# Patient Record
Sex: FEMALE | Race: ASIAN | NOT HISPANIC OR LATINO | Employment: OTHER | URBAN - METROPOLITAN AREA
[De-identification: names, ages, dates, MRNs, and addresses within clinical notes are randomized per-mention and may not be internally consistent; named-entity substitution may affect disease eponyms.]

---

## 2018-01-17 ENCOUNTER — SURGERY - HEALTHEAST (OUTPATIENT)
Dept: GASTROENTEROLOGY | Facility: HOSPITAL | Age: 83
End: 2018-01-17

## 2018-01-18 ASSESSMENT — MIFFLIN-ST. JEOR: SCORE: 960.71

## 2018-01-19 ENCOUNTER — AMBULATORY - HEALTHEAST (OUTPATIENT)
Dept: OTHER | Facility: CLINIC | Age: 83
End: 2018-01-19

## 2018-01-19 ENCOUNTER — COMMUNICATION - HEALTHEAST (OUTPATIENT)
Dept: NEUROSURGERY | Facility: CLINIC | Age: 83
End: 2018-01-19

## 2018-01-19 DIAGNOSIS — S22.080A T12 COMPRESSION FRACTURE (H): ICD-10-CM

## 2018-01-19 ASSESSMENT — MIFFLIN-ST. JEOR: SCORE: 961.62

## 2018-01-20 ENCOUNTER — HOME CARE/HOSPICE - HEALTHEAST (OUTPATIENT)
Dept: HOME HEALTH SERVICES | Facility: HOME HEALTH | Age: 83
End: 2018-01-20

## 2018-01-22 ENCOUNTER — HOME CARE/HOSPICE - HEALTHEAST (OUTPATIENT)
Dept: HOME HEALTH SERVICES | Facility: HOME HEALTH | Age: 83
End: 2018-01-22

## 2018-01-25 ENCOUNTER — HOME CARE/HOSPICE - HEALTHEAST (OUTPATIENT)
Dept: HOME HEALTH SERVICES | Facility: HOME HEALTH | Age: 83
End: 2018-01-25

## 2018-01-26 ENCOUNTER — HOME CARE/HOSPICE - HEALTHEAST (OUTPATIENT)
Dept: HOME HEALTH SERVICES | Facility: HOME HEALTH | Age: 83
End: 2018-01-26

## 2018-01-29 ENCOUNTER — HOME CARE/HOSPICE - HEALTHEAST (OUTPATIENT)
Dept: HOME HEALTH SERVICES | Facility: HOME HEALTH | Age: 83
End: 2018-01-29

## 2018-01-31 ENCOUNTER — HOME CARE/HOSPICE - HEALTHEAST (OUTPATIENT)
Dept: HOME HEALTH SERVICES | Facility: HOME HEALTH | Age: 83
End: 2018-01-31

## 2018-02-01 ENCOUNTER — HOME CARE/HOSPICE - HEALTHEAST (OUTPATIENT)
Dept: HOME HEALTH SERVICES | Facility: HOME HEALTH | Age: 83
End: 2018-02-01

## 2018-02-05 ENCOUNTER — OFFICE VISIT - HEALTHEAST (OUTPATIENT)
Dept: NEUROSURGERY | Facility: CLINIC | Age: 83
End: 2018-02-05

## 2018-02-05 ENCOUNTER — HOSPITAL ENCOUNTER (OUTPATIENT)
Dept: RADIOLOGY | Facility: HOSPITAL | Age: 83
Discharge: HOME | End: 2018-02-05
Attending: NEUROLOGICAL SURGERY

## 2018-02-05 DIAGNOSIS — S22.080A T12 COMPRESSION FRACTURE (H): ICD-10-CM

## 2018-02-05 DIAGNOSIS — S22.000A THORACIC COMPRESSION FRACTURE (H): ICD-10-CM

## 2018-02-08 ENCOUNTER — HOME CARE/HOSPICE - HEALTHEAST (OUTPATIENT)
Dept: HOME HEALTH SERVICES | Facility: HOME HEALTH | Age: 83
End: 2018-02-08

## 2018-03-05 ENCOUNTER — OFFICE VISIT - HEALTHEAST (OUTPATIENT)
Dept: NEUROSURGERY | Facility: CLINIC | Age: 83
End: 2018-03-05

## 2018-03-05 DIAGNOSIS — S22.080A T12 COMPRESSION FRACTURE (H): ICD-10-CM

## 2018-03-13 ENCOUNTER — HOSPITAL ENCOUNTER (OUTPATIENT)
Dept: RADIOLOGY | Facility: HOSPITAL | Age: 83
Discharge: HOME | End: 2018-03-13

## 2018-03-13 ENCOUNTER — OFFICE VISIT - HEALTHEAST (OUTPATIENT)
Dept: NEUROSURGERY | Facility: CLINIC | Age: 83
End: 2018-03-13

## 2018-03-13 DIAGNOSIS — S22.000A THORACIC COMPRESSION FRACTURE (H): ICD-10-CM

## 2018-03-13 DIAGNOSIS — S22.080A T12 COMPRESSION FRACTURE (H): ICD-10-CM

## 2018-03-14 ENCOUNTER — OFFICE VISIT - HEALTHEAST (OUTPATIENT)
Dept: NEUROSURGERY | Facility: CLINIC | Age: 83
End: 2018-03-14

## 2018-03-14 DIAGNOSIS — S22.080A T12 COMPRESSION FRACTURE (H): ICD-10-CM

## 2018-03-14 ASSESSMENT — MIFFLIN-ST. JEOR: SCORE: 960.71

## 2018-03-23 ENCOUNTER — HOME CARE/HOSPICE - HEALTHEAST (OUTPATIENT)
Dept: HOME HEALTH SERVICES | Facility: HOME HEALTH | Age: 83
End: 2018-03-23

## 2018-03-25 ENCOUNTER — HOME CARE/HOSPICE - HEALTHEAST (OUTPATIENT)
Dept: HOME HEALTH SERVICES | Facility: HOME HEALTH | Age: 83
End: 2018-03-25

## 2018-04-17 ENCOUNTER — COMMUNICATION - HEALTHEAST (OUTPATIENT)
Dept: NEUROSURGERY | Facility: CLINIC | Age: 83
End: 2018-04-17

## 2018-04-18 ENCOUNTER — HOSPITAL ENCOUNTER (OUTPATIENT)
Dept: RADIOLOGY | Facility: HOSPITAL | Age: 83
Discharge: HOME | End: 2018-04-18

## 2018-04-18 ENCOUNTER — OFFICE VISIT - HEALTHEAST (OUTPATIENT)
Dept: NEUROSURGERY | Facility: CLINIC | Age: 83
End: 2018-04-18

## 2018-04-18 DIAGNOSIS — S22.080A T12 COMPRESSION FRACTURE (H): ICD-10-CM

## 2018-04-20 ENCOUNTER — HOSPITAL ENCOUNTER (OUTPATIENT)
Dept: MRI IMAGING | Facility: CLINIC | Age: 83
Discharge: HOME | End: 2018-04-20
Attending: INTERPRETER

## 2018-04-20 DIAGNOSIS — S22.080A T12 COMPRESSION FRACTURE (H): ICD-10-CM

## 2018-04-24 ENCOUNTER — OFFICE VISIT - HEALTHEAST (OUTPATIENT)
Dept: NEUROSURGERY | Facility: CLINIC | Age: 83
End: 2018-04-24

## 2018-04-24 DIAGNOSIS — S22.080A T12 COMPRESSION FRACTURE (H): ICD-10-CM

## 2018-04-24 DIAGNOSIS — R25.1 EPISODE OF SHAKING: ICD-10-CM

## 2018-04-27 ENCOUNTER — RECORDS - HEALTHEAST (OUTPATIENT)
Dept: ADMINISTRATIVE | Facility: OTHER | Age: 83
End: 2018-04-27

## 2018-04-27 ENCOUNTER — RECORDS - HEALTHEAST (OUTPATIENT)
Dept: LAB | Facility: HOSPITAL | Age: 83
End: 2018-04-27

## 2018-04-27 LAB
ALBUMIN SERPL-MCNC: 3 G/DL (ref 3.5–5)
ALP SERPL-CCNC: 68 U/L (ref 45–120)
ALT SERPL W P-5'-P-CCNC: 11 U/L (ref 0–45)
ANION GAP SERPL CALCULATED.3IONS-SCNC: 7 MMOL/L (ref 5–18)
AST SERPL W P-5'-P-CCNC: 16 U/L (ref 0–40)
BILIRUB SERPL-MCNC: 0.4 MG/DL (ref 0–1)
BUN SERPL-MCNC: 15 MG/DL (ref 8–28)
CALCIUM SERPL-MCNC: 9.2 MG/DL (ref 8.5–10.5)
CHLORIDE BLD-SCNC: 102 MMOL/L (ref 98–107)
CO2 SERPL-SCNC: 27 MMOL/L (ref 22–31)
CREAT SERPL-MCNC: 0.88 MG/DL (ref 0.6–1.1)
GFR SERPL CREATININE-BSD FRML MDRD: >60 ML/MIN/1.73M2
GLUCOSE BLD-MCNC: 151 MG/DL (ref 70–125)
POTASSIUM BLD-SCNC: 4 MMOL/L (ref 3.5–5)
PROT SERPL-MCNC: 8.5 G/DL (ref 6–8)
SODIUM SERPL-SCNC: 136 MMOL/L (ref 136–145)
TSH SERPL DL<=0.005 MIU/L-ACNC: 3.25 UIU/ML (ref 0.3–5)
VIT B12 SERPL-MCNC: 720 PG/ML (ref 213–816)

## 2018-04-29 LAB — COPPER SERPL-MCNC: 103 UG/DL (ref 80–155)

## 2018-05-04 ENCOUNTER — RECORDS - HEALTHEAST (OUTPATIENT)
Dept: ADMINISTRATIVE | Facility: OTHER | Age: 83
End: 2018-05-04

## 2018-05-04 ENCOUNTER — RECORDS - HEALTHEAST (OUTPATIENT)
Dept: BONE DENSITY | Facility: CLINIC | Age: 83
End: 2018-05-04

## 2018-05-04 DIAGNOSIS — S22.080A WEDGE COMPRESSION FRACTURE OF T11-T12 VERTEBRA, INITIAL ENCOUNTER FOR CLOSED FRACTURE (H): ICD-10-CM

## 2018-05-07 ENCOUNTER — HOSPITAL ENCOUNTER (OUTPATIENT)
Dept: MRI IMAGING | Facility: CLINIC | Age: 83
Discharge: HOME | End: 2018-05-07
Attending: PSYCHIATRY & NEUROLOGY

## 2018-05-07 DIAGNOSIS — R20.9 SENSORY DISTURBANCE: ICD-10-CM

## 2018-06-05 ENCOUNTER — COMMUNICATION - HEALTHEAST (OUTPATIENT)
Dept: NEUROSURGERY | Facility: CLINIC | Age: 83
End: 2018-06-05

## 2018-06-13 ENCOUNTER — AMBULATORY - HEALTHEAST (OUTPATIENT)
Dept: NEUROSURGERY | Facility: CLINIC | Age: 83
End: 2018-06-13

## 2018-06-19 ENCOUNTER — OFFICE VISIT - HEALTHEAST (OUTPATIENT)
Dept: NEUROSURGERY | Facility: CLINIC | Age: 83
End: 2018-06-19

## 2018-06-19 DIAGNOSIS — M25.552 BILATERAL HIP PAIN: ICD-10-CM

## 2018-06-19 DIAGNOSIS — M25.551 BILATERAL HIP PAIN: ICD-10-CM

## 2018-06-19 ASSESSMENT — MIFFLIN-ST. JEOR: SCORE: 870

## 2018-06-29 ENCOUNTER — HOSPITAL ENCOUNTER (OUTPATIENT)
Dept: PHYSICAL MEDICINE AND REHAB | Facility: CLINIC | Age: 83
Discharge: HOME | End: 2018-06-29
Attending: PHYSICAL MEDICINE & REHABILITATION

## 2018-06-29 DIAGNOSIS — M12.88 OTHER SPECIFIC ARTHROPATHIES, NOT ELSEWHERE CLASSIFIED, OTHER SPECIFIED SITE: ICD-10-CM

## 2018-06-29 DIAGNOSIS — M54.50 LUMBAR SPINE PAIN: ICD-10-CM

## 2018-06-29 DIAGNOSIS — M25.551 BILATERAL HIP PAIN: ICD-10-CM

## 2018-06-29 DIAGNOSIS — M48.061 LUMBAR STENOSIS: ICD-10-CM

## 2018-06-29 DIAGNOSIS — R20.2 PARESTHESIAS: ICD-10-CM

## 2018-06-29 DIAGNOSIS — M43.16 SPONDYLOLISTHESIS, LUMBAR REGION: ICD-10-CM

## 2018-06-29 DIAGNOSIS — M25.552 BILATERAL HIP PAIN: ICD-10-CM

## 2018-06-29 DIAGNOSIS — M47.816 ARTHROPATHY OF LUMBAR FACET JOINT: ICD-10-CM

## 2018-06-29 LAB — GLUCOSE BLDC GLUCOMTR-MCNC: 140 MG/DL (ref 70–125)

## 2018-07-13 ENCOUNTER — HOSPITAL ENCOUNTER (OUTPATIENT)
Dept: PHYSICAL MEDICINE AND REHAB | Facility: CLINIC | Age: 83
Discharge: HOME | End: 2018-07-13
Attending: PHYSICIAN ASSISTANT

## 2018-07-13 DIAGNOSIS — M25.552 BILATERAL HIP PAIN: ICD-10-CM

## 2018-07-13 DIAGNOSIS — M54.50 LUMBAR SPINE PAIN: ICD-10-CM

## 2018-07-13 DIAGNOSIS — M25.551 BILATERAL HIP PAIN: ICD-10-CM

## 2018-07-13 DIAGNOSIS — M47.816 ARTHROPATHY OF LUMBAR FACET JOINT: ICD-10-CM

## 2018-07-13 DIAGNOSIS — R20.2 PARESTHESIAS: ICD-10-CM

## 2018-07-13 DIAGNOSIS — S22.080A T12 COMPRESSION FRACTURE (H): ICD-10-CM

## 2018-07-20 ENCOUNTER — HOSPITAL ENCOUNTER (OUTPATIENT)
Dept: MRI IMAGING | Facility: HOSPITAL | Age: 83
Discharge: HOME | End: 2018-07-20
Attending: PHYSICIAN ASSISTANT

## 2018-07-20 DIAGNOSIS — R20.2 PARESTHESIAS: ICD-10-CM

## 2018-07-20 DIAGNOSIS — M12.88 OTHER SPECIFIC ARTHROPATHIES, NOT ELSEWHERE CLASSIFIED, OTHER SPECIFIED SITE: ICD-10-CM

## 2018-07-20 DIAGNOSIS — M25.552 BILATERAL HIP PAIN: ICD-10-CM

## 2018-07-20 DIAGNOSIS — M47.816 ARTHROPATHY OF LUMBAR FACET JOINT: ICD-10-CM

## 2018-07-20 DIAGNOSIS — M25.551 BILATERAL HIP PAIN: ICD-10-CM

## 2018-07-20 DIAGNOSIS — M54.50 LUMBAR SPINE PAIN: ICD-10-CM

## 2018-07-23 ENCOUNTER — COMMUNICATION - HEALTHEAST (OUTPATIENT)
Dept: PHYSICAL MEDICINE AND REHAB | Facility: CLINIC | Age: 83
End: 2018-07-23

## 2018-07-27 ENCOUNTER — HOSPITAL ENCOUNTER (OUTPATIENT)
Dept: PHYSICAL MEDICINE AND REHAB | Facility: CLINIC | Age: 83
Discharge: HOME | End: 2018-07-27
Attending: PHYSICAL MEDICINE & REHABILITATION

## 2018-07-27 DIAGNOSIS — M43.16 SPONDYLOLISTHESIS, LUMBAR REGION: ICD-10-CM

## 2018-07-27 DIAGNOSIS — M48.061 LUMBAR STENOSIS: ICD-10-CM

## 2018-07-27 DIAGNOSIS — M54.50 LUMBAR SPINE PAIN: ICD-10-CM

## 2018-07-27 DIAGNOSIS — S22.080A T12 COMPRESSION FRACTURE (H): ICD-10-CM

## 2018-07-27 ASSESSMENT — MIFFLIN-ST. JEOR: SCORE: 867.73

## 2018-07-31 ENCOUNTER — HOSPITAL ENCOUNTER (OUTPATIENT)
Dept: PHYSICAL MEDICINE AND REHAB | Facility: CLINIC | Age: 83
Discharge: HOME | End: 2018-07-31
Attending: PHYSICAL MEDICINE & REHABILITATION

## 2018-07-31 DIAGNOSIS — M48.061 LUMBAR STENOSIS: ICD-10-CM

## 2018-07-31 DIAGNOSIS — M54.50 LUMBAR SPINE PAIN: ICD-10-CM

## 2018-08-14 ENCOUNTER — HOSPITAL ENCOUNTER (OUTPATIENT)
Dept: PHYSICAL MEDICINE AND REHAB | Facility: CLINIC | Age: 83
Discharge: HOME | End: 2018-08-14
Attending: NURSE PRACTITIONER

## 2018-08-14 DIAGNOSIS — M48.061 SPINAL STENOSIS OF LUMBAR REGION WITHOUT NEUROGENIC CLAUDICATION: ICD-10-CM

## 2018-08-14 DIAGNOSIS — M47.816 ARTHROPATHY OF LUMBAR FACET JOINT: ICD-10-CM

## 2018-08-14 DIAGNOSIS — M54.16 LUMBAR RADICULITIS: ICD-10-CM

## 2018-08-14 DIAGNOSIS — S22.080A T12 COMPRESSION FRACTURE (H): ICD-10-CM

## 2018-08-14 DIAGNOSIS — M43.16 SPONDYLOLISTHESIS, LUMBAR REGION: ICD-10-CM

## 2018-08-16 ENCOUNTER — HOSPITAL ENCOUNTER (OUTPATIENT)
Dept: PHYSICAL MEDICINE AND REHAB | Facility: CLINIC | Age: 83
Discharge: HOME | End: 2018-08-16
Attending: PHYSICAL MEDICINE & REHABILITATION

## 2018-08-16 DIAGNOSIS — M54.16 LUMBAR RADICULITIS: ICD-10-CM

## 2018-08-30 ENCOUNTER — HOSPITAL ENCOUNTER (OUTPATIENT)
Dept: PHYSICAL MEDICINE AND REHAB | Facility: CLINIC | Age: 83
Discharge: HOME | End: 2018-08-30
Attending: NURSE PRACTITIONER

## 2018-08-30 DIAGNOSIS — M54.16 LUMBAR RADICULITIS: ICD-10-CM

## 2018-08-30 DIAGNOSIS — M47.816 ARTHROPATHY OF LUMBAR FACET JOINT: ICD-10-CM

## 2018-08-30 DIAGNOSIS — R29.898 WEAKNESS OF BOTH LOWER EXTREMITIES: ICD-10-CM

## 2018-08-30 DIAGNOSIS — M43.16 SPONDYLOLISTHESIS, LUMBAR REGION: ICD-10-CM

## 2018-08-30 DIAGNOSIS — M48.061 LUMBAR STENOSIS: ICD-10-CM

## 2018-08-30 DIAGNOSIS — R20.2 PARESTHESIAS: ICD-10-CM

## 2018-08-30 DIAGNOSIS — M54.50 LUMBAR SPINE PAIN: ICD-10-CM

## 2018-08-30 DIAGNOSIS — M48.061 SPINAL STENOSIS OF LUMBAR REGION WITHOUT NEUROGENIC CLAUDICATION: ICD-10-CM

## 2018-10-09 ENCOUNTER — HOSPITAL ENCOUNTER (OUTPATIENT)
Dept: RADIOLOGY | Facility: CLINIC | Age: 83
Discharge: HOME | End: 2018-10-09
Attending: NEUROLOGICAL SURGERY

## 2018-10-09 ENCOUNTER — OFFICE VISIT - HEALTHEAST (OUTPATIENT)
Dept: NEUROSURGERY | Facility: CLINIC | Age: 83
End: 2018-10-09

## 2018-10-09 DIAGNOSIS — M25.552 BILATERAL HIP PAIN: ICD-10-CM

## 2018-10-09 DIAGNOSIS — M25.551 BILATERAL HIP PAIN: ICD-10-CM

## 2018-10-09 DIAGNOSIS — M48.062 SPINAL STENOSIS, LUMBAR REGION, WITH NEUROGENIC CLAUDICATION: ICD-10-CM

## 2018-10-09 DIAGNOSIS — S22.080A T12 COMPRESSION FRACTURE (H): ICD-10-CM

## 2018-10-09 ASSESSMENT — MIFFLIN-ST. JEOR: SCORE: 888.14

## 2018-10-23 ENCOUNTER — RECORDS - HEALTHEAST (OUTPATIENT)
Dept: ADMINISTRATIVE | Facility: OTHER | Age: 83
End: 2018-10-23

## 2018-10-24 ENCOUNTER — RECORDS - HEALTHEAST (OUTPATIENT)
Dept: ADMINISTRATIVE | Facility: OTHER | Age: 83
End: 2018-10-24

## 2018-10-30 ENCOUNTER — RECORDS - HEALTHEAST (OUTPATIENT)
Dept: ADMINISTRATIVE | Facility: OTHER | Age: 83
End: 2018-10-30

## 2018-11-01 ENCOUNTER — HOSPITAL ENCOUNTER (OUTPATIENT)
Dept: PHYSICAL MEDICINE AND REHAB | Facility: CLINIC | Age: 83
Discharge: HOME | End: 2018-11-01
Attending: NEUROLOGICAL SURGERY

## 2018-11-01 DIAGNOSIS — M48.062 SPINAL STENOSIS, LUMBAR REGION, WITH NEUROGENIC CLAUDICATION: ICD-10-CM

## 2018-11-02 ENCOUNTER — HOSPITAL ENCOUNTER (OUTPATIENT)
Dept: CARDIOLOGY | Facility: HOSPITAL | Age: 83
Discharge: HOME | End: 2018-11-02
Attending: INTERPRETER

## 2018-11-02 DIAGNOSIS — R60.9 EDEMA: ICD-10-CM

## 2018-11-02 ASSESSMENT — MIFFLIN-ST. JEOR: SCORE: 858.66

## 2018-11-05 ENCOUNTER — RECORDS - HEALTHEAST (OUTPATIENT)
Dept: ADMINISTRATIVE | Facility: OTHER | Age: 83
End: 2018-11-05

## 2018-11-05 LAB
AORTIC ARCH: 2.8 CM
AORTIC ROOT: 3.6 CM
AORTIC VALVE MEAN VELOCITY: 100 CM/S
ASCENDING AORTA: 3.6 CM
AV DIMENSIONLESS INDEX VTI: 0.5
AV MEAN GRADIENT: 5 MMHG
AV PEAK GRADIENT: 7.7 MMHG
AV VALVE AREA: 1.8 CM2
AV VELOCITY RATIO: 0.5
BSA FOR ECHO PROCEDURE: 1.47 M2
CV BLOOD PRESSURE: NORMAL MMHG
CV ECHO HEIGHT: 57 IN
CV ECHO WEIGHT: 119 LBS
DOP CALC AO PEAK VEL: 139 CM/S
DOP CALC AO VTI: 30.7 CM
DOP CALC LVOT AREA: 3.46 CM2
DOP CALC LVOT DIAMETER: 2.1 CM
DOP CALC LVOT PEAK VEL: 62.8 CM/S
DOP CALC LVOT STROKE VOLUME: 55 CM3
DOP CALCLVOT PEAK VEL VTI: 15.9 CM
EJECTION FRACTION: 56 % (ref 55–75)
FRACTIONAL SHORTENING: 41.2 % (ref 28–44)
INTERVENTRICULAR SEPTUM IN END DIASTOLE: 0.95 CM (ref 0.6–0.9)
IVS/PW RATIO: 1.1
LA AREA 1: 14.8 CM2
LA AREA 2: 12.1 CM2
LEFT ATRIUM LENGTH: 3.9 CM
LEFT ATRIUM SIZE: 2.8 CM
LEFT ATRIUM TO AORTIC ROOT RATIO: 0.78 NO UNITS
LEFT ATRIUM VOLUME INDEX: 26.6 ML/M2
LEFT ATRIUM VOLUME: 39 ML
LEFT VENTRICLE CARDIAC INDEX: 2.6 L/MIN/M2
LEFT VENTRICLE CARDIAC OUTPUT: 3.8 L/MIN
LEFT VENTRICLE DIASTOLIC VOLUME INDEX: 35.7 CM3/M2 (ref 34–74)
LEFT VENTRICLE DIASTOLIC VOLUME: 52.5 CM3 (ref 46–106)
LEFT VENTRICLE HEART RATE: 69 BPM
LEFT VENTRICLE MASS INDEX: 81.8 G/M2
LEFT VENTRICLE SYSTOLIC VOLUME INDEX: 15.7 CM3/M2 (ref 11–31)
LEFT VENTRICLE SYSTOLIC VOLUME: 23.1 CM3 (ref 14–42)
LEFT VENTRICULAR INTERNAL DIMENSION IN DIASTOLE: 4.13 CM (ref 3.8–5.2)
LEFT VENTRICULAR INTERNAL DIMENSION IN SYSTOLE: 2.43 CM (ref 2.2–3.5)
LEFT VENTRICULAR MASS: 120.3 G
LEFT VENTRICULAR OUTFLOW TRACT MEAN GRADIENT: 1 MMHG
LEFT VENTRICULAR OUTFLOW TRACT MEAN VELOCITY: 45.5 CM/S
LEFT VENTRICULAR OUTFLOW TRACT PEAK GRADIENT: 2 MMHG
LEFT VENTRICULAR POSTERIOR WALL IN END DIASTOLE: 0.9 CM (ref 0.6–0.9)
LV STROKE VOLUME INDEX: 37.4 ML/M2
MITRAL VALVE DECELERATION SLOPE: 2100 MM/S2
MITRAL VALVE E/A RATIO: 0.6
MITRAL VALVE PRESSURE HALF-TIME: 87 MS
MV AVERAGE E/E' RATIO: 12.6 CM/S
MV DECELERATION TIME: 296 MS
MV E'TISSUE VEL-LAT: 4.74 CM/S
MV E'TISSUE VEL-MED: 5.13 CM/S
MV LATERAL E/E' RATIO: 13.1
MV MEDIAL E/E' RATIO: 12.1
MV PEAK A VELOCITY: 101 CM/S
MV PEAK E VELOCITY: 62.1 CM/S
MV VALVE AREA PRESSURE 1/2 METHOD: 2.5 CM2
NUC REST DIASTOLIC VOLUME INDEX: 1904 LBS
NUC REST SYSTOLIC VOLUME INDEX: 57 IN
RIGHT VENTRICULAR INTERNAL DIMENSION IN DYSTOLE: 3.26 CM
TRICUSPID REGURGITATION PEAK PRESSURE GRADIENT: 18.1 MMHG
TRICUSPID VALVE ANULAR PLANE SYSTOLIC EXCURSION: 1.8 CM
TRICUSPID VALVE PEAK REGURGITANT VELOCITY: 213 CM/S

## 2018-12-11 ENCOUNTER — TRANSFERRED RECORDS (OUTPATIENT)
Dept: HEALTH INFORMATION MANAGEMENT | Facility: CLINIC | Age: 83
End: 2018-12-11

## 2018-12-11 ENCOUNTER — AMBULATORY - HEALTHEAST (OUTPATIENT)
Dept: INFUSION THERAPY | Facility: HOSPITAL | Age: 83
End: 2018-12-11

## 2018-12-11 ENCOUNTER — OFFICE VISIT - HEALTHEAST (OUTPATIENT)
Dept: ONCOLOGY | Facility: HOSPITAL | Age: 83
End: 2018-12-11

## 2018-12-11 DIAGNOSIS — D50.0 ANEMIA DUE TO GI BLOOD LOSS: ICD-10-CM

## 2018-12-11 LAB
BASOPHILS # BLD AUTO: 0.1 THOU/UL (ref 0–0.2)
BASOPHILS NFR BLD AUTO: 1 % (ref 0–2)
EOSINOPHIL # BLD AUTO: 0.2 THOU/UL (ref 0–0.4)
EOSINOPHIL NFR BLD AUTO: 3 % (ref 0–6)
ERYTHROCYTE [DISTWIDTH] IN BLOOD BY AUTOMATED COUNT: 14.6 % (ref 11–14.5)
FERRITIN SERPL-MCNC: 35 NG/ML (ref 10–130)
HCT VFR BLD AUTO: 34 % (ref 35–47)
HGB BLD-MCNC: 11.1 G/DL (ref 12–16)
IGA SERPL-MCNC: 2632 MG/DL
IGA SERPL-MCNC: 619 MG/DL (ref 65–400)
IGM SERPL-MCNC: 96 MG/DL (ref 60–280)
IRON SATN MFR SERPL: 16 % (ref 20–50)
IRON SERPL-MCNC: 47 UG/DL (ref 42–175)
LYMPHOCYTES # BLD AUTO: 2 THOU/UL (ref 0.8–4.4)
LYMPHOCYTES NFR BLD AUTO: 34 % (ref 20–40)
MCH RBC QN AUTO: 33.6 PG (ref 27–34)
MCHC RBC AUTO-ENTMCNC: 32.6 G/DL (ref 32–36)
MCV RBC AUTO: 103 FL (ref 80–100)
MONOCYTES # BLD AUTO: 0.9 THOU/UL (ref 0–0.9)
MONOCYTES NFR BLD AUTO: 16 % (ref 2–10)
NEUTROPHILS # BLD AUTO: 2.6 THOU/UL (ref 2–7.7)
NEUTROPHILS NFR BLD AUTO: 46 % (ref 50–70)
PLATELET # BLD AUTO: 226 THOU/UL (ref 140–440)
PMV BLD AUTO: 8.3 FL (ref 8.5–12.5)
RBC # BLD AUTO: 3.3 MILL/UL (ref 3.8–5.4)
TIBC SERPL-MCNC: 297 UG/DL (ref 313–563)
TRANSFERRIN SERPL-MCNC: 237 MG/DL (ref 212–360)
WBC: 5.7 THOU/UL (ref 4–11)

## 2018-12-13 LAB
EPO SERPL-ACNC: 26 MU/ML (ref 4–27)
KAPPA LC FREE SER-MCNC: 5.75 MG/DL (ref 0.33–1.94)
KAPPA LC FREE/LAMBDA FREE SER NEPH: 0.79 {RATIO} (ref 0.26–1.65)
LAMBDA LC FREE SERPL-MCNC: 7.32 MG/DL (ref 0.57–2.63)

## 2019-01-04 ENCOUNTER — AMBULATORY - HEALTHEAST (OUTPATIENT)
Dept: CARDIOLOGY | Facility: CLINIC | Age: 84
End: 2019-01-04

## 2019-01-04 ENCOUNTER — RECORDS - HEALTHEAST (OUTPATIENT)
Dept: ADMINISTRATIVE | Facility: OTHER | Age: 84
End: 2019-01-04

## 2019-01-11 ENCOUNTER — OFFICE VISIT - HEALTHEAST (OUTPATIENT)
Dept: CARDIOLOGY | Facility: CLINIC | Age: 84
End: 2019-01-11

## 2019-01-11 DIAGNOSIS — I50.30 HEART FAILURE WITH PRESERVED EJECTION FRACTION (H): ICD-10-CM

## 2019-01-11 DIAGNOSIS — I10 ACCELERATED HYPERTENSION: ICD-10-CM

## 2019-01-11 ASSESSMENT — MIFFLIN-ST. JEOR: SCORE: 840.52

## 2019-01-22 ENCOUNTER — OFFICE VISIT - HEALTHEAST (OUTPATIENT)
Dept: ONCOLOGY | Facility: HOSPITAL | Age: 84
End: 2019-01-22

## 2019-01-22 ENCOUNTER — AMBULATORY - HEALTHEAST (OUTPATIENT)
Dept: INFUSION THERAPY | Facility: HOSPITAL | Age: 84
End: 2019-01-22

## 2019-01-22 DIAGNOSIS — D50.0 ANEMIA DUE TO GI BLOOD LOSS: ICD-10-CM

## 2019-01-22 LAB
BASOPHILS # BLD AUTO: 0 THOU/UL (ref 0–0.2)
BASOPHILS NFR BLD AUTO: 0 % (ref 0–2)
EOSINOPHIL # BLD AUTO: 0.1 THOU/UL (ref 0–0.4)
EOSINOPHIL NFR BLD AUTO: 2 % (ref 0–6)
ERYTHROCYTE [DISTWIDTH] IN BLOOD BY AUTOMATED COUNT: 14.9 % (ref 11–14.5)
HCT VFR BLD AUTO: 35 % (ref 35–47)
HGB BLD-MCNC: 11.9 G/DL (ref 12–16)
LYMPHOCYTES # BLD AUTO: 2 THOU/UL (ref 0.8–4.4)
LYMPHOCYTES NFR BLD AUTO: 37 % (ref 20–40)
MCH RBC QN AUTO: 34 PG (ref 27–34)
MCHC RBC AUTO-ENTMCNC: 34 G/DL (ref 32–36)
MCV RBC AUTO: 100 FL (ref 80–100)
MONOCYTES # BLD AUTO: 0.8 THOU/UL (ref 0–0.9)
MONOCYTES NFR BLD AUTO: 14 % (ref 2–10)
NEUTROPHILS # BLD AUTO: 2.5 THOU/UL (ref 2–7.7)
NEUTROPHILS NFR BLD AUTO: 46 % (ref 50–70)
PLATELET # BLD AUTO: 304 THOU/UL (ref 140–440)
PMV BLD AUTO: 8.5 FL (ref 8.5–12.5)
RBC # BLD AUTO: 3.5 MILL/UL (ref 3.8–5.4)
WBC: 5.5 THOU/UL (ref 4–11)

## 2019-05-16 ENCOUNTER — AMBULATORY - HEALTHEAST (OUTPATIENT)
Dept: ADMINISTRATIVE | Facility: REHABILITATION | Age: 84
End: 2019-05-16

## 2019-05-16 DIAGNOSIS — F41.1 GAD (GENERALIZED ANXIETY DISORDER): ICD-10-CM

## 2019-05-16 DIAGNOSIS — M19.049 ARTHRITIS OF HAND: ICD-10-CM

## 2019-07-19 ENCOUNTER — AMBULATORY - HEALTHEAST (OUTPATIENT)
Dept: INFUSION THERAPY | Facility: HOSPITAL | Age: 84
End: 2019-07-19

## 2019-07-19 ENCOUNTER — TRANSFERRED RECORDS (OUTPATIENT)
Dept: HEALTH INFORMATION MANAGEMENT | Facility: CLINIC | Age: 84
End: 2019-07-19

## 2019-07-19 ENCOUNTER — OFFICE VISIT - HEALTHEAST (OUTPATIENT)
Dept: ONCOLOGY | Facility: HOSPITAL | Age: 84
End: 2019-07-19

## 2019-07-19 DIAGNOSIS — C05.1 CANCER OF SOFT PALATE (H): ICD-10-CM

## 2019-07-19 DIAGNOSIS — D50.0 ANEMIA DUE TO GI BLOOD LOSS: ICD-10-CM

## 2019-07-19 LAB
BASOPHILS # BLD AUTO: 0 THOU/UL (ref 0–0.2)
BASOPHILS NFR BLD AUTO: 0 % (ref 0–2)
EOSINOPHIL # BLD AUTO: 0.2 THOU/UL (ref 0–0.4)
EOSINOPHIL NFR BLD AUTO: 2 % (ref 0–6)
ERYTHROCYTE [DISTWIDTH] IN BLOOD BY AUTOMATED COUNT: 14.3 % (ref 11–14.5)
HCT VFR BLD AUTO: 33.5 % (ref 35–47)
HGB BLD-MCNC: 11.2 G/DL (ref 12–16)
LYMPHOCYTES # BLD AUTO: 1.3 THOU/UL (ref 0.8–4.4)
LYMPHOCYTES NFR BLD AUTO: 20 % (ref 20–40)
MCH RBC QN AUTO: 35.3 PG (ref 27–34)
MCHC RBC AUTO-ENTMCNC: 33.4 G/DL (ref 32–36)
MCV RBC AUTO: 106 FL (ref 80–100)
MONOCYTES # BLD AUTO: 1.2 THOU/UL (ref 0–0.9)
MONOCYTES NFR BLD AUTO: 18 % (ref 2–10)
NEUTROPHILS # BLD AUTO: 4 THOU/UL (ref 2–7.7)
NEUTROPHILS NFR BLD AUTO: 60 % (ref 50–70)
PLATELET # BLD AUTO: 240 THOU/UL (ref 140–440)
PMV BLD AUTO: 8.2 FL (ref 8.5–12.5)
RBC # BLD AUTO: 3.17 MILL/UL (ref 3.8–5.4)
WBC: 6.7 THOU/UL (ref 4–11)

## 2019-07-25 ENCOUNTER — TRANSFERRED RECORDS (OUTPATIENT)
Dept: HEALTH INFORMATION MANAGEMENT | Facility: CLINIC | Age: 84
End: 2019-07-25

## 2019-07-25 ENCOUNTER — HOSPITAL ENCOUNTER (OUTPATIENT)
Dept: PET IMAGING | Facility: HOSPITAL | Age: 84
Setting detail: RADIATION/ONCOLOGY SERIES
Discharge: STILL ADMITTED OR ADMINISTRATIVE | End: 2019-07-25
Attending: INTERNAL MEDICINE

## 2019-07-25 DIAGNOSIS — C05.1 CANCER OF SOFT PALATE (H): ICD-10-CM

## 2019-07-25 LAB — GLUCOSE BLDC GLUCOMTR-MCNC: 105 MG/DL (ref 70–139)

## 2019-07-25 ASSESSMENT — MIFFLIN-ST. JEOR: SCORE: 808.76

## 2019-07-29 ENCOUNTER — RECORDS - HEALTHEAST (OUTPATIENT)
Dept: ADMINISTRATIVE | Facility: OTHER | Age: 84
End: 2019-07-29

## 2019-07-29 ENCOUNTER — TRANSFERRED RECORDS (OUTPATIENT)
Dept: HEALTH INFORMATION MANAGEMENT | Facility: CLINIC | Age: 84
End: 2019-07-29

## 2019-08-08 ENCOUNTER — TRANSFERRED RECORDS (OUTPATIENT)
Dept: HEALTH INFORMATION MANAGEMENT | Facility: CLINIC | Age: 84
End: 2019-08-08

## 2019-08-08 ENCOUNTER — AMBULATORY - HEALTHEAST (OUTPATIENT)
Dept: RADIATION ONCOLOGY | Facility: HOSPITAL | Age: 84
End: 2019-08-08

## 2019-08-08 ENCOUNTER — OFFICE VISIT - HEALTHEAST (OUTPATIENT)
Dept: RADIATION ONCOLOGY | Facility: HOSPITAL | Age: 84
End: 2019-08-08

## 2019-08-08 DIAGNOSIS — C05.1 CANCER OF SOFT PALATE (H): ICD-10-CM

## 2019-08-09 ENCOUNTER — TRANSFERRED RECORDS (OUTPATIENT)
Dept: HEALTH INFORMATION MANAGEMENT | Facility: CLINIC | Age: 84
End: 2019-08-09

## 2019-08-14 ENCOUNTER — TRANSFERRED RECORDS (OUTPATIENT)
Dept: HEALTH INFORMATION MANAGEMENT | Facility: CLINIC | Age: 84
End: 2019-08-14

## 2019-08-14 ENCOUNTER — HOSPITAL ENCOUNTER (OUTPATIENT)
Dept: ULTRASOUND IMAGING | Facility: HOSPITAL | Age: 84
Setting detail: RADIATION/ONCOLOGY SERIES
Discharge: STILL ADMITTED OR ADMINISTRATIVE | End: 2019-08-14
Attending: RADIOLOGY

## 2019-08-14 DIAGNOSIS — C05.1 CANCER OF SOFT PALATE (H): ICD-10-CM

## 2019-08-22 ENCOUNTER — TELEPHONE (OUTPATIENT)
Dept: OTOLARYNGOLOGY | Facility: CLINIC | Age: 84
End: 2019-08-22

## 2019-08-22 ENCOUNTER — OFFICE VISIT - HEALTHEAST (OUTPATIENT)
Dept: RADIATION ONCOLOGY | Facility: HOSPITAL | Age: 84
End: 2019-08-22

## 2019-08-22 ENCOUNTER — MEDICAL CORRESPONDENCE (OUTPATIENT)
Dept: HEALTH INFORMATION MANAGEMENT | Facility: CLINIC | Age: 84
End: 2019-08-22

## 2019-08-22 DIAGNOSIS — C05.1 CANCER OF SOFT PALATE (H): ICD-10-CM

## 2019-08-22 NOTE — TELEPHONE ENCOUNTER
LVM with patient through  letting her know of sooner appt Date/Time with Dr. Cruz.  Aso, sent out appt letter.  Left the call center number in case that doesn't work to reschedule with next available.

## 2019-08-22 NOTE — TELEPHONE ENCOUNTER
PT currently scheduled for next available 9/10/19 and needs sooner appt.  Urgent referral and records being faxed to [phone]

## 2019-08-23 LAB
CAP COMMENT: ABNORMAL
LAB AP CHARGES (HE HISTORICAL CONVERSION): ABNORMAL
LAB AP INITIAL CYTO EVAL (HE HISTORICAL CONVERSION): ABNORMAL
LAB MED GENERAL PATH INTERP (HE HISTORICAL CONVERSION): ABNORMAL
PATH REPORT.ADDENDUM SPEC: ABNORMAL
PATH REPORT.COMMENTS IMP SPEC: ABNORMAL
PATH REPORT.COMMENTS IMP SPEC: ABNORMAL
PATH REPORT.FINAL DX SPEC: ABNORMAL
PATH REPORT.MICROSCOPIC SPEC OTHER STN: ABNORMAL
PATH REPORT.MICROSCOPIC SPEC OTHER STN: ABNORMAL
PATH REPORT.RELEVANT HX SPEC: ABNORMAL
RESULT FLAG (HE HISTORICAL CONVERSION): ABNORMAL
SPECIMEN DESCRIPTION: ABNORMAL

## 2019-08-26 NOTE — PROGRESS NOTES
Otolaryngology Clinic      Name: Chris Bell  MRN: [id number]  Age: 84 year old  : 1935  Referring provider: Dai Lai  2019      Chief Complaint:  Consult     History of Present Illness:   The patient's clinic visit was completed with the assistance of a [employer] .   Chris Bell is a 84 year old female who presents for evaluation of a tongue lesion.  The patient reports about 6 months of a painful tongue lesion, leading her to present to the ER in early 2019.  This was biopsied on 6/3/19 with pathology showing inflammation without evidence of malignancy.  CT scan on 19 showed an enhancing infiltrative mass in the left soft palate extending to the left lateral pharyngeal wall and toward the left palatine tonsil highly concerning for a primary mucosal malignancy.  PET scan on 19 revealed marked FDG activity consistent with malignancy.  Biopsy of the soft palate on 19 again showed ulcer with necrosis and inflammation without evidence of malignancy.  Lymph node biopsy on 19 also did not show evidence of malignancy however due to ongoing clinical concern for cancer, she was referred here for further evaluation.  Currently her worst pain is at the back of the throat and on her palate on the left side.     Active Medications:      alum & mag hydroxide-simethicone (ANTACID LIQUID) 200-200-20 MG/5ML SUSP suspension, 4 MLS MIXED WITH 1 ML LIDOCAINE-MAY TAKE 5 ML PO QID, Disp: , Rfl:      calcium carbonate (TUMS) 500 MG chewable tablet, TK  1  TO  2  TS  PO    Q 1  TO 2      H  PRF  STOMACH  PAINS, Disp: , Rfl:      Ferrous Sulfate 324 (65 Fe) MG TBEC, TK 1 T PO BID, Disp: , Rfl:      furosemide (LASIX) 20 MG tablet, TK 1 T  PO  QAM, Disp: , Rfl:      gabapentin (NEURONTIN) 100 MG capsule, Take 200 mg by mouth, Disp: , Rfl:      levothyroxine (SYNTHROID/LEVOTHROID) 50 MCG tablet, Take 50 mcg by mouth, Disp: , Rfl:      lidocaine (XYLOCAINE) 2 % solution, 1 ML MIXED WITH 4 ML RULOX-MAY  TAKE 5 ML BY MOUTH QID FOR STOMACH PAIN., Disp: , Rfl:      lisinopril (PRINIVIL/ZESTRIL) 5 MG tablet, TK 1 T  PO Q D, Disp: , Rfl:      metoprolol succinate ER (TOPROL-XL) 25 MG 24 hr tablet, Take 25 mg by mouth, Disp: , Rfl:      Multiple Vitamin (DAILY-CHRIS) TABS, TK 1 T  PO  QD, Disp: , Rfl:      omeprazole (PRILOSEC) 20 MG DR capsule, TK 1 C  PO  QD  BEFORE  EATING, Disp: , Rfl:      ranitidine (ZANTAC) 300 MG tablet, TK 1 T  PO  QD  TO  PREVENT  ACID, Disp: , Rfl:      sucralfate (CARAFATE) 1 GM tablet, Take 1 g by mouth, Disp: , Rfl:      traZODone (DESYREL) 50 MG tablet, TK 1 T PO QHS, Disp: , Rfl:      triamcinolone (KENALOG) 0.1 % paste, LISANDRO    SMALL     AMOUNT  BID      UTD    TO  MOUTH, Disp: , Rfl:      vitamin B-12 (CYANOCOBALAMIN) 250 MCG tablet, TK 1  T PO QD, Disp: , Rfl:      acetaminophen (TYLENOL) 500 MG tablet, TAKE 1 PILL (500 MG) EVERY 6 HOURS AS NEEDED/ YOG MOB TXHUA 6 TEEV NOJ 1 LUB, Disp: , Rfl: 0     benzocaine (ORAJEL/ANBESOL) 10 % gel, , Disp: , Rfl:      calcium carbonate (TUMS) 500 MG chewable tablet, TK  1  TO  2  TS  PO    Q 1  TO 2      H  PRF  STOMACH  PAINS, Disp: , Rfl: 3     diclofenac (VOLTAREN) 1 % topical gel, LISANDRO    2  GRAMS   AA     TID  PRN  P, Disp: , Rfl: 1     fentaNYL (DURAGESIC) 12 mcg/hr 72 hr patch, PLACE  1   PATCH  ON  THE  SKIN    Q  THIRD  DAY, Disp: , Rfl: 0     LORazepam (ATIVAN) 0.5 MG tablet, 1 TB BY MOUTH EVERY 6-12 HOURS AS NEEDED FOR ANXIETY, Disp: , Rfl: 0     magnesium hydroxide (MILK OF MAGNESIA) 400 MG/5ML suspension, Take 30 mLs by mouth, Disp: , Rfl:      Magnesium Oxide -Mg Supplement 250 MG tablet, TK 1  T  PO QD, Disp: , Rfl: 3     mirtazapine (REMERON) 15 MG tablet, TK 1 T  PO HS, Disp: , Rfl: 5     oxyCODONE (ROXICODONE) 5 MG/5ML solution, 5-10 ML MLS EVERY 6 HOURS AS NEEDED FOR PAIN RELIEF IN TONGUE, Disp: , Rfl: 0     potassium chloride ER (K-DUR/KLOR-CON M) 20 MEQ CR tablet, Take 20 mEq by mouth, Disp: , Rfl:       Allergies:   No clinical  screening - see comments      Past Medical History:  Hypothyroidism  Hypokalemia   Bleeding ulcer  Heart failure with preserved ejection fraction   Kyphosis of thoracic region   C-spine compression fracture  T12 compression fracture   Hypertension   Osteoporosis  Peptic ulcer disease      Past Surgical History:  FNA lymph node 8/14/19  Esophagogastroduodenoscopy   Hysterectomy     Family History:   History reviewed. No pertinent family history.       Social History:   Presents to clinic with her daughter and law and grandson  Tobacco Use: No previous or current tobacco use.   Alcohol Use: No alcohol use.   PCP: INOCENCIA PRINGLE     Review of Systems:   Pertinent items are noted in HPI or as in patient entered ROS below, remainder of complete ROS is negative.   Answers for HPI/ROS submitted by the patient on 8/27/2019   If you checked off any problems, how difficult have these problems made it for you to do your work, take care of things at home, or get along with other people?: Very difficult  PHQ9 TOTAL SCORE: 20     Physical Exam:   /86 (BP Location: Right arm, Patient Position: Sitting, Cuff Size: Adult Small)   Pulse 88   Resp 14   Ht 1.524 m (5')      Constitutional:  The patient was accompanied, well-groomed, and in no acute distress.    Skin:  Warm and pink.    Neurologic:  Alert and oriented x 3.  CN's III-XII within normal limits.  Voice normal.   Psychiatric:  The patient's affect was calm, cooperative, and appropriate.    Respiratory:  Breathing comfortably without stridor or exertion of accessory muscles.    Eyes: Extraocular movement intact.    Head:  Normocephalic and atraumatic.  No lesions or scars.    OC/OP:  Normal tongue, floor of mouth, buccal mucosa.  Dime sized ulcer deep in the left side of the soft palate.  Neck:  Supple with normal laryngeal and tracheal landmarks.  The parotid beds were without masses.  No palpable thyroid.  Lymphatic:  There is no palpable lymphadenopathy in  the neck.     Data:  Soft tissue neck CT 7/1/19:  CONCLUSION:   1.  Findings highly concerning for primary mucosal malignancy such as squamous cell carcinoma involving the left aspect of the soft palate. Enhancing abnormality extends laterally and abuts the medial margin of the left medial pterygoid muscle as above.  2.  For line size and mildly enlarged hyperenhancing left level 2 cervical lymph nodes concerning for moises metastasis.    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  FDG avid bilateral axillary, mediastinal, and hilar lymph nodes could all be reactive, less likely metastatic.    Left lateral tongue ulcer biopsy 6/3/19:  A) TONGUE, LEFT LATERAL ULCER, BIOPSY:  1. Ulcer base with granulation tissue, fibrin, and acute inflammation  2. Negative for vasculitis and vessel thrombi  3. Negative for malignancy  4. See comment    Immunohistochemical staining for CMV, HSV I and II fail to reveal virally infected cells  A GMS stain is performed and fails to reveal fungal hyphae.    The histologic appearance is of a major aphthous ulcer (> 1 cm in size, often multiple lesions, present greater than 2 weeks). In these situations the most common cause is HSV, but in this case HSV studies are negative. The remaining differential diagnosis is diverse and includes causes such as neutropenias and nutritional deficiencies (B vitamins, iron, folate, zinc), oral presentations of Celiac or Crohn's disease (often with significant GI symptoms), immunodeficiencies (IgA, HIV) or immune dysregulation (Behcet's syndrome - usually multiple lesions of the soft palate and oropharynx), or drug reactions. Clinical correlation is required.     Left soft palate biopsy 7/29/19:  A) LEFT SOFT PALATE, BIOPSY:  1.  Ulcer with necrosis and marked acute inflammation  2.  Associated benign squamous mucosa with reactive  changes  3.  Negative for evidence of fungus, Herpes, CMV, Treponema, and Julio-Barr virus, see microscopic description  4.  See comment    I reviewed Dr. Joy's procedure note and discussed this patient's situation in detail with him. I understand that this lesion is clinically very suspicious for malignancy. The reactive changes seen in this biopsy do not necessarily rule out an unsampled malignancy in this area. Specific features are not seen in this biopsy. Thus, unfortunately, a cause for the inflammatory process is not readily apparent.    Lymph node, left side of neck FNA 8/14/19:  LYMPH NODE, LEFT SIDE OF NECK, FINE NEEDLE ASPIRATION:    - MIXED POPULATION OF T-CELLS AND B-CELLS WITH FOCAL EVIDENCE OF ACUTE LYMPHADENITIS     - NO EVIDENCE OF MALIGNANCY     - PLEASE SEE COMMENT    Pap and Diff-Quik-stained smears prepared from aspirates of lymph node show a mixed population of mainly small lymphocytes, occasional centroblasts, occasional neutrophils and eosinophils. H&E stained cell block sections show similar cellular elements including a rare aggregate of lymphocytes with imbedded neutrophils, raising the possibility of acute infection and possibly granulomatous inflammation.     Due to the clinical concern for the possibility of neoplasia, immunohistochemical stains have been performed to rule out the possibility of lymphoma and carcinoma. The results are as follows:      - Cytokeratin-7: Negative    - p63: Negative (no diagnostic staining)    - [id number]: Negative    - CD56: Rare positive lymphocytes are identified    - CD20: Positive in B-cells, slight minority of lymphocytes    - CD3: Positive in T-cells, slight majority of lymphocytes    - Cyclin D1: Negative    - Bcl-2: Positive in T-cells     - Bcl-6: Positive in scattered lymphocytes    - CD5: Positive in T-cells    In addition, an in-situ hybridization probe for Julio-Barr virus (SHIMA) is performed, and it is negative.     Assessment and  Plan:  Chris Bell is a 84 year old female here for consultation regarding a tongue/soft palate lesion.  While her imaging is concerning for possible malignancy, 3 separate biopsies have not identified any definitive evidence of malignancy.  It is possible this could represent infection or inflammation.  Area of ulceration was painted with venetian violet today.  Will have her use magic mouthwash with steroid, nystatin, and Benadryl for the next 3 weeks and then see her back for reevaluation.          Scribe Disclosure:  I, Karen Thompson, am serving as a scribe to document services personally performed by Salazar Cruz MD at this visit, based upon the provider's statements to me. All documentation has been reviewed by the aforementioned provider prior to being entered into the official medical record.

## 2019-08-27 ENCOUNTER — OFFICE VISIT (OUTPATIENT)
Dept: OTOLARYNGOLOGY | Facility: CLINIC | Age: 84
End: 2019-08-27
Payer: COMMERCIAL

## 2019-08-27 VITALS
RESPIRATION RATE: 14 BRPM | DIASTOLIC BLOOD PRESSURE: 86 MMHG | SYSTOLIC BLOOD PRESSURE: 129 MMHG | HEART RATE: 88 BPM | HEIGHT: 60 IN

## 2019-08-27 DIAGNOSIS — L43.9 LICHEN PLANUS: Primary | ICD-10-CM

## 2019-08-27 RX ORDER — FUROSEMIDE 20 MG
TABLET ORAL
COMMUNITY
Start: 2018-10-18 | End: 2022-01-01

## 2019-08-27 RX ORDER — MIRTAZAPINE 15 MG/1
TABLET, FILM COATED ORAL
Refills: 5 | COMMUNITY
Start: 2019-06-18 | End: 2022-01-01

## 2019-08-27 RX ORDER — CALCIUM CARBONATE/VITAMIN D3 600 MG-10
TABLET ORAL
COMMUNITY
Start: 2019-06-18 | End: 2022-01-01

## 2019-08-27 RX ORDER — TRAZODONE HYDROCHLORIDE 50 MG/1
TABLET, FILM COATED ORAL
COMMUNITY
Start: 2019-06-09 | End: 2022-01-01

## 2019-08-27 RX ORDER — MULTIVITAMIN WITH FOLIC ACID 400 MCG
TABLET ORAL
COMMUNITY
Start: 2019-05-31 | End: 2022-01-01

## 2019-08-27 RX ORDER — LISINOPRIL 5 MG/1
TABLET ORAL
COMMUNITY
Start: 2018-10-23 | End: 2022-01-01

## 2019-08-27 RX ORDER — FERROUS SULFATE 324(65)MG
TABLET, DELAYED RELEASE (ENTERIC COATED) ORAL
COMMUNITY
Start: 2019-04-11 | End: 2022-01-01

## 2019-08-27 RX ORDER — SUCRALFATE 1 G/1
1 TABLET ORAL
COMMUNITY
Start: 2019-04-03 | End: 2022-01-01

## 2019-08-27 RX ORDER — TRIAMCINOLONE ACETONIDE 0.1 %
PASTE (GRAM) DENTAL
COMMUNITY
Start: 2019-06-24 | End: 2022-01-01

## 2019-08-27 RX ORDER — ISOPROPYL ALCOHOL 0.75 G/1
SWAB TOPICAL
Refills: 0 | COMMUNITY
Start: 2018-09-27 | End: 2022-01-01

## 2019-08-27 RX ORDER — BLOOD SUGAR DIAGNOSTIC
STRIP MISCELLANEOUS
Refills: 2 | COMMUNITY
Start: 2018-09-27 | End: 2022-01-01

## 2019-08-27 RX ORDER — CALCIUM CARBONATE 500 MG/1
TABLET, CHEWABLE ORAL
COMMUNITY
Start: 2019-03-14 | End: 2021-10-27

## 2019-08-27 RX ORDER — LIDOCAINE HYDROCHLORIDE 20 MG/ML
SOLUTION OROPHARYNGEAL
COMMUNITY
Start: 2019-05-15 | End: 2021-10-27

## 2019-08-27 RX ORDER — OXYCODONE HCL 5 MG/5 ML
SOLUTION, ORAL ORAL
Refills: 0 | COMMUNITY
Start: 2019-08-15 | End: 2021-10-27

## 2019-08-27 RX ORDER — LORAZEPAM 0.5 MG/1
0.5 TABLET ORAL
Refills: 0 | Status: ON HOLD | COMMUNITY
Start: 2019-08-13 | End: 2022-01-01

## 2019-08-27 RX ORDER — LEVOTHYROXINE SODIUM 50 UG/1
50 TABLET ORAL EVERY MORNING
COMMUNITY
Start: 2019-03-29

## 2019-08-27 RX ORDER — ACETAMINOPHEN 500 MG
TABLET ORAL
Refills: 0 | COMMUNITY
Start: 2018-12-14 | End: 2021-08-18

## 2019-08-27 RX ORDER — PNV NO.95/FERROUS FUM/FOLIC AC 28MG-0.8MG
TABLET ORAL
Refills: 3 | COMMUNITY
Start: 2019-04-11 | End: 2021-10-27 | Stop reason: DRUGHIGH

## 2019-08-27 RX ORDER — GABAPENTIN 100 MG/1
200 CAPSULE ORAL
COMMUNITY
Start: 2018-08-30 | End: 2021-10-27

## 2019-08-27 RX ORDER — MAGNESIUM HYDROXIDE/ALUMINUM HYDROXICE/SIMETHICONE 120; 1200; 1200 MG/30ML; MG/30ML; MG/30ML
SUSPENSION ORAL
COMMUNITY
Start: 2019-01-15 | End: 2021-10-27

## 2019-08-27 RX ORDER — CALCIUM CARBONATE 500 MG/1
2 TABLET, CHEWABLE ORAL
Refills: 3 | COMMUNITY
Start: 2019-03-14 | End: 2022-01-01

## 2019-08-27 RX ORDER — POTASSIUM CHLORIDE 1500 MG/1
20 TABLET, EXTENDED RELEASE ORAL
COMMUNITY
End: 2021-10-27 | Stop reason: DRUGHIGH

## 2019-08-27 RX ORDER — BLOOD-GLUCOSE METER
EACH MISCELLANEOUS 2 TIMES DAILY
Refills: 0 | COMMUNITY
Start: 2018-09-27 | End: 2022-01-01

## 2019-08-27 RX ORDER — FENTANYL 12.5 UG/1
PATCH TRANSDERMAL
Refills: 0 | COMMUNITY
Start: 2019-08-22 | End: 2021-10-27

## 2019-08-27 RX ORDER — METOPROLOL SUCCINATE 25 MG/1
25 TABLET, EXTENDED RELEASE ORAL
COMMUNITY
Start: 2019-03-27 | End: 2021-10-27 | Stop reason: DRUGHIGH

## 2019-08-27 ASSESSMENT — PATIENT HEALTH QUESTIONNAIRE - PHQ9
SUM OF ALL RESPONSES TO PHQ QUESTIONS 1-9: 20
SUM OF ALL RESPONSES TO PHQ QUESTIONS 1-9: 20
10. IF YOU CHECKED OFF ANY PROBLEMS, HOW DIFFICULT HAVE THESE PROBLEMS MADE IT FOR YOU TO DO YOUR WORK, TAKE CARE OF THINGS AT HOME, OR GET ALONG WITH OTHER PEOPLE: VERY DIFFICULT

## 2019-08-27 ASSESSMENT — PAIN SCALES - GENERAL: PAINLEVEL: EXTREME PAIN (9)

## 2019-08-27 NOTE — PATIENT INSTRUCTIONS
1. You were seen in the ENT Clinic today by Dr. Cruz  If you have any questions or concerns after your appointment, please call   - Option 1: ENT Clinic: [phone]  - Option 2: Sylwia MARTINS Nurse Coordinator: [phone]    2. Plan to return to clinic in 3 weeks, use mouth wash 2 times daily for 2 weeks.         Thank you for allowing us to be apart of your care!  Rosa Elena Harley LPN

## 2019-08-27 NOTE — NURSING NOTE
Chief Complaint   Patient presents with     Consult     Cancer of soft palate     /86 (BP Location: Right arm, Patient Position: Sitting, Cuff Size: Adult Small)   Pulse 88   Resp 14   Ht 1.524 m (5')     Stevie Ríos CMA

## 2019-08-27 NOTE — LETTER
2019       RE: Chris Bell  [address]  [address]  [place] [address]     Dear Colleague,    Thank you for referring your patient, Chris Bell, to the [facility] EAR NOSE AND THROAT at [facility]. Please see a copy of my visit note below.      Otolaryngology Clinic      Name: Chris Bell  MRN: [id number]  Age: 84 year old  : 1935  Referring provider: Dai Lai  2019      Chief Complaint:  Consult     History of Present Illness:   The patient's clinic visit was completed with the assistance of a [employer] .   Chris Bell is a 84 year old female who presents for evaluation of a tongue lesion.  The patient reports about 6 months of a painful tongue lesion, leading her to present to the ER in early 2019.  This was biopsied on 6/3/19 with pathology showing inflammation without evidence of malignancy.  CT scan on 19 showed an enhancing infiltrative mass in the left soft palate extending to the left lateral pharyngeal wall and toward the left palatine tonsil highly concerning for a primary mucosal malignancy.  PET scan on 19 revealed marked FDG activity consistent with malignancy.  Biopsy of the soft palate on 19 again showed ulcer with necrosis and inflammation without evidence of malignancy.  Lymph node biopsy on 19 also did not show evidence of malignancy however due to ongoing clinical concern for cancer, she was referred here for further evaluation.  Currently her worst pain is at the back of the throat and on her palate on the left side.     Active Medications:      alum & mag hydroxide-simethicone (ANTACID LIQUID) 200-200-20 MG/5ML SUSP suspension, 4 MLS MIXED WITH 1 ML LIDOCAINE-MAY TAKE 5 ML PO QID, Disp: , Rfl:      calcium carbonate (TUMS) 500 MG chewable tablet, TK  1  TO  2  TS  PO    Q 1  TO 2      H  PRF  STOMACH  PAINS, Disp: , Rfl:      Ferrous Sulfate 324 (65 Fe) MG TBEC, TK 1 T PO BID, Disp: , Rfl:      furosemide  (LASIX) 20 MG tablet, TK 1 T  PO  QAM, Disp: , Rfl:      gabapentin (NEURONTIN) 100 MG capsule, Take 200 mg by mouth, Disp: , Rfl:      levothyroxine (SYNTHROID/LEVOTHROID) 50 MCG tablet, Take 50 mcg by mouth, Disp: , Rfl:      lidocaine (XYLOCAINE) 2 % solution, 1 ML MIXED WITH 4 ML RULOX-MAY TAKE 5 ML BY MOUTH QID FOR STOMACH PAIN., Disp: , Rfl:      lisinopril (PRINIVIL/ZESTRIL) 5 MG tablet, TK 1 T  PO Q D, Disp: , Rfl:      metoprolol succinate ER (TOPROL-XL) 25 MG 24 hr tablet, Take 25 mg by mouth, Disp: , Rfl:      Multiple Vitamin (DAILY-CHRIS) TABS, TK 1 T  PO  QD, Disp: , Rfl:      omeprazole (PRILOSEC) 20 MG DR capsule, TK 1 C  PO  QD  BEFORE  EATING, Disp: , Rfl:      ranitidine (ZANTAC) 300 MG tablet, TK 1 T  PO  QD  TO  PREVENT  ACID, Disp: , Rfl:      sucralfate (CARAFATE) 1 GM tablet, Take 1 g by mouth, Disp: , Rfl:      traZODone (DESYREL) 50 MG tablet, TK 1 T PO QHS, Disp: , Rfl:      triamcinolone (KENALOG) 0.1 % paste, LISANDRO    SMALL     AMOUNT  BID      UTD    TO  MOUTH, Disp: , Rfl:      vitamin B-12 (CYANOCOBALAMIN) 250 MCG tablet, TK 1  T PO QD, Disp: , Rfl:      acetaminophen (TYLENOL) 500 MG tablet, TAKE 1 PILL (500 MG) EVERY 6 HOURS AS NEEDED/ YOG MOB TXHUA 6 TEEV NOJ 1 LUB, Disp: , Rfl: 0     benzocaine (ORAJEL/ANBESOL) 10 % gel, , Disp: , Rfl:      calcium carbonate (TUMS) 500 MG chewable tablet, TK  1  TO  2  TS  PO    Q 1  TO 2      H  PRF  STOMACH  PAINS, Disp: , Rfl: 3     diclofenac (VOLTAREN) 1 % topical gel, LISANDRO    2  GRAMS   AA     TID  PRN  P, Disp: , Rfl: 1     fentaNYL (DURAGESIC) 12 mcg/hr 72 hr patch, PLACE  1   PATCH  ON  THE  SKIN    Q  THIRD  DAY, Disp: , Rfl: 0     LORazepam (ATIVAN) 0.5 MG tablet, 1 TB BY MOUTH EVERY 6-12 HOURS AS NEEDED FOR ANXIETY, Disp: , Rfl: 0     magnesium hydroxide (MILK OF MAGNESIA) 400 MG/5ML suspension, Take 30 mLs by mouth, Disp: , Rfl:      Magnesium Oxide -Mg Supplement 250 MG tablet, TK 1  T  PO QD, Disp: , Rfl: 3     mirtazapine (REMERON) 15  MG tablet, TK 1 T  PO HS, Disp: , Rfl: 5     oxyCODONE (ROXICODONE) 5 MG/5ML solution, 5-10 ML MLS EVERY 6 HOURS AS NEEDED FOR PAIN RELIEF IN TONGUE, Disp: , Rfl: 0     potassium chloride ER (K-DUR/KLOR-CON M) 20 MEQ CR tablet, Take 20 mEq by mouth, Disp: , Rfl:       Allergies:   No clinical screening - see comments      Past Medical History:  Hypothyroidism  Hypokalemia   Bleeding ulcer  Heart failure with preserved ejection fraction   Kyphosis of thoracic region   C-spine compression fracture  T12 compression fracture   Hypertension   Osteoporosis  Peptic ulcer disease      Past Surgical History:  FNA lymph node 8/14/19  Esophagogastroduodenoscopy   Hysterectomy     Family History:   History reviewed. No pertinent family history.       Social History:   Presents to clinic with her daughter and law and grandson  Tobacco Use: No previous or current tobacco use.   Alcohol Use: No alcohol use.   PCP: INOCENCIA PRINGLE     Review of Systems:   Pertinent items are noted in HPI or as in patient entered ROS below, remainder of complete ROS is negative.   Answers for HPI/ROS submitted by the patient on 8/27/2019   If you checked off any problems, how difficult have these problems made it for you to do your work, take care of things at home, or get along with other people?: Very difficult  PHQ9 TOTAL SCORE: 20     Physical Exam:   /86 (BP Location: Right arm, Patient Position: Sitting, Cuff Size: Adult Small)   Pulse 88   Resp 14   Ht 1.524 m (5')      Constitutional:  The patient was accompanied, well-groomed, and in no acute distress.    Skin:  Warm and pink.    Neurologic:  Alert and oriented x 3.  CN's III-XII within normal limits.  Voice normal.   Psychiatric:  The patient's affect was calm, cooperative, and appropriate.    Respiratory:  Breathing comfortably without stridor or exertion of accessory muscles.    Eyes: Extraocular movement intact.    Head:  Normocephalic and atraumatic.  No lesions or  scars.    OC/OP:  Normal tongue, floor of mouth, buccal mucosa.  Dime sized ulcer deep in the left side of the soft palate.  Neck:  Supple with normal laryngeal and tracheal landmarks.  The parotid beds were without masses.  No palpable thyroid.  Lymphatic:  There is no palpable lymphadenopathy in the neck.     Data:  Soft tissue neck CT 7/1/19:  CONCLUSION:   1.  Findings highly concerning for primary mucosal malignancy such as squamous cell carcinoma involving the left aspect of the soft palate. Enhancing abnormality extends laterally and abuts the medial margin of the left medial pterygoid muscle as above.  2.  For line size and mildly enlarged hyperenhancing left level 2 cervical lymph nodes concerning for moises metastasis.    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  FDG avid bilateral axillary, mediastinal, and hilar lymph nodes could all be reactive, less likely metastatic.    Left lateral tongue ulcer biopsy 6/3/19:  A) TONGUE, LEFT LATERAL ULCER, BIOPSY:  1. Ulcer base with granulation tissue, fibrin, and acute inflammation  2. Negative for vasculitis and vessel thrombi  3. Negative for malignancy  4. See comment    Immunohistochemical staining for CMV, HSV I and II fail to reveal virally infected cells  A GMS stain is performed and fails to reveal fungal hyphae.    The histologic appearance is of a major aphthous ulcer (> 1 cm in size, often multiple lesions, present greater than 2 weeks). In these situations the most common cause is HSV, but in this case HSV studies are negative. The remaining differential diagnosis is diverse and includes causes such as neutropenias and nutritional deficiencies (B vitamins, iron, folate, zinc), oral presentations of Celiac or Crohn's disease (often with significant GI symptoms), immunodeficiencies (IgA, HIV) or immune dysregulation (Behcet's syndrome -  usually multiple lesions of the soft palate and oropharynx), or drug reactions. Clinical correlation is required.     Left soft palate biopsy 7/29/19:  A) LEFT SOFT PALATE, BIOPSY:  1.  Ulcer with necrosis and marked acute inflammation  2.  Associated benign squamous mucosa with reactive changes  3.  Negative for evidence of fungus, Herpes, CMV, Treponema, and Julio-Barr virus, see microscopic description  4.  See comment    I reviewed Dr. Joy's procedure note and discussed this patient's situation in detail with him. I understand that this lesion is clinically very suspicious for malignancy. The reactive changes seen in this biopsy do not necessarily rule out an unsampled malignancy in this area. Specific features are not seen in this biopsy. Thus, unfortunately, a cause for the inflammatory process is not readily apparent.    Lymph node, left side of neck FNA 8/14/19:  LYMPH NODE, LEFT SIDE OF NECK, FINE NEEDLE ASPIRATION:    - MIXED POPULATION OF T-CELLS AND B-CELLS WITH FOCAL EVIDENCE OF ACUTE LYMPHADENITIS     - NO EVIDENCE OF MALIGNANCY     - PLEASE SEE COMMENT    Pap and Diff-Quik-stained smears prepared from aspirates of lymph node show a mixed population of mainly small lymphocytes, occasional centroblasts, occasional neutrophils and eosinophils. H&E stained cell block sections show similar cellular elements including a rare aggregate of lymphocytes with imbedded neutrophils, raising the possibility of acute infection and possibly granulomatous inflammation.     Due to the clinical concern for the possibility of neoplasia, immunohistochemical stains have been performed to rule out the possibility of lymphoma and carcinoma. The results are as follows:      - Cytokeratin-7: Negative    - p63: Negative (no diagnostic staining)    - [id number]: Negative    - CD56: Rare positive lymphocytes are identified    - CD20: Positive in B-cells, slight minority of lymphocytes    - CD3: Positive in T-cells, slight  majority of lymphocytes    - Cyclin D1: Negative    - Bcl-2: Positive in T-cells     - Bcl-6: Positive in scattered lymphocytes    - CD5: Positive in T-cells    In addition, an in-situ hybridization probe for Julio-Barr virus (SHIMA) is performed, and it is negative.     Assessment and Plan:  Chris Bell is a 84 year old female here for consultation regarding a tongue/soft palate lesion.  While her imaging is concerning for possible malignancy, 3 separate biopsies have not identified any definitive evidence of malignancy.  It is possible this could represent infection or inflammation.  Area of ulceration was painted with venetian violet today.  Will have her use magic mouthwash with steroid, nystatin, and Benadryl for the next 3 weeks and then see her back for reevaluation.          Scribe Disclosure:  I, Karen Lujanjas, am serving as a scribe to document services personally performed by Salazar Cruz MD at this visit, based upon the provider's statements to me. All documentation has been reviewed by the aforementioned provider prior to being entered into the official medical record.        Again, thank you for allowing me to participate in the care of your patient.      Sincerely,    Salazar Cruz MD

## 2019-08-28 LAB — COPATH REPORT: NORMAL

## 2019-08-28 PROCEDURE — 00000346 ZZHCL STATISTIC REVIEW OUTSIDE SLIDES TC 88321: Performed by: OTOLARYNGOLOGY

## 2019-09-10 ENCOUNTER — PRE VISIT (OUTPATIENT)
Dept: OTOLARYNGOLOGY | Facility: CLINIC | Age: 84
End: 2019-09-10

## 2019-09-17 ENCOUNTER — OFFICE VISIT (OUTPATIENT)
Dept: OTOLARYNGOLOGY | Facility: CLINIC | Age: 84
End: 2019-09-17
Payer: COMMERCIAL

## 2019-09-17 VITALS — HEIGHT: 60 IN | RESPIRATION RATE: 14 BRPM

## 2019-09-17 DIAGNOSIS — L43.9 LICHEN PLANUS: Primary | ICD-10-CM

## 2019-09-17 RX ORDER — CLOBETASOL PROPIONATE 0.5 MG/G
OINTMENT TOPICAL 2 TIMES DAILY
Qty: 30 G | Refills: 0 | Status: SHIPPED | OUTPATIENT
Start: 2019-09-17 | End: 2022-01-01

## 2019-09-17 RX ORDER — CLOTRIMAZOLE 10 MG/1
LOZENGE ORAL
Qty: 70 TROCHE | Refills: 1 | Status: SHIPPED | OUTPATIENT
Start: 2019-09-17 | End: 2019-10-01

## 2019-09-17 ASSESSMENT — PAIN SCALES - GENERAL: PAINLEVEL: WORST PAIN (10)

## 2019-09-17 NOTE — LETTER
9/17/2019       RE: Chris Bell  [address]  [address]  [facility] [address]     Dear Colleague,    Thank you for referring your patient, Chris Bell, to the [facility] EAR NOSE AND THROAT at [facility]. Please see a copy of my visit note below.    HISTORY OF PRESENT ILLNESS:  Chris Bell here for followup today.  She is a patient that is 84 years old.  She has a history of oral cavity necrosis.  I do not know what the cause for this is, but she has had multiple biopsies and it is certainly not cancer.  It has finally gotten a little bit better from the medicines we had given her which have been Magic Mouthwash-type medicines versus gentian violet.  She has no other current complaints at the present time.  He is otherwise getting by quite well.  She says that she does not think that she is that improved.      PHYSICAL EXAMINATION:  The patient is alert and oriented x3 and pleasant.  She is here through audiogram interpretation with her daughter.  She has no other issues today.  Her oral cavity was examined and the area of her tonsil is about 40-50% healed.  The inside of the lip still looks sore.  There are no other masses or lesions and no new ones that are developing in her neck.      ASSESSMENT:  Patient with a history of oral cavity necrosis.  I do not think this is cancer.       PLAN:  I went ahead and painted this area with gentian violet.  I will put her on Mycelex troches and see her again in two to three weeks.  It is healing slowly so that is all better than what we have seen before, but she is still somewhat symptomatic from this.         Again, thank you for allowing me to participate in the care of your patient.      Sincerely,    Salazar Cruz MD

## 2019-09-17 NOTE — NURSING NOTE
Chief Complaint   Patient presents with     RECHECK     Mouth Pain     Resp 14   Ht 1.524 m (5')     Stevie Ríos, CMA

## 2019-09-17 NOTE — PATIENT INSTRUCTIONS
1. You were seen in the ENT Clinic today by Dr. Cruz.  If you have any questions or concerns after your appointment, please call   - Option 1: ENT Clinic: [phone]  - Option 2: Sylwia (Dr. Cruz's Nurse): [phone]    2. Plan to return to clinic in 3 weeks; October 15th at 0900    3. An ointment and Lozenge have been sent to your local pharmacy; please  and follow the prescription details    4. 1% Hydrocortisone cream for rash to neck. This is an over the counter medication that you can pick-up at your local pharmacy.       Natice Schwab, RN  [facility] Otolaryngology  [phone]

## 2019-09-17 NOTE — PROGRESS NOTES
HISTORY OF PRESENT ILLNESS:  Chris Bell here for followup today.  She is a patient that is 84 years old.  She has a history of oral cavity necrosis.  I do not know what the cause for this is, but she has had multiple biopsies and it is certainly not cancer.  It has finally gotten a little bit better from the medicines we had given her which have been Magic Mouthwash-type medicines versus gentian violet.  She has no other current complaints at the present time.  He is otherwise getting by quite well.  She says that she does not think that she is that improved.      PHYSICAL EXAMINATION:  The patient is alert and oriented x3 and pleasant.  She is here through audiogram interpretation with her daughter.  She has no other issues today.  Her oral cavity was examined and the area of her tonsil is about 40-50% healed.  The inside of the lip still looks sore.  There are no other masses or lesions and no new ones that are developing in her neck.      ASSESSMENT:  Patient with a history of oral cavity necrosis.  I do not think this is cancer.       PLAN:  I went ahead and painted this area with gentian violet.  I will put her on Mycelex troches and see her again in two to three weeks.  It is healing slowly so that is all better than what we have seen before, but she is still somewhat symptomatic from this.

## 2019-09-18 ENCOUNTER — TELEPHONE (OUTPATIENT)
Dept: OTOLARYNGOLOGY | Facility: CLINIC | Age: 84
End: 2019-09-18

## 2019-09-18 NOTE — TELEPHONE ENCOUNTER
"Received a fax for a Drug change request. Drug: Clotrimazole. Message said patient starts to have rash on face and mouth after taking this medication. Please change to different medication.   This is not correct patient came into clinic yesterday with rash and was told to use hydrocortisone. Patient had not been prescribed or taken medication yet to show a reaction. Pharmacy tech said that it shows Clotrimazole as an allergy. \"I informed him we do not have it listed as an allergy, We show she has NKDA \" .He said he does not know when it was added to her list. He was going to try to call patient and follow up. I told him I would also try to follow up as patient speaks Hmong and needs an .     Tried to call daughter unable to reach.  Called interrupter services to call patient but home number is same as cell that belongs to daughter.    "

## 2019-09-19 ENCOUNTER — TELEPHONE (OUTPATIENT)
Dept: OTOLARYNGOLOGY | Facility: CLINIC | Age: 84
End: 2019-09-19

## 2019-09-19 NOTE — TELEPHONE ENCOUNTER
Called talked with daughter she said she is aware that patient was complaining of itching and rash prior to appointment and before taking the medication. She said her mother has anxiety and every time she starts on a new medication she starts to think she is having all these things happen to her. Clarified patient had not taking this medication before and that she does not in fact have a allergy to this medication.

## 2019-09-19 NOTE — TELEPHONE ENCOUNTER
"Voicemail received asking for a call back to discuss recently prescribed medications. Phone call returned. Daughter explained that when she takes the trouche's it feels \"hot in her mouth and spreads to her entire body\". Also, when she takes the ointment it makes her drowsy. I explained that these are very unusual symptoms to have from these medications. I instructed her to discontinue the medications if she has any shortness of breath otherwise she is going to have to dictate what symptoms are bothersome enough (mouth pain vs warm body) as there are no alternatives to the prescribed medications. Daughter stated understanding. Direct line given for additional questions/concerns.     Natice Schwab, RN BSN    "

## 2019-10-15 ENCOUNTER — OFFICE VISIT (OUTPATIENT)
Dept: OTOLARYNGOLOGY | Facility: CLINIC | Age: 84
End: 2019-10-15
Payer: COMMERCIAL

## 2019-10-15 VITALS
RESPIRATION RATE: 14 BRPM | HEART RATE: 99 BPM | BODY MASS INDEX: 21.07 KG/M2 | OXYGEN SATURATION: 96 % | WEIGHT: 107.3 LBS | HEIGHT: 60 IN

## 2019-10-15 DIAGNOSIS — L43.9 LICHEN PLANUS: ICD-10-CM

## 2019-10-15 ASSESSMENT — PAIN SCALES - GENERAL: PAINLEVEL: SEVERE PAIN (7)

## 2019-10-15 ASSESSMENT — MIFFLIN-ST. JEOR: SCORE: 858.21

## 2019-10-15 NOTE — NURSING NOTE
Chief Complaint   Patient presents with     RECHECK     Lichen planus     Pulse 99   Resp 14   Ht 1.524 m (5')   Wt 48.7 kg (107 lb 4.8 oz)   SpO2 96%   BMI 20.96 kg/m      Stevie Ríos CMA

## 2019-10-15 NOTE — LETTER
"10/15/2019       RE: Chris Bell  [address]  [address]  [facility] [address]     Dear Colleague,    Thank you for referring your patient, Chris Bell, to the [facility] EAR NOSE AND THROAT at [facility]. Please see a copy of my visit note below.      Otolaryngology Clinic      Name: Chris Bell  MRN: [id number]  Age: 84 year old  : 1935  10/15/2019      Chief Complaint:   RECHECK     History of Present Illness:   Chris Bell is a 84 year old female with a history of lichen planus who presents for follow up. Patient has a history of oral cavity necrosis, although she has had several biopsies and this is felt to be non-carcinogenic in nature. At the time of her last evaluation on 2019 the patient was responding well to Magic Mouth Wash-type medicines vs gentian violet. She was placed on Mycelex troches at the time of her last follow up and recommended follow up in 2-3 weeks.     Here the patient states that she only took the Mycelex for 1 day as it gave her a \"warm sensation\" from her chest down. Her daughter notes that the patient takes a lot of medications. The patient notes that the rinsing medication she received previously seemed to help improve her symptoms. She endorses pain to palpation in her left neck as well.      Review of Systems:   Pertinent items are noted in HPI or as in patient entered ROS below, remainder of complete ROS is negative.     Physical Exam:   Pulse 99   Resp 14   Ht 1.524 m (5')   Wt 48.7 kg (107 lb 4.8 oz)   SpO2 96%   BMI 20.96 kg/m        PHYSICAL EXAMINATION:    Constitutional:  The patient was accompanied, well-groomed, and in no acute distress.    Skin:  Warm and pink.    Neurologic:  Alert and oriented x 3.  CN's III-XII within normal limits.  Voice normal.   Psychiatric:  The patient's affect was calm, cooperative, and appropriate.    Respiratory:  Breathing comfortably without stridor or exertion of accessory muscles.    Eyes: " Extraocular movement intact.    Head:  Normocephalic and atraumatic.  No lesions or scars.    Ears:  Pinnae and tragus non-tender.  EAC's and TM's were clear.     Nose:  Sinuses were non-tender.  Anterior rhinoscopy revealed midline septum and absence of purulence or polyps.    OC/OP:  Area is over 50% improved compared to previous. Normal tongue, floor of mouth, buccal mucosa, and palate.  Ulcerated lesion smaller and less ulcered.  No abnormal lymph tissue in the oropharynx.  The pterygoid region is non-tender.    Neck:  Supple with normal laryngeal and tracheal landmarks.  The parotid beds were without masses.  No palpable thyroid.  Lymphatic:  There is no palpable lymphadenopathy in the neck.     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 The patient's oral cavity seems to be significantly improved from prior. We placed gentian violet here and will start the patient on Magic Mouthwash. We discussed that this is likely not cancer but the patient should follow up in 4-6 weeks to ensure the area is healing well.       Scribe Disclosure:  Brown BLEVINS, am serving as a scribe to document services personally performed by Salazar Cruz MD at this visit, based upon the provider's statements to me. All documentation has been reviewed by the aforementioned provider prior to being entered into the official medical record.        Again, thank you for allowing me to participate in the care of your patient.      Sincerely,    Salazar Cruz MD

## 2019-10-15 NOTE — PROGRESS NOTES
"  Otolaryngology Clinic      Name: Chris Bell  MRN: [id number]  Age: 84 year old  : 1935  10/15/2019      Chief Complaint:   RECHECK     History of Present Illness:   Chris Bell is a 84 year old female with a history of lichen planus who presents for follow up. Patient has a history of oral cavity necrosis, although she has had several biopsies and this is felt to be non-carcinogenic in nature. At the time of her last evaluation on 2019 the patient was responding well to Magic Mouth Wash-type medicines vs gentian violet. She was placed on Mycelex troches at the time of her last follow up and recommended follow up in 2-3 weeks.     Here the patient states that she only took the Mycelex for 1 day as it gave her a \"warm sensation\" from her chest down. Her daughter notes that the patient takes a lot of medications. The patient notes that the rinsing medication she received previously seemed to help improve her symptoms. She endorses pain to palpation in her left neck as well.      Review of Systems:   Pertinent items are noted in HPI or as in patient entered ROS below, remainder of complete ROS is negative.     Physical Exam:   Pulse 99   Resp 14   Ht 1.524 m (5')   Wt 48.7 kg (107 lb 4.8 oz)   SpO2 96%   BMI 20.96 kg/m       PHYSICAL EXAMINATION:    Constitutional:  The patient was accompanied, well-groomed, and in no acute distress.    Skin:  Warm and pink.    Neurologic:  Alert and oriented x 3.  CN's III-XII within normal limits.  Voice normal.   Psychiatric:  The patient's affect was calm, cooperative, and appropriate.    Respiratory:  Breathing comfortably without stridor or exertion of accessory muscles.    Eyes: Extraocular movement intact.    Head:  Normocephalic and atraumatic.  No lesions or scars.    Ears:  Pinnae and tragus non-tender.  EAC's and TM's were clear.     Nose:  Sinuses were non-tender.  Anterior rhinoscopy revealed midline septum and absence of purulence or polyps.    OC/OP:  " Area is over 50% improved compared to previous. Normal tongue, floor of mouth, buccal mucosa, and palate.  Ulcerated lesion smaller and less ulcered.  No abnormal lymph tissue in the oropharynx.  The pterygoid region is non-tender.    Neck:  Supple with normal laryngeal and tracheal landmarks.  The parotid beds were without masses.  No palpable thyroid.  Lymphatic:  There is no palpable lymphadenopathy in the neck.     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 The patient's oral cavity seems to be significantly improved from prior. We placed gentian violet here and will start the patient on Magic Mouthwash. We discussed that this is likely not cancer but the patient should follow up in 4-6 weeks to ensure the area is healing well.       Scribe Disclosure:  I, Brown Phillips, am serving as a scribe to document services personally performed by Salazar Cruz MD at this visit, based upon the provider's statements to me. All documentation has been reviewed by the aforementioned provider prior to being entered into the official medical record.

## 2019-10-15 NOTE — PATIENT INSTRUCTIONS
1. You were seen in the ENT Clinic today by Dr. Cruz  If you have any questions or concerns after your appointment, please call   - Option 1: ENT Clinic: [phone]  - Option 2: Sylwia MARTINS Nurse Coordinator: [phone]    2. Plan we will send RX for mouth wash to be delivered.  Return to clinic 4-6 weeks    Thank you for allowing us to be apart of your care!  Rosa Elena Harley LPN

## 2019-10-15 NOTE — TELEPHONE ENCOUNTER
Patient's daughter in law called us looking for a prescription for her mother's (patient) magic mouthwash.  I have no record that it was ever sent / received by us.  Looks like someone locally printed and never faxed it over to us.  Could someone please look into this and send us over a new prescription (fax [phone]).        Thanks        Hemanth Nunez  Compounding Pharmacy Technician  [facility]   [address] [place] [id number]   Phone: [phone]  Fax: [phone]

## 2019-10-31 ENCOUNTER — COMMUNICATION - HEALTHEAST (OUTPATIENT)
Dept: ADMINISTRATIVE | Facility: HOSPITAL | Age: 84
End: 2019-10-31

## 2019-11-05 ENCOUNTER — TELEPHONE (OUTPATIENT)
Dept: OTOLARYNGOLOGY | Facility: CLINIC | Age: 84
End: 2019-11-05

## 2019-11-05 DIAGNOSIS — L43.9 LICHEN PLANUS: ICD-10-CM

## 2019-11-05 NOTE — TELEPHONE ENCOUNTER
Voicemail received from pt's daughter asking for pt's magic mouthwash to be refilled.     After consulting with Dr. Cruz, refill sent to compounding pharmacy to be filled.    Daughter called to inform her of prescription being sent. Direct line given for additional questions/concerns.     Natice Schwab, RN BSN

## 2019-11-26 ENCOUNTER — OFFICE VISIT (OUTPATIENT)
Dept: OTOLARYNGOLOGY | Facility: CLINIC | Age: 84
End: 2019-11-26
Payer: COMMERCIAL

## 2019-11-26 VITALS
SYSTOLIC BLOOD PRESSURE: 105 MMHG | OXYGEN SATURATION: 99 % | DIASTOLIC BLOOD PRESSURE: 37 MMHG | TEMPERATURE: 98.2 F | HEIGHT: 60 IN | BODY MASS INDEX: 21.03 KG/M2 | HEART RATE: 70 BPM | WEIGHT: 107.1 LBS

## 2019-11-26 DIAGNOSIS — L43.9 LICHEN PLANUS: ICD-10-CM

## 2019-11-26 ASSESSMENT — MIFFLIN-ST. JEOR: SCORE: 857.3

## 2019-11-26 ASSESSMENT — PAIN SCALES - GENERAL: PAINLEVEL: SEVERE PAIN (7)

## 2019-11-26 NOTE — PATIENT INSTRUCTIONS
1. You were seen in the ENT Clinic today by Dr. Cruz  If you have any questions or concerns after your appointment, please call   - Option 1: ENT Clinic: [phone]  - Option 2: Sylwia MARTINS Nurse Coordinator: [phone]    2. Plan: continue to use mouth wash and do not rinse out mouth after.  Return to clinic in about 6 weeks     Thank you for allowing us to be apart of your care!  Rosa Elena Harley LPN

## 2019-11-26 NOTE — PROGRESS NOTES
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 The patient has been using Magic Mouthwash and gentian violet treatments. She was previously offered Mycelex troches but did not tolerate these well. At the time of her last evaluation on 10/15/2019 she was noted to have a improved and healing oral cavity. It was thought this could possibly be necrotizing sialometaplasia and are having the patient return here today to ensure the area is continuing to heal well and that there are no signs of malignancy.      Review of Systems:   Pertinent items are noted in HPI or as in patient entered ROS below, remainder of complete ROS is negative.     Physical Exam:   BP (!) 105/37 (BP Location: Left arm, Patient Position: Sitting, Cuff Size: Adult Regular)   Pulse 70   Temp 98.2  F (36.8  C) (Oral)   Ht 1.524 m (5')   Wt 48.6 kg (107 lb 1.6 oz)   SpO2 99%   BMI 20.92 kg/m       PHYSICAL EXAMINATION:    Constitutional:  The patient was accompanied, well-groomed, and in no acute distress.    Skin:  Warm and pink.    Neurologic:  Alert and oriented x 3.  CN's III-XII within normal limits.  Voice normal.   Psychiatric:  The patient's affect was calm, cooperative, and appropriate.    Respiratory:  Breathing comfortably without stridor or exertion of accessory muscles.    Eyes: Extraocular movement intact.    Head:  Normocephalic and atraumatic.  No lesions or scars.    Ears:  Pinnae and tragus non-tender.  EAC's and TM's were clear.     Nose:  Sinuses were non-tender.  Anterior rhinoscopy revealed midline septum and absence of purulence or polyps.    OC/OP:  Area is over 75% improved  compared to previous. Normal tongue, floor of mouth, buccal mucosa, and palate.  Ulcerated lesion smaller and less ulcered.  No abnormal lymph tissue in the oropharynx.  The pterygoid region is non-tender.    Neck:  Supple with normal laryngeal and tracheal landmarks.  The parotid beds were without masses.  No palpable thyroid.  Lymphatic:  There is no palpable lymphadenopathy in the neck.    Assessment and Plan:  84 year old with a history of oral lichens planus and possible sialometaplasia. Patient is responding well to the above treatment and has almost 75% healing on exam today. Follow up in 6 weeks.       Scribe Disclosure:  Brown BLEVINS, am serving as a scribe to document services personally performed by Salazar Cruz MD at this visit, based upon the provider's statements to me. All documentation has been reviewed by the aforementioned provider prior to being entered into the official medical record.

## 2019-11-26 NOTE — LETTER
2019      RE: Chris Bell  [address]  [address]  [facility] [address]     Otolaryngology Clinic  Name: Chris Bell  MRN: [id number]  Age: 84 year old  : 2019      Chief Complaint:   RECHECK    History of Present Illness:   Chris Bell is a 84 year old female with a history of oral lichen planus who presents for follow up. 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 The patient has been using Magic Mouthwash and gentian violet treatments. She was previously offered Mycelex troches but did not tolerate these well. At the time of her last evaluation on 10/15/2019 she was noted to have a improved and healing oral cavity. It was thought this could possibly be necrotizing sialometaplasia and are having the patient return here today to ensure the area is continuing to heal well and that there are no signs of malignancy.      Review of Systems:   Pertinent items are noted in HPI or as in patient entered ROS below, remainder of complete ROS is negative.     Physical Exam:   BP (!) 105/37 (BP Location: Left arm, Patient Position: Sitting, Cuff Size: Adult Regular)   Pulse 70   Temp 98.2  F (36.8  C) (Oral)   Ht 1.524 m (5')   Wt 48.6 kg (107 lb 1.6 oz)   SpO2 99%   BMI 20.92 kg/m        PHYSICAL EXAMINATION:    Constitutional:  The patient was accompanied, well-groomed, and in no acute distress.    Skin:  Warm and pink.    Neurologic:  Alert and oriented x 3.  CN's III-XII within normal limits.  Voice normal.   Psychiatric:  The patient's affect was calm, cooperative, and appropriate.    Respiratory:  Breathing comfortably without stridor or exertion of accessory muscles.    Eyes: Extraocular movement intact.    Head:  Normocephalic and atraumatic.  No lesions or scars.    Ears:  Pinnae and tragus non-tender.  EAC's and TM's were clear.     Nose:  Sinuses were non-tender.  Anterior rhinoscopy revealed midline  septum and absence of purulence or polyps.    OC/OP:  Area is over 75% improved compared to previous. Normal tongue, floor of mouth, buccal mucosa, and palate.  Ulcerated lesion smaller and less ulcered.  No abnormal lymph tissue in the oropharynx.  The pterygoid region is non-tender.    Neck:  Supple with normal laryngeal and tracheal landmarks.  The parotid beds were without masses.  No palpable thyroid.  Lymphatic:  There is no palpable lymphadenopathy in the neck.    Assessment and Plan:  84 year old with a history of oral lichens planus and possible sialometaplasia. Patient is responding well to the above treatment and has almost 75% healing on exam today. Follow up in 6 weeks.       Scribe Disclosure:  I, Brown Phillips, am serving as a scribe to document services personally performed by Salazar Cruz MD at this visit, based upon the provider's statements to me. All documentation has been reviewed by the aforementioned provider prior to being entered into the official medical record.         Salazar Cruz MD

## 2019-11-26 NOTE — LETTER
2019       RE: Chris Bell  [address]  [address]  [facility] [address]     Dear Colleague,    Thank you for referring your patient, Chris Bell, to the [facility] EAR NOSE AND THROAT at [facility]. Please see a copy of my visit note below.      Otolaryngology Clinic      Name: Chris Bell  MRN: [id number]  Age: 84 year old  : 2019      Chief Complaint:   RECHECK       History of Present Illness:   Chris Bell is a 84 year old female with a history of oral lichen planus who presents for follow up. 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 The patient has been using Magic Mouthwash and gentian violet treatments. She was previously offered Mycelex troches but did not tolerate these well. At the time of her last evaluation on 10/15/2019 she was noted to have a improved and healing oral cavity. It was thought this could possibly be necrotizing sialometaplasia and are having the patient return here today to ensure the area is continuing to heal well and that there are no signs of malignancy.      Review of Systems:   Pertinent items are noted in HPI or as in patient entered ROS below, remainder of complete ROS is negative.     Physical Exam:   BP (!) 105/37 (BP Location: Left arm, Patient Position: Sitting, Cuff Size: Adult Regular)   Pulse 70   Temp 98.2  F (36.8  C) (Oral)   Ht 1.524 m (5')   Wt 48.6 kg (107 lb 1.6 oz)   SpO2 99%   BMI 20.92 kg/m        PHYSICAL EXAMINATION:    Constitutional:  The patient was accompanied, well-groomed, and in no acute distress.    Skin:  Warm and pink.    Neurologic:  Alert and oriented x 3.  CN's III-XII within normal limits.  Voice normal.   Psychiatric:  The patient's affect was calm, cooperative, and appropriate.    Respiratory:  Breathing comfortably without stridor or exertion of accessory muscles.    Eyes: Extraocular movement  intact.    Head:  Normocephalic and atraumatic.  No lesions or scars.    Ears:  Pinnae and tragus non-tender.  EAC's and TM's were clear.     Nose:  Sinuses were non-tender.  Anterior rhinoscopy revealed midline septum and absence of purulence or polyps.    OC/OP:  Area is over 75% improved compared to previous. Normal tongue, floor of mouth, buccal mucosa, and palate.  Ulcerated lesion smaller and less ulcered.  No abnormal lymph tissue in the oropharynx.  The pterygoid region is non-tender.    Neck:  Supple with normal laryngeal and tracheal landmarks.  The parotid beds were without masses.  No palpable thyroid.  Lymphatic:  There is no palpable lymphadenopathy in the neck.    Assessment and Plan:  84 year old with a history of oral lichens planus and possible sialometaplasia. Patient is responding well to the above treatment and has almost 75% healing on exam today. Follow up in 6 weeks.       Scribe Disclosure:  GEN, Brown Phillips, am serving as a scribe to document services personally performed by Salazar Cruz MD at this visit, based upon the provider's statements to me. All documentation has been reviewed by the aforementioned provider prior to being entered into the official medical record.         Again, thank you for allowing me to participate in the care of your patient.      Sincerely,    Salazar Cruz MD

## 2019-11-26 NOTE — NURSING NOTE
Chief Complaint   Patient presents with     RECHECK     Lichen planus.       Vitals:    11/26/19 1020   BP: (!) 105/37   BP Location: Left arm   Patient Position: Sitting   Cuff Size: Adult Regular   Pulse: 70   Temp: 98.2  F (36.8  C)   TempSrc: Oral   SpO2: 99%   Weight: 48.6 kg (107 lb 1.6 oz)   Height: 1.524 m (5')       Body mass index is 20.92 kg/m .      Judith Casillas CMA

## 2020-01-14 ENCOUNTER — OFFICE VISIT (OUTPATIENT)
Dept: OTOLARYNGOLOGY | Facility: CLINIC | Age: 85
End: 2020-01-14
Payer: COMMERCIAL

## 2020-01-14 VITALS — SYSTOLIC BLOOD PRESSURE: 126 MMHG | DIASTOLIC BLOOD PRESSURE: 78 MMHG | HEART RATE: 82 BPM

## 2020-01-14 DIAGNOSIS — L43.9 LICHEN PLANUS: Primary | ICD-10-CM

## 2020-01-14 ASSESSMENT — PAIN SCALES - GENERAL: PAINLEVEL: MODERATE PAIN (5)

## 2020-01-14 NOTE — LETTER
2020       RE: Chris Bell  [address]  [address]  [facility] [address]     Dear Colleague,    Thank you for referring your patient, Chris Bell, to the [facility] EAR NOSE AND THROAT at [facility]. Please see a copy of my visit note below.      Otolaryngology Clinic      Name: Chris Bell  MRN: [id number]  Age: 84 year old  : 2020      Chief Complaint:   Follow up    History of Present Illness:   The patient's clinic visit was completed with the assistance of a [employer] .   Chris Bell is a 84 year old female who presents for follow up of oral lichen planus. 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 The patient has been using Magic Mouthwash and gentian violet treatments. I last saw the patient in 2019 and she was continuing to heal with these strategies. Since that time, she feels her tongue fully healed except for a small area of pain on her left mouth.      Review of Systems:   Pertinent items are noted in HPI or as in patient entered ROS below, remainder of complete ROS is negative.   [facility] ROS 2019   Constitutional Weight loss, Problems with sleep, Unexplained fever or night sweats   Neurology Numbness, Unexplained weakness   Psychology Frequently feeling depressed or sad, Frequently feeling anxious   Eyes Visual loss, Double vision   Ears, Nose, Throat Hearing loss, Ear pain, Ringing/noise in ears, Sore throat, Trouble swallowing, Hoarseness   Cardiopulmonary Cough, Breathing problems, Wheezing, Chest pain   Gastrointestinal/Genitourinary Unexplained nausea/vomiting, Constipation   Musculoskeletal Sore or stiff joints, Swollen joints, Back pain, Neck pain, Swollen legs/feet   Allergy/Immunology Allergies or hay fever, Skin changes, Rash   Hematologic Lymph node swelling   Endocrine Thirst, Heat or cold intolerance, Frequent urination   Skin  Change in moles   Other Rash        Physical Exam:   There were no vitals taken for this visit.     PHYSICAL EXAMINATION:    Constitutional:  The patient was accompanied by her daughter and H[place] , well-groomed, and in no acute distress.    Skin:  Warm and pink.    Neurologic:  Alert and oriented x 3.  CN's III-XII within normal limits.  Voice normal.   Psychiatric:  The patient's affect was calm, cooperative, and appropriate.    Respiratory:  Breathing comfortably without stridor or exertion of accessory muscles.    Eyes: Extraocular movement intact.    Head:  Normocephalic and atraumatic.  No lesions or scars.    Ears:  Pinnae and tragus non-tender.  EAC's and TM's were clear.     Nose:  Sinuses were non-tender.  Anterior rhinoscopy revealed midline septum and absence of purulence or polyps.    OC/OP:  Normal tongue, floor of mouth, buccal mucosa, and palate.  No lesions or masses on inspection or palpation.  No abnormal lymph tissue in the oropharynx.  The pterygoid region is non-tender.  No trismus.   Neck:  Supple with normal laryngeal and tracheal landmarks.  The parotid beds were without masses.  No palpable thyroid.  Lymphatic:  There is no palpable lymphadenopathy in the neck.     Assessment and Plan:  Chris Bell is an 84 year old woman here for follow up of lichen planus and oral cavity necrosis. This has fully healed now after gentian violet and magic mouthwash. She can follow up with new or worsening symptoms.      Scribe Disclosure:  I, Tawanna Ta, am serving as a scribe to document services personally performed by Salazar Cruz MD at this visit, based upon the provider's statements to me. All documentation has been reviewed by the aforementioned provider prior to being entered into the official medical record.      Again, thank you for allowing me to participate in the care of your patient.      Sincerely,    Salazar Cruz MD

## 2020-01-14 NOTE — NURSING NOTE
Chief Complaint   Patient presents with     RECHECK     Lichen planus     Blood pressure 126/78, pulse 82.    Rosa Elena Harley LPN

## 2020-01-14 NOTE — PATIENT INSTRUCTIONS
1. You were seen in the ENT Clinic today by Dr. Cruz.  If you have any questions or concerns after your appointment, please call   - Option 1: ENT Clinic: [phone]  - Option 2: Sylwia (Dr. Cruz's Nurse): [phone]    2.   Plan to return to clinic as needed    Natice Schwab, RN  [facility] Otolaryngology  [phone]

## 2020-01-14 NOTE — PROGRESS NOTES
Otolaryngology Clinic      Name: Chris Bell  MRN: [id number]  Age: 84 year old  : 2020      Chief Complaint:   Follow up    History of Present Illness:   The patient's clinic visit was completed with the assistance of a [facility] .   Chris Bell is a 84 year old female who presents for follow up of oral lichen planus. 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 The patient has been using Magic Mouthwash and gentian violet treatments. I last saw the patient in 2019 and she was continuing to heal with these strategies. Since that time, she feels her tongue fully healed except for a small area of pain on her left mouth.      Review of Systems:   Pertinent items are noted in HPI or as in patient entered ROS below, remainder of complete ROS is negative.    ENT ROS 2019   Constitutional Weight loss, Problems with sleep, Unexplained fever or night sweats   Neurology Numbness, Unexplained weakness   Psychology Frequently feeling depressed or sad, Frequently feeling anxious   Eyes Visual loss, Double vision   Ears, Nose, Throat Hearing loss, Ear pain, Ringing/noise in ears, Sore throat, Trouble swallowing, Hoarseness   Cardiopulmonary Cough, Breathing problems, Wheezing, Chest pain   Gastrointestinal/Genitourinary Unexplained nausea/vomiting, Constipation   Musculoskeletal Sore or stiff joints, Swollen joints, Back pain, Neck pain, Swollen legs/feet   Allergy/Immunology Allergies or hay fever, Skin changes, Rash   Hematologic Lymph node swelling   Endocrine Thirst, Heat or cold intolerance, Frequent urination   Skin Change in moles   Other Rash        Physical Exam:   There were no vitals taken for this visit.     PHYSICAL EXAMINATION:    Constitutional:  The patient was accompanied by her daughter and [facility] , well-groomed, and in no acute distress.    Skin:  Warm and pink.    Neurologic:   Alert and oriented x 3.  CN's III-XII within normal limits.  Voice normal.   Psychiatric:  The patient's affect was calm, cooperative, and appropriate.    Respiratory:  Breathing comfortably without stridor or exertion of accessory muscles.    Eyes: Extraocular movement intact.    Head:  Normocephalic and atraumatic.  No lesions or scars.    Ears:  Pinnae and tragus non-tender.  EAC's and TM's were clear.     Nose:  Sinuses were non-tender.  Anterior rhinoscopy revealed midline septum and absence of purulence or polyps.    OC/OP:  Normal tongue, floor of mouth, buccal mucosa, and palate.  No lesions or masses on inspection or palpation.  No abnormal lymph tissue in the oropharynx.  The pterygoid region is non-tender.  No trismus.   Neck:  Supple with normal laryngeal and tracheal landmarks.  The parotid beds were without masses.  No palpable thyroid.  Lymphatic:  There is no palpable lymphadenopathy in the neck.     Assessment and Plan:  Chris Bell is an 84 year old woman here for follow up of lichen planus and oral cavity necrosis. This has fully healed now after gentian violet and magic mouthwash. She can follow up with new or worsening symptoms.      Scribe Disclosure:  I, Tawanna Ta, am serving as a scribe to document services personally performed by Salazar Cruz MD at this visit, based upon the provider's statements to me. All documentation has been reviewed by the aforementioned provider prior to being entered into the official medical record.

## 2020-01-30 ENCOUNTER — RECORDS - HEALTHEAST (OUTPATIENT)
Dept: ADMINISTRATIVE | Facility: OTHER | Age: 85
End: 2020-01-30

## 2020-03-25 ENCOUNTER — RECORDS - HEALTHEAST (OUTPATIENT)
Dept: ADMINISTRATIVE | Facility: OTHER | Age: 85
End: 2020-03-25

## 2020-03-26 ENCOUNTER — AMBULATORY - HEALTHEAST (OUTPATIENT)
Dept: ADMINISTRATIVE | Facility: CLINIC | Age: 85
End: 2020-03-26

## 2020-03-26 DIAGNOSIS — K12.0 APHTHOUS STOMATITIS: ICD-10-CM

## 2020-03-26 DIAGNOSIS — K05.10 GINGIVITIS: ICD-10-CM

## 2020-03-26 DIAGNOSIS — K14.0 ULCERATED TONGUE: ICD-10-CM

## 2021-02-15 ENCOUNTER — RECORDS - HEALTHEAST (OUTPATIENT)
Dept: LAB | Facility: CLINIC | Age: 86
End: 2021-02-15

## 2021-02-16 LAB
ANION GAP SERPL CALCULATED.3IONS-SCNC: 11 MMOL/L (ref 5–18)
BUN SERPL-MCNC: 5 MG/DL (ref 8–28)
CALCIUM SERPL-MCNC: 8.5 MG/DL (ref 8.5–10.5)
CHLORIDE BLD-SCNC: 98 MMOL/L (ref 98–107)
CO2 SERPL-SCNC: 24 MMOL/L (ref 22–31)
CREAT SERPL-MCNC: 0.74 MG/DL (ref 0.6–1.1)
ERYTHROCYTE [DISTWIDTH] IN BLOOD BY AUTOMATED COUNT: 16.1 % (ref 11–14.5)
GFR SERPL CREATININE-BSD FRML MDRD: >60 ML/MIN/1.73M2
GLUCOSE BLD-MCNC: 135 MG/DL (ref 70–125)
HCT VFR BLD AUTO: 25.7 % (ref 35–47)
HGB BLD-MCNC: 8.5 G/DL (ref 12–16)
MCH RBC QN AUTO: 33.5 PG (ref 27–34)
MCHC RBC AUTO-ENTMCNC: 33.1 G/DL (ref 32–36)
MCV RBC AUTO: 101 FL (ref 80–100)
PLATELET # BLD AUTO: 336 THOU/UL (ref 140–440)
PMV BLD AUTO: 8 FL (ref 8.5–12.5)
POTASSIUM BLD-SCNC: 3.4 MMOL/L (ref 3.5–5)
RBC # BLD AUTO: 2.54 MILL/UL (ref 3.8–5.4)
SODIUM SERPL-SCNC: 133 MMOL/L (ref 136–145)
WBC: 3.6 THOU/UL (ref 4–11)

## 2021-02-19 ENCOUNTER — RECORDS - HEALTHEAST (OUTPATIENT)
Dept: LAB | Facility: CLINIC | Age: 86
End: 2021-02-19

## 2021-02-19 LAB
ALBUMIN UR-MCNC: NEGATIVE MG/DL
APPEARANCE UR: CLEAR
BILIRUB UR QL STRIP: NEGATIVE
COLOR UR AUTO: NORMAL
GLUCOSE UR STRIP-MCNC: NEGATIVE MG/DL
HGB UR QL STRIP: NEGATIVE
KETONES UR STRIP-MCNC: NEGATIVE MG/DL
LEUKOCYTE ESTERASE UR QL STRIP: NEGATIVE
NITRATE UR QL: NEGATIVE
PH UR STRIP: 6 [PH] (ref 4.5–8)
SP GR UR STRIP: 1.01 (ref 1–1.03)
UROBILINOGEN UR STRIP-ACNC: NORMAL

## 2021-02-22 ENCOUNTER — RECORDS - HEALTHEAST (OUTPATIENT)
Dept: LAB | Facility: CLINIC | Age: 86
End: 2021-02-22

## 2021-02-23 LAB
ANION GAP SERPL CALCULATED.3IONS-SCNC: 8 MMOL/L (ref 5–18)
BUN SERPL-MCNC: 6 MG/DL (ref 8–28)
CALCIUM SERPL-MCNC: 8.1 MG/DL (ref 8.5–10.5)
CHLORIDE BLD-SCNC: 102 MMOL/L (ref 98–107)
CO2 SERPL-SCNC: 26 MMOL/L (ref 22–31)
CREAT SERPL-MCNC: 0.66 MG/DL (ref 0.6–1.1)
FERRITIN SERPL-MCNC: 36 NG/ML (ref 10–130)
FOLATE SERPL-MCNC: 8.5 NG/ML
GFR SERPL CREATININE-BSD FRML MDRD: >60 ML/MIN/1.73M2
GLUCOSE BLD-MCNC: 103 MG/DL (ref 70–125)
HGB BLD-MCNC: 7.9 G/DL (ref 12–16)
IRON SATN MFR SERPL: 8 % (ref 20–50)
IRON SERPL-MCNC: 18 UG/DL (ref 42–175)
POTASSIUM BLD-SCNC: 3.3 MMOL/L (ref 3.5–5)
SODIUM SERPL-SCNC: 136 MMOL/L (ref 136–145)
TIBC SERPL-MCNC: 215 UG/DL (ref 313–563)
TRANSFERRIN SERPL-MCNC: 172 MG/DL (ref 212–360)
VIT B12 SERPL-MCNC: 1340 PG/ML (ref 213–816)

## 2021-02-24 ENCOUNTER — RECORDS - HEALTHEAST (OUTPATIENT)
Dept: LAB | Facility: CLINIC | Age: 86
End: 2021-02-24

## 2021-02-25 LAB
HGB BLD-MCNC: 8.8 G/DL (ref 12–16)
MAGNESIUM SERPL-MCNC: 1.5 MG/DL (ref 1.8–2.6)

## 2021-02-28 ENCOUNTER — RECORDS - HEALTHEAST (OUTPATIENT)
Dept: LAB | Facility: CLINIC | Age: 86
End: 2021-02-28

## 2021-02-28 LAB
SARS-COV-2 PCR COMMENT: NORMAL
SARS-COV-2 RNA SPEC QL NAA+PROBE: NEGATIVE
SARS-COV-2 VIRUS SPECIMEN SOURCE: NORMAL

## 2021-03-08 ENCOUNTER — RECORDS - HEALTHEAST (OUTPATIENT)
Dept: LAB | Facility: CLINIC | Age: 86
End: 2021-03-08

## 2021-03-08 LAB
ALBUMIN UR-MCNC: NEGATIVE MG/DL
APPEARANCE UR: CLEAR
BILIRUB UR QL STRIP: NEGATIVE
COLOR UR AUTO: NORMAL
GLUCOSE UR STRIP-MCNC: NEGATIVE MG/DL
HGB UR QL STRIP: NEGATIVE
KETONES UR STRIP-MCNC: NEGATIVE MG/DL
LEUKOCYTE ESTERASE UR QL STRIP: NEGATIVE
NITRATE UR QL: NEGATIVE
PH UR STRIP: 6.5 [PH] (ref 4.5–8)
SP GR UR STRIP: 1 (ref 1–1.03)
UROBILINOGEN UR STRIP-ACNC: NORMAL

## 2021-03-09 LAB
ANION GAP SERPL CALCULATED.3IONS-SCNC: 8 MMOL/L (ref 5–18)
BUN SERPL-MCNC: 4 MG/DL (ref 8–28)
CALCIUM SERPL-MCNC: 8.5 MG/DL (ref 8.5–10.5)
CHLORIDE BLD-SCNC: 102 MMOL/L (ref 98–107)
CO2 SERPL-SCNC: 26 MMOL/L (ref 22–31)
CREAT SERPL-MCNC: 0.71 MG/DL (ref 0.6–1.1)
ERYTHROCYTE [DISTWIDTH] IN BLOOD BY AUTOMATED COUNT: 15.1 % (ref 11–14.5)
GFR SERPL CREATININE-BSD FRML MDRD: >60 ML/MIN/1.73M2
GLUCOSE BLD-MCNC: 111 MG/DL (ref 70–125)
HCT VFR BLD AUTO: 27.6 % (ref 35–47)
HGB BLD-MCNC: 8.7 G/DL (ref 12–16)
MCH RBC QN AUTO: 33.1 PG (ref 27–34)
MCHC RBC AUTO-ENTMCNC: 31.5 G/DL (ref 32–36)
MCV RBC AUTO: 105 FL (ref 80–100)
PLATELET # BLD AUTO: 251 THOU/UL (ref 140–440)
PMV BLD AUTO: 8.5 FL (ref 8.5–12.5)
POTASSIUM BLD-SCNC: 3.3 MMOL/L (ref 3.5–5)
RBC # BLD AUTO: 2.63 MILL/UL (ref 3.8–5.4)
SODIUM SERPL-SCNC: 136 MMOL/L (ref 136–145)
WBC: 4.2 THOU/UL (ref 4–11)

## 2021-03-22 ENCOUNTER — AMBULATORY - HEALTHEAST (OUTPATIENT)
Dept: PHYSICAL MEDICINE AND REHAB | Facility: CLINIC | Age: 86
End: 2021-03-22

## 2021-03-22 DIAGNOSIS — M48.54XD COLLAPSED VERTEBRA, NOT ELSEWHERE CLASSIFIED, THORACIC REGION, SUBSEQUENT ENCOUNTER FOR FRACTURE WITH ROUTINE HEALING: ICD-10-CM

## 2021-03-24 DIAGNOSIS — G47.9 SLEEP DIFFICULTIES: Primary | ICD-10-CM

## 2021-03-29 ENCOUNTER — HOSPITAL ENCOUNTER (OUTPATIENT)
Dept: PHYSICAL MEDICINE AND REHAB | Facility: CLINIC | Age: 86
Discharge: HOME | End: 2021-03-29
Attending: NURSE PRACTITIONER

## 2021-03-29 ENCOUNTER — COMMUNICATION - HEALTHEAST (OUTPATIENT)
Dept: TELEHEALTH | Facility: CLINIC | Age: 86
End: 2021-03-29

## 2021-03-29 DIAGNOSIS — G89.29 CHRONIC MIDLINE LOW BACK PAIN WITH BILATERAL SCIATICA: ICD-10-CM

## 2021-03-29 DIAGNOSIS — M48.061 SPINAL STENOSIS OF LUMBAR REGION WITHOUT NEUROGENIC CLAUDICATION: ICD-10-CM

## 2021-03-29 DIAGNOSIS — M54.42 CHRONIC MIDLINE LOW BACK PAIN WITH BILATERAL SCIATICA: ICD-10-CM

## 2021-03-29 DIAGNOSIS — M79.18 MYOFASCIAL PAIN: ICD-10-CM

## 2021-03-29 DIAGNOSIS — M53.3 PAIN IN THE COCCYX: ICD-10-CM

## 2021-03-29 DIAGNOSIS — Z87.81 HISTORY OF COMPRESSION FRACTURE OF SPINE: ICD-10-CM

## 2021-03-29 DIAGNOSIS — M47.816 LUMBAR FACET ARTHROPATHY: ICD-10-CM

## 2021-03-29 DIAGNOSIS — M43.16 SPONDYLOLISTHESIS OF LUMBAR REGION: ICD-10-CM

## 2021-03-29 DIAGNOSIS — M54.41 CHRONIC MIDLINE LOW BACK PAIN WITH BILATERAL SCIATICA: ICD-10-CM

## 2021-05-25 ENCOUNTER — RECORDS - HEALTHEAST (OUTPATIENT)
Dept: ADMINISTRATIVE | Facility: CLINIC | Age: 86
End: 2021-05-25

## 2021-05-26 ENCOUNTER — RECORDS - HEALTHEAST (OUTPATIENT)
Dept: ADMINISTRATIVE | Facility: CLINIC | Age: 86
End: 2021-05-26

## 2021-05-27 ENCOUNTER — RECORDS - HEALTHEAST (OUTPATIENT)
Dept: ADMINISTRATIVE | Facility: CLINIC | Age: 86
End: 2021-05-27

## 2021-05-28 ENCOUNTER — RECORDS - HEALTHEAST (OUTPATIENT)
Dept: ADMINISTRATIVE | Facility: CLINIC | Age: 86
End: 2021-05-28

## 2021-05-29 ENCOUNTER — RECORDS - HEALTHEAST (OUTPATIENT)
Dept: ADMINISTRATIVE | Facility: CLINIC | Age: 86
End: 2021-05-29

## 2021-05-30 NOTE — PROGRESS NOTES
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  Had CT scan July 1 showing concern for soft palate cancer with associated cervical adenopathy.  Seen by ENT July 8 but initially declined biopsy.  He is agreeable to this now.    We will schedule PET scan.  We will schedule follow-up with Dr. Barragan at [facility] as soon as possible for biopsy.  We will schedule follow-up with our radiation oncology about a week after the ENT visit.    Explained that she likely has a squamous cell cancer.  Given her age and core morbidities I think the primary treatment would be radiation therapy alone.  Did not schedule a follow-up here in oncology.    Asked her to increase dose of oxycodone to help with her pain.  We will follow-up with her primary for additional pain management.    Seen initially for follow-up and evaluation of anemia.  This appears to be iron deficiency related to bleeding from gastric ulcers.  Has now resolved.    Plan: Schedule PET scan  Follow-up with ENT for biopsy  See radiation oncology after that    Measurable disease: Physical exam, CT of the neck        ECOG Performance   ECOG Performance Status: 2    Distress Assessment  Distress Assessment Score: No distress    Pain           Problem List    1. Cancer of soft palate (H)  NM PET CT Bone Scan    Ambulatory referral to ENT    Ambulatory referral to Radiation Oncology   2. Anemia due to GI blood loss          CC: Kizzy Villanueva,  MD    ______________________________________________________________________________    History of Present Illness    Ms. Chris Bell returns for follow-up.  Last seen here about 6 months ago for follow-up of her anemia which had improved.  Was recommended to stay on some oral iron.    Has been having issues in her tongue for a few months.  At the end of June there was noted left sided soft palate lesion.  She was treated with Orabase.  Had CT scan in urgent care July 8 showing concern for squamous cell cancer involving the soft palate with associated cervical lymph nodes.  Did go back to ENT and was recommended biopsy but declined this.  It saw her primary care and was started on pain medication.    Pain not adequately controlled.  Patient and family are agreeable to biopsy at this time.    Pain Status  Currently in Pain: No/denies    Review of Systems    Constitutional  Constitutional (WDL): Exceptions to WDL  Fatigue: Fatigue not relieved by rest - Limiting instrumental ADL  Neurosensory  Neurosensory (WDL): Exceptions to WDL  Ataxia: Moderate symptoms, limiting instrumental ADL(Slow gait. Weak. Intermittent WC use while in clinic. )  Peripheral Sensory Neuropathy: Asymptomatic, loss of deep tendon reflexes or paresthesia(Tingling to both hands and feet. )  Cardiovascular  Cardiovascular (WDL): Exceptions to WDL  Palpitations: Definition: A disorder characterized by inflammation of the muscle tissue of the heart.  Edema: Yes(Feet/ankles. Reports some improvement. )  Pulmonary  Respiratory (WDL): Within Defined Limits  Gastrointestinal  Gastrointestinal (WDL): Exceptions to WDL  Anorexia: Oral intake altered without significant weight loss or malnutrition, oral nutritional supplements indicated(Minimal intake. )  Constipation: Persistent symptoms with regular use of laxatives or enemas, limiting instrumental ADL  Dysphagia: Symptomatic and altered eating/swallowing(Foods have to be very soft or liquidy. )  Dry  Mouth: Symptomatic (e.g., dry or thick saliva) without significant dietary alteration, unstimulated saliva flow >0.2 ml/min  Genitourinary  Genitourinary (WDL): All genitourinary elements are within defined limits  Integumentary  Integumentary (WDL): All integumentary elements are within defined limits  Patient Coping  Patient Coping: Accepting  Distress Assessment  Distress Assessment Score: No distress  Accompanied by  Accompanied by: Family Member    Past History  Past Medical History:   Diagnosis Date     Anemia      Depression      Disease of thyroid gland      HTN (hypertension)      Osteoporosis      PUD (peptic ulcer disease)          Past Surgical History:   Procedure Laterality Date     ESOPHAGOGASTRODUODENOSCOPY N/A 1/17/2018    Procedure: ESOPHAGOGASTRODUODENOSCOPY (EGD) with h.pylori and gastric ulcer biopsies;  Surgeon: Colin Alvarez MD;  Location: [facility];  Service:      HYSTERECTOMY         Physical Exam    Recent Vitals 7/19/2019   Height -   Weight 108 lbs 11 oz   BSA (m2) 1.41 m2   /60   Pulse 86   Temp 98.5   Temp src 1   SpO2 97   Some recent data might be hidden       GENERAL: Alert and oriented to time place and person. Seated comfortably. In no distress.    HEAD: Atraumatic and normocephalic.    EYES: EFRAIN, EOMI.  No pallor.  No icterus.    Oral cavity: Left side of the soft palate reveals whitish lesion measuring 2 to 3 cm.    NECK: supple. JVP normal.  No thyroid enlargement.    LYMPH NODES: No palpable, cervical, axillary or inguinal lymphadenopathy.    CHEST: clear to auscultation bilaterally.  Resonant to percussion throughout bilaterally.  Symmetrical breath movements bilaterally.    CVS: S1 and S2 are heard. Regular rate and rhythm.  No murmur or gallop or rub heard.  No peripheral edema.    ABDOMEN: Soft. Not tender. Not distended.  No palpable hepatomegaly or splenomegaly.  No other mass palpable.  Bowel sounds heard.    EXTREMITIES: Warm.    SKIN: no rash, or  bruising or purpura.  Has a full head of hair.      Lab Results    Recent Results (from the past 168 hour(s))   HM1 (CBC with Diff)   Result Value Ref Range    WBC 6.7 4.0 - 11.0 thou/uL    RBC 3.17 (L) 3.80 - 5.40 mill/uL    Hemoglobin 11.2 (L) 12.0 - 16.0 g/dL    Hematocrit 33.5 (L) 35.0 - 47.0 %     (H) 80 - 100 fL    MCH 35.3 (H) 27.0 - 34.0 pg    MCHC 33.4 32.0 - 36.0 g/dL    RDW 14.3 11.0 - 14.5 %    Platelets 240 140 - 440 thou/uL    MPV 8.2 (L) 8.5 - 12.5 fL    Neutrophils % 60 50 - 70 %    Lymphocytes % 20 20 - 40 %    Monocytes % 18 (H) 2 - 10 %    Eosinophils % 2 0 - 6 %    Basophils % 0 0 - 2 %    Neutrophils Absolute 4.0 2.0 - 7.7 thou/uL    Lymphocytes Absolute 1.3 0.8 - 4.4 thou/uL    Monocytes Absolute 1.2 (H) 0.0 - 0.9 thou/uL    Eosinophils Absolute 0.2 0.0 - 0.4 thou/uL    Basophils Absolute 0.0 0.0 - 0.2 thou/uL       Imaging    No results found.      Signed by: Fiona Jennings MD

## 2021-05-31 ENCOUNTER — RECORDS - HEALTHEAST (OUTPATIENT)
Dept: ADMINISTRATIVE | Facility: CLINIC | Age: 86
End: 2021-05-31

## 2021-05-31 VITALS — BODY MASS INDEX: 25.76 KG/M2 | WEIGHT: 131.2 LBS | HEIGHT: 60 IN

## 2021-05-31 VITALS — BODY MASS INDEX: 25.72 KG/M2 | WEIGHT: 131 LBS | HEIGHT: 60 IN

## 2021-05-31 NOTE — CONSULTS
Consults   [facility] Radiation Oncology Consult Note     Patient: Chris Bell  MRN: [id number]  Date of Service: 08/08/2019          Fiona Jennings MD  [address] [place] [address]       Dear Dr. Jennings:    Thank you very much for referring this patient for consideration of radiotherapy. As you know Ms. Bell is a 84 y.o. female with a diagnosis of left oropharynx ulcerative lesion with FDG avid level 2 left lymph node consistent with oropharyngeal cancer with lymph node metastasis.  The patient had biopsy twice of the oral pharyngeal lesion with the pathology showed inflammation without evidence of malignancy.  She is referred to radiation oncology for evaluation and discussion of management recommendation.    HISTORY OF PRESENT ILLNESS:   Ms. Bell is a 84 y.o. female who presented a few months history of painful tongue lesion for which she was seeking further evaluation.  She was initially treated with triamcinolone acetonide with no significant improvement.  Her initial examination reviewed left soft palate was erythematous with a central ulceration and extend to the left lateral tongue consistent with oropharynx cancer.  The patient had a left tongue ulcerative lesion biopsy on 6/3/2019 with biopsy showed acute inflammation with no evidence of malignancy. CT neck on 7/1/119 also reviewed an enhancing infiltrative mass at the left aspect of the soft palate extending laterally towards the left lateral pharyngeal wall and inferiorly towards the left palatine tonsil. This area of enhancement measures approximately 2.4 x 1.8 x 2.1 cm in oblique transverse, AP, and craniocaudal dimensions respectively.  There was also an Enlarged mildly enhancing left level 2 cervical lymph nodes. The dominant node measures 1.0 x 1.8 cm. There is an additional suspicious rounded left level 2 lymph node measuring 0.7 cm.  PET CT scan on 7/25/2019 confirmed marked FDG activity,SUV max 8.9, consistent with  malignancy. The enhancing level 2 left cervical lymph node demonstrates moderate FDG activity, SUV max 4.6, consistent with lymph node metastasis.  She underwent a second biopsy on 7/29/2019 with pathology again reviewed ulcer with necrosis and marked acute inflammation with no evidence of malignancy.  Patient is referred to radiation oncology with the assumption of diagnosis of head neck cancer and discussion of management recommendation.    CHEMOTHERAPY HISTORY: Concurrent Chemotherapy: Yes    RADIATION THERAPY HISTORY: Prior Radiation: No    IMPLANTED CARDIAC DEVICE: none     PREGNANCY: The patient is informed not to be pregnant during the radiation therapy.  She is post menopausal.    Current Outpatient Medications   Medication Sig Dispense Refill     ANTACID REGULAR STRENGTH 200-200-20 mg/5 mL Susp 4 MLS MIXED WITH 1 ML LIDOCAINE-MAY TAKE 5 ML PO QID  11     benzocaine (ORAJEL) 10 % mucosal gel Apply to the mouth or throat as needed for pain.       furosemide (LASIX) 20 MG tablet Take 1 tablet (20 mg total) by mouth daily for 5 days. 5 tablet 0     gabapentin (NEURONTIN) 100 MG capsule Take 200 mg by mouth 3 (three) times a day. (Patient taking differently: Take 100 mg by mouth 3 (three) times a day. ) 180 capsule 2     levothyroxine (SYNTHROID, LEVOTHROID) 50 MCG tablet Take 50 mcg by mouth daily.       LORazepam (ATIVAN) 0.5 MG tablet Take 0.5 mg by mouth every 8 (eight) hours as needed for anxiety.        magnesium hydroxide (MAGNESIUM HYDROXIDE) 400 mg/5 mL Susp suspension Take 30 mL by mouth daily as needed.       metoprolol succinate (TOPROL-XL) 25 MG Take 25 mg by mouth 2 (two) times a day.        multivitamin (DAILY-CHRIS ORAL) Take 1 tablet by mouth daily.       oxyCODONE (ROXICODONE) 5 mg/5 mL solution Take 5 mg by mouth every 4 (four) hours as needed for pain.       potassium chloride (K-DUR,KLOR-CON) 20 MEQ tablet Take 20 mEq by mouth daily.       sucralfate (CARAFATE) 1 gram tablet Take 1 g by mouth  4 (four) times a day.       No current facility-administered medications for this visit.      Past Medical History:   Diagnosis Date     Anemia      Depression      Disease of thyroid gland      HTN (hypertension)      Osteoporosis      PUD (peptic ulcer disease)      Past Surgical History:   Procedure Laterality Date     ESOPHAGOGASTRODUODENOSCOPY N/A 1/17/2018    Procedure: ESOPHAGOGASTRODUODENOSCOPY (EGD) with h.pylori and gastric ulcer biopsies;  Surgeon: Colin Alvarez MD;  Location: [facility];  Service:      HYSTERECTOMY       Patient has no known allergies.  Family History   Problem Relation Age of Onset     Diabetes Neg Hx      Cancer Neg Hx      Heart disease Neg Hx      Social History     Socioeconomic History     Marital status:      Spouse name: Not on file     Number of children: Not on file     Years of education: Not on file     Highest education level: Not on file   Occupational History     Not on file   Social Needs     Financial resource strain: Not on file     Food insecurity:     Worry: Not on file     Inability: Not on file     Transportation needs:     Medical: Not on file     Non-medical: Not on file   Tobacco Use     Smoking status: Never Smoker     Smokeless tobacco: Never Used   Substance and Sexual Activity     Alcohol use: No     Drug use: No     Sexual activity: Never   Lifestyle     Physical activity:     Days per week: Not on file     Minutes per session: Not on file     Stress: Not on file   Relationships     Social connections:     Talks on phone: Not on file     Gets together: Not on file     Attends [employer] service: Not on file     Active member of club or organization: Not on file     Attends meetings of clubs or organizations: Not on file     Relationship status: Not on file     Intimate partner violence:     Fear of current or ex partner: Not on file     Emotionally abused: Not on file     Physically abused: Not on file     Forced sexual activity: Not on file    Other Topics Concern     Not on file   Social History Narrative     Not on file        Review of Systems:      General  Constitutional (WDL): Exceptions to WDL  Fatigue: Fatigue not relieved by rest - Limiting instrumental ADL  Hot flashes/Night Sweats: Mild symptoms, no intervention needed  EENT  Eye Disorder (WDL): Exceptions to WDL  Blurred Vision: Intervention not indicated  Ear Disorder (WDL): Exceptions to WDL  Ear Pain: Mild Pain(left ear pain/pressure)  Tinnitus: Mild symptoms, intervention not indicated(left)  Respiratory   Respiratory (WDL): Within Defined Limits  Cardiovascular  Cardiovascular (WDL): Exceptions to WDL  Palpitations: Definition: A disorder characterized by inflammation of the muscle tissue of the heart.  Edema: Yes  Edema Limbs: 5 - 10% inter-limb discrepancy in volume or circumference at point of greatest visible difference, swelling or obscuration of anatomic architecture on close inspection(feet/ankles occasionally)  Endocrine     Gastrointestinal  Gastrointestinal (WDL): Exceptions to WDL  Anorexia: Associated with significant weight loss or malnutrition (e.g., inadequate oral caloric and/or fluid intake), tube feeding or TPN indicated(can only swallow rice porridge & broth)  Dysphagia: Symptomatic and altered eating/swallowing  Mucositis Oral: Moderate pain: not interfering with oral intake: modified diet  Constipation: Persistent symptoms with regular use of laxatives or enemas, limiting instrumental ADL  Musculoskeletal  Musculoskeletal and Connetive Tissue Disorders (WDL): Exceptions to WDL  Muscle Weakness : Symptomatic, evident on physical exam, limiting instrumental ADL  Integumentary               Integumentary (WDL): All integumentary elements are within defined limits  Neurological  Neurosensory (WDL): Exceptions to WDL  Ataxia: Moderate symptoms, limiting instrumental ADL(weak, slow gait, uses w/c in clinic)  Peripheral Sensory Neuropathy: Asymptomatic, loss of deep tendon  reflexes or paresthesia(tingling in hands & feet)  Psychological/Emotional   Patient Coping: Accepting;Open/discussion  Hematological/Lymphatic  Lymph (WDL): All lymph disorder elements are within defined limits  Dermatologic     Genitourinary/Reproductive  Genitourinary (WDL): All genitourinary elements are within defined limits  Reproductive     Pain              Currently in Pain: Yes  Pain Score (Initial OR Reassessment): 5  Pain Frequency: Constant/continuous  Location: mouth/throat  Pain Intervention(s): Home medication  Response to Interventions: tolerable  Accompanied by  Accompanied by: Family Member    Imaging: Reviewed    Pathology: Reviewed    ECOG Peformance Status  ECOG Performance Status: 2  Distress Assessment Score: 5    Objective:      PHYSICAL EXAMINATION:    /73   Pulse 80   Temp 98.4  F (36.9  C) (Oral)   SpO2 98%     Gen: Alert, in NAD  Eyes: PERRL, EOMI, sclera anicteric  HENT     Head: NC/AT     Ears: No external auricular lesions     Nose/sinus: No rhinorrhea or epistaxis     Oropharynx: There is an large ulcerative lesion involving the left soft palate extending down to left base of tongue.  Neck: Supple, full ROM, no LAD  Pulm: No wheezing, stridor or respiratory distress  CV: Well-perfused, no cyanosis, no pedal edema  Abdominal: BS+, soft, nontender, nondistended, no hepatomegaly  Back: No step-offs or pain to palpation along the thoracolumbar spine  Rectal: Deferred  : Deferred  Musculoskeletal: Normal muscle bulk and tone  Skin: Normal color and turgor  Neurologic: A/Ox3, CN II-XII intact, normal gait and station  Psychiatric: Appropriate mood and affect     Impression     Left oropharynx ulcerative lesion with FDG avid level 2 left lymph node consistent with oropharyngeal cancer with lymph node metastasis.  The patient had biopsy twice of the oral pharyngeal lesion with the pathology showed inflammation without evidence of malignancy.  She is referred to radiation oncology  for evaluation and discussion of management recommendation.    Assessment & Plan:     I have personally reviewed her upcoming medical record today.  I have also reviewed her most recent radiology study including PET CT scan.  This is a 84-year-old female with a recent finding of ulcerative lesion involving the left oropharynx with PET CT scan reviewed FDG avid oropharyngeal lesion and level 2 lymph node enlargement consistent with oropharyngeal cancer with lymph node metastasis.  2 attempted to biopsy did not reviewed evidence of malignancy.  I have reviewed her PET CT scan with our radiologist today.  I have also discussed her case with Dr. Khanh Joy, ENT.  The consensus recommendation is to proceed with ultrasound/CT-guided biopsy of her left neck level 2 lymph node to obtain pathology confirmation of the diagnosis.  If this is still not successful, Dr. Barragan will consider to obtain a biopsy under the anesthesia.  Patient is therefore scheduled to return to radiation oncology 1 week after biopsy for further evaluation and discussion of treatment recommendation.  She is also informed to see her dentist to get her teeth evaluation and treatment before proceed with head neck cancer treatment.    Again, thank you very much for the referral and allowing me to participate in the care of this patient.  If you have any questions or concerns about this consultation, please do not hesitate to call.  I spent approximately 60 minutes today with the patient and 80% time was used for counseling.        Sincerely,        Dai Lai MD, PhD  Department of Radiation Oncology   [facility]  Tel: [phone]  Page: [phone]    [facility]  [address] [place] [address]     [facility]   [address] [facility] [place] [address]    CC:  Patient Care Team:  Kizzy Villanueva MD as PCP - General (Family Medicine)  Fiona Jennings MD as Physician (Hematology and  Oncology)  Khanh Joy MD as Physician (Otolaryngology)  Dai Lai MD as Physician (Radiation Oncology)

## 2021-05-31 NOTE — PROGRESS NOTES
Pt in w/c with family for follow up. Having lots of mouth and throat pain. Further recommendations and orders per provider.

## 2021-05-31 NOTE — PROGRESS NOTES
[facility] Radiation Oncology Follow Up Note    Patient: Chris Bell  MRN: [id number]  Date of Service: 08/22/2019    Ms. Bell is a 84 y.o. female who presented a few months history of painful tongue lesion for which she was seeking further evaluation.  She was initially treated with triamcinolone acetonide with no significant improvement.  Her initial examination reviewed left soft palate was erythematous with a central ulceration and extend to the left lateral tongue consistent with oropharynx cancer.  The patient had a left tongue ulcerative lesion biopsy on 6/3/2019 with biopsy showed acute inflammation with no evidence of malignancy. CT neck on 7/1/119 also reviewed an enhancing infiltrative mass at the left aspect of the soft palate extending laterally towards the left lateral pharyngeal wall and inferiorly towards the left palatine tonsil. This area of enhancement measures approximately 2.4 x 1.8 x 2.1 cm in oblique transverse, AP, and craniocaudal dimensions respectively.  There was also an Enlarged mildly enhancing left level 2 cervical lymph nodes. The dominant node measures 1.0 x 1.8 cm. There is an additional suspicious rounded left level 2 lymph node measuring 0.7 cm.  PET CT scan on 7/25/2019 confirmed marked FDG activity,SUV max 8.9, consistent with malignancy. The enhancing level 2 left cervical lymph node demonstrates moderate FDG activity, SUV max 4.6, consistent with lymph node metastasis.  She underwent a second biopsy on 7/29/2019 with pathology again reviewed ulcer with necrosis and marked acute inflammation with no evidence of malignancy.  The patient was seen and evaluated on 8/8/2019 for consideration of possible chemoradiation therapy.  She had a 2 attempted biopsy in the past with no evidence of malignancy.  She was recommended to proceed third biopsy of her left neck lymph nodes and the patient had lymph node biopsy on 8/14/2019 with biopsy again showed no evidence of malignancy.  The  patient is here for office follow-up.      I have reviewed her new pathology result with the patient.  The pathology showed no evidence of malignancy.  I have discussed her case with Dr. Joy, ENT.  The decision is to refer patient to [facility] for evaluation and management recommendation.  The patient may require another biopsy or excision under anesthesia to establish the diagnosis.  Patient is recommended to return to radiation oncology 1 week after ENT evaluation by [facility] for discussion of possible treatment recommendation.  She still have significant pain and is not able to swallow with any solid food.  I will prescribe fentanyl patches 12 MCG every 72 hours for better pain control.    I spent approximately 40 minutes today with patient and 80% time was used for counseling.        Dai Lai MD, PhD  Department of Radiation Oncology   [facility]  Tel: [phone]  Page: [phone]    [facility]  [address] [place] [address]     [address] [place] [address]    CC:  Patient Care Team:  Kizzy Villanueva MD as PCP - General (Family Medicine)  Fiona Jennings MD as Physician (Hematology and Oncology)  Khanh Joy MD as Physician (Otolaryngology)  Dai Lai MD as Physician (Radiation Oncology)

## 2021-05-31 NOTE — PROGRESS NOTES
Pt here via w/c with one family member, and  used throughout encounter. VSS, c/o pain in mouth/throat for which she takes orajel and roxicodone with decent relief. She states she can only eat rice porridge and vegetable broth at this time. Bx done at [facility] recently, report and path available to Dr. Lai today.     Per Dr. Lai, pathology is negative so more work up needs to be done. I did not go through radiation education at this time per Dr. Lai.

## 2021-06-01 VITALS — WEIGHT: 115 LBS | BODY MASS INDEX: 23.23 KG/M2

## 2021-06-01 VITALS — BODY MASS INDEX: 22.98 KG/M2 | WEIGHT: 114 LBS | HEIGHT: 59 IN

## 2021-06-01 VITALS — WEIGHT: 114.5 LBS | HEIGHT: 59 IN | BODY MASS INDEX: 23.08 KG/M2

## 2021-06-01 VITALS — WEIGHT: 115 LBS | HEIGHT: 60 IN | BODY MASS INDEX: 22.58 KG/M2

## 2021-06-01 VITALS — BODY MASS INDEX: 23.23 KG/M2 | WEIGHT: 115 LBS

## 2021-06-02 VITALS — HEIGHT: 57 IN | WEIGHT: 115 LBS | BODY MASS INDEX: 24.81 KG/M2

## 2021-06-02 VITALS — WEIGHT: 119 LBS | BODY MASS INDEX: 25.67 KG/M2 | HEIGHT: 57 IN

## 2021-06-02 VITALS — BODY MASS INDEX: 25.19 KG/M2 | WEIGHT: 116.4 LBS

## 2021-06-03 VITALS — BODY MASS INDEX: 23.63 KG/M2 | WEIGHT: 109.2 LBS

## 2021-06-03 VITALS — HEIGHT: 57 IN | BODY MASS INDEX: 23.3 KG/M2 | WEIGHT: 108 LBS

## 2021-06-03 VITALS — WEIGHT: 108.7 LBS | BODY MASS INDEX: 23.52 KG/M2

## 2021-06-15 NOTE — PROGRESS NOTES
S: Patient is a 83 y/o female seen for the fitting of a [place] 424 TLSO at [facility] on orders from Yaneth Nunez CNP for the treatment of a T12 compression fracture.    O: Patient presented in her hospital bed in the supine position. Patient only speaks Hmong and was accompanied by two family members and a hospital provided . Patient's orthosis will provide compression and stabilization of her affected vertebrae to allow proper positioning and healing.     A: I fit the patient with the aid of nursing staff. Patient was log-rolled in her hospital bed and the orthosis was donned over her hospital gown. Patient's orthosis was adjusted for tightness and positioned optimally. Patient was then taken down to X-ray to confirm that the orthosis was supporting her fracture site. Patient returned from x-ray and her nurse confirmed that the orthosis was supporting her fracture as intended. Patient does not like to wear the orthosis as tight as she needs to and the orthosis was beginning to migrate superiorly after she returned from x-ray because of this fact. I explained the care and usage associated with the patient's orthosis through an . All of the patient's questions were answered at the end of our appointment.     P: Patient's family was given my business card and asked to call our office if there are any further questions or concerns regarding the fit and function of the patient's orthosis.

## 2021-06-15 NOTE — PROGRESS NOTES
NEUROSURGERY FOLLOW UP EVALUATION:    Chris Bell is a 82 y.o. female with pmh including PUD and osteoporosis presented initially with epigastric pain and abdominal bloating on December 23, she was found to have 2 non-bleeding ulcers. T12 fracture was noted at that time on CT of chest abdomen and pelvis. She had mid low back pain that was not severe in nature. MRI was completed that showed worsening T12 fracture with 70% height loss, 23 degrees of kyphosis, and 5mm of retropulsion into the ventral canal causing cord flattening but no abnormal T2 signal.     Today she reports very little pain. Weakness while up and using the walker.     Her daughter is getting overwhelmed taking care of her mother and her sister who now has an arm in a sling.     EXAM:    Alert and oriented x3, speech clear per   Arm strength: 5/5  Leg strength: 5/5   Bulk and tone: normal  Gait with a walker     IMAGING:  Bone subjectively osteopenic. T12 compression fracture with near complete vertebral body height loss anteriorly is similar. Approximately 32 degrees segmental kyphosis from T11 through L1, similar. DEXA scoliotic curvature the thoracolumbar   junction is similar. Elsewhere vertebral body heights are maintained. Mild to moderate multilevel degenerative disc disease with loss of disc height and endplate osteophyte formation. Limited evaluation of the thorax is unremarkable    IMPRESSION:  82 y.o. female with stable T12 compression fracture. She is not a candidate for vertrebroplasty due to retropulsion. This brace is working for her for now.     PLAN:  Patient Instructions   1.  Continue brace when up. Do not wear it when you sleep.  2.  Follow up in 4 weeks with a new xray.  3.  No lifting, twisting, pulling, pushing or bending greater than 5-10 pounds.   4.  You may walk around your house as tolerated.       The patient verbalizes understanding of instructions and agrees with the plan.    Xochilt Maxwell [facility]  Neurosurgery  [phone]

## 2021-06-16 PROBLEM — M25.552 BILATERAL HIP PAIN: Status: ACTIVE | Noted: 2018-06-19

## 2021-06-16 PROBLEM — R10.9 ABDOMINAL PAIN: Status: ACTIVE | Noted: 2018-01-16

## 2021-06-16 PROBLEM — R93.89 ABNORMAL FINDING ON IMAGING: Status: ACTIVE | Noted: 2018-01-16

## 2021-06-16 PROBLEM — R60.9 EDEMA: Status: ACTIVE | Noted: 2018-10-17

## 2021-06-16 PROBLEM — R25.1 EPISODE OF SHAKING: Status: ACTIVE | Noted: 2018-04-24

## 2021-06-16 PROBLEM — C05.1: Status: ACTIVE | Noted: 2019-07-19

## 2021-06-16 PROBLEM — M40.204 KYPHOSIS OF THORACIC REGION: Status: ACTIVE | Noted: 2018-01-19

## 2021-06-16 PROBLEM — M25.551 BILATERAL HIP PAIN: Status: ACTIVE | Noted: 2018-06-19

## 2021-06-16 NOTE — PROGRESS NOTES
CHART NOTE     DATE OF SERVICE:  3/5/2018     : 1935   82 y.o.     ASSESSMENT :       PLAN:     HPI:  Chris Bell isn  82 y.o. female with pmh including PUD and osteoporosis presented initially with epigastric pain and abdominal bloating initially on 2017 and was found to have 2 non-bleeding ulcers. Returned to hospital on 2018 and noted to haveT12 fracture was noted at that time on CT of chest abdomen and pelvis. Flexible brace applied 2018. Last seen in clinic on 2018. Fx was stable on CT. Reporting minimal pain, walking with walker.  She is not a candidate for vertrebroplasty due to retropulsion. This brace is working for her for now. RTC 4 weeks with new xrays.     TODAY, Chris Bell cancelled appt

## 2021-06-16 NOTE — PROGRESS NOTES
CHART NOTE     DATE OF SERVICE:  3/13/2018     : 1935   82 y.o.     ASSESSMENT :       PLAN:     HPI:  Chris Bell is an 82 y.o. female with h/o PUD and osteoporosis presented initially with epigastric pain and back pain in late December.  T12 fracture was noted at that time on CT.  Readmitted in January, MRI was completed that showed worsening T12 fracture with 70% height loss and retropulsion with flattening of the cord. Patient is not a candidate for vertebroplasty.  Initially refused brace, agreed to [place], placed in brace 2018. Dr Redding neurosurgeon.      Last seen in clinic on 2018. Fx has remained stable with near complete height loss.  Alignment stable. Patient with min c/o pain. Walking with walker, family overwhelmed, [facility] and OT/OT ongoing.  RTC 4 weeks w/ xrays.       TODAY, Chris Bell cancelled appt

## 2021-06-16 NOTE — PROGRESS NOTES
Assessment:     Diagnoses and all orders for this visit:    Spinal stenosis of lumbar region without neurogenic claudication    Spondylolisthesis of lumbar region    Lumbar facet arthropathy    History of compression fracture of spine    Pain in the coccyx  -     diclofenac sodium (VOLTAREN) 1 % Gel; Apply 2-4 g topically 3 (three) times a day as needed. Each application should be 2-4 grams.  Dispense: 1 Tube; Refill: 3    Chronic midline low back pain with bilateral sciatica  -     diclofenac sodium (VOLTAREN) 1 % Gel; Apply 2-4 g topically 3 (three) times a day as needed. Each application should be 2-4 grams.  Dispense: 1 Tube; Refill: 3  -     methocarbamoL (ROBAXIN) 500 MG tablet; Take 0.5-1 tablets (250-500 mg total) by mouth 3 (three) times a day as needed (muscle spasms).  Dispense: 30 tablet; Refill: 0    Myofascial pain  -     methocarbamoL (ROBAXIN) 500 MG tablet; Take 0.5-1 tablets (250-500 mg total) by mouth 3 (three) times a day as needed (muscle spasms).  Dispense: 30 tablet; Refill: 0       Chris Bell is a 85 y.o. y.o. female with past medical history significant for depression, hypertension, peptic ulcer disease, heart failure, anemia, osteoporosis, thyroid disease, T12 compression fracture with thoracic kyphosis, cancer of the soft palate who presents today for follow-up regarding:    -Chronic midline low back pain with generalized lower extremity pain with paresthesias.  Patient does have chronic L4-5 severe spinal stenosis likely contributing to these ongoing symptoms.  She does feel they are fairly tolerable, she is not interested in spine surgery at this time.    -Worsening tailbone pain post fall from January 2021, no pinpoint tenderness on exam and no acute fractures identified on x-rays.     Plan:     A shared decision making plan was used. The patient's values and choices were respected. Prior medical records from 8/30/2018 to current were reviewed today. The following represents what was  discussed and decided upon by the provider and the patient.        -DIAGNOSTIC TESTS: Images were personally reviewed and interpreted.   --Did discuss that we could consider lumbar and sacral MRI down the road if her symptoms are worsening however she would like to hold off on further images which is reasonable as she is neurologically intact on exam.  --Lumbar spine x-ray 1/27/2021 with mild convex curvature.  Chronic marked T12 compression fracture unchanged since 2018.  Possible subtle height loss at L3. Degenerative spondylolisthesis L5-S1 and 4 mm anterolisthesis L4-5 stable.  --Pelvic x-ray 1/27/2021 with no fracture identified, stable joint space.    --Lumbar spine MRI shows chronic anterolisthesis L4-5 with facet hypertrophy and ligamentum flavum thickening causing severe central canal stenosis with mild bilateral foraminal stenosis.  There is also unchanged compression deformity at T12 with retropulsion with kyphosis at this level.      -INTERVENTIONS: We did discuss potentially doing an L5-S1 interlaminar epidural steroid injection however previously she got short-term relief with injections only, therefore she wants to hold off on injections and her daughter-in-law is in agreements with this as well.    -MEDICATIONS: Did prescribe diclofenac gel that she can utilize 3 times daily to tailbone pain.  Lidocaine patches were not covered by her insurance.  -Did prescribe methocarbamol 500 mg that she can utilize 1/2 to 1 tablet 3 times daily as needed for lower extremity spasms.  Patient is aware that this medication can cause sedation and she should be cautious when taking this medication as she is already at risk for falls.  Patient and daughter-in-law did verbalize understanding of this.  -We did discuss revisiting gabapentin 100 mg 1 tablet 3 times daily, as she prefers not to take further medications on a regular basis however.  She has tried this in the past and it did give her some relief but not  substantial.  Discussed side effects of medications and proper use. Patient verbalized understanding.    -PHYSICAL THERAPY: Encourage patient to continue with home PT and OT at this time.  Discussed the importance of core strengthening, ROM, stretching exercises with the patient and how each of these entities is important in decreasing pain.  Explained to the patient that the purpose of physical therapy is to teach the patient a home exercise program.  These exercises need to be performed every day in order to decrease pain and prevent future occurrences of pain.        -PATIENT EDUCATION:  Total time of 46 minutes spent with the patient, reviewing the chart, placing orders, and documenting.   -Today we also discussed the issues related to the current COVID-19 pandemic, the pros and cons of the current treatment plan, the CDC guidelines such as social distancing, washing the hands, and covering the cough.    -FOLLOW UP: Follow-up as needed  Advised to contact clinic if symptoms worsen or change.    Subjective:     Chris Bell is a 85 y.o. female who presents today for follow-up regarding chronic bilateral low back pain with generalized lower extremity pain with paresthesias that is fairly unchanged but she reports tolerable in the last couple of years.  Patient did have a fall in January 2021 and has since then had lower tailbone pain that is her concern today.  Currently her pain is a 5/10, and 8 at its worst, 0 at its best.  She reports that its slightly more tolerable with sitting and walking but does bother some more so when she is laying down.  She reports its very much in the center in the coccyx region, has minimal pain in the low back that is unchanged from her chronic baseline.    *Patient was referred to [facility] back in 2018 however did not decide to go forward with any type of surgical intervention for her chronic lumbar stenosis.     was present during entire visit today.      Patient's daughter-in-law who does speak fluent English was present today during entire visit and added to past medical/surgical/family/social history and history of presenting illness.     Treatment to Date: No prior spinal surgery.     Bilateral L4-5 facet joint steroid injection 6/29/2018  Bilateral L4-5 TF ZAHRAA 7/31/2018 with minimal to no lasting benefit.  Preprocedure pain scale 8/10, post 5/10.  Bilateral L5-S1 TF ZAHRAA 8/16/2018 with 50% relief ×3 days only.  Preprocedure pain scale 8/10, most 6/10.     Medications:  Flexeril 10 mg  Oxycodone 5 mg  Tizanidine 2 mg  Gabapentin 100 mg 1-1-1    Patient Active Problem List   Diagnosis     Anemia due to GI blood loss     Hypokalemia     PUD (peptic ulcer disease)     T12 compression fracture (H)     Accelerated hypertension     Abdominal pain     Abnormal finding on imaging     Kyphosis of thoracic region     Electrolyte imbalance     Episode of shaking     Bilateral hip pain     Edema     Heart failure with preserved ejection fraction (H)     Cancer of soft palate (H)       Current Outpatient Medications on File Prior to Encounter   Medication Sig Dispense Refill     acetaminophen (TYLENOL) 650 MG CR tablet Take 650 mg by mouth daily as needed for pain.       ANTACID REGULAR STRENGTH 200-200-20 mg/5 mL Susp 4 MLS MIXED WITH 1 ML LIDOCAINE-MAY TAKE 5 ML PO QID  11     benzocaine (ORAJEL) 10 % mucosal gel Apply to the mouth or throat as needed for pain.       fentaNYL (DURAGESIC) 12 mcg/hr Place 1 patch on the skin every third day. 5 patch 0     furosemide (LASIX) 20 MG tablet Take 1 tablet (20 mg total) by mouth daily for 5 days. 5 tablet 0     levothyroxine (SYNTHROID, LEVOTHROID) 50 MCG tablet Take 50 mcg by mouth daily.       LORazepam (ATIVAN) 0.5 MG tablet Take 0.5 mg by mouth every 8 (eight) hours as needed for anxiety.        magnesium hydroxide (MAGNESIUM HYDROXIDE) 400 mg/5 mL Susp suspension Take 30 mL by mouth daily as needed.       metoprolol  succinate (TOPROL-XL) 25 MG Take 25 mg by mouth 2 (two) times a day.        multivitamin (DAILY-CHRIS ORAL) Take 1 tablet by mouth daily.       potassium chloride (K-DUR,KLOR-CON) 20 MEQ tablet Take 20 mEq by mouth daily.       sucralfate (CARAFATE) 1 gram tablet Take 1 g by mouth 4 (four) times a day.       [DISCONTINUED] acetaminophen (TYLENOL) 500 MG tablet Take 2 tablets (1,000 mg total) by mouth every 8 (eight) hours as needed for pain. 30 tablet 0     [DISCONTINUED] gabapentin (NEURONTIN) 100 MG capsule Take 200 mg by mouth 3 (three) times a day. (Patient taking differently: Take 100 mg by mouth 3 (three) times a day. ) 180 capsule 2     [DISCONTINUED] lidocaine (LIDODERM) 5 % Remove & Discard patch within 12 hours or as directed by MD 5 patch 1     [DISCONTINUED] oxyCODONE (ROXICODONE) 5 MG immediate release tablet Take 1 tablet (5 mg total) by mouth every 4 (four) hours as needed for pain. 15 tablet 0     No current facility-administered medications on file prior to encounter.        No Known Allergies    Past Medical History:   Diagnosis Date     Anemia      Depression      Disease of thyroid gland      HTN (hypertension)      Osteoporosis      PUD (peptic ulcer disease)         Review of Systems  ROS: Positive for generalized weakness, increased pain at night. specifically negative for bowel/bladder dysfunction, balance changes, headache, dizziness, foot drop, fevers, chills, appetite changes, nausea/vomiting, unexplained weight loss. Otherwise 13 systems reviewed are negative. Please see the patient's intake questionnaire from today for details.    Reviewed Social, Family, Past Medical and Past Surgical history with patient, no significant changes noted since prior visit.     Objective:     /62 (Patient Site: Right Arm, Patient Position: Sitting)     PHYSICAL EXAMINATION:    --CONSTITUTIONAL: Well developed, well nourished, healthy appearing individual.  --PSYCHIATRIC: Appropriate mood and affect. No  difficulty interacting due to temper, social withdrawal, or memory issues.  --SKIN: Lumbar region is dry and intact.   --RESPIRATORY: Normal rhythm and effort. No abnormal accessory muscle breathing patterns noted.   --MUSCULOSKELETAL:  Normal lumbar lordosis noted, no lateral shift.  --GROSS MOTOR: Easily arises from a seated position.  Walks with a walker.  --LUMBAR SPINE:  Inspection reveals no evidence of deformity. No tenderness to palpation lumbar spine or over the coccyx/tailbone region. Straight leg raising in the seated position is negative to radicular pain. Sciatic notch non-tender.    --SACROILIAC JOINT: One Finger point test negative.  --LOWER EXTREMITY MOTOR TESTING:  Plantar flexion left 5/5, right 5/5   Dorsiflexion left 5/5, right 5/5   Great toe MTP extension left 5/5, right 5/5  Knee flexion left 5/5, right 5/5  Knee extension left 5/5, right 5/5   Hip flexion left 5/5, right 5/5  Hip abduction left 5/5, right 5/5  Hip adduction left 5/5, right 5/5   --HIPS: Full range of motion bilaterally.   --NEUROLOGIC: Bilateral patellar and achilles reflexes are physiologic and symmetric. Sensation to light touch is intact in the bilateral L4, L5, and S1 dermatomes.    RESULTS:   Imaging: Lumbar spine imaging was reviewed today. The images were shown to the patient and the findings were explained using a spine model.      Mr Lumbar Spine Without Contrast  Result Date: 7/20/2018  INDICATION: Low back pain radiating into the bilateral hips and tingling in both legs. Status post L4-L5 facet joint injections bilaterally 6/29/2018. History of T12 compression fracture. TECHNIQUE: Without IV contrast. CONTRAST: None. COMPARISON: 4/20/2018 thoracic spine MRI. FINDINGS: Nomenclature is based on 5 lumbar-type vertebral bodies. Sagittal field-of-view extends from the level of T10 to the level of S3. No significant change in T12 vertebra plana with unchanged retropulsion and associated focal thoracolumbar kyphosis. No  new fracture. Remaining vertebral bodies are normal in height. Unchanged grade 1 anterolisthesis at L4-L5. No pars defects. Except for the T12 vertebral body, expected marrow signal. The conus tip is identified at L2. Cauda equina is normal. Grossly normal paraspinal soft tissues. No abdominal aortic aneurysm. The visualized portions of the bony pelvis are normal for age. T11-T12: Unchanged posterior retropulsion of the superior T12 vertebral body, resulting in unchanged contouring of the spinal cord. No abnormal cord signal. No neural foraminal narrowing.   T12-L1: Normal disc height and signal. No herniation. No facet arthropathy. No spinal canal stenosis. No right neural foraminal stenosis. No left neural foraminal stenosis.   L1-L2: Normal disc height and signal. No herniation. No facet arthropathy. No spinal canal stenosis. No right neural foraminal stenosis. No left neural foraminal stenosis.   L2-L3: Disc desiccation, height loss, and disc bulge. Dorsal epidural lipomatosis, ligamentum flavum thickening, and bilateral facet arthropathy. Moderate subarticular zone narrowing. Mild central spinal canal stenosis. No right neural foraminal stenosis. No left neural foraminal stenosis.   L3-L4: Disc desiccation, height loss, and disc bulge. Ligamentum flavum thickening. Bilateral facet arthropathy. Moderate subarticular zone narrowing and moderate central spinal canal stenosis. Mild right neural foraminal stenosis. Mild left neural foraminal stenosis.   L4-L5: Disc desiccation, height loss, and disc bulge without significant cephalad migration of the superiorly uncovered disc. Ligamentum flavum thickening. Bilateral facet arthropathy. Severe spinal canal stenosis. Mild right neural foraminal stenosis. Mild left neural foraminal stenosis.   L5-S1: Normal disc height and signal. No herniation. Bilateral facet arthropathy. No spinal canal stenosis. No right neural foraminal stenosis. No left neural foraminal stenosis.    CONCLUSION:   1.  Disc bulge, ligamentum flavum thickening, and facet arthropathy contribute to cause severe spinal canal narrowing at L4-L5. Unchanged grade 1 anterolisthesis at L4-L5.   2.  Unchanged compression deformity of the T12 vertebral body with retropulsed fracture fragment that contours the ventral spinal cord. No abnormal cord signal. Unchanged resultant focal thoracolumbar kyphosis.   3.  Otherwise, multilevel lumbar spondylosis. No additional level of severe spinal canal narrowing. No severe neural foraminal narrowing.

## 2021-06-16 NOTE — PROGRESS NOTES
CHART NOTE      DATE OF SERVICE:  3/14/2018      : 1935   82 y.o.      ASSESSMENT :   Overall doing well with min pain. Stable T12 fx.       PLAN: Continue brace.  RTC 6 5-6 weeks with new xrays.      HPI:  Chris Bell is an 82 y.o. female with h/o PUD and osteoporosis presented initially with epigastric pain and back pain in late December.  T12 fracture was noted at that time on CT.  Readmitted in January, MRI was completed that showed worsening T12 fracture with 70% height loss and retropulsion with flattening of the cord. Patient is not a candidate for vertebroplasty.  Initially refused brace, agreed to [place], placed in brace 2018. Dr Redding neurosurgeon.       Last seen in clinic on 2018. Fx has remained stable with near complete height loss.  Alignment stable. Patient with min c/o pain. Walking with walker, family overwhelmed, HE HC and OT/OT ongoing.  RTC 4 weeks w/ xrays.      TODAY, Chris Bell reports min back pain, mostly with prolonged sitting. Some leg pain with prolonged walking.  Uses her walker.   Wearing brace as instructed.  HHC/PT/OT is now completed.      PAST MEDICAL HISTORY, SURGICAL HISTORY, REVIEW OF SYMPTOMS, MEDICATIONS AND ALLERGIES:  Past medical history, surgical history, ROS, medications and allergies reviewed with patient and remain unchanged from previous visit.          Past Medical History:   Diagnosis Date     HTN (hypertension)       Osteoporosis           PHYSICAL EXAM:    There were no vitals taken for this visit.     Neurological exam reveals:  Respirations easy, non-labored.   Skin: W/D/I. No rashes, lesions or breaks in integrity.   Recent and remote memory intact, fund of knowledge wnl.    Alert and oriented x3, speech fluent and appropriate.   PERRL, EOMI, No nystagmus,   Face symmetric, tongue midline, Uvula midline,  palate rises with phonation   Shoulder shrug equal  Leg strength bilateral dorsiflexion, plantar flexion, and hip flexion 5/5  No extremity  edema noted.   Muscle Bulk and tone wnl.   Reflexes: No pathological reflexes   Gait and station: Not observed, in WC    RADIOGRAPHIC IMAGING: XR:  Marked anterior wedge deformity of T12 with slight retropulsion of the posterior T12 vertebral margin similar to the previous exam.     Films personally reviewed and interpreted.    Reviewed imaging with patient and family.

## 2021-06-16 NOTE — PATIENT INSTRUCTIONS - HE
~Please call our [facility] Nurse Navigation line [phone] with any questions or concerns about your treatment plan, if symptoms worsen and you would like to be seen urgently, or if you have problems controlling bladder and bowel function.      Importance of specialized Physical Therapy: Discussed the importance of core strengthening, ROM, stretching exercises with the patient and how each of these entities is important in decreasing pain.  Explained to the patient that the purpose of physical therapy is to teach the patient a home exercise program individualized to them and their specific health concerns.  These exercises need to be performed every day in order to decrease pain and prevent future occurrences of pain.

## 2021-06-17 NOTE — PROGRESS NOTES
CHART NOTE     DATE OF SERVICE:  2018     : 1935   82 y.o.     ASSESSMENT :   Stable T12 fracture, persistent pain.  Initially not considered candidate for vertebroplasty due to retropulsion.         PLAN: MRI thoracic spine. RTC next Tuesday when Dr. Redding is in to review MRI and determine next step, whether ok for vertebroplasty or if surgical correction indicated. Daughter did seem to understand. Computer system down at time of visit-given written instructions.     HPI:  Chris Bell is an 82 y.o. female with h/o PUD and osteoporosis presented initially with epigastric pain and back pain in late December.  T12 fracture was noted at that time on CT.  Readmitted in January, MRI was completed that showed worsening T12 fracture with 70% height loss and retropulsion with flattening of the cord. Patient is not a candidate for vertebroplasty.  Initially refused brace, agreed to [place], placed in brace 2018. Dr Redding neurosurgeon.      Last seen in clinic on 3/14/2018.  She was doing well with min pain, wearing brace, amb with walker. HHC/PT/OT completed.  XR showed stable T12 fx. RTC 4 weeks with xrays.      TODAY, Chris Bell is here today with her daughter who speaks some English,  failed the appt and did not respond to phone calls x 2.  I did see patient as she has been to the ED twice since last seen in clinic.  She  reports back pain all of the time-points to lower on back worse than at fracture site. Has not been wearing the brace as back hurts worse with it on.  Has myriad c/o that family has thought is related to the fracture, such as eye pain, shaking, tingling all over etc.  At ED visits noted to have electrolyte abnormalities and and symptoms c/w hyperventilation and anxiety.  I did try to explain this to daughter and patient.  On imaging compression fx remains stable but given persistent pain, will get another MRI to insure no further retropulsion has occurred, and to assess  whether or not fx has healed.  I called and reviewed with Annemarie Polanco NP with IR. If fx not healed, patient would be candidate for vertebroplasty.      PAST MEDICAL HISTORY, SURGICAL HISTORY, REVIEW OF SYMPTOMS, MEDICATIONS AND ALLERGIES:  Past medical history, surgical history, ROS, medications and allergies reviewed with patient and remain unchanged from previous visit.    Past Medical History:   Diagnosis Date     HTN (hypertension)      Osteoporosis        PHYSICAL EXAM:    /84  Pulse 100  Temp 98.4  F (36.9  C)  Resp 16  SpO2 97%      Neurological exam reveals:  Respirations easy, non-labored.   Skin: W/D/I. No rashes, lesions or breaks in integrity.   Recent and remote memory intact, fund of knowledge wnl.    Alert and oriented x3, speech fluent and appropriate   PERRL, EOMI, No nystagmus,   Face symmetric, tongue midline, Uvula midline,  palate rises with phonation   No extremity edema noted.   Muscle Bulk and tone wnl.   Reflexes: No pathological reflexes   CHOWDHURY   Gait and station:Antalgic     RADIOGRAPHIC IMAGING: XR: Severe anterior compression fracture at T12 is unchanged with segmental kyphosis from T11 to L1 measuring 31 degrees, unchanged.      Films personally reviewed and interpreted.    Reviewed imaging with patient and family.

## 2021-06-17 NOTE — PROGRESS NOTES
CHART NOTE     DATE OF SERVICE:  2018     : 1935   82 y.o.     ASSESSMENT :   T 12 fracture.  Episodes of shaking, staring.      PLAN:  Neurology referral for episodes of shaking and staring. Dexa Scan, F/U with Dr. Redding..    HPI:  Chris Bell is status post 82 y.o. female with h/o PUD and osteoporosis presented initially with epigastric pain and back pain in late December.  T12 fracture was noted at that time on CT.  Readmitted in January, MRI was completed that showed worsening T12 fracture with 70% height loss and retropulsion with flattening of the cord. Patient is not a candidate for vertebroplasty.  Initially refused brace, agreed to [place], placed in brace 2018. Dr Redding neurosurgeon.      Last seen in clinic 2018.  She  reports back pain all of the time-points to lower on back worse than at fracture site. Has not been wearing the brace as back hurts worse with it on.  Has myriad c/o that family has thought is related to the fracture, such as eye pain, shaking, tingling all over etc.  At ED visits noted to have electrolyte abnormalities and and symptoms c/w hyperventilation and anxiety.  I did try to explain this to daughter and patient.  On imaging compression fx remains stable but given persistent pain, will get another MRI to insure no further retropulsion has occurred, and to assess whether or not fx has healed, and to eval retropulsion.      TODAY, Chris Bell is here today with her son who speaks very good English, her daughters and . Patient still reporting severe pain in back, still refusing to wear brace. Has episodes of shaking, tingling, burning all over unable to move-occurs at night when trying to lay down for sleep.   Patient's family would like to proceed with fusion if feasible.      PAST MEDICAL HISTORY, SURGICAL HISTORY, REVIEW OF SYMPTOMS, MEDICATIONS AND ALLERGIES:  Past medical history, surgical history, ROS, medications and allergies reviewed with  patient and remain unchanged from previous visit.    Past Medical History:   Diagnosis Date     HTN (hypertension)      Osteoporosis        PHYSICAL EXAM:    /84  Pulse 100  Resp 16  SpO2 99%    Neurological exam reveals:  Respirations easy, non-labored.   Skin: W/D/I. No rashes, lesions or breaks in integrity.   Recent and remote memory intact, fund of knowledge wnl.    Alert and oriented x3, speech fluent and appropriate.   PERRL, EOMI, No nystagmus,   Face symmetric, tongue midline, Uvula midline,  palate rises with phonation   Shoulder shrug equal  Leg strength bilateral dorsiflexion, plantar flexion, and hip flexion 5/5  No extremity edema noted.   Muscle Bulk and tone wnl.   Reflexes: No pathological reflexes   Gait and station:in      RADIOGRAPHIC IMAGIN2018:   Marked T12 fracture similar in configuration to the most recent plain film. Mild retropulsion (4.5 mm similar to past imaging) of the posterior superior cortex slightly flattening the ventral cord though the canal is only mildly narrowed. No cord signal abnormality.      Films personally reviewed and interpreted.  Also reviewed and discussed with Dr. Redding.   Reviewed imaging with patient and family.

## 2021-06-18 NOTE — PROGRESS NOTES
D/W Dr. Hurtado OPS.  Referral to [facility] and they will order/do the bilateral SI Joint injections same day after consult.  They will also do follow up and any further conservative care indicated.     Linda Hartmann, CNP

## 2021-06-19 NOTE — PROGRESS NOTES
Assessment:   Chris Bell is a 83 y.o. y.o. female with past medical history significant for hypertension, peptic ulcer disease who presents today for follow-up regarding bilateral low back pain with radiation into the buttocks and lateral hips and widespread tingling involving both arms and both legs.  She is status post a bilateral L4-5 facet joint injection on June 29, 2018 which provided 50% relief of her pain but only lasted 1 week.  There is mild spondylolisthesis at this level.  I am concerned that she may have neural compression.  -Patient also has a history of a T12 compression fracture with retropulsion and focal kyphosis which could potentially cause pain to the lower back and gluteal region.  -Unclear etiology for tingling in bilateral upper and lower extremities.         Plan:     A shared decision making plan was used.  The patient's values and choices were respected.  The following represents what was discussed and decided upon by the physician assistant and the patient.  A professional  is present for the visit.    1.  DIAGNOSTIC TESTS: I reviewed the CT abdomen and pelvis and CT thoracic spine.  I did place an order for an MRI lumbar spine to better characterize possible neural compression.    2.  PHYSICAL THERAPY: No physical therapy was ordered.    3.  MEDICATIONS: No changes are made to the patient's medications.  Uses gabapentin 100 mg 3 times daily, cycle benzathine 10 mg as needed, lorazepam 5 mg twice daily, and oxycodone 5 mg as needed.    4.  INTERVENTIONS: No further interventions were ordered.    5.  PATIENT EDUCATION: Patient is in agreement with the above plan.  All questions were answered.    6.  FOLLOW-UP: Patient will return to the clinic to review the results of her MRI lumbar spine with Dr. Hurtado.  She has any questions or concerns in the meantime, she should not hesitate contact our clinic.    Subjective:     Chris Bell is a 83 y.o. female who presents today for  follow-up regarding bilateral low back pain with radiation into the buttocks and hips and numbness and tingling involving the bilateral lower and upper extremities.  Patient status post bilateral L4-5 facet joint injections on June 29, 2018.  Patient reports these injections provided 50% relief of her pain but only lasted 1 week.    Patient complains of bilateral low back pain.  Pain spans across low back in a broadband his region at the lumbosacral junction.  Pain radiates into the buttocks and hips bilaterally.  Patient denies any pain radiating further down the legs, but she is tingling which begins at the feet and rises up both legs throughout the body and down both arms.  She rates her pain today as an 8 out of 10.  At its best it is a 5 out of 10.  At its worst it is a 10 out of 10.  The pain is aggravated with walking and alleviated with rest.  The tingling is aggravated with anything touching her arms or legs.  She states that she does not even like a blanket covering her body because of the tingling and causes.  She feels weakness associated with tingling.    Patient has had home therapy in the past.  She was deemed not to be a candidate for vertebroplasty or kyphoplasty per neurosurgery notes.  She did not tolerate bracing for the compression fracture.  She uses lorazepam 5 mg twice daily, gabapentin 100 mg 3 times daily, cyclobenzaprine 10 mg as needed, and oxycodone 5 mg as needed.    Past medical history is reviewed and is unchanged in the interim.    Family history is reviewed and is unchanged in the interim.    Review of Systems:  Positive for tingling, weakness.  Negative for loss of bowel/bladder control, footdrop, headache, dizziness, nausea/vomiting, blurry vision, balance changes.     Objective:   CONSTITUTIONAL:  Vital signs as above.  No acute distress.  The patient is well nourished and well groomed.    PSYCHIATRIC:  The patient is awake, alert, oriented to person, place and time.  The patient  is answering questions appropriately with clear speech.  Normal affect.  HEENT: Normocephalic, atraumatic.  Sclera clear.    SKIN:  Skin over the face, posterior torso, bilateral upper and lower extremities is clean, dry, intact without rashes.  MUSCULOSKELETAL: Patient ambulates with a guarded gait with short strides.  She is flexed forward at the hips.  Her family member assisted her from the wheelchair to the exam table, but the patient was able to transfer independently from exam table and take several steps by herself back to wheelchair.  Mild tenderness over the bilateral lower lumbar paraspinal muscles.    Strength testing was difficult with some breakaway weakness throughout; the patient has 5/5 strength for the bilateral hip flexors, knee flexors/extensors, ankle dorsiflexors/plantar flexors, ankle evertors/invertors.    NEUROLOGICAL: 1+ patellar, achilles reflexes which are symmetric bilaterally.  No ankle clonus bilaterally.  Subjective tingling sensation throughout both legs to light touch.     RESULTS:   [facility]  CT THORACIC SPINE WO CONTRAST  6/11/2018 7:47 PM     INDICATION: Tingling tingling  TECHNIQUE: Helical thin-section CT scan of the spine was performed using bone reconstruction algorithm. From the axial source data, sagittal and coronal thin reformats. Dose reduction techniques were used.   IV CONTRAST: None.  COMPARISON: Thoracic spine MRI 4/20/2018     FINDINGS: Mild dextrocurvature of the thoracic spine. Redemonstration of severe compression fracture of T12 with approximately 80-90% vertebral body height loss. 5.5 mm retropulsion of the posterior superior endplate of T12 into the spinal canal causing   mild spinal canal narrowing at that level. Stable 28 degrees of focal kyphosis from T11 to L1. The rest of vertebral body heights are maintained. The spinal canal is otherwise widely patent. No high-grade left-sided neuroforaminal narrowing. The   paraspinal soft tissues are  grossly within normal limits. Atherosclerotic calcification of the distal abdominal aorta. Bibasilar atelectasis.        IMPRESSION:   CONCLUSION:  1.  Redemonstration of severe compression fracture of T12 with approximately 80-90% vertebral body height loss. 5.5 mm retropulsion of the posterior superior endplate of T12 into the spinal canal causing mild spinal canal narrowing at that level.   2.  Stable 28 degrees of focal kyphosis from T11 to L1.     XR LUMBAR SPINE 2 OR 3 VWS  4/18/2018 10:31 AM     INDICATION: T12 fracture follow-up.  COMPARISON: 4/8/2018.     FINDINGS: Five lumbar-type vertebral bodies. Severe anterior compression fracture at T12 is unchanged with segmental kyphosis from T11 to L1 measuring 31 degrees, unchanged. Mild degenerative anterior spondylolisthesis at L4-L5 by 5 mm and minimal   posterior spondylolisthesis at L2-L3. Otherwise normal alignment. Diffuse osteopenia. Otherwise normal vertebral body heights. Mild disc space narrowing and marginal osteophytes throughout the lumbar spine. Advanced facet arthropathy L4-L5 and mild to   moderate facet arthropathy L3-L4 and L5-S1. The sacrum and visualized pelvis are unremarkable.    CT ABDOMEN PELVIS WO ORAL W IV CONTRAST  12/24/2017 4:23 AM      INDICATION: bilat LQ pain with rebound abd pain  TECHNIQUE: CT abdomen and pelvis. Multiplanar reformation images (MPR). Dose reduction techniques were used.   IV CONTRAST: Iohexol (Omni) 100 mL  COMPARISON: 3/11/2016     FINDINGS:  LUNG BASES: Basilar fibroatelectasis. Prominent subpleural fat.     ABDOMEN: Hepatic steatosis. Thickening of the adrenals. Stomach is not well distended. Distal gastric wall thickening is not excluded. Several areas of outpouching along the lumen of the stomach in the region of wall thickening. Pancreas and kidneys   within normal limits. Normal caliber bowel.     PELVIS: Appendix is normal. Left inguinal hernia containing nondistended small bowel. No free  fluid.     MUSCULOSKELETAL: Osseous demineralization. Degenerative change osseous structures.     IMPRESSION:   CONCLUSION:  1.  The stomach is not well distended. Wall thickening of the distal stomach. Several areas of questionable outpouching in the region of wall thickening. Possibility of underlying gastritis/peptic ulcer disease not excluded. Correlate clinically and if   indicated with endoscopy correlation.  2.  Left inguinal hernia containing nondistended small bowel.  3.  Normal appendix.

## 2021-06-19 NOTE — PROGRESS NOTES
Assessment/Plan:      Diagnoses and all orders for this visit:    Lumbar spine pain  -     gabapentin (NEURONTIN) 100 MG capsule; 1 cap at bedtime x 3 days, then may take 1 cap twice daily x 3 days, then may take 1 cap three times daily  Dispense: 90 capsule; Refill: 2    Arthropathy of lumbar facet joint  -     gabapentin (NEURONTIN) 100 MG capsule; 1 cap at bedtime x 3 days, then may take 1 cap twice daily x 3 days, then may take 1 cap three times daily  Dispense: 90 capsule; Refill: 2  -     OPS Paravertbral Facet Joint Inj Lumbar; Future; Expected date: 6/29/18    Spondylolisthesis, lumbar region  -     gabapentin (NEURONTIN) 100 MG capsule; 1 cap at bedtime x 3 days, then may take 1 cap twice daily x 3 days, then may take 1 cap three times daily  Dispense: 90 capsule; Refill: 2  -     OPS Paravertbral Facet Joint Inj Lumbar; Future; Expected date: 6/29/18    Lumbar stenosis  -     gabapentin (NEURONTIN) 100 MG capsule; 1 cap at bedtime x 3 days, then may take 1 cap twice daily x 3 days, then may take 1 cap three times daily  Dispense: 90 capsule; Refill: 2    Paresthesias  -     POCT glucose  -     gabapentin (NEURONTIN) 100 MG capsule; 1 cap at bedtime x 3 days, then may take 1 cap twice daily x 3 days, then may take 1 cap three times daily  Dispense: 90 capsule; Refill: 2        Assessment: Pleasant 82-year-old  non-English speaking female presents with  and family members with a history of depression, hypertension, osteoporosis thyroid disease, T12 compression fracture with:    1.  Lumbar spine pain at the lumbosacral junction.  She has L4-5 facet arthropathy and mild spondylolisthesis with central stenosis at that level on MRI of the thoracic spine and CT abdomen and pelvis.  Difficult to fully assess extent of stenosis given lack of MRI of lumbar spine.  2.  T12 compression fracture may also be causing pain in the gluteal region.  3.  Widespread paresthesias arms and legs, likely related to  anxiety and/or pain level cannot exclude systemic cause such as noncompliance with thyroid medication along with elevated blood glucose.  4.  She does have mild hyperreflexia right upper extremity only.  No carotid bruits today.  Cranial nerves intact no other signs of stroke acutely.  She has no spinal cord compression the cervical spine.      Discussion:    1.  Lengthy discussion with the patient through an  and her family.  We discussed options of injections, medications, therapy, blood glucose check along with further diagnostics.  2.  She is having significant pain would like something done.  Will trial bilateral L4-5 facet injections.  If she has a difficult time getting on the table given her severe pain from the fracture then she will hold off on injection.  3.  We will check blood glucose prior to injection to ensure she is not having hypo-or hyperglycemia.  4.  Trial gabapentin for pain.  We discussed side effects.  5.  Consideration for further diagnostics such as MRI lumbar spine if she cannot tolerate laying on her stomach for this injection or if injection is not helpful.  6.  Will monitor hyperreflexia for now.  7.  Follow-up 2 weeks.      60 minutes were spent with this patient in addition to any procedure with greater than 50% in counseling and coordination of care.      It was our pleasure caring for your patient today, if there any questions or concerns please do not hesitate to contact us.    Addendum: Blood glucose today 140.    Subjective:   Patient ID: Chris Bell is a 82 y.o. female.    History of Present Illness: Patient presents at the request of Linda Hartmann for evaluation of low back pain at the lumbosacral junction presents with her family today an .  The family members do provide some of the history as well.  She has low back pain since December 24, 2017.  She woke up with severe pain was hospitalized.  She was found to have a T12 compression fracture significant  height loss is being followed by neurosurgery, Dr. Redding.  She was given a brace but it is too painful to wear.  She has severe pain in the lower lumbar spine which brings her in to see us today.  She also has other issues since the time of the fracture including numbness and tingling throughout her entire body that comes in waves.  This happens in her legs and arms and feels that it occurs with times of pain.  She has had some issues with swallowing and vision issues when the pain and tingling become severe.  She has had blood glucose checked with  which has been mildly elevated in the past at times other times normal.  Mild elevation is in the 100s.  Magnesium has been low at 1.6 white blood counts normal hemoglobin normal thyroid in April of this year was normal 3.25.  She is hypothyroid but does not take her thyroid medication regularly.    Low back pain is at the lumbosacral junction bilaterally.  Constant and severe worse with any walking better with lying down.  Feels that she cannot walk today given the severity of the pain and is in a wheelchair.  The pain does not necessarily radiate down the legs but she has this whole body numbness and tingling that comes in waves.  The pain is mostly axial.  No weakness in the legs.  No bowel or bladder incontinence.    She has had home therapy in the past.  No injections.  Not a candidate for vertebroplasty or kyphoplasty per neurosurgery notes which were reviewed.  Again is not wearing her brace.  She is taking medications including oxycodone and tizanidine.  Has had CT abdomen and pelvis done with MRIs of the thoracic spine x-rays of lumbar spine MRI of the cervical spine.      Imaging: MRI report and images were personally reviewed and discussed with the patient.  A plastic model was utilized during the discussion.  MRI of thethoracic spine personally reviewed. .There is T12 compression fracture significant with retropulsion.  No high-grade spinal cord  compression.  On distal sagittal views there is also broad-based disc bulge which appears at L4-5 with spondylolisthesis degenerative disc disease.  There is central stenosis difficult to appreciate the extent  Likely at least moderate    CT abdomen and pelvis from December 2017 personally reviewed.  Musculoskeletal shows demineralization throughout degenerative changes of the osseous structures.  On my review there is significant facet arthropathy L4-5 with degenerative disc disease and vacuum disc phenomenon at this level likely broad-based disc bulge and likely severe central stenosis but again difficult to fully assess.    Review of Systems: Multiple positive review of systems.  She has whole-body paresthesias as discussed above.  This is the arms and legs waxes and wanes.  She has some blurred vision during these episodes.  May be some swallowing issues and speech the last 2 hours at a time.  She does have irregular heartbeat anxiety, abdominal pain constipation diarrhea bruising easily, poor sleep, back pain joint pain leg pain headaches and depression. Remainder of 12 point review systems negative unless listed above.    Past Medical History:   Diagnosis Date     Depression      Disease of thyroid gland      HTN (hypertension)      Osteoporosis      PUD (peptic ulcer disease)        The following portions of the patient's history were reviewed and updated as appropriate: allergies, current medications, past family history, past medical history, past social history, past surgical history and problem list.      WHO 5: 5    ERNESTINE Score: 90      Objective:   Physical Exam:    Vitals:    06/29/18 0817   BP: 142/80   Pulse: 88   Temp: 97.9  F (36.6  C)       General:  Well-appearing female in no acute distress.  Sitting in wheelchair, pleasant, cooperative, and interactive throughout the examination and interview.  May be some confusion with the  at times.  CV: No lower extremity edema on inspection or  paltation.  No carotid bruits auscultated bilaterally.  Lymphatics: No cervical lymphadenopathy palpated.  Eyes: sclera clear.  Skin: No rashes or lesions seen .  Respirations unlabored.  MSK: Gait is not fully assessed.  Can sit to stand from wheelchair.  Refuses to ambulate given symptoms..   Romberg not assessed..  Spine: Appears to have increased thoracic kyphotic curve.  Difficult to assess range of motion given wheelchair  used today.     Palpation: Tenderness to palpation over lumbosacral junction bilaterally and L4-5 levels.  Extremities: Full range of motion of the  elbows, and wrists with no effusions or tenderness to palpation.     Full range of motion of the hips knees, and ankles from a seated position with no effusions or tenderness to palpation. No hypermobility of the upper or lower extremities.  Neurologic exam: Mental status: Patient is alert and oriented with normal affect.  Attention, knowledge, memory, and language appear intact but there may be some confusion at times with discussing with .  No pronator drift.  Normal coordination t on rapid alternating movements.  Able to perform finger to nose bilaterally.  Cranial nerves II-XII appear intact.  Reflexes are 2+ and symmetric biceps, triceps, brachioradialis, patellar, and 1+ Achilles with mild positive Bullock's right negative left.  Equivocal toes.  Sensation is intact to light touch throughout the upper and lower extremities bilaterally.  Manual muscle testing reveals 5 out of 5 strength in the bilateral ankle plantar flexors ankle dorsiflexors, EHL, knee flexors extensors and hip flexors .  May be some give way to pain throughout bilaterally.  Upper extremity strength appears grossly normal.;    Normal muscle bulk and tone in the arms and legs.

## 2021-06-19 NOTE — PROGRESS NOTES
Assessment/Plan:      Diagnoses and all orders for this visit:    Lumbar spine pain  -     OPS TFESI Lumbar Bilateral; Future; Expected date: 7/27/18  -     Ambulatory referral to Neurosurgery    Lumbar stenosis  -     OPS TFESI Lumbar Bilateral; Future; Expected date: 7/27/18  -     Ambulatory referral to Neurosurgery    Spondylolisthesis, lumbar region  -     Ambulatory referral to Neurosurgery    T12 compression fracture (H)        Assessment:Pleasant 82-year-old  non-English speaking female presents with  and family members with a history of depression, hypertension, osteoporosis thyroid disease, T12 compression fracture with:    1. *Persistent lumbar spine pain bilateral gluteal pain lower extremity pain with potential weakness in ankle plantar flexors/S1 muscles and potential L5 muscles difficult with language barrier.  She has severe spinal stenosis L4-5 there is trifold with severe lateral recess stenosis.  Mild spondylolisthesis at that level.  2.  Multilevel lumbar degenerative disc disease.  3.  T12 compression fracture stable on recent MRI.  4.  Widespread paresthesias of the arms and legs.  Some of the leg symptoms may be related to lumbar stenosis may also be myofascial pain type phenomenon.      Discussion:    1.  I discussed the diagnoses and treatment options with the patient and her daughter through an  today.  We reviewed the MRI.  We reviewed the previous MRI and treatments.  We discussed options such as epidural surgical referral medications.  Given the severity and duration of her pain she like to be aggressive.  2.  Bilateral L5-S1 transfemoral epidural steroid injection to treat the lateral recess stenosis and target the L5 nerves given the distribution of her pain and weakness.  3.  Given the potential weakness in lower extremities and severity of the stenosis, recommend neurosurgical evaluation and she would like an opinion.  4.  Follow-up with LISANDRO 2-4 weeks after  injection.    25 minutes were spent with this patient in addition to any procedure with greater than 50% in counseling and coordination of care.    It was our pleasure caring for your patient today, if there any questions or concerns please do not hesitate to contact us.      Subjective:   Patient ID: Chris Bell is a 83 y.o. female.    History of Present IllnessPatient presents with an  today and her daughter for evaluation of persistent low back pain bilateral lower extremity pain and paresthesias.  Symptoms have not changed dramatically since last visit with me.  She had lumbar facet injections which she says help mildly for little while and her pain flared has improved some since the MRI but really at baseline and change depends on position.  Patient has pain at the lumbosacral junction with pain down both legs posterior lateral legs to the knees and her legs feel weak.  This pain is worse with any sitting standing or even laying down.  Can do better with changing positions or sitting in certain positions.  Pain is a 7/10 today 8/10 at worst.  Her legs do feel weak.    Since last visit has had MRI which was reviewed.  She had no effects adversely from the injections and the facets.  Does have chronic T12 compression fracture which was reviewed on MRI as well.           Imaging: MRI report and images were personally reviewed and discussed with the patient.  A plastic model was utilized during the discussion.  MRI of the lumbar spine personally reviewed.  This reveals stable T12 compression fracture.  Multilevel degenerative disc diseaseThroughout the lumbar spine.  L5-S1 is normal however with some bilateral facet arthropathy.  At L4-5 there is broad-based disc bulge with ligamentum flavum buckling bilateral facet arthropathy resulting in severe spinal stenosis and lateral recess stenosis.  There is also mild neural foraminal stenosis bilaterally.  There is some moderate central stenosis at L3-4 with  lateral recess stenosis.  MRI of the thoracic spine was personally reviewed again to compare the compression fractures which appears unchanged recently. Is also able to view down to the L3 vertebral body.  Showing the stenosis at L3-4.    Review of Systems: Complains of numbness and tingling in the legs with weakness.  No headache dizziness nausea vomiting blurred vision or balance changes.    Past Medical History:   Diagnosis Date     Depression      Disease of thyroid gland      HTN (hypertension)      Osteoporosis      PUD (peptic ulcer disease)      No change in past medical history family history.    The following portions of the patient's history were reviewed and updated as appropriate: allergies, current medications, past family history, past medical history, past social history, past surgical history and problem list.      Objective:   Physical Exam:    Vitals:    07/27/18 1123   BP: 132/65   Pulse: 63       General: Alert and oriented with normal affect. Attention, knowledge, memory, and language are intact. No acute distress.   Eyes: Sclerae are clear.  Respirations: Unlabored. CV: No lower extremity edema.   Gait:   not tested, and wheelchair  Sensation is intact to light touch throughout the   lower extremities.  Reflexes are 1+ patellar and Achilles     Manual muscle testing reveals:  Right /Left out of 5     5/5 hip flexors  4+/4+ knee flexors-from wheelchair.  Question weakness versus language barrier.  5/5 knee extensors  4/4 ankle plantar flexors-from wheelchair.  Question weakness versus language barrier.  5/5 ankle dorsiflexors  5-/5 - [facility] question giveaway difficult with language

## 2021-06-19 NOTE — PROGRESS NOTES
Assessment:     Diagnoses and all orders for this visit:    Spondylolisthesis, lumbar region    T12 compression fracture (H)    Arthropathy of lumbar facet joint    Spinal stenosis of lumbar region without neurogenic claudication    Lumbar radiculitis  -     OPS TFESI Lumbar Bilateral; Future; Expected date: 8/14/18       Chris Bell is a 83 y.o. y.o. female patient of Dr. Hurtado last seen 7/27/2018 with past medical history significant for depression, hypertension, osteoporosis, thyroid disease, T12 compression fracture who presents today for follow-up regarding:    -Chronic bilateral lower extremity pain in which she received minimal to no relief with bilateral L4-5 TF ZAHRAA, pain is constant with associated paresthesias.    Exam is challenging, there was weakness noted on exam however question to giveaway as well as difficulties with language barrier.     Plan:     A shared decision making plan was used. The patient's values and choices were respected. Prior medical records from 7/27/2018 to current were reviewed today. The following represents what was discussed and decided upon by the provider and the patient.        -DIAGNOSTIC TESTS: Images were personally reviewed and interpreted.   --Lumbar spine MRI shows chronic anterolisthesis L4-5 with facet hypertrophy and ligamentum flavum thickening causing severe central canal stenosis with mild bilateral foraminal stenosis.  There is also unchanged compression deformity at T12 with retropulsion with kyphosis at this level.      -Referral: [facility] neurosurgical referral was placed by Dr. Hurtado on 7/27/2018, patient is not scheduled as of yet.  Patient would like to hold off on surgical referral until second injection and then will schedule if she gets no benefit which is reasonable.  Her pain is debilitating therefore she does want to consider surgery if injections give no benefit.    -INTERVENTIONS: Per Dr. Williamson recommendation we will trial a bilateral   L5-S1 transforaminal epidural steroid injection to see if we get further relief of her chronic bilateral radicular symptoms since he got no lasting relief with bilateral L4-5 TF ZAHRAA.  She does have lateral recess stenosis bilaterally at L5-S1.    -MEDICATIONS: No change in medications today, patient does take oxycodone prescribed by PCP ongoing.  Discussed side effects of medications and proper use. Patient verbalized understanding.    -PHYSICAL THERAPY: Could consider physical therapy down the road however she is not interested in physical therapy today and declined a referral.  Discussed the importance of core strengthening, ROM, stretching exercises with the patient and how each of these entities is important in decreasing pain.  Explained to the patient that the purpose of physical therapy is to teach the patient a home exercise program.  These exercises need to be performed every day in order to decrease pain and prevent future occurrences of pain.        -PATIENT EDUCATION:  30 minutes of total visit time was spent face to face with the patient today, 60 % of the visit was spent on counseling, education, and coordinating care.   -5 minutes spent outside of visit time, non-face-to-face time, reviewing chart.    -FOLLOW UP: Follow-up for injection with Dr. Williamson than 2 weeks postinjection if no benefit  Advised to contact clinic if symptoms worsen or change.    Subjective:     Chris Bell is a 83 y.o. female who presents today for follow-up regarding ongoing chronic bilateral low back pain with bilateral lower extremity pain nonspecific pattern as well as paresthesias that is significant and debilitating she reports currently her pain is a 6/10 up to an 8/10 at its worst.  She reports that she got minimal to no relief with bilateral L4-5 TF ZAHRAA and feels similar to how she did prior to injection.  She does endorse lower extremity weakness as well however denies any recent trips or falls or balance  changes.    Patient's daughter who does speak some English was present today during entire visit and added to past medical/surgical/family/social history and history of presenting illness.   [employer]  (voice only) was present during entire visit today.  There was some challenges with this due to difficulties with hearing the  and the  hearing with the patient and staff.    Patient was seen by Linda Hartmann CNP with [facility] neurosurgery last 4/24/2018 with progressive T12 compression fracture 70% height loss, patient was not a candidate for vertebroplasty.    Treatment to Date: No prior spinal surgery.    Bilateral L4-5 facet joint steroid injection 6/29/2018  Bilateral L4-5 TF ZAHRAA 7/31/2018 with minimal to no lasting benefit.  Preprocedure pain scale 8/10, post 5/10.    Medications:  Flexeril 10 mg  Oxycodone 5 mg  Tizanidine 2 mg  Gabapentin 100 mg 1-1-1    Patient Active Problem List   Diagnosis     Anemia due to GI blood loss     Hypokalemia     PUD (peptic ulcer disease)     T12 compression fracture (H)     Accelerated hypertension     Abdominal pain     Abnormal finding on imaging     Kyphosis of thoracic region     Electrolyte imbalance     Episode of shaking     Bilateral hip pain       Current Outpatient Prescriptions on File Prior to Encounter   Medication Sig Dispense Refill     gabapentin (NEURONTIN) 100 MG capsule 1 cap at bedtime x 3 days, then may take 1 cap twice daily x 3 days, then may take 1 cap three times daily 90 capsule 2     LORazepam (ATIVAN) 0.5 MG tablet Take by mouth. Take 1 tablet by mouth every 8 to 12 hours to relieve anxiety.       metoprolol succinate (TOPROL-XL) 25 MG Take 50 mg by mouth daily. Takes about 7pm qday       MULTIVITAMIN (MULTIPLE VITAMIN ORAL) Take 1 tablet by mouth daily.       oxyCODONE (ROXICODONE) 5 mg/5 mL solution Take 5 mg by mouth every 4 (four) hours as needed for pain.       potassium chloride (K-DUR,KLOR-CON) 20 MEQ  tablet Take 20 mEq by mouth daily.       sucralfate (CARAFATE) 1 gram tablet Take 1 g by mouth 4 (four) times a day.       cyclobenzaprine (FLEXERIL) 10 MG tablet Take 1 tablet (10 mg total) by mouth 2 (two) times a day as needed for muscle spasms. 20 tablet 0     DAILY-CHRIS tablet Take 1 tablet by mouth daily.  0     levothyroxine (SYNTHROID, LEVOTHROID) 50 MCG tablet Take 50 mcg by mouth daily.       meclizine (ANTIVERT) 25 mg tablet Take 25 mg by mouth 4 (four) times a day as needed.       tiZANidine (ZANAFLEX) 2 MG tablet Take 2 mg by mouth. Take 1/2 to 1 tablet twice daily as needed. Give 1 tablet at bedtime.       viscous lidocaine HC (LIDOCAINE) 2 % Soln viscous solution Take 15 mL by mouth 3 (three) times a day. 15 mL 0     VITAMIN E ACETATE ORAL Take by mouth daily.       No current facility-administered medications on file prior to encounter.        No Known Allergies    Past Medical History:   Diagnosis Date     Depression      Disease of thyroid gland      HTN (hypertension)      Osteoporosis      PUD (peptic ulcer disease)         Review of Systems  ROS: Positive for numbness and tingling and perceived weakness, positive for blurry vision and chronic balance changes.  Specifically negative for bowel/bladder dysfunction, headache, dizziness, foot drop, fevers, chills, appetite changes, nausea/vomiting, unexplained weight loss. Otherwise 13 systems reviewed are negative. Please see the patient's intake questionnaire from today for details.    Reviewed Social, Family, Past Medical and Past Surgical history with patient, no significant changes noted since prior visit.     Objective:     /78 (Patient Site: Right Arm, Patient Position: Sitting)  Pulse 98  Temp 98  F (36.7  C) (Oral)   SpO2 98%    PHYSICAL EXAMINATION:    --CONSTITUTIONAL: Well developed, well nourished, healthy appearing individual.  --PSYCHIATRIC: Appropriate mood and affect. No difficulty interacting due to temper, social  withdrawal, or memory issues.  --SKIN: Lumbar region is dry and intact.   --RESPIRATORY: Normal rhythm and effort. No abnormal accessory muscle breathing patterns noted.   --MUSCULOSKELETAL:  Patient in wheelchair.  --LUMBAR SPINE:  Inspection reveals no evidence of deformity. No tenderness to palpation lumbar spine. Straight leg raising in the seated position is negative to radicular pain. Sciatic notch non-tender.   --LOWER EXTREMITY MOTOR TESTING:  Plantar flexion left 4/5, right 4/5 -question give way/difficulty with language barrier  Dorsiflexion left 5/5, right 5/5   Great toe MTP extension left 4/5, right 4/5 -question give way/difficult with language barrier.  Knee flexion left 4/5, right 4/5 -question give way/difficult with language barrier  Hip flexion left 5/5, right 5/5  Hip abduction left 5/5, right 5/5  Hip adduction left 5/5, right 5/5   --HIPS: Full range of motion bilaterally.   --NEUROLOGIC: Bilateral patellar and achilles reflexes are physiologic and symmetric at 1+. Sensation to light touch is intact in the bilateral L4, L5, and S1 dermatomes.    RESULTS:   Imaging: Lumbar spine imaging was reviewed today. The images were shown to the patient and the findings were explained using a spine model.      Mr Lumbar Spine Without Contrast  Result Date: 7/20/2018  INDICATION: Low back pain radiating into the bilateral hips and tingling in both legs. Status post L4-L5 facet joint injections bilaterally 6/29/2018. History of T12 compression fracture. TECHNIQUE: Without IV contrast. CONTRAST: None. COMPARISON: 4/20/2018 thoracic spine MRI. FINDINGS: Nomenclature is based on 5 lumbar-type vertebral bodies. Sagittal field-of-view extends from the level of T10 to the level of S3. No significant change in T12 vertebra plana with unchanged retropulsion and associated focal thoracolumbar kyphosis. No new fracture. Remaining vertebral bodies are normal in height. Unchanged grade 1 anterolisthesis at L4-L5. No  pars defects. Except for the T12 vertebral body, expected marrow signal. The conus tip is identified at L2. Cauda equina is normal. Grossly normal paraspinal soft tissues. No abdominal aortic aneurysm. The visualized portions of the bony pelvis are normal for age. T11-T12: Unchanged posterior retropulsion of the superior T12 vertebral body, resulting in unchanged contouring of the spinal cord. No abnormal cord signal. No neural foraminal narrowing.   T12-L1: Normal disc height and signal. No herniation. No facet arthropathy. No spinal canal stenosis. No right neural foraminal stenosis. No left neural foraminal stenosis.   L1-L2: Normal disc height and signal. No herniation. No facet arthropathy. No spinal canal stenosis. No right neural foraminal stenosis. No left neural foraminal stenosis.   L2-L3: Disc desiccation, height loss, and disc bulge. Dorsal epidural lipomatosis, ligamentum flavum thickening, and bilateral facet arthropathy. Moderate subarticular zone narrowing. Mild central spinal canal stenosis. No right neural foraminal stenosis. No left neural foraminal stenosis.   L3-L4: Disc desiccation, height loss, and disc bulge. Ligamentum flavum thickening. Bilateral facet arthropathy. Moderate subarticular zone narrowing and moderate central spinal canal stenosis. Mild right neural foraminal stenosis. Mild left neural foraminal stenosis.   L4-L5: Disc desiccation, height loss, and disc bulge without significant cephalad migration of the superiorly uncovered disc. Ligamentum flavum thickening. Bilateral facet arthropathy. Severe spinal canal stenosis. Mild right neural foraminal stenosis. Mild left neural foraminal stenosis.   L5-S1: Normal disc height and signal. No herniation. Bilateral facet arthropathy. No spinal canal stenosis. No right neural foraminal stenosis. No left neural foraminal stenosis.   CONCLUSION:   1.  Disc bulge, ligamentum flavum thickening, and facet arthropathy contribute to cause severe  spinal canal narrowing at L4-L5. Unchanged grade 1 anterolisthesis at L4-L5.   2.  Unchanged compression deformity of the T12 vertebral body with retropulsed fracture fragment that contours the ventral spinal cord. No abnormal cord signal. Unchanged resultant focal thoracolumbar kyphosis.   3.  Otherwise, multilevel lumbar spondylosis. No additional level of severe spinal canal narrowing. No severe neural foraminal narrowing.

## 2021-06-20 NOTE — PROGRESS NOTES
Assessment:     Diagnoses and all orders for this visit:    Spinal stenosis of lumbar region without neurogenic claudication  -     Ambulatory referral to Neurosurgery    Spondylolisthesis, lumbar region  -     gabapentin (NEURONTIN) 100 MG capsule; Take 200 mg by mouth 3 (three) times a day.  Dispense: 180 capsule; Refill: 2    Lumbar radiculitis  -     Ambulatory referral to Neurosurgery    Lumbar spine pain  -     gabapentin (NEURONTIN) 100 MG capsule; Take 200 mg by mouth 3 (three) times a day.  Dispense: 180 capsule; Refill: 2    Arthropathy of lumbar facet joint  -     gabapentin (NEURONTIN) 100 MG capsule; Take 200 mg by mouth 3 (three) times a day.  Dispense: 180 capsule; Refill: 2    Lumbar stenosis  -     gabapentin (NEURONTIN) 100 MG capsule; Take 200 mg by mouth 3 (three) times a day.  Dispense: 180 capsule; Refill: 2    Paresthesias  -     gabapentin (NEURONTIN) 100 MG capsule; Take 200 mg by mouth 3 (three) times a day.  Dispense: 180 capsule; Refill: 2  -     Ambulatory referral to Neurosurgery    Weakness of both lower extremities  -     Ambulatory referral to Neurosurgery       Chris Bell is a 83 y.o. y.o. female patient of Dr. Hurtado last seen 8/14/2018 with past medical history significant for depression, hypertension, osteoporosis, thyroid disease, T12 compression fracture who presents today for follow-up regarding:    -Chronic bilateral low back pain with lower extremity pain, paresthesias and associated weakness with no lasting relief with bilateral L4-5 TF ZAHRAA, patient did receive about 50% relief ×3 days with bilateral  L5-S1 TF ZAHRAA, however no lasting relief.     Plan:     A shared decision making plan was used. The patient's values and choices were respected. Prior medical records from 8/14/2018 were reviewed today. The following represents what was discussed and decided upon by the provider and the patient.        -DIAGNOSTIC TESTS: Images were personally reviewed and interpreted.    --Lumbar spine MRI shows chronic anterolisthesis L4-5 with facet hypertrophy and ligamentum flavum thickening causing severe central canal stenosis with mild bilateral foraminal stenosis.  There is also unchanged compression deformity at T12 with retropulsion with kyphosis at this level.      -Referral: [facility]  neurosurgical referral was again placed by Dr. Hurtado 7/27/2018.  Another referral was placed so that they would call patient to schedule and patient was given the phone number as well but she would prefer them to call her.      -INTERVENTIONS: No further recommended injections at this time unless recommended by neurosurgery.    -MEDICATIONS: Did increase gabapentin to 100 mg, 2 tablets 3 times a day to see if we get further relief of her stenosis type pain and lower extremity paresthesias.  -Encouraged patient continue  oxycodone liquid prescribed by PCP as needed for pain.  Discussed side effects of medications and proper use. Patient verbalized understanding.    -PHYSICAL THERAPY: No recommendations for physical therapy at this time.  Discussed the importance of core strengthening, ROM, stretching exercises with the patient and how each of these entities is important in decreasing pain.  Explained to the patient that the purpose of physical therapy is to teach the patient a home exercise program.  These exercises need to be performed every day in order to decrease pain and prevent future occurrences of pain.        -PATIENT EDUCATION:  25 minutes of total visit time was spent face to face with the patient today, 60 % of the visit was spent on counseling, education, and coordinating care.   -5 minutes spent outside of visit time, non-face-to-face time, reviewing chart.    -FOLLOW UP: Follow-up with [facility] neurosurgery then as needed with Dr. Hurtado if she is not a surgical candidate.  Advised to contact clinic if symptoms worsen or change.    Subjective:     Chris Bell is a 83 y.o. female who  presents today for follow-up regarding 2 weeks post bilateral L5-S1 50% relief for 3 days and then transforaminal epidural steroid injection which she received pain returned to baseline.  Currently her pain is so significant lower lumbar spine that radiates to the upper buttock region with numbness and tingling and perceived weakness lower extremity.  Currently her pain is significant at a 9/10, a 7 at its best but it is constant.    Patient's daughter and son was present today during entire visit and added to past medical/surgical/family/social history and history of presenting illness.    was present during entire visit today.     Patient was seen by Linda Hartmann CNP with [facility] neurosurgery last 4/24/2018 with progressive T12 compression fracture 70% height loss, patient was not a candidate for vertebroplasty.     Treatment to Date: No prior spinal surgery.     Bilateral L4-5 facet joint steroid injection 6/29/2018  Bilateral L4-5 TF ZAHRAA 7/31/2018 with minimal to no lasting benefit.  Preprocedure pain scale 8/10, post 5/10.  Bilateral L5-S1 TF ZAHRAA 8/16/2018 with 50% relief ×3 days only.  Preprocedure pain scale 8/10, most 6/10.     Medications:  Flexeril 10 mg  Oxycodone 5 mg  Tizanidine 2 mg  Gabapentin 100 mg 1-1-1    Patient Active Problem List   Diagnosis     Anemia due to GI blood loss     Hypokalemia     PUD (peptic ulcer disease)     T12 compression fracture (H)     Accelerated hypertension     Abdominal pain     Abnormal finding on imaging     Kyphosis of thoracic region     Electrolyte imbalance     Episode of shaking     Bilateral hip pain       Current Outpatient Prescriptions on File Prior to Encounter   Medication Sig Dispense Refill     cyclobenzaprine (FLEXERIL) 10 MG tablet Take 1 tablet (10 mg total) by mouth 2 (two) times a day as needed for muscle spasms. 20 tablet 0     DAILY-CHRIS tablet Take 1 tablet by mouth daily.  0     levothyroxine (SYNTHROID, LEVOTHROID) 50 MCG tablet  Take 50 mcg by mouth daily.       LORazepam (ATIVAN) 0.5 MG tablet Take by mouth. Take 1 tablet by mouth every 8 to 12 hours to relieve anxiety.       meclizine (ANTIVERT) 25 mg tablet Take 25 mg by mouth 4 (four) times a day as needed.       metoprolol succinate (TOPROL-XL) 25 MG Take 50 mg by mouth daily. Takes about 7pm qday       MULTIVITAMIN (MULTIPLE VITAMIN ORAL) Take 1 tablet by mouth daily.       oxyCODONE (ROXICODONE) 5 mg/5 mL solution Take 5 mg by mouth every 4 (four) hours as needed for pain.       potassium chloride (K-DUR,KLOR-CON) 20 MEQ tablet Take 20 mEq by mouth daily.       sucralfate (CARAFATE) 1 gram tablet Take 1 g by mouth 4 (four) times a day.       tiZANidine (ZANAFLEX) 2 MG tablet Take 2 mg by mouth. Take 1/2 to 1 tablet twice daily as needed. Give 1 tablet at bedtime.       viscous lidocaine HC (LIDOCAINE) 2 % Soln viscous solution Take 15 mL by mouth 3 (three) times a day. 15 mL 0     VITAMIN E ACETATE ORAL Take by mouth daily.       [DISCONTINUED] gabapentin (NEURONTIN) 100 MG capsule 1 cap at bedtime x 3 days, then may take 1 cap twice daily x 3 days, then may take 1 cap three times daily 90 capsule 2     No current facility-administered medications on file prior to encounter.        No Known Allergies    Past Medical History:   Diagnosis Date     Depression      Disease of thyroid gland      HTN (hypertension)      Osteoporosis      PUD (peptic ulcer disease)         Review of Systems  ROS: Positive for numbness and tingling, generalized lower extremity weakness, dizziness, balance changes, blurry vision.  Specifically negative for bowel/bladder dysfunction, foot drop, fevers, chills, appetite changes, nausea/vomiting, unexplained weight loss. Otherwise 13 systems reviewed are negative. Please see the patient's intake questionnaire from today for details.    Reviewed Social, Family, Past Medical and Past Surgical history with patient, no significant changes noted since prior visit.      Objective:     /81 (Patient Site: Right Arm, Patient Position: Sitting)  Pulse 81  SpO2 99%    PHYSICAL EXAMINATION:    --CONSTITUTIONAL: Well developed, well nourished, healthy appearing individual.  --PSYCHIATRIC: Appropriate mood and affect. No difficulty interacting due to temper, social withdrawal, or memory issues.  --SKIN: Lumbar region is dry and intact.   --RESPIRATORY: Normal rhythm and effort. No abnormal accessory muscle breathing patterns noted.   --LUMBAR SPINE: No pinpoint tenderness over lumbar or thoracic spine.  --LOWER EXTREMITY MOTOR TESTING: -Question give way to pain/difficulty with language barrier.  Plantar flexion left 4/5, right 4/5   Dorsiflexion left 4/5, right 4/5   Great toe MTP extension left 5/5, right 5/5  Knee flexion left 4/5, right 4/5  Knee extension left 4/5, right 4/5   Hip flexion left 5/5, right 5/5  --NEUROLOGIC: Bilateral patellar and achilles reflexes are physiologic and symmetric at 1+. Sensation to light touch is intact in the bilateral L4, L5, and S1 dermatomes.    RESULTS:   Imaging: Lumbar spine imaging was reviewed today. The images were shown to the patient and the findings were explained using a spine model.      Mr Lumbar Spine Without Contrast  Result Date: 7/20/2018  INDICATION: Low back pain radiating into the bilateral hips and tingling in both legs. Status post L4-L5 facet joint injections bilaterally 6/29/2018. History of T12 compression fracture. TECHNIQUE: Without IV contrast. CONTRAST: None. COMPARISON: 4/20/2018 thoracic spine MRI. FINDINGS: Nomenclature is based on 5 lumbar-type vertebral bodies. Sagittal field-of-view extends from the level of T10 to the level of S3. No significant change in T12 vertebra plana with unchanged retropulsion and associated focal thoracolumbar kyphosis. No new fracture. Remaining vertebral bodies are normal in height. Unchanged grade 1 anterolisthesis at L4-L5. No pars defects. Except for the T12 vertebral body,  expected marrow signal. The conus tip is identified at L2. Cauda equina is normal. Grossly normal paraspinal soft tissues. No abdominal aortic aneurysm. The visualized portions of the bony pelvis are normal for age. T11-T12: Unchanged posterior retropulsion of the superior T12 vertebral body, resulting in unchanged contouring of the spinal cord. No abnormal cord signal. No neural foraminal narrowing.   T12-L1: Normal disc height and signal. No herniation. No facet arthropathy. No spinal canal stenosis. No right neural foraminal stenosis. No left neural foraminal stenosis.   L1-L2: Normal disc height and signal. No herniation. No facet arthropathy. No spinal canal stenosis. No right neural foraminal stenosis. No left neural foraminal stenosis.   L2-L3: Disc desiccation, height loss, and disc bulge. Dorsal epidural lipomatosis, ligamentum flavum thickening, and bilateral facet arthropathy. Moderate subarticular zone narrowing. Mild central spinal canal stenosis. No right neural foraminal stenosis. No left neural foraminal stenosis.   L3-L4: Disc desiccation, height loss, and disc bulge. Ligamentum flavum thickening. Bilateral facet arthropathy. Moderate subarticular zone narrowing and moderate central spinal canal stenosis. Mild right neural foraminal stenosis. Mild left neural foraminal stenosis.   L4-L5: Disc desiccation, height loss, and disc bulge without significant cephalad migration of the superiorly uncovered disc. Ligamentum flavum thickening. Bilateral facet arthropathy. Severe spinal canal stenosis. Mild right neural foraminal stenosis. Mild left neural foraminal stenosis.   L5-S1: Normal disc height and signal. No herniation. Bilateral facet arthropathy. No spinal canal stenosis. No right neural foraminal stenosis. No left neural foraminal stenosis.   CONCLUSION:   1.  Disc bulge, ligamentum flavum thickening, and facet arthropathy contribute to cause severe spinal canal narrowing at L4-L5. Unchanged grade  1 anterolisthesis at L4-L5.   2.  Unchanged compression deformity of the T12 vertebral body with retropulsed fracture fragment that contours the ventral spinal cord. No abnormal cord signal. Unchanged resultant focal thoracolumbar kyphosis.   3.  Otherwise, multilevel lumbar spondylosis. No additional level of severe spinal canal narrowing. No severe neural foraminal narrowing.

## 2021-06-20 NOTE — PROGRESS NOTES
Dear Dr. Villanueva:  The pleasure of seeing Chris Bell is an 83-year-old with predominantly back pain with occasional pain in her thighs bilaterally.  She states that she has generalized weakness.  She had a thoracic fracture in December 2017.  She had a recent MRI dated 7/20/2018 suggestive of severe spinal canal narrowing at L4-5 and grade 1 anterolisthesis at L4-5 and mild to moderate stenosis at L3-4.  There is a compression deformity at T12 vertebral body with retropulsed fracture fragment the contour of the ventral spinal cord without any abnormal cord signal.  Also there is multilevel lumbar spondylosis.  Patient has had 3 epidural steroid injections one at L4-5 and most recently at L5-S1 all 3 without any improvement in her pain.  She is tried physical therapy in the past without improvement in her pain.  On physical exam patient is in a wheelchair but is able to stand with assistance  On physical exam patient is very pleasant and appropriate  Speech is clear fluent and appropriate  Face is symmetric throughout the forehead eyes and mouth  Extraocular muscles are intact bilaterally  Strength is 4-/5 throughout the lower ext's  Straight leg raise test negative bilaterally  DTRs are intact throughout the lower extremities  Patient has severe tenderness to palpation of the thoracal lumbosacral region in the midline and paraspinous region as well as SI joints bilaterally    Assessment and plan:  Chris Bell is a very pleasant 83-year-old with osteoporosis with severe stenosis at L4-5 and moderate stenosis at L3-4.  Her physical exam does not correlate to her stenosis.  I explained this to her her son and daughter who accompanied her today.  We will refer her for EMG studies to evaluate for any radiculopathy.  I will also obtain flexion-extension x-rays.  I will refer her for physical therapy.   Thank you for giving me the opportunity to see this very pleasant patient.  If you have any questions about her or any  other patients please feel free to contact me.  Of note I spent greater than 30 minutes counseling the patient regarding her pathology and treatment plan.    Juan Redding MD, FAANS

## 2021-06-21 NOTE — PROGRESS NOTES
Patient presents at the request of Dr. Redding for bilateral lower extremity EMG.  She presents with an  today.  She has low back pain with bilateral lower extremity pain and some tingling in her feet.    EMG/NCS  results: Please see scanned full report.    Comment NCS: Borderline study  1.  Normal bilateral sural SNAPs.  2.  Low amplitude right superficial peroneal SNAP, likely normal for patient's age.  3.  Borderline or low amplitude lower extremity CMAPs.  Likely technical given lower extremity edema.    Comment EMG: Normal study  1.  Normal needle EMG bilateral lower extremities.    Interpretation: Essentially normal study    1. There is no electrodiagnostic evidence of lumbosacral radiculopathy, lumbosacral plexopathy, or focal neuropathy in the bilateral lower extremities.    2.  There is some borderline/nonspecific nerve conduction study findings which are likely normal for the patient and or technical secondary to lower extremity edema.    The testing was completed in its entirety by the physician.      It was our pleasure caring for your patient today, if there any questions or concerns please do not hesitate to contact us.

## 2021-06-22 NOTE — CONSULTS
[facility] Hematology and Oncology Consult Note    Patient: Chris Bell  MRN: [id number]  Date of Service: 12/11/2018        Reason for Visit    I was consulted by Dr. Villanueva regarding anemia    Assessment/Plan    Macrocytic anemia  History of gastric ulcers and bleeding    Patient hospitalized in December 2017 in January 2018 with significant drop in hemoglobin.  Had endoscopy which showed gastric ulcers and was transfused on the first admission.  Recently hospitalized again.  Hemoglobin did drop in the hospital but was improved in November.    Workup does not suggest a plasma cell dyscrasia.  B12 and folate are normal.  Iron saturation is low and there is no recent ferritin level checked.  Patient has been on oral iron twice daily for about a month.    Anemia could be related to iron deficiency related to her GI bleeding with slow recovery of counts.  She could have some iron malabsorption which is delaying her response.  Given the macrocytosis a bone marrow disorder such as myelodysplastic syndrome is possible as well.    Will do additional workup today and check erythropoietin level, ferritin, repeat CBC, serum free light chains and quantitative immunoglobulins.  Will have patient continue iron 3 times daily.  We will have her return in a few weeks with recheck of CBC.    If she does have iron deficiency and is not responding to oral iron then we can consider parenteral iron replacement.  If there is no evidence of iron deficiency and no response to oral iron we can discuss bone marrow aspirate and biopsy.    Plan: Continue ferrous sulfate 3 times a day  Check lab work as above  Follow-up in about 6 weeks with recheck of CBC            ECOG Performance   ECOG Performance Status: 1  Distress Assessment  Distress Assessment Score: No distress        Problem List    1. Anemia due to GI blood loss  Ferritin    Iron and Transferrin Iron Binding Capacity    Erythropoietin (EPO)    Immunoglobulin Free Light Chains,  Serum    Immunoglobulins, Quantitative    HM1(CBC and Differential)           CC: Kizzy Villanueva MD      ______________________________________________________________________________      Staging History    Cancer Staging  No matching staging information was found for the patient.    History    Ms. Chris Bell is a 83-year-old with past history of hypertension, hypothyroidism, gastric ulcers, thoracic compression fracture who is referred for evaluation of anemia.  She was hospitalized recently with some leg swelling and weight gain.  She had lab work showing slight elevation in the BNP.  AST was mildly elevated and albumin is decreased.  After discharge she had follow-up in spine care with EMG which was normal.  She was also seen in the emergency room for hypokalemia.    She was hospitalized last December with significant anemia and transfused.  She was hospitalized again in January of this year with low hemoglobin and had endoscopy showing gastric ulcers.    She currently denies any bleeding.  Reports no change with her diet.  Appetite is good.  Weight has increased recently possibly due to edema.    No headaches dizziness or focal weakness.  No fever or mild sores.  Some fatigue.  No change with her breathing.  No abdominal pain.  No change with bowels or urine.  No bleeding or rash.    Past medical history as above.  Past surgical history includes hysterectomy.  She lives in a senior home is  and has 5 kids.  Does not smoke or drink.      Past History  Past Medical History:   Diagnosis Date     Anemia      Depression      Disease of thyroid gland      HTN (hypertension)      Osteoporosis      PUD (peptic ulcer disease)      Past Surgical History:   Procedure Laterality Date     ESOPHAGOGASTRODUODENOSCOPY N/A 1/17/2018    Procedure: ESOPHAGOGASTRODUODENOSCOPY (EGD) with h.pylori and gastric ulcer biopsies;  Surgeon: Colin Alvarez MD;  Location: [facility];  Service:      HYSTERECTOMY    "    Family History   Problem Relation Age of Onset     Diabetes Neg Hx      Cancer Neg Hx      Heart disease Neg Hx      Social History     Socioeconomic History     Marital status:      Spouse name: None     Number of children: None     Years of education: None     Highest education level: None   Social Needs     Financial resource strain: None     Food insecurity - worry: None     Food insecurity - inability: None     Transportation needs - medical: None     Transportation needs - non-medical: None   Occupational History     None   Tobacco Use     Smoking status: Never Smoker     Smokeless tobacco: Never Used   Substance and Sexual Activity     Alcohol use: No     Drug use: No     Sexual activity: No   Other Topics Concern     None   Social History Narrative     None     Allergies    No Known Allergies    Review of Systems    12 point review of systems completed.  Details per the HPI.  Other systemic review negative.       Pain  Currently in Pain: Yes  Pain Score (Initial OR Reassessment): 5  Pain Frequency: Constant/continuous  Location: \"All over.\"  Pain Characteristics : Aching  Pain Intervention(s): Home medication  Response to Interventions: Some relief    Physical Exam    Recent Vitals 12/11/2018   Height -   Weight -   BSA (m2) -   /76   Pulse 92   Temp 98.3   Temp src 1   SpO2 97   Some recent data might be hidden       GENERAL: Alert and oriented. Seated comfortably. In no distress.    HEAD: Atraumatic and normocephalic.  Has a full head of hair.    EYES: EFRAIN, EOMI.  No pallor.  No icterus.    Oral cavity: no mucosal lesion or tonsillar enlargement.    NECK: supple. JVP normal.  No thyroid enlargement.    LYMPH NODES: No palpable, cervical, axillary or inguinal lymphadenopathy.    CHEST: clear to auscultation bilaterally.  Resonant to percussion throughout bilaterally.  Symmetrical breath movements bilaterally.    CVS: S1 and S2 are heard. Regular rate and rhythm.  No murmur or gallop or " rub heard.    ABDOMEN: Soft. Not tender. Not distended.  No palpable hepatomegaly or splenomegaly.  No other mass palpable.  Bowel sounds heard.    EXTREMITIES: Warm.  No peripheral edema.    SKIN: no rash, or bruising or purpura.      Lab Results    CMP shows mild increase in AST and decrease in albumin.  Total protein is 8.  .  C-reactive protein 2.7.  CBC shows white count 6.2 hemoglobin 10.5 platelets 106  with 19% monocytes.  Urinalysis is normal.  Iron saturation 13%.  HIV test negative.  Serum immunofixation is normal.  SPEP shows polyclonal gammopathy.  B12 and folate are normal.  Hepatitis C test is negative.  Peripheral smear shows microcytic anemia with megaloblastic changes.  TSH was normal.  Imaging Results  Echo shows ejection fraction of 56%.    Signed by: Fiona Jennings MD

## 2021-06-23 NOTE — PROGRESS NOTES
[facility] Hematology and Oncology Progress Note    Patient: Chris Bell  MRN: [id number]  Date of Service: 01/22/2019        Reason for Visit    Chief Complaint   Patient presents with     Benign Hematology     Anemia due to GI blood loss       Assessment and Plan    1.  Macrocytic anemia: Patient has a history of gastric ulcers and bleeding.  Was felt to be related to iron deficiency.  He did get a workup for myeloma.  That was normal.  B12 and folate were normal.  Ferritin level was normal.  She has been taking oral iron twice a day.  That was started in November.  Anemia seems to be slowly improving and is now basically normal.  At this time we will have her continue her iron.  See Dr. Jennings in 4-6 months with a repeat CBC.  I think if that is normal we can probably discharge her from our clinic.  If she stops responding we may have to give her IV iron.  If no response so that we could potentially do a bone marrow biopsy.    ECOG Performance    2     Distress Assessment  Distress Assessment Score: 2    Pain  Currently in Pain: Yes  Pain Score (Initial OR Reassessment): 7  Location: back pain; pinched: pcp is managing        Problem List    1. Anemia due to GI blood loss  HM1(CBC and Differential)    [facility]        ______________________________________________________________________________    History of Present Illness    Measurable disease: CBC    Treatment: Oral iron twice a day    Interim history: Patient is here today for a lab and follow-up visit.  Overall she says she is doing fine.  She continues to take her oral iron.  Denies any constipation or GI issues.  Denies any bleeding in her stool or any other bleeding.    Pain Status  Currently in Pain: Yes    Review of Systems    Oncology Nurse Assessment/CMA Intake:   Constitutional  Constitutional (WDL): Exceptions to WDL  Fatigue: Concerns(weak and tired as usual)  Fever: No Concerns  Chills: No Concerns  Weight Gain: No Concerns  Weight Loss: No  Concerns  Hot flashes/Night Sweats: Concerns(some hot flashes; no sweating)  Neurosensory  Peripheral Motor Neuropathy: Concerns(numbness and tingling in fingertips)  Ataxia: Concerns(unsteady)  Peripheral Sensory Neuropathy: No Concerns(makes fingers stiff)  Confusion: No Concerns  Dizziness: No Concerns  Syncope: No Concerns  Eye   Eye Disorder (WDL): Exceptions to WDL  Blurred Vision: No Concerns  Dry Eye: Concerns(pt reports eyes feel hard)  Eye Pain: No Concerns  Watering Eyes: No Concerns  Ear  Ear Disorder (WDL): All ear disorder elements are within defined limits  Ear Pain: No Concerns  Tinnitus: No Concerns  Cardiovascular  Cardiovascular (WDL): All cardiovascular elements are within defined limits  Palpitations: No Concerns  Edema: No Concerns  SVT, DVT/PE: No Concerns  Chest Pain - Cardiac: No Concerns  Pulmonary  Respiratory (WDL): Exceptions to WDL(alot of sneezing)  Cough: Concerns(occ dry )  Dyspnea: No Concerns  Hypoxia: No Concerns  Gastrointestinal  Gastrointestinal (WDL): All gastrointestinal elements are within defined limits  Anorexia: No Concerns  Nausea: No Concerns  Vomiting: No Concerns  Dehydration: No Concerns  Dysgeusia: No Concerns  Dysphagia: No Concerns  Mucositis Oral: No Concerns  Esophagitis: No Concerns  Constipation: No Concerns  Diarrhea: No Concerns  Pharyngitis: No Concerns  Dry Mouth: No Concerns  Genitourinary  Genitourinary (WDL): All genitourinary elements are within defined limits  Urinary Frequency: Concerns(water pill)  Urinary Retention: No Concerns  Urinary Tract Pain: No Concerns  Lymphatic  Lymph (WDL): Exceptions to WDL(neck feels tight)  Lymphedema: No Concerns  Lymph Node Discomfort: No Concerns  Musculoskeletal and Connective Tissue  Musculoskeletal and Connetive Tissue Disorders (WDL): Exceptions to WDL  Arthralgia: Concerns(fingers swollen)  Bone Pain: No Concerns  Muscle Weakness : Concerns(legs; wheelchair used today)  Myalgia: Concerns(chronic back  pain)  Integumentary  Integumentary (WDL): All integumentary elements are within defined limits  Alopecia: No Concerns  Rash Maculo-Papular: No Concerns  Pruritus: No Concerns  Urticaria: No Concerns  Palmar-Plantar Erythrodysesthesia Syndrome: No Concerns  Flushing: No Concerns  Patient Coping  Patient Coping: Accepting  Accompanied by  Accompanied by: Family Member( from [facility] Garcia)  Oral Chemo Adherence         Past History  Past Medical History:   Diagnosis Date     Anemia      Depression      Disease of thyroid gland      HTN (hypertension)      Osteoporosis      PUD (peptic ulcer disease)        PHYSICAL EXAM:  /64   Pulse 76   Temp 98.1  F (36.7  C) (Oral)   Wt 116 lb 6.4 oz (52.8 kg)   SpO2 98%   BMI 25.19 kg/m    GENERAL: no acute distress. Cooperative in conversation. Here with daughter and . In wheelchair  HEENT: pupils are equal, round and reactive. Oromucosa is clean and intact. No ulcerations or mucositis noted. No bleeding noted.  RESP: lungs are clear bilaterally per auscultation. Regular respiratory rate. No wheezes or rhonchi.  CV: Regular, rate and rhythm. No murmurs.  ABD: soft, nontender. Positive bowel sounds. No organomegaly.   MUSCULOSKELETAL: No lower extremity swelling.   NEURO: non focal. Alert and oriented x3.   PSYCH: within normal limits. No depression or anxiety.  SKIN: warm dry intact   LYMPH: no cervical, supraclavicular or axillary lymphadenopathy      Lab Results    Recent Results (from the past 168 hour(s))   HM1 (CBC with Diff)   Result Value Ref Range    WBC 5.5 4.0 - 11.0 thou/uL    RBC 3.50 (L) 3.80 - 5.40 mill/uL    Hemoglobin 11.9 (L) 12.0 - 16.0 g/dL    Hematocrit 35.0 35.0 - 47.0 %     80 - 100 fL    MCH 34.0 27.0 - 34.0 pg    MCHC 34.0 32.0 - 36.0 g/dL    RDW 14.9 (H) 11.0 - 14.5 %    Platelets 304 140 - 440 thou/uL    MPV 8.5 8.5 - 12.5 fL    Neutrophils % 46 (L) 50 - 70 %    Lymphocytes % 37 20 - 40 %    Monocytes % 14 (H) 2 - 10 %     Eosinophils % 2 0 - 6 %    Basophils % 0 0 - 2 %    Neutrophils Absolute 2.5 2.0 - 7.7 thou/uL    Lymphocytes Absolute 2.0 0.8 - 4.4 thou/uL    Monocytes Absolute 0.8 0.0 - 0.9 thou/uL    Eosinophils Absolute 0.1 0.0 - 0.4 thou/uL    Basophils Absolute 0.0 0.0 - 0.2 thou/uL       Imaging    No results found.      Signed by: Sole Lin, CNP

## 2021-06-23 NOTE — PATIENT INSTRUCTIONS - HE
Chris Bell,    It was a pleasure to see you today at the [facility].     My recommendations after this visit include:  - Please follow up in the heart clinic as needed   - No changes to your medications    Kate Morrison CNP    What is the [facility] Failure [facility]?     The [facility] is a heart failure specialty clinic within [facility].  You will work with your cardiologist, nurse practitioner, and nurses to carefully adjust medications and learn how to live with heart failure.  The Heart Failure Program will help you:      Better understand your chronic heart condition    Feel better and avoid hospital stays    Monitoring for Symptoms      Call the Heart Failure Phone Line ([phone]) if you have any of these symptoms:     Increased shortness of breath/shortness of breath at rest    Waking up at night with difficulty breathing    Unable to lie down for sleep due to symptoms or needing to sit upright for sleep    Weight gain of 2 pounds a day for 2 days in a row OR 5 pounds in 1 week    Increased swelling in your ankles or legs    Dizziness or lightheadedness    Medications       Take your medications as prescribed    Bring all your medications in their original bottles to every appointment    Avoid non-steroidal anti-inflammatory medications (Advil, Aleve, Ibuprofen, Naprosyn, Naproxen, Celebrex)    Do not stop taking your medications or begin taking over-the-counter or herbal medications without first talking to your doctor or nurse practitioner    Diet and Lifestyle       Limit sodium/salt to 2000 mg daily   o Read food labels for sodium content  o Do not add salt when cooking or add salt at the table    Weigh yourself every day and record in your daily weight log   o Call if you gain 2 pounds a day for 2 days in a row OR 5 pounds in 1 week  o Bring daily weight log to every appointment    Stay active, pace yourself, listen to your body, and rest  when tired    Elevate your legs if they are swollen. Ask about using compression/support stockings    Stop smoking    Lose weight if you are overweight    Avoid drinking alcohol or limit amount    Stay updated on your immunizations including flu and pneumonia vaccines

## 2021-06-27 NOTE — PROGRESS NOTES
Progress Notes by Kate Morrison CNP at 1/11/2019  2:50 PM     Author: Kate Morrison CNP Service: -- Author Type: Nurse Practitioner    Filed: 1/11/2019  3:30 PM Encounter Date: 1/11/2019 Status: Signed    : Kate Morrison CNP (Nurse Practitioner)           Click to link to [facility] HEART CARE NOTE      Assessment/Recommendations   Assessment:    1.  Heart failure with preserved ejection fraction, NYHA class I: Compensated.  No signs or symptoms of fluid retention.  We discussed monitoring heart failure symptoms, following a low-sodium diet, monitoring daily weights, and heart failure treatment.  We discussed reasons to call her PMD or the heart clinic.  She will start weighing herself on a daily basis.  She is currently not on any diuretics.    2.  Hypertension: Controlled.  Blood pressure 134/80    Plan:  1.  Continue current medications  2.  Start daily weights and continue low-sodium diet    Chris Bell will follow up in the heart clinic as needed.     History of Present Illness    Ms. Chris Bell is a 83 y.o. female seen at [facility] heart failure clinic today for continued follow-up.  2 family members accompany her today.  There is an  for the duration of the appointment.  She was hospitalized at [facility] October 17 - October 18, 2018 with lower extremity edema, mild JVD, and pulmonary edema shown on chest x-ray.  Her BNP was 265 on admission.  She was started on Lasix which improved her lower extremity edema.  She was not seen by cardiology during her hospitalization.  Her primary doctor checked an echocardiogram November 2, 2018 which showed an ejection fraction of 56% with no significant valve abnormalities.  She has a past medical history significant for hypertension and ulcer.    Today, she states she has no acute heart failure symptoms.  Her edema has resolved.  She denies fatigue, lightheadedness, shortness of breath, dyspnea  on exertion, orthopnea, PND, palpitations, chest pain, abdominal fullness/bloating and lower extremity edema.      She is not monitoring weights.  She is following a low sodium diet. Her weight is down 5 pounds since discharge from the hospital.    ECHO 11/2/2018:   Summary       No previous study for comparison.    Normal left ventricular size.    Left ventricle ejection fraction is normal. The calculated left ventricular ejection fraction is 56%.    Normal right ventricular size and systolic function.    No significant valve abnormality observed.    Small circumferential pericardial effusion most prominent overlying the right ventricle.          Physical Examination Review of Systems   Vitals:    01/11/19 1459   BP: 134/80   Pulse: 81   Resp: 18     Body mass index is 24.89 kg/m .  Wt Readings from Last 3 Encounters:   01/11/19 115 lb (52.2 kg)   11/21/18 121 lb (54.9 kg)   11/02/18 119 lb (54 kg)       General Appearance:     Alert, cooperative and in no acute distress.   ENT/Mouth: membranes moist, no oral lesions or bleeding gums.      EYES:  no scleral icterus, normal conjunctivae   Neck: no carotid bruits or thyromegaly   Chest/Lungs:   lungs are clear to auscultation, no rales or wheezing, respirations unlabored   Cardiovascular:   Regular. Normal first and second heart sounds with no murmurs, rubs, or gallops; the carotid, radial and posterior tibial pulses are intact, Jugular venous pressure normal, no edema     Abdomen:  Soft, nontender, nondistended, bowel sounds present   Extremities: no cyanosis or clubbing   Skin: warm, dry.    Neurologic: mood and affect are appropriate, alert and oriented x3      General: WNL  Eyes: WNL  Ears/Nose/Throat: WNL  Lungs: WNL  Heart: Leg Swelling  Stomach: WNL  Bladder: WNL  Muscle/Joints: WNL  Skin: WNL  Nervous System: WNL  Mental Health: Depression, Anxiety     Blood: WNL     Medical History  Surgical History Family History Social History   Past Medical History:    Diagnosis Date   ? Anemia    ? Depression    ? Disease of thyroid gland    ? HTN (hypertension)    ? Osteoporosis    ? PUD (peptic ulcer disease)     Past Surgical History:   Procedure Laterality Date   ? ESOPHAGOGASTRODUODENOSCOPY N/A 1/17/2018    Procedure: ESOPHAGOGASTRODUODENOSCOPY (EGD) with h.pylori and gastric ulcer biopsies;  Surgeon: Colin Alvarez MD;  Location: [facility];  Service:    ? HYSTERECTOMY      Family History   Problem Relation Age of Onset   ? Diabetes Neg Hx    ? Cancer Neg Hx    ? Heart disease Neg Hx     Social History     Socioeconomic History   ? Marital status:      Spouse name: Not on file   ? Number of children: Not on file   ? Years of education: Not on file   ? Highest education level: Not on file   Social Needs   ? Financial resource strain: Not on file   ? Food insecurity - worry: Not on file   ? Food insecurity - inability: Not on file   ? Transportation needs - medical: Not on file   ? Transportation needs - non-medical: Not on file   Occupational History   ? Not on file   Tobacco Use   ? Smoking status: Never Smoker   ? Smokeless tobacco: Never Used   Substance and Sexual Activity   ? Alcohol use: No   ? Drug use: No   ? Sexual activity: No   Other Topics Concern   ? Not on file   Social History Narrative   ? Not on file          Medications  Allergies   Current Outpatient Medications   Medication Sig Dispense Refill   ? DAILY-CHRIS tablet Take 1 tablet by mouth daily.  0   ? gabapentin (NEURONTIN) 100 MG capsule Take 200 mg by mouth 3 (three) times a day. (Patient taking differently: Take 100 mg by mouth 3 (three) times a day. ) 180 capsule 2   ? levothyroxine (SYNTHROID, LEVOTHROID) 50 MCG tablet Take 50 mcg by mouth daily.     ? LORazepam (ATIVAN) 0.5 MG tablet Take 0.5 mg by mouth every 8 (eight) hours as needed for anxiety.      ? metoprolol succinate (TOPROL-XL) 25 MG Take 25 mg by mouth 2 (two) times a day.      ? multivitamin with minerals (THERA-M) 9 mg  iron-400 mcg Tab tablet Take 1 tablet by mouth every evening.     ? oxyCODONE (ROXICODONE) 5 mg/5 mL solution Take 5 mg by mouth every 4 (four) hours as needed for pain.     ? potassium chloride (K-DUR,KLOR-CON) 20 MEQ tablet Take 20 mEq by mouth daily.     ? sucralfate (CARAFATE) 1 gram tablet Take 1 g by mouth 4 (four) times a day.     ? furosemide (LASIX) 20 MG tablet Take 1 tablet (20 mg total) by mouth daily for 5 days. 5 tablet 0     No current facility-administered medications for this visit.       No Known Allergies      Lab Results    Chemistry CBC BNP   Lab Results   Component Value Date    CREATININE 0.76 11/21/2018    BUN 9 11/21/2018     (L) 11/21/2018    K 3.7 11/21/2018    CL 98 11/21/2018    CO2 22 11/21/2018     Creatinine (mg/dL)   Date Value   11/21/2018 0.76   10/18/2018 0.69   10/17/2018 0.72   09/26/2018 0.71    Lab Results   Component Value Date    WBC 5.7 12/11/2018    HGB 11.1 (L) 12/11/2018    HCT 34.0 (L) 12/11/2018     (H) 12/11/2018     12/11/2018    Lab Results   Component Value Date     (H) 10/17/2018     BNP (pg/mL)   Date Value   10/17/2018 265 (H)   06/05/2018 31          30 minutes were spent with the patient with greater than 50% spent on education and counseling.      Kate Morrison, Replaced by [facility]   Heart Failure Clinic

## 2021-07-13 NOTE — PROGRESS NOTES
Assessment:     Diagnoses and all orders for this visit:  Chronic right-sided low back pain with right-sided sciatica  -     MR Lumbar Spine w/o Contrast; Future  -     methocarbamol (ROBAXIN) 500 MG tablet; Take 1 tablet (500 mg) by mouth 4 times daily as needed for muscle spasms  -     acetaminophen (TYLENOL) 500 MG tablet; Take 1-2 tablets (500-1,000 mg) by mouth every 8 hours as needed for pain  Right lumbar radiculitis  -     MR Lumbar Spine w/o Contrast; Future  -     methocarbamol (ROBAXIN) 500 MG tablet; Take 1 tablet (500 mg) by mouth 4 times daily as needed for muscle spasms  -     acetaminophen (TYLENOL) 500 MG tablet; Take 1-2 tablets (500-1,000 mg) by mouth every 8 hours as needed for pain  Spinal stenosis of lumbar region without neurogenic claudication  -     MR Lumbar Spine w/o Contrast; Future  Spondylolisthesis of lumbar region  -     MR Lumbar Spine w/o Contrast; Future  Lumbar facet arthropathy  -     MR Lumbar Spine w/o Contrast; Future  History of compression fracture of spine  -     MR Lumbar Spine w/o Contrast; Future     Chris Bell is a 86 year old y.o. female with past medical history significant for depression, hypertension, peptic ulcer disease, heart failure, anemia, osteoporosis, thyroid disease, T12 compression fracture with thoracic kyphosis, cancer of the soft palate who presents today for follow-up regarding:    -Chronic low back pain with acute right LBP with groin pain and anterior thigh pain consistent with radiculitis.     Plan:     A shared decision making plan was used. The patient's values and choices were respected. Prior medical records from 3/29/2021 to current were reviewed today. The following represents what was discussed and decided upon by the provider and the patient.        -DIAGNOSTIC TESTS: Images were personally reviewed and interpreted.   --Ordered updated urgent MRI to further evaluate severe right lumbar radiculitis.  --Lumbar spine x-ray 1/27/2021 with mild  convex curvature.  Chronic marked T12 compression fracture unchanged since 2018.  Possible subtle height loss at L3. Degenerative spondylolisthesis L5-S1 and 4 mm anterolisthesis L4-5 stable.  --Pelvic x-ray 1/27/2021 with no fracture identified, stable joint space.    --Lumbar spine MRI 2018 shows chronic anterolisthesis L4-5 with facet hypertrophy and ligamentum flavum thickening causing severe central canal stenosis with mild bilateral foraminal stenosis.  There is also unchanged compression deformity at T12 with retropulsion with kyphosis at this level.      -INTERVENTIONS: Consider right lumbar ZAHRAA pending imaging review which she is very interested in.    -MEDICATIONS: Prescribed Tylenol 500 mg 1 to 2 tablets 3 times daily as needed for pain.  -Also prescribe methocarbamol 500 mg 4 times daily as needed for myofascial pain.  Discussed side effects of medications and proper use. Patient verbalized understanding.    -PHYSICAL THERAPY: Patient is not interested in physical therapy options at this time.  Discussed the importance of core strengthening, ROM, stretching exercises with the patient and how each of these entities is important in decreasing pain.  Explained to the patient that the purpose of physical therapy is to teach the patient a home exercise program.  These exercises need to be performed every day in order to decrease pain and prevent future occurrences of pain.        -PATIENT EDUCATION:  Total time of 32 minutes spent with the patient, reviewing the chart, placing orders, and documenting.   -Today we also discussed the issues related to the current COVID-19 pandemic, the pros and cons of the current treatment plan, the CDC guidelines such as social distancing, washing the hands, and covering the cough.    -FOLLOW UP: Can call patient if right ZAHRAA injection reasonable given MRI results.  Advised to contact clinic if symptoms worsen or change.    Subjective:     Chris Bell is a 86 year old female  who presents today for follow-up regarding chronic bilateral low back pain with generalized lower extremity pain historically however pain is been more severe in the last couple of weeks on the right low back at the lumbosacral junction that radiates to the right lateral hip anterior groin and occasionally into the anterior thigh mostly that stops at the knee at this time.  She reports that her pain is constant and 8/10 currently up to a 10 at its worse with walking as well as prolonged sitting.  She does feel like her pain is severe at bedtime and hindering her sleep at this time as well.  Patient otherwise denies any recent trips or falls or balance changes, denies bowel or bladder loss control.     was present during entire visit today.   Patient's daughter-in-law who does speak fluent English was present today during entire visit and added to past medical/surgical/family/social history and history of presenting illness.      Treatment to Date: No prior spinal surgery.     Bilateral L4-5 facet joint steroid injection 6/29/2018  Bilateral L4-5 TF ZAHRAA 7/31/2018 with minimal to no lasting benefit.  Preprocedure pain scale 8/10, post 5/10.  Bilateral L5-S1 TF ZAHRAA 8/16/2018 with 50% relief ×3 days only.  Preprocedure pain scale 8/10, most 6/10.     Medications:  Flexeril 10 mg  Oxycodone 5 mg  Tizanidine 2 mg  Gabapentin 100 mg 1-1-1  Diclofenac gel  Methocarbamol    Patient Active Problem List   Diagnosis     Anemia due to GI blood loss     Hypokalemia     PUD (peptic ulcer disease)     T12 compression fracture (H)     Accelerated hypertension     Abdominal pain     Abnormal finding on imaging     Kyphosis of thoracic region     Electrolyte imbalance     Episode of shaking     Bilateral hip pain     Edema     Heart failure with preserved ejection fraction (H)     Cancer of soft palate (H)       Current Outpatient Medications   Medication     acetaminophen (TYLENOL) 500 MG tablet     acetaminophen (TYLENOL)  500 MG tablet     Ferrous Sulfate 324 (65 Fe) MG TBEC     levothyroxine (SYNTHROID/LEVOTHROID) 50 MCG tablet     LORazepam (ATIVAN) 0.5 MG tablet     magnesium oxide (MAG-OX) 400 MG tablet     methocarbamol (ROBAXIN) 500 MG tablet     metoprolol succinate ER (TOPROL-XL) 25 MG 24 hr tablet     pantoprazole (PROTONIX) 40 MG EC tablet     sodium chloride 1 GM tablet     vitamin B-12 (CYANOCOBALAMIN) 250 MCG tablet     Alcohol Swabs (B-D SINGLE USE SWABS REGULAR) PADS     alum & mag hydroxide-simethicone (ANTACID LIQUID) 200-200-20 MG/5ML SUSP suspension     benzocaine (ORAJEL/ANBESOL) 10 % gel     blood glucose monitoring (ONETOUCH VERIO) meter device kit     calcium carbonate (TUMS) 500 MG chewable tablet     calcium carbonate (TUMS) 500 MG chewable tablet     clobetasol (TEMOVATE) 0.05 % external ointment     diclofenac (VOLTAREN) 1 % topical gel     fentaNYL (DURAGESIC) 12 mcg/hr 72 hr patch     furosemide (LASIX) 20 MG tablet     gabapentin (NEURONTIN) 100 MG capsule     lidocaine (XYLOCAINE) 2 % solution     lisinopril (PRINIVIL/ZESTRIL) 5 MG tablet     magic mouthwash (ENTER INGREDIENTS IN COMMENTS) suspension     magnesium hydroxide (MILK OF MAGNESIA) 400 MG/5ML suspension     Magnesium Oxide -Mg Supplement 250 MG tablet     mirtazapine (REMERON) 15 MG tablet     Multiple Vitamin (DAILY-CHRIS) TABS     omeprazole (PRILOSEC) 20 MG DR capsule     ONETOUCH VERIO IQ test strip     oxyCODONE (ROXICODONE) 5 MG/5ML solution     potassium chloride ER (K-DUR/KLOR-CON M) 20 MEQ CR tablet     potassium chloride ER (K-TAB/KLOR-CON) 10 MEQ CR tablet     ranitidine (ZANTAC) 300 MG tablet     sucralfate (CARAFATE) 1 GM tablet     traZODone (DESYREL) 50 MG tablet     triamcinolone (KENALOG) 0.1 % paste     No current facility-administered medications for this visit.       Allergies   Allergen Reactions     Unknown [No Clinical Screening - See Comments]        Past Medical History:   Diagnosis Date     Anemia      Depression       Disease of thyroid gland      HTN (hypertension)      Osteoporosis      PUD (peptic ulcer disease)         Review of Systems  ROS:  Specifically negative for bowel/bladder dysfunction, balance changes, headache, dizziness, foot drop, fevers, chills, appetite changes, nausea/vomiting, unexplained weight loss. Otherwise 13 systems reviewed are negative. Please see the patient's intake questionnaire from today for details.    Reviewed Social, Family, Past Medical and Past Surgical history with patient, no significant changes noted since prior visit.     Objective:     BP (!) 170/81 (BP Location: Right arm, Patient Position: Sitting, Cuff Size: Adult Regular)   Pulse 70     PHYSICAL EXAMINATION:    --CONSTITUTIONAL: Well developed, well nourished, healthy appearing individual.  --PSYCHIATRIC: Appropriate mood and affect. No difficulty interacting due to temper, social withdrawal, or memory issues.  --SKIN: Lumbar region is dry and intact.   --RESPIRATORY: Normal rhythm and effort. No abnormal accessory muscle breathing patterns noted.   --MUSCULOSKELETAL:  Normal lumbar lordosis noted, no lateral shift.  --GROSS MOTOR: Easily arises from a seated position. Gait is non-antalgic  --LUMBAR SPINE:  Inspection reveals no evidence of deformity. No tenderness to palpation lumbar spine. Straight leg raising in the supine position is negative to radicular pain. Sciatic notch non-tender on the left, tender on the right.   --SACROILIAC JOINT: One Finger point test negative.  --LOWER EXTREMITY MOTOR TESTING:  Plantar flexion left 5/5, right 5/5   Dorsiflexion left 5/5, right 5/5   Great toe MTP extension left 5/5, right 5/5  Knee flexion left 5/5, right 5/5  Knee extension left 5/5, right 5/5   Hip flexion left 5/5, right 5/5  Hip abduction left 5/5, right 5/5  Hip adduction left 5/5, right 5/5   --HIPS: Full range of motion bilaterally.   --NEUROLOGIC: Bilateral patellar and achilles reflexes are physiologic and symmetric.  Sensation to light touch is intact in the bilateral L4, L5, and S1 dermatomes.    RESULTS:   Imaging: Lumbar spine imaging was reviewed today. The images were shown to the patient and the findings were explained using a spine model.      Mr Lumbar Spine Without Contrast  Result Date: 7/20/2018  INDICATION: Low back pain radiating into the bilateral hips and tingling in both legs. Status post L4-L5 facet joint injections bilaterally 6/29/2018. History of T12 compression fracture. TECHNIQUE: Without IV contrast. CONTRAST: None. COMPARISON: 4/20/2018 thoracic spine MRI. FINDINGS: Nomenclature is based on 5 lumbar-type vertebral bodies. Sagittal field-of-view extends from the level of T10 to the level of S3. No significant change in T12 vertebra plana with unchanged retropulsion and associated focal thoracolumbar kyphosis. No new fracture. Remaining vertebral bodies are normal in height. Unchanged grade 1 anterolisthesis at L4-L5. No pars defects. Except for the T12 vertebral body, expected marrow signal. The conus tip is identified at L2. Cauda equina is normal. Grossly normal paraspinal soft tissues. No abdominal aortic aneurysm. The visualized portions of the bony pelvis are normal for age. T11-T12: Unchanged posterior retropulsion of the superior T12 vertebral body, resulting in unchanged contouring of the spinal cord. No abnormal cord signal. No neural foraminal narrowing.   T12-L1: Normal disc height and signal. No herniation. No facet arthropathy. No spinal canal stenosis. No right neural foraminal stenosis. No left neural foraminal stenosis.   L1-L2: Normal disc height and signal. No herniation. No facet arthropathy. No spinal canal stenosis. No right neural foraminal stenosis. No left neural foraminal stenosis.   L2-L3: Disc desiccation, height loss, and disc bulge. Dorsal epidural lipomatosis, ligamentum flavum thickening, and bilateral facet arthropathy. Moderate subarticular zone narrowing. Mild central  spinal canal stenosis. No right neural foraminal stenosis. No left neural foraminal stenosis.   L3-L4: Disc desiccation, height loss, and disc bulge. Ligamentum flavum thickening. Bilateral facet arthropathy. Moderate subarticular zone narrowing and moderate central spinal canal stenosis. Mild right neural foraminal stenosis. Mild left neural foraminal stenosis.   L4-L5: Disc desiccation, height loss, and disc bulge without significant cephalad migration of the superiorly uncovered disc. Ligamentum flavum thickening. Bilateral facet arthropathy. Severe spinal canal stenosis. Mild right neural foraminal stenosis. Mild left neural foraminal stenosis.   L5-S1: Normal disc height and signal. No herniation. Bilateral facet arthropathy. No spinal canal stenosis. No right neural foraminal stenosis. No left neural foraminal stenosis.   CONCLUSION:   1.  Disc bulge, ligamentum flavum thickening, and facet arthropathy contribute to cause severe spinal canal narrowing at L4-L5. Unchanged grade 1 anterolisthesis at L4-L5.   2.  Unchanged compression deformity of the T12 vertebral body with retropulsed fracture fragment that contours the ventral spinal cord. No abnormal cord signal. Unchanged resultant focal thoracolumbar kyphosis.   3.  Otherwise, multilevel lumbar spondylosis. No additional level of severe spinal canal narrowing. No severe neural foraminal narrowing.

## 2021-07-14 ENCOUNTER — OFFICE VISIT (OUTPATIENT)
Dept: PHYSICAL MEDICINE AND REHAB | Facility: CLINIC | Age: 86
End: 2021-07-14
Payer: COMMERCIAL

## 2021-07-14 VITALS — HEART RATE: 70 BPM | DIASTOLIC BLOOD PRESSURE: 81 MMHG | SYSTOLIC BLOOD PRESSURE: 170 MMHG

## 2021-07-14 DIAGNOSIS — M54.41 CHRONIC RIGHT-SIDED LOW BACK PAIN WITH RIGHT-SIDED SCIATICA: Primary | ICD-10-CM

## 2021-07-14 DIAGNOSIS — M43.16 SPONDYLOLISTHESIS OF LUMBAR REGION: ICD-10-CM

## 2021-07-14 DIAGNOSIS — M47.816 LUMBAR FACET ARTHROPATHY: ICD-10-CM

## 2021-07-14 DIAGNOSIS — Z87.81 HISTORY OF COMPRESSION FRACTURE OF SPINE: ICD-10-CM

## 2021-07-14 DIAGNOSIS — M54.16 RIGHT LUMBAR RADICULITIS: ICD-10-CM

## 2021-07-14 DIAGNOSIS — M48.061 SPINAL STENOSIS OF LUMBAR REGION WITHOUT NEUROGENIC CLAUDICATION: ICD-10-CM

## 2021-07-14 DIAGNOSIS — G89.29 CHRONIC RIGHT-SIDED LOW BACK PAIN WITH RIGHT-SIDED SCIATICA: Primary | ICD-10-CM

## 2021-07-14 PROCEDURE — 99214 OFFICE O/P EST MOD 30 MIN: CPT | Performed by: NURSE PRACTITIONER

## 2021-07-14 RX ORDER — POTASSIUM CHLORIDE 750 MG/1
10 TABLET, EXTENDED RELEASE ORAL DAILY
COMMUNITY
Start: 2021-06-01 | End: 2022-01-01

## 2021-07-14 RX ORDER — METHOCARBAMOL 500 MG/1
500 TABLET, FILM COATED ORAL 4 TIMES DAILY PRN
Qty: 30 TABLET | Refills: 3 | Status: SHIPPED | OUTPATIENT
Start: 2021-07-14 | End: 2021-10-27 | Stop reason: SINTOL

## 2021-07-14 RX ORDER — SODIUM CHLORIDE 1 G/1
2 TABLET ORAL 2 TIMES DAILY
Status: ON HOLD | COMMUNITY
Start: 2021-07-12 | End: 2022-01-01

## 2021-07-14 RX ORDER — PANTOPRAZOLE SODIUM 40 MG/1
40 TABLET, DELAYED RELEASE ORAL DAILY
COMMUNITY
Start: 2020-08-23 | End: 2022-01-01

## 2021-07-14 RX ORDER — ACETAMINOPHEN 500 MG
500-1000 TABLET ORAL EVERY 8 HOURS PRN
Qty: 90 TABLET | Refills: 1 | Status: SHIPPED | OUTPATIENT
Start: 2021-07-14 | End: 2021-08-18

## 2021-07-14 RX ORDER — MAGNESIUM OXIDE 400 MG/1
400 TABLET ORAL DAILY
COMMUNITY
Start: 2021-05-27 | End: 2022-01-01

## 2021-07-14 ASSESSMENT — PAIN SCALES - GENERAL: PAINLEVEL: EXTREME PAIN (8)

## 2021-07-14 NOTE — LETTER
7/14/2021         RE: Chris Bell  [address]  [address]  [facility] [address]        Dear Colleague,    Thank you for referring your patient, Chris Bell, to the [facility] [place]. Please see a copy of my visit note below.      Assessment:     Diagnoses and all orders for this visit:  Chronic right-sided low back pain with right-sided sciatica  -     MR Lumbar Spine w/o Contrast; Future  -     methocarbamol (ROBAXIN) 500 MG tablet; Take 1 tablet (500 mg) by mouth 4 times daily as needed for muscle spasms  -     acetaminophen (TYLENOL) 500 MG tablet; Take 1-2 tablets (500-1,000 mg) by mouth every 8 hours as needed for pain  Right lumbar radiculitis  -     MR Lumbar Spine w/o Contrast; Future  -     methocarbamol (ROBAXIN) 500 MG tablet; Take 1 tablet (500 mg) by mouth 4 times daily as needed for muscle spasms  -     acetaminophen (TYLENOL) 500 MG tablet; Take 1-2 tablets (500-1,000 mg) by mouth every 8 hours as needed for pain  Spinal stenosis of lumbar region without neurogenic claudication  -     MR Lumbar Spine w/o Contrast; Future  Spondylolisthesis of lumbar region  -     MR Lumbar Spine w/o Contrast; Future  Lumbar facet arthropathy  -     MR Lumbar Spine w/o Contrast; Future  History of compression fracture of spine  -     MR Lumbar Spine w/o Contrast; Future     Chris Bell is a 86 year old y.o. female with past medical history significant for depression, hypertension, peptic ulcer disease, heart failure, anemia, osteoporosis, thyroid disease, T12 compression fracture with thoracic kyphosis, cancer of the soft palate who presents today for follow-up regarding:    -Chronic low back pain with acute right LBP with groin pain and anterior thigh pain consistent with radiculitis.     Plan:     A shared decision making plan was used. The patient's values and choices were respected. Prior medical records from 3/29/2021 to current were reviewed today. The following represents what was  discussed and decided upon by the provider and the patient.        -DIAGNOSTIC TESTS: Images were personally reviewed and interpreted.   --Ordered updated urgent MRI to further evaluate severe right lumbar radiculitis.  --Lumbar spine x-ray 1/27/2021 with mild convex curvature.  Chronic marked T12 compression fracture unchanged since 2018.  Possible subtle height loss at L3. Degenerative spondylolisthesis L5-S1 and 4 mm anterolisthesis L4-5 stable.  --Pelvic x-ray 1/27/2021 with no fracture identified, stable joint space.    --Lumbar spine MRI 2018 shows chronic anterolisthesis L4-5 with facet hypertrophy and ligamentum flavum thickening causing severe central canal stenosis with mild bilateral foraminal stenosis.  There is also unchanged compression deformity at T12 with retropulsion with kyphosis at this level.      -INTERVENTIONS: Consider right lumbar ZAHRAA pending imaging review which she is very interested in.    -MEDICATIONS: Prescribed Tylenol 500 mg 1 to 2 tablets 3 times daily as needed for pain.  -Also prescribe methocarbamol 500 mg 4 times daily as needed for myofascial pain.  Discussed side effects of medications and proper use. Patient verbalized understanding.    -PHYSICAL THERAPY: Patient is not interested in physical therapy options at this time.  Discussed the importance of core strengthening, ROM, stretching exercises with the patient and how each of these entities is important in decreasing pain.  Explained to the patient that the purpose of physical therapy is to teach the patient a home exercise program.  These exercises need to be performed every day in order to decrease pain and prevent future occurrences of pain.        -PATIENT EDUCATION:  Total time of 32 minutes spent with the patient, reviewing the chart, placing orders, and documenting.   -Today we also discussed the issues related to the current COVID-19 pandemic, the pros and cons of the current treatment plan, the CDC guidelines such  as social distancing, washing the hands, and covering the cough.    -FOLLOW UP: Can call patient if right ZAHRAA injection reasonable given MRI results.  Advised to contact clinic if symptoms worsen or change.    Subjective:     Chris Bell is a 86 year old female who presents today for follow-up regarding chronic bilateral low back pain with generalized lower extremity pain historically however pain is been more severe in the last couple of weeks on the right low back at the lumbosacral junction that radiates to the right lateral hip anterior groin and occasionally into the anterior thigh mostly that stops at the knee at this time.  She reports that her pain is constant and 8/10 currently up to a 10 at its worse with walking as well as prolonged sitting.  She does feel like her pain is severe at bedtime and hindering her sleep at this time as well.  Patient otherwise denies any recent trips or falls or balance changes, denies bowel or bladder loss control.     was present during entire visit today.   Patient's daughter-in-law who does speak fluent English was present today during entire visit and added to past medical/surgical/family/social history and history of presenting illness.      Treatment to Date: No prior spinal surgery.     Bilateral L4-5 facet joint steroid injection 6/29/2018  Bilateral L4-5 TF ZAHRAA 7/31/2018 with minimal to no lasting benefit.  Preprocedure pain scale 8/10, post 5/10.  Bilateral L5-S1 TF ZAHRAA 8/16/2018 with 50% relief ×3 days only.  Preprocedure pain scale 8/10, most 6/10.     Medications:  Flexeril 10 mg  Oxycodone 5 mg  Tizanidine 2 mg  Gabapentin 100 mg 1-1-1  Diclofenac gel  Methocarbamol    Patient Active Problem List   Diagnosis     Anemia due to GI blood loss     Hypokalemia     PUD (peptic ulcer disease)     T12 compression fracture (H)     Accelerated hypertension     Abdominal pain     Abnormal finding on imaging     Kyphosis of thoracic region     Electrolyte imbalance      Episode of shaking     Bilateral hip pain     Edema     Heart failure with preserved ejection fraction (H)     Cancer of soft palate (H)       Current Outpatient Medications   Medication     acetaminophen (TYLENOL) 500 MG tablet     acetaminophen (TYLENOL) 500 MG tablet     Ferrous Sulfate 324 (65 Fe) MG TBEC     levothyroxine (SYNTHROID/LEVOTHROID) 50 MCG tablet     LORazepam (ATIVAN) 0.5 MG tablet     magnesium oxide (MAG-OX) 400 MG tablet     methocarbamol (ROBAXIN) 500 MG tablet     metoprolol succinate ER (TOPROL-XL) 25 MG 24 hr tablet     pantoprazole (PROTONIX) 40 MG EC tablet     sodium chloride 1 GM tablet     vitamin B-12 (CYANOCOBALAMIN) 250 MCG tablet     Alcohol Swabs (B-D SINGLE USE SWABS REGULAR) PADS     alum & mag hydroxide-simethicone (ANTACID LIQUID) 200-200-20 MG/5ML SUSP suspension     benzocaine (ORAJEL/ANBESOL) 10 % gel     blood glucose monitoring (ONETOUCH VERIO) meter device kit     calcium carbonate (TUMS) 500 MG chewable tablet     calcium carbonate (TUMS) 500 MG chewable tablet     clobetasol (TEMOVATE) 0.05 % external ointment     diclofenac (VOLTAREN) 1 % topical gel     fentaNYL (DURAGESIC) 12 mcg/hr 72 hr patch     furosemide (LASIX) 20 MG tablet     gabapentin (NEURONTIN) 100 MG capsule     lidocaine (XYLOCAINE) 2 % solution     lisinopril (PRINIVIL/ZESTRIL) 5 MG tablet     magic mouthwash (ENTER INGREDIENTS IN COMMENTS) suspension     magnesium hydroxide (MILK OF MAGNESIA) 400 MG/5ML suspension     Magnesium Oxide -Mg Supplement 250 MG tablet     mirtazapine (REMERON) 15 MG tablet     Multiple Vitamin (DAILY-CHRIS) TABS     omeprazole (PRILOSEC) 20 MG DR capsule     ONETOUCH VERIO IQ test strip     oxyCODONE (ROXICODONE) 5 MG/5ML solution     potassium chloride ER (K-DUR/KLOR-CON M) 20 MEQ CR tablet     potassium chloride ER (K-TAB/KLOR-CON) 10 MEQ CR tablet     ranitidine (ZANTAC) 300 MG tablet     sucralfate (CARAFATE) 1 GM tablet     traZODone (DESYREL) 50 MG tablet      triamcinolone (KENALOG) 0.1 % paste     No current facility-administered medications for this visit.       Allergies   Allergen Reactions     Unknown [No Clinical Screening - See Comments]        Past Medical History:   Diagnosis Date     Anemia      Depression      Disease of thyroid gland      HTN (hypertension)      Osteoporosis      PUD (peptic ulcer disease)         Review of Systems  ROS:  Specifically negative for bowel/bladder dysfunction, balance changes, headache, dizziness, foot drop, fevers, chills, appetite changes, nausea/vomiting, unexplained weight loss. Otherwise 13 systems reviewed are negative. Please see the patient's intake questionnaire from today for details.    Reviewed Social, Family, Past Medical and Past Surgical history with patient, no significant changes noted since prior visit.     Objective:     BP (!) 170/81 (BP Location: Right arm, Patient Position: Sitting, Cuff Size: Adult Regular)   Pulse 70     PHYSICAL EXAMINATION:    --CONSTITUTIONAL: Well developed, well nourished, healthy appearing individual.  --PSYCHIATRIC: Appropriate mood and affect. No difficulty interacting due to temper, social withdrawal, or memory issues.  --SKIN: Lumbar region is dry and intact.   --RESPIRATORY: Normal rhythm and effort. No abnormal accessory muscle breathing patterns noted.   --MUSCULOSKELETAL:  Normal lumbar lordosis noted, no lateral shift.  --GROSS MOTOR: Easily arises from a seated position. Gait is non-antalgic  --LUMBAR SPINE:  Inspection reveals no evidence of deformity. No tenderness to palpation lumbar spine. Straight leg raising in the supine position is negative to radicular pain. Sciatic notch non-tender on the left, tender on the right.   --SACROILIAC JOINT: One Finger point test negative.  --LOWER EXTREMITY MOTOR TESTING:  Plantar flexion left 5/5, right 5/5   Dorsiflexion left 5/5, right 5/5   Great toe MTP extension left 5/5, right 5/5  Knee flexion left 5/5, right 5/5  Knee  extension left 5/5, right 5/5   Hip flexion left 5/5, right 5/5  Hip abduction left 5/5, right 5/5  Hip adduction left 5/5, right 5/5   --HIPS: Full range of motion bilaterally.   --NEUROLOGIC: Bilateral patellar and achilles reflexes are physiologic and symmetric. Sensation to light touch is intact in the bilateral L4, L5, and S1 dermatomes.    RESULTS:   Imaging: Lumbar spine imaging was reviewed today. The images were shown to the patient and the findings were explained using a spine model.      Mr Lumbar Spine Without Contrast  Result Date: 7/20/2018  INDICATION: Low back pain radiating into the bilateral hips and tingling in both legs. Status post L4-L5 facet joint injections bilaterally 6/29/2018. History of T12 compression fracture. TECHNIQUE: Without IV contrast. CONTRAST: None. COMPARISON: 4/20/2018 thoracic spine MRI. FINDINGS: Nomenclature is based on 5 lumbar-type vertebral bodies. Sagittal field-of-view extends from the level of T10 to the level of S3. No significant change in T12 vertebra plana with unchanged retropulsion and associated focal thoracolumbar kyphosis. No new fracture. Remaining vertebral bodies are normal in height. Unchanged grade 1 anterolisthesis at L4-L5. No pars defects. Except for the T12 vertebral body, expected marrow signal. The conus tip is identified at L2. Cauda equina is normal. Grossly normal paraspinal soft tissues. No abdominal aortic aneurysm. The visualized portions of the bony pelvis are normal for age. T11-T12: Unchanged posterior retropulsion of the superior T12 vertebral body, resulting in unchanged contouring of the spinal cord. No abnormal cord signal. No neural foraminal narrowing.   T12-L1: Normal disc height and signal. No herniation. No facet arthropathy. No spinal canal stenosis. No right neural foraminal stenosis. No left neural foraminal stenosis.   L1-L2: Normal disc height and signal. No herniation. No facet arthropathy. No spinal canal stenosis. No right  neural foraminal stenosis. No left neural foraminal stenosis.   L2-L3: Disc desiccation, height loss, and disc bulge. Dorsal epidural lipomatosis, ligamentum flavum thickening, and bilateral facet arthropathy. Moderate subarticular zone narrowing. Mild central spinal canal stenosis. No right neural foraminal stenosis. No left neural foraminal stenosis.   L3-L4: Disc desiccation, height loss, and disc bulge. Ligamentum flavum thickening. Bilateral facet arthropathy. Moderate subarticular zone narrowing and moderate central spinal canal stenosis. Mild right neural foraminal stenosis. Mild left neural foraminal stenosis.   L4-L5: Disc desiccation, height loss, and disc bulge without significant cephalad migration of the superiorly uncovered disc. Ligamentum flavum thickening. Bilateral facet arthropathy. Severe spinal canal stenosis. Mild right neural foraminal stenosis. Mild left neural foraminal stenosis.   L5-S1: Normal disc height and signal. No herniation. Bilateral facet arthropathy. No spinal canal stenosis. No right neural foraminal stenosis. No left neural foraminal stenosis.   CONCLUSION:   1.  Disc bulge, ligamentum flavum thickening, and facet arthropathy contribute to cause severe spinal canal narrowing at L4-L5. Unchanged grade 1 anterolisthesis at L4-L5.   2.  Unchanged compression deformity of the T12 vertebral body with retropulsed fracture fragment that contours the ventral spinal cord. No abnormal cord signal. Unchanged resultant focal thoracolumbar kyphosis.   3.  Otherwise, multilevel lumbar spondylosis. No additional level of severe spinal canal narrowing. No severe neural foraminal narrowing.                               Again, thank you for allowing me to participate in the care of your patient.        Sincerely,        Jennifer Knight, CNP

## 2021-07-14 NOTE — PATIENT INSTRUCTIONS
~Please call our [facility] Nurse Navigation line [phone] with any questions or concerns about your treatment plan, if symptoms worsen and you would like to be seen urgently, or if you have problems controlling bladder and bowel function.      A muscle relaxer methocarbamol was prescribed today to take as needed only for muscle tightness and pain.  Pleaseuse with caution with driving due to possible sedation.      Imaging has been ordered. Radiology will call you to schedule. Please call below if you do not hear from them in the next couple of days.     [facility] Radiology Scheduling  Please call [phone] to schedule your image(s) (select option #1). There are 2 different locations, see below.     [facility]  [address] [facility] [address]    [address] [address]

## 2021-07-22 ENCOUNTER — HOSPITAL ENCOUNTER (OUTPATIENT)
Dept: MRI IMAGING | Facility: HOSPITAL | Age: 86
Discharge: HOME | End: 2021-07-22
Attending: NURSE PRACTITIONER | Admitting: NURSE PRACTITIONER
Payer: COMMERCIAL

## 2021-07-22 PROCEDURE — 72148 MRI LUMBAR SPINE W/O DYE: CPT

## 2021-07-23 ENCOUNTER — TELEPHONE (OUTPATIENT)
Dept: PHYSICAL MEDICINE AND REHAB | Facility: CLINIC | Age: 86
End: 2021-07-23

## 2021-07-23 DIAGNOSIS — S32.040A CLOSED COMPRESSION FRACTURE OF L4 LUMBAR VERTEBRA, INITIAL ENCOUNTER (H): Primary | ICD-10-CM

## 2021-07-23 NOTE — TELEPHONE ENCOUNTER
Call placed to May, pt's daughter. Results and recommendations given. She reports the pt does already have a TLSO brace from a previous T12 fracture she had in 2018. She will have the pt start wearing this brace. If she is unable to find this, she will let us know so we can assist in getting her a new one.     Assisted her in scheduling appt with Jennifer next week as well.

## 2021-07-23 NOTE — TELEPHONE ENCOUNTER
----- Message from Haroldo Hurtado DO sent at 7/23/2021  9:47 AM CDT -----  MRI lumbar spine was reviewed in Jennifer's absence.  There is an acute/subacute L4 compression fracture 30 to 40% height loss.We will provide orthotics referral for lumbar brace/LSO and recommend follow-up with Jennifer Knight next week.

## 2021-07-23 NOTE — TELEPHONE ENCOUNTER
Call placed to patient with provider's results and recommendations.  Primary number on file is for daughter May who speaks fluent English. She was currently driving and asked to be called back in 15 minutes. Will call her back.

## 2021-07-26 NOTE — PROGRESS NOTES
Assessment:     Diagnoses and all orders for this visit:  Age-related osteoporosis with current pathological fracture, initial encounter  -     [facility]; Future  -     acetaminophen-codeine (TYLENOL #3) 300-30 MG tablet; Take 0.5-1 tablets by mouth every 8 hours as needed for severe pain  -     XR Lumbar Spine 2/3 Views; Future  -     DEX[facility] - HIM Scan  Closed compression fracture of L4 lumbar vertebra, initial encounter (H)  -     calcitonin, salmon, (MIACALCIN) 200 UNIT/ACT nasal spray; Spray 1 spray into one nostril alternating nostrils daily for 14 days Alternate nostril each day.  -     [facility]; Future  -     acetaminophen-codeine (TYLENOL #3) 300-30 MG tablet; Take 0.5-1 tablets by mouth every 8 hours as needed for severe pain  -     XR Lumbar Spine 2/3 Views; Future  -     DEXA - HIM Scan  Chronic right-sided low back pain with right-sided sciatica  -     calcitonin, salmon, (MIACALCIN) 200 UNIT/ACT nasal spray; Spray 1 spray into one nostril alternating nostrils daily for 14 days Alternate nostril each day.  -     XR Lumbar Spine 2/3 Views; Future  Right lumbar radiculitis  Spinal stenosis of lumbar region without neurogenic claudication  History of compression fracture of spine  -     [facility]; Future  -     XR Lumbar Spine 2/3 Views; Future  -     DEXA - HIM Scan     Chris Bell is a 86 year old y.o. female with past medical history significant for depression, hypertension, peptic ulcer disease, heart failure, anemia, osteoporosis, thyroid disease, T12 compression fracture with thoracic kyphosis, cancer of the soft palate who presents today for follow-up regarding:    -Acute on chronic right LBP with right lumbar radiculitis.   Updated lumbar spine MRI with acute/subacute pathologic L4 compression fracture.     Plan:     A shared decision making plan was used. The patient's values and choices were respected. Prior medical records were reviewed today. The  following represents what was discussed and decided upon by the provider and the patient.        -DIAGNOSTIC TESTS: Images were personally reviewed and interpreted.   --Recommend standing x-ray to evaluate L4 compression fracture stability.  --Ordered bone density scan.  --Lumbar spine MRI 7/14/2021 with acute/subacute L4 compression fracture 30 to 40% height loss with disc bulge and facet arthropathy with moderate to severe spinal stenosis, moderate right and mild left foraminal stenosis.  L4-5 severe spinal stenosis with severe right foraminal stenosis.  Chronic T12 compression fracture with accompanied angular kyphosis.  --Lumbar spine x-ray 1/27/2021 with mild convex curvature.  Chronic marked T12 compression fracture unchanged since 2018.  Possible subtle height loss at L3. Degenerative spondylolisthesis L5-S1 and 4 mm anterolisthesis L4-5 stable.  --Pelvic x-ray 1/27/2021 with no fracture identified, stable joint space.    --Lumbar spine MRI 2018 shows chronic anterolisthesis L4-5 with facet hypertrophy and ligamentum flavum thickening causing severe central canal stenosis with mild bilateral foraminal stenosis.  There is also unchanged compression deformity at T12 with retropulsion with kyphosis at this level.      -Osteoporosis clinic evaluation placed due to osteoporosis with pathologic compression fracture as well as history of prior pathologic compression fracture.    -INTERVENTIONS: No injection recommendations at this time.    -TLSO back brace ordered by Dr. Hurtado previously 7/22/2021.  She does also report that she has a brace at home and will try this on to see if it still fits her.    -MEDICATIONS: Prescribed calcitonin for bone pain and healing.  Prescribed Tylenol No. 3, 1/2 to 1 tablet 3 times daily as needed for severe breakthrough pain, number 21 tablets given for 7 days worth.  Patient is aware that she should take this only as needed for severe breakthrough pain and should take it 4 hours  away from her lorazepam/Ativan medication.  Recommend trialing 1/2 tablet first to see how she does with the lower dose.  Discussed side effects of medications and proper use. Patient verbalized understanding.    -PHYSICAL THERAPY: Recommend holding on physical therapy, can discuss physical therapy options in the next month if symptoms are improving.  Discussed the importance of core strengthening, ROM, stretching exercises with the patient and how each of these entities is important in decreasing pain.  Explained to the patient that the purpose of physical therapy is to teach the patient a home exercise program.  These exercises need to be performed every day in order to decrease pain and prevent future occurrences of pain.        -PATIENT EDUCATION:  Total time of 42 minutes spent with the patient, reviewing the chart, placing orders, and documenting.   -Today we also discussed the issues related to the current COVID-19 pandemic, the pros and cons of the current treatment plan, the CDC guidelines such as social distancing, washing the hands, and covering the cough.    -FOLLOW UP: Follow-up in 3 weeks, sooner if needed for medication refill.  Advised to contact clinic if symptoms worsen or change.    Subjective:     Chris Bell is a 86 year old female who presents today for follow-up regarding chronic bilateral low back pain with acute more severe right low back pain radiating to the lumbosacral junction, sacral area as well as lateral hip and anterior groin with some pain into the anterior thigh over the last 3-week timeframe, no known trauma.  Currently her pain is a constant 9/10 worse with standing and walking, does improve slightly with sitting.    Patient's family was present today during entire visit and added to past medical/surgical/family/social history and history of presenting illness.    was present during entire visit today.     Treatment to Date: No prior spinal surgery.     Bilateral L4-5  facet joint steroid injection 6/29/2018  Bilateral L4-5 TF ZAHRAA 7/31/2018 with minimal to no lasting benefit.  Preprocedure pain scale 8/10, post 5/10.  Bilateral L5-S1 TF ZAHRAA 8/16/2018 with 50% relief ×3 days only.  Preprocedure pain scale 8/10, most 6/10.     Medications:  Flexeril 10 mg  Oxycodone 5 mg  Tizanidine 2 mg  Gabapentin 100 mg 1-1-1  Diclofenac gel  Methocarbamol    Patient Active Problem List   Diagnosis     Anemia due to GI blood loss     Hypokalemia     PUD (peptic ulcer disease)     T12 compression fracture (H)     Accelerated hypertension     Abdominal pain     Abnormal finding on imaging     Kyphosis of thoracic region     Electrolyte imbalance     Episode of shaking     Bilateral hip pain     Edema     Heart failure with preserved ejection fraction (H)     Cancer of soft palate (H)       Current Outpatient Medications   Medication     acetaminophen-codeine (TYLENOL #3) 300-30 MG tablet     calcitonin, salmon, (MIACALCIN) 200 UNIT/ACT nasal spray     acetaminophen (TYLENOL) 500 MG tablet     acetaminophen (TYLENOL) 500 MG tablet     Alcohol Swabs (B-D SINGLE USE SWABS REGULAR) PADS     alum & mag hydroxide-simethicone (ANTACID LIQUID) 200-200-20 MG/5ML SUSP suspension     benzocaine (ORAJEL/ANBESOL) 10 % gel     blood glucose monitoring (ONETOUCH VERIO) meter device kit     calcium carbonate (TUMS) 500 MG chewable tablet     calcium carbonate (TUMS) 500 MG chewable tablet     clobetasol (TEMOVATE) 0.05 % external ointment     diclofenac (VOLTAREN) 1 % topical gel     fentaNYL (DURAGESIC) 12 mcg/hr 72 hr patch     Ferrous Sulfate 324 (65 Fe) MG TBEC     furosemide (LASIX) 20 MG tablet     gabapentin (NEURONTIN) 100 MG capsule     levothyroxine (SYNTHROID/LEVOTHROID) 50 MCG tablet     lidocaine (XYLOCAINE) 2 % solution     lisinopril (PRINIVIL/ZESTRIL) 5 MG tablet     LORazepam (ATIVAN) 0.5 MG tablet     magic mouthwash (ENTER INGREDIENTS IN COMMENTS) suspension     magnesium hydroxide (MILK OF  MAGNESIA) 400 MG/5ML suspension     magnesium oxide (MAG-OX) 400 MG tablet     Magnesium Oxide -Mg Supplement 250 MG tablet     methocarbamol (ROBAXIN) 500 MG tablet     metoprolol succinate ER (TOPROL-XL) 25 MG 24 hr tablet     mirtazapine (REMERON) 15 MG tablet     Multiple Vitamin (DAILY-CHRIS) TABS     omeprazole (PRILOSEC) 20 MG DR capsule     ONETOUCH VERIO IQ test strip     oxyCODONE (ROXICODONE) 5 MG/5ML solution     pantoprazole (PROTONIX) 40 MG EC tablet     potassium chloride ER (K-DUR/KLOR-CON M) 20 MEQ CR tablet     potassium chloride ER (K-TAB/KLOR-CON) 10 MEQ CR tablet     ranitidine (ZANTAC) 300 MG tablet     sodium chloride 1 GM tablet     sucralfate (CARAFATE) 1 GM tablet     traZODone (DESYREL) 50 MG tablet     triamcinolone (KENALOG) 0.1 % paste     vitamin B-12 (CYANOCOBALAMIN) 250 MCG tablet     No current facility-administered medications for this visit.       Allergies   Allergen Reactions     Unknown [No Clinical Screening - See Comments]        Past Medical History:   Diagnosis Date     Anemia      Depression      Disease of thyroid gland      HTN (hypertension)      Osteoporosis      PUD (peptic ulcer disease)         Review of Systems  ROS:  Specifically negative for bowel/bladder dysfunction, balance changes, headache, dizziness, foot drop, fevers, chills, appetite changes, nausea/vomiting, unexplained weight loss. Otherwise 13 systems reviewed are negative. Please see the patient's intake questionnaire from today for details.    Reviewed Social, Family, Past Medical and Past Surgical history with patient, no significant changes noted since prior visit.     Objective:     /88 (BP Location: Left arm, Patient Position: Sitting, Cuff Size: Adult Regular)   Pulse 88     PHYSICAL EXAMINATION:    --CONSTITUTIONAL: Well developed, well nourished, healthy appearing individual.  --PSYCHIATRIC: Appropriate mood and affect. No difficulty interacting due to temper, social withdrawal, or memory  issues.  --SKIN: Lumbar region is dry and intact.   --RESPIRATORY: Normal rhythm and effort. No abnormal accessory muscle breathing patterns noted.   --MUSCULOSKELETAL:  Normal lumbar lordosis noted, no lateral shift.  --GROSS MOTOR: Easily arises from a seated position. Gait is non-antalgic  --LUMBAR SPINE:  Inspection reveals no evidence of deformity. Range of motion is not limited in lumbar flexion, extension, lateral rotation.  Tenderness to palpation midline lumbar spine.  --LOWER EXTREMITY MOTOR TESTING:  Plantar flexion left 5/5, right 5/5   Dorsiflexion left 5/5, right 5/5   Great toe MTP extension left 5/5, right 5/5  Knee flexion left 5/5, right 5/5  Knee extension left 5/5, right 5/5   Hip flexion left 5/5, right 5/5  --NEUROLOGIC: Bilateral patellar and achilles reflexes are physiologic and symmetric. Sensation to light touch is intact in the bilateral L4, L5, and S1 dermatomes.    RESULTS:   Imaging: Lumbar spine imaging was reviewed today. The images were shown to the patient and the findings were explained using a spine model.      MR Lumbar Spine w/o Contrast  Result Date: 7/23/2021  EXAM: MR LUMBAR SPINE W/O CONTRAST LOCATION: [facility] DATE/TIME: 7/22/2021 7:20 PM INDICATION: Low back pain, > 6 wks. COMPARISON: Radiograph 01/27/2021. MRI 07/20/2018. TECHNIQUE: Routine Lumbar Spine MRI without IV contrast. FINDINGS: Nomenclature is based on 5 lumbar-type vertebral bodies. Severe chronic T12 vertebral body height loss with retropulsion is unchanged. There is 30-40% height loss of the L4 vertebral body with diffuse accompanying edema extending into the pedicles. This is compatible with an acute-to-subacute fracture. There is mild edema along the L5 superior endplate, eccentric to the right, without significant height loss. The imaged portions of the proximal femora, sacrum and rm appear intact. There is angular kyphosis at the T11-T12 level, due to T12 vertebral body height  loss described above. Mild L4-L5 anterolisthesis and minimal L2-L3 retrolisthesis are noted. There is mild dextroconvex curvature. The conus terminates at L2. No signal abnormalities of the imaged cord or cauda equina nerve roots are demonstrated. Paraspinal soft tissues are unremarkable. Limited images of the abdominal cavity demonstrate no acute abnormality. T12-L1: Preserved intervertebral disc height. No significant posterior disc abnormality. No significant stenosis of the spinal canal or neural foramina. L1-L2: Normal disc height and signal. No herniation. Normal facets. No spinal canal or neural foraminal stenosis. L2-L3: Mild intervertebral disc height loss with disc desiccation. Circumferential disc bulging. No significant facet arthropathy. Mild spinal canal stenosis. No right neural foraminal stenosis. Mild left neural foraminal stenosis. L3-L4: Preserved intervertebral disc height. Mildly elevated disc signal likely represents disc injury related to fracture described above. Shallow broad-based bulge. Mild to moderate bilateral facet arthropathy. Moderate to severe spinal canal stenosis.  Moderate right and mild left neural foraminal stenosis. L4-L5: Anterolisthesis as above with unroofing of the dorsal disc margin. L4 fracture as above. Circumferential disc bulging. Moderate facet arthropathy. Severe spinal canal stenosis. Severe right and mild left neural foraminal stenosis. L5-S1: Unremarkable disc height and signal. No herniation. Mild facet arthropathy. No spinal canal stenosis. No neural foraminal stenosis.   IMPRESSION: 1.  Acute to subacute L4 compression fracture with 30-40% height loss. 2.  Mild edema along the left L5 superior endplate without significant height loss. This may represent subtle superior endplate fracture or reactive change. 3.  Stable chronic severe T12 compression fracture. 4.  At L3-L4, there is moderate-to-severe spinal canal stenosis and moderate right neural foraminal  stenosis. 5.  At L4-L5, there is severe spinal canal stenosis and severe right neural foraminal stenosis. Additional findings as above.      Mr Lumbar Spine Without Contrast  Result Date: 7/20/2018  INDICATION: Low back pain radiating into the bilateral hips and tingling in both legs. Status post L4-L5 facet joint injections bilaterally 6/29/2018. History of T12 compression fracture. TECHNIQUE: Without IV contrast. CONTRAST: None. COMPARISON: 4/20/2018 thoracic spine MRI. FINDINGS: Nomenclature is based on 5 lumbar-type vertebral bodies. Sagittal field-of-view extends from the level of T10 to the level of S3. No significant change in T12 vertebra plana with unchanged retropulsion and associated focal thoracolumbar kyphosis. No new fracture. Remaining vertebral bodies are normal in height. Unchanged grade 1 anterolisthesis at L4-L5. No pars defects. Except for the T12 vertebral body, expected marrow signal. The conus tip is identified at L2. Cauda equina is normal. Grossly normal paraspinal soft tissues. No abdominal aortic aneurysm. The visualized portions of the bony pelvis are normal for age. T11-T12: Unchanged posterior retropulsion of the superior T12 vertebral body, resulting in unchanged contouring of the spinal cord. No abnormal cord signal. No neural foraminal narrowing.   T12-L1: Normal disc height and signal. No herniation. No facet arthropathy. No spinal canal stenosis. No right neural foraminal stenosis. No left neural foraminal stenosis.   L1-L2: Normal disc height and signal. No herniation. No facet arthropathy. No spinal canal stenosis. No right neural foraminal stenosis. No left neural foraminal stenosis.   L2-L3: Disc desiccation, height loss, and disc bulge. Dorsal epidural lipomatosis, ligamentum flavum thickening, and bilateral facet arthropathy. Moderate subarticular zone narrowing. Mild central spinal canal stenosis. No right neural foraminal stenosis. No left neural foraminal stenosis.    L3-L4: Disc desiccation, height loss, and disc bulge. Ligamentum flavum thickening. Bilateral facet arthropathy. Moderate subarticular zone narrowing and moderate central spinal canal stenosis. Mild right neural foraminal stenosis. Mild left neural foraminal stenosis.   L4-L5: Disc desiccation, height loss, and disc bulge without significant cephalad migration of the superiorly uncovered disc. Ligamentum flavum thickening. Bilateral facet arthropathy. Severe spinal canal stenosis. Mild right neural foraminal stenosis. Mild left neural foraminal stenosis.   L5-S1: Normal disc height and signal. No herniation. Bilateral facet arthropathy. No spinal canal stenosis. No right neural foraminal stenosis. No left neural foraminal stenosis.   CONCLUSION:   1.  Disc bulge, ligamentum flavum thickening, and facet arthropathy contribute to cause severe spinal canal narrowing at L4-L5. Unchanged grade 1 anterolisthesis at L4-L5.   2.  Unchanged compression deformity of the T12 vertebral body with retropulsed fracture fragment that contours the ventral spinal cord. No abnormal cord signal. Unchanged resultant focal thoracolumbar kyphosis.   3.  Otherwise, multilevel lumbar spondylosis. No additional level of severe spinal canal narrowing. No severe neural foraminal narrowing.

## 2021-07-27 ENCOUNTER — OFFICE VISIT (OUTPATIENT)
Dept: PHYSICAL MEDICINE AND REHAB | Facility: CLINIC | Age: 86
End: 2021-07-27
Payer: COMMERCIAL

## 2021-07-27 VITALS — DIASTOLIC BLOOD PRESSURE: 88 MMHG | SYSTOLIC BLOOD PRESSURE: 137 MMHG | HEART RATE: 88 BPM

## 2021-07-27 DIAGNOSIS — M54.41 CHRONIC RIGHT-SIDED LOW BACK PAIN WITH RIGHT-SIDED SCIATICA: ICD-10-CM

## 2021-07-27 DIAGNOSIS — S32.040A CLOSED COMPRESSION FRACTURE OF L4 LUMBAR VERTEBRA, INITIAL ENCOUNTER (H): ICD-10-CM

## 2021-07-27 DIAGNOSIS — M80.00XA AGE-RELATED OSTEOPOROSIS WITH CURRENT PATHOLOGICAL FRACTURE, INITIAL ENCOUNTER: Primary | ICD-10-CM

## 2021-07-27 DIAGNOSIS — Z87.81 HISTORY OF COMPRESSION FRACTURE OF SPINE: ICD-10-CM

## 2021-07-27 DIAGNOSIS — G89.29 CHRONIC RIGHT-SIDED LOW BACK PAIN WITH RIGHT-SIDED SCIATICA: ICD-10-CM

## 2021-07-27 DIAGNOSIS — M48.061 SPINAL STENOSIS OF LUMBAR REGION WITHOUT NEUROGENIC CLAUDICATION: ICD-10-CM

## 2021-07-27 DIAGNOSIS — M54.16 RIGHT LUMBAR RADICULITIS: ICD-10-CM

## 2021-07-27 PROCEDURE — 99215 OFFICE O/P EST HI 40 MIN: CPT | Performed by: NURSE PRACTITIONER

## 2021-07-27 RX ORDER — CALCITONIN SALMON 200 [IU]/.09ML
1 SPRAY, METERED NASAL DAILY
Qty: 3.7 ML | Refills: 0 | Status: SHIPPED | OUTPATIENT
Start: 2021-07-27 | End: 2021-08-18

## 2021-07-27 ASSESSMENT — PAIN SCALES - GENERAL: PAINLEVEL: WORST PAIN (10)

## 2021-07-27 NOTE — LETTER
7/27/2021         RE: Chris Bell  [address]  [address]  [facility] [address]        Dear Colleague,    Thank you for referring your patient, Chris Bell, to the [facility] [place]. Please see a copy of my visit note below.      Assessment:     Diagnoses and all orders for this visit:  Age-related osteoporosis with current pathological fracture, initial encounter  -     [facility] Referral; Future  -     acetaminophen-codeine (TYLENOL #3) 300-30 MG tablet; Take 0.5-1 tablets by mouth every 8 hours as needed for severe pain  -     XR Lumbar Spine 2/3 Views; Future  -     DEXA - HIM Scan  Closed compression fracture of L4 lumbar vertebra, initial encounter (H)  -     calcitonin, salmon, (MIACALCIN) 200 UNIT/ACT nasal spray; Spray 1 spray into one nostril alternating nostrils daily for 14 days Alternate nostril each day.  -     [facility] Referral; Future  -     acetaminophen-codeine (TYLENOL #3) 300-30 MG tablet; Take 0.5-1 tablets by mouth every 8 hours as needed for severe pain  -     XR Lumbar Spine 2/3 Views; Future  -     DEXA - HIM Scan  Chronic right-sided low back pain with right-sided sciatica  -     calcitonin, salmon, (MIACALCIN) 200 UNIT/ACT nasal spray; Spray 1 spray into one nostril alternating nostrils daily for 14 days Alternate nostril each day.  -     XR Lumbar Spine 2/3 Views; Future  Right lumbar radiculitis  Spinal stenosis of lumbar region without neurogenic claudication  History of compression fracture of spine  -     [facility] Referral; Future  -     XR Lumbar Spine 2/3 Views; Future  -     DEXA - HIM Scan     Chris Bell is a 86 year old y.o. female with past medical history significant for depression, hypertension, peptic ulcer disease, heart failure, anemia, osteoporosis, thyroid disease, T12 compression fracture with thoracic kyphosis, cancer of the soft palate who presents today for follow-up regarding:    -Acute on chronic right LBP with right  lumbar radiculitis.   Updated lumbar spine MRI with acute/subacute pathologic L4 compression fracture.     Plan:     A shared decision making plan was used. The patient's values and choices were respected. Prior medical records were reviewed today. The following represents what was discussed and decided upon by the provider and the patient.        -DIAGNOSTIC TESTS: Images were personally reviewed and interpreted.   --Recommend standing x-ray to evaluate L4 compression fracture stability.  --Ordered bone density scan.  --Lumbar spine MRI 7/14/2021 with acute/subacute L4 compression fracture 30 to 40% height loss with disc bulge and facet arthropathy with moderate to severe spinal stenosis, moderate right and mild left foraminal stenosis.  L4-5 severe spinal stenosis with severe right foraminal stenosis.  Chronic T12 compression fracture with accompanied angular kyphosis.  --Lumbar spine x-ray 1/27/2021 with mild convex curvature.  Chronic marked T12 compression fracture unchanged since 2018.  Possible subtle height loss at L3. Degenerative spondylolisthesis L5-S1 and 4 mm anterolisthesis L4-5 stable.  --Pelvic x-ray 1/27/2021 with no fracture identified, stable joint space.    --Lumbar spine MRI 2018 shows chronic anterolisthesis L4-5 with facet hypertrophy and ligamentum flavum thickening causing severe central canal stenosis with mild bilateral foraminal stenosis.  There is also unchanged compression deformity at T12 with retropulsion with kyphosis at this level.      -Osteoporosis clinic evaluation placed due to osteoporosis with pathologic compression fracture as well as history of prior pathologic compression fracture.    -INTERVENTIONS: No injection recommendations at this time.    -TLSO back brace ordered by Dr. Hurtado previously 7/22/2021.  She does also report that she has a brace at home and will try this on to see if it still fits her.    -MEDICATIONS: Prescribed calcitonin for bone pain and  healing.  Prescribed Tylenol No. 3, 1/2 to 1 tablet 3 times daily as needed for severe breakthrough pain, number 21 tablets given for 7 days worth.  Patient is aware that she should take this only as needed for severe breakthrough pain and should take it 4 hours away from her lorazepam/Ativan medication.  Recommend trialing 1/2 tablet first to see how she does with the lower dose.  Discussed side effects of medications and proper use. Patient verbalized understanding.    -PHYSICAL THERAPY: Recommend holding on physical therapy, can discuss physical therapy options in the next month if symptoms are improving.  Discussed the importance of core strengthening, ROM, stretching exercises with the patient and how each of these entities is important in decreasing pain.  Explained to the patient that the purpose of physical therapy is to teach the patient a home exercise program.  These exercises need to be performed every day in order to decrease pain and prevent future occurrences of pain.        -PATIENT EDUCATION:  Total time of 42 minutes spent with the patient, reviewing the chart, placing orders, and documenting.   -Today we also discussed the issues related to the current COVID-19 pandemic, the pros and cons of the current treatment plan, the CDC guidelines such as social distancing, washing the hands, and covering the cough.    -FOLLOW UP: Follow-up in 3 weeks, sooner if needed for medication refill.  Advised to contact clinic if symptoms worsen or change.    Subjective:     Chris Bell is a 86 year old female who presents today for follow-up regarding chronic bilateral low back pain with acute more severe right low back pain radiating to the lumbosacral junction, sacral area as well as lateral hip and anterior groin with some pain into the anterior thigh over the last 3-week timeframe, no known trauma.  Currently her pain is a constant 9/10 worse with standing and walking, does improve slightly with  sitting.    Patient's family was present today during entire visit and added to past medical/surgical/family/social history and history of presenting illness.    was present during entire visit today.     Treatment to Date: No prior spinal surgery.     Bilateral L4-5 facet joint steroid injection 6/29/2018  Bilateral L4-5 TF ZAHRAA 7/31/2018 with minimal to no lasting benefit.  Preprocedure pain scale 8/10, post 5/10.  Bilateral L5-S1 TF ZAHRAA 8/16/2018 with 50% relief ×3 days only.  Preprocedure pain scale 8/10, most 6/10.     Medications:  Flexeril 10 mg  Oxycodone 5 mg  Tizanidine 2 mg  Gabapentin 100 mg 1-1-1  Diclofenac gel  Methocarbamol    Patient Active Problem List   Diagnosis     Anemia due to GI blood loss     Hypokalemia     PUD (peptic ulcer disease)     T12 compression fracture (H)     Accelerated hypertension     Abdominal pain     Abnormal finding on imaging     Kyphosis of thoracic region     Electrolyte imbalance     Episode of shaking     Bilateral hip pain     Edema     Heart failure with preserved ejection fraction (H)     Cancer of soft palate (H)       Current Outpatient Medications   Medication     acetaminophen-codeine (TYLENOL #3) 300-30 MG tablet     calcitonin, salmon, (MIACALCIN) 200 UNIT/ACT nasal spray     acetaminophen (TYLENOL) 500 MG tablet     acetaminophen (TYLENOL) 500 MG tablet     Alcohol Swabs (B-D SINGLE USE SWABS REGULAR) PADS     alum & mag hydroxide-simethicone (ANTACID LIQUID) 200-200-20 MG/5ML SUSP suspension     benzocaine (ORAJEL/ANBESOL) 10 % gel     blood glucose monitoring (ONETOUCH VERIO) meter device kit     calcium carbonate (TUMS) 500 MG chewable tablet     calcium carbonate (TUMS) 500 MG chewable tablet     clobetasol (TEMOVATE) 0.05 % external ointment     diclofenac (VOLTAREN) 1 % topical gel     fentaNYL (DURAGESIC) 12 mcg/hr 72 hr patch     Ferrous Sulfate 324 (65 Fe) MG TBEC     furosemide (LASIX) 20 MG tablet     gabapentin (NEURONTIN) 100 MG  capsule     levothyroxine (SYNTHROID/LEVOTHROID) 50 MCG tablet     lidocaine (XYLOCAINE) 2 % solution     lisinopril (PRINIVIL/ZESTRIL) 5 MG tablet     LORazepam (ATIVAN) 0.5 MG tablet     magic mouthwash (ENTER INGREDIENTS IN COMMENTS) suspension     magnesium hydroxide (MILK OF MAGNESIA) 400 MG/5ML suspension     magnesium oxide (MAG-OX) 400 MG tablet     Magnesium Oxide -Mg Supplement 250 MG tablet     methocarbamol (ROBAXIN) 500 MG tablet     metoprolol succinate ER (TOPROL-XL) 25 MG 24 hr tablet     mirtazapine (REMERON) 15 MG tablet     Multiple Vitamin (DAILY-CHRIS) TABS     omeprazole (PRILOSEC) 20 MG DR capsule     ONETOUCH VERIO IQ test strip     oxyCODONE (ROXICODONE) 5 MG/5ML solution     pantoprazole (PROTONIX) 40 MG EC tablet     potassium chloride ER (K-DUR/KLOR-CON M) 20 MEQ CR tablet     potassium chloride ER (K-TAB/KLOR-CON) 10 MEQ CR tablet     ranitidine (ZANTAC) 300 MG tablet     sodium chloride 1 GM tablet     sucralfate (CARAFATE) 1 GM tablet     traZODone (DESYREL) 50 MG tablet     triamcinolone (KENALOG) 0.1 % paste     vitamin B-12 (CYANOCOBALAMIN) 250 MCG tablet     No current facility-administered medications for this visit.       Allergies   Allergen Reactions     Unknown [No Clinical Screening - See Comments]        Past Medical History:   Diagnosis Date     Anemia      Depression      Disease of thyroid gland      HTN (hypertension)      Osteoporosis      PUD (peptic ulcer disease)         Review of Systems  ROS:  Specifically negative for bowel/bladder dysfunction, balance changes, headache, dizziness, foot drop, fevers, chills, appetite changes, nausea/vomiting, unexplained weight loss. Otherwise 13 systems reviewed are negative. Please see the patient's intake questionnaire from today for details.    Reviewed Social, Family, Past Medical and Past Surgical history with patient, no significant changes noted since prior visit.     Objective:     /88 (BP Location: Left arm, Patient  Position: Sitting, Cuff Size: Adult Regular)   Pulse 88     PHYSICAL EXAMINATION:    --CONSTITUTIONAL: Well developed, well nourished, healthy appearing individual.  --PSYCHIATRIC: Appropriate mood and affect. No difficulty interacting due to temper, social withdrawal, or memory issues.  --SKIN: Lumbar region is dry and intact.   --RESPIRATORY: Normal rhythm and effort. No abnormal accessory muscle breathing patterns noted.   --MUSCULOSKELETAL:  Normal lumbar lordosis noted, no lateral shift.  --GROSS MOTOR: Easily arises from a seated position. Gait is non-antalgic  --LUMBAR SPINE:  Inspection reveals no evidence of deformity. Range of motion is not limited in lumbar flexion, extension, lateral rotation.  Tenderness to palpation midline lumbar spine.  --LOWER EXTREMITY MOTOR TESTING:  Plantar flexion left 5/5, right 5/5   Dorsiflexion left 5/5, right 5/5   Great toe MTP extension left 5/5, right 5/5  Knee flexion left 5/5, right 5/5  Knee extension left 5/5, right 5/5   Hip flexion left 5/5, right 5/5  --NEUROLOGIC: Bilateral patellar and achilles reflexes are physiologic and symmetric. Sensation to light touch is intact in the bilateral L4, L5, and S1 dermatomes.    RESULTS:   Imaging: Lumbar spine imaging was reviewed today. The images were shown to the patient and the findings were explained using a spine model.      MR Lumbar Spine w/o Contrast  Result Date: 7/23/2021  EXAM: MR LUMBAR SPINE W/O CONTRAST LOCATION: [facility] DATE/TIME: 7/22/2021 7:20 PM INDICATION: Low back pain, > 6 wks. COMPARISON: Radiograph 01/27/2021. MRI 07/20/2018. TECHNIQUE: Routine Lumbar Spine MRI without IV contrast. FINDINGS: Nomenclature is based on 5 lumbar-type vertebral bodies. Severe chronic T12 vertebral body height loss with retropulsion is unchanged. There is 30-40% height loss of the L4 vertebral body with diffuse accompanying edema extending into the pedicles. This is compatible with an  acute-to-subacute fracture. There is mild edema along the L5 superior endplate, eccentric to the right, without significant height loss. The imaged portions of the proximal femora, sacrum and rm appear intact. There is angular kyphosis at the T11-T12 level, due to T12 vertebral body height loss described above. Mild L4-L5 anterolisthesis and minimal L2-L3 retrolisthesis are noted. There is mild dextroconvex curvature. The conus terminates at L2. No signal abnormalities of the imaged cord or cauda equina nerve roots are demonstrated. Paraspinal soft tissues are unremarkable. Limited images of the abdominal cavity demonstrate no acute abnormality. T12-L1: Preserved intervertebral disc height. No significant posterior disc abnormality. No significant stenosis of the spinal canal or neural foramina. L1-L2: Normal disc height and signal. No herniation. Normal facets. No spinal canal or neural foraminal stenosis. L2-L3: Mild intervertebral disc height loss with disc desiccation. Circumferential disc bulging. No significant facet arthropathy. Mild spinal canal stenosis. No right neural foraminal stenosis. Mild left neural foraminal stenosis. L3-L4: Preserved intervertebral disc height. Mildly elevated disc signal likely represents disc injury related to fracture described above. Shallow broad-based bulge. Mild to moderate bilateral facet arthropathy. Moderate to severe spinal canal stenosis.  Moderate right and mild left neural foraminal stenosis. L4-L5: Anterolisthesis as above with unroofing of the dorsal disc margin. L4 fracture as above. Circumferential disc bulging. Moderate facet arthropathy. Severe spinal canal stenosis. Severe right and mild left neural foraminal stenosis. L5-S1: Unremarkable disc height and signal. No herniation. Mild facet arthropathy. No spinal canal stenosis. No neural foraminal stenosis.   IMPRESSION: 1.  Acute to subacute L4 compression fracture with 30-40% height loss. 2.  Mild edema along  the left L5 superior endplate without significant height loss. This may represent subtle superior endplate fracture or reactive change. 3.  Stable chronic severe T12 compression fracture. 4.  At L3-L4, there is moderate-to-severe spinal canal stenosis and moderate right neural foraminal stenosis. 5.  At L4-L5, there is severe spinal canal stenosis and severe right neural foraminal stenosis. Additional findings as above.      Mr Lumbar Spine Without Contrast  Result Date: 7/20/2018  INDICATION: Low back pain radiating into the bilateral hips and tingling in both legs. Status post L4-L5 facet joint injections bilaterally 6/29/2018. History of T12 compression fracture. TECHNIQUE: Without IV contrast. CONTRAST: None. COMPARISON: 4/20/2018 thoracic spine MRI. FINDINGS: Nomenclature is based on 5 lumbar-type vertebral bodies. Sagittal field-of-view extends from the level of T10 to the level of S3. No significant change in T12 vertebra plana with unchanged retropulsion and associated focal thoracolumbar kyphosis. No new fracture. Remaining vertebral bodies are normal in height. Unchanged grade 1 anterolisthesis at L4-L5. No pars defects. Except for the T12 vertebral body, expected marrow signal. The conus tip is identified at L2. Cauda equina is normal. Grossly normal paraspinal soft tissues. No abdominal aortic aneurysm. The visualized portions of the bony pelvis are normal for age. T11-T12: Unchanged posterior retropulsion of the superior T12 vertebral body, resulting in unchanged contouring of the spinal cord. No abnormal cord signal. No neural foraminal narrowing.   T12-L1: Normal disc height and signal. No herniation. No facet arthropathy. No spinal canal stenosis. No right neural foraminal stenosis. No left neural foraminal stenosis.   L1-L2: Normal disc height and signal. No herniation. No facet arthropathy. No spinal canal stenosis. No right neural foraminal stenosis. No left neural foraminal stenosis.   L2-L3:  Disc desiccation, height loss, and disc bulge. Dorsal epidural lipomatosis, ligamentum flavum thickening, and bilateral facet arthropathy. Moderate subarticular zone narrowing. Mild central spinal canal stenosis. No right neural foraminal stenosis. No left neural foraminal stenosis.   L3-L4: Disc desiccation, height loss, and disc bulge. Ligamentum flavum thickening. Bilateral facet arthropathy. Moderate subarticular zone narrowing and moderate central spinal canal stenosis. Mild right neural foraminal stenosis. Mild left neural foraminal stenosis.   L4-L5: Disc desiccation, height loss, and disc bulge without significant cephalad migration of the superiorly uncovered disc. Ligamentum flavum thickening. Bilateral facet arthropathy. Severe spinal canal stenosis. Mild right neural foraminal stenosis. Mild left neural foraminal stenosis.   L5-S1: Normal disc height and signal. No herniation. Bilateral facet arthropathy. No spinal canal stenosis. No right neural foraminal stenosis. No left neural foraminal stenosis.   CONCLUSION:   1.  Disc bulge, ligamentum flavum thickening, and facet arthropathy contribute to cause severe spinal canal narrowing at L4-L5. Unchanged grade 1 anterolisthesis at L4-L5.   2.  Unchanged compression deformity of the T12 vertebral body with retropulsed fracture fragment that contours the ventral spinal cord. No abnormal cord signal. Unchanged resultant focal thoracolumbar kyphosis.   3.  Otherwise, multilevel lumbar spondylosis. No additional level of severe spinal canal narrowing. No severe neural foraminal narrowing.                              Again, thank you for allowing me to participate in the care of your patient.        Sincerely,        Jennifer Knight, CNP

## 2021-07-27 NOTE — PATIENT INSTRUCTIONS
~Please call our [facility] Nurse Navigation line [phone] with any questions or concerns about your treatment plan, if symptoms worsen and you would like to be seen urgently, or if you have problems controlling bladder and bowel function.      Imaging has been ordered. Radiology will call you to schedule. Please call below if you do not hear from them in the next couple of days.   [facility] Radiology Scheduling  Please call [phone] to schedule your image(s) (select option #1). There are 2 different locations, see below.     [facility]  [address] [address]    [place] [address] [facility] [address]      *You have a spine fracture and a Spine Back Brace has been ordered to help control your pain by taking pressure off the fracture, and to limit spine mobility in order to stabilize the fracture so that the fracture does not worsen and compress further while it is healing.   You will be contacted by [facility] to schedule an appointment for the brace fitting.   The brace needs to fit snuggly in order for it to be effective.   It can take 3-6 months before a fracture heals and can cause some discomfort up to a year after the event.  You should wear the brace at all time when up and around during the day for the next 12 weeks. You can take the brace off for bathing/showering, at bedtime, and if you are taking a nap or lying down during the day.   If the brace is uncomfortable or does not fit properly, you should call the facility you received the brace from and they can help to re-fit it to be as comfortable as possible.       *You have been prescribed Calcitonin for bone pain and healing post fracture. This medication is a nasal spray and should be used in one side of your nose (nostril) daily, alternating sides for the next 2 weeks only. We do not recommend this medication longer than 2 weeks.       *Bone Density Testing was  ordered today to evaluate bone health post fracture. You will be called to schedule this and it should be scheduled within the next couple of months.       ~Please call our [facility] Nurse Navigation line [phone] with any questions or concerns about your treatment plan, if symptoms worsen and you would like to be seen urgently, or if you have problems controlling bladder and bowel function.

## 2021-07-29 RX ORDER — CALCITONIN SALMON 200 [IU]/.09ML
SPRAY, METERED NASAL
Qty: 11.1 ML | OUTPATIENT
Start: 2021-07-29

## 2021-08-06 ENCOUNTER — HOSPITAL ENCOUNTER (OUTPATIENT)
Dept: RADIOLOGY | Facility: HOSPITAL | Age: 86
Discharge: HOME | End: 2021-08-06
Attending: NURSE PRACTITIONER | Admitting: NURSE PRACTITIONER
Payer: COMMERCIAL

## 2021-08-06 DIAGNOSIS — S32.040A CLOSED COMPRESSION FRACTURE OF L4 LUMBAR VERTEBRA, INITIAL ENCOUNTER (H): ICD-10-CM

## 2021-08-06 DIAGNOSIS — Z87.81 HISTORY OF COMPRESSION FRACTURE OF SPINE: ICD-10-CM

## 2021-08-06 DIAGNOSIS — M80.00XA AGE-RELATED OSTEOPOROSIS WITH CURRENT PATHOLOGICAL FRACTURE, INITIAL ENCOUNTER: ICD-10-CM

## 2021-08-06 DIAGNOSIS — M54.41 CHRONIC RIGHT-SIDED LOW BACK PAIN WITH RIGHT-SIDED SCIATICA: ICD-10-CM

## 2021-08-06 DIAGNOSIS — G89.29 CHRONIC RIGHT-SIDED LOW BACK PAIN WITH RIGHT-SIDED SCIATICA: ICD-10-CM

## 2021-08-06 PROCEDURE — 72100 X-RAY EXAM L-S SPINE 2/3 VWS: CPT

## 2021-08-10 ENCOUNTER — TELEPHONE (OUTPATIENT)
Dept: PHYSICAL MEDICINE AND REHAB | Facility: CLINIC | Age: 86
End: 2021-08-10

## 2021-08-10 NOTE — TELEPHONE ENCOUNTER
Phone call to patient to review results and provider's recommendations with assistance of Sukhjinder  Lucy from [facility]  services. Results given and explained to both patient and her daughter.     Discussed/confirmed upcoming appointment for DEXA scan as well as her follow up with Jennifer Knight CNP. Stated understanding.

## 2021-08-10 NOTE — TELEPHONE ENCOUNTER
----- Message from Ministerio Tate, DO sent at 8/6/2021  4:22 PM CDT -----  Please call the patient let her know that I am reviewing x-ray of the lumbar spine in Jennifer's absence as she is in not in the office today.  It does show vertebral compression fracture at L4 which is similar to MRI and compression.  Recommend that she continue with Jennifer's plan and follow-up as scheduled.

## 2021-08-11 ENCOUNTER — DOCUMENTATION ONLY (OUTPATIENT)
Dept: ORTHOPEDICS | Facility: CLINIC | Age: 86
End: 2021-08-11

## 2021-08-11 NOTE — PROGRESS NOTES
S: Pt. seen at the [place] office with daughter. Female. Dr. Hurtado. RX: LSO. DX: Closed compression Fx. of L4 lumbar vert..  O:A: Pt. had been wearing a LSO OTS. Pt. has decided to take a Aspen quickdraw RAP LSO size Medium. LSO to provide intercavitary pressure to reduce load on the intervertebral disc and reduce pain. Donning doffing wear and care instructions given to Pt. and daughter.  P: Pt. to be seen as needed.    Bean BARRETT,LO

## 2021-08-12 ENCOUNTER — ANCILLARY PROCEDURE (OUTPATIENT)
Dept: BONE DENSITY | Facility: CLINIC | Age: 86
End: 2021-08-12
Attending: NURSE PRACTITIONER
Payer: COMMERCIAL

## 2021-08-12 DIAGNOSIS — S32.040A CLOSED COMPRESSION FRACTURE OF L4 LUMBAR VERTEBRA, INITIAL ENCOUNTER (H): ICD-10-CM

## 2021-08-12 DIAGNOSIS — Z87.81 HISTORY OF COMPRESSION FRACTURE OF SPINE: ICD-10-CM

## 2021-08-12 DIAGNOSIS — M80.00XA AGE-RELATED OSTEOPOROSIS WITH CURRENT PATHOLOGICAL FRACTURE, INITIAL ENCOUNTER: ICD-10-CM

## 2021-08-12 PROCEDURE — 77080 DXA BONE DENSITY AXIAL: CPT | Performed by: INTERNAL MEDICINE

## 2021-08-18 ENCOUNTER — OFFICE VISIT (OUTPATIENT)
Dept: PHYSICAL MEDICINE AND REHAB | Facility: CLINIC | Age: 86
End: 2021-08-18
Payer: COMMERCIAL

## 2021-08-18 VITALS
WEIGHT: 117 LBS | BODY MASS INDEX: 23.59 KG/M2 | HEIGHT: 59 IN | SYSTOLIC BLOOD PRESSURE: 115 MMHG | DIASTOLIC BLOOD PRESSURE: 68 MMHG

## 2021-08-18 DIAGNOSIS — M80.00XA AGE-RELATED OSTEOPOROSIS WITH CURRENT PATHOLOGICAL FRACTURE, INITIAL ENCOUNTER: ICD-10-CM

## 2021-08-18 DIAGNOSIS — G89.29 CHRONIC RIGHT-SIDED LOW BACK PAIN WITH RIGHT-SIDED SCIATICA: ICD-10-CM

## 2021-08-18 DIAGNOSIS — S32.040A CLOSED COMPRESSION FRACTURE OF L4 LUMBAR VERTEBRA, INITIAL ENCOUNTER (H): Primary | ICD-10-CM

## 2021-08-18 DIAGNOSIS — Z87.81 HISTORY OF COMPRESSION FRACTURE OF SPINE: ICD-10-CM

## 2021-08-18 DIAGNOSIS — M54.16 RIGHT LUMBAR RADICULITIS: ICD-10-CM

## 2021-08-18 DIAGNOSIS — M54.41 CHRONIC RIGHT-SIDED LOW BACK PAIN WITH RIGHT-SIDED SCIATICA: ICD-10-CM

## 2021-08-18 PROCEDURE — 99215 OFFICE O/P EST HI 40 MIN: CPT | Performed by: NURSE PRACTITIONER

## 2021-08-18 RX ORDER — ACETAMINOPHEN 500 MG
500-1000 TABLET ORAL EVERY 8 HOURS PRN
Qty: 90 TABLET | Refills: 1 | Status: SHIPPED | OUTPATIENT
Start: 2021-08-18 | End: 2021-09-15

## 2021-08-18 ASSESSMENT — PAIN SCALES - GENERAL: PAINLEVEL: EXTREME PAIN (8)

## 2021-08-18 ASSESSMENT — MIFFLIN-ST. JEOR: SCORE: 876.34

## 2021-08-18 NOTE — LETTER
8/18/2021         RE: Chris Bell  [address]  [address]  [facility], [place] [address]        Dear Colleague,    Thank you for referring your patient, Chris Bell, to the [facility] [place]. Please see a copy of my visit note below.      Assessment:     Diagnoses and all orders for this visit:  Closed compression fracture of L4 lumbar vertebra, initial encounter (H)  -     XR Lumbar Spine 2/3 Views; Future  History of compression fracture of spine  -     XR Lumbar Spine 2/3 Views; Future  Age-related osteoporosis with current pathological fracture, initial encounter  -     XR Lumbar Spine 2/3 Views; Future  Chronic right-sided low back pain with right-sided sciatica  -     XR Lumbar Spine 2/3 Views; Future  -     acetaminophen (TYLENOL) 500 MG tablet; Take 1-2 tablets (500-1,000 mg) by mouth every 8 hours as needed for pain  Right lumbar radiculitis  -     acetaminophen (TYLENOL) 500 MG tablet; Take 1-2 tablets (500-1,000 mg) by mouth every 8 hours as needed for pain     Chris Bell is a 86 year old y.o. female with past medical history significant for depression, hypertension, peptic ulcer disease, heart failure, anemia, osteoporosis, thyroid disease, T12 compression fracture with thoracic kyphosis, cancer of the soft palate who presents today for follow-up regarding:    -Acute on chronic right low back pain with right lumbar radiculitis.  Updated MRI with subacute L4 compression fracture, chronic T12 compression fracture.  Chronic significant central canal stenosis at L4-5 with severe right foraminal stenosis.     Plan:     A shared decision making plan was used. The patient's values and choices were respected. Prior medical records were reviewed today. The following represents what was discussed and decided upon by the provider and the patient.        -DIAGNOSTIC TESTS: Images were personally reviewed and interpreted.   --Discuss potentially obtaining a right lower extremity EMG to evaluate  for acute versus chronic radiculopathy.  She is not interested in surgery options at this time therefore patient defers EMG as well at this time.  --Ordered lumbar spine x-ray to be completed in 2 to 3 weeks to further evaluate L4 compression fracture.  If stable no further imaging needed.  --Lumbar spine x-ray 8/6/2021 with chronic T12 compression fracture.  L4 compression fracture appears stable since MRI 7/22/2021.  --Bone density scan 8/12/2021 with osteoporosis with high predicted future fracture risk.  --Lumbar spine MRI 7/14/2021 with acute/subacute L4 compression fracture 30 to 40% height loss with disc bulge and facet arthropathy with moderate to severe spinal stenosis, moderate right and mild left foraminal stenosis.  L4-5 severe spinal stenosis with severe right foraminal stenosis.  Chronic T12 compression fracture with accompanied angular kyphosis.  --Lumbar spine x-ray 1/27/2021 with mild convex curvature.  Chronic marked T12 compression fracture unchanged since 2018.  Possible subtle height loss at L3. Degenerative spondylolisthesis L5-S1 and 4 mm anterolisthesis L4-5 stable.  --Pelvic x-ray 1/27/2021 with no fracture identified, stable joint space.    --Lumbar spine MRI 2018 shows chronic anterolisthesis L4-5 with facet hypertrophy and ligamentum flavum thickening causing severe central canal stenosis with mild bilateral foraminal stenosis.  There is also unchanged compression deformity at T12 with retropulsion with kyphosis at this level.      -Patient is scheduled with Dr. Golden for osteoporosis evaluation and management 11/1/2021.    -TLSO back brace previously ordered 7/22/2021.  Patient does not wish to wear this at this time as she finds it is very aggravating.  Advised patient to limit lifting over 10 pounds for the next 4 weeks, as well as limiting repetitive bending and twisting at this time.  Patient and daughter verbalized understanding    -INTERVENTIONS: No recommendations for spine  injections at this time.  Discussed with patient that if she still has significant right lower extremity pain in the next 4 weeks and her x-ray is stable without progression of L4 compression fracture we could consider a right L4-5 TFESI.    -MEDICATIONS: Calcitonin therapy completed.  Refill Tylenol extra strength 500 mg 1 to 2 tablets 3 times daily as needed for pain.  Patient has previous prescription still for Tylenol 3 which she is taking 1/2 tablet very infrequently due to severe back pain as she does find this medication quite strong.  She still has 18 to 19 tablets left she states and does not need a refill today.  Discussed side effects of medications and proper use. Patient verbalized understanding.    -PHYSICAL THERAPY: Consider physical therapy down the road, will hold at this time due to acute/subacute compression fracture.  Discussed the importance of core strengthening, ROM, stretching exercises with the patient and how each of these entities is important in decreasing pain.  Explained to the patient that the purpose of physical therapy is to teach the patient a home exercise program.  These exercises need to be performed every day in order to decrease pain and prevent future occurrences of pain.        -PATIENT EDUCATION:  Total time of 42 minutes spent with the patient, reviewing the chart, placing orders, and documenting.   -Today we also discussed the issues related to the current COVID-19 pandemic, the pros and cons of the current treatment plan, the CDC guidelines such as social distancing, washing the hands, and covering the cough.    -FOLLOW UP: Follow-up in 4 weeks after obtaining x-ray to review x-ray results.  Advised to contact clinic if symptoms worsen or change.    Subjective:     Chris Bell is a 86 year old female who presents today for follow-up regarding bilateral low back pain that has been more severe in the last couple of months radiating on the right into the right lower extremity  into the posterior and anterior thigh as well as posterior and anterior shin with associated numbness and tingling and perceived weakness.  Currently her pain is still constant 8/10, 10 at its worst, 6 at its best.  Overall symptoms are aggravated with generalized activity and movement of her body, does improve with taking Tylenol as well as Tylenol 3.    Telephone  was present during entire visit today.   Patient's daughter was present today during entire visit and added to past medical/surgical/family/social history and history of presenting illness.     Treatment to Date: No prior spinal surgery.     Bilateral L4-5 facet joint steroid injection 6/29/2018  Bilateral L4-5 TF ZAHRAA 7/31/2018 with minimal to no lasting benefit.  Preprocedure pain scale 8/10, post 5/10.  Bilateral L5-S1 TF ZAHRAA 8/16/2018 with 50% relief ×3 days only.  Preprocedure pain scale 8/10, most 6/10.     Medications:  Gabapentin 100 mg 1-1-1  Diclofenac gel    Prior medications:  Oxycodone  Tizanidine  Methocarbamol  Flexeril    Patient Active Problem List   Diagnosis     Anemia due to GI blood loss     Hypokalemia     PUD (peptic ulcer disease)     T12 compression fracture (H)     Accelerated hypertension     Abdominal pain     Abnormal finding on imaging     Kyphosis of thoracic region     Electrolyte imbalance     Episode of shaking     Bilateral hip pain     Edema     Heart failure with preserved ejection fraction (H)     Cancer of soft palate (H)       Current Outpatient Medications   Medication     acetaminophen (TYLENOL) 500 MG tablet     Alcohol Swabs (B-D SINGLE USE SWABS REGULAR) PADS     alum & mag hydroxide-simethicone (ANTACID LIQUID) 200-200-20 MG/5ML SUSP suspension     benzocaine (ORAJEL/ANBESOL) 10 % gel     blood glucose monitoring (ONETOUCH VERIO) meter device kit     calcium carbonate (TUMS) 500 MG chewable tablet     calcium carbonate (TUMS) 500 MG chewable tablet     clobetasol (TEMOVATE) 0.05 % external ointment      fentaNYL (DURAGESIC) 12 mcg/hr 72 hr patch     Ferrous Sulfate 324 (65 Fe) MG TBEC     furosemide (LASIX) 20 MG tablet     gabapentin (NEURONTIN) 100 MG capsule     levothyroxine (SYNTHROID/LEVOTHROID) 50 MCG tablet     lidocaine (XYLOCAINE) 2 % solution     lisinopril (PRINIVIL/ZESTRIL) 5 MG tablet     LORazepam (ATIVAN) 0.5 MG tablet     magic mouthwash (ENTER INGREDIENTS IN COMMENTS) suspension     magnesium hydroxide (MILK OF MAGNESIA) 400 MG/5ML suspension     magnesium oxide (MAG-OX) 400 MG tablet     Magnesium Oxide -Mg Supplement 250 MG tablet     methocarbamol (ROBAXIN) 500 MG tablet     metoprolol succinate ER (TOPROL-XL) 25 MG 24 hr tablet     mirtazapine (REMERON) 15 MG tablet     Multiple Vitamin (DAILY-CHRIS) TABS     omeprazole (PRILOSEC) 20 MG DR capsule     ONETOUCH VERIO IQ test strip     oxyCODONE (ROXICODONE) 5 MG/5ML solution     pantoprazole (PROTONIX) 40 MG EC tablet     potassium chloride ER (K-DUR/KLOR-CON M) 20 MEQ CR tablet     potassium chloride ER (K-TAB/KLOR-CON) 10 MEQ CR tablet     ranitidine (ZANTAC) 300 MG tablet     sodium chloride 1 GM tablet     sucralfate (CARAFATE) 1 GM tablet     traZODone (DESYREL) 50 MG tablet     triamcinolone (KENALOG) 0.1 % paste     vitamin B-12 (CYANOCOBALAMIN) 250 MCG tablet     No current facility-administered medications for this visit.       Allergies   Allergen Reactions     Unknown [No Clinical Screening - See Comments]        Past Medical History:   Diagnosis Date     Anemia      Depression      Disease of thyroid gland      HTN (hypertension)      Osteoporosis      PUD (peptic ulcer disease)         Review of Systems  ROS:  Specifically negative for bowel/bladder dysfunction, balance changes, headache, dizziness, foot drop, fevers, chills, appetite changes, nausea/vomiting, unexplained weight loss. Otherwise 13 systems reviewed are negative. Please see the patient's intake questionnaire from today for details.    Reviewed Social, Family, Past  "Medical and Past Surgical history with patient, no significant changes noted since prior visit.     Objective:     /68 (BP Location: Right arm, Patient Position: Sitting)   Ht 4' 11\" (1.499 m)   Wt 117 lb (53.1 kg)   BMI 23.63 kg/m      PHYSICAL EXAMINATION:    --CONSTITUTIONAL: Well developed, well nourished, healthy appearing individual.  --PSYCHIATRIC: Appropriate mood and affect. No difficulty interacting due to temper, social withdrawal, or memory issues.  --SKIN: Lumbar region is dry and intact.   --RESPIRATORY: Normal rhythm and effort. No abnormal accessory muscle breathing patterns noted.   --MUSCULOSKELETAL:  Normal lumbar lordosis noted, no lateral shift.  --GROSS MOTOR: Easily arises from a seated position. Gait is non-antalgic  --LUMBAR SPINE:  Inspection reveals no evidence of deformity.   --LOWER EXTREMITY MOTOR TESTING:  Plantar flexion left 5/5, right 5/5   Dorsiflexion left 5/5, right 5/5   Great toe MTP extension left 5/5, right 5/5  Knee flexion left 5/5, right 5/5  Knee extension left 5/5, right 5/5   Hip flexion left 5/5, right 5/5  Hip abduction left 5/5, right 5/5  Hip adduction left 5/5, right 5/5   --NEUROLOGIC: Bilateral patellar and achilles reflexes are physiologic and symmetric. Sensation to light touch is intact on the left, diminished globally on the right below the knee only.    RESULTS:   Imaging: Lumbar spine imaging was reviewed today. The images were shown to the patient and the findings were explained using a spine model.      XR Lumbar Spine 2/3 Views  Result Date: 8/6/2021  EXAM: XR LUMBAR SPINE 2-3 VIEWS LOCATION: [facility] DATE/TIME: 8/6/2021 9:48 AM INDICATION: Standing Xray: Evaluate L4 Compression Fracture COMPARISON: X-ray 01/27/2021. MRI 07/22/2021 TECHNIQUE: CR Lumbar Spine.   IMPRESSION: 5 lumbar type vertebra. Thoracolumbar levocurvature similar to previous exams. Moderate central vertebral compression at L4 is new since the " previous radiographs, but is relatively similar to findings on previous MRI given differences in technique. Chronic marked anterior wedge deformity of T12 with 33 degrees segmental kyphosis centered at this level. No definite new fracture or progressive vertebral height loss identified. Mild diffuse lumbar spondylosis including mild to moderate multilevel facet arthropathy, greatest at L4-L5.      DX Hip/Pelvis/Spine  Result Date: 8/15/2021  8/12/2021 RE: Chris Bell YOB: 1935 Dear Jennifer Knight, Patient Profile: 86 year old female, postmenopausal, is here for the follow up bone density test. History of fractures - Yes; Thoracic vertebrae. Family history of osteoporosis - None.  Family history of hip fracture: None. Smoking history - No. Osteoporosis treatment past -  No. Osteoporosis treatment current - No.  Chronic medical problems - Hysterectomy. High risk medications -  Anti-seizure; Yes, Currently. Assessment: 1. The spine bone density is best assessed using L1-L2 with T-score of -2.6. 2. Femoral bone densities show left femoral neck T- score of -2.4, and right total hip T-score of -2.8. 3.  Since the scan in 2018, bone density has declined a significant 8.7% in the right total hip and 5.2% in the left total hip.  At L1-L2 the AP spine, bone density has not significantly changed. 4.  The trabecular bone score reflects poor micro architecture. 86 year old female with OSTEOPOROSIS and HIGH predicted future fracture risk.  Assuming that her vertebral fractures are true fragility fractures, osteoporosis is judged as severe. Recommendations: Further evaluation and therapeutic intervention is advised with a recheck in 1 year. Bone densitometry was performed on your patient using our [employer] densitometer. The results are summarized and a copy of the actual scans are included for your review. In conformity with the International Society of Clinical Densitometry's most recent position statement  for DXA interpretation (2015), the diagnosis will be made on the lowest measured T-score of the lumbar spine, femoral neck, total proximal femur or 33% radius. Note the change in terminology for diagnostic classification from OSTEOPENIA to LOW BONE MASS. All trending for sequential exams will be done using multiple vertebrae or the total proximal femur. Fracture risk is based on the WHO Fracture Risk Assessment Tool (FRAX). If additional information is needed or if you would like to discuss the results, please do not hesitate to call me. Thank you for referring this patient to [facility]. We are happy to be of service in support of you and your practice. If you have any questions or suggestions to improve our service, please call me at [phone]. Sincerely, DAVID Barraza. Osteoporosis Services, [facility]      MR Lumbar Spine w/o Contrast  Result Date: 7/23/2021  EXAM: MR LUMBAR SPINE W/O CONTRAST LOCATION: [facility] DATE/TIME: 7/22/2021 7:20 PM INDICATION: Low back pain, > 6 wks. COMPARISON: Radiograph 01/27/2021. MRI 07/20/2018. TECHNIQUE: Routine Lumbar Spine MRI without IV contrast. FINDINGS: Nomenclature is based on 5 lumbar-type vertebral bodies. Severe chronic T12 vertebral body height loss with retropulsion is unchanged. There is 30-40% height loss of the L4 vertebral body with diffuse accompanying edema extending into the pedicles. This is compatible with an acute-to-subacute fracture. There is mild edema along the L5 superior endplate, eccentric to the right, without significant height loss. The imaged portions of the proximal femora, sacrum and rm appear intact. There is angular kyphosis at the T11-T12 level, due to T12 vertebral body height loss described above. Mild L4-L5 anterolisthesis and minimal L2-L3 retrolisthesis are noted. There is mild dextroconvex curvature. The conus terminates at L2. No signal abnormalities  of the imaged cord or cauda equina nerve roots are demonstrated. Paraspinal soft tissues are unremarkable. Limited images of the abdominal cavity demonstrate no acute abnormality. T12-L1: Preserved intervertebral disc height. No significant posterior disc abnormality. No significant stenosis of the spinal canal or neural foramina. L1-L2: Normal disc height and signal. No herniation. Normal facets. No spinal canal or neural foraminal stenosis. L2-L3: Mild intervertebral disc height loss with disc desiccation. Circumferential disc bulging. No significant facet arthropathy. Mild spinal canal stenosis. No right neural foraminal stenosis. Mild left neural foraminal stenosis. L3-L4: Preserved intervertebral disc height. Mildly elevated disc signal likely represents disc injury related to fracture described above. Shallow broad-based bulge. Mild to moderate bilateral facet arthropathy. Moderate to severe spinal canal stenosis.  Moderate right and mild left neural foraminal stenosis. L4-L5: Anterolisthesis as above with unroofing of the dorsal disc margin. L4 fracture as above. Circumferential disc bulging. Moderate facet arthropathy. Severe spinal canal stenosis. Severe right and mild left neural foraminal stenosis. L5-S1: Unremarkable disc height and signal. No herniation. Mild facet arthropathy. No spinal canal stenosis. No neural foraminal stenosis.   IMPRESSION: 1.  Acute to subacute L4 compression fracture with 30-40% height loss. 2.  Mild edema along the left L5 superior endplate without significant height loss. This may represent subtle superior endplate fracture or reactive change. 3.  Stable chronic severe T12 compression fracture. 4.  At L3-L4, there is moderate-to-severe spinal canal stenosis and moderate right neural foraminal stenosis. 5.  At L4-L5, there is severe spinal canal stenosis and severe right neural foraminal stenosis. Additional findings as above.        Mr Lumbar Spine Without Contrast  Result  Date: 7/20/2018  INDICATION: Low back pain radiating into the bilateral hips and tingling in both legs. Status post L4-L5 facet joint injections bilaterally 6/29/2018. History of T12 compression fracture. TECHNIQUE: Without IV contrast. CONTRAST: None. COMPARISON: 4/20/2018 thoracic spine MRI. FINDINGS: Nomenclature is based on 5 lumbar-type vertebral bodies. Sagittal field-of-view extends from the level of T10 to the level of S3. No significant change in T12 vertebra plana with unchanged retropulsion and associated focal thoracolumbar kyphosis. No new fracture. Remaining vertebral bodies are normal in height. Unchanged grade 1 anterolisthesis at L4-L5. No pars defects. Except for the T12 vertebral body, expected marrow signal. The conus tip is identified at L2. Cauda equina is normal. Grossly normal paraspinal soft tissues. No abdominal aortic aneurysm. The visualized portions of the bony pelvis are normal for age. T11-T12: Unchanged posterior retropulsion of the superior T12 vertebral body, resulting in unchanged contouring of the spinal cord. No abnormal cord signal. No neural foraminal narrowing.   T12-L1: Normal disc height and signal. No herniation. No facet arthropathy. No spinal canal stenosis. No right neural foraminal stenosis. No left neural foraminal stenosis.   L1-L2: Normal disc height and signal. No herniation. No facet arthropathy. No spinal canal stenosis. No right neural foraminal stenosis. No left neural foraminal stenosis.   L2-L3: Disc desiccation, height loss, and disc bulge. Dorsal epidural lipomatosis, ligamentum flavum thickening, and bilateral facet arthropathy. Moderate subarticular zone narrowing. Mild central spinal canal stenosis. No right neural foraminal stenosis. No left neural foraminal stenosis.   L3-L4: Disc desiccation, height loss, and disc bulge. Ligamentum flavum thickening. Bilateral facet arthropathy. Moderate subarticular zone narrowing and moderate central spinal canal  stenosis. Mild right neural foraminal stenosis. Mild left neural foraminal stenosis.   L4-L5: Disc desiccation, height loss, and disc bulge without significant cephalad migration of the superiorly uncovered disc. Ligamentum flavum thickening. Bilateral facet arthropathy. Severe spinal canal stenosis. Mild right neural foraminal stenosis. Mild left neural foraminal stenosis.   L5-S1: Normal disc height and signal. No herniation. Bilateral facet arthropathy. No spinal canal stenosis. No right neural foraminal stenosis. No left neural foraminal stenosis.   CONCLUSION:   1.  Disc bulge, ligamentum flavum thickening, and facet arthropathy contribute to cause severe spinal canal narrowing at L4-L5. Unchanged grade 1 anterolisthesis at L4-L5.   2.  Unchanged compression deformity of the T12 vertebral body with retropulsed fracture fragment that contours the ventral spinal cord. No abnormal cord signal. Unchanged resultant focal thoracolumbar kyphosis.   3.  Otherwise, multilevel lumbar spondylosis. No additional level of severe spinal canal narrowing. No severe neural foraminal narrowing.                          Again, thank you for allowing me to participate in the care of your patient.        Sincerely,        Jennifer Knight, CNP

## 2021-08-18 NOTE — PATIENT INSTRUCTIONS
~Please call our [facility] Nurse Navigation line [phone] with any questions or concerns about your treatment plan, if symptoms worsen and you would like to be seen urgently, or if you have problems controlling bladder and bowel function.      Imaging has been ordered. Radiology will call you to schedule. Please call below if you do not hear from them in the next couple of days.     [facility] Radiology Scheduling  Please call [phone] to schedule your image(s) (select option #1). There are 2 different locations, see below.     [facility]  [address] [id number]    [place] [id number] [facility] [phone]

## 2021-08-18 NOTE — PROGRESS NOTES
Assessment:     Diagnoses and all orders for this visit:  Closed compression fracture of L4 lumbar vertebra, initial encounter (H)  -     XR Lumbar Spine 2/3 Views; Future  History of compression fracture of spine  -     XR Lumbar Spine 2/3 Views; Future  Age-related osteoporosis with current pathological fracture, initial encounter  -     XR Lumbar Spine 2/3 Views; Future  Chronic right-sided low back pain with right-sided sciatica  -     XR Lumbar Spine 2/3 Views; Future  -     acetaminophen (TYLENOL) 500 MG tablet; Take 1-2 tablets (500-1,000 mg) by mouth every 8 hours as needed for pain  Right lumbar radiculitis  -     acetaminophen (TYLENOL) 500 MG tablet; Take 1-2 tablets (500-1,000 mg) by mouth every 8 hours as needed for pain     Chris Bell is a 86 year old y.o. female with past medical history significant for depression, hypertension, peptic ulcer disease, heart failure, anemia, osteoporosis, thyroid disease, T12 compression fracture with thoracic kyphosis, cancer of the soft palate who presents today for follow-up regarding:    -Acute on chronic right low back pain with right lumbar radiculitis.  Updated MRI with subacute L4 compression fracture, chronic T12 compression fracture.  Chronic significant central canal stenosis at L4-5 with severe right foraminal stenosis.     Plan:     A shared decision making plan was used. The patient's values and choices were respected. Prior medical records were reviewed today. The following represents what was discussed and decided upon by the provider and the patient.        -DIAGNOSTIC TESTS: Images were personally reviewed and interpreted.   --Discuss potentially obtaining a right lower extremity EMG to evaluate for acute versus chronic radiculopathy.  She is not interested in surgery options at this time therefore patient defers EMG as well at this time.  --Ordered lumbar spine x-ray to be completed in 2 to 3 weeks to further evaluate L4 compression fracture.  If  stable no further imaging needed.  --Lumbar spine x-ray 8/6/2021 with chronic T12 compression fracture.  L4 compression fracture appears stable since MRI 7/22/2021.  --Bone density scan 8/12/2021 with osteoporosis with high predicted future fracture risk.  --Lumbar spine MRI 7/14/2021 with acute/subacute L4 compression fracture 30 to 40% height loss with disc bulge and facet arthropathy with moderate to severe spinal stenosis, moderate right and mild left foraminal stenosis.  L4-5 severe spinal stenosis with severe right foraminal stenosis.  Chronic T12 compression fracture with accompanied angular kyphosis.  --Lumbar spine x-ray 1/27/2021 with mild convex curvature.  Chronic marked T12 compression fracture unchanged since 2018.  Possible subtle height loss at L3. Degenerative spondylolisthesis L5-S1 and 4 mm anterolisthesis L4-5 stable.  --Pelvic x-ray 1/27/2021 with no fracture identified, stable joint space.    --Lumbar spine MRI 2018 shows chronic anterolisthesis L4-5 with facet hypertrophy and ligamentum flavum thickening causing severe central canal stenosis with mild bilateral foraminal stenosis.  There is also unchanged compression deformity at T12 with retropulsion with kyphosis at this level.      -Patient is scheduled with Dr. Golden for osteoporosis evaluation and management 11/1/2021.    -TLSO back brace previously ordered 7/22/2021.  Patient does not wish to wear this at this time as she finds it is very aggravating.  Advised patient to limit lifting over 10 pounds for the next 4 weeks, as well as limiting repetitive bending and twisting at this time.  Patient and daughter verbalized understanding    -INTERVENTIONS: No recommendations for spine injections at this time.  Discussed with patient that if she still has significant right lower extremity pain in the next 4 weeks and her x-ray is stable without progression of L4 compression fracture we could consider a right L4-5 TFESI.    -MEDICATIONS:  Calcitonin therapy completed.  Refill Tylenol extra strength 500 mg 1 to 2 tablets 3 times daily as needed for pain.  Patient has previous prescription still for Tylenol 3 which she is taking 1/2 tablet very infrequently due to severe back pain as she does find this medication quite strong.  She still has 18 to 19 tablets left she states and does not need a refill today.  Discussed side effects of medications and proper use. Patient verbalized understanding.    -PHYSICAL THERAPY: Consider physical therapy down the road, will hold at this time due to acute/subacute compression fracture.  Discussed the importance of core strengthening, ROM, stretching exercises with the patient and how each of these entities is important in decreasing pain.  Explained to the patient that the purpose of physical therapy is to teach the patient a home exercise program.  These exercises need to be performed every day in order to decrease pain and prevent future occurrences of pain.        -PATIENT EDUCATION:  Total time of 42 minutes spent with the patient, reviewing the chart, placing orders, and documenting.   -Today we also discussed the issues related to the current COVID-19 pandemic, the pros and cons of the current treatment plan, the CDC guidelines such as social distancing, washing the hands, and covering the cough.    -FOLLOW UP: Follow-up in 4 weeks after obtaining x-ray to review x-ray results.  Advised to contact clinic if symptoms worsen or change.    Subjective:     Chris Bell is a 86 year old female who presents today for follow-up regarding bilateral low back pain that has been more severe in the last couple of months radiating on the right into the right lower extremity into the posterior and anterior thigh as well as posterior and anterior shin with associated numbness and tingling and perceived weakness.  Currently her pain is still constant 8/10, 10 at its worst, 6 at its best.  Overall symptoms are aggravated with  generalized activity and movement of her body, does improve with taking Tylenol as well as Tylenol 3.    Telephone  was present during entire visit today.   Patient's daughter was present today during entire visit and added to past medical/surgical/family/social history and history of presenting illness.     Treatment to Date: No prior spinal surgery.     Bilateral L4-5 facet joint steroid injection 6/29/2018  Bilateral L4-5 TF ZAHRAA 7/31/2018 with minimal to no lasting benefit.  Preprocedure pain scale 8/10, post 5/10.  Bilateral L5-S1 TF ZAHRAA 8/16/2018 with 50% relief ×3 days only.  Preprocedure pain scale 8/10, most 6/10.     Medications:  Gabapentin 100 mg 1-1-1  Diclofenac gel    Prior medications:  Oxycodone  Tizanidine  Methocarbamol  Flexeril    Patient Active Problem List   Diagnosis     Anemia due to GI blood loss     Hypokalemia     PUD (peptic ulcer disease)     T12 compression fracture (H)     Accelerated hypertension     Abdominal pain     Abnormal finding on imaging     Kyphosis of thoracic region     Electrolyte imbalance     Episode of shaking     Bilateral hip pain     Edema     Heart failure with preserved ejection fraction (H)     Cancer of soft palate (H)       Current Outpatient Medications   Medication     acetaminophen (TYLENOL) 500 MG tablet     Alcohol Swabs (B-D SINGLE USE SWABS REGULAR) PADS     alum & mag hydroxide-simethicone (ANTACID LIQUID) 200-200-20 MG/5ML SUSP suspension     benzocaine (ORAJEL/ANBESOL) 10 % gel     blood glucose monitoring (ONETOUCH VERIO) meter device kit     calcium carbonate (TUMS) 500 MG chewable tablet     calcium carbonate (TUMS) 500 MG chewable tablet     clobetasol (TEMOVATE) 0.05 % external ointment     fentaNYL (DURAGESIC) 12 mcg/hr 72 hr patch     Ferrous Sulfate 324 (65 Fe) MG TBEC     furosemide (LASIX) 20 MG tablet     gabapentin (NEURONTIN) 100 MG capsule     levothyroxine (SYNTHROID/LEVOTHROID) 50 MCG tablet     lidocaine (XYLOCAINE) 2 %  "solution     lisinopril (PRINIVIL/ZESTRIL) 5 MG tablet     LORazepam (ATIVAN) 0.5 MG tablet     magic mouthwash (ENTER INGREDIENTS IN COMMENTS) suspension     magnesium hydroxide (MILK OF MAGNESIA) 400 MG/5ML suspension     magnesium oxide (MAG-OX) 400 MG tablet     Magnesium Oxide -Mg Supplement 250 MG tablet     methocarbamol (ROBAXIN) 500 MG tablet     metoprolol succinate ER (TOPROL-XL) 25 MG 24 hr tablet     mirtazapine (REMERON) 15 MG tablet     Multiple Vitamin (DAILY-CHRIS) TABS     omeprazole (PRILOSEC) 20 MG DR capsule     ONETOUCH VERIO IQ test strip     oxyCODONE (ROXICODONE) 5 MG/5ML solution     pantoprazole (PROTONIX) 40 MG EC tablet     potassium chloride ER (K-DUR/KLOR-CON M) 20 MEQ CR tablet     potassium chloride ER (K-TAB/KLOR-CON) 10 MEQ CR tablet     ranitidine (ZANTAC) 300 MG tablet     sodium chloride 1 GM tablet     sucralfate (CARAFATE) 1 GM tablet     traZODone (DESYREL) 50 MG tablet     triamcinolone (KENALOG) 0.1 % paste     vitamin B-12 (CYANOCOBALAMIN) 250 MCG tablet     No current facility-administered medications for this visit.       Allergies   Allergen Reactions     Unknown [No Clinical Screening - See Comments]        Past Medical History:   Diagnosis Date     Anemia      Depression      Disease of thyroid gland      HTN (hypertension)      Osteoporosis      PUD (peptic ulcer disease)         Review of Systems  ROS:  Specifically negative for bowel/bladder dysfunction, balance changes, headache, dizziness, foot drop, fevers, chills, appetite changes, nausea/vomiting, unexplained weight loss. Otherwise 13 systems reviewed are negative. Please see the patient's intake questionnaire from today for details.    Reviewed Social, Family, Past Medical and Past Surgical history with patient, no significant changes noted since prior visit.     Objective:     /68 (BP Location: Right arm, Patient Position: Sitting)   Ht 4' 11\" (1.499 m)   Wt 117 lb (53.1 kg)   BMI 23.63 kg/m  "     PHYSICAL EXAMINATION:    --CONSTITUTIONAL: Well developed, well nourished, healthy appearing individual.  --PSYCHIATRIC: Appropriate mood and affect. No difficulty interacting due to temper, social withdrawal, or memory issues.  --SKIN: Lumbar region is dry and intact.   --RESPIRATORY: Normal rhythm and effort. No abnormal accessory muscle breathing patterns noted.   --MUSCULOSKELETAL:  Normal lumbar lordosis noted, no lateral shift.  --GROSS MOTOR: Easily arises from a seated position. Gait is non-antalgic  --LUMBAR SPINE:  Inspection reveals no evidence of deformity.   --LOWER EXTREMITY MOTOR TESTING:  Plantar flexion left 5/5, right 5/5   Dorsiflexion left 5/5, right 5/5   Great toe MTP extension left 5/5, right 5/5  Knee flexion left 5/5, right 5/5  Knee extension left 5/5, right 5/5   Hip flexion left 5/5, right 5/5  Hip abduction left 5/5, right 5/5  Hip adduction left 5/5, right 5/5   --NEUROLOGIC: Bilateral patellar and achilles reflexes are physiologic and symmetric. Sensation to light touch is intact on the left, diminished globally on the right below the knee only.    RESULTS:   Imaging: Lumbar spine imaging was reviewed today. The images were shown to the patient and the findings were explained using a spine model.      XR Lumbar Spine 2/3 Views  Result Date: 8/6/2021  EXAM: XR LUMBAR SPINE 2-3 VIEWS LOCATION: [facility] DATE/TIME: 8/6/2021 9:48 AM INDICATION: Standing Xray: Evaluate L4 Compression Fracture COMPARISON: X-ray 01/27/2021. MRI 07/22/2021 TECHNIQUE: CR Lumbar Spine.   IMPRESSION: 5 lumbar type vertebra. Thoracolumbar levocurvature similar to previous exams. Moderate central vertebral compression at L4 is new since the previous radiographs, but is relatively similar to findings on previous MRI given differences in technique. Chronic marked anterior wedge deformity of T12 with 33 degrees segmental kyphosis centered at this level. No definite new fracture or  progressive vertebral height loss identified. Mild diffuse lumbar spondylosis including mild to moderate multilevel facet arthropathy, greatest at L4-L5.      DX Hip/Pelvis/Spine  Result Date: 8/15/2021  8/12/2021 RE: Chris Bell YOB: 1935 Dear Jennifer Knight, Patient Profile: 86 year old female, postmenopausal, is here for the follow up bone density test. History of fractures - Yes; Thoracic vertebrae. Family history of osteoporosis - None.  Family history of hip fracture: None. Smoking history - No. Osteoporosis treatment past -  No. Osteoporosis treatment current - No.  Chronic medical problems - Hysterectomy. High risk medications -  Anti-seizure; Yes, Currently. Assessment: 1. The spine bone density is best assessed using L1-L2 with T-score of -2.6. 2. Femoral bone densities show left femoral neck T- score of -2.4, and right total hip T-score of -2.8. 3.  Since the scan in 2018, bone density has declined a significant 8.7% in the right total hip and 5.2% in the left total hip.  At L1-L2 the AP spine, bone density has not significantly changed. 4.  The trabecular bone score reflects poor micro architecture. 86 year old female with OSTEOPOROSIS and HIGH predicted future fracture risk.  Assuming that her vertebral fractures are true fragility fractures, osteoporosis is judged as severe. Recommendations: Further evaluation and therapeutic intervention is advised with a recheck in 1 year. Bone densitometry was performed on your patient using our Snapwire densitometer. The results are summarized and a copy of the actual scans are included for your review. In conformity with the International Society of Clinical Densitometry's most recent position statement for DXA interpretation (2015), the diagnosis will be made on the lowest measured T-score of the lumbar spine, femoral neck, total proximal femur or 33% radius. Note the change in terminology for diagnostic classification from OSTEOPENIA to LOW  BONE MASS. All trending for sequential exams will be done using multiple vertebrae or the total proximal femur. Fracture risk is based on the WHO Fracture Risk Assessment Tool (FRAX). If additional information is needed or if you would like to discuss the results, please do not hesitate to call me. Thank you for referring this patient to [facility]. We are happy to be of service in support of you and your practice. If you have any questions or suggestions to improve our service, please call me at [phone]. Sincerely, DAVID Barraza. Osteoporosis Services, [facility]      MR Lumbar Spine w/o Contrast  Result Date: 7/23/2021  EXAM: MR LUMBAR SPINE W/O CONTRAST LOCATION: [facility] DATE/TIME: 7/22/2021 7:20 PM INDICATION: Low back pain, > 6 wks. COMPARISON: Radiograph 01/27/2021. MRI 07/20/2018. TECHNIQUE: Routine Lumbar Spine MRI without IV contrast. FINDINGS: Nomenclature is based on 5 lumbar-type vertebral bodies. Severe chronic T12 vertebral body height loss with retropulsion is unchanged. There is 30-40% height loss of the L4 vertebral body with diffuse accompanying edema extending into the pedicles. This is compatible with an acute-to-subacute fracture. There is mild edema along the L5 superior endplate, eccentric to the right, without significant height loss. The imaged portions of the proximal femora, sacrum and rm appear intact. There is angular kyphosis at the T11-T12 level, due to T12 vertebral body height loss described above. Mild L4-L5 anterolisthesis and minimal L2-L3 retrolisthesis are noted. There is mild dextroconvex curvature. The conus terminates at L2. No signal abnormalities of the imaged cord or cauda equina nerve roots are demonstrated. Paraspinal soft tissues are unremarkable. Limited images of the abdominal cavity demonstrate no acute abnormality. T12-L1: Preserved intervertebral disc height. No significant  posterior disc abnormality. No significant stenosis of the spinal canal or neural foramina. L1-L2: Normal disc height and signal. No herniation. Normal facets. No spinal canal or neural foraminal stenosis. L2-L3: Mild intervertebral disc height loss with disc desiccation. Circumferential disc bulging. No significant facet arthropathy. Mild spinal canal stenosis. No right neural foraminal stenosis. Mild left neural foraminal stenosis. L3-L4: Preserved intervertebral disc height. Mildly elevated disc signal likely represents disc injury related to fracture described above. Shallow broad-based bulge. Mild to moderate bilateral facet arthropathy. Moderate to severe spinal canal stenosis.  Moderate right and mild left neural foraminal stenosis. L4-L5: Anterolisthesis as above with unroofing of the dorsal disc margin. L4 fracture as above. Circumferential disc bulging. Moderate facet arthropathy. Severe spinal canal stenosis. Severe right and mild left neural foraminal stenosis. L5-S1: Unremarkable disc height and signal. No herniation. Mild facet arthropathy. No spinal canal stenosis. No neural foraminal stenosis.   IMPRESSION: 1.  Acute to subacute L4 compression fracture with 30-40% height loss. 2.  Mild edema along the left L5 superior endplate without significant height loss. This may represent subtle superior endplate fracture or reactive change. 3.  Stable chronic severe T12 compression fracture. 4.  At L3-L4, there is moderate-to-severe spinal canal stenosis and moderate right neural foraminal stenosis. 5.  At L4-L5, there is severe spinal canal stenosis and severe right neural foraminal stenosis. Additional findings as above.        Mr Lumbar Spine Without Contrast  Result Date: 7/20/2018  INDICATION: Low back pain radiating into the bilateral hips and tingling in both legs. Status post L4-L5 facet joint injections bilaterally 6/29/2018. History of T12 compression fracture. TECHNIQUE: Without IV contrast.  CONTRAST: None. COMPARISON: 4/20/2018 thoracic spine MRI. FINDINGS: Nomenclature is based on 5 lumbar-type vertebral bodies. Sagittal field-of-view extends from the level of T10 to the level of S3. No significant change in T12 vertebra plana with unchanged retropulsion and associated focal thoracolumbar kyphosis. No new fracture. Remaining vertebral bodies are normal in height. Unchanged grade 1 anterolisthesis at L4-L5. No pars defects. Except for the T12 vertebral body, expected marrow signal. The conus tip is identified at L2. Cauda equina is normal. Grossly normal paraspinal soft tissues. No abdominal aortic aneurysm. The visualized portions of the bony pelvis are normal for age. T11-T12: Unchanged posterior retropulsion of the superior T12 vertebral body, resulting in unchanged contouring of the spinal cord. No abnormal cord signal. No neural foraminal narrowing.   T12-L1: Normal disc height and signal. No herniation. No facet arthropathy. No spinal canal stenosis. No right neural foraminal stenosis. No left neural foraminal stenosis.   L1-L2: Normal disc height and signal. No herniation. No facet arthropathy. No spinal canal stenosis. No right neural foraminal stenosis. No left neural foraminal stenosis.   L2-L3: Disc desiccation, height loss, and disc bulge. Dorsal epidural lipomatosis, ligamentum flavum thickening, and bilateral facet arthropathy. Moderate subarticular zone narrowing. Mild central spinal canal stenosis. No right neural foraminal stenosis. No left neural foraminal stenosis.   L3-L4: Disc desiccation, height loss, and disc bulge. Ligamentum flavum thickening. Bilateral facet arthropathy. Moderate subarticular zone narrowing and moderate central spinal canal stenosis. Mild right neural foraminal stenosis. Mild left neural foraminal stenosis.   L4-L5: Disc desiccation, height loss, and disc bulge without significant cephalad migration of the superiorly uncovered disc. Ligamentum flavum  thickening. Bilateral facet arthropathy. Severe spinal canal stenosis. Mild right neural foraminal stenosis. Mild left neural foraminal stenosis.   L5-S1: Normal disc height and signal. No herniation. Bilateral facet arthropathy. No spinal canal stenosis. No right neural foraminal stenosis. No left neural foraminal stenosis.   CONCLUSION:   1.  Disc bulge, ligamentum flavum thickening, and facet arthropathy contribute to cause severe spinal canal narrowing at L4-L5. Unchanged grade 1 anterolisthesis at L4-L5.   2.  Unchanged compression deformity of the T12 vertebral body with retropulsed fracture fragment that contours the ventral spinal cord. No abnormal cord signal. Unchanged resultant focal thoracolumbar kyphosis.   3.  Otherwise, multilevel lumbar spondylosis. No additional level of severe spinal canal narrowing. No severe neural foraminal narrowing.

## 2021-09-03 ENCOUNTER — HOSPITAL ENCOUNTER (OUTPATIENT)
Dept: RADIOLOGY | Facility: HOSPITAL | Age: 86
Discharge: HOME | End: 2021-09-03
Attending: NURSE PRACTITIONER | Admitting: NURSE PRACTITIONER
Payer: COMMERCIAL

## 2021-09-03 DIAGNOSIS — M54.41 CHRONIC RIGHT-SIDED LOW BACK PAIN WITH RIGHT-SIDED SCIATICA: ICD-10-CM

## 2021-09-03 DIAGNOSIS — M80.00XA AGE-RELATED OSTEOPOROSIS WITH CURRENT PATHOLOGICAL FRACTURE, INITIAL ENCOUNTER: ICD-10-CM

## 2021-09-03 DIAGNOSIS — G89.29 CHRONIC RIGHT-SIDED LOW BACK PAIN WITH RIGHT-SIDED SCIATICA: ICD-10-CM

## 2021-09-03 DIAGNOSIS — S32.040A CLOSED COMPRESSION FRACTURE OF L4 LUMBAR VERTEBRA, INITIAL ENCOUNTER (H): ICD-10-CM

## 2021-09-03 DIAGNOSIS — Z87.81 HISTORY OF COMPRESSION FRACTURE OF SPINE: ICD-10-CM

## 2021-09-03 PROCEDURE — 72100 X-RAY EXAM L-S SPINE 2/3 VWS: CPT

## 2021-09-15 ENCOUNTER — OFFICE VISIT (OUTPATIENT)
Dept: PHYSICAL MEDICINE AND REHAB | Facility: CLINIC | Age: 86
End: 2021-09-15
Payer: COMMERCIAL

## 2021-09-15 VITALS — DIASTOLIC BLOOD PRESSURE: 68 MMHG | SYSTOLIC BLOOD PRESSURE: 148 MMHG

## 2021-09-15 DIAGNOSIS — M54.41 CHRONIC RIGHT-SIDED LOW BACK PAIN WITH RIGHT-SIDED SCIATICA: ICD-10-CM

## 2021-09-15 DIAGNOSIS — M48.061 SPINAL STENOSIS OF LUMBAR REGION WITHOUT NEUROGENIC CLAUDICATION: ICD-10-CM

## 2021-09-15 DIAGNOSIS — Z87.81 HISTORY OF COMPRESSION FRACTURE OF SPINE: ICD-10-CM

## 2021-09-15 DIAGNOSIS — M79.18 MYOFASCIAL PAIN: ICD-10-CM

## 2021-09-15 DIAGNOSIS — S32.040S CLOSED COMPRESSION FRACTURE OF L4 LUMBAR VERTEBRA, SEQUELA: Primary | ICD-10-CM

## 2021-09-15 DIAGNOSIS — M80.00XD AGE-RELATED OSTEOPOROSIS WITH CURRENT PATHOLOGICAL FRACTURE WITH ROUTINE HEALING, SUBSEQUENT ENCOUNTER: ICD-10-CM

## 2021-09-15 DIAGNOSIS — G89.29 CHRONIC RIGHT-SIDED LOW BACK PAIN WITH RIGHT-SIDED SCIATICA: ICD-10-CM

## 2021-09-15 DIAGNOSIS — M54.16 RIGHT LUMBAR RADICULITIS: ICD-10-CM

## 2021-09-15 PROCEDURE — 99214 OFFICE O/P EST MOD 30 MIN: CPT | Performed by: NURSE PRACTITIONER

## 2021-09-15 RX ORDER — ACETAMINOPHEN 500 MG
500-1000 TABLET ORAL EVERY 8 HOURS PRN
Qty: 90 TABLET | Refills: 1 | Status: ON HOLD | OUTPATIENT
Start: 2021-09-15 | End: 2022-01-01

## 2021-09-15 ASSESSMENT — PAIN SCALES - GENERAL: PAINLEVEL: SEVERE PAIN (7)

## 2021-09-15 NOTE — PROGRESS NOTES
Assessment:     Diagnoses and all orders for this visit:  Closed compression fracture of L4 lumbar vertebra, sequela  -     Physical Therapy Referral; Future  History of compression fracture of spine  -     Physical Therapy Referral; Future  Age-related osteoporosis with current pathological fracture with routine healing, subsequent encounter  -     Physical Therapy Referral; Future  Right lumbar radiculitis  -     Physical Therapy Referral; Future  -     acetaminophen (TYLENOL) 500 MG tablet; Take 1-2 tablets (500-1,000 mg) by mouth every 8 hours as needed for pain  Spinal stenosis of lumbar region without neurogenic claudication  -     Physical Therapy Referral; Future  Chronic right-sided low back pain with right-sided sciatica  -     Physical Therapy Referral; Future  -     acetaminophen (TYLENOL) 500 MG tablet; Take 1-2 tablets (500-1,000 mg) by mouth every 8 hours as needed for pain  Myofascial pain  -     Physical Therapy Referral; Future     Chris Bell is a 86 year old y.o. female with past medical history significant for depression, hypertension, peptic ulcer disease, heart failure, anemia, osteoporosis, thyroid disease, T12 compression fracture with thoracic kyphosis, cancer of the soft palate who presents today for follow-up regarding:    -Acute on chronic right low back pain with right lumbar radiculitis.  Updated MRI with subacute L4 compression fracture, chronic T12 compression fracture, updated x-ray shows stability of the fracture.  Patient does have chronic severe spinal stenosis as well as right foraminal stenosis at L4-5.     Plan:     A shared decision making plan was used. The patient's values and choices were respected. Prior medical records were reviewed today. The following represents what was discussed and decided upon by the provider and the patient.        -DIAGNOSTIC TESTS: Images were personally reviewed and interpreted.   --No further x-rays recommended at this time unless symptoms  worsen or change at any time.  --Lumbar spine x-ray 9/3/2021 with unchanged T12 and L4 compression fractures.  Kyphosis T12 at 32 degrees.  Stable since prior x-ray.  --Lumbar spine x-ray 8/6/2021 with chronic T12 compression fracture.  L4 compression fracture appears stable since MRI 7/22/2021.  --Bone density scan 8/12/2021 with osteoporosis with high predicted future fracture risk.  --Lumbar spine MRI 7/14/2021 with acute/subacute L4 compression fracture 30 to 40% height loss with disc bulge and facet arthropathy with moderate to severe spinal stenosis, moderate right and mild left foraminal stenosis.  L4-5 severe spinal stenosis with severe right foraminal stenosis.  Chronic T12 compression fracture with accompanied angular kyphosis.  --Lumbar spine x-ray 1/27/2021 with mild convex curvature.  Chronic marked T12 compression fracture unchanged since 2018.  Possible subtle height loss at L3. Degenerative spondylolisthesis L5-S1 and 4 mm anterolisthesis L4-5 stable.  --Pelvic x-ray 1/27/2021 with no fracture identified, stable joint space.    --Lumbar spine MRI 2018 shows chronic anterolisthesis L4-5 with facet hypertrophy and ligamentum flavum thickening causing severe central canal stenosis with mild bilateral foraminal stenosis.  There is also unchanged compression deformity at T12 with retropulsion with kyphosis at this level.      -Patient is scheduled with Dr. Golden for osteoporosis evaluation and management 11/1/2021.    -INTERVENTIONS: Consider a right L4-5 TFESI if symptoms or not improving with at least 3 sessions of physical therapy.  -Patient is not interested in surgical intervention at this time.    -MEDICATIONS: Refilled Tylenol 500 mg 1 to 2 tablets 3 times daily as needed for pain.  Patient does not feel she needs codeine at this time.  Discussed side effects of medications and proper use. Patient verbalized understanding.    -PHYSICAL THERAPY: Referral to physical therapy placed to optimum for  osteoporosis, fracture management as well as radicular pain.  Discussed the importance of core strengthening, ROM, stretching exercises with the patient and how each of these entities is important in decreasing pain.  Explained to the patient that the purpose of physical therapy is to teach the patient a home exercise program.  These exercises need to be performed every day in order to decrease pain and prevent future occurrences of pain.        -PATIENT EDUCATION:  Total time of 32 minutes spent with the patient, reviewing the chart, placing orders, and documenting.   -Today we also discussed the issues related to the current COVID-19 pandemic, the pros and cons of the current treatment plan, the CDC guidelines such as social distancing, washing the hands, and covering the cough.    -FOLLOW UP: Follow-up in 4 to 6 weeks, sooner at any time if pain is worsening.  Advised to contact clinic if symptoms worsen or change.    Subjective:     Chris Bell is a 86 year old female who presents today for follow-up regarding chronic bilateral low back pain with right lumbar radiculitis radiating to the right lateral hip lateral thigh lateral knee as well as lateral shin, with slight improvement in the last 4 weeks.  Currently her pain is a constant 7/10, 10 at its worst, a 4 at its best.  She denies any recent trips or falls, does report generalized weakness right greater than left lower extremity.     was present during entire visit today.   Patient's daughter who speaks fluent English was present today during entire visit and added to past medical/surgical/family/social history and history of presenting illness.     Treatment to Date: No prior spinal surgery.     Bilateral L4-5 facet joint steroid injection 6/29/2018  Bilateral L4-5 TF ZAHRAA 7/31/2018 with minimal to no lasting benefit.  Preprocedure pain scale 8/10, post 5/10.  Bilateral L5-S1 TF ZAHRAA 8/16/2018 with 50% relief ×3 days only.  Preprocedure pain scale  8/10, most 6/10.     Medications:  Gabapentin 100 mg 1-1-1  Diclofenac gel     Prior medications:  Oxycodone  Tizanidine  Methocarbamol  Flexeril    Patient Active Problem List   Diagnosis     Anemia due to GI blood loss     Hypokalemia     PUD (peptic ulcer disease)     T12 compression fracture (H)     Accelerated hypertension     Abdominal pain     Abnormal finding on imaging     Kyphosis of thoracic region     Electrolyte imbalance     Episode of shaking     Bilateral hip pain     Edema     Heart failure with preserved ejection fraction (H)     Cancer of soft palate (H)       Current Outpatient Medications   Medication     acetaminophen (TYLENOL) 500 MG tablet     Alcohol Swabs (B-D SINGLE USE SWABS REGULAR) PADS     alum & mag hydroxide-simethicone (ANTACID LIQUID) 200-200-20 MG/5ML SUSP suspension     benzocaine (ORAJEL/ANBESOL) 10 % gel     blood glucose monitoring (ONETOUCH VERIO) meter device kit     calcium carbonate (TUMS) 500 MG chewable tablet     calcium carbonate (TUMS) 500 MG chewable tablet     clobetasol (TEMOVATE) 0.05 % external ointment     fentaNYL (DURAGESIC) 12 mcg/hr 72 hr patch     Ferrous Sulfate 324 (65 Fe) MG TBEC     furosemide (LASIX) 20 MG tablet     gabapentin (NEURONTIN) 100 MG capsule     levothyroxine (SYNTHROID/LEVOTHROID) 50 MCG tablet     lidocaine (XYLOCAINE) 2 % solution     lisinopril (PRINIVIL/ZESTRIL) 5 MG tablet     LORazepam (ATIVAN) 0.5 MG tablet     magic mouthwash (ENTER INGREDIENTS IN COMMENTS) suspension     magnesium hydroxide (MILK OF MAGNESIA) 400 MG/5ML suspension     magnesium oxide (MAG-OX) 400 MG tablet     Magnesium Oxide -Mg Supplement 250 MG tablet     methocarbamol (ROBAXIN) 500 MG tablet     metoprolol succinate ER (TOPROL-XL) 25 MG 24 hr tablet     mirtazapine (REMERON) 15 MG tablet     Multiple Vitamin (DAILY-CHRIS) TABS     omeprazole (PRILOSEC) 20 MG DR capsule     ONETOUCH VERIO IQ test strip     oxyCODONE (ROXICODONE) 5 MG/5ML solution      pantoprazole (PROTONIX) 40 MG EC tablet     potassium chloride ER (K-DUR/KLOR-CON M) 20 MEQ CR tablet     potassium chloride ER (K-TAB/KLOR-CON) 10 MEQ CR tablet     ranitidine (ZANTAC) 300 MG tablet     sodium chloride 1 GM tablet     sucralfate (CARAFATE) 1 GM tablet     traZODone (DESYREL) 50 MG tablet     triamcinolone (KENALOG) 0.1 % paste     vitamin B-12 (CYANOCOBALAMIN) 250 MCG tablet     No current facility-administered medications for this visit.       Allergies   Allergen Reactions     Unknown [No Clinical Screening - See Comments]        Past Medical History:   Diagnosis Date     Anemia      Depression      Disease of thyroid gland      HTN (hypertension)      Osteoporosis      PUD (peptic ulcer disease)         Review of Systems  ROS:  Specifically negative for bowel/bladder dysfunction, balance changes, headache, dizziness, foot drop, fevers, chills, appetite changes, nausea/vomiting, unexplained weight loss. Otherwise 13 systems reviewed are negative. Please see the patient's intake questionnaire from today for details.    Reviewed Social, Family, Past Medical and Past Surgical history with patient, no significant changes noted since prior visit.     Objective:     BP (!) 148/68 (BP Location: Right arm, Patient Position: Sitting)     PHYSICAL EXAMINATION:    --CONSTITUTIONAL: Well developed, well nourished, healthy appearing individual.  --PSYCHIATRIC: Appropriate mood and affect. No difficulty interacting due to temper, social withdrawal, or memory issues.  --SKIN: Lumbar region is dry and intact.   --RESPIRATORY: Normal rhythm and effort. No abnormal accessory muscle breathing patterns noted.   --MUSCULOSKELETAL:  Normal lumbar lordosis noted, no lateral shift.  --GROSS MOTOR: Easily arises from a seated position. Gait is non-antalgic  --LUMBAR SPINE:  Inspection reveals no evidence of deformity. Range of motion is not limited in lumbar flexion, extension, lateral rotation. No tenderness to  palpation lumbar spine. Straight leg raising in the supine position is negative to radicular pain. Sciatic notch non-tender.     RESULTS:   Imaging: Lumbar spine imaging was reviewed today. The images were shown to the patient and the findings were explained using a spine model.      XR Lumbar Spine 2/3 Views  Result Date: 9/3/2021  EXAM: XR LUMBAR SPINE 2-3 VIEWS LOCATION: [facility] DATE/TIME: 9/3/2021 10:50 AM INDICATION: Evaluate L4 compression fracture stability, compare with previous Xray COMPARISON: 08/06/2021. TECHNIQUE: CR Lumbar Spine.   IMPRESSION: 5 lumbar vertebrae. Thoracolumbar dextrocurvature. Degenerative grade 1 anterolisthesis at L4-L5. Unchanged T12 and L4 compression deformities. Focal kyphosis at T12 measures 32 degrees, similar to the prior radiograph No new fracture. Diffuse osseous demineralization. Unchanged spondylosis.      XR Lumbar Spine 2/3 Views  Result Date: 8/6/2021  EXAM: XR LUMBAR SPINE 2-3 VIEWS LOCATION: [facility] DATE/TIME: 8/6/2021 9:48 AM INDICATION: Standing Xray: Evaluate L4 Compression Fracture COMPARISON: X-ray 01/27/2021. MRI 07/22/2021 TECHNIQUE: CR Lumbar Spine.   IMPRESSION: 5 lumbar type vertebra. Thoracolumbar levocurvature similar to previous exams. Moderate central vertebral compression at L4 is new since the previous radiographs, but is relatively similar to findings on previous MRI given differences in technique. Chronic marked anterior wedge deformity of T12 with 33 degrees segmental kyphosis centered at this level. No definite new fracture or progressive vertebral height loss identified. Mild diffuse lumbar spondylosis including mild to moderate multilevel facet arthropathy, greatest at L4-L5.        DX Hip/Pelvis/Spine  Result Date: 8/15/2021  8/12/2021 RE: Chris Bell YOB: 1935 Dear Jennifer Knight, Patient Profile: 86 year old female, postmenopausal, is here for the follow up bone density  test. History of fractures - Yes; Thoracic vertebrae. Family history of osteoporosis - None.  Family history of hip fracture: None. Smoking history - No. Osteoporosis treatment past -  No. Osteoporosis treatment current - No.  Chronic medical problems - Hysterectomy. High risk medications -  Anti-seizure; Yes, Currently. Assessment: 1. The spine bone density is best assessed using L1-L2 with T-score of -2.6. 2. Femoral bone densities show left femoral neck T- score of -2.4, and right total hip T-score of -2.8. 3.  Since the scan in 2018, bone density has declined a significant 8.7% in the right total hip and 5.2% in the left total hip.  At L1-L2 the AP spine, bone density has not significantly changed. 4.  The trabecular bone score reflects poor micro architecture. 86 year old female with OSTEOPOROSIS and HIGH predicted future fracture risk.  Assuming that her vertebral fractures are true fragility fractures, osteoporosis is judged as severe. Recommendations: Further evaluation and therapeutic intervention is advised with a recheck in 1 year. Bone densitometry was performed on your patient using our THUBIT densitometer. The results are summarized and a copy of the actual scans are included for your review. In conformity with the International Society of Clinical Densitometry's most recent position statement for DXA interpretation (2015), the diagnosis will be made on the lowest measured T-score of the lumbar spine, femoral neck, total proximal femur or 33% radius. Note the change in terminology for diagnostic classification from OSTEOPENIA to LOW BONE MASS. All trending for sequential exams will be done using multiple vertebrae or the total proximal femur. Fracture risk is based on the WHO Fracture Risk Assessment Tool (FRAX). If additional information is needed or if you would like to discuss the results, please do not hesitate to call me. Thank you for referring this patient to [facility]. We are happy to be of service in support of you and your practice. If you have any questions or suggestions to improve our service, please call me at [phone]. Sincerely, Rosales Case M.D. LEON. [facility]oporosis [facility], [facility]        MR Lumbar Spine w/o Contrast  Result Date: 7/23/2021  EXAM: MR LUMBAR SPINE W/O CONTRAST LOCATION: [facility] DATE/TIME: 7/22/2021 7:20 PM INDICATION: Low back pain, > 6 wks. COMPARISON: Radiograph 01/27/2021. MRI 07/20/2018. TECHNIQUE: Routine Lumbar Spine MRI without IV contrast. FINDINGS: Nomenclature is based on 5 lumbar-type vertebral bodies. Severe chronic T12 vertebral body height loss with retropulsion is unchanged. There is 30-40% height loss of the L4 vertebral body with diffuse accompanying edema extending into the pedicles. This is compatible with an acute-to-subacute fracture. There is mild edema along the L5 superior endplate, eccentric to the right, without significant height loss. The imaged portions of the proximal femora, sacrum and rm appear intact. There is angular kyphosis at the T11-T12 level, due to T12 vertebral body height loss described above. Mild L4-L5 anterolisthesis and minimal L2-L3 retrolisthesis are noted. There is mild dextroconvex curvature. The conus terminates at L2. No signal abnormalities of the imaged cord or cauda equina nerve roots are demonstrated. Paraspinal soft tissues are unremarkable. Limited images of the abdominal cavity demonstrate no acute abnormality. T12-L1: Preserved intervertebral disc height. No significant posterior disc abnormality. No significant stenosis of the spinal canal or neural foramina. L1-L2: Normal disc height and signal. No herniation. Normal facets. No spinal canal or neural foraminal stenosis. L2-L3: Mild intervertebral disc height loss with disc desiccation. Circumferential disc bulging. No significant facet arthropathy. Mild spinal canal  stenosis. No right neural foraminal stenosis. Mild left neural foraminal stenosis. L3-L4: Preserved intervertebral disc height. Mildly elevated disc signal likely represents disc injury related to fracture described above. Shallow broad-based bulge. Mild to moderate bilateral facet arthropathy. Moderate to severe spinal canal stenosis.  Moderate right and mild left neural foraminal stenosis. L4-L5: Anterolisthesis as above with unroofing of the dorsal disc margin. L4 fracture as above. Circumferential disc bulging. Moderate facet arthropathy. Severe spinal canal stenosis. Severe right and mild left neural foraminal stenosis. L5-S1: Unremarkable disc height and signal. No herniation. Mild facet arthropathy. No spinal canal stenosis. No neural foraminal stenosis.   IMPRESSION: 1.  Acute to subacute L4 compression fracture with 30-40% height loss. 2.  Mild edema along the left L5 superior endplate without significant height loss. This may represent subtle superior endplate fracture or reactive change. 3.  Stable chronic severe T12 compression fracture. 4.  At L3-L4, there is moderate-to-severe spinal canal stenosis and moderate right neural foraminal stenosis. 5.  At L4-L5, there is severe spinal canal stenosis and severe right neural foraminal stenosis. Additional findings as above.        Mr Lumbar Spine Without Contrast  Result Date: 7/20/2018  INDICATION: Low back pain radiating into the bilateral hips and tingling in both legs. Status post L4-L5 facet joint injections bilaterally 6/29/2018. History of T12 compression fracture. TECHNIQUE: Without IV contrast. CONTRAST: None. COMPARISON: 4/20/2018 thoracic spine MRI. FINDINGS: Nomenclature is based on 5 lumbar-type vertebral bodies. Sagittal field-of-view extends from the level of T10 to the level of S3. No significant change in T12 vertebra plana with unchanged retropulsion and associated focal thoracolumbar kyphosis. No new fracture. Remaining vertebral bodies are  normal in height. Unchanged grade 1 anterolisthesis at L4-L5. No pars defects. Except for the T12 vertebral body, expected marrow signal. The conus tip is identified at L2. Cauda equina is normal. Grossly normal paraspinal soft tissues. No abdominal aortic aneurysm. The visualized portions of the bony pelvis are normal for age. T11-T12: Unchanged posterior retropulsion of the superior T12 vertebral body, resulting in unchanged contouring of the spinal cord. No abnormal cord signal. No neural foraminal narrowing.   T12-L1: Normal disc height and signal. No herniation. No facet arthropathy. No spinal canal stenosis. No right neural foraminal stenosis. No left neural foraminal stenosis.   L1-L2: Normal disc height and signal. No herniation. No facet arthropathy. No spinal canal stenosis. No right neural foraminal stenosis. No left neural foraminal stenosis.   L2-L3: Disc desiccation, height loss, and disc bulge. Dorsal epidural lipomatosis, ligamentum flavum thickening, and bilateral facet arthropathy. Moderate subarticular zone narrowing. Mild central spinal canal stenosis. No right neural foraminal stenosis. No left neural foraminal stenosis.   L3-L4: Disc desiccation, height loss, and disc bulge. Ligamentum flavum thickening. Bilateral facet arthropathy. Moderate subarticular zone narrowing and moderate central spinal canal stenosis. Mild right neural foraminal stenosis. Mild left neural foraminal stenosis.   L4-L5: Disc desiccation, height loss, and disc bulge without significant cephalad migration of the superiorly uncovered disc. Ligamentum flavum thickening. Bilateral facet arthropathy. Severe spinal canal stenosis. Mild right neural foraminal stenosis. Mild left neural foraminal stenosis.   L5-S1: Normal disc height and signal. No herniation. Bilateral facet arthropathy. No spinal canal stenosis. No right neural foraminal stenosis. No left neural foraminal stenosis.   CONCLUSION:   1.  Disc bulge, ligamentum  flavum thickening, and facet arthropathy contribute to cause severe spinal canal narrowing at L4-L5. Unchanged grade 1 anterolisthesis at L4-L5.   2.  Unchanged compression deformity of the T12 vertebral body with retropulsed fracture fragment that contours the ventral spinal cord. No abnormal cord signal. Unchanged resultant focal thoracolumbar kyphosis.   3.  Otherwise, multilevel lumbar spondylosis. No additional level of severe spinal canal narrowing. No severe neural foraminal narrowing.

## 2021-09-15 NOTE — PATIENT INSTRUCTIONS
~Please call our [facility] Nurse Navigation line [phone] with any questions or concerns about your treatment plan, if symptoms worsen and you would like to be seen urgently, or if you have problems controlling bladder and bowel function.        ~You have been referred for Physical Therapy to [facility]. They will call you to schedule an appointment.      Scheduling phone number is [phone] for [facility], or [place] location.  If you have not heard from the scheduling office within 2 business days, please call [phone] for ALL other locations.    Discussed the importance of core strengthening, ROM, stretching exercises and how each of these entities is important in decreasing pain and improving long term spine health.  The purpose of physical therapy is to teach you an individualized home exercise program.  These exercises need to be performed every day in order to decrease pain and prevent future occurrences of pain.

## 2021-09-15 NOTE — LETTER
9/15/2021         RE: Chris Bell  [address]  [address]  [facility] [address]        Dear Colleague,    Thank you for referring your patient, Chris Bell, to the [facility] [place]. Please see a copy of my visit note below.      Assessment:     Diagnoses and all orders for this visit:  Closed compression fracture of L4 lumbar vertebra, sequela  -     Physical Therapy Referral; Future  History of compression fracture of spine  -     Physical Therapy Referral; Future  Age-related osteoporosis with current pathological fracture with routine healing, subsequent encounter  -     Physical Therapy Referral; Future  Right lumbar radiculitis  -     Physical Therapy Referral; Future  -     acetaminophen (TYLENOL) 500 MG tablet; Take 1-2 tablets (500-1,000 mg) by mouth every 8 hours as needed for pain  Spinal stenosis of lumbar region without neurogenic claudication  -     Physical Therapy Referral; Future  Chronic right-sided low back pain with right-sided sciatica  -     Physical Therapy Referral; Future  -     acetaminophen (TYLENOL) 500 MG tablet; Take 1-2 tablets (500-1,000 mg) by mouth every 8 hours as needed for pain  Myofascial pain  -     Physical Therapy Referral; Future     Chris Bell is a 86 year old y.o. female with past medical history significant for depression, hypertension, peptic ulcer disease, heart failure, anemia, osteoporosis, thyroid disease, T12 compression fracture with thoracic kyphosis, cancer of the soft palate who presents today for follow-up regarding:    -Acute on chronic right low back pain with right lumbar radiculitis.  Updated MRI with subacute L4 compression fracture, chronic T12 compression fracture, updated x-ray shows stability of the fracture.  Patient does have chronic severe spinal stenosis as well as right foraminal stenosis at L4-5.     Plan:     A shared decision making plan was used. The patient's values and choices were respected. Prior medical  records were reviewed today. The following represents what was discussed and decided upon by the provider and the patient.        -DIAGNOSTIC TESTS: Images were personally reviewed and interpreted.   --No further x-rays recommended at this time unless symptoms worsen or change at any time.  --Lumbar spine x-ray 9/3/2021 with unchanged T12 and L4 compression fractures.  Kyphosis T12 at 32 degrees.  Stable since prior x-ray.  --Lumbar spine x-ray 8/6/2021 with chronic T12 compression fracture.  L4 compression fracture appears stable since MRI 7/22/2021.  --Bone density scan 8/12/2021 with osteoporosis with high predicted future fracture risk.  --Lumbar spine MRI 7/14/2021 with acute/subacute L4 compression fracture 30 to 40% height loss with disc bulge and facet arthropathy with moderate to severe spinal stenosis, moderate right and mild left foraminal stenosis.  L4-5 severe spinal stenosis with severe right foraminal stenosis.  Chronic T12 compression fracture with accompanied angular kyphosis.  --Lumbar spine x-ray 1/27/2021 with mild convex curvature.  Chronic marked T12 compression fracture unchanged since 2018.  Possible subtle height loss at L3. Degenerative spondylolisthesis L5-S1 and 4 mm anterolisthesis L4-5 stable.  --Pelvic x-ray 1/27/2021 with no fracture identified, stable joint space.    --Lumbar spine MRI 2018 shows chronic anterolisthesis L4-5 with facet hypertrophy and ligamentum flavum thickening causing severe central canal stenosis with mild bilateral foraminal stenosis.  There is also unchanged compression deformity at T12 with retropulsion with kyphosis at this level.      -Patient is scheduled with Dr. Golden for osteoporosis evaluation and management 11/1/2021.    -INTERVENTIONS: Consider a right L4-5 TFESI if symptoms or not improving with at least 3 sessions of physical therapy.  -Patient is not interested in surgical intervention at this time.    -MEDICATIONS: Refilled Tylenol 500 mg 1 to 2  tablets 3 times daily as needed for pain.  Patient does not feel she needs codeine at this time.  Discussed side effects of medications and proper use. Patient verbalized understanding.    -PHYSICAL THERAPY: Referral to physical therapy placed to optimum for osteoporosis, fracture management as well as radicular pain.  Discussed the importance of core strengthening, ROM, stretching exercises with the patient and how each of these entities is important in decreasing pain.  Explained to the patient that the purpose of physical therapy is to teach the patient a home exercise program.  These exercises need to be performed every day in order to decrease pain and prevent future occurrences of pain.        -PATIENT EDUCATION:  Total time of 32 minutes spent with the patient, reviewing the chart, placing orders, and documenting.   -Today we also discussed the issues related to the current COVID-19 pandemic, the pros and cons of the current treatment plan, the CDC guidelines such as social distancing, washing the hands, and covering the cough.    -FOLLOW UP: Follow-up in 4 to 6 weeks, sooner at any time if pain is worsening.  Advised to contact clinic if symptoms worsen or change.    Subjective:     Chris Bell is a 86 year old female who presents today for follow-up regarding chronic bilateral low back pain with right lumbar radiculitis radiating to the right lateral hip lateral thigh lateral knee as well as lateral shin, with slight improvement in the last 4 weeks.  Currently her pain is a constant 7/10, 10 at its worst, a 4 at its best.  She denies any recent trips or falls, does report generalized weakness right greater than left lower extremity.     was present during entire visit today.   Patient's daughter who speaks fluent English was present today during entire visit and added to past medical/surgical/family/social history and history of presenting illness.     Treatment to Date: No prior spinal  surgery.     Bilateral L4-5 facet joint steroid injection 6/29/2018  Bilateral L4-5 TF ZAHRAA 7/31/2018 with minimal to no lasting benefit.  Preprocedure pain scale 8/10, post 5/10.  Bilateral L5-S1 TF ZAHRAA 8/16/2018 with 50% relief ×3 days only.  Preprocedure pain scale 8/10, most 6/10.     Medications:  Gabapentin 100 mg 1-1-1  Diclofenac gel     Prior medications:  Oxycodone  Tizanidine  Methocarbamol  Flexeril    Patient Active Problem List   Diagnosis     Anemia due to GI blood loss     Hypokalemia     PUD (peptic ulcer disease)     T12 compression fracture (H)     Accelerated hypertension     Abdominal pain     Abnormal finding on imaging     Kyphosis of thoracic region     Electrolyte imbalance     Episode of shaking     Bilateral hip pain     Edema     Heart failure with preserved ejection fraction (H)     Cancer of soft palate (H)       Current Outpatient Medications   Medication     acetaminophen (TYLENOL) 500 MG tablet     Alcohol Swabs (B-D SINGLE USE SWABS REGULAR) PADS     alum & mag hydroxide-simethicone (ANTACID LIQUID) 200-200-20 MG/5ML SUSP suspension     benzocaine (ORAJEL/ANBESOL) 10 % gel     blood glucose monitoring (ONETOUCH VERIO) meter device kit     calcium carbonate (TUMS) 500 MG chewable tablet     calcium carbonate (TUMS) 500 MG chewable tablet     clobetasol (TEMOVATE) 0.05 % external ointment     fentaNYL (DURAGESIC) 12 mcg/hr 72 hr patch     Ferrous Sulfate 324 (65 Fe) MG TBEC     furosemide (LASIX) 20 MG tablet     gabapentin (NEURONTIN) 100 MG capsule     levothyroxine (SYNTHROID/LEVOTHROID) 50 MCG tablet     lidocaine (XYLOCAINE) 2 % solution     lisinopril (PRINIVIL/ZESTRIL) 5 MG tablet     LORazepam (ATIVAN) 0.5 MG tablet     magic mouthwash (ENTER INGREDIENTS IN COMMENTS) suspension     magnesium hydroxide (MILK OF MAGNESIA) 400 MG/5ML suspension     magnesium oxide (MAG-OX) 400 MG tablet     Magnesium Oxide -Mg Supplement 250 MG tablet     methocarbamol (ROBAXIN) 500 MG tablet      metoprolol succinate ER (TOPROL-XL) 25 MG 24 hr tablet     mirtazapine (REMERON) 15 MG tablet     Multiple Vitamin (DAILY-CHRIS) TABS     omeprazole (PRILOSEC) 20 MG DR capsule     ONETOUCH VERIO IQ test strip     oxyCODONE (ROXICODONE) 5 MG/5ML solution     pantoprazole (PROTONIX) 40 MG EC tablet     potassium chloride ER (K-DUR/KLOR-CON M) 20 MEQ CR tablet     potassium chloride ER (K-TAB/KLOR-CON) 10 MEQ CR tablet     ranitidine (ZANTAC) 300 MG tablet     sodium chloride 1 GM tablet     sucralfate (CARAFATE) 1 GM tablet     traZODone (DESYREL) 50 MG tablet     triamcinolone (KENALOG) 0.1 % paste     vitamin B-12 (CYANOCOBALAMIN) 250 MCG tablet     No current facility-administered medications for this visit.       Allergies   Allergen Reactions     Unknown [No Clinical Screening - See Comments]        Past Medical History:   Diagnosis Date     Anemia      Depression      Disease of thyroid gland      HTN (hypertension)      Osteoporosis      PUD (peptic ulcer disease)         Review of Systems  ROS:  Specifically negative for bowel/bladder dysfunction, balance changes, headache, dizziness, foot drop, fevers, chills, appetite changes, nausea/vomiting, unexplained weight loss. Otherwise 13 systems reviewed are negative. Please see the patient's intake questionnaire from today for details.    Reviewed Social, Family, Past Medical and Past Surgical history with patient, no significant changes noted since prior visit.     Objective:     BP (!) 148/68 (BP Location: Right arm, Patient Position: Sitting)     PHYSICAL EXAMINATION:    --CONSTITUTIONAL: Well developed, well nourished, healthy appearing individual.  --PSYCHIATRIC: Appropriate mood and affect. No difficulty interacting due to temper, social withdrawal, or memory issues.  --SKIN: Lumbar region is dry and intact.   --RESPIRATORY: Normal rhythm and effort. No abnormal accessory muscle breathing patterns noted.   --MUSCULOSKELETAL:  Normal lumbar lordosis noted, no  lateral shift.  --GROSS MOTOR: Easily arises from a seated position. Gait is non-antalgic  --LUMBAR SPINE:  Inspection reveals no evidence of deformity. Range of motion is not limited in lumbar flexion, extension, lateral rotation. No tenderness to palpation lumbar spine. Straight leg raising in the supine position is negative to radicular pain. Sciatic notch non-tender.     RESULTS:   Imaging: Lumbar spine imaging was reviewed today. The images were shown to the patient and the findings were explained using a spine model.      XR Lumbar Spine 2/3 Views  Result Date: 9/3/2021  EXAM: XR LUMBAR SPINE 2-3 VIEWS LOCATION: [facility] DATE/TIME: 9/3/2021 10:50 AM INDICATION: Evaluate L4 compression fracture stability, compare with previous Xray COMPARISON: 08/06/2021. TECHNIQUE: CR Lumbar Spine.   IMPRESSION: 5 lumbar vertebrae. Thoracolumbar dextrocurvature. Degenerative grade 1 anterolisthesis at L4-L5. Unchanged T12 and L4 compression deformities. Focal kyphosis at T12 measures 32 degrees, similar to the prior radiograph No new fracture. Diffuse osseous demineralization. Unchanged spondylosis.      XR Lumbar Spine 2/3 Views  Result Date: 8/6/2021  EXAM: XR LUMBAR SPINE 2-3 VIEWS LOCATION: [facility] DATE/TIME: 8/6/2021 9:48 AM INDICATION: Standing Xray: Evaluate L4 Compression Fracture COMPARISON: X-ray 01/27/2021. MRI 07/22/2021 TECHNIQUE: CR Lumbar Spine.   IMPRESSION: 5 lumbar type vertebra. Thoracolumbar levocurvature similar to previous exams. Moderate central vertebral compression at L4 is new since the previous radiographs, but is relatively similar to findings on previous MRI given differences in technique. Chronic marked anterior wedge deformity of T12 with 33 degrees segmental kyphosis centered at this level. No definite new fracture or progressive vertebral height loss identified. Mild diffuse lumbar spondylosis including mild to moderate multilevel facet  arthropathy, greatest at L4-L5.        DX Hip/Pelvis/Spine  Result Date: 8/15/2021  8/12/2021 RE: Chris Bell YOB: 1935 Dear Jennifer Knight, Patient Profile: 86 year old female, postmenopausal, is here for the follow up bone density test. History of fractures - Yes; Thoracic vertebrae. Family history of osteoporosis - None.  Family history of hip fracture: None. Smoking history - No. Osteoporosis treatment past -  No. Osteoporosis treatment current - No.  Chronic medical problems - Hysterectomy. High risk medications -  Anti-seizure; Yes, Currently. Assessment: 1. The spine bone density is best assessed using L1-L2 with T-score of -2.6. 2. Femoral bone densities show left femoral neck T- score of -2.4, and right total hip T-score of -2.8. 3.  Since the scan in 2018, bone density has declined a significant 8.7% in the right total hip and 5.2% in the left total hip.  At L1-L2 the AP spine, bone density has not significantly changed. 4.  The trabecular bone score reflects poor micro architecture. 86 year old female with OSTEOPOROSIS and HIGH predicted future fracture risk.  Assuming that her vertebral fractures are true fragility fractures, osteoporosis is judged as severe. Recommendations: Further evaluation and therapeutic intervention is advised with a recheck in 1 year. Bone densitometry was performed on your patient using our Eurocept densitometer. The results are summarized and a copy of the actual scans are included for your review. In conformity with the International Society of Clinical Densitometry's most recent position statement for DXA interpretation (2015), the diagnosis will be made on the lowest measured T-score of the lumbar spine, femoral neck, total proximal femur or 33% radius. Note the change in terminology for diagnostic classification from OSTEOPENIA to LOW BONE MASS. All trending for sequential exams will be done using multiple vertebrae or the total proximal femur. Fracture  risk is based on the WHO Fracture Risk Assessment Tool (FRAX). If additional information is needed or if you would like to discuss the results, please do not hesitate to call me. Thank you for referring this patient to [facility]. We are happy to be of service in support of you and your practice. If you have any questions or suggestions to improve our service, please call me at [phone]. Sincerely, DAVID Barraza. Osteoporosis Services, [facility]        MR Lumbar Spine w/o Contrast  Result Date: 7/23/2021  EXAM: MR LUMBAR SPINE W/O CONTRAST LOCATION: [facility] DATE/TIME: 7/22/2021 7:20 PM INDICATION: Low back pain, > 6 wks. COMPARISON: Radiograph 01/27/2021. MRI 07/20/2018. TECHNIQUE: Routine Lumbar Spine MRI without IV contrast. FINDINGS: Nomenclature is based on 5 lumbar-type vertebral bodies. Severe chronic T12 vertebral body height loss with retropulsion is unchanged. There is 30-40% height loss of the L4 vertebral body with diffuse accompanying edema extending into the pedicles. This is compatible with an acute-to-subacute fracture. There is mild edema along the L5 superior endplate, eccentric to the right, without significant height loss. The imaged portions of the proximal femora, sacrum and rm appear intact. There is angular kyphosis at the T11-T12 level, due to T12 vertebral body height loss described above. Mild L4-L5 anterolisthesis and minimal L2-L3 retrolisthesis are noted. There is mild dextroconvex curvature. The conus terminates at L2. No signal abnormalities of the imaged cord or cauda equina nerve roots are demonstrated. Paraspinal soft tissues are unremarkable. Limited images of the abdominal cavity demonstrate no acute abnormality. T12-L1: Preserved intervertebral disc height. No significant posterior disc abnormality. No significant stenosis of the spinal canal or neural foramina. L1-L2: Normal disc height and  signal. No herniation. Normal facets. No spinal canal or neural foraminal stenosis. L2-L3: Mild intervertebral disc height loss with disc desiccation. Circumferential disc bulging. No significant facet arthropathy. Mild spinal canal stenosis. No right neural foraminal stenosis. Mild left neural foraminal stenosis. L3-L4: Preserved intervertebral disc height. Mildly elevated disc signal likely represents disc injury related to fracture described above. Shallow broad-based bulge. Mild to moderate bilateral facet arthropathy. Moderate to severe spinal canal stenosis.  Moderate right and mild left neural foraminal stenosis. L4-L5: Anterolisthesis as above with unroofing of the dorsal disc margin. L4 fracture as above. Circumferential disc bulging. Moderate facet arthropathy. Severe spinal canal stenosis. Severe right and mild left neural foraminal stenosis. L5-S1: Unremarkable disc height and signal. No herniation. Mild facet arthropathy. No spinal canal stenosis. No neural foraminal stenosis.   IMPRESSION: 1.  Acute to subacute L4 compression fracture with 30-40% height loss. 2.  Mild edema along the left L5 superior endplate without significant height loss. This may represent subtle superior endplate fracture or reactive change. 3.  Stable chronic severe T12 compression fracture. 4.  At L3-L4, there is moderate-to-severe spinal canal stenosis and moderate right neural foraminal stenosis. 5.  At L4-L5, there is severe spinal canal stenosis and severe right neural foraminal stenosis. Additional findings as above.        Mr Lumbar Spine Without Contrast  Result Date: 7/20/2018  INDICATION: Low back pain radiating into the bilateral hips and tingling in both legs. Status post L4-L5 facet joint injections bilaterally 6/29/2018. History of T12 compression fracture. TECHNIQUE: Without IV contrast. CONTRAST: None. COMPARISON: 4/20/2018 thoracic spine MRI. FINDINGS: Nomenclature is based on 5 lumbar-type vertebral bodies.  Sagittal field-of-view extends from the level of T10 to the level of S3. No significant change in T12 vertebra plana with unchanged retropulsion and associated focal thoracolumbar kyphosis. No new fracture. Remaining vertebral bodies are normal in height. Unchanged grade 1 anterolisthesis at L4-L5. No pars defects. Except for the T12 vertebral body, expected marrow signal. The conus tip is identified at L2. Cauda equina is normal. Grossly normal paraspinal soft tissues. No abdominal aortic aneurysm. The visualized portions of the bony pelvis are normal for age. T11-T12: Unchanged posterior retropulsion of the superior T12 vertebral body, resulting in unchanged contouring of the spinal cord. No abnormal cord signal. No neural foraminal narrowing.   T12-L1: Normal disc height and signal. No herniation. No facet arthropathy. No spinal canal stenosis. No right neural foraminal stenosis. No left neural foraminal stenosis.   L1-L2: Normal disc height and signal. No herniation. No facet arthropathy. No spinal canal stenosis. No right neural foraminal stenosis. No left neural foraminal stenosis.   L2-L3: Disc desiccation, height loss, and disc bulge. Dorsal epidural lipomatosis, ligamentum flavum thickening, and bilateral facet arthropathy. Moderate subarticular zone narrowing. Mild central spinal canal stenosis. No right neural foraminal stenosis. No left neural foraminal stenosis.   L3-L4: Disc desiccation, height loss, and disc bulge. Ligamentum flavum thickening. Bilateral facet arthropathy. Moderate subarticular zone narrowing and moderate central spinal canal stenosis. Mild right neural foraminal stenosis. Mild left neural foraminal stenosis.   L4-L5: Disc desiccation, height loss, and disc bulge without significant cephalad migration of the superiorly uncovered disc. Ligamentum flavum thickening. Bilateral facet arthropathy. Severe spinal canal stenosis. Mild right neural foraminal stenosis. Mild left neural  foraminal stenosis.   L5-S1: Normal disc height and signal. No herniation. Bilateral facet arthropathy. No spinal canal stenosis. No right neural foraminal stenosis. No left neural foraminal stenosis.   CONCLUSION:   1.  Disc bulge, ligamentum flavum thickening, and facet arthropathy contribute to cause severe spinal canal narrowing at L4-L5. Unchanged grade 1 anterolisthesis at L4-L5.   2.  Unchanged compression deformity of the T12 vertebral body with retropulsed fracture fragment that contours the ventral spinal cord. No abnormal cord signal. Unchanged resultant focal thoracolumbar kyphosis.   3.  Otherwise, multilevel lumbar spondylosis. No additional level of severe spinal canal narrowing. No severe neural foraminal narrowing.                            Again, thank you for allowing me to participate in the care of your patient.        Sincerely,        Jennifer Knight, CNP

## 2021-10-27 ENCOUNTER — TRANSCRIBE ORDERS (OUTPATIENT)
Dept: PHYSICAL MEDICINE AND REHAB | Facility: CLINIC | Age: 86
End: 2021-10-27

## 2021-10-27 ENCOUNTER — OFFICE VISIT (OUTPATIENT)
Dept: PHYSICAL MEDICINE AND REHAB | Facility: CLINIC | Age: 86
End: 2021-10-27
Payer: COMMERCIAL

## 2021-10-27 ENCOUNTER — TELEPHONE (OUTPATIENT)
Dept: PHYSICAL MEDICINE AND REHAB | Facility: CLINIC | Age: 86
End: 2021-10-27

## 2021-10-27 VITALS — SYSTOLIC BLOOD PRESSURE: 124 MMHG | DIASTOLIC BLOOD PRESSURE: 58 MMHG

## 2021-10-27 DIAGNOSIS — M54.41 CHRONIC RIGHT-SIDED LOW BACK PAIN WITH RIGHT-SIDED SCIATICA: Primary | ICD-10-CM

## 2021-10-27 DIAGNOSIS — M80.00XD AGE-RELATED OSTEOPOROSIS WITH CURRENT PATHOLOGICAL FRACTURE WITH ROUTINE HEALING, SUBSEQUENT ENCOUNTER: ICD-10-CM

## 2021-10-27 DIAGNOSIS — Z87.81 HISTORY OF COMPRESSION FRACTURE OF SPINE: ICD-10-CM

## 2021-10-27 DIAGNOSIS — S32.040A CLOSED COMPRESSION FRACTURE OF L4 LUMBAR VERTEBRA, INITIAL ENCOUNTER (H): ICD-10-CM

## 2021-10-27 DIAGNOSIS — M54.16 RIGHT LUMBAR RADICULITIS: ICD-10-CM

## 2021-10-27 DIAGNOSIS — M80.00XA AGE-RELATED OSTEOPOROSIS WITH CURRENT PATHOLOGICAL FRACTURE, INITIAL ENCOUNTER: ICD-10-CM

## 2021-10-27 DIAGNOSIS — G89.29 CHRONIC RIGHT-SIDED LOW BACK PAIN WITH RIGHT-SIDED SCIATICA: Primary | ICD-10-CM

## 2021-10-27 PROCEDURE — 99215 OFFICE O/P EST HI 40 MIN: CPT | Performed by: NURSE PRACTITIONER

## 2021-10-27 RX ORDER — METOPROLOL SUCCINATE 50 MG/1
100 TABLET, EXTENDED RELEASE ORAL DAILY
COMMUNITY

## 2021-10-27 ASSESSMENT — PAIN SCALES - GENERAL: PAINLEVEL: SEVERE PAIN (6)

## 2021-10-27 NOTE — LETTER
10/27/2021         RE: Chris Bell  [address]  [address]  [facility] [id number]        Dear Colleague,    Thank you for referring your patient, Chris Bell, to the [facility] [place]. Please see a copy of my visit note below.      Assessment:     Diagnoses and all orders for this visit:  Chronic right-sided low back pain with right-sided sciatica  Right lumbar radiculitis  Age-related osteoporosis with current pathological fracture with routine healing, subsequent encounter     Chris Bell is a 86 year old y.o. female with past medical history significant for depression, hypertension, peptic ulcer disease, heart failure, anemia, osteoporosis, thyroid disease, T12 compression fracture with thoracic kyphosis, cancer of the soft palate who presents today for follow-up regarding:    -Acute on chronic improving right low back pain with right lumbar radiculitis.  Updated MRI with subacute L4 compression fracture, chronic T12 compression fracture, updated x-ray shows stability of the fracture.  Patient does have chronic severe spinal stenosis as well as right foraminal stenosis at L4-5.     Plan:     A shared decision making plan was used. The patient's values and choices were respected. Prior medical records were reviewed today. The following represents what was discussed and decided upon by the provider and the patient.        -DIAGNOSTIC TESTS: Images were personally reviewed and interpreted.   --No further x-rays recommended at this time unless symptoms worsen or change at any time.  --Lumbar spine x-ray 9/3/2021 with unchanged T12 and L4 compression fractures.  Kyphosis T12 at 32 degrees.  Stable since prior x-ray.  --Lumbar spine x-ray 8/6/2021 with chronic T12 compression fracture.  L4 compression fracture appears stable since MRI 7/22/2021.  --Bone density scan 8/12/2021 with osteoporosis with high predicted future fracture risk.  --Lumbar spine MRI 7/14/2021 with acute/subacute L4  compression fracture 30 to 40% height loss with disc bulge and facet arthropathy with moderate to severe spinal stenosis, moderate right and mild left foraminal stenosis.  L4-5 severe spinal stenosis with severe right foraminal stenosis.  Chronic T12 compression fracture with accompanied angular kyphosis.  --Lumbar spine x-ray 1/27/2021 with mild convex curvature.  Chronic marked T12 compression fracture unchanged since 2018.  Possible subtle height loss at L3. Degenerative spondylolisthesis L5-S1 and 4 mm anterolisthesis L4-5 stable.  --Pelvic x-ray 1/27/2021 with no fracture identified, stable joint space.    --Lumbar spine MRI 2018 shows chronic anterolisthesis L4-5 with facet hypertrophy and ligamentum flavum thickening causing severe central canal stenosis with mild bilateral foraminal stenosis.  There is also unchanged compression deformity at T12 with retropulsion with kyphosis at this level.      -Patient is scheduled with Dr. Golden for osteoporosis evaluation and management 11/1/2021.    -INTERVENTIONS: Discussed with patient that we could trial a right L4-5 TFESI.  However given her history of compression fractures there is a possibility that steroid exposure could progress osteoporosis and put her at risk for future fractures.  Because of this patient is preferring to hold off on any injections at this time.    -MEDICATIONS: No changes in medications today advised patient to continue with Tylenol as needed  Discussed side effects of medications and proper use. Patient verbalized understanding.    -PHYSICAL THERAPY: Patient is scheduled to start with physical therapy in the next couple of days, advised patient to complete at least 3 sessions and continue with home exercises as tolerated on a regular basis that she learned from prior physical therapy sessions.  Discussed the importance of core strengthening, ROM, stretching exercises with the patient and how each of these entities is important in  decreasing pain.  Explained to the patient that the purpose of physical therapy is to teach the patient a home exercise program.  These exercises need to be performed every day in order to decrease pain and prevent future occurrences of pain.        -PATIENT EDUCATION:  Total time of 45 minutes, on the day of service, spent with the patient, reviewing the chart, placing orders, and documenting.   -Today we also discussed the issues related to the current COVID-19 pandemic, the pros and cons of the current treatment plan, the CDC guidelines such as social distancing, washing the hands, and covering the cough.    -FOLLOW UP: Follow-up as needed  **40-minute follow-up always  Advised to contact clinic if symptoms worsen or change.    Subjective:     Chris Bell is a 86 year old female who presents today for follow-up regarding ongoing chronic bilateral low back pain with generalized right lateral hip and right lower extremity pain that has improved at this point in the last 4 weeks.  Currently her pain is a 6/10 still however but she does report that it is more tolerable than previous.  She reports that her leg pain at this point is more intense than her back pain.  Patient does present in a wheelchair which has been an ongoing thing because of her generalized lower extremity weakness.  Patient denies any recent trips or falls or balance changes, denies bowel or bladder loss control.     was present during entire visit today.   Patient's daughter was present today during entire visit and added to past medical/surgical/family/social history and history of presenting illness.     Treatment to Date: No prior spinal surgery.     Bilateral L4-5 facet joint steroid injection 6/29/2018  Bilateral L4-5 TF ZAHRAA 7/31/2018 with minimal to no lasting benefit.  Preprocedure pain scale 8/10, post 5/10.  Bilateral L5-S1 TF ZAHRAA 8/16/2018 with 50% relief ×3 days only.  Preprocedure pain scale 8/10, most  6/10.     Medications:  Gabapentin 100 mg 1-1-1  Diclofenac gel     Prior medications:  Oxycodone  Tizanidine  Methocarbamol  Flexeril    Patient Active Problem List   Diagnosis     Anemia due to GI blood loss     Hypokalemia     PUD (peptic ulcer disease)     T12 compression fracture (H)     Accelerated hypertension     Abdominal pain     Abnormal finding on imaging     Kyphosis of thoracic region     Electrolyte imbalance     Episode of shaking     Bilateral hip pain     Edema     Heart failure with preserved ejection fraction (H)     Cancer of soft palate (H)       Current Outpatient Medications   Medication     acetaminophen (TYLENOL) 500 MG tablet     calcium carbonate (TUMS) 500 MG chewable tablet     Ferrous Sulfate 324 (65 Fe) MG TBEC     levothyroxine (SYNTHROID/LEVOTHROID) 50 MCG tablet     LORazepam (ATIVAN) 0.5 MG tablet     magnesium oxide (MAG-OX) 400 MG tablet     metoprolol succinate ER (TOPROL-XL) 50 MG 24 hr tablet     Multiple Vitamins-Minerals (CERTAVITE/ANTIOXIDANTS PO)     pantoprazole (PROTONIX) 40 MG EC tablet     potassium chloride ER (K-TAB/KLOR-CON) 10 MEQ CR tablet     sodium chloride 1 GM tablet     vitamin B-12 (CYANOCOBALAMIN) 250 MCG tablet     Alcohol Swabs (B-D SINGLE USE SWABS REGULAR) PADS     blood glucose monitoring (ONETOUCH VERIO) meter device kit     clobetasol (TEMOVATE) 0.05 % external ointment     furosemide (LASIX) 20 MG tablet     lisinopril (PRINIVIL/ZESTRIL) 5 MG tablet     magic mouthwash (ENTER INGREDIENTS IN COMMENTS) suspension     magnesium hydroxide (MILK OF MAGNESIA) 400 MG/5ML suspension     mirtazapine (REMERON) 15 MG tablet     Multiple Vitamin (DAILY-CHRIS) TABS     omeprazole (PRILOSEC) 20 MG DR capsule     ONETOUCH VERIO IQ test strip     ranitidine (ZANTAC) 300 MG tablet     sucralfate (CARAFATE) 1 GM tablet     traZODone (DESYREL) 50 MG tablet     triamcinolone (KENALOG) 0.1 % paste     No current facility-administered medications for this visit.        Allergies   Allergen Reactions     Unknown [No Clinical Screening - See Comments]        Past Medical History:   Diagnosis Date     Anemia      Depression      Disease of thyroid gland      HTN (hypertension)      Osteoporosis      PUD (peptic ulcer disease)         Review of Systems  ROS:  Specifically negative for bowel/bladder dysfunction, balance changes, headache, dizziness, foot drop, fevers, chills, appetite changes, nausea/vomiting, unexplained weight loss. Otherwise 13 systems reviewed are negative. Please see the patient's intake questionnaire from today for details.    Reviewed Social, Family, Past Medical and Past Surgical history with patient, no significant changes noted since prior visit.     Objective:     /58 (BP Location: Right arm, Patient Position: Sitting)     PHYSICAL EXAMINATION:    --CONSTITUTIONAL: Well developed, well nourished, healthy appearing individual.  --PSYCHIATRIC: Appropriate mood and affect. No difficulty interacting due to temper, social withdrawal, or memory issues.  --SKIN: Lumbar region is dry and intact.   --RESPIRATORY: Normal rhythm and effort. No abnormal accessory muscle breathing patterns noted.   --LUMBAR SPINE:  Inspection reveals no evidence of deformity.     RESULTS:   Imaging: Lumbar spine imaging was reviewed today. The images were shown to the patient and the findings were explained using a spine model.      XR Lumbar Spine 2/3 Views  Result Date: 9/3/2021  EXAM: XR LUMBAR SPINE 2-3 VIEWS LOCATION: [facility] DATE/TIME: 9/3/2021 10:50 AM INDICATION: Evaluate L4 compression fracture stability, compare with previous Xray COMPARISON: 08/06/2021. TECHNIQUE: CR Lumbar Spine.   IMPRESSION: 5 lumbar vertebrae. Thoracolumbar dextrocurvature. Degenerative grade 1 anterolisthesis at L4-L5. Unchanged T12 and L4 compression deformities. Focal kyphosis at T12 measures 32 degrees, similar to the prior radiograph No new fracture. Diffuse  osseous demineralization. Unchanged spondylosis.        XR Lumbar Spine 2/3 Views  Result Date: 8/6/2021  EXAM: XR LUMBAR SPINE 2-3 VIEWS LOCATION: [facility] DATE/TIME: 8/6/2021 9:48 AM INDICATION: Standing Xray: Evaluate L4 Compression Fracture COMPARISON: X-ray 01/27/2021. MRI 07/22/2021 TECHNIQUE: CR Lumbar Spine.   IMPRESSION: 5 lumbar type vertebra. Thoracolumbar levocurvature similar to previous exams. Moderate central vertebral compression at L4 is new since the previous radiographs, but is relatively similar to findings on previous MRI given differences in technique. Chronic marked anterior wedge deformity of T12 with 33 degrees segmental kyphosis centered at this level. No definite new fracture or progressive vertebral height loss identified. Mild diffuse lumbar spondylosis including mild to moderate multilevel facet arthropathy, greatest at L4-L5.        DX Hip/Pelvis/Spine  Result Date: 8/15/2021  8/12/2021 RE: Chris Bell YOB: 1935 Dear Jennifer Knight, Patient Profile: 86 year old female, postmenopausal, is here for the follow up bone density test. History of fractures - Yes; Thoracic vertebrae. Family history of osteoporosis - None.  Family history of hip fracture: None. Smoking history - No. Osteoporosis treatment past -  No. Osteoporosis treatment current - No.  Chronic medical problems - Hysterectomy. High risk medications -  Anti-seizure; Yes, Currently. Assessment: 1. The spine bone density is best assessed using L1-L2 with T-score of -2.6. 2. Femoral bone densities show left femoral neck T- score of -2.4, and right total hip T-score of -2.8. 3.  Since the scan in 2018, bone density has declined a significant 8.7% in the right total hip and 5.2% in the left total hip.  At L1-L2 the AP spine, bone density has not significantly changed. 4.  The trabecular bone score reflects poor micro architecture. 86 year old female with OSTEOPOROSIS and HIGH predicted  future fracture risk.  Assuming that her vertebral fractures are true fragility fractures, osteoporosis is judged as severe. Recommendations: Further evaluation and therapeutic intervention is advised with a recheck in 1 year. Bone densitometry was performed on your patient using our [employer] iDXA densitometer. The results are summarized and a copy of the actual scans are included for your review. In conformity with the International Society of Clinical Densitometry's most recent position statement for DXA interpretation (2015), the diagnosis will be made on the lowest measured T-score of the lumbar spine, femoral neck, total proximal femur or 33% radius. Note the change in terminology for diagnostic classification from OSTEOPENIA to LOW BONE MASS. All trending for sequential exams will be done using multiple vertebrae or the total proximal femur. Fracture risk is based on the WHO Fracture Risk Assessment Tool (FRAX). If additional information is needed or if you would like to discuss the results, please do not hesitate to call me. Thank you for referring this patient to [facility]. We are happy to be of service in support of you and your practice. If you have any questions or suggestions to improve our service, please call me at [phone]. Sincerely, DAVID Barraza. Osteoporosis Services, [facility]        MR Lumbar Spine w/o Contrast  Result Date: 7/23/2021  EXAM: MR LUMBAR SPINE W/O CONTRAST LOCATION: [facility] DATE/TIME: 7/22/2021 7:20 PM INDICATION: Low back pain, > 6 wks. COMPARISON: Radiograph 01/27/2021. MRI 07/20/2018. TECHNIQUE: Routine Lumbar Spine MRI without IV contrast. FINDINGS: Nomenclature is based on 5 lumbar-type vertebral bodies. Severe chronic T12 vertebral body height loss with retropulsion is unchanged. There is 30-40% height loss of the L4 vertebral body with diffuse accompanying edema extending into the  pedicles. This is compatible with an acute-to-subacute fracture. There is mild edema along the L5 superior endplate, eccentric to the right, without significant height loss. The imaged portions of the proximal femora, sacrum and rm appear intact. There is angular kyphosis at the T11-T12 level, due to T12 vertebral body height loss described above. Mild L4-L5 anterolisthesis and minimal L2-L3 retrolisthesis are noted. There is mild dextroconvex curvature. The conus terminates at L2. No signal abnormalities of the imaged cord or cauda equina nerve roots are demonstrated. Paraspinal soft tissues are unremarkable. Limited images of the abdominal cavity demonstrate no acute abnormality. T12-L1: Preserved intervertebral disc height. No significant posterior disc abnormality. No significant stenosis of the spinal canal or neural foramina. L1-L2: Normal disc height and signal. No herniation. Normal facets. No spinal canal or neural foraminal stenosis. L2-L3: Mild intervertebral disc height loss with disc desiccation. Circumferential disc bulging. No significant facet arthropathy. Mild spinal canal stenosis. No right neural foraminal stenosis. Mild left neural foraminal stenosis. L3-L4: Preserved intervertebral disc height. Mildly elevated disc signal likely represents disc injury related to fracture described above. Shallow broad-based bulge. Mild to moderate bilateral facet arthropathy. Moderate to severe spinal canal stenosis.  Moderate right and mild left neural foraminal stenosis. L4-L5: Anterolisthesis as above with unroofing of the dorsal disc margin. L4 fracture as above. Circumferential disc bulging. Moderate facet arthropathy. Severe spinal canal stenosis. Severe right and mild left neural foraminal stenosis. L5-S1: Unremarkable disc height and signal. No herniation. Mild facet arthropathy. No spinal canal stenosis. No neural foraminal stenosis.   IMPRESSION: 1.  Acute to subacute L4 compression fracture with  30-40% height loss. 2.  Mild edema along the left L5 superior endplate without significant height loss. This may represent subtle superior endplate fracture or reactive change. 3.  Stable chronic severe T12 compression fracture. 4.  At L3-L4, there is moderate-to-severe spinal canal stenosis and moderate right neural foraminal stenosis. 5.  At L4-L5, there is severe spinal canal stenosis and severe right neural foraminal stenosis. Additional findings as above.        Mr Lumbar Spine Without Contrast  Result Date: 7/20/2018  INDICATION: Low back pain radiating into the bilateral hips and tingling in both legs. Status post L4-L5 facet joint injections bilaterally 6/29/2018. History of T12 compression fracture. TECHNIQUE: Without IV contrast. CONTRAST: None. COMPARISON: 4/20/2018 thoracic spine MRI. FINDINGS: Nomenclature is based on 5 lumbar-type vertebral bodies. Sagittal field-of-view extends from the level of T10 to the level of S3. No significant change in T12 vertebra plana with unchanged retropulsion and associated focal thoracolumbar kyphosis. No new fracture. Remaining vertebral bodies are normal in height. Unchanged grade 1 anterolisthesis at L4-L5. No pars defects. Except for the T12 vertebral body, expected marrow signal. The conus tip is identified at L2. Cauda equina is normal. Grossly normal paraspinal soft tissues. No abdominal aortic aneurysm. The visualized portions of the bony pelvis are normal for age. T11-T12: Unchanged posterior retropulsion of the superior T12 vertebral body, resulting in unchanged contouring of the spinal cord. No abnormal cord signal. No neural foraminal narrowing.   T12-L1: Normal disc height and signal. No herniation. No facet arthropathy. No spinal canal stenosis. No right neural foraminal stenosis. No left neural foraminal stenosis.   L1-L2: Normal disc height and signal. No herniation. No facet arthropathy. No spinal canal stenosis. No right neural foraminal stenosis. No  left neural foraminal stenosis.   L2-L3: Disc desiccation, height loss, and disc bulge. Dorsal epidural lipomatosis, ligamentum flavum thickening, and bilateral facet arthropathy. Moderate subarticular zone narrowing. Mild central spinal canal stenosis. No right neural foraminal stenosis. No left neural foraminal stenosis.   L3-L4: Disc desiccation, height loss, and disc bulge. Ligamentum flavum thickening. Bilateral facet arthropathy. Moderate subarticular zone narrowing and moderate central spinal canal stenosis. Mild right neural foraminal stenosis. Mild left neural foraminal stenosis.   L4-L5: Disc desiccation, height loss, and disc bulge without significant cephalad migration of the superiorly uncovered disc. Ligamentum flavum thickening. Bilateral facet arthropathy. Severe spinal canal stenosis. Mild right neural foraminal stenosis. Mild left neural foraminal stenosis.   L5-S1: Normal disc height and signal. No herniation. Bilateral facet arthropathy. No spinal canal stenosis. No right neural foraminal stenosis. No left neural foraminal stenosis.   CONCLUSION:   1.  Disc bulge, ligamentum flavum thickening, and facet arthropathy contribute to cause severe spinal canal narrowing at L4-L5. Unchanged grade 1 anterolisthesis at L4-L5.   2.  Unchanged compression deformity of the T12 vertebral body with retropulsed fracture fragment that contours the ventral spinal cord. No abnormal cord signal. Unchanged resultant focal thoracolumbar kyphosis.   3.  Otherwise, multilevel lumbar spondylosis. No additional level of severe spinal canal narrowing. No severe neural foraminal narrowing.                                 Again, thank you for allowing me to participate in the care of your patient.        Sincerely,        Jennifer Knight, CNP

## 2021-10-27 NOTE — PROGRESS NOTES
Assessment:     Diagnoses and all orders for this visit:  Chronic right-sided low back pain with right-sided sciatica  Right lumbar radiculitis  Age-related osteoporosis with current pathological fracture with routine healing, subsequent encounter     Chris Bell is a 86 year old y.o. female with past medical history significant for depression, hypertension, peptic ulcer disease, heart failure, anemia, osteoporosis, thyroid disease, T12 compression fracture with thoracic kyphosis, cancer of the soft palate who presents today for follow-up regarding:    -Acute on chronic improving right low back pain with right lumbar radiculitis.  Updated MRI with subacute L4 compression fracture, chronic T12 compression fracture, updated x-ray shows stability of the fracture.  Patient does have chronic severe spinal stenosis as well as right foraminal stenosis at L4-5.     Plan:     A shared decision making plan was used. The patient's values and choices were respected. Prior medical records were reviewed today. The following represents what was discussed and decided upon by the provider and the patient.        -DIAGNOSTIC TESTS: Images were personally reviewed and interpreted.   --No further x-rays recommended at this time unless symptoms worsen or change at any time.  --Lumbar spine x-ray 9/3/2021 with unchanged T12 and L4 compression fractures.  Kyphosis T12 at 32 degrees.  Stable since prior x-ray.  --Lumbar spine x-ray 8/6/2021 with chronic T12 compression fracture.  L4 compression fracture appears stable since MRI 7/22/2021.  --Bone density scan 8/12/2021 with osteoporosis with high predicted future fracture risk.  --Lumbar spine MRI 7/14/2021 with acute/subacute L4 compression fracture 30 to 40% height loss with disc bulge and facet arthropathy with moderate to severe spinal stenosis, moderate right and mild left foraminal stenosis.  L4-5 severe spinal stenosis with severe right foraminal stenosis.  Chronic T12 compression  fracture with accompanied angular kyphosis.  --Lumbar spine x-ray 1/27/2021 with mild convex curvature.  Chronic marked T12 compression fracture unchanged since 2018.  Possible subtle height loss at L3. Degenerative spondylolisthesis L5-S1 and 4 mm anterolisthesis L4-5 stable.  --Pelvic x-ray 1/27/2021 with no fracture identified, stable joint space.    --Lumbar spine MRI 2018 shows chronic anterolisthesis L4-5 with facet hypertrophy and ligamentum flavum thickening causing severe central canal stenosis with mild bilateral foraminal stenosis.  There is also unchanged compression deformity at T12 with retropulsion with kyphosis at this level.      -Patient is scheduled with Dr. Golden for osteoporosis evaluation and management 11/1/2021.    -INTERVENTIONS: Discussed with patient that we could trial a right L4-5 TFESI.  However given her history of compression fractures there is a possibility that steroid exposure could progress osteoporosis and put her at risk for future fractures.  Because of this patient is preferring to hold off on any injections at this time.    -MEDICATIONS: No changes in medications today advised patient to continue with Tylenol as needed  Discussed side effects of medications and proper use. Patient verbalized understanding.    -PHYSICAL THERAPY: Patient is scheduled to start with physical therapy in the next couple of days, advised patient to complete at least 3 sessions and continue with home exercises as tolerated on a regular basis that she learned from prior physical therapy sessions.  Discussed the importance of core strengthening, ROM, stretching exercises with the patient and how each of these entities is important in decreasing pain.  Explained to the patient that the purpose of physical therapy is to teach the patient a home exercise program.  These exercises need to be performed every day in order to decrease pain and prevent future occurrences of pain.        -PATIENT EDUCATION:   Total time of 45 minutes, on the day of service, spent with the patient, reviewing the chart, placing orders, and documenting.   -Today we also discussed the issues related to the current COVID-19 pandemic, the pros and cons of the current treatment plan, the CDC guidelines such as social distancing, washing the hands, and covering the cough.    -FOLLOW UP: Follow-up as needed  **40-minute follow-up always  Advised to contact clinic if symptoms worsen or change.    Subjective:     Chris Bell is a 86 year old female who presents today for follow-up regarding ongoing chronic bilateral low back pain with generalized right lateral hip and right lower extremity pain that has improved at this point in the last 4 weeks.  Currently her pain is a 6/10 still however but she does report that it is more tolerable than previous.  She reports that her leg pain at this point is more intense than her back pain.  Patient does present in a wheelchair which has been an ongoing thing because of her generalized lower extremity weakness.  Patient denies any recent trips or falls or balance changes, denies bowel or bladder loss control.     was present during entire visit today.   Patient's daughter was present today during entire visit and added to past medical/surgical/family/social history and history of presenting illness.     Treatment to Date: No prior spinal surgery.     Bilateral L4-5 facet joint steroid injection 6/29/2018  Bilateral L4-5 TF ZAHRAA 7/31/2018 with minimal to no lasting benefit.  Preprocedure pain scale 8/10, post 5/10.  Bilateral L5-S1 TF ZAHRAA 8/16/2018 with 50% relief ×3 days only.  Preprocedure pain scale 8/10, most 6/10.     Medications:  Gabapentin 100 mg 1-1-1  Diclofenac gel     Prior medications:  Oxycodone  Tizanidine  Methocarbamol  Flexeril    Patient Active Problem List   Diagnosis     Anemia due to GI blood loss     Hypokalemia     PUD (peptic ulcer disease)     T12 compression fracture (H)      Accelerated hypertension     Abdominal pain     Abnormal finding on imaging     Kyphosis of thoracic region     Electrolyte imbalance     Episode of shaking     Bilateral hip pain     Edema     Heart failure with preserved ejection fraction (H)     Cancer of soft palate (H)       Current Outpatient Medications   Medication     acetaminophen (TYLENOL) 500 MG tablet     calcium carbonate (TUMS) 500 MG chewable tablet     Ferrous Sulfate 324 (65 Fe) MG TBEC     levothyroxine (SYNTHROID/LEVOTHROID) 50 MCG tablet     LORazepam (ATIVAN) 0.5 MG tablet     magnesium oxide (MAG-OX) 400 MG tablet     metoprolol succinate ER (TOPROL-XL) 50 MG 24 hr tablet     Multiple Vitamins-Minerals (CERTAVITE/ANTIOXIDANTS PO)     pantoprazole (PROTONIX) 40 MG EC tablet     potassium chloride ER (K-TAB/KLOR-CON) 10 MEQ CR tablet     sodium chloride 1 GM tablet     vitamin B-12 (CYANOCOBALAMIN) 250 MCG tablet     Alcohol Swabs (B-D SINGLE USE SWABS REGULAR) PADS     blood glucose monitoring (ONETOUCH VERIO) meter device kit     clobetasol (TEMOVATE) 0.05 % external ointment     furosemide (LASIX) 20 MG tablet     lisinopril (PRINIVIL/ZESTRIL) 5 MG tablet     magic mouthwash (ENTER INGREDIENTS IN COMMENTS) suspension     magnesium hydroxide (MILK OF MAGNESIA) 400 MG/5ML suspension     mirtazapine (REMERON) 15 MG tablet     Multiple Vitamin (DAILY-CHRIS) TABS     omeprazole (PRILOSEC) 20 MG DR capsule     ONETOUCH VERIO IQ test strip     ranitidine (ZANTAC) 300 MG tablet     sucralfate (CARAFATE) 1 GM tablet     traZODone (DESYREL) 50 MG tablet     triamcinolone (KENALOG) 0.1 % paste     No current facility-administered medications for this visit.       Allergies   Allergen Reactions     Unknown [No Clinical Screening - See Comments]        Past Medical History:   Diagnosis Date     Anemia      Depression      Disease of thyroid gland      HTN (hypertension)      Osteoporosis      PUD (peptic ulcer disease)         Review of Systems  ROS:   Specifically negative for bowel/bladder dysfunction, balance changes, headache, dizziness, foot drop, fevers, chills, appetite changes, nausea/vomiting, unexplained weight loss. Otherwise 13 systems reviewed are negative. Please see the patient's intake questionnaire from today for details.    Reviewed Social, Family, Past Medical and Past Surgical history with patient, no significant changes noted since prior visit.     Objective:     /58 (BP Location: Right arm, Patient Position: Sitting)     PHYSICAL EXAMINATION:    --CONSTITUTIONAL: Well developed, well nourished, healthy appearing individual.  --PSYCHIATRIC: Appropriate mood and affect. No difficulty interacting due to temper, social withdrawal, or memory issues.  --SKIN: Lumbar region is dry and intact.   --RESPIRATORY: Normal rhythm and effort. No abnormal accessory muscle breathing patterns noted.   --LUMBAR SPINE:  Inspection reveals no evidence of deformity.     RESULTS:   Imaging: Lumbar spine imaging was reviewed today. The images were shown to the patient and the findings were explained using a spine model.      XR Lumbar Spine 2/3 Views  Result Date: 9/3/2021  EXAM: XR LUMBAR SPINE 2-3 VIEWS LOCATION: [facility] DATE/TIME: 9/3/2021 10:50 AM INDICATION: Evaluate L4 compression fracture stability, compare with previous Xray COMPARISON: 08/06/2021. TECHNIQUE: CR Lumbar Spine.   IMPRESSION: 5 lumbar vertebrae. Thoracolumbar dextrocurvature. Degenerative grade 1 anterolisthesis at L4-L5. Unchanged T12 and L4 compression deformities. Focal kyphosis at T12 measures 32 degrees, similar to the prior radiograph No new fracture. Diffuse osseous demineralization. Unchanged spondylosis.        XR Lumbar Spine 2/3 Views  Result Date: 8/6/2021  EXAM: XR LUMBAR SPINE 2-3 VIEWS LOCATION: [facility] DATE/TIME: 8/6/2021 9:48 AM INDICATION: Standing Xray: Evaluate L4 Compression Fracture COMPARISON: X-ray 01/27/2021.  MRI 07/22/2021 TECHNIQUE: CR Lumbar Spine.   IMPRESSION: 5 lumbar type vertebra. Thoracolumbar levocurvature similar to previous exams. Moderate central vertebral compression at L4 is new since the previous radiographs, but is relatively similar to findings on previous MRI given differences in technique. Chronic marked anterior wedge deformity of T12 with 33 degrees segmental kyphosis centered at this level. No definite new fracture or progressive vertebral height loss identified. Mild diffuse lumbar spondylosis including mild to moderate multilevel facet arthropathy, greatest at L4-L5.        DX Hip/Pelvis/Spine  Result Date: 8/15/2021  8/12/2021 RE: Chris Bell YOB: 1935 Dear Jennifer Knight, Patient Profile: 86 year old female, postmenopausal, is here for the follow up bone density test. History of fractures - Yes; Thoracic vertebrae. Family history of osteoporosis - None.  Family history of hip fracture: None. Smoking history - No. Osteoporosis treatment past -  No. Osteoporosis treatment current - No.  Chronic medical problems - Hysterectomy. High risk medications -  Anti-seizure; Yes, Currently. Assessment: 1. The spine bone density is best assessed using L1-L2 with T-score of -2.6. 2. Femoral bone densities show left femoral neck T- score of -2.4, and right total hip T-score of -2.8. 3.  Since the scan in 2018, bone density has declined a significant 8.7% in the right total hip and 5.2% in the left total hip.  At L1-L2 the AP spine, bone density has not significantly changed. 4.  The trabecular bone score reflects poor micro architecture. 86 year old female with OSTEOPOROSIS and HIGH predicted future fracture risk.  Assuming that her vertebral fractures are true fragility fractures, osteoporosis is judged as severe. Recommendations: Further evaluation and therapeutic intervention is advised with a recheck in 1 year. Bone densitometry was performed on your patient using our [employer]  densitometer. The results are summarized and a copy of the actual scans are included for your review. In conformity with the International Society of Clinical Densitometry's most recent position statement for DXA interpretation (2015), the diagnosis will be made on the lowest measured T-score of the lumbar spine, femoral neck, total proximal femur or 33% radius. Note the change in terminology for diagnostic classification from OSTEOPENIA to LOW BONE MASS. All trending for sequential exams will be done using multiple vertebrae or the total proximal femur. Fracture risk is based on the WHO Fracture Risk Assessment Tool (FRAX). If additional information is needed or if you would like to discuss the results, please do not hesitate to call me. Thank you for referring this patient to [facility]. We are happy to be of service in support of you and your practice. If you have any questions or suggestions to improve our service, please call me at [phone]. Sincerely, DAVID BarrazaCMARIE. Osteoporosis Services, [facility]        MR Lumbar Spine w/o Contrast  Result Date: 7/23/2021  EXAM: MR LUMBAR SPINE W/O CONTRAST LOCATION: [facility] DATE/TIME: 7/22/2021 7:20 PM INDICATION: Low back pain, > 6 wks. COMPARISON: Radiograph 01/27/2021. MRI 07/20/2018. TECHNIQUE: Routine Lumbar Spine MRI without IV contrast. FINDINGS: Nomenclature is based on 5 lumbar-type vertebral bodies. Severe chronic T12 vertebral body height loss with retropulsion is unchanged. There is 30-40% height loss of the L4 vertebral body with diffuse accompanying edema extending into the pedicles. This is compatible with an acute-to-subacute fracture. There is mild edema along the L5 superior endplate, eccentric to the right, without significant height loss. The imaged portions of the proximal femora, sacrum and rm appear intact. There is angular kyphosis at the T11-T12 level, due to  T12 vertebral body height loss described above. Mild L4-L5 anterolisthesis and minimal L2-L3 retrolisthesis are noted. There is mild dextroconvex curvature. The conus terminates at L2. No signal abnormalities of the imaged cord or cauda equina nerve roots are demonstrated. Paraspinal soft tissues are unremarkable. Limited images of the abdominal cavity demonstrate no acute abnormality. T12-L1: Preserved intervertebral disc height. No significant posterior disc abnormality. No significant stenosis of the spinal canal or neural foramina. L1-L2: Normal disc height and signal. No herniation. Normal facets. No spinal canal or neural foraminal stenosis. L2-L3: Mild intervertebral disc height loss with disc desiccation. Circumferential disc bulging. No significant facet arthropathy. Mild spinal canal stenosis. No right neural foraminal stenosis. Mild left neural foraminal stenosis. L3-L4: Preserved intervertebral disc height. Mildly elevated disc signal likely represents disc injury related to fracture described above. Shallow broad-based bulge. Mild to moderate bilateral facet arthropathy. Moderate to severe spinal canal stenosis.  Moderate right and mild left neural foraminal stenosis. L4-L5: Anterolisthesis as above with unroofing of the dorsal disc margin. L4 fracture as above. Circumferential disc bulging. Moderate facet arthropathy. Severe spinal canal stenosis. Severe right and mild left neural foraminal stenosis. L5-S1: Unremarkable disc height and signal. No herniation. Mild facet arthropathy. No spinal canal stenosis. No neural foraminal stenosis.   IMPRESSION: 1.  Acute to subacute L4 compression fracture with 30-40% height loss. 2.  Mild edema along the left L5 superior endplate without significant height loss. This may represent subtle superior endplate fracture or reactive change. 3.  Stable chronic severe T12 compression fracture. 4.  At L3-L4, there is moderate-to-severe spinal canal stenosis and moderate  right neural foraminal stenosis. 5.  At L4-L5, there is severe spinal canal stenosis and severe right neural foraminal stenosis. Additional findings as above.        Mr Lumbar Spine Without Contrast  Result Date: 7/20/2018  INDICATION: Low back pain radiating into the bilateral hips and tingling in both legs. Status post L4-L5 facet joint injections bilaterally 6/29/2018. History of T12 compression fracture. TECHNIQUE: Without IV contrast. CONTRAST: None. COMPARISON: 4/20/2018 thoracic spine MRI. FINDINGS: Nomenclature is based on 5 lumbar-type vertebral bodies. Sagittal field-of-view extends from the level of T10 to the level of S3. No significant change in T12 vertebra plana with unchanged retropulsion and associated focal thoracolumbar kyphosis. No new fracture. Remaining vertebral bodies are normal in height. Unchanged grade 1 anterolisthesis at L4-L5. No pars defects. Except for the T12 vertebral body, expected marrow signal. The conus tip is identified at L2. Cauda equina is normal. Grossly normal paraspinal soft tissues. No abdominal aortic aneurysm. The visualized portions of the bony pelvis are normal for age. T11-T12: Unchanged posterior retropulsion of the superior T12 vertebral body, resulting in unchanged contouring of the spinal cord. No abnormal cord signal. No neural foraminal narrowing.   T12-L1: Normal disc height and signal. No herniation. No facet arthropathy. No spinal canal stenosis. No right neural foraminal stenosis. No left neural foraminal stenosis.   L1-L2: Normal disc height and signal. No herniation. No facet arthropathy. No spinal canal stenosis. No right neural foraminal stenosis. No left neural foraminal stenosis.   L2-L3: Disc desiccation, height loss, and disc bulge. Dorsal epidural lipomatosis, ligamentum flavum thickening, and bilateral facet arthropathy. Moderate subarticular zone narrowing. Mild central spinal canal stenosis. No right neural foraminal stenosis. No left neural  foraminal stenosis.   L3-L4: Disc desiccation, height loss, and disc bulge. Ligamentum flavum thickening. Bilateral facet arthropathy. Moderate subarticular zone narrowing and moderate central spinal canal stenosis. Mild right neural foraminal stenosis. Mild left neural foraminal stenosis.   L4-L5: Disc desiccation, height loss, and disc bulge without significant cephalad migration of the superiorly uncovered disc. Ligamentum flavum thickening. Bilateral facet arthropathy. Severe spinal canal stenosis. Mild right neural foraminal stenosis. Mild left neural foraminal stenosis.   L5-S1: Normal disc height and signal. No herniation. Bilateral facet arthropathy. No spinal canal stenosis. No right neural foraminal stenosis. No left neural foraminal stenosis.   CONCLUSION:   1.  Disc bulge, ligamentum flavum thickening, and facet arthropathy contribute to cause severe spinal canal narrowing at L4-L5. Unchanged grade 1 anterolisthesis at L4-L5.   2.  Unchanged compression deformity of the T12 vertebral body with retropulsed fracture fragment that contours the ventral spinal cord. No abnormal cord signal. Unchanged resultant focal thoracolumbar kyphosis.   3.  Otherwise, multilevel lumbar spondylosis. No additional level of severe spinal canal narrowing. No severe neural foraminal narrowing.

## 2021-10-27 NOTE — TELEPHONE ENCOUNTER
Got a VM from [facility] Scheduling to call this pt.    Pt was seen this morning by Jennifer. I attempt to call pt x 2 with no answer. Unable to leave VM as it is full.

## 2021-10-27 NOTE — PATIENT INSTRUCTIONS
~Please call our [facility] Nurse Navigation line [phone] with any questions or concerns about your treatment plan, if symptoms worsen and you would like to be seen urgently, or if you have problems controlling bladder and bowel function.      Follow up with Osteoporosis specialist.       Importance of specialized Physical Therapy: Discussed the importance of core strengthening, ROM, stretching exercises with the patient and how each of these entities is important in decreasing pain.  Explained to the patient that the purpose of physical therapy is to teach the patient a home exercise program individualized to them and their specific health concerns.  These exercises need to be performed every day in order to decrease pain and prevent future occurrences of pain.

## 2021-10-28 ENCOUNTER — HOSPITAL ENCOUNTER (OUTPATIENT)
Dept: PHYSICAL THERAPY | Facility: REHABILITATION | Age: 86
End: 2021-10-28
Attending: NURSE PRACTITIONER
Payer: COMMERCIAL

## 2021-10-28 DIAGNOSIS — M79.18 MYOFASCIAL PAIN: ICD-10-CM

## 2021-10-28 DIAGNOSIS — G89.29 CHRONIC RIGHT-SIDED LOW BACK PAIN WITH RIGHT-SIDED SCIATICA: ICD-10-CM

## 2021-10-28 DIAGNOSIS — M80.00XD AGE-RELATED OSTEOPOROSIS WITH CURRENT PATHOLOGICAL FRACTURE WITH ROUTINE HEALING, SUBSEQUENT ENCOUNTER: ICD-10-CM

## 2021-10-28 DIAGNOSIS — M54.41 CHRONIC RIGHT-SIDED LOW BACK PAIN WITH RIGHT-SIDED SCIATICA: ICD-10-CM

## 2021-10-28 DIAGNOSIS — M48.061 SPINAL STENOSIS OF LUMBAR REGION WITHOUT NEUROGENIC CLAUDICATION: ICD-10-CM

## 2021-10-28 DIAGNOSIS — S32.040S CLOSED COMPRESSION FRACTURE OF L4 LUMBAR VERTEBRA, SEQUELA: ICD-10-CM

## 2021-10-28 DIAGNOSIS — M54.16 RIGHT LUMBAR RADICULITIS: ICD-10-CM

## 2021-10-28 DIAGNOSIS — Z87.81 HISTORY OF COMPRESSION FRACTURE OF SPINE: ICD-10-CM

## 2021-10-28 PROCEDURE — 97110 THERAPEUTIC EXERCISES: CPT | Mod: GP

## 2021-10-28 PROCEDURE — 97161 PT EVAL LOW COMPLEX 20 MIN: CPT | Mod: GP

## 2021-10-28 NOTE — TELEPHONE ENCOUNTER
"Called and spoke with pt's daughter, Anne Castillo. Informed her I called [facility] yesterday to ask why they cancelled the pt's appt on 11/1/21 and the reason was \"wrong pt selected.\" Jennifer still want this pt to see osteoporosis so we would like to know why? The  was not able to give us a reason why this was cancelled and told me it might be an error on their end. Informed the Osteoporosis Clinic to call the patient to reschedule her consultation then which said they would.     Asked if Osteoporosis Clinic had called them to schedule but May stated she got a couple miss calls yesterday and don't know who it was. Informed May to call [facility] at [phone] to schedule her mom's consultation. May verbally understood.    Questions for provider:  Pt started PT today 10/28/21 and realized that they are teaching her the same stretches she has done in the past with Home PT. The pt stated she would rather have a Home PT order so she don't have to leave the home as it's hard for her to get dress and go out due to her pain. May was wondering if Jennifer would be ok putting in a Home PT order for her mom vs doing it at the clinic. Please advise.   "

## 2021-10-29 NOTE — PROGRESS NOTES
[facility]          OUTPATIENT PHYSICAL THERAPY ORTHOPEDIC EVALUATION  PLAN OF TREATMENT FOR OUTPATIENT REHABILITATION  (COMPLETE FOR INITIAL CLAIMS ONLY)  Patient's Last Name, First Name, M.I.  YOB: 1935  Chris Bell       Provider s Name:  [facility]   Medical Record No.  [id number]   Start of Care Date:  10/28/21   Onset Date:      Type:     _X__PT   ___OT   ___SLP Medical Diagnosis:  S32.040A (ICD-10-CM) - Closed compression fracture of L4 lumbar vertebra, initial encounter (H)     PT Diagnosis:  Radiating low back pain, generalized weakness, balance and gait deficits, decreased activity tolerance    Visits from SOC:  1      _________________________________________________________________________________  Plan of Treatment/Functional Goals:  ADL retraining, balance training, gait training, joint mobilization, manual therapy, ROM, strengthening, stretching, neuromuscular re-education     Cryotherapy, Electrical stimulation, TENS, Traction, Ultrasound, Hot packs     Goals  Goal Identifier: HEP  Goal Description: Patient will demonstrate independence with home exercise program to facilitate improvement in function.   Target Date: 11/26/21    Goal Identifier: Pain  Goal Description: Patient will report no greater than 3/10 pain to improve quality of life.   Target Date: 12/17/21    Goal Identifier: Standing  Goal Description: Patient will demonstrate at least 20 minutes of standing with no increase in symptoms to improve ability in completing upright ADLs such as cooking, cleaning, and bathing.   Target Date: 12/17/21    Goal Identifier: Walking  Goal Description: Patient will be able to tolerate at least 15 minutes of walking to improve ability in completing ADLs and walking for exercise to improve overall health.   Target Date: 12/17/21                                                Therapy Frequency:  2 times/Week  Predicted Duration of  Therapy Intervention:  10 weeks    Scott Parmer, PT, DPT                 I CERTIFY THE NEED FOR THESE SERVICES FURNISHED UNDER        THIS PLAN OF TREATMENT AND WHILE UNDER MY CARE     (Physician co-signature of this document indicates review and certification of the therapy plan).                       Certification Date From:  10/28/21   Certification Date To:  01/07/22    Referring Provider:  Jennifer Knight CNP     Initial Assessment        See Epic Evaluation Start of Care Date: 10/28/21                10/28/21 1000   General Information   Type of Visit Initial OP Ortho PT Evaluation   Start of Care Date 10/28/21   Referring Physician Jennifer Knight CNP    Orders Evaluate and Treat   Date of Order 07/27/21   Certification Required? Yes   Medical Diagnosis S32.040A (ICD-10-CM) - Closed compression fracture of L4 lumbar vertebra, initial encounter (H)   Surgical/Medical history reviewed Yes   Precautions/Limitations other (see comments)  (Osteoporosis and hx of compression fx )   General Information Comments Visit abbreviated due to patient coming about 25 minutes late and having to use the restroom in the middle of the appointment        Present Yes   Language Hmong   Body Part(s)   Body Part(s) Lumbar Spine/SI;Hip   Presentation and Etiology   Pertinent history of current problem (include personal factors and/or comorbidities that impact the POC) Patient reports hx of fx in the spine due to osteoporosis. Patient reports some arm and leg stiffness, patient has a PCA which fairly does everything for her. Patient's goals for physical therapy include becoming stronger and getting more independent. Patient does use a walker in the home. Patient does not leave the home much due to weakness and low back pain.     Pain rating (0-10 point scale) Best (/10);Worst (/10)   Best (/10) 5   Worst (/10) 10   Pain quality C. Aching;D. Burning;B. Dull;E. Shooting   Frequency of pain/symptoms C.  With activity   Pain/symptoms are: Worse in the morning   Pain/symptoms exacerbated by B. Walking;C. Lifting;D. Carrying;G. Certain positions;I. Bending;J. ADL;K. Home tasks   Pain/symptoms eased by C. Rest;I. OTC medication(s)   Progression of symptoms since onset: Worsened   Fall Risk Screen   Fall screen completed by PT   Have you fallen 2 or more times in the past year? Yes   Have you fallen and had an injury in the past year? Yes   Is patient a fall risk? Yes   Abuse Screen (yes response referral indicated)   Feels Unsafe at Home or Work/School no   Feels Threatened by Someone no   Does Anyone Try to Keep You From Having Contact with Others or Doing Things Outside Your Home? no   Physical Signs of Abuse Present no   System Outcome Measures   Outcome Measures Low Back Pain (see Oswestry and Jonny)  (72%)   Hip Objective Findings   Hip ROM Comments Hip PROM assessed in supine, WNL B grossly    Functional Closed Chain Screen 30s Sit<>Stand: 7 repetitions; Bridging: unable due to pain and weakness    Lumbar Spine/SI Objective Findings   Observation Patient arrived in wheelchair, decreased muscle mass, slow moving    Integumentary No deficits observed    Posture Forward head, shoulders, kyphosis    Gait/Locomotion Patient using 4 wheeled walker, hunched posture, very slow gait    Balance/Proprioception (Single Leg Stance) Unable to balance without UE support    Flexion ROM Unable due to balance    Lumbar ROM Comment Unable to assess standing trunk ROM due to balance deficits    Lumbar/Hip/Knee/Foot Strength Comments Deferred formal MMT due to OP dx    SLR (+) R    Slump Test (+) R    Sensation Testing Intact through dermatomal pattern    Palpation Tenderness to the B lumbar paraspinals   Planned Therapy Interventions   Planned Therapy Interventions ADL retraining;balance training;gait training;joint mobilization;manual therapy;ROM;strengthening;stretching;neuromuscular re-education   Planned Modality Interventions    Planned Modality Interventions Cryotherapy;Electrical stimulation;TENS;Traction;Ultrasound;Hot packs   Clinical Impression   Criteria for Skilled Therapeutic Interventions Met yes, treatment indicated   PT Diagnosis Radiating low back pain, generalized weakness, balance and gait deficits, decreased activity tolerance    Functional limitations due to impairments All ADLs   Clinical Presentation Stable/Uncomplicated   Clinical Decision Making (Complexity) Low complexity   Therapy Frequency 2 times/Week   Predicted Duration of Therapy Intervention (days/wks) 10 weeks   Risk & Benefits of therapy have been explained Yes   Patient, Family & other staff in agreement with plan of care Yes   Clinical Impression Comments Patient is a 86 year old female presenting to physical therapy with low back pain associated with an osteoporosis related compression fracture of the lumbar spine. Patient presents with significant weakness due to deconditioning and pain. Patient has been unable to complete ADLs on her own and requires full support of family and a PCA. Patient would benefit from physical therapy services to address limitations and improve function.    ORTHO GOALS   PT Ortho Eval Goals 1;2;3;4   Ortho Goal 1   Goal Identifier HEP   Goal Description Patient will demonstrate independence with home exercise program to facilitate improvement in function.    Target Date 11/26/21   Ortho Goal 2   Goal Identifier Pain   Goal Description Patient will report no greater than 3/10 pain to improve quality of life.    Target Date 12/17/21   Ortho Goal 3   Goal Identifier Standing   Goal Description Patient will demonstrate at least 20 minutes of standing with no increase in symptoms to improve ability in completing upright ADLs such as cooking, cleaning, and bathing.    Target Date 12/17/21   Ortho Goal 4   Goal Identifier Walking   Goal Description Patient will be able to tolerate at least 15 minutes of walking to improve ability in  completing ADLs and walking for exercise to improve overall health.    Target Date 12/17/21   Total Evaluation Time   PT Eval, Low Complexity Minutes (38114) 15   Therapy Certification   Certification date from 10/28/21   Certification date to 01/07/22   Medical Diagnosis S32.040A (ICD-10-CM) - Closed compression fracture of L4 lumbar vertebra, initial encounter (H)     Scott Parmer, PT, DPT

## 2021-10-29 NOTE — TELEPHONE ENCOUNTER
Message below was informed to May (pt's daughter) who will relay message to the patient. May also added that they called and schedule Chris's Osteoporosis consult for 1/6/22.    I would recommend her telling the physical therapist that these are the same exercises that she has done at home previously, usually they do build on the physical therapy home exercises at each subsequent visit.  I would recommend at least 3 sessions of in person physical therapy.     Therefore I do not recommend home therapy at this time, if patient prefers she can discuss this with her primary care provider.   Thanks,   Jennifer

## 2021-11-03 ENCOUNTER — HOSPITAL ENCOUNTER (OUTPATIENT)
Dept: PHYSICAL THERAPY | Facility: REHABILITATION | Age: 86
End: 2021-11-03
Payer: COMMERCIAL

## 2021-11-03 DIAGNOSIS — S32.040S CLOSED COMPRESSION FRACTURE OF L4 LUMBAR VERTEBRA, SEQUELA: ICD-10-CM

## 2021-11-03 DIAGNOSIS — M79.18 MYOFASCIAL PAIN: ICD-10-CM

## 2021-11-03 DIAGNOSIS — M80.00XD AGE-RELATED OSTEOPOROSIS WITH CURRENT PATHOLOGICAL FRACTURE WITH ROUTINE HEALING, SUBSEQUENT ENCOUNTER: ICD-10-CM

## 2021-11-03 DIAGNOSIS — Z87.81 HISTORY OF COMPRESSION FRACTURE OF SPINE: Primary | ICD-10-CM

## 2021-11-03 DIAGNOSIS — M54.16 RIGHT LUMBAR RADICULITIS: ICD-10-CM

## 2021-11-03 PROCEDURE — 97110 THERAPEUTIC EXERCISES: CPT | Mod: GP

## 2022-01-01 ENCOUNTER — HOSPITAL ENCOUNTER (INPATIENT)
Facility: HOSPITAL | Age: 87
LOS: 2 days | Discharge: HOME | DRG: 812 | End: 2022-06-24
Attending: EMERGENCY MEDICINE | Admitting: FAMILY MEDICINE
Payer: COMMERCIAL

## 2022-01-01 ENCOUNTER — HOSPITAL ENCOUNTER (EMERGENCY)
Facility: HOSPITAL | Age: 87
Discharge: HOME | End: 2022-11-12
Attending: EMERGENCY MEDICINE | Admitting: EMERGENCY MEDICINE
Payer: COMMERCIAL

## 2022-01-01 ENCOUNTER — APPOINTMENT (OUTPATIENT)
Dept: PHYSICAL THERAPY | Facility: HOSPITAL | Age: 87
DRG: 193 | End: 2022-01-01
Attending: STUDENT IN AN ORGANIZED HEALTH CARE EDUCATION/TRAINING PROGRAM
Payer: COMMERCIAL

## 2022-01-01 ENCOUNTER — APPOINTMENT (OUTPATIENT)
Dept: INTERPRETER SERVICES | Facility: CLINIC | Age: 87
End: 2022-01-01
Payer: COMMERCIAL

## 2022-01-01 ENCOUNTER — APPOINTMENT (OUTPATIENT)
Dept: OCCUPATIONAL THERAPY | Facility: HOSPITAL | Age: 87
DRG: 641 | End: 2022-01-01
Payer: COMMERCIAL

## 2022-01-01 ENCOUNTER — HOSPITAL ENCOUNTER (INPATIENT)
Facility: HOSPITAL | Age: 87
LOS: 2 days | Discharge: HOME HEALTH | DRG: 193 | End: 2022-03-22
Attending: EMERGENCY MEDICINE | Admitting: HOSPITALIST
Payer: COMMERCIAL

## 2022-01-01 ENCOUNTER — APPOINTMENT (OUTPATIENT)
Dept: RADIOLOGY | Facility: HOSPITAL | Age: 87
DRG: 193 | End: 2022-01-01
Attending: PHYSICIAN ASSISTANT
Payer: COMMERCIAL

## 2022-01-01 ENCOUNTER — OFFICE VISIT (OUTPATIENT)
Dept: CARDIOLOGY | Facility: CLINIC | Age: 87
End: 2022-01-01
Payer: COMMERCIAL

## 2022-01-01 ENCOUNTER — APPOINTMENT (OUTPATIENT)
Dept: CARDIOLOGY | Facility: HOSPITAL | Age: 87
DRG: 193 | End: 2022-01-01
Attending: FAMILY MEDICINE
Payer: COMMERCIAL

## 2022-01-01 ENCOUNTER — APPOINTMENT (OUTPATIENT)
Dept: SPEECH THERAPY | Facility: HOSPITAL | Age: 87
DRG: 193 | End: 2022-01-01
Payer: COMMERCIAL

## 2022-01-01 ENCOUNTER — APPOINTMENT (OUTPATIENT)
Dept: CT IMAGING | Facility: HOSPITAL | Age: 87
End: 2022-01-01
Attending: EMERGENCY MEDICINE
Payer: COMMERCIAL

## 2022-01-01 ENCOUNTER — APPOINTMENT (OUTPATIENT)
Dept: SPEECH THERAPY | Facility: HOSPITAL | Age: 87
DRG: 177 | End: 2022-01-01
Payer: COMMERCIAL

## 2022-01-01 ENCOUNTER — APPOINTMENT (OUTPATIENT)
Dept: RADIOLOGY | Facility: HOSPITAL | Age: 87
DRG: 193 | End: 2022-01-01
Attending: EMERGENCY MEDICINE
Payer: COMMERCIAL

## 2022-01-01 ENCOUNTER — APPOINTMENT (OUTPATIENT)
Dept: OCCUPATIONAL THERAPY | Facility: HOSPITAL | Age: 87
DRG: 812 | End: 2022-01-01
Payer: COMMERCIAL

## 2022-01-01 ENCOUNTER — HOSPITAL ENCOUNTER (OUTPATIENT)
Facility: HOSPITAL | Age: 87
Setting detail: OBSERVATION
Discharge: HOME | End: 2022-02-16
Attending: EMERGENCY MEDICINE | Admitting: EMERGENCY MEDICINE
Payer: COMMERCIAL

## 2022-01-01 ENCOUNTER — APPOINTMENT (OUTPATIENT)
Dept: RADIOLOGY | Facility: HOSPITAL | Age: 87
DRG: 867 | End: 2022-01-01
Payer: COMMERCIAL

## 2022-01-01 ENCOUNTER — APPOINTMENT (OUTPATIENT)
Dept: CT IMAGING | Facility: HOSPITAL | Age: 87
DRG: 641 | End: 2022-01-01
Payer: COMMERCIAL

## 2022-01-01 ENCOUNTER — APPOINTMENT (OUTPATIENT)
Dept: RADIOLOGY | Facility: HOSPITAL | Age: 87
DRG: 392 | End: 2022-01-01
Attending: EMERGENCY MEDICINE
Payer: COMMERCIAL

## 2022-01-01 ENCOUNTER — APPOINTMENT (OUTPATIENT)
Dept: CT IMAGING | Facility: HOSPITAL | Age: 87
DRG: 392 | End: 2022-01-01
Attending: EMERGENCY MEDICINE
Payer: COMMERCIAL

## 2022-01-01 ENCOUNTER — TELEPHONE (OUTPATIENT)
Dept: NEUROSURGERY | Facility: CLINIC | Age: 87
End: 2022-01-01
Payer: COMMERCIAL

## 2022-01-01 ENCOUNTER — HOSPITAL ENCOUNTER (OUTPATIENT)
Dept: RADIOLOGY | Facility: HOSPITAL | Age: 87
Discharge: HOME | End: 2022-04-06
Attending: SURGERY | Admitting: SURGERY
Payer: COMMERCIAL

## 2022-01-01 ENCOUNTER — APPOINTMENT (OUTPATIENT)
Dept: CT IMAGING | Facility: HOSPITAL | Age: 87
DRG: 867 | End: 2022-01-01
Payer: COMMERCIAL

## 2022-01-01 ENCOUNTER — HOSPITAL ENCOUNTER (INPATIENT)
Facility: HOSPITAL | Age: 87
LOS: 5 days | Discharge: NURSING, REHAB OR LONG-TERM CARE | DRG: 193 | End: 2022-12-19
Attending: EMERGENCY MEDICINE | Admitting: FAMILY MEDICINE
Payer: COMMERCIAL

## 2022-01-01 ENCOUNTER — HOSPITAL ENCOUNTER (EMERGENCY)
Facility: HOSPITAL | Age: 87
Discharge: HOME | End: 2022-05-20
Attending: EMERGENCY MEDICINE | Admitting: EMERGENCY MEDICINE
Payer: COMMERCIAL

## 2022-01-01 ENCOUNTER — ANESTHESIA EVENT (OUTPATIENT)
Dept: SURGERY | Facility: HOSPITAL | Age: 87
DRG: 392 | End: 2022-01-01
Payer: COMMERCIAL

## 2022-01-01 ENCOUNTER — PATIENT OUTREACH (OUTPATIENT)
Dept: CARE COORDINATION | Facility: CLINIC | Age: 87
End: 2022-01-01
Payer: COMMERCIAL

## 2022-01-01 ENCOUNTER — APPOINTMENT (OUTPATIENT)
Dept: RADIOLOGY | Facility: HOSPITAL | Age: 87
DRG: 177 | End: 2022-01-01
Attending: FAMILY MEDICINE
Payer: COMMERCIAL

## 2022-01-01 ENCOUNTER — APPOINTMENT (OUTPATIENT)
Dept: NUCLEAR MEDICINE | Facility: HOSPITAL | Age: 87
DRG: 177 | End: 2022-01-01
Payer: COMMERCIAL

## 2022-01-01 ENCOUNTER — HOSPITAL ENCOUNTER (INPATIENT)
Facility: HOSPITAL | Age: 87
LOS: 2 days | Discharge: HOME | DRG: 641 | End: 2022-05-26
Attending: EMERGENCY MEDICINE | Admitting: FAMILY MEDICINE
Payer: COMMERCIAL

## 2022-01-01 ENCOUNTER — ANESTHESIA (OUTPATIENT)
Dept: SURGERY | Facility: HOSPITAL | Age: 87
End: 2022-01-01

## 2022-01-01 ENCOUNTER — HOSPITAL ENCOUNTER (INPATIENT)
Facility: HOSPITAL | Age: 87
LOS: 7 days | Discharge: HOME | DRG: 392 | End: 2022-07-06
Attending: EMERGENCY MEDICINE | Admitting: STUDENT IN AN ORGANIZED HEALTH CARE EDUCATION/TRAINING PROGRAM
Payer: COMMERCIAL

## 2022-01-01 ENCOUNTER — APPOINTMENT (OUTPATIENT)
Dept: ULTRASOUND IMAGING | Facility: HOSPITAL | Age: 87
DRG: 392 | End: 2022-01-01
Attending: STUDENT IN AN ORGANIZED HEALTH CARE EDUCATION/TRAINING PROGRAM
Payer: COMMERCIAL

## 2022-01-01 ENCOUNTER — APPOINTMENT (OUTPATIENT)
Dept: RADIOLOGY | Facility: HOSPITAL | Age: 87
DRG: 867 | End: 2022-01-01
Attending: HOSPITALIST
Payer: COMMERCIAL

## 2022-01-01 ENCOUNTER — APPOINTMENT (OUTPATIENT)
Dept: CT IMAGING | Facility: HOSPITAL | Age: 87
DRG: 193 | End: 2022-01-01
Attending: EMERGENCY MEDICINE
Payer: COMMERCIAL

## 2022-01-01 ENCOUNTER — APPOINTMENT (OUTPATIENT)
Dept: RADIOLOGY | Facility: HOSPITAL | Age: 87
DRG: 867 | End: 2022-01-01
Attending: EMERGENCY MEDICINE
Payer: COMMERCIAL

## 2022-01-01 ENCOUNTER — APPOINTMENT (OUTPATIENT)
Dept: PHYSICAL THERAPY | Facility: HOSPITAL | Age: 87
DRG: 641 | End: 2022-01-01
Payer: COMMERCIAL

## 2022-01-01 ENCOUNTER — HOSPITAL ENCOUNTER (INPATIENT)
Facility: HOSPITAL | Age: 87
LOS: 3 days | Discharge: HOME | DRG: 193 | End: 2022-04-16
Attending: EMERGENCY MEDICINE | Admitting: INTERNAL MEDICINE
Payer: COMMERCIAL

## 2022-01-01 ENCOUNTER — APPOINTMENT (OUTPATIENT)
Dept: RADIOLOGY | Facility: HOSPITAL | Age: 87
DRG: 812 | End: 2022-01-01
Payer: COMMERCIAL

## 2022-01-01 ENCOUNTER — APPOINTMENT (OUTPATIENT)
Dept: ULTRASOUND IMAGING | Facility: HOSPITAL | Age: 87
DRG: 177 | End: 2022-01-01
Payer: COMMERCIAL

## 2022-01-01 ENCOUNTER — PATIENT OUTREACH (OUTPATIENT)
Dept: CARE COORDINATION | Facility: CLINIC | Age: 87
End: 2022-01-01

## 2022-01-01 ENCOUNTER — APPOINTMENT (OUTPATIENT)
Dept: ULTRASOUND IMAGING | Facility: HOSPITAL | Age: 87
End: 2022-01-01
Attending: EMERGENCY MEDICINE
Payer: COMMERCIAL

## 2022-01-01 ENCOUNTER — OFFICE VISIT (OUTPATIENT)
Dept: CARDIOLOGY | Facility: CLINIC | Age: 87
End: 2022-01-01
Attending: NURSE PRACTITIONER
Payer: COMMERCIAL

## 2022-01-01 ENCOUNTER — ANESTHESIA (OUTPATIENT)
Dept: SURGERY | Facility: HOSPITAL | Age: 87
DRG: 392 | End: 2022-01-01
Payer: COMMERCIAL

## 2022-01-01 ENCOUNTER — VIRTUAL VISIT (OUTPATIENT)
Dept: PHARMACY | Facility: CLINIC | Age: 87
End: 2022-01-01
Attending: FAMILY MEDICINE
Payer: COMMERCIAL

## 2022-01-01 ENCOUNTER — HOSPITAL ENCOUNTER (INPATIENT)
Facility: HOSPITAL | Age: 87
LOS: 11 days | Discharge: NURSING, REHAB OR LONG-TERM CARE | DRG: 392 | End: 2022-10-16
Attending: EMERGENCY MEDICINE | Admitting: INTERNAL MEDICINE
Payer: COMMERCIAL

## 2022-01-01 ENCOUNTER — ANESTHESIA EVENT (OUTPATIENT)
Dept: SURGERY | Facility: HOSPITAL | Age: 87
End: 2022-01-01

## 2022-01-01 ENCOUNTER — APPOINTMENT (OUTPATIENT)
Dept: RADIOLOGY | Facility: HOSPITAL | Age: 87
DRG: 392 | End: 2022-01-01
Attending: INTERNAL MEDICINE
Payer: COMMERCIAL

## 2022-01-01 ENCOUNTER — HOSPITAL ENCOUNTER (INPATIENT)
Facility: HOSPITAL | Age: 87
LOS: 7 days | Discharge: HOSPICE | DRG: 867 | End: 2022-12-30
Attending: EMERGENCY MEDICINE
Payer: COMMERCIAL

## 2022-01-01 ENCOUNTER — MEDICAL CORRESPONDENCE (OUTPATIENT)
Dept: HEALTH INFORMATION MANAGEMENT | Facility: CLINIC | Age: 87
End: 2022-01-01

## 2022-01-01 ENCOUNTER — APPOINTMENT (OUTPATIENT)
Dept: CARDIOLOGY | Facility: HOSPITAL | Age: 87
DRG: 193 | End: 2022-01-01
Attending: STUDENT IN AN ORGANIZED HEALTH CARE EDUCATION/TRAINING PROGRAM
Payer: COMMERCIAL

## 2022-01-01 ENCOUNTER — APPOINTMENT (OUTPATIENT)
Dept: CT IMAGING | Facility: HOSPITAL | Age: 87
DRG: 812 | End: 2022-01-01
Attending: EMERGENCY MEDICINE
Payer: COMMERCIAL

## 2022-01-01 ENCOUNTER — TELEPHONE (OUTPATIENT)
Dept: SLEEP MEDICINE | Facility: CLINIC | Age: 87
End: 2022-01-01

## 2022-01-01 ENCOUNTER — APPOINTMENT (OUTPATIENT)
Dept: OCCUPATIONAL THERAPY | Facility: HOSPITAL | Age: 87
DRG: 193 | End: 2022-01-01
Attending: INTERNAL MEDICINE
Payer: COMMERCIAL

## 2022-01-01 ENCOUNTER — APPOINTMENT (OUTPATIENT)
Dept: RADIOLOGY | Facility: HOSPITAL | Age: 87
DRG: 641 | End: 2022-01-01
Payer: COMMERCIAL

## 2022-01-01 ENCOUNTER — APPOINTMENT (OUTPATIENT)
Dept: PHYSICAL THERAPY | Facility: HOSPITAL | Age: 87
DRG: 193 | End: 2022-01-01
Attending: INTERNAL MEDICINE
Payer: COMMERCIAL

## 2022-01-01 ENCOUNTER — APPOINTMENT (OUTPATIENT)
Dept: OCCUPATIONAL THERAPY | Facility: HOSPITAL | Age: 87
DRG: 193 | End: 2022-01-01
Payer: COMMERCIAL

## 2022-01-01 ENCOUNTER — APPOINTMENT (OUTPATIENT)
Dept: CT IMAGING | Facility: HOSPITAL | Age: 87
DRG: 177 | End: 2022-01-01
Payer: COMMERCIAL

## 2022-01-01 ENCOUNTER — OFFICE VISIT (OUTPATIENT)
Dept: NEUROSURGERY | Facility: CLINIC | Age: 87
End: 2022-01-01
Payer: COMMERCIAL

## 2022-01-01 ENCOUNTER — APPOINTMENT (OUTPATIENT)
Dept: PHYSICAL THERAPY | Facility: HOSPITAL | Age: 87
DRG: 812 | End: 2022-01-01
Payer: COMMERCIAL

## 2022-01-01 ENCOUNTER — HOSPITAL ENCOUNTER (INPATIENT)
Facility: HOSPITAL | Age: 87
LOS: 8 days | Discharge: NURSING, REHAB OR LONG-TERM CARE | DRG: 177 | End: 2022-08-05
Attending: EMERGENCY MEDICINE | Admitting: FAMILY MEDICINE
Payer: COMMERCIAL

## 2022-01-01 ENCOUNTER — HOSPITAL ENCOUNTER (OUTPATIENT)
Facility: HOSPITAL | Age: 87
End: 2022-01-01
Attending: INTERNAL MEDICINE | Admitting: INTERNAL MEDICINE
Payer: COMMERCIAL

## 2022-01-01 ENCOUNTER — APPOINTMENT (OUTPATIENT)
Dept: PHYSICAL THERAPY | Facility: HOSPITAL | Age: 87
DRG: 177 | End: 2022-01-01
Payer: COMMERCIAL

## 2022-01-01 ENCOUNTER — APPOINTMENT (OUTPATIENT)
Dept: RADIOLOGY | Facility: HOSPITAL | Age: 87
End: 2022-01-01
Attending: EMERGENCY MEDICINE
Payer: COMMERCIAL

## 2022-01-01 ENCOUNTER — APPOINTMENT (OUTPATIENT)
Dept: SPEECH THERAPY | Facility: HOSPITAL | Age: 87
DRG: 193 | End: 2022-01-01
Attending: STUDENT IN AN ORGANIZED HEALTH CARE EDUCATION/TRAINING PROGRAM
Payer: COMMERCIAL

## 2022-01-01 ENCOUNTER — TELEPHONE (OUTPATIENT)
Dept: CARDIOLOGY | Facility: CLINIC | Age: 87
End: 2022-01-01
Payer: COMMERCIAL

## 2022-01-01 ENCOUNTER — PATIENT OUTREACH (OUTPATIENT)
Dept: FAMILY MEDICINE | Facility: CLINIC | Age: 87
End: 2022-01-01

## 2022-01-01 ENCOUNTER — TELEPHONE (OUTPATIENT)
Dept: CARDIOLOGY | Facility: CLINIC | Age: 87
End: 2022-01-01

## 2022-01-01 ENCOUNTER — APPOINTMENT (OUTPATIENT)
Dept: OCCUPATIONAL THERAPY | Facility: HOSPITAL | Age: 87
DRG: 193 | End: 2022-01-01
Attending: HOSPITALIST
Payer: COMMERCIAL

## 2022-01-01 ENCOUNTER — APPOINTMENT (OUTPATIENT)
Dept: RADIOLOGY | Facility: HOSPITAL | Age: 87
DRG: 177 | End: 2022-01-01
Attending: EMERGENCY MEDICINE
Payer: COMMERCIAL

## 2022-01-01 ENCOUNTER — VIRTUAL VISIT (OUTPATIENT)
Dept: SLEEP MEDICINE | Facility: CLINIC | Age: 87
End: 2022-01-01
Payer: COMMERCIAL

## 2022-01-01 ENCOUNTER — HOSPITAL ENCOUNTER (EMERGENCY)
Facility: HOSPITAL | Age: 87
Discharge: HOME | End: 2022-06-04
Attending: EMERGENCY MEDICINE | Admitting: EMERGENCY MEDICINE
Payer: COMMERCIAL

## 2022-01-01 VITALS
RESPIRATION RATE: 19 BRPM | TEMPERATURE: 97.9 F | SYSTOLIC BLOOD PRESSURE: 133 MMHG | OXYGEN SATURATION: 98 % | BODY MASS INDEX: 21.32 KG/M2 | DIASTOLIC BLOOD PRESSURE: 80 MMHG | WEIGHT: 108.6 LBS | HEIGHT: 60 IN | HEART RATE: 87 BPM

## 2022-01-01 VITALS
BODY MASS INDEX: 23.73 KG/M2 | WEIGHT: 117.5 LBS | HEART RATE: 86 BPM | DIASTOLIC BLOOD PRESSURE: 60 MMHG | OXYGEN SATURATION: 97 % | SYSTOLIC BLOOD PRESSURE: 124 MMHG | RESPIRATION RATE: 20 BRPM | TEMPERATURE: 98 F

## 2022-01-01 VITALS
SYSTOLIC BLOOD PRESSURE: 114 MMHG | WEIGHT: 115 LBS | RESPIRATION RATE: 16 BRPM | BODY MASS INDEX: 23.23 KG/M2 | DIASTOLIC BLOOD PRESSURE: 72 MMHG | HEART RATE: 86 BPM

## 2022-01-01 VITALS
DIASTOLIC BLOOD PRESSURE: 60 MMHG | SYSTOLIC BLOOD PRESSURE: 106 MMHG | HEIGHT: 59 IN | WEIGHT: 116 LBS | RESPIRATION RATE: 14 BRPM | BODY MASS INDEX: 23.39 KG/M2 | HEART RATE: 82 BPM

## 2022-01-01 VITALS
RESPIRATION RATE: 20 BRPM | TEMPERATURE: 98.1 F | OXYGEN SATURATION: 100 % | DIASTOLIC BLOOD PRESSURE: 74 MMHG | HEART RATE: 94 BPM | BODY MASS INDEX: 22.49 KG/M2 | SYSTOLIC BLOOD PRESSURE: 147 MMHG | WEIGHT: 111.33 LBS

## 2022-01-01 VITALS
WEIGHT: 95 LBS | HEART RATE: 81 BPM | DIASTOLIC BLOOD PRESSURE: 73 MMHG | OXYGEN SATURATION: 96 % | SYSTOLIC BLOOD PRESSURE: 119 MMHG | BODY MASS INDEX: 18.55 KG/M2 | RESPIRATION RATE: 20 BRPM | TEMPERATURE: 97.6 F

## 2022-01-01 VITALS — HEIGHT: 60 IN | WEIGHT: 95 LBS | BODY MASS INDEX: 18.65 KG/M2

## 2022-01-01 VITALS
DIASTOLIC BLOOD PRESSURE: 81 MMHG | RESPIRATION RATE: 30 BRPM | TEMPERATURE: 98.2 F | OXYGEN SATURATION: 90 % | HEART RATE: 77 BPM | SYSTOLIC BLOOD PRESSURE: 138 MMHG

## 2022-01-01 VITALS
RESPIRATION RATE: 20 BRPM | SYSTOLIC BLOOD PRESSURE: 144 MMHG | HEART RATE: 63 BPM | WEIGHT: 94 LBS | TEMPERATURE: 98.2 F | BODY MASS INDEX: 18.36 KG/M2 | OXYGEN SATURATION: 91 % | DIASTOLIC BLOOD PRESSURE: 67 MMHG

## 2022-01-01 VITALS
WEIGHT: 112 LBS | DIASTOLIC BLOOD PRESSURE: 66 MMHG | BODY MASS INDEX: 21.99 KG/M2 | RESPIRATION RATE: 17 BRPM | HEART RATE: 62 BPM | SYSTOLIC BLOOD PRESSURE: 134 MMHG | HEIGHT: 60 IN | OXYGEN SATURATION: 96 % | TEMPERATURE: 97.8 F

## 2022-01-01 VITALS
OXYGEN SATURATION: 92 % | DIASTOLIC BLOOD PRESSURE: 73 MMHG | BODY MASS INDEX: 18.59 KG/M2 | WEIGHT: 95.2 LBS | RESPIRATION RATE: 18 BRPM | SYSTOLIC BLOOD PRESSURE: 133 MMHG | HEART RATE: 75 BPM | TEMPERATURE: 97.4 F

## 2022-01-01 VITALS
RESPIRATION RATE: 20 BRPM | BODY MASS INDEX: 22.22 KG/M2 | WEIGHT: 110 LBS | HEART RATE: 83 BPM | OXYGEN SATURATION: 97 % | SYSTOLIC BLOOD PRESSURE: 127 MMHG | DIASTOLIC BLOOD PRESSURE: 61 MMHG | TEMPERATURE: 99.8 F

## 2022-01-01 VITALS
BODY MASS INDEX: 23.16 KG/M2 | DIASTOLIC BLOOD PRESSURE: 58 MMHG | OXYGEN SATURATION: 98 % | SYSTOLIC BLOOD PRESSURE: 116 MMHG | RESPIRATION RATE: 16 BRPM | WEIGHT: 114.68 LBS | HEART RATE: 91 BPM | TEMPERATURE: 97.9 F

## 2022-01-01 VITALS
BODY MASS INDEX: 23.83 KG/M2 | SYSTOLIC BLOOD PRESSURE: 110 MMHG | RESPIRATION RATE: 16 BRPM | DIASTOLIC BLOOD PRESSURE: 62 MMHG | WEIGHT: 118 LBS | HEART RATE: 96 BPM

## 2022-01-01 VITALS
OXYGEN SATURATION: 97 % | DIASTOLIC BLOOD PRESSURE: 72 MMHG | HEART RATE: 76 BPM | SYSTOLIC BLOOD PRESSURE: 155 MMHG | TEMPERATURE: 97.8 F | RESPIRATION RATE: 18 BRPM | HEIGHT: 59 IN | BODY MASS INDEX: 24.19 KG/M2 | WEIGHT: 120 LBS

## 2022-01-01 VITALS
SYSTOLIC BLOOD PRESSURE: 151 MMHG | TEMPERATURE: 97.4 F | DIASTOLIC BLOOD PRESSURE: 73 MMHG | RESPIRATION RATE: 20 BRPM | OXYGEN SATURATION: 97 % | HEIGHT: 60 IN | BODY MASS INDEX: 18.55 KG/M2 | HEART RATE: 95 BPM | WEIGHT: 94.5 LBS

## 2022-01-01 VITALS
SYSTOLIC BLOOD PRESSURE: 124 MMHG | HEART RATE: 68 BPM | OXYGEN SATURATION: 96 % | BODY MASS INDEX: 24.19 KG/M2 | DIASTOLIC BLOOD PRESSURE: 70 MMHG | WEIGHT: 120 LBS | HEIGHT: 59 IN

## 2022-01-01 VITALS
BODY MASS INDEX: 23.79 KG/M2 | RESPIRATION RATE: 19 BRPM | HEIGHT: 59 IN | OXYGEN SATURATION: 100 % | WEIGHT: 118 LBS | TEMPERATURE: 99.2 F | HEART RATE: 80 BPM | DIASTOLIC BLOOD PRESSURE: 72 MMHG | SYSTOLIC BLOOD PRESSURE: 162 MMHG

## 2022-01-01 DIAGNOSIS — K12.1 ORAL ULCER: ICD-10-CM

## 2022-01-01 DIAGNOSIS — A04.72 C. DIFFICILE COLITIS: ICD-10-CM

## 2022-01-01 DIAGNOSIS — J18.9 PNEUMONIA OF LEFT LOWER LOBE DUE TO INFECTIOUS ORGANISM: ICD-10-CM

## 2022-01-01 DIAGNOSIS — D72.829 LEUKOCYTOSIS, UNSPECIFIED TYPE: ICD-10-CM

## 2022-01-01 DIAGNOSIS — I50.33 ACUTE ON CHRONIC HEART FAILURE WITH PRESERVED EJECTION FRACTION (H): Primary | ICD-10-CM

## 2022-01-01 DIAGNOSIS — R06.02 SHORTNESS OF BREATH: ICD-10-CM

## 2022-01-01 DIAGNOSIS — R60.0 LOWER EXTREMITY EDEMA: ICD-10-CM

## 2022-01-01 DIAGNOSIS — M54.41 CHRONIC RIGHT-SIDED LOW BACK PAIN WITH RIGHT-SIDED SCIATICA: ICD-10-CM

## 2022-01-01 DIAGNOSIS — E87.6 HYPOKALEMIA: ICD-10-CM

## 2022-01-01 DIAGNOSIS — E22.2 SIADH (SYNDROME OF INAPPROPRIATE ADH PRODUCTION) (H): ICD-10-CM

## 2022-01-01 DIAGNOSIS — E83.42 HYPOMAGNESEMIA: ICD-10-CM

## 2022-01-01 DIAGNOSIS — N17.9 ACUTE RENAL FAILURE, UNSPECIFIED ACUTE RENAL FAILURE TYPE (H): ICD-10-CM

## 2022-01-01 DIAGNOSIS — D50.0 ANEMIA DUE TO GI BLOOD LOSS: Primary | ICD-10-CM

## 2022-01-01 DIAGNOSIS — K27.4 GASTROINTESTINAL HEMORRHAGE ASSOCIATED WITH PEPTIC ULCER: ICD-10-CM

## 2022-01-01 DIAGNOSIS — E87.1 HYPONATREMIA: ICD-10-CM

## 2022-01-01 DIAGNOSIS — N39.0 URINARY TRACT INFECTION WITHOUT HEMATURIA, SITE UNSPECIFIED: ICD-10-CM

## 2022-01-01 DIAGNOSIS — I10 HYPERTENSION, UNSPECIFIED TYPE: ICD-10-CM

## 2022-01-01 DIAGNOSIS — R06.01 ORTHOPNEA: ICD-10-CM

## 2022-01-01 DIAGNOSIS — J96.01 ACUTE RESPIRATORY FAILURE WITH HYPOXIA (H): ICD-10-CM

## 2022-01-01 DIAGNOSIS — I50.32 CHRONIC HEART FAILURE WITH PRESERVED EJECTION FRACTION (H): Primary | ICD-10-CM

## 2022-01-01 DIAGNOSIS — G47.33 OSA (OBSTRUCTIVE SLEEP APNEA): ICD-10-CM

## 2022-01-01 DIAGNOSIS — I50.9 CHF (CONGESTIVE HEART FAILURE) (H): ICD-10-CM

## 2022-01-01 DIAGNOSIS — S32.020D CLOSED WEDGE COMPRESSION FRACTURE OF L2 VERTEBRA WITH ROUTINE HEALING, SUBSEQUENT ENCOUNTER: Primary | ICD-10-CM

## 2022-01-01 DIAGNOSIS — J18.9 PNEUMONIA DUE TO INFECTIOUS ORGANISM, UNSPECIFIED LATERALITY, UNSPECIFIED PART OF LUNG: ICD-10-CM

## 2022-01-01 DIAGNOSIS — M62.81 GENERALIZED MUSCLE WEAKNESS: ICD-10-CM

## 2022-01-01 DIAGNOSIS — D64.9 CHRONIC ANEMIA: ICD-10-CM

## 2022-01-01 DIAGNOSIS — S32.020A CLOSED COMPRESSION FRACTURE OF L2 LUMBAR VERTEBRA, INITIAL ENCOUNTER (H): ICD-10-CM

## 2022-01-01 DIAGNOSIS — M05.79 RHEUMATOID ARTHRITIS INVOLVING MULTIPLE SITES WITH POSITIVE RHEUMATOID FACTOR (H): ICD-10-CM

## 2022-01-01 DIAGNOSIS — Z78.9 TAKES DIETARY SUPPLEMENTS: ICD-10-CM

## 2022-01-01 DIAGNOSIS — K12.1 ORAL ULCER: Primary | ICD-10-CM

## 2022-01-01 DIAGNOSIS — Z71.89 OTHER SPECIFIED COUNSELING: ICD-10-CM

## 2022-01-01 DIAGNOSIS — I50.32 CHRONIC HEART FAILURE WITH PRESERVED EJECTION FRACTION (H): ICD-10-CM

## 2022-01-01 DIAGNOSIS — D64.9 ANEMIA, UNSPECIFIED TYPE: ICD-10-CM

## 2022-01-01 DIAGNOSIS — G89.29 CHRONIC RIGHT-SIDED LOW BACK PAIN WITH RIGHT-SIDED SCIATICA: ICD-10-CM

## 2022-01-01 DIAGNOSIS — S32.029A L2 VERTEBRAL FRACTURE (H): Primary | ICD-10-CM

## 2022-01-01 DIAGNOSIS — R19.7 DIARRHEA, UNSPECIFIED TYPE: ICD-10-CM

## 2022-01-01 DIAGNOSIS — U07.1 INFECTION DUE TO 2019 NOVEL CORONAVIRUS: ICD-10-CM

## 2022-01-01 DIAGNOSIS — G89.29 OTHER CHRONIC PAIN: Primary | ICD-10-CM

## 2022-01-01 DIAGNOSIS — R10.30 LOWER ABDOMINAL PAIN: ICD-10-CM

## 2022-01-01 DIAGNOSIS — G47.33 OSA (OBSTRUCTIVE SLEEP APNEA): Primary | ICD-10-CM

## 2022-01-01 DIAGNOSIS — I50.9 CHF (CONGESTIVE HEART FAILURE) (H): Primary | ICD-10-CM

## 2022-01-01 DIAGNOSIS — N17.9 ACUTE KIDNEY INJURY (H): ICD-10-CM

## 2022-01-01 DIAGNOSIS — F41.9 ANXIETY: ICD-10-CM

## 2022-01-01 DIAGNOSIS — E87.5 HYPERKALEMIA: ICD-10-CM

## 2022-01-01 DIAGNOSIS — R53.1 WEAKNESS: ICD-10-CM

## 2022-01-01 DIAGNOSIS — K27.9 PUD (PEPTIC ULCER DISEASE): ICD-10-CM

## 2022-01-01 DIAGNOSIS — R09.02 HYPOXIA: ICD-10-CM

## 2022-01-01 DIAGNOSIS — G47.9 SLEEP DISTURBANCE: Primary | ICD-10-CM

## 2022-01-01 DIAGNOSIS — M79.604 BILATERAL LEG PAIN: ICD-10-CM

## 2022-01-01 DIAGNOSIS — R32 URINARY INCONTINENCE, UNSPECIFIED TYPE: ICD-10-CM

## 2022-01-01 DIAGNOSIS — I50.33 ACUTE ON CHRONIC DIASTOLIC CHF (CONGESTIVE HEART FAILURE) (H): ICD-10-CM

## 2022-01-01 DIAGNOSIS — R52 PAIN: ICD-10-CM

## 2022-01-01 DIAGNOSIS — M54.16 RIGHT LUMBAR RADICULITIS: ICD-10-CM

## 2022-01-01 DIAGNOSIS — R19.7 DIARRHEA, UNSPECIFIED TYPE: Primary | ICD-10-CM

## 2022-01-01 DIAGNOSIS — R10.84 ABDOMINAL PAIN, GENERALIZED: ICD-10-CM

## 2022-01-01 DIAGNOSIS — K59.00 CONSTIPATION, UNSPECIFIED CONSTIPATION TYPE: ICD-10-CM

## 2022-01-01 DIAGNOSIS — I50.9 CONGESTIVE HEART FAILURE, UNSPECIFIED HF CHRONICITY, UNSPECIFIED HEART FAILURE TYPE (H): Primary | ICD-10-CM

## 2022-01-01 DIAGNOSIS — M79.605 BILATERAL LEG PAIN: ICD-10-CM

## 2022-01-01 DIAGNOSIS — D50.0 ANEMIA DUE TO GI BLOOD LOSS: ICD-10-CM

## 2022-01-01 DIAGNOSIS — I50.33 ACUTE ON CHRONIC HEART FAILURE WITH PRESERVED EJECTION FRACTION (H): ICD-10-CM

## 2022-01-01 DIAGNOSIS — S32.029A L2 VERTEBRAL FRACTURE (H): ICD-10-CM

## 2022-01-01 DIAGNOSIS — M71.22 BAKER'S CYST OF KNEE, LEFT: ICD-10-CM

## 2022-01-01 DIAGNOSIS — M54.50 LOW BACK PAIN, UNSPECIFIED BACK PAIN LATERALITY, UNSPECIFIED CHRONICITY, UNSPECIFIED WHETHER SCIATICA PRESENT: ICD-10-CM

## 2022-01-01 LAB
ABO/RH(D): NORMAL
ALBUMIN PERCENT: 34.1 % (ref 51–67)
ALBUMIN SERPL BCG-MCNC: 2.2 G/DL (ref 3.5–5.2)
ALBUMIN SERPL BCG-MCNC: 2.5 G/DL (ref 3.5–5.2)
ALBUMIN SERPL BCG-MCNC: 2.6 G/DL (ref 3.5–5.2)
ALBUMIN SERPL BCG-MCNC: 2.6 G/DL (ref 3.5–5.2)
ALBUMIN SERPL ELPH-MCNC: 2.6 G/DL (ref 3.2–4.7)
ALBUMIN SERPL-MCNC: 1.9 G/DL (ref 3.5–5)
ALBUMIN SERPL-MCNC: 2 G/DL (ref 3.5–5)
ALBUMIN SERPL-MCNC: 2.1 G/DL (ref 3.5–5)
ALBUMIN SERPL-MCNC: 2.2 G/DL (ref 3.5–5)
ALBUMIN SERPL-MCNC: 2.3 G/DL (ref 3.5–5)
ALBUMIN SERPL-MCNC: 2.3 G/DL (ref 3.5–5)
ALBUMIN SERPL-MCNC: 2.5 G/DL (ref 3.5–5)
ALBUMIN SERPL-MCNC: 2.8 G/DL (ref 3.5–5)
ALBUMIN UR-MCNC: 20 MG/DL
ALBUMIN UR-MCNC: 30 MG/DL
ALBUMIN UR-MCNC: NEGATIVE MG/DL
ALP SERPL-CCNC: 119 U/L (ref 45–120)
ALP SERPL-CCNC: 134 U/L (ref 45–120)
ALP SERPL-CCNC: 172 U/L (ref 45–120)
ALP SERPL-CCNC: 193 U/L (ref 45–120)
ALP SERPL-CCNC: 63 U/L (ref 45–120)
ALP SERPL-CCNC: 77 U/L (ref 45–120)
ALP SERPL-CCNC: 78 U/L (ref 45–120)
ALP SERPL-CCNC: 84 U/L (ref 35–104)
ALP SERPL-CCNC: 85 U/L (ref 45–120)
ALP SERPL-CCNC: 89 U/L (ref 45–120)
ALP SERPL-CCNC: 90 U/L (ref 45–120)
ALP SERPL-CCNC: 91 U/L (ref 35–104)
ALP SERPL-CCNC: 96 U/L (ref 45–120)
ALP SERPL-CCNC: 97 U/L (ref 35–104)
ALP SERPL-CCNC: 98 U/L (ref 45–120)
ALPHA 1 PERCENT: 4.5 % (ref 2–4)
ALPHA 2 PERCENT: 11.5 % (ref 5–13)
ALPHA1 GLOB SERPL ELPH-MCNC: 0.3 G/DL (ref 0.1–0.3)
ALPHA2 GLOB SERPL ELPH-MCNC: 0.9 G/DL (ref 0.4–0.9)
ALT SERPL W P-5'-P-CCNC: 10 U/L (ref 0–45)
ALT SERPL W P-5'-P-CCNC: 10 U/L (ref 0–45)
ALT SERPL W P-5'-P-CCNC: 6 U/L (ref 10–35)
ALT SERPL W P-5'-P-CCNC: 7 U/L (ref 10–35)
ALT SERPL W P-5'-P-CCNC: <5 U/L (ref 10–35)
ALT SERPL W P-5'-P-CCNC: <9 U/L (ref 0–45)
AMORPH CRY #/AREA URNS HPF: ABNORMAL /HPF
ANA SER QL IF: NEGATIVE
ANION GAP SERPL CALCULATED.3IONS-SCNC: 10 MMOL/L (ref 5–18)
ANION GAP SERPL CALCULATED.3IONS-SCNC: 10 MMOL/L (ref 7–15)
ANION GAP SERPL CALCULATED.3IONS-SCNC: 11 MMOL/L (ref 5–18)
ANION GAP SERPL CALCULATED.3IONS-SCNC: 11 MMOL/L (ref 7–15)
ANION GAP SERPL CALCULATED.3IONS-SCNC: 12 MMOL/L (ref 5–18)
ANION GAP SERPL CALCULATED.3IONS-SCNC: 12 MMOL/L (ref 5–18)
ANION GAP SERPL CALCULATED.3IONS-SCNC: 12 MMOL/L (ref 7–15)
ANION GAP SERPL CALCULATED.3IONS-SCNC: 13 MMOL/L (ref 7–15)
ANION GAP SERPL CALCULATED.3IONS-SCNC: 13 MMOL/L (ref 7–15)
ANION GAP SERPL CALCULATED.3IONS-SCNC: 14 MMOL/L (ref 7–15)
ANION GAP SERPL CALCULATED.3IONS-SCNC: 15 MMOL/L (ref 5–18)
ANION GAP SERPL CALCULATED.3IONS-SCNC: 15 MMOL/L (ref 7–15)
ANION GAP SERPL CALCULATED.3IONS-SCNC: 15 MMOL/L (ref 7–15)
ANION GAP SERPL CALCULATED.3IONS-SCNC: 2 MMOL/L (ref 5–18)
ANION GAP SERPL CALCULATED.3IONS-SCNC: 4 MMOL/L (ref 5–18)
ANION GAP SERPL CALCULATED.3IONS-SCNC: 6 MMOL/L (ref 5–18)
ANION GAP SERPL CALCULATED.3IONS-SCNC: 6 MMOL/L (ref 5–18)
ANION GAP SERPL CALCULATED.3IONS-SCNC: 6 MMOL/L (ref 7–15)
ANION GAP SERPL CALCULATED.3IONS-SCNC: 7 MMOL/L (ref 5–18)
ANION GAP SERPL CALCULATED.3IONS-SCNC: 7 MMOL/L (ref 7–15)
ANION GAP SERPL CALCULATED.3IONS-SCNC: 8 MMOL/L (ref 5–18)
ANION GAP SERPL CALCULATED.3IONS-SCNC: 8 MMOL/L (ref 7–15)
ANION GAP SERPL CALCULATED.3IONS-SCNC: 9 MMOL/L (ref 5–18)
ANION GAP SERPL CALCULATED.3IONS-SCNC: 9 MMOL/L (ref 7–15)
ANTIBODY SCREEN: NEGATIVE
APPEARANCE UR: ABNORMAL
APPEARANCE UR: CLEAR
APTT PPP: 27 SECONDS (ref 22–38)
APTT PPP: 30 SECONDS (ref 22–38)
APTT PPP: 32 SECONDS (ref 22–38)
AST SERPL W P-5'-P-CCNC: 10 U/L (ref 0–40)
AST SERPL W P-5'-P-CCNC: 12 U/L (ref 0–40)
AST SERPL W P-5'-P-CCNC: 12 U/L (ref 0–40)
AST SERPL W P-5'-P-CCNC: 13 U/L (ref 0–40)
AST SERPL W P-5'-P-CCNC: 13 U/L (ref 0–40)
AST SERPL W P-5'-P-CCNC: 15 U/L (ref 0–40)
AST SERPL W P-5'-P-CCNC: 16 U/L (ref 0–40)
AST SERPL W P-5'-P-CCNC: 16 U/L (ref 10–35)
AST SERPL W P-5'-P-CCNC: 17 U/L (ref 0–40)
AST SERPL W P-5'-P-CCNC: 18 U/L (ref 0–40)
AST SERPL W P-5'-P-CCNC: 20 U/L (ref 10–35)
AST SERPL W P-5'-P-CCNC: 39 U/L (ref 10–35)
AST SERPL W P-5'-P-CCNC: 8 U/L (ref 0–40)
ATRIAL RATE - MUSE: 112 BPM
ATRIAL RATE - MUSE: 128 BPM
ATRIAL RATE - MUSE: 71 BPM
ATRIAL RATE - MUSE: 78 BPM
ATRIAL RATE - MUSE: 78 BPM
ATRIAL RATE - MUSE: 82 BPM
ATRIAL RATE - MUSE: 83 BPM
ATRIAL RATE - MUSE: 85 BPM
ATRIAL RATE - MUSE: 86 BPM
ATRIAL RATE - MUSE: 98 BPM
B-GLOBULIN SERPL ELPH-MCNC: 1.2 G/DL (ref 0.7–1.2)
BACTERIA #/AREA URNS HPF: ABNORMAL /HPF
BACTERIA BLD CULT: NO GROWTH
BACTERIA UR CULT: NORMAL
BASE EXCESS BLDV CALC-SCNC: -3.8 MMOL/L
BASE EXCESS BLDV CALC-SCNC: 0.9 MMOL/L
BASO STIPL BLD QL SMEAR: PRESENT
BASOPHILS # BLD AUTO: 0 10E3/UL (ref 0–0.2)
BASOPHILS # BLD MANUAL: 0 10E3/UL (ref 0–0.2)
BASOPHILS # BLD MANUAL: 0.2 10E3/UL (ref 0–0.2)
BASOPHILS # BLD MANUAL: 0.2 10E3/UL (ref 0–0.2)
BASOPHILS NFR BLD AUTO: 0 %
BASOPHILS NFR BLD AUTO: 0 %
BASOPHILS NFR BLD AUTO: 1 %
BASOPHILS NFR BLD MANUAL: 0 %
BASOPHILS NFR BLD MANUAL: 1 %
BASOPHILS NFR BLD MANUAL: 1 %
BETA PERCENT: 15.8 % (ref 10–17)
BILIRUB DIRECT SERPL-MCNC: 0.1 MG/DL
BILIRUB DIRECT SERPL-MCNC: <0.2 MG/DL (ref 0–0.3)
BILIRUB DIRECT SERPL-MCNC: <0.2 MG/DL (ref 0–0.3)
BILIRUB SERPL-MCNC: 0.2 MG/DL
BILIRUB SERPL-MCNC: 0.2 MG/DL (ref 0–1)
BILIRUB SERPL-MCNC: 0.3 MG/DL
BILIRUB SERPL-MCNC: 0.3 MG/DL
BILIRUB SERPL-MCNC: 0.3 MG/DL (ref 0–1)
BILIRUB SERPL-MCNC: 0.5 MG/DL (ref 0–1)
BILIRUB UR QL STRIP: NEGATIVE
BLD PROD TYP BPU: NORMAL
BLOOD COMPONENT TYPE: NORMAL
BNP SERPL-MCNC: 175 PG/ML (ref 0–167)
BNP SERPL-MCNC: 238 PG/ML (ref 0–167)
BNP SERPL-MCNC: 265 PG/ML (ref 0–167)
BNP SERPL-MCNC: 272 PG/ML (ref 0–167)
BNP SERPL-MCNC: 396 PG/ML (ref 0–167)
BNP SERPL-MCNC: 84 PG/ML (ref 0–167)
BUN SERPL-MCNC: 10 MG/DL (ref 8–23)
BUN SERPL-MCNC: 10.2 MG/DL (ref 8–23)
BUN SERPL-MCNC: 10.3 MG/DL (ref 8–23)
BUN SERPL-MCNC: 10.6 MG/DL (ref 8–23)
BUN SERPL-MCNC: 10.7 MG/DL (ref 8–23)
BUN SERPL-MCNC: 10.7 MG/DL (ref 8–23)
BUN SERPL-MCNC: 11 MG/DL (ref 8–28)
BUN SERPL-MCNC: 12 MG/DL (ref 8–28)
BUN SERPL-MCNC: 13.6 MG/DL (ref 8–23)
BUN SERPL-MCNC: 13.8 MG/DL (ref 8–23)
BUN SERPL-MCNC: 14.4 MG/DL (ref 8–23)
BUN SERPL-MCNC: 14.9 MG/DL (ref 8–23)
BUN SERPL-MCNC: 15 MG/DL (ref 8–28)
BUN SERPL-MCNC: 16.5 MG/DL (ref 8–23)
BUN SERPL-MCNC: 17 MG/DL (ref 8–28)
BUN SERPL-MCNC: 17.2 MG/DL (ref 8–23)
BUN SERPL-MCNC: 18 MG/DL (ref 8–28)
BUN SERPL-MCNC: 18 MG/DL (ref 8–28)
BUN SERPL-MCNC: 18.6 MG/DL (ref 8–23)
BUN SERPL-MCNC: 19 MG/DL (ref 8–28)
BUN SERPL-MCNC: 20 MG/DL (ref 8–28)
BUN SERPL-MCNC: 20.6 MG/DL (ref 8–23)
BUN SERPL-MCNC: 21 MG/DL (ref 8–23)
BUN SERPL-MCNC: 21.5 MG/DL (ref 8–23)
BUN SERPL-MCNC: 21.5 MG/DL (ref 8–23)
BUN SERPL-MCNC: 21.6 MG/DL (ref 8–23)
BUN SERPL-MCNC: 21.7 MG/DL (ref 8–23)
BUN SERPL-MCNC: 22 MG/DL (ref 8–28)
BUN SERPL-MCNC: 24 MG/DL (ref 8–28)
BUN SERPL-MCNC: 25.7 MG/DL (ref 8–23)
BUN SERPL-MCNC: 29.8 MG/DL (ref 8–23)
BUN SERPL-MCNC: 3 MG/DL (ref 8–28)
BUN SERPL-MCNC: 3 MG/DL (ref 8–28)
BUN SERPL-MCNC: 31 MG/DL (ref 8–28)
BUN SERPL-MCNC: 31.5 MG/DL (ref 8–23)
BUN SERPL-MCNC: 33 MG/DL (ref 8–28)
BUN SERPL-MCNC: 33.4 MG/DL (ref 8–23)
BUN SERPL-MCNC: 37 MG/DL (ref 8–28)
BUN SERPL-MCNC: 41 MG/DL (ref 8–28)
BUN SERPL-MCNC: 43.2 MG/DL (ref 8–23)
BUN SERPL-MCNC: 5 MG/DL (ref 8–28)
BUN SERPL-MCNC: 5 MG/DL (ref 8–28)
BUN SERPL-MCNC: 50 MG/DL (ref 8–28)
BUN SERPL-MCNC: 50 MG/DL (ref 8–28)
BUN SERPL-MCNC: 61 MG/DL (ref 8–28)
BUN SERPL-MCNC: 7 MG/DL (ref 8–28)
BUN SERPL-MCNC: 7 MG/DL (ref 8–28)
BUN SERPL-MCNC: 7.4 MG/DL (ref 8–23)
BUN SERPL-MCNC: 7.6 MG/DL (ref 8–23)
BUN SERPL-MCNC: 7.8 MG/DL (ref 8–23)
BUN SERPL-MCNC: 8 MG/DL (ref 8–28)
BUN SERPL-MCNC: 8.3 MG/DL (ref 8–23)
BUN SERPL-MCNC: 9 MG/DL (ref 8–28)
BUN SERPL-MCNC: 9 MG/DL (ref 8–28)
BUN SERPL-MCNC: 9.1 MG/DL (ref 8–23)
BUN SERPL-MCNC: 9.9 MG/DL (ref 8–23)
BURR CELLS BLD QL SMEAR: SLIGHT
BURR CELLS BLD QL SMEAR: SLIGHT
C COLI+JEJUNI+LARI FUSA STL QL NAA+PROBE: NOT DETECTED
C COLI+JEJUNI+LARI FUSA STL QL NAA+PROBE: NOT DETECTED
C DIFF TOX B STL QL: NEGATIVE
C DIFF TOX B STL QL: POSITIVE
C REACTIVE PROTEIN LHE: 18.1 MG/DL (ref 0–?)
C REACTIVE PROTEIN LHE: 22.4 MG/DL (ref 0–?)
C REACTIVE PROTEIN LHE: 4.7 MG/DL (ref 0–?)
C REACTIVE PROTEIN LHE: 5.1 MG/DL (ref 0–?)
C REACTIVE PROTEIN LHE: 6.5 MG/DL (ref 0–?)
C REACTIVE PROTEIN LHE: 9 MG/DL (ref 0–?)
C1INH ACT/NOR SERPL: 114 %
C1INH SERPL-MCNC: 33 MG/DL
C1Q AB SER-ACNC: 6 UNITS
C4 SERPL-MCNC: 35 MG/DL (ref 19–59)
CALCIUM SERPL-MCNC: 6.7 MG/DL (ref 8.8–10.2)
CALCIUM SERPL-MCNC: 7 MG/DL (ref 8.8–10.2)
CALCIUM SERPL-MCNC: 7.1 MG/DL (ref 8.8–10.2)
CALCIUM SERPL-MCNC: 7.2 MG/DL (ref 8.8–10.2)
CALCIUM SERPL-MCNC: 7.3 MG/DL (ref 8.8–10.2)
CALCIUM SERPL-MCNC: 7.3 MG/DL (ref 8.8–10.2)
CALCIUM SERPL-MCNC: 7.4 MG/DL (ref 8.8–10.2)
CALCIUM SERPL-MCNC: 7.5 MG/DL (ref 8.5–10.5)
CALCIUM SERPL-MCNC: 7.5 MG/DL (ref 8.8–10.2)
CALCIUM SERPL-MCNC: 7.6 MG/DL (ref 8.8–10.2)
CALCIUM SERPL-MCNC: 7.7 MG/DL (ref 8.5–10.5)
CALCIUM SERPL-MCNC: 7.7 MG/DL (ref 8.5–10.5)
CALCIUM SERPL-MCNC: 7.8 MG/DL (ref 8.5–10.5)
CALCIUM SERPL-MCNC: 7.8 MG/DL (ref 8.5–10.5)
CALCIUM SERPL-MCNC: 7.8 MG/DL (ref 8.8–10.2)
CALCIUM SERPL-MCNC: 7.9 MG/DL (ref 8.5–10.5)
CALCIUM SERPL-MCNC: 7.9 MG/DL (ref 8.8–10.2)
CALCIUM SERPL-MCNC: 8 MG/DL (ref 8.5–10.5)
CALCIUM SERPL-MCNC: 8 MG/DL (ref 8.8–10.2)
CALCIUM SERPL-MCNC: 8.1 MG/DL (ref 8.5–10.5)
CALCIUM SERPL-MCNC: 8.1 MG/DL (ref 8.8–10.2)
CALCIUM SERPL-MCNC: 8.1 MG/DL (ref 8.8–10.2)
CALCIUM SERPL-MCNC: 8.2 MG/DL (ref 8.5–10.5)
CALCIUM SERPL-MCNC: 8.2 MG/DL (ref 8.8–10.2)
CALCIUM SERPL-MCNC: 8.3 MG/DL (ref 8.5–10.5)
CALCIUM SERPL-MCNC: 8.3 MG/DL (ref 8.8–10.2)
CALCIUM SERPL-MCNC: 8.3 MG/DL (ref 8.8–10.2)
CALCIUM SERPL-MCNC: 8.4 MG/DL (ref 8.5–10.5)
CALCIUM SERPL-MCNC: 8.4 MG/DL (ref 8.8–10.2)
CALCIUM SERPL-MCNC: 8.5 MG/DL (ref 8.5–10.5)
CALCIUM SERPL-MCNC: 8.6 MG/DL (ref 8.5–10.5)
CALCIUM SERPL-MCNC: 8.6 MG/DL (ref 8.5–10.5)
CALCIUM SERPL-MCNC: 8.6 MG/DL (ref 8.8–10.2)
CALCIUM SERPL-MCNC: 8.8 MG/DL (ref 8.5–10.5)
CALCIUM SERPL-MCNC: 8.8 MG/DL (ref 8.5–10.5)
CALCIUM SERPL-MCNC: 8.8 MG/DL (ref 8.8–10.2)
CALCIUM SERPL-MCNC: 9.2 MG/DL (ref 8.8–10.2)
CALCIUM SERPL-MCNC: 9.3 MG/DL (ref 8.8–10.2)
CALPROTECTIN STL-MCNT: 165 MG/KG (ref 0–49.9)
CCP AB SER IA-ACNC: >340 U/ML
CHLORIDE BLD-SCNC: 100 MMOL/L (ref 98–107)
CHLORIDE BLD-SCNC: 100 MMOL/L (ref 98–107)
CHLORIDE BLD-SCNC: 101 MMOL/L (ref 98–107)
CHLORIDE BLD-SCNC: 102 MMOL/L (ref 98–107)
CHLORIDE BLD-SCNC: 103 MMOL/L (ref 98–107)
CHLORIDE BLD-SCNC: 104 MMOL/L (ref 98–107)
CHLORIDE BLD-SCNC: 105 MMOL/L (ref 98–107)
CHLORIDE BLD-SCNC: 106 MMOL/L (ref 98–107)
CHLORIDE BLD-SCNC: 107 MMOL/L (ref 98–107)
CHLORIDE BLD-SCNC: 108 MMOL/L (ref 98–107)
CHLORIDE BLD-SCNC: 110 MMOL/L (ref 98–107)
CHLORIDE BLD-SCNC: 80 MMOL/L (ref 98–107)
CHLORIDE BLD-SCNC: 92 MMOL/L (ref 98–107)
CHLORIDE BLD-SCNC: 92 MMOL/L (ref 98–107)
CHLORIDE BLD-SCNC: 97 MMOL/L (ref 98–107)
CHLORIDE BLD-SCNC: 98 MMOL/L (ref 98–107)
CHLORIDE BLD-SCNC: 99 MMOL/L (ref 98–107)
CHLORIDE BLD-SCNC: 99 MMOL/L (ref 98–107)
CHLORIDE SERPL-SCNC: 100 MMOL/L (ref 98–107)
CHLORIDE SERPL-SCNC: 102 MMOL/L (ref 98–107)
CHLORIDE SERPL-SCNC: 83 MMOL/L (ref 98–107)
CHLORIDE SERPL-SCNC: 87 MMOL/L (ref 98–107)
CHLORIDE SERPL-SCNC: 89 MMOL/L (ref 98–107)
CHLORIDE SERPL-SCNC: 90 MMOL/L (ref 98–107)
CHLORIDE SERPL-SCNC: 90 MMOL/L (ref 98–107)
CHLORIDE SERPL-SCNC: 91 MMOL/L (ref 98–107)
CHLORIDE SERPL-SCNC: 91 MMOL/L (ref 98–107)
CHLORIDE SERPL-SCNC: 92 MMOL/L (ref 98–107)
CHLORIDE SERPL-SCNC: 95 MMOL/L (ref 98–107)
CHLORIDE SERPL-SCNC: 95 MMOL/L (ref 98–107)
CHLORIDE SERPL-SCNC: 96 MMOL/L (ref 98–107)
CHLORIDE SERPL-SCNC: 97 MMOL/L (ref 98–107)
CHLORIDE SERPL-SCNC: 98 MMOL/L (ref 98–107)
CHLORIDE SERPL-SCNC: 99 MMOL/L (ref 98–107)
CHLORIDE SERPL-SCNC: 99 MMOL/L (ref 98–107)
CK SERPL-CCNC: 36 U/L (ref 30–190)
CO2 SERPL-SCNC: 17 MMOL/L (ref 22–31)
CO2 SERPL-SCNC: 18 MMOL/L (ref 22–31)
CO2 SERPL-SCNC: 19 MMOL/L (ref 22–31)
CO2 SERPL-SCNC: 19 MMOL/L (ref 22–31)
CO2 SERPL-SCNC: 20 MMOL/L (ref 22–31)
CO2 SERPL-SCNC: 21 MMOL/L (ref 22–31)
CO2 SERPL-SCNC: 22 MMOL/L (ref 22–31)
CO2 SERPL-SCNC: 23 MMOL/L (ref 22–31)
CO2 SERPL-SCNC: 24 MMOL/L (ref 22–31)
CO2 SERPL-SCNC: 24 MMOL/L (ref 22–31)
CO2 SERPL-SCNC: 25 MMOL/L (ref 22–31)
CO2 SERPL-SCNC: 25 MMOL/L (ref 22–31)
CO2 SERPL-SCNC: 28 MMOL/L (ref 22–31)
CO2 SERPL-SCNC: 29 MMOL/L (ref 22–31)
CODING SYSTEM: NORMAL
COLONOSCOPY: NORMAL
COLOR UR AUTO: ABNORMAL
COLOR UR AUTO: COLORLESS
CORTIS SERPL-MCNC: 20.8 UG/DL
CREAT SERPL-MCNC: 0.53 MG/DL (ref 0.51–0.95)
CREAT SERPL-MCNC: 0.55 MG/DL (ref 0.51–0.95)
CREAT SERPL-MCNC: 0.62 MG/DL (ref 0.6–1.1)
CREAT SERPL-MCNC: 0.65 MG/DL (ref 0.6–1.1)
CREAT SERPL-MCNC: 0.66 MG/DL (ref 0.6–1.1)
CREAT SERPL-MCNC: 0.68 MG/DL (ref 0.6–1.1)
CREAT SERPL-MCNC: 0.68 MG/DL (ref 0.6–1.1)
CREAT SERPL-MCNC: 0.69 MG/DL (ref 0.51–0.95)
CREAT SERPL-MCNC: 0.7 MG/DL (ref 0.6–1.1)
CREAT SERPL-MCNC: 0.71 MG/DL (ref 0.6–1.1)
CREAT SERPL-MCNC: 0.72 MG/DL (ref 0.6–1.1)
CREAT SERPL-MCNC: 0.73 MG/DL (ref 0.6–1.1)
CREAT SERPL-MCNC: 0.73 MG/DL (ref 0.6–1.1)
CREAT SERPL-MCNC: 0.75 MG/DL (ref 0.6–1.1)
CREAT SERPL-MCNC: 0.78 MG/DL (ref 0.6–1.1)
CREAT SERPL-MCNC: 0.78 MG/DL (ref 0.6–1.1)
CREAT SERPL-MCNC: 0.8 MG/DL (ref 0.51–0.95)
CREAT SERPL-MCNC: 0.8 MG/DL (ref 0.6–1.1)
CREAT SERPL-MCNC: 0.81 MG/DL (ref 0.6–1.1)
CREAT SERPL-MCNC: 0.81 MG/DL (ref 0.6–1.1)
CREAT SERPL-MCNC: 0.82 MG/DL (ref 0.51–0.95)
CREAT SERPL-MCNC: 0.83 MG/DL (ref 0.51–0.95)
CREAT SERPL-MCNC: 0.84 MG/DL (ref 0.51–0.95)
CREAT SERPL-MCNC: 0.85 MG/DL (ref 0.51–0.95)
CREAT SERPL-MCNC: 0.87 MG/DL (ref 0.6–1.1)
CREAT SERPL-MCNC: 0.88 MG/DL (ref 0.51–0.95)
CREAT SERPL-MCNC: 0.89 MG/DL (ref 0.51–0.95)
CREAT SERPL-MCNC: 0.89 MG/DL (ref 0.6–1.1)
CREAT SERPL-MCNC: 0.89 MG/DL (ref 0.6–1.1)
CREAT SERPL-MCNC: 0.9 MG/DL (ref 0.6–1.1)
CREAT SERPL-MCNC: 0.91 MG/DL (ref 0.51–0.95)
CREAT SERPL-MCNC: 0.92 MG/DL (ref 0.51–0.95)
CREAT SERPL-MCNC: 0.92 MG/DL (ref 0.6–1.1)
CREAT SERPL-MCNC: 0.94 MG/DL (ref 0.51–0.95)
CREAT SERPL-MCNC: 0.94 MG/DL (ref 0.51–0.95)
CREAT SERPL-MCNC: 0.96 MG/DL (ref 0.51–0.95)
CREAT SERPL-MCNC: 0.96 MG/DL (ref 0.51–0.95)
CREAT SERPL-MCNC: 0.98 MG/DL (ref 0.51–0.95)
CREAT SERPL-MCNC: 0.99 MG/DL (ref 0.51–0.95)
CREAT SERPL-MCNC: 1 MG/DL (ref 0.51–0.95)
CREAT SERPL-MCNC: 1.01 MG/DL (ref 0.6–1.1)
CREAT SERPL-MCNC: 1.01 MG/DL (ref 0.6–1.1)
CREAT SERPL-MCNC: 1.02 MG/DL (ref 0.51–0.95)
CREAT SERPL-MCNC: 1.03 MG/DL (ref 0.51–0.95)
CREAT SERPL-MCNC: 1.03 MG/DL (ref 0.51–0.95)
CREAT SERPL-MCNC: 1.08 MG/DL (ref 0.6–1.1)
CREAT SERPL-MCNC: 1.09 MG/DL (ref 0.6–1.1)
CREAT SERPL-MCNC: 1.1 MG/DL (ref 0.6–1.1)
CREAT SERPL-MCNC: 1.11 MG/DL (ref 0.51–0.95)
CREAT SERPL-MCNC: 1.11 MG/DL (ref 0.6–1.1)
CREAT SERPL-MCNC: 1.21 MG/DL (ref 0.6–1.1)
CREAT SERPL-MCNC: 1.22 MG/DL (ref 0.51–0.95)
CREAT SERPL-MCNC: 1.23 MG/DL (ref 0.6–1.1)
CREAT SERPL-MCNC: 1.27 MG/DL (ref 0.6–1.1)
CREAT SERPL-MCNC: 1.33 MG/DL (ref 0.51–0.95)
CREAT SERPL-MCNC: 1.45 MG/DL (ref 0.51–0.95)
CREAT SERPL-MCNC: 1.46 MG/DL (ref 0.51–0.95)
CREAT SERPL-MCNC: 1.49 MG/DL (ref 0.51–0.95)
CREAT SERPL-MCNC: 1.5 MG/DL (ref 0.51–0.95)
CREAT SERPL-MCNC: 1.51 MG/DL (ref 0.51–0.95)
CROSSMATCH: NORMAL
D DIMER PPP FEU-MCNC: 1.4 UG/ML FEU (ref 0–0.5)
D DIMER PPP FEU-MCNC: 1.76 UG/ML FEU (ref 0–0.5)
D DIMER PPP FEU-MCNC: 1.79 UG/ML FEU (ref 0–0.5)
D DIMER PPP FEU-MCNC: 2.16 UG/ML FEU (ref 0–0.5)
D DIMER PPP FEU-MCNC: 2.99 UG/ML FEU (ref 0–0.5)
D DIMER PPP FEU-MCNC: 3.04 UG/ML FEU (ref 0–0.5)
D DIMER PPP FEU-MCNC: 3.46 UG/ML FEU (ref 0–0.5)
D DIMER PPP FEU-MCNC: 4.18 UG/ML FEU (ref 0–0.5)
D DIMER PPP FEU-MCNC: 5.43 UG/ML FEU (ref 0–0.5)
DEPRECATED HCO3 PLAS-SCNC: 13 MMOL/L (ref 22–29)
DEPRECATED HCO3 PLAS-SCNC: 15 MMOL/L (ref 22–29)
DEPRECATED HCO3 PLAS-SCNC: 15 MMOL/L (ref 22–29)
DEPRECATED HCO3 PLAS-SCNC: 20 MMOL/L (ref 22–29)
DEPRECATED HCO3 PLAS-SCNC: 21 MMOL/L (ref 22–29)
DEPRECATED HCO3 PLAS-SCNC: 22 MMOL/L (ref 22–29)
DEPRECATED HCO3 PLAS-SCNC: 22 MMOL/L (ref 22–29)
DEPRECATED HCO3 PLAS-SCNC: 23 MMOL/L (ref 22–29)
DEPRECATED HCO3 PLAS-SCNC: 24 MMOL/L (ref 22–29)
DEPRECATED HCO3 PLAS-SCNC: 25 MMOL/L (ref 22–29)
DEPRECATED HCO3 PLAS-SCNC: 26 MMOL/L (ref 22–29)
DEPRECATED HCO3 PLAS-SCNC: 27 MMOL/L (ref 22–29)
DEPRECATED HCO3 PLAS-SCNC: 28 MMOL/L (ref 22–29)
DEPRECATED HCO3 PLAS-SCNC: 29 MMOL/L (ref 22–29)
DEPRECATED HCO3 PLAS-SCNC: 31 MMOL/L (ref 22–29)
DEPRECATED HCO3 PLAS-SCNC: 36 MMOL/L (ref 22–29)
DEPRECATED HCO3 PLAS-SCNC: 39 MMOL/L (ref 22–29)
DIASTOLIC BLOOD PRESSURE - MUSE: 60 MMHG
DIASTOLIC BLOOD PRESSURE - MUSE: 63 MMHG
DIASTOLIC BLOOD PRESSURE - MUSE: 76 MMHG
DIASTOLIC BLOOD PRESSURE - MUSE: NORMAL MMHG
EC STX1 GENE STL QL NAA+PROBE: NOT DETECTED
EC STX1 GENE STL QL NAA+PROBE: NOT DETECTED
EC STX2 GENE STL QL NAA+PROBE: NOT DETECTED
EC STX2 GENE STL QL NAA+PROBE: NOT DETECTED
ELLIPTOCYTES BLD QL SMEAR: SLIGHT
EOSINOPHIL # BLD AUTO: 0.1 10E3/UL (ref 0–0.7)
EOSINOPHIL # BLD AUTO: 0.2 10E3/UL (ref 0–0.7)
EOSINOPHIL # BLD AUTO: 0.2 10E3/UL (ref 0–0.7)
EOSINOPHIL # BLD MANUAL: 0 10E3/UL (ref 0–0.7)
EOSINOPHIL # BLD MANUAL: 0.1 10E3/UL (ref 0–0.7)
EOSINOPHIL # BLD MANUAL: 0.2 10E3/UL (ref 0–0.7)
EOSINOPHIL # BLD MANUAL: 0.2 10E3/UL (ref 0–0.7)
EOSINOPHIL # BLD MANUAL: 0.3 10E3/UL (ref 0–0.7)
EOSINOPHIL # BLD MANUAL: 0.3 10E3/UL (ref 0–0.7)
EOSINOPHIL NFR BLD AUTO: 1 %
EOSINOPHIL NFR BLD AUTO: 2 %
EOSINOPHIL NFR BLD AUTO: 4 %
EOSINOPHIL NFR BLD MANUAL: 0 %
EOSINOPHIL NFR BLD MANUAL: 1 %
EOSINOPHIL NFR BLD MANUAL: 2 %
EOSINOPHIL NFR BLD MANUAL: 2 %
ERYTHROCYTE [DISTWIDTH] IN BLOOD BY AUTOMATED COUNT: 14.1 % (ref 10–15)
ERYTHROCYTE [DISTWIDTH] IN BLOOD BY AUTOMATED COUNT: 14.3 % (ref 10–15)
ERYTHROCYTE [DISTWIDTH] IN BLOOD BY AUTOMATED COUNT: 14.5 % (ref 10–15)
ERYTHROCYTE [DISTWIDTH] IN BLOOD BY AUTOMATED COUNT: 14.6 % (ref 10–15)
ERYTHROCYTE [DISTWIDTH] IN BLOOD BY AUTOMATED COUNT: 15.5 % (ref 10–15)
ERYTHROCYTE [DISTWIDTH] IN BLOOD BY AUTOMATED COUNT: 15.9 % (ref 10–15)
ERYTHROCYTE [DISTWIDTH] IN BLOOD BY AUTOMATED COUNT: 15.9 % (ref 10–15)
ERYTHROCYTE [DISTWIDTH] IN BLOOD BY AUTOMATED COUNT: 16 % (ref 10–15)
ERYTHROCYTE [DISTWIDTH] IN BLOOD BY AUTOMATED COUNT: 16 % (ref 10–15)
ERYTHROCYTE [DISTWIDTH] IN BLOOD BY AUTOMATED COUNT: 16.1 % (ref 10–15)
ERYTHROCYTE [DISTWIDTH] IN BLOOD BY AUTOMATED COUNT: 16.2 % (ref 10–15)
ERYTHROCYTE [DISTWIDTH] IN BLOOD BY AUTOMATED COUNT: 16.3 % (ref 10–15)
ERYTHROCYTE [DISTWIDTH] IN BLOOD BY AUTOMATED COUNT: 16.4 % (ref 10–15)
ERYTHROCYTE [DISTWIDTH] IN BLOOD BY AUTOMATED COUNT: 16.5 % (ref 10–15)
ERYTHROCYTE [DISTWIDTH] IN BLOOD BY AUTOMATED COUNT: 16.7 % (ref 10–15)
ERYTHROCYTE [DISTWIDTH] IN BLOOD BY AUTOMATED COUNT: 16.8 % (ref 10–15)
ERYTHROCYTE [DISTWIDTH] IN BLOOD BY AUTOMATED COUNT: 17 % (ref 10–15)
ERYTHROCYTE [DISTWIDTH] IN BLOOD BY AUTOMATED COUNT: 17.1 % (ref 10–15)
ERYTHROCYTE [DISTWIDTH] IN BLOOD BY AUTOMATED COUNT: 17.2 % (ref 10–15)
ERYTHROCYTE [DISTWIDTH] IN BLOOD BY AUTOMATED COUNT: 17.6 % (ref 10–15)
ERYTHROCYTE [DISTWIDTH] IN BLOOD BY AUTOMATED COUNT: 17.6 % (ref 10–15)
ERYTHROCYTE [DISTWIDTH] IN BLOOD BY AUTOMATED COUNT: 17.7 % (ref 10–15)
ERYTHROCYTE [DISTWIDTH] IN BLOOD BY AUTOMATED COUNT: 18 % (ref 10–15)
ERYTHROCYTE [DISTWIDTH] IN BLOOD BY AUTOMATED COUNT: 18 % (ref 10–15)
ERYTHROCYTE [DISTWIDTH] IN BLOOD BY AUTOMATED COUNT: 18.2 % (ref 10–15)
ERYTHROCYTE [DISTWIDTH] IN BLOOD BY AUTOMATED COUNT: 18.5 % (ref 10–15)
ERYTHROCYTE [DISTWIDTH] IN BLOOD BY AUTOMATED COUNT: 18.5 % (ref 10–15)
ERYTHROCYTE [DISTWIDTH] IN BLOOD BY AUTOMATED COUNT: 18.6 % (ref 10–15)
ERYTHROCYTE [DISTWIDTH] IN BLOOD BY AUTOMATED COUNT: 18.7 % (ref 10–15)
ERYTHROCYTE [DISTWIDTH] IN BLOOD BY AUTOMATED COUNT: 19 % (ref 10–15)
ERYTHROCYTE [DISTWIDTH] IN BLOOD BY AUTOMATED COUNT: 19.1 % (ref 10–15)
ERYTHROCYTE [DISTWIDTH] IN BLOOD BY AUTOMATED COUNT: 19.2 % (ref 10–15)
ERYTHROCYTE [DISTWIDTH] IN BLOOD BY AUTOMATED COUNT: 19.2 % (ref 10–15)
ERYTHROCYTE [DISTWIDTH] IN BLOOD BY AUTOMATED COUNT: 19.3 % (ref 10–15)
ERYTHROCYTE [DISTWIDTH] IN BLOOD BY AUTOMATED COUNT: 19.3 % (ref 10–15)
ERYTHROCYTE [DISTWIDTH] IN BLOOD BY AUTOMATED COUNT: 19.5 % (ref 10–15)
ERYTHROCYTE [DISTWIDTH] IN BLOOD BY AUTOMATED COUNT: 20.2 % (ref 10–15)
ERYTHROCYTE [DISTWIDTH] IN BLOOD BY AUTOMATED COUNT: 20.3 % (ref 10–15)
ERYTHROCYTE [DISTWIDTH] IN BLOOD BY AUTOMATED COUNT: 20.3 % (ref 10–15)
ERYTHROCYTE [DISTWIDTH] IN BLOOD BY AUTOMATED COUNT: 20.4 % (ref 10–15)
ERYTHROCYTE [DISTWIDTH] IN BLOOD BY AUTOMATED COUNT: 20.6 % (ref 10–15)
ERYTHROCYTE [DISTWIDTH] IN BLOOD BY AUTOMATED COUNT: 21 % (ref 10–15)
ERYTHROCYTE [DISTWIDTH] IN BLOOD BY AUTOMATED COUNT: 21.1 % (ref 10–15)
ERYTHROCYTE [DISTWIDTH] IN BLOOD BY AUTOMATED COUNT: 21.1 % (ref 10–15)
ERYTHROCYTE [DISTWIDTH] IN BLOOD BY AUTOMATED COUNT: 21.3 % (ref 10–15)
ERYTHROCYTE [DISTWIDTH] IN BLOOD BY AUTOMATED COUNT: 21.5 % (ref 10–15)
ERYTHROCYTE [DISTWIDTH] IN BLOOD BY AUTOMATED COUNT: 21.7 % (ref 10–15)
ERYTHROCYTE [DISTWIDTH] IN BLOOD BY AUTOMATED COUNT: 21.7 % (ref 10–15)
FERRITIN SERPL-MCNC: 146 NG/ML (ref 10–130)
FERRITIN SERPL-MCNC: 311 NG/ML (ref 11–328)
FLUAV RNA SPEC QL NAA+PROBE: NEGATIVE
FLUBV RNA RESP QL NAA+PROBE: NEGATIVE
FOLATE SERPL-MCNC: 5.7 NG/ML (ref 4.6–34.8)
FOLATE SERPL-MCNC: 8.4 NG/ML (ref 4.6–34.8)
FOLATE SERPL-MCNC: 9.9 NG/ML
FRAGMENTS BLD QL SMEAR: SLIGHT
GAMMA GLOB SERPL ELPH-MCNC: 2.6 G/DL (ref 0.6–1.4)
GAMMA GLOBULIN PERCENT: 34.1 % (ref 9–20)
GAMMA INTERFERON BACKGROUND BLD IA-ACNC: 0 IU/ML
GAMMA INTERFERON BACKGROUND BLD IA-ACNC: 0.02 IU/ML
GFR SERPL CREATININE-BSD FRML MDRD: 33 ML/MIN/1.73M2
GFR SERPL CREATININE-BSD FRML MDRD: 33 ML/MIN/1.73M2
GFR SERPL CREATININE-BSD FRML MDRD: 34 ML/MIN/1.73M2
GFR SERPL CREATININE-BSD FRML MDRD: 34 ML/MIN/1.73M2
GFR SERPL CREATININE-BSD FRML MDRD: 35 ML/MIN/1.73M2
GFR SERPL CREATININE-BSD FRML MDRD: 39 ML/MIN/1.73M2
GFR SERPL CREATININE-BSD FRML MDRD: 41 ML/MIN/1.73M2
GFR SERPL CREATININE-BSD FRML MDRD: 42 ML/MIN/1.73M2
GFR SERPL CREATININE-BSD FRML MDRD: 43 ML/MIN/1.73M2
GFR SERPL CREATININE-BSD FRML MDRD: 43 ML/MIN/1.73M2
GFR SERPL CREATININE-BSD FRML MDRD: 48 ML/MIN/1.73M2
GFR SERPL CREATININE-BSD FRML MDRD: 48 ML/MIN/1.73M2
GFR SERPL CREATININE-BSD FRML MDRD: 49 ML/MIN/1.73M2
GFR SERPL CREATININE-BSD FRML MDRD: 52 ML/MIN/1.73M2
GFR SERPL CREATININE-BSD FRML MDRD: 52 ML/MIN/1.73M2
GFR SERPL CREATININE-BSD FRML MDRD: 53 ML/MIN/1.73M2
GFR SERPL CREATININE-BSD FRML MDRD: 54 ML/MIN/1.73M2
GFR SERPL CREATININE-BSD FRML MDRD: 55 ML/MIN/1.73M2
GFR SERPL CREATININE-BSD FRML MDRD: 56 ML/MIN/1.73M2
GFR SERPL CREATININE-BSD FRML MDRD: 57 ML/MIN/1.73M2
GFR SERPL CREATININE-BSD FRML MDRD: 57 ML/MIN/1.73M2
GFR SERPL CREATININE-BSD FRML MDRD: 58 ML/MIN/1.73M2
GFR SERPL CREATININE-BSD FRML MDRD: 58 ML/MIN/1.73M2
GFR SERPL CREATININE-BSD FRML MDRD: 60 ML/MIN/1.73M2
GFR SERPL CREATININE-BSD FRML MDRD: 60 ML/MIN/1.73M2
GFR SERPL CREATININE-BSD FRML MDRD: 61 ML/MIN/1.73M2
GFR SERPL CREATININE-BSD FRML MDRD: 62 ML/MIN/1.73M2
GFR SERPL CREATININE-BSD FRML MDRD: 63 ML/MIN/1.73M2
GFR SERPL CREATININE-BSD FRML MDRD: 63 ML/MIN/1.73M2
GFR SERPL CREATININE-BSD FRML MDRD: 65 ML/MIN/1.73M2
GFR SERPL CREATININE-BSD FRML MDRD: 66 ML/MIN/1.73M2
GFR SERPL CREATININE-BSD FRML MDRD: 67 ML/MIN/1.73M2
GFR SERPL CREATININE-BSD FRML MDRD: 68 ML/MIN/1.73M2
GFR SERPL CREATININE-BSD FRML MDRD: 69 ML/MIN/1.73M2
GFR SERPL CREATININE-BSD FRML MDRD: 70 ML/MIN/1.73M2
GFR SERPL CREATININE-BSD FRML MDRD: 70 ML/MIN/1.73M2
GFR SERPL CREATININE-BSD FRML MDRD: 71 ML/MIN/1.73M2
GFR SERPL CREATININE-BSD FRML MDRD: 71 ML/MIN/1.73M2
GFR SERPL CREATININE-BSD FRML MDRD: 74 ML/MIN/1.73M2
GFR SERPL CREATININE-BSD FRML MDRD: 74 ML/MIN/1.73M2
GFR SERPL CREATININE-BSD FRML MDRD: 77 ML/MIN/1.73M2
GFR SERPL CREATININE-BSD FRML MDRD: 79 ML/MIN/1.73M2
GFR SERPL CREATININE-BSD FRML MDRD: 80 ML/MIN/1.73M2
GFR SERPL CREATININE-BSD FRML MDRD: 81 ML/MIN/1.73M2
GFR SERPL CREATININE-BSD FRML MDRD: 82 ML/MIN/1.73M2
GFR SERPL CREATININE-BSD FRML MDRD: 84 ML/MIN/1.73M2
GFR SERPL CREATININE-BSD FRML MDRD: 85 ML/MIN/1.73M2
GFR SERPL CREATININE-BSD FRML MDRD: 86 ML/MIN/1.73M2
GFR SERPL CREATININE-BSD FRML MDRD: 88 ML/MIN/1.73M2
GFR SERPL CREATININE-BSD FRML MDRD: 89 ML/MIN/1.73M2
GLIADIN IGA SER-ACNC: 2.3 U/ML
GLIADIN IGG SER-ACNC: 0.7 U/ML
GLUCOSE BLD-MCNC: 103 MG/DL (ref 70–125)
GLUCOSE BLD-MCNC: 104 MG/DL (ref 70–125)
GLUCOSE BLD-MCNC: 105 MG/DL (ref 70–125)
GLUCOSE BLD-MCNC: 105 MG/DL (ref 70–125)
GLUCOSE BLD-MCNC: 108 MG/DL (ref 70–125)
GLUCOSE BLD-MCNC: 110 MG/DL (ref 70–125)
GLUCOSE BLD-MCNC: 110 MG/DL (ref 70–125)
GLUCOSE BLD-MCNC: 113 MG/DL (ref 70–125)
GLUCOSE BLD-MCNC: 114 MG/DL (ref 70–125)
GLUCOSE BLD-MCNC: 115 MG/DL (ref 70–125)
GLUCOSE BLD-MCNC: 117 MG/DL (ref 70–125)
GLUCOSE BLD-MCNC: 121 MG/DL (ref 70–125)
GLUCOSE BLD-MCNC: 121 MG/DL (ref 70–125)
GLUCOSE BLD-MCNC: 124 MG/DL (ref 70–125)
GLUCOSE BLD-MCNC: 128 MG/DL (ref 70–125)
GLUCOSE BLD-MCNC: 131 MG/DL (ref 70–125)
GLUCOSE BLD-MCNC: 131 MG/DL (ref 70–125)
GLUCOSE BLD-MCNC: 160 MG/DL (ref 70–125)
GLUCOSE BLD-MCNC: 167 MG/DL (ref 70–125)
GLUCOSE BLD-MCNC: 81 MG/DL (ref 70–125)
GLUCOSE BLD-MCNC: 83 MG/DL (ref 70–125)
GLUCOSE BLD-MCNC: 84 MG/DL (ref 70–125)
GLUCOSE BLD-MCNC: 85 MG/DL (ref 70–125)
GLUCOSE BLD-MCNC: 87 MG/DL (ref 70–125)
GLUCOSE BLD-MCNC: 87 MG/DL (ref 70–125)
GLUCOSE BLD-MCNC: 89 MG/DL (ref 70–125)
GLUCOSE BLD-MCNC: 91 MG/DL (ref 70–125)
GLUCOSE BLD-MCNC: 93 MG/DL (ref 70–125)
GLUCOSE BLD-MCNC: 93 MG/DL (ref 70–125)
GLUCOSE BLD-MCNC: 94 MG/DL (ref 70–125)
GLUCOSE BLD-MCNC: 95 MG/DL (ref 70–125)
GLUCOSE BLDC GLUCOMTR-MCNC: 101 MG/DL (ref 70–99)
GLUCOSE BLDC GLUCOMTR-MCNC: 103 MG/DL (ref 70–99)
GLUCOSE BLDC GLUCOMTR-MCNC: 104 MG/DL (ref 70–99)
GLUCOSE BLDC GLUCOMTR-MCNC: 108 MG/DL (ref 70–99)
GLUCOSE BLDC GLUCOMTR-MCNC: 110 MG/DL (ref 70–99)
GLUCOSE BLDC GLUCOMTR-MCNC: 113 MG/DL (ref 70–99)
GLUCOSE BLDC GLUCOMTR-MCNC: 116 MG/DL (ref 70–99)
GLUCOSE BLDC GLUCOMTR-MCNC: 117 MG/DL (ref 70–99)
GLUCOSE BLDC GLUCOMTR-MCNC: 118 MG/DL (ref 70–99)
GLUCOSE BLDC GLUCOMTR-MCNC: 122 MG/DL (ref 70–99)
GLUCOSE BLDC GLUCOMTR-MCNC: 125 MG/DL (ref 70–99)
GLUCOSE BLDC GLUCOMTR-MCNC: 128 MG/DL (ref 70–99)
GLUCOSE BLDC GLUCOMTR-MCNC: 130 MG/DL (ref 70–99)
GLUCOSE BLDC GLUCOMTR-MCNC: 137 MG/DL (ref 70–99)
GLUCOSE BLDC GLUCOMTR-MCNC: 144 MG/DL (ref 70–99)
GLUCOSE BLDC GLUCOMTR-MCNC: 154 MG/DL (ref 70–99)
GLUCOSE BLDC GLUCOMTR-MCNC: 156 MG/DL (ref 70–99)
GLUCOSE BLDC GLUCOMTR-MCNC: 188 MG/DL (ref 70–99)
GLUCOSE BLDC GLUCOMTR-MCNC: 206 MG/DL (ref 70–99)
GLUCOSE BLDC GLUCOMTR-MCNC: 70 MG/DL (ref 70–99)
GLUCOSE BLDC GLUCOMTR-MCNC: 84 MG/DL (ref 70–99)
GLUCOSE BLDC GLUCOMTR-MCNC: 94 MG/DL (ref 70–99)
GLUCOSE BLDC GLUCOMTR-MCNC: 95 MG/DL (ref 70–99)
GLUCOSE BLDC GLUCOMTR-MCNC: 97 MG/DL (ref 70–99)
GLUCOSE BLDC GLUCOMTR-MCNC: 98 MG/DL (ref 70–99)
GLUCOSE SERPL-MCNC: 103 MG/DL (ref 70–99)
GLUCOSE SERPL-MCNC: 104 MG/DL (ref 70–99)
GLUCOSE SERPL-MCNC: 105 MG/DL (ref 70–99)
GLUCOSE SERPL-MCNC: 107 MG/DL (ref 70–99)
GLUCOSE SERPL-MCNC: 113 MG/DL (ref 70–99)
GLUCOSE SERPL-MCNC: 117 MG/DL (ref 70–99)
GLUCOSE SERPL-MCNC: 117 MG/DL (ref 70–99)
GLUCOSE SERPL-MCNC: 131 MG/DL (ref 70–99)
GLUCOSE SERPL-MCNC: 80 MG/DL (ref 70–99)
GLUCOSE SERPL-MCNC: 80 MG/DL (ref 70–99)
GLUCOSE SERPL-MCNC: 84 MG/DL (ref 70–99)
GLUCOSE SERPL-MCNC: 85 MG/DL (ref 70–99)
GLUCOSE SERPL-MCNC: 85 MG/DL (ref 70–99)
GLUCOSE SERPL-MCNC: 86 MG/DL (ref 70–99)
GLUCOSE SERPL-MCNC: 86 MG/DL (ref 70–99)
GLUCOSE SERPL-MCNC: 88 MG/DL (ref 70–99)
GLUCOSE SERPL-MCNC: 88 MG/DL (ref 70–99)
GLUCOSE SERPL-MCNC: 89 MG/DL (ref 70–99)
GLUCOSE SERPL-MCNC: 90 MG/DL (ref 70–99)
GLUCOSE SERPL-MCNC: 91 MG/DL (ref 70–99)
GLUCOSE SERPL-MCNC: 91 MG/DL (ref 70–99)
GLUCOSE SERPL-MCNC: 92 MG/DL (ref 70–99)
GLUCOSE SERPL-MCNC: 92 MG/DL (ref 70–99)
GLUCOSE SERPL-MCNC: 95 MG/DL (ref 70–99)
GLUCOSE SERPL-MCNC: 97 MG/DL (ref 70–99)
GLUCOSE SERPL-MCNC: 99 MG/DL (ref 70–99)
GLUCOSE UR STRIP-MCNC: NEGATIVE MG/DL
HAPTOGLOB SERPL-MCNC: 304 MG/DL (ref 32–197)
HBA1C MFR BLD: 5.7 %
HBV CORE AB SERPL QL IA: NONREACTIVE
HBV SURFACE AB SERPL IA-ACNC: 20.48 M[IU]/ML
HBV SURFACE AG SERPL QL IA: NONREACTIVE
HCO3 BLDV-SCNC: 21 MMOL/L (ref 24–30)
HCO3 BLDV-SCNC: 26 MMOL/L (ref 24–30)
HCT VFR BLD AUTO: 19.3 % (ref 35–47)
HCT VFR BLD AUTO: 21.9 % (ref 35–47)
HCT VFR BLD AUTO: 22 % (ref 35–47)
HCT VFR BLD AUTO: 22.5 % (ref 35–47)
HCT VFR BLD AUTO: 22.7 % (ref 35–47)
HCT VFR BLD AUTO: 22.8 % (ref 35–47)
HCT VFR BLD AUTO: 22.9 % (ref 35–47)
HCT VFR BLD AUTO: 22.9 % (ref 35–47)
HCT VFR BLD AUTO: 23 % (ref 35–47)
HCT VFR BLD AUTO: 23.1 % (ref 35–47)
HCT VFR BLD AUTO: 23.3 % (ref 35–47)
HCT VFR BLD AUTO: 23.3 % (ref 35–47)
HCT VFR BLD AUTO: 23.4 % (ref 35–47)
HCT VFR BLD AUTO: 23.4 % (ref 35–47)
HCT VFR BLD AUTO: 23.6 % (ref 35–47)
HCT VFR BLD AUTO: 23.8 % (ref 35–47)
HCT VFR BLD AUTO: 23.8 % (ref 35–47)
HCT VFR BLD AUTO: 23.9 % (ref 35–47)
HCT VFR BLD AUTO: 24 % (ref 35–47)
HCT VFR BLD AUTO: 24.3 % (ref 35–47)
HCT VFR BLD AUTO: 24.4 % (ref 35–47)
HCT VFR BLD AUTO: 24.4 % (ref 35–47)
HCT VFR BLD AUTO: 24.5 % (ref 35–47)
HCT VFR BLD AUTO: 24.7 % (ref 35–47)
HCT VFR BLD AUTO: 24.8 % (ref 35–47)
HCT VFR BLD AUTO: 25.1 % (ref 35–47)
HCT VFR BLD AUTO: 25.1 % (ref 35–47)
HCT VFR BLD AUTO: 25.6 % (ref 35–47)
HCT VFR BLD AUTO: 25.6 % (ref 35–47)
HCT VFR BLD AUTO: 26 % (ref 35–47)
HCT VFR BLD AUTO: 26.1 % (ref 35–47)
HCT VFR BLD AUTO: 26.1 % (ref 35–47)
HCT VFR BLD AUTO: 26.9 % (ref 35–47)
HCT VFR BLD AUTO: 27.1 % (ref 35–47)
HCT VFR BLD AUTO: 27.3 % (ref 35–47)
HCT VFR BLD AUTO: 27.5 % (ref 35–47)
HCT VFR BLD AUTO: 27.6 % (ref 35–47)
HCT VFR BLD AUTO: 27.6 % (ref 35–47)
HCT VFR BLD AUTO: 28 % (ref 35–47)
HCT VFR BLD AUTO: 28.2 % (ref 35–47)
HCT VFR BLD AUTO: 28.4 % (ref 35–47)
HCT VFR BLD AUTO: 28.4 % (ref 35–47)
HCT VFR BLD AUTO: 29 % (ref 35–47)
HCT VFR BLD AUTO: 29.4 % (ref 35–47)
HCT VFR BLD AUTO: 29.6 % (ref 35–47)
HCT VFR BLD AUTO: 29.8 % (ref 35–47)
HCT VFR BLD AUTO: 29.9 % (ref 35–47)
HCT VFR BLD AUTO: 30 % (ref 35–47)
HCT VFR BLD AUTO: 30.7 % (ref 35–47)
HCT VFR BLD AUTO: 32.3 % (ref 35–47)
HCT VFR BLD AUTO: 33.6 % (ref 35–47)
HCT VFR BLD AUTO: 34.6 % (ref 35–47)
HCT VFR BLD AUTO: 35.3 % (ref 35–47)
HCT VFR BLD AUTO: 36.6 % (ref 35–47)
HCV AB SERPL QL IA: REACTIVE
HCV RNA SERPL NAA+PROBE-ACNC: NOT DETECTED IU/ML
HCV RNA SERPL NAA+PROBE-ACNC: NOT DETECTED IU/ML
HEMOCCULT STL QL: NEGATIVE
HEMOCCULT STL QL: POSITIVE
HGB BLD-MCNC: 10.3 G/DL (ref 11.7–15.7)
HGB BLD-MCNC: 10.3 G/DL (ref 11.7–15.7)
HGB BLD-MCNC: 10.5 G/DL (ref 11.7–15.7)
HGB BLD-MCNC: 10.7 G/DL (ref 11.7–15.7)
HGB BLD-MCNC: 10.7 G/DL (ref 11.7–15.7)
HGB BLD-MCNC: 11.3 G/DL (ref 11.7–15.7)
HGB BLD-MCNC: 12.5 G/DL (ref 11.7–15.7)
HGB BLD-MCNC: 6.1 G/DL (ref 11.7–15.7)
HGB BLD-MCNC: 6.7 G/DL (ref 11.7–15.7)
HGB BLD-MCNC: 6.7 G/DL (ref 11.7–15.7)
HGB BLD-MCNC: 6.9 G/DL (ref 11.7–15.7)
HGB BLD-MCNC: 7 G/DL (ref 11.7–15.7)
HGB BLD-MCNC: 7.1 G/DL (ref 11.7–15.7)
HGB BLD-MCNC: 7.2 G/DL (ref 11.7–15.7)
HGB BLD-MCNC: 7.2 G/DL (ref 11.7–15.7)
HGB BLD-MCNC: 7.3 G/DL (ref 11.7–15.7)
HGB BLD-MCNC: 7.4 G/DL (ref 11.7–15.7)
HGB BLD-MCNC: 7.5 G/DL (ref 11.7–15.7)
HGB BLD-MCNC: 7.5 G/DL (ref 11.7–15.7)
HGB BLD-MCNC: 7.6 G/DL (ref 11.7–15.7)
HGB BLD-MCNC: 7.7 G/DL (ref 11.7–15.7)
HGB BLD-MCNC: 7.8 G/DL (ref 11.7–15.7)
HGB BLD-MCNC: 7.9 G/DL (ref 11.7–15.7)
HGB BLD-MCNC: 8 G/DL (ref 11.7–15.7)
HGB BLD-MCNC: 8.1 G/DL (ref 11.7–15.7)
HGB BLD-MCNC: 8.1 G/DL (ref 11.7–15.7)
HGB BLD-MCNC: 8.2 G/DL (ref 11.7–15.7)
HGB BLD-MCNC: 8.2 G/DL (ref 11.7–15.7)
HGB BLD-MCNC: 8.3 G/DL (ref 11.7–15.7)
HGB BLD-MCNC: 8.3 G/DL (ref 11.7–15.7)
HGB BLD-MCNC: 8.5 G/DL (ref 11.7–15.7)
HGB BLD-MCNC: 8.7 G/DL (ref 11.7–15.7)
HGB BLD-MCNC: 8.7 G/DL (ref 11.7–15.7)
HGB BLD-MCNC: 8.8 G/DL (ref 11.7–15.7)
HGB BLD-MCNC: 8.9 G/DL (ref 11.7–15.7)
HGB BLD-MCNC: 9 G/DL (ref 11.7–15.7)
HGB BLD-MCNC: 9 G/DL (ref 11.7–15.7)
HGB BLD-MCNC: 9.1 G/DL (ref 11.7–15.7)
HGB BLD-MCNC: 9.3 G/DL (ref 11.7–15.7)
HGB BLD-MCNC: 9.3 G/DL (ref 11.7–15.7)
HGB BLD-MCNC: 9.5 G/DL (ref 11.7–15.7)
HGB BLD-MCNC: 9.5 G/DL (ref 11.7–15.7)
HGB BLD-MCNC: 9.7 G/DL (ref 11.7–15.7)
HGB BLD-MCNC: 9.8 G/DL (ref 11.7–15.7)
HGB BLD-MCNC: 9.8 G/DL (ref 11.7–15.7)
HGB BLD-MCNC: 9.9 G/DL (ref 11.7–15.7)
HGB UR QL STRIP: ABNORMAL
HGB UR QL STRIP: ABNORMAL
HGB UR QL STRIP: NEGATIVE
HIV 1+2 AB+HIV1 P24 AG SERPL QL IA: NONREACTIVE
HOLD SPECIMEN: NORMAL
HYALINE CASTS: 1 /LPF
HYALINE CASTS: 10 /LPF
HYALINE CASTS: 4 /LPF
HYALINE CASTS: 5 /LPF
IGA SERPL-MCNC: 1026 MG/DL (ref 84–499)
IMM GRANULOCYTES # BLD: 0.1 10E3/UL
IMM GRANULOCYTES # BLD: 0.2 10E3/UL
IMM GRANULOCYTES # BLD: 0.2 10E3/UL
IMM GRANULOCYTES NFR BLD: 2 %
INR PPP: 1.03 (ref 0.9–1.15)
INR PPP: 1.08 (ref 0.85–1.15)
INR PPP: 1.1 (ref 0.9–1.15)
INR PPP: 1.2 (ref 0.85–1.15)
INTERNAL QC CHECK POCT: ABNORMAL
INTERPRETATION ECG - MUSE: NORMAL
IRON BINDING CAPACITY (ROCHE): 132 UG/DL (ref 240–430)
IRON SATN MFR SERPL: 20 % (ref 15–46)
IRON SERPL-MCNC: 26 UG/DL (ref 37–145)
ISSUE DATE AND TIME: NORMAL
KETONES UR STRIP-MCNC: NEGATIVE MG/DL
LACTATE SERPL-SCNC: 0.9 MMOL/L (ref 0.7–2)
LACTATE SERPL-SCNC: 1 MMOL/L (ref 0.7–2)
LACTATE SERPL-SCNC: 1 MMOL/L (ref 0.7–2)
LACTATE SERPL-SCNC: 1.1 MMOL/L (ref 0.7–2)
LACTATE SERPL-SCNC: 1.2 MMOL/L (ref 0.7–2)
LACTATE SERPL-SCNC: 1.3 MMOL/L (ref 0.7–2)
LACTATE SERPL-SCNC: 1.3 MMOL/L (ref 0.7–2)
LACTATE SERPL-SCNC: 1.4 MMOL/L (ref 0.7–2)
LACTATE SERPL-SCNC: 1.4 MMOL/L (ref 0.7–2)
LACTATE SERPL-SCNC: 1.6 MMOL/L (ref 0.7–2)
LACTATE SERPL-SCNC: 1.6 MMOL/L (ref 0.7–2)
LACTATE SERPL-SCNC: 1.7 MMOL/L (ref 0.7–2)
LACTATE SERPL-SCNC: 1.8 MMOL/L (ref 0.7–2)
LACTATE SERPL-SCNC: 2.6 MMOL/L (ref 0.7–2)
LDH SERPL L TO P-CCNC: 186 U/L (ref 125–220)
LDH SERPL L TO P-CCNC: 260 U/L (ref 0–250)
LEUKOCYTE ESTERASE UR QL STRIP: ABNORMAL
LEUKOCYTE ESTERASE UR QL STRIP: NEGATIVE
LIPASE SERPL-CCNC: 11 U/L (ref 0–52)
LIPASE SERPL-CCNC: 11 U/L (ref 0–52)
LIPASE SERPL-CCNC: 14 U/L (ref 13–60)
LIPASE SERPL-CCNC: 18 U/L (ref 0–52)
LVEF ECHO: NORMAL
LVEF ECHO: NORMAL
LYMPHOCYTES # BLD AUTO: 1.2 10E3/UL (ref 0.8–5.3)
LYMPHOCYTES # BLD AUTO: 1.6 10E3/UL (ref 0.8–5.3)
LYMPHOCYTES # BLD AUTO: 1.9 10E3/UL (ref 0.8–5.3)
LYMPHOCYTES # BLD MANUAL: 0.8 10E3/UL (ref 0.8–5.3)
LYMPHOCYTES # BLD MANUAL: 0.9 10E3/UL (ref 0.8–5.3)
LYMPHOCYTES # BLD MANUAL: 1.1 10E3/UL (ref 0.8–5.3)
LYMPHOCYTES # BLD MANUAL: 1.2 10E3/UL (ref 0.8–5.3)
LYMPHOCYTES # BLD MANUAL: 1.2 10E3/UL (ref 0.8–5.3)
LYMPHOCYTES # BLD MANUAL: 1.3 10E3/UL (ref 0.8–5.3)
LYMPHOCYTES # BLD MANUAL: 1.3 10E3/UL (ref 0.8–5.3)
LYMPHOCYTES # BLD MANUAL: 1.5 10E3/UL (ref 0.8–5.3)
LYMPHOCYTES # BLD MANUAL: 1.8 10E3/UL (ref 0.8–5.3)
LYMPHOCYTES # BLD MANUAL: 3.1 10E3/UL (ref 0.8–5.3)
LYMPHOCYTES NFR BLD AUTO: 18 %
LYMPHOCYTES NFR BLD AUTO: 18 %
LYMPHOCYTES NFR BLD AUTO: 22 %
LYMPHOCYTES NFR BLD MANUAL: 10 %
LYMPHOCYTES NFR BLD MANUAL: 13 %
LYMPHOCYTES NFR BLD MANUAL: 16 %
LYMPHOCYTES NFR BLD MANUAL: 21 %
LYMPHOCYTES NFR BLD MANUAL: 3 %
LYMPHOCYTES NFR BLD MANUAL: 4 %
LYMPHOCYTES NFR BLD MANUAL: 6 %
LYMPHOCYTES NFR BLD MANUAL: 6 %
LYMPHOCYTES NFR BLD MANUAL: 7 %
LYMPHOCYTES NFR BLD MANUAL: 7 %
M TB IFN-G BLD-IMP: ABNORMAL
M TB IFN-G BLD-IMP: ABNORMAL
M TB IFN-G CD4+ BCKGRND COR BLD-ACNC: 0.04 IU/ML
M TB IFN-G CD4+ BCKGRND COR BLD-ACNC: 0.06 IU/ML
MAGNESIUM SERPL-MCNC: 0.9 MG/DL (ref 1.8–2.6)
MAGNESIUM SERPL-MCNC: 1 MG/DL (ref 1.7–2.3)
MAGNESIUM SERPL-MCNC: 1 MG/DL (ref 1.7–2.3)
MAGNESIUM SERPL-MCNC: 1 MG/DL (ref 1.8–2.6)
MAGNESIUM SERPL-MCNC: 1.1 MG/DL (ref 1.7–2.3)
MAGNESIUM SERPL-MCNC: 1.1 MG/DL (ref 1.8–2.6)
MAGNESIUM SERPL-MCNC: 1.2 MG/DL (ref 1.8–2.6)
MAGNESIUM SERPL-MCNC: 1.2 MG/DL (ref 1.8–2.6)
MAGNESIUM SERPL-MCNC: 1.3 MG/DL (ref 1.7–2.3)
MAGNESIUM SERPL-MCNC: 1.3 MG/DL (ref 1.7–2.3)
MAGNESIUM SERPL-MCNC: 1.3 MG/DL (ref 1.8–2.6)
MAGNESIUM SERPL-MCNC: 1.4 MG/DL (ref 1.8–2.6)
MAGNESIUM SERPL-MCNC: 1.5 MG/DL (ref 1.7–2.3)
MAGNESIUM SERPL-MCNC: 1.5 MG/DL (ref 1.8–2.6)
MAGNESIUM SERPL-MCNC: 1.6 MG/DL (ref 1.8–2.6)
MAGNESIUM SERPL-MCNC: 1.7 MG/DL (ref 1.7–2.3)
MAGNESIUM SERPL-MCNC: 1.7 MG/DL (ref 1.8–2.6)
MAGNESIUM SERPL-MCNC: 1.8 MG/DL (ref 1.8–2.6)
MAGNESIUM SERPL-MCNC: 1.9 MG/DL (ref 1.8–2.6)
MAGNESIUM SERPL-MCNC: 2 MG/DL (ref 1.8–2.6)
MAGNESIUM SERPL-MCNC: 2.2 MG/DL (ref 1.7–2.3)
MAGNESIUM SERPL-MCNC: 2.2 MG/DL (ref 1.8–2.6)
MAGNESIUM SERPL-MCNC: 2.4 MG/DL (ref 1.8–2.6)
MAGNESIUM SERPL-MCNC: 2.4 MG/DL (ref 1.8–2.6)
MAGNESIUM SERPL-MCNC: 2.5 MG/DL (ref 1.7–2.3)
MAGNESIUM SERPL-MCNC: 2.5 MG/DL (ref 1.8–2.6)
MAGNESIUM SERPL-MCNC: 2.6 MG/DL (ref 1.8–2.6)
MAGNESIUM SERPL-MCNC: 2.8 MG/DL (ref 1.8–2.6)
MAGNESIUM SERPL-MCNC: 3 MG/DL (ref 1.8–2.6)
MAGNESIUM SERPL-MCNC: 3.3 MG/DL (ref 1.8–2.6)
MCH RBC QN AUTO: 29.4 PG (ref 26.5–33)
MCH RBC QN AUTO: 29.7 PG (ref 26.5–33)
MCH RBC QN AUTO: 30 PG (ref 26.5–33)
MCH RBC QN AUTO: 30.1 PG (ref 26.5–33)
MCH RBC QN AUTO: 30.1 PG (ref 26.5–33)
MCH RBC QN AUTO: 30.2 PG (ref 26.5–33)
MCH RBC QN AUTO: 30.2 PG (ref 26.5–33)
MCH RBC QN AUTO: 30.3 PG (ref 26.5–33)
MCH RBC QN AUTO: 30.5 PG (ref 26.5–33)
MCH RBC QN AUTO: 30.7 PG (ref 26.5–33)
MCH RBC QN AUTO: 30.9 PG (ref 26.5–33)
MCH RBC QN AUTO: 31 PG (ref 26.5–33)
MCH RBC QN AUTO: 31.1 PG (ref 26.5–33)
MCH RBC QN AUTO: 31.4 PG (ref 26.5–33)
MCH RBC QN AUTO: 31.6 PG (ref 26.5–33)
MCH RBC QN AUTO: 31.7 PG (ref 26.5–33)
MCH RBC QN AUTO: 31.9 PG (ref 26.5–33)
MCH RBC QN AUTO: 31.9 PG (ref 26.5–33)
MCH RBC QN AUTO: 32.1 PG (ref 26.5–33)
MCH RBC QN AUTO: 32.7 PG (ref 26.5–33)
MCH RBC QN AUTO: 32.8 PG (ref 26.5–33)
MCH RBC QN AUTO: 33.1 PG (ref 26.5–33)
MCH RBC QN AUTO: 33.2 PG (ref 26.5–33)
MCH RBC QN AUTO: 33.3 PG (ref 26.5–33)
MCH RBC QN AUTO: 33.6 PG (ref 26.5–33)
MCH RBC QN AUTO: 33.8 PG (ref 26.5–33)
MCH RBC QN AUTO: 33.9 PG (ref 26.5–33)
MCH RBC QN AUTO: 34.1 PG (ref 26.5–33)
MCH RBC QN AUTO: 34.2 PG (ref 26.5–33)
MCH RBC QN AUTO: 34.4 PG (ref 26.5–33)
MCH RBC QN AUTO: 34.5 PG (ref 26.5–33)
MCH RBC QN AUTO: 34.5 PG (ref 26.5–33)
MCH RBC QN AUTO: 34.6 PG (ref 26.5–33)
MCH RBC QN AUTO: 34.8 PG (ref 26.5–33)
MCH RBC QN AUTO: 35 PG (ref 26.5–33)
MCH RBC QN AUTO: 35 PG (ref 26.5–33)
MCH RBC QN AUTO: 35.1 PG (ref 26.5–33)
MCH RBC QN AUTO: 35.1 PG (ref 26.5–33)
MCH RBC QN AUTO: 35.2 PG (ref 26.5–33)
MCH RBC QN AUTO: 35.4 PG (ref 26.5–33)
MCH RBC QN AUTO: 35.4 PG (ref 26.5–33)
MCH RBC QN AUTO: 35.5 PG (ref 26.5–33)
MCH RBC QN AUTO: 35.5 PG (ref 26.5–33)
MCH RBC QN AUTO: 35.6 PG (ref 26.5–33)
MCH RBC QN AUTO: 36.8 PG (ref 26.5–33)
MCH RBC QN AUTO: 36.9 PG (ref 26.5–33)
MCHC RBC AUTO-ENTMCNC: 29.5 G/DL (ref 31.5–36.5)
MCHC RBC AUTO-ENTMCNC: 29.6 G/DL (ref 31.5–36.5)
MCHC RBC AUTO-ENTMCNC: 30.3 G/DL (ref 31.5–36.5)
MCHC RBC AUTO-ENTMCNC: 30.5 G/DL (ref 31.5–36.5)
MCHC RBC AUTO-ENTMCNC: 30.6 G/DL (ref 31.5–36.5)
MCHC RBC AUTO-ENTMCNC: 30.7 G/DL (ref 31.5–36.5)
MCHC RBC AUTO-ENTMCNC: 30.9 G/DL (ref 31.5–36.5)
MCHC RBC AUTO-ENTMCNC: 31 G/DL (ref 31.5–36.5)
MCHC RBC AUTO-ENTMCNC: 31.1 G/DL (ref 31.5–36.5)
MCHC RBC AUTO-ENTMCNC: 31.2 G/DL (ref 31.5–36.5)
MCHC RBC AUTO-ENTMCNC: 31.2 G/DL (ref 31.5–36.5)
MCHC RBC AUTO-ENTMCNC: 31.4 G/DL (ref 31.5–36.5)
MCHC RBC AUTO-ENTMCNC: 31.5 G/DL (ref 31.5–36.5)
MCHC RBC AUTO-ENTMCNC: 31.6 G/DL (ref 31.5–36.5)
MCHC RBC AUTO-ENTMCNC: 31.8 G/DL (ref 31.5–36.5)
MCHC RBC AUTO-ENTMCNC: 31.8 G/DL (ref 31.5–36.5)
MCHC RBC AUTO-ENTMCNC: 31.9 G/DL (ref 31.5–36.5)
MCHC RBC AUTO-ENTMCNC: 31.9 G/DL (ref 31.5–36.5)
MCHC RBC AUTO-ENTMCNC: 32 G/DL (ref 31.5–36.5)
MCHC RBC AUTO-ENTMCNC: 32 G/DL (ref 31.5–36.5)
MCHC RBC AUTO-ENTMCNC: 32.1 G/DL (ref 31.5–36.5)
MCHC RBC AUTO-ENTMCNC: 32.1 G/DL (ref 31.5–36.5)
MCHC RBC AUTO-ENTMCNC: 32.2 G/DL (ref 31.5–36.5)
MCHC RBC AUTO-ENTMCNC: 32.5 G/DL (ref 31.5–36.5)
MCHC RBC AUTO-ENTMCNC: 32.5 G/DL (ref 31.5–36.5)
MCHC RBC AUTO-ENTMCNC: 32.6 G/DL (ref 31.5–36.5)
MCHC RBC AUTO-ENTMCNC: 32.7 G/DL (ref 31.5–36.5)
MCHC RBC AUTO-ENTMCNC: 32.8 G/DL (ref 31.5–36.5)
MCHC RBC AUTO-ENTMCNC: 32.8 G/DL (ref 31.5–36.5)
MCHC RBC AUTO-ENTMCNC: 32.9 G/DL (ref 31.5–36.5)
MCHC RBC AUTO-ENTMCNC: 32.9 G/DL (ref 31.5–36.5)
MCHC RBC AUTO-ENTMCNC: 33 G/DL (ref 31.5–36.5)
MCHC RBC AUTO-ENTMCNC: 33.1 G/DL (ref 31.5–36.5)
MCHC RBC AUTO-ENTMCNC: 33.2 G/DL (ref 31.5–36.5)
MCHC RBC AUTO-ENTMCNC: 33.3 G/DL (ref 31.5–36.5)
MCHC RBC AUTO-ENTMCNC: 33.5 G/DL (ref 31.5–36.5)
MCHC RBC AUTO-ENTMCNC: 34 G/DL (ref 31.5–36.5)
MCHC RBC AUTO-ENTMCNC: 34.2 G/DL (ref 31.5–36.5)
MCHC RBC AUTO-ENTMCNC: 34.4 G/DL (ref 31.5–36.5)
MCHC RBC AUTO-ENTMCNC: 34.6 G/DL (ref 31.5–36.5)
MCHC RBC AUTO-ENTMCNC: 35.4 G/DL (ref 31.5–36.5)
MCV RBC AUTO: 100 FL (ref 78–100)
MCV RBC AUTO: 101 FL (ref 78–100)
MCV RBC AUTO: 102 FL (ref 78–100)
MCV RBC AUTO: 104 FL (ref 78–100)
MCV RBC AUTO: 104 FL (ref 78–100)
MCV RBC AUTO: 105 FL (ref 78–100)
MCV RBC AUTO: 105 FL (ref 78–100)
MCV RBC AUTO: 106 FL (ref 78–100)
MCV RBC AUTO: 107 FL (ref 78–100)
MCV RBC AUTO: 107 FL (ref 78–100)
MCV RBC AUTO: 109 FL (ref 78–100)
MCV RBC AUTO: 110 FL (ref 78–100)
MCV RBC AUTO: 111 FL (ref 78–100)
MCV RBC AUTO: 111 FL (ref 78–100)
MCV RBC AUTO: 112 FL (ref 78–100)
MCV RBC AUTO: 113 FL (ref 78–100)
MCV RBC AUTO: 114 FL (ref 78–100)
MCV RBC AUTO: 114 FL (ref 78–100)
MCV RBC AUTO: 116 FL (ref 78–100)
MCV RBC AUTO: 90 FL (ref 78–100)
MCV RBC AUTO: 91 FL (ref 78–100)
MCV RBC AUTO: 93 FL (ref 78–100)
MCV RBC AUTO: 94 FL (ref 78–100)
MCV RBC AUTO: 95 FL (ref 78–100)
MCV RBC AUTO: 95 FL (ref 78–100)
MCV RBC AUTO: 96 FL (ref 78–100)
MCV RBC AUTO: 97 FL (ref 78–100)
MCV RBC AUTO: 98 FL (ref 78–100)
MCV RBC AUTO: 98 FL (ref 78–100)
MCV RBC AUTO: 99 FL (ref 78–100)
METAMYELOCYTES # BLD MANUAL: 0.2 10E3/UL
METAMYELOCYTES # BLD MANUAL: 0.4 10E3/UL
METAMYELOCYTES # BLD MANUAL: 0.6 10E3/UL
METAMYELOCYTES # BLD MANUAL: 1 10E3/UL
METAMYELOCYTES NFR BLD MANUAL: 1 %
METAMYELOCYTES NFR BLD MANUAL: 2 %
METAMYELOCYTES NFR BLD MANUAL: 2 %
METAMYELOCYTES NFR BLD MANUAL: 8 %
MITOGEN IGNF BCKGRD COR BLD-ACNC: 0 IU/ML
MITOGEN IGNF BCKGRD COR BLD-ACNC: 0.01 IU/ML
MONOCYTES # BLD AUTO: 0.9 10E3/UL (ref 0–1.3)
MONOCYTES # BLD AUTO: 1.6 10E3/UL (ref 0–1.3)
MONOCYTES # BLD AUTO: 2 10E3/UL (ref 0–1.3)
MONOCYTES # BLD MANUAL: 0.4 10E3/UL (ref 0–1.3)
MONOCYTES # BLD MANUAL: 0.9 10E3/UL (ref 0–1.3)
MONOCYTES # BLD MANUAL: 1.1 10E3/UL (ref 0–1.3)
MONOCYTES # BLD MANUAL: 1.2 10E3/UL (ref 0–1.3)
MONOCYTES # BLD MANUAL: 2.4 10E3/UL (ref 0–1.3)
MONOCYTES # BLD MANUAL: 2.8 10E3/UL (ref 0–1.3)
MONOCYTES # BLD MANUAL: 3.2 10E3/UL (ref 0–1.3)
MONOCYTES # BLD MANUAL: 3.9 10E3/UL (ref 0–1.3)
MONOCYTES NFR BLD AUTO: 17 %
MONOCYTES NFR BLD AUTO: 17 %
MONOCYTES NFR BLD AUTO: 18 %
MONOCYTES NFR BLD MANUAL: 1 %
MONOCYTES NFR BLD MANUAL: 11 %
MONOCYTES NFR BLD MANUAL: 11 %
MONOCYTES NFR BLD MANUAL: 20 %
MONOCYTES NFR BLD MANUAL: 20 %
MONOCYTES NFR BLD MANUAL: 22 %
MONOCYTES NFR BLD MANUAL: 7 %
MONOCYTES NFR BLD MANUAL: 7 %
MONOCYTES NFR BLD MANUAL: 8 %
MONOCYTES NFR BLD MANUAL: 8 %
MRSA DNA SPEC QL NAA+PROBE: NEGATIVE
MUCOUS THREADS #/AREA URNS LPF: PRESENT /LPF
MYELOCYTES # BLD MANUAL: 0.2 10E3/UL
MYELOCYTES # BLD MANUAL: 0.4 10E3/UL
MYELOCYTES # BLD MANUAL: 0.5 10E3/UL
MYELOCYTES # BLD MANUAL: 0.6 10E3/UL
MYELOCYTES NFR BLD MANUAL: 1 %
MYELOCYTES NFR BLD MANUAL: 2 %
MYELOCYTES NFR BLD MANUAL: 3 %
MYELOCYTES NFR BLD MANUAL: 5 %
NEUTROPHILS # BLD AUTO: 3.1 10E3/UL (ref 1.6–8.3)
NEUTROPHILS # BLD AUTO: 5.7 10E3/UL (ref 1.6–8.3)
NEUTROPHILS # BLD AUTO: 6.8 10E3/UL (ref 1.6–8.3)
NEUTROPHILS # BLD MANUAL: 11.4 10E3/UL (ref 1.6–8.3)
NEUTROPHILS # BLD MANUAL: 11.8 10E3/UL (ref 1.6–8.3)
NEUTROPHILS # BLD MANUAL: 12.9 10E3/UL (ref 1.6–8.3)
NEUTROPHILS # BLD MANUAL: 14.2 10E3/UL (ref 1.6–8.3)
NEUTROPHILS # BLD MANUAL: 17.3 10E3/UL (ref 1.6–8.3)
NEUTROPHILS # BLD MANUAL: 23.6 10E3/UL (ref 1.6–8.3)
NEUTROPHILS # BLD MANUAL: 3 10E3/UL (ref 1.6–8.3)
NEUTROPHILS # BLD MANUAL: 42.1 10E3/UL (ref 1.6–8.3)
NEUTROPHILS # BLD MANUAL: 8.6 10E3/UL (ref 1.6–8.3)
NEUTROPHILS # BLD MANUAL: 9.1 10E3/UL (ref 1.6–8.3)
NEUTROPHILS NFR BLD AUTO: 54 %
NEUTROPHILS NFR BLD AUTO: 61 %
NEUTROPHILS NFR BLD AUTO: 61 %
NEUTROPHILS NFR BLD MANUAL: 55 %
NEUTROPHILS NFR BLD MANUAL: 59 %
NEUTROPHILS NFR BLD MANUAL: 66 %
NEUTROPHILS NFR BLD MANUAL: 68 %
NEUTROPHILS NFR BLD MANUAL: 80 %
NEUTROPHILS NFR BLD MANUAL: 81 %
NEUTROPHILS NFR BLD MANUAL: 82 %
NEUTROPHILS NFR BLD MANUAL: 83 %
NEUTROPHILS NFR BLD MANUAL: 85 %
NEUTROPHILS NFR BLD MANUAL: 96 %
NEUTS HYPERSEG BLD QL SMEAR: PRESENT
NITRATE UR QL: NEGATIVE
NOROV GI+II ORF1-ORF2 JNC STL QL NAA+PR: NOT DETECTED
NOROV GI+II ORF1-ORF2 JNC STL QL NAA+PR: NOT DETECTED
NRBC # BLD AUTO: 0 10E3/UL
NRBC BLD AUTO-RTO: 0 /100
NT-PROBNP SERPL-MCNC: ABNORMAL PG/ML (ref 0–1800)
OCCULT BLOOD POCT: POSITIVE
OSMOLALITY SERPL: 253 MMOL/KG (ref 280–301)
OSMOLALITY SERPL: 256 MMOL/KG (ref 280–301)
OSMOLALITY SERPL: 292 MMOL/KG (ref 280–301)
OSMOLALITY UR: 113 MMOL/KG (ref 100–1200)
OSMOLALITY UR: 143 MMOL/KG (ref 100–1200)
OSMOLALITY UR: 292 MMOL/KG (ref 100–1200)
OSMOLALITY UR: 304 MMOL/KG (ref 100–1200)
OSMOLALITY UR: 491 MMOL/KG (ref 100–1200)
OSMOLALITY UR: 588 MMOL/KG (ref 100–1200)
OXYHGB MFR BLDV: 60.8 % (ref 70–75)
OXYHGB MFR BLDV: >98.5 % (ref 70–75)
P AXIS - MUSE: -1 DEGREES
P AXIS - MUSE: 1 DEGREES
P AXIS - MUSE: 10 DEGREES
P AXIS - MUSE: 11 DEGREES
P AXIS - MUSE: 22 DEGREES
P AXIS - MUSE: 28 DEGREES
P AXIS - MUSE: 30 DEGREES
P AXIS - MUSE: 35 DEGREES
P AXIS - MUSE: 43 DEGREES
P AXIS - MUSE: 47 DEGREES
PATH ICD:: ABNORMAL
PATH ICD:: NORMAL
PATH REPORT.ADDENDUM SPEC: NORMAL
PATH REPORT.COMMENTS IMP SPEC: NORMAL
PATH REPORT.FINAL DX SPEC: NORMAL
PATH REPORT.GROSS SPEC: NORMAL
PATH REPORT.GROSS SPEC: NORMAL
PATH REPORT.MICROSCOPIC SPEC OTHER STN: NORMAL
PATH REPORT.RELEVANT HX SPEC: NORMAL
PCO2 BLDV: 34 MM HG (ref 35–50)
PCO2 BLDV: 44 MM HG (ref 35–50)
PH BLDV: 7.38 [PH] (ref 7.35–7.45)
PH BLDV: 7.4 [PH] (ref 7.35–7.45)
PH UR STRIP: 5 [PH] (ref 5–7)
PH UR STRIP: 5.5 [PH] (ref 5–7)
PH UR STRIP: 6 [PH] (ref 5–7)
PH UR STRIP: 7.5 [PH] (ref 5–7)
PHOSPHATE SERPL-MCNC: 2.8 MG/DL (ref 2.5–4.5)
PHOSPHATE SERPL-MCNC: 4 MG/DL (ref 2.5–4.5)
PHOTO IMAGE: NORMAL
PHOTO IMAGE: NORMAL
PLAT MORPH BLD: ABNORMAL
PLAT MORPH BLD: NORMAL
PLATELET # BLD AUTO: 138 10E3/UL (ref 150–450)
PLATELET # BLD AUTO: 189 10E3/UL (ref 150–450)
PLATELET # BLD AUTO: 195 10E3/UL (ref 150–450)
PLATELET # BLD AUTO: 195 10E3/UL (ref 150–450)
PLATELET # BLD AUTO: 196 10E3/UL (ref 150–450)
PLATELET # BLD AUTO: 198 10E3/UL (ref 150–450)
PLATELET # BLD AUTO: 200 10E3/UL (ref 150–450)
PLATELET # BLD AUTO: 202 10E3/UL (ref 150–450)
PLATELET # BLD AUTO: 202 10E3/UL (ref 150–450)
PLATELET # BLD AUTO: 203 10E3/UL (ref 150–450)
PLATELET # BLD AUTO: 204 10E3/UL (ref 150–450)
PLATELET # BLD AUTO: 208 10E3/UL (ref 150–450)
PLATELET # BLD AUTO: 212 10E3/UL (ref 150–450)
PLATELET # BLD AUTO: 214 10E3/UL (ref 150–450)
PLATELET # BLD AUTO: 214 10E3/UL (ref 150–450)
PLATELET # BLD AUTO: 215 10E3/UL (ref 150–450)
PLATELET # BLD AUTO: 215 10E3/UL (ref 150–450)
PLATELET # BLD AUTO: 219 10E3/UL (ref 150–450)
PLATELET # BLD AUTO: 219 10E3/UL (ref 150–450)
PLATELET # BLD AUTO: 220 10E3/UL (ref 150–450)
PLATELET # BLD AUTO: 230 10E3/UL (ref 150–450)
PLATELET # BLD AUTO: 235 10E3/UL (ref 150–450)
PLATELET # BLD AUTO: 241 10E3/UL (ref 150–450)
PLATELET # BLD AUTO: 242 10E3/UL (ref 150–450)
PLATELET # BLD AUTO: 242 10E3/UL (ref 150–450)
PLATELET # BLD AUTO: 243 10E3/UL (ref 150–450)
PLATELET # BLD AUTO: 244 10E3/UL (ref 150–450)
PLATELET # BLD AUTO: 245 10E3/UL (ref 150–450)
PLATELET # BLD AUTO: 246 10E3/UL (ref 150–450)
PLATELET # BLD AUTO: 248 10E3/UL (ref 150–450)
PLATELET # BLD AUTO: 249 10E3/UL (ref 150–450)
PLATELET # BLD AUTO: 251 10E3/UL (ref 150–450)
PLATELET # BLD AUTO: 251 10E3/UL (ref 150–450)
PLATELET # BLD AUTO: 253 10E3/UL (ref 150–450)
PLATELET # BLD AUTO: 254 10E3/UL (ref 150–450)
PLATELET # BLD AUTO: 258 10E3/UL (ref 150–450)
PLATELET # BLD AUTO: 258 10E3/UL (ref 150–450)
PLATELET # BLD AUTO: 261 10E3/UL (ref 150–450)
PLATELET # BLD AUTO: 263 10E3/UL (ref 150–450)
PLATELET # BLD AUTO: 264 10E3/UL (ref 150–450)
PLATELET # BLD AUTO: 265 10E3/UL (ref 150–450)
PLATELET # BLD AUTO: 268 10E3/UL (ref 150–450)
PLATELET # BLD AUTO: 268 10E3/UL (ref 150–450)
PLATELET # BLD AUTO: 273 10E3/UL (ref 150–450)
PLATELET # BLD AUTO: 274 10E3/UL (ref 150–450)
PLATELET # BLD AUTO: 276 10E3/UL (ref 150–450)
PLATELET # BLD AUTO: 276 10E3/UL (ref 150–450)
PLATELET # BLD AUTO: 280 10E3/UL (ref 150–450)
PLATELET # BLD AUTO: 284 10E3/UL (ref 150–450)
PLATELET # BLD AUTO: 284 10E3/UL (ref 150–450)
PLATELET # BLD AUTO: 286 10E3/UL (ref 150–450)
PLATELET # BLD AUTO: 286 10E3/UL (ref 150–450)
PLATELET # BLD AUTO: 287 10E3/UL (ref 150–450)
PLATELET # BLD AUTO: 289 10E3/UL (ref 150–450)
PLATELET # BLD AUTO: 296 10E3/UL (ref 150–450)
PLATELET # BLD AUTO: 298 10E3/UL (ref 150–450)
PLATELET # BLD AUTO: 397 10E3/UL (ref 150–450)
PO2 BLDV: 167 MM HG (ref 25–47)
PO2 BLDV: 33 MM HG (ref 25–47)
POTASSIUM BLD-SCNC: 2.8 MMOL/L (ref 3.5–5)
POTASSIUM BLD-SCNC: 3 MMOL/L (ref 3.5–5)
POTASSIUM BLD-SCNC: 3.1 MMOL/L (ref 3.5–5)
POTASSIUM BLD-SCNC: 3.2 MMOL/L (ref 3.5–5)
POTASSIUM BLD-SCNC: 3.3 MMOL/L (ref 3.5–5)
POTASSIUM BLD-SCNC: 3.3 MMOL/L (ref 3.5–5)
POTASSIUM BLD-SCNC: 3.4 MMOL/L (ref 3.5–5)
POTASSIUM BLD-SCNC: 3.5 MMOL/L (ref 3.5–5)
POTASSIUM BLD-SCNC: 3.6 MMOL/L (ref 3.5–5)
POTASSIUM BLD-SCNC: 3.7 MMOL/L (ref 3.5–5)
POTASSIUM BLD-SCNC: 3.8 MMOL/L (ref 3.5–5)
POTASSIUM BLD-SCNC: 3.9 MMOL/L (ref 3.5–5)
POTASSIUM BLD-SCNC: 4 MMOL/L (ref 3.5–5)
POTASSIUM BLD-SCNC: 4.2 MMOL/L (ref 3.5–5)
POTASSIUM BLD-SCNC: 4.3 MMOL/L (ref 3.5–5)
POTASSIUM BLD-SCNC: 4.4 MMOL/L (ref 3.5–5)
POTASSIUM BLD-SCNC: 4.6 MMOL/L (ref 3.5–5)
POTASSIUM BLD-SCNC: 4.8 MMOL/L (ref 3.5–5)
POTASSIUM BLD-SCNC: 4.8 MMOL/L (ref 3.5–5)
POTASSIUM SERPL-SCNC: 2.8 MMOL/L (ref 3.4–5.3)
POTASSIUM SERPL-SCNC: 2.9 MMOL/L (ref 3.4–5.3)
POTASSIUM SERPL-SCNC: 2.9 MMOL/L (ref 3.4–5.3)
POTASSIUM SERPL-SCNC: 3 MMOL/L (ref 3.4–5.3)
POTASSIUM SERPL-SCNC: 3 MMOL/L (ref 3.4–5.3)
POTASSIUM SERPL-SCNC: 3.2 MMOL/L (ref 3.4–5.3)
POTASSIUM SERPL-SCNC: 3.2 MMOL/L (ref 3.4–5.3)
POTASSIUM SERPL-SCNC: 3.3 MMOL/L (ref 3.4–5.3)
POTASSIUM SERPL-SCNC: 3.4 MMOL/L (ref 3.4–5.3)
POTASSIUM SERPL-SCNC: 3.4 MMOL/L (ref 3.4–5.3)
POTASSIUM SERPL-SCNC: 3.5 MMOL/L (ref 3.4–5.3)
POTASSIUM SERPL-SCNC: 3.5 MMOL/L (ref 3.4–5.3)
POTASSIUM SERPL-SCNC: 3.6 MMOL/L (ref 3.4–5.3)
POTASSIUM SERPL-SCNC: 3.7 MMOL/L (ref 3.4–5.3)
POTASSIUM SERPL-SCNC: 3.8 MMOL/L (ref 3.4–5.3)
POTASSIUM SERPL-SCNC: 3.9 MMOL/L (ref 3.4–5.3)
POTASSIUM SERPL-SCNC: 3.9 MMOL/L (ref 3.4–5.3)
POTASSIUM SERPL-SCNC: 4 MMOL/L (ref 3.4–5.3)
POTASSIUM SERPL-SCNC: 4 MMOL/L (ref 3.4–5.3)
POTASSIUM SERPL-SCNC: 4.2 MMOL/L (ref 3.4–5.3)
POTASSIUM SERPL-SCNC: 4.2 MMOL/L (ref 3.4–5.3)
POTASSIUM SERPL-SCNC: 4.3 MMOL/L (ref 3.4–5.3)
POTASSIUM SERPL-SCNC: 4.3 MMOL/L (ref 3.4–5.3)
POTASSIUM SERPL-SCNC: 4.4 MMOL/L (ref 3.4–5.3)
POTASSIUM SERPL-SCNC: 4.5 MMOL/L (ref 3.4–5.3)
POTASSIUM SERPL-SCNC: 4.6 MMOL/L (ref 3.4–5.3)
POTASSIUM SERPL-SCNC: 4.7 MMOL/L (ref 3.4–5.3)
POTASSIUM SERPL-SCNC: 5.4 MMOL/L (ref 3.4–5.3)
POTASSIUM SERPL-SCNC: 5.6 MMOL/L (ref 3.4–5.3)
POTASSIUM SERPL-SCNC: 5.8 MMOL/L (ref 3.4–5.3)
POTASSIUM SERPL-SCNC: 5.9 MMOL/L (ref 3.4–5.3)
POTASSIUM SERPL-SCNC: 5.9 MMOL/L (ref 3.4–5.3)
POTASSIUM SERPL-SCNC: 6.1 MMOL/L (ref 3.4–5.3)
POTASSIUM SERPL-SCNC: 6.1 MMOL/L (ref 3.4–5.3)
POTASSIUM SERPL-SCNC: 6.4 MMOL/L (ref 3.4–5.3)
PR INTERVAL - MUSE: 150 MS
PR INTERVAL - MUSE: 162 MS
PR INTERVAL - MUSE: 164 MS
PR INTERVAL - MUSE: 168 MS
PR INTERVAL - MUSE: 170 MS
PR INTERVAL - MUSE: 172 MS
PR INTERVAL - MUSE: 174 MS
PR INTERVAL - MUSE: 176 MS
PROCALCITONIN SERPL IA-MCNC: 0.35 NG/ML
PROCALCITONIN SERPL IA-MCNC: 0.87 NG/ML
PROCALCITONIN SERPL IA-MCNC: 1.29 NG/ML
PROCALCITONIN SERPL IA-MCNC: 4.16 NG/ML
PROCALCITONIN SERPL IA-MCNC: 5.92 NG/ML
PROCALCITONIN SERPL-MCNC: 0.03 NG/ML (ref 0–0.49)
PROCALCITONIN SERPL-MCNC: 0.37 NG/ML (ref 0–0.49)
PROCALCITONIN SERPL-MCNC: 0.88 NG/ML (ref 0–0.49)
PROCALCITONIN SERPL-MCNC: 0.91 NG/ML (ref 0–0.49)
PROT PATTERN SERPL ELPH-IMP: ABNORMAL
PROT PATTERN SERPL IFE-IMP: NORMAL
PROT SERPL-MCNC: 5.5 G/DL (ref 6–8)
PROT SERPL-MCNC: 5.6 G/DL (ref 6.4–8.3)
PROT SERPL-MCNC: 6.1 G/DL (ref 6–8)
PROT SERPL-MCNC: 6.1 G/DL (ref 6–8)
PROT SERPL-MCNC: 6.4 G/DL (ref 6–8)
PROT SERPL-MCNC: 6.8 G/DL (ref 6–8)
PROT SERPL-MCNC: 6.9 G/DL (ref 6–8)
PROT SERPL-MCNC: 7.5 G/DL (ref 6–8)
PROT SERPL-MCNC: 7.6 G/DL (ref 6.4–8.3)
PROT SERPL-MCNC: 7.8 G/DL (ref 6–8)
PROT SERPL-MCNC: 8 G/DL (ref 6–8)
PROT SERPL-MCNC: 8.1 G/DL (ref 6–8)
PROT SERPL-MCNC: 8.7 G/DL (ref 6–8)
PROT SERPL-MCNC: 8.9 G/DL (ref 6.4–8.3)
PROT SERPL-MCNC: 8.9 G/DL (ref 6–8)
QRS DURATION - MUSE: 74 MS
QRS DURATION - MUSE: 74 MS
QRS DURATION - MUSE: 80 MS
QRS DURATION - MUSE: 82 MS
QRS DURATION - MUSE: 84 MS
QRS DURATION - MUSE: 86 MS
QRS DURATION - MUSE: 88 MS
QRS DURATION - MUSE: 88 MS
QT - MUSE: 276 MS
QT - MUSE: 338 MS
QT - MUSE: 346 MS
QT - MUSE: 372 MS
QT - MUSE: 374 MS
QT - MUSE: 374 MS
QT - MUSE: 388 MS
QT - MUSE: 392 MS
QT - MUSE: 394 MS
QT - MUSE: 400 MS
QTC - MUSE: 402 MS
QTC - MUSE: 428 MS
QTC - MUSE: 436 MS
QTC - MUSE: 439 MS
QTC - MUSE: 441 MS
QTC - MUSE: 442 MS
QTC - MUSE: 442 MS
QTC - MUSE: 456 MS
QTC - MUSE: 461 MS
QTC - MUSE: 469 MS
QUANTIFERON MITOGEN: 0.04 IU/ML
QUANTIFERON MITOGEN: 0.08 IU/ML
QUANTIFERON NIL TUBE: 0 IU/ML
QUANTIFERON NIL TUBE: 0.02 IU/ML
QUANTIFERON TB1 TUBE: 0.01 IU/ML
QUANTIFERON TB1 TUBE: 0.02 IU/ML
QUANTIFERON TB2 TUBE: 0.01
QUANTIFERON TB2 TUBE: 0.03
R AXIS - MUSE: 30 DEGREES
R AXIS - MUSE: 37 DEGREES
R AXIS - MUSE: 44 DEGREES
R AXIS - MUSE: 48 DEGREES
R AXIS - MUSE: 51 DEGREES
R AXIS - MUSE: 58 DEGREES
R AXIS - MUSE: 67 DEGREES
R AXIS - MUSE: 69 DEGREES
R AXIS - MUSE: 71 DEGREES
R AXIS - MUSE: 84 DEGREES
RBC # BLD AUTO: 1.93 10E6/UL (ref 3.8–5.2)
RBC # BLD AUTO: 1.96 10E6/UL (ref 3.8–5.2)
RBC # BLD AUTO: 2 10E6/UL (ref 3.8–5.2)
RBC # BLD AUTO: 2.03 10E6/UL (ref 3.8–5.2)
RBC # BLD AUTO: 2.03 10E6/UL (ref 3.8–5.2)
RBC # BLD AUTO: 2.06 10E6/UL (ref 3.8–5.2)
RBC # BLD AUTO: 2.08 10E6/UL (ref 3.8–5.2)
RBC # BLD AUTO: 2.08 10E6/UL (ref 3.8–5.2)
RBC # BLD AUTO: 2.09 10E6/UL (ref 3.8–5.2)
RBC # BLD AUTO: 2.09 10E6/UL (ref 3.8–5.2)
RBC # BLD AUTO: 2.11 10E6/UL (ref 3.8–5.2)
RBC # BLD AUTO: 2.11 10E6/UL (ref 3.8–5.2)
RBC # BLD AUTO: 2.14 10E6/UL (ref 3.8–5.2)
RBC # BLD AUTO: 2.16 10E6/UL (ref 3.8–5.2)
RBC # BLD AUTO: 2.16 10E6/UL (ref 3.8–5.2)
RBC # BLD AUTO: 2.2 10E6/UL (ref 3.8–5.2)
RBC # BLD AUTO: 2.22 10E6/UL (ref 3.8–5.2)
RBC # BLD AUTO: 2.23 10E6/UL (ref 3.8–5.2)
RBC # BLD AUTO: 2.25 10E6/UL (ref 3.8–5.2)
RBC # BLD AUTO: 2.26 10E6/UL (ref 3.8–5.2)
RBC # BLD AUTO: 2.27 10E6/UL (ref 3.8–5.2)
RBC # BLD AUTO: 2.3 10E6/UL (ref 3.8–5.2)
RBC # BLD AUTO: 2.31 10E6/UL (ref 3.8–5.2)
RBC # BLD AUTO: 2.32 10E6/UL (ref 3.8–5.2)
RBC # BLD AUTO: 2.38 10E6/UL (ref 3.8–5.2)
RBC # BLD AUTO: 2.4 10E6/UL (ref 3.8–5.2)
RBC # BLD AUTO: 2.43 10E6/UL (ref 3.8–5.2)
RBC # BLD AUTO: 2.44 10E6/UL (ref 3.8–5.2)
RBC # BLD AUTO: 2.44 10E6/UL (ref 3.8–5.2)
RBC # BLD AUTO: 2.47 10E6/UL (ref 3.8–5.2)
RBC # BLD AUTO: 2.48 10E6/UL (ref 3.8–5.2)
RBC # BLD AUTO: 2.49 10E6/UL (ref 3.8–5.2)
RBC # BLD AUTO: 2.51 10E6/UL (ref 3.8–5.2)
RBC # BLD AUTO: 2.55 10E6/UL (ref 3.8–5.2)
RBC # BLD AUTO: 2.56 10E6/UL (ref 3.8–5.2)
RBC # BLD AUTO: 2.64 10E6/UL (ref 3.8–5.2)
RBC # BLD AUTO: 2.64 10E6/UL (ref 3.8–5.2)
RBC # BLD AUTO: 2.65 10E6/UL (ref 3.8–5.2)
RBC # BLD AUTO: 2.69 10E6/UL (ref 3.8–5.2)
RBC # BLD AUTO: 2.72 10E6/UL (ref 3.8–5.2)
RBC # BLD AUTO: 2.75 10E6/UL (ref 3.8–5.2)
RBC # BLD AUTO: 2.77 10E6/UL (ref 3.8–5.2)
RBC # BLD AUTO: 2.8 10E6/UL (ref 3.8–5.2)
RBC # BLD AUTO: 2.82 10E6/UL (ref 3.8–5.2)
RBC # BLD AUTO: 2.88 10E6/UL (ref 3.8–5.2)
RBC # BLD AUTO: 2.88 10E6/UL (ref 3.8–5.2)
RBC # BLD AUTO: 2.93 10E6/UL (ref 3.8–5.2)
RBC # BLD AUTO: 2.97 10E6/UL (ref 3.8–5.2)
RBC # BLD AUTO: 3 10E6/UL (ref 3.8–5.2)
RBC # BLD AUTO: 3.05 10E6/UL (ref 3.8–5.2)
RBC # BLD AUTO: 3.17 10E6/UL (ref 3.8–5.2)
RBC # BLD AUTO: 3.28 10E6/UL (ref 3.8–5.2)
RBC # BLD AUTO: 3.4 10E6/UL (ref 3.8–5.2)
RBC # BLD AUTO: 3.56 10E6/UL (ref 3.8–5.2)
RBC # BLD AUTO: 3.64 10E6/UL (ref 3.8–5.2)
RBC # BLD AUTO: 3.8 10E6/UL (ref 3.8–5.2)
RBC # BLD AUTO: 3.92 10E6/UL (ref 3.8–5.2)
RBC MORPH BLD: ABNORMAL
RBC MORPH BLD: NORMAL
RBC URINE: 0 /HPF
RBC URINE: 1 /HPF
RBC URINE: 2 /HPF
RBC URINE: 2 /HPF
RBC URINE: <1 /HPF
RBC URINE: <1 /HPF
RETICS # AUTO: 0.04 10E6/UL (ref 0.01–0.11)
RETICS # AUTO: 0.08 10E6/UL (ref 0.01–0.11)
RETICS/RBC NFR AUTO: 1.4 % (ref 0.8–2.7)
RETICS/RBC NFR AUTO: 2.7 % (ref 0.8–2.7)
REVIEWING PATHOLOGIST: ABNORMAL
REVIEWING PATHOLOGIST: NORMAL
RHEUMATOID FACT SER NEPH-ACNC: 805 IU/ML (ref 0–30)
ROULEAUX BLD QL SMEAR: PRESENT
ROULEAUX BLD QL SMEAR: PRESENT
RSV RNA SPEC NAA+PROBE: NEGATIVE
RVA NSP5 STL QL NAA+PROBE: NOT DETECTED
RVA NSP5 STL QL NAA+PROBE: NOT DETECTED
SA TARGET DNA: NEGATIVE
SALMONELLA SP RPOD STL QL NAA+PROBE: NOT DETECTED
SALMONELLA SP RPOD STL QL NAA+PROBE: NOT DETECTED
SAO2 % BLDV: 100 % (ref 70–75)
SAO2 % BLDV: 61.7 % (ref 70–75)
SARS-COV-2 RNA RESP QL NAA+PROBE: NEGATIVE
SARS-COV-2 RNA RESP QL NAA+PROBE: POSITIVE
SARS-COV-2 RNA RESP QL NAA+PROBE: POSITIVE
SHIGELLA SP+EIEC IPAH STL QL NAA+PROBE: NOT DETECTED
SHIGELLA SP+EIEC IPAH STL QL NAA+PROBE: NOT DETECTED
SODIUM SERPL-SCNC: 113 MMOL/L (ref 136–145)
SODIUM SERPL-SCNC: 113 MMOL/L (ref 136–145)
SODIUM SERPL-SCNC: 116 MMOL/L (ref 136–145)
SODIUM SERPL-SCNC: 117 MMOL/L (ref 136–145)
SODIUM SERPL-SCNC: 118 MMOL/L (ref 136–145)
SODIUM SERPL-SCNC: 119 MMOL/L (ref 136–145)
SODIUM SERPL-SCNC: 120 MMOL/L (ref 136–145)
SODIUM SERPL-SCNC: 120 MMOL/L (ref 136–145)
SODIUM SERPL-SCNC: 121 MMOL/L (ref 136–145)
SODIUM SERPL-SCNC: 123 MMOL/L (ref 136–145)
SODIUM SERPL-SCNC: 124 MMOL/L (ref 136–145)
SODIUM SERPL-SCNC: 125 MMOL/L (ref 136–145)
SODIUM SERPL-SCNC: 126 MMOL/L (ref 136–145)
SODIUM SERPL-SCNC: 127 MMOL/L (ref 136–145)
SODIUM SERPL-SCNC: 128 MMOL/L (ref 136–145)
SODIUM SERPL-SCNC: 129 MMOL/L (ref 136–145)
SODIUM SERPL-SCNC: 130 MMOL/L (ref 136–145)
SODIUM SERPL-SCNC: 131 MMOL/L (ref 136–145)
SODIUM SERPL-SCNC: 132 MMOL/L (ref 136–145)
SODIUM SERPL-SCNC: 133 MMOL/L (ref 136–145)
SODIUM SERPL-SCNC: 134 MMOL/L (ref 136–145)
SODIUM SERPL-SCNC: 135 MMOL/L (ref 136–145)
SODIUM SERPL-SCNC: 136 MMOL/L (ref 136–145)
SODIUM SERPL-SCNC: 137 MMOL/L (ref 136–145)
SODIUM SERPL-SCNC: 137 MMOL/L (ref 136–145)
SODIUM SERPL-SCNC: 138 MMOL/L (ref 136–145)
SODIUM SERPL-SCNC: 139 MMOL/L (ref 136–145)
SODIUM SERPL-SCNC: 140 MMOL/L (ref 136–145)
SODIUM UR-SCNC: 49 MMOL/L
SODIUM UR-SCNC: 51 MMOL/L
SODIUM UR-SCNC: 79 MMOL/L
SODIUM UR-SCNC: 87 MMOL/L
SODIUM UR-SCNC: <20 MMOL/L
SODIUM UR-SCNC: <20 MMOL/L
SP GR UR STRIP: 1 (ref 1–1.03)
SP GR UR STRIP: 1 (ref 1–1.03)
SP GR UR STRIP: 1.01 (ref 1–1.03)
SP GR UR STRIP: 1.02 (ref 1–1.03)
SPECIMEN EXPIRATION DATE: NORMAL
SQUAMOUS EPITHELIAL: 1 /HPF
SQUAMOUS EPITHELIAL: 1 /HPF
SQUAMOUS EPITHELIAL: 11 /HPF
SQUAMOUS EPITHELIAL: 2 /HPF
SQUAMOUS EPITHELIAL: <1 /HPF
SYSTOLIC BLOOD PRESSURE - MUSE: 114 MMHG
SYSTOLIC BLOOD PRESSURE - MUSE: 123 MMHG
SYSTOLIC BLOOD PRESSURE - MUSE: 160 MMHG
SYSTOLIC BLOOD PRESSURE - MUSE: NORMAL MMHG
T AXIS - MUSE: -13 DEGREES
T AXIS - MUSE: 15 DEGREES
T AXIS - MUSE: 19 DEGREES
T AXIS - MUSE: 32 DEGREES
T AXIS - MUSE: 36 DEGREES
T AXIS - MUSE: 51 DEGREES
T AXIS - MUSE: 55 DEGREES
T AXIS - MUSE: 7 DEGREES
T AXIS - MUSE: 72 DEGREES
T AXIS - MUSE: 97 DEGREES
T3 SERPL-MCNC: 66 NG/DL (ref 85–202)
T4 SERPL-MCNC: 6.2 UG/DL (ref 4.5–11.7)
TEST CARD EXPIRATION DATE POC: ABNORMAL
TEST CARD LOT NUMBER: ABNORMAL
TOTAL PROTEIN SERUM FOR ELP (SYNCED VALUE): 7.7 G/DL
TOTAL PROTEIN SERUM FOR ELP: 7.7 G/DL (ref 6–8)
TPMT BLD-CCNC: 27 U/ML
TROPONIN I SERPL HS-MCNC: 40 NG/L
TROPONIN I SERPL-MCNC: 0.01 NG/ML (ref 0–0.29)
TROPONIN I SERPL-MCNC: 0.01 NG/ML (ref 0–0.29)
TROPONIN I SERPL-MCNC: 0.02 NG/ML (ref 0–0.29)
TROPONIN I SERPL-MCNC: 0.03 NG/ML (ref 0–0.29)
TROPONIN I SERPL-MCNC: 0.04 NG/ML (ref 0–0.29)
TROPONIN I SERPL-MCNC: 0.04 NG/ML (ref 0–0.29)
TROPONIN I SERPL-MCNC: 0.05 NG/ML (ref 0–0.29)
TROPONIN T SERPL HS-MCNC: 31 NG/L
TROPONIN T SERPL HS-MCNC: 34 NG/L
TROPONIN T SERPL HS-MCNC: 36 NG/L
TROPONIN T SERPL HS-MCNC: 37 NG/L
TROPONIN T SERPL HS-MCNC: 40 NG/L
TROPONIN T SERPL HS-MCNC: 40 NG/L
TROPONIN T SERPL HS-MCNC: 44 NG/L
TROPONIN T SERPL HS-MCNC: 47 NG/L
TSH SERPL DL<=0.005 MIU/L-ACNC: 1.07 UIU/ML (ref 0.3–5)
TSH SERPL DL<=0.005 MIU/L-ACNC: 1.61 UIU/ML (ref 0.3–4.2)
TSH SERPL DL<=0.005 MIU/L-ACNC: 2.93 UIU/ML (ref 0.3–4.2)
TSH SERPL DL<=0.005 MIU/L-ACNC: 6.04 UIU/ML (ref 0.3–4.2)
TTG IGA SER-ACNC: 1.8 U/ML
TTG IGG SER-ACNC: 1.6 U/ML
UNIT ABO/RH: NORMAL
UNIT NUMBER: NORMAL
UNIT STATUS: NORMAL
UNIT TYPE ISBT: 5100
UPPER GI ENDOSCOPY: NORMAL
UPPER GI ENDOSCOPY: NORMAL
UROBILINOGEN UR STRIP-MCNC: <2 MG/DL
V CHOL+PARA RFBL+TRKH+TNAA STL QL NAA+PR: NOT DETECTED
V CHOL+PARA RFBL+TRKH+TNAA STL QL NAA+PR: NOT DETECTED
VANCOMYCIN SERPL-MCNC: 16.1 MG/L
VANCOMYCIN SERPL-MCNC: 21.2 UG/ML
VENTRICULAR RATE- MUSE: 112 BPM
VENTRICULAR RATE- MUSE: 128 BPM
VENTRICULAR RATE- MUSE: 71 BPM
VENTRICULAR RATE- MUSE: 78 BPM
VENTRICULAR RATE- MUSE: 78 BPM
VENTRICULAR RATE- MUSE: 82 BPM
VENTRICULAR RATE- MUSE: 83 BPM
VENTRICULAR RATE- MUSE: 85 BPM
VENTRICULAR RATE- MUSE: 86 BPM
VENTRICULAR RATE- MUSE: 98 BPM
VIT B12 SERPL-MCNC: 1227 PG/ML (ref 232–1245)
VIT B12 SERPL-MCNC: 1716 PG/ML (ref 193–986)
VIT B12 SERPL-MCNC: 1772 PG/ML (ref 232–1245)
VIT B12 SERPL-MCNC: 940 PG/ML (ref 213–816)
WBC # BLD AUTO: 10.7 10E3/UL (ref 4–11)
WBC # BLD AUTO: 10.8 10E3/UL (ref 4–11)
WBC # BLD AUTO: 11.4 10E3/UL (ref 4–11)
WBC # BLD AUTO: 11.9 10E3/UL (ref 4–11)
WBC # BLD AUTO: 12.1 10E3/UL (ref 4–11)
WBC # BLD AUTO: 12.6 10E3/UL (ref 4–11)
WBC # BLD AUTO: 12.6 10E3/UL (ref 4–11)
WBC # BLD AUTO: 13.2 10E3/UL (ref 4–11)
WBC # BLD AUTO: 13.4 10E3/UL (ref 4–11)
WBC # BLD AUTO: 13.4 10E3/UL (ref 4–11)
WBC # BLD AUTO: 13.8 10E3/UL (ref 4–11)
WBC # BLD AUTO: 13.9 10E3/UL (ref 4–11)
WBC # BLD AUTO: 14.2 10E3/UL (ref 4–11)
WBC # BLD AUTO: 14.3 10E3/UL (ref 4–11)
WBC # BLD AUTO: 14.7 10E3/UL (ref 4–11)
WBC # BLD AUTO: 15.5 10E3/UL (ref 4–11)
WBC # BLD AUTO: 15.6 10E3/UL (ref 4–11)
WBC # BLD AUTO: 17.2 10E3/UL (ref 4–11)
WBC # BLD AUTO: 17.7 10E3/UL (ref 4–11)
WBC # BLD AUTO: 19.4 10E3/UL (ref 4–11)
WBC # BLD AUTO: 19.5 10E3/UL (ref 4–11)
WBC # BLD AUTO: 19.8 10E3/UL (ref 4–11)
WBC # BLD AUTO: 20 10E3/UL (ref 4–11)
WBC # BLD AUTO: 20.2 10E3/UL (ref 4–11)
WBC # BLD AUTO: 20.8 10E3/UL (ref 4–11)
WBC # BLD AUTO: 21.4 10E3/UL (ref 4–11)
WBC # BLD AUTO: 21.4 10E3/UL (ref 4–11)
WBC # BLD AUTO: 24 10E3/UL (ref 4–11)
WBC # BLD AUTO: 24.4 10E3/UL (ref 4–11)
WBC # BLD AUTO: 28.7 10E3/UL (ref 4–11)
WBC # BLD AUTO: 28.8 10E3/UL (ref 4–11)
WBC # BLD AUTO: 36.7 10E3/UL (ref 4–11)
WBC # BLD AUTO: 4 10E3/UL (ref 4–11)
WBC # BLD AUTO: 4.2 10E3/UL (ref 4–11)
WBC # BLD AUTO: 4.3 10E3/UL (ref 4–11)
WBC # BLD AUTO: 4.5 10E3/UL (ref 4–11)
WBC # BLD AUTO: 4.8 10E3/UL (ref 4–11)
WBC # BLD AUTO: 4.8 10E3/UL (ref 4–11)
WBC # BLD AUTO: 43.9 10E3/UL (ref 4–11)
WBC # BLD AUTO: 5.4 10E3/UL (ref 4–11)
WBC # BLD AUTO: 5.8 10E3/UL (ref 4–11)
WBC # BLD AUTO: 6.7 10E3/UL (ref 4–11)
WBC # BLD AUTO: 7 10E3/UL (ref 4–11)
WBC # BLD AUTO: 7.3 10E3/UL (ref 4–11)
WBC # BLD AUTO: 8 10E3/UL (ref 4–11)
WBC # BLD AUTO: 8 10E3/UL (ref 4–11)
WBC # BLD AUTO: 8.1 10E3/UL (ref 4–11)
WBC # BLD AUTO: 8.4 10E3/UL (ref 4–11)
WBC # BLD AUTO: 8.7 10E3/UL (ref 4–11)
WBC # BLD AUTO: 9.1 10E3/UL (ref 4–11)
WBC # BLD AUTO: 9.7 10E3/UL (ref 4–11)
WBC # BLD AUTO: 9.9 10E3/UL (ref 4–11)
WBC URINE: 1 /HPF
WBC URINE: 10 /HPF
WBC URINE: 2 /HPF
WBC URINE: 3 /HPF
WBC URINE: 6 /HPF
WBC URINE: 8 /HPF
WBC URINE: <1 /HPF
WBC URINE: <1 /HPF
Y ENTERO RECN STL QL NAA+PROBE: NOT DETECTED
Y ENTERO RECN STL QL NAA+PROBE: NOT DETECTED

## 2022-01-01 PROCEDURE — 85027 COMPLETE CBC AUTOMATED: CPT | Performed by: EMERGENCY MEDICINE

## 2022-01-01 PROCEDURE — 360N000075 HC SURGERY LEVEL 2, PER MIN: Performed by: INTERNAL MEDICINE

## 2022-01-01 PROCEDURE — 82550 ASSAY OF CK (CPK): CPT | Performed by: EMERGENCY MEDICINE

## 2022-01-01 PROCEDURE — 84295 ASSAY OF SERUM SODIUM: CPT | Performed by: NURSE PRACTITIONER

## 2022-01-01 PROCEDURE — 99232 SBSQ HOSP IP/OBS MODERATE 35: CPT | Mod: GC | Performed by: FAMILY MEDICINE

## 2022-01-01 PROCEDURE — 250N000013 HC RX MED GY IP 250 OP 250 PS 637

## 2022-01-01 PROCEDURE — 96367 TX/PROPH/DG ADDL SEQ IV INF: CPT

## 2022-01-01 PROCEDURE — 99239 HOSP IP/OBS DSCHRG MGMT >30: CPT | Performed by: HOSPITALIST

## 2022-01-01 PROCEDURE — 83735 ASSAY OF MAGNESIUM: CPT | Performed by: FAMILY MEDICINE

## 2022-01-01 PROCEDURE — 250N000013 HC RX MED GY IP 250 OP 250 PS 637: Performed by: INTERNAL MEDICINE

## 2022-01-01 PROCEDURE — 83880 ASSAY OF NATRIURETIC PEPTIDE: CPT | Performed by: EMERGENCY MEDICINE

## 2022-01-01 PROCEDURE — 93010 ELECTROCARDIOGRAM REPORT: CPT | Performed by: INTERNAL MEDICINE

## 2022-01-01 PROCEDURE — 85018 HEMOGLOBIN: CPT

## 2022-01-01 PROCEDURE — 83615 LACTATE (LD) (LDH) ENZYME: CPT | Performed by: FAMILY MEDICINE

## 2022-01-01 PROCEDURE — 92526 ORAL FUNCTION THERAPY: CPT | Mod: GN

## 2022-01-01 PROCEDURE — 250N000013 HC RX MED GY IP 250 OP 250 PS 637: Performed by: STUDENT IN AN ORGANIZED HEALTH CARE EDUCATION/TRAINING PROGRAM

## 2022-01-01 PROCEDURE — 85014 HEMATOCRIT: CPT

## 2022-01-01 PROCEDURE — 82746 ASSAY OF FOLIC ACID SERUM: CPT

## 2022-01-01 PROCEDURE — 36415 COLL VENOUS BLD VENIPUNCTURE: CPT | Performed by: NURSE PRACTITIONER

## 2022-01-01 PROCEDURE — 85379 FIBRIN DEGRADATION QUANT: CPT | Performed by: HOSPITALIST

## 2022-01-01 PROCEDURE — 87635 SARS-COV-2 COVID-19 AMP PRB: CPT | Performed by: EMERGENCY MEDICINE

## 2022-01-01 PROCEDURE — 83735 ASSAY OF MAGNESIUM: CPT | Performed by: STUDENT IN AN ORGANIZED HEALTH CARE EDUCATION/TRAINING PROGRAM

## 2022-01-01 PROCEDURE — 258N000003 HC RX IP 258 OP 636: Performed by: NURSE ANESTHETIST, CERTIFIED REGISTERED

## 2022-01-01 PROCEDURE — C9113 INJ PANTOPRAZOLE SODIUM, VIA: HCPCS | Performed by: INTERNAL MEDICINE

## 2022-01-01 PROCEDURE — 36415 COLL VENOUS BLD VENIPUNCTURE: CPT | Performed by: INTERNAL MEDICINE

## 2022-01-01 PROCEDURE — 250N000011 HC RX IP 250 OP 636

## 2022-01-01 PROCEDURE — 36415 COLL VENOUS BLD VENIPUNCTURE: CPT | Performed by: MASSAGE THERAPIST

## 2022-01-01 PROCEDURE — 250N000013 HC RX MED GY IP 250 OP 250 PS 637: Performed by: FAMILY MEDICINE

## 2022-01-01 PROCEDURE — 96374 THER/PROPH/DIAG INJ IV PUSH: CPT

## 2022-01-01 PROCEDURE — 84295 ASSAY OF SERUM SODIUM: CPT

## 2022-01-01 PROCEDURE — 250N000013 HC RX MED GY IP 250 OP 250 PS 637: Performed by: HOSPITALIST

## 2022-01-01 PROCEDURE — 999N000141 HC STATISTIC PRE-PROCEDURE NURSING ASSESSMENT: Performed by: INTERNAL MEDICINE

## 2022-01-01 PROCEDURE — 36415 COLL VENOUS BLD VENIPUNCTURE: CPT | Performed by: FAMILY MEDICINE

## 2022-01-01 PROCEDURE — 250N000011 HC RX IP 250 OP 636: Performed by: STUDENT IN AN ORGANIZED HEALTH CARE EDUCATION/TRAINING PROGRAM

## 2022-01-01 PROCEDURE — 84295 ASSAY OF SERUM SODIUM: CPT | Performed by: INTERNAL MEDICINE

## 2022-01-01 PROCEDURE — 250N000011 HC RX IP 250 OP 636: Performed by: INTERNAL MEDICINE

## 2022-01-01 PROCEDURE — 85027 COMPLETE CBC AUTOMATED: CPT

## 2022-01-01 PROCEDURE — 72131 CT LUMBAR SPINE W/O DYE: CPT

## 2022-01-01 PROCEDURE — 83935 ASSAY OF URINE OSMOLALITY: CPT

## 2022-01-01 PROCEDURE — 83735 ASSAY OF MAGNESIUM: CPT | Performed by: EMERGENCY MEDICINE

## 2022-01-01 PROCEDURE — 82728 ASSAY OF FERRITIN: CPT | Performed by: STUDENT IN AN ORGANIZED HEALTH CARE EDUCATION/TRAINING PROGRAM

## 2022-01-01 PROCEDURE — 83605 ASSAY OF LACTIC ACID: CPT

## 2022-01-01 PROCEDURE — 250N000011 HC RX IP 250 OP 636: Performed by: EMERGENCY MEDICINE

## 2022-01-01 PROCEDURE — 84484 ASSAY OF TROPONIN QUANT: CPT | Performed by: EMERGENCY MEDICINE

## 2022-01-01 PROCEDURE — 84295 ASSAY OF SERUM SODIUM: CPT | Performed by: STUDENT IN AN ORGANIZED HEALTH CARE EDUCATION/TRAINING PROGRAM

## 2022-01-01 PROCEDURE — A9560 TC99M LABELED RBC: HCPCS | Performed by: FAMILY MEDICINE

## 2022-01-01 PROCEDURE — 87635 SARS-COV-2 COVID-19 AMP PRB: CPT | Performed by: PHYSICIAN ASSISTANT

## 2022-01-01 PROCEDURE — 250N000009 HC RX 250: Performed by: STUDENT IN AN ORGANIZED HEALTH CARE EDUCATION/TRAINING PROGRAM

## 2022-01-01 PROCEDURE — 85007 BL SMEAR W/DIFF WBC COUNT: CPT

## 2022-01-01 PROCEDURE — 99238 HOSP IP/OBS DSCHRG MGMT 30/<: CPT | Mod: GC | Performed by: STUDENT IN AN ORGANIZED HEALTH CARE EDUCATION/TRAINING PROGRAM

## 2022-01-01 PROCEDURE — 87040 BLOOD CULTURE FOR BACTERIA: CPT | Performed by: EMERGENCY MEDICINE

## 2022-01-01 PROCEDURE — 96361 HYDRATE IV INFUSION ADD-ON: CPT

## 2022-01-01 PROCEDURE — 86161 COMPLEMENT/FUNCTION ACTIVITY: CPT | Performed by: INTERNAL MEDICINE

## 2022-01-01 PROCEDURE — 85027 COMPLETE CBC AUTOMATED: CPT | Performed by: STUDENT IN AN ORGANIZED HEALTH CARE EDUCATION/TRAINING PROGRAM

## 2022-01-01 PROCEDURE — 250N000012 HC RX MED GY IP 250 OP 636 PS 637: Performed by: MASSAGE THERAPIST

## 2022-01-01 PROCEDURE — 99232 SBSQ HOSP IP/OBS MODERATE 35: CPT | Performed by: HOSPITALIST

## 2022-01-01 PROCEDURE — 86850 RBC ANTIBODY SCREEN: CPT | Performed by: STUDENT IN AN ORGANIZED HEALTH CARE EDUCATION/TRAINING PROGRAM

## 2022-01-01 PROCEDURE — 99285 EMERGENCY DEPT VISIT HI MDM: CPT | Mod: 25

## 2022-01-01 PROCEDURE — 84484 ASSAY OF TROPONIN QUANT: CPT

## 2022-01-01 PROCEDURE — 72100 X-RAY EXAM L-S SPINE 2/3 VWS: CPT

## 2022-01-01 PROCEDURE — 36415 COLL VENOUS BLD VENIPUNCTURE: CPT | Performed by: EMERGENCY MEDICINE

## 2022-01-01 PROCEDURE — 86901 BLOOD TYPING SEROLOGIC RH(D): CPT | Performed by: EMERGENCY MEDICINE

## 2022-01-01 PROCEDURE — 99232 SBSQ HOSP IP/OBS MODERATE 35: CPT | Mod: GC

## 2022-01-01 PROCEDURE — 258N000003 HC RX IP 258 OP 636: Performed by: INTERNAL MEDICINE

## 2022-01-01 PROCEDURE — 85007 BL SMEAR W/DIFF WBC COUNT: CPT | Performed by: EMERGENCY MEDICINE

## 2022-01-01 PROCEDURE — 80053 COMPREHEN METABOLIC PANEL: CPT | Performed by: PHYSICIAN ASSISTANT

## 2022-01-01 PROCEDURE — 83735 ASSAY OF MAGNESIUM: CPT | Performed by: INTERNAL MEDICINE

## 2022-01-01 PROCEDURE — 84145 PROCALCITONIN (PCT): CPT | Performed by: INTERNAL MEDICINE

## 2022-01-01 PROCEDURE — 97162 PT EVAL MOD COMPLEX 30 MIN: CPT | Mod: GP

## 2022-01-01 PROCEDURE — 97530 THERAPEUTIC ACTIVITIES: CPT | Mod: GP

## 2022-01-01 PROCEDURE — 94660 CPAP INITIATION&MGMT: CPT

## 2022-01-01 PROCEDURE — 258N000003 HC RX IP 258 OP 636: Performed by: FAMILY MEDICINE

## 2022-01-01 PROCEDURE — 250N000013 HC RX MED GY IP 250 OP 250 PS 637: Performed by: MASSAGE THERAPIST

## 2022-01-01 PROCEDURE — 82805 BLOOD GASES W/O2 SATURATION: CPT | Performed by: HOSPITALIST

## 2022-01-01 PROCEDURE — 84300 ASSAY OF URINE SODIUM: CPT | Performed by: HOSPITALIST

## 2022-01-01 PROCEDURE — 82272 OCCULT BLD FECES 1-3 TESTS: CPT | Performed by: EMERGENCY MEDICINE

## 2022-01-01 PROCEDURE — 36415 COLL VENOUS BLD VENIPUNCTURE: CPT

## 2022-01-01 PROCEDURE — 36415 COLL VENOUS BLD VENIPUNCTURE: CPT | Performed by: STUDENT IN AN ORGANIZED HEALTH CARE EDUCATION/TRAINING PROGRAM

## 2022-01-01 PROCEDURE — 82248 BILIRUBIN DIRECT: CPT

## 2022-01-01 PROCEDURE — 250N000011 HC RX IP 250 OP 636: Performed by: FAMILY MEDICINE

## 2022-01-01 PROCEDURE — U0003 INFECTIOUS AGENT DETECTION BY NUCLEIC ACID (DNA OR RNA); SEVERE ACUTE RESPIRATORY SYNDROME CORONAVIRUS 2 (SARS-COV-2) (CORONAVIRUS DISEASE [COVID-19]), AMPLIFIED PROBE TECHNIQUE, MAKING USE OF HIGH THROUGHPUT TECHNOLOGIES AS DESCRIBED BY CMS-2020-01-R: HCPCS | Performed by: EMERGENCY MEDICINE

## 2022-01-01 PROCEDURE — 85610 PROTHROMBIN TIME: CPT | Performed by: EMERGENCY MEDICINE

## 2022-01-01 PROCEDURE — 0DBH8ZX EXCISION OF CECUM, VIA NATURAL OR ARTIFICIAL OPENING ENDOSCOPIC, DIAGNOSTIC: ICD-10-PCS | Performed by: INTERNAL MEDICINE

## 2022-01-01 PROCEDURE — 83605 ASSAY OF LACTIC ACID: CPT | Performed by: EMERGENCY MEDICINE

## 2022-01-01 PROCEDURE — 85027 COMPLETE CBC AUTOMATED: CPT | Performed by: FAMILY MEDICINE

## 2022-01-01 PROCEDURE — 92526 ORAL FUNCTION THERAPY: CPT | Mod: GN | Performed by: REHABILITATION PRACTITIONER

## 2022-01-01 PROCEDURE — 86901 BLOOD TYPING SEROLOGIC RH(D): CPT

## 2022-01-01 PROCEDURE — 96366 THER/PROPH/DIAG IV INF ADDON: CPT

## 2022-01-01 PROCEDURE — 85014 HEMATOCRIT: CPT | Performed by: STUDENT IN AN ORGANIZED HEALTH CARE EDUCATION/TRAINING PROGRAM

## 2022-01-01 PROCEDURE — 86923 COMPATIBILITY TEST ELECTRIC: CPT

## 2022-01-01 PROCEDURE — 86481 TB AG RESPONSE T-CELL SUSP: CPT

## 2022-01-01 PROCEDURE — 93005 ELECTROCARDIOGRAM TRACING: CPT | Performed by: EMERGENCY MEDICINE

## 2022-01-01 PROCEDURE — 81001 URINALYSIS AUTO W/SCOPE: CPT | Performed by: EMERGENCY MEDICINE

## 2022-01-01 PROCEDURE — 250N000013 HC RX MED GY IP 250 OP 250 PS 637: Performed by: EMERGENCY MEDICINE

## 2022-01-01 PROCEDURE — 85018 HEMOGLOBIN: CPT | Performed by: PHYSICIAN ASSISTANT

## 2022-01-01 PROCEDURE — 96360 HYDRATION IV INFUSION INIT: CPT | Mod: 59

## 2022-01-01 PROCEDURE — 99221 1ST HOSP IP/OBS SF/LOW 40: CPT | Mod: AI | Performed by: STUDENT IN AN ORGANIZED HEALTH CARE EDUCATION/TRAINING PROGRAM

## 2022-01-01 PROCEDURE — 80048 BASIC METABOLIC PNL TOTAL CA: CPT | Performed by: NURSE PRACTITIONER

## 2022-01-01 PROCEDURE — 258N000003 HC RX IP 258 OP 636: Performed by: STUDENT IN AN ORGANIZED HEALTH CARE EDUCATION/TRAINING PROGRAM

## 2022-01-01 PROCEDURE — 120N000001 HC R&B MED SURG/OB

## 2022-01-01 PROCEDURE — P9016 RBC LEUKOCYTES REDUCED: HCPCS | Performed by: STUDENT IN AN ORGANIZED HEALTH CARE EDUCATION/TRAINING PROGRAM

## 2022-01-01 PROCEDURE — 99217 PR OBSERVATION CARE DISCHARGE: CPT | Performed by: EMERGENCY MEDICINE

## 2022-01-01 PROCEDURE — 85060 BLOOD SMEAR INTERPRETATION: CPT | Performed by: PATHOLOGY

## 2022-01-01 PROCEDURE — 250N000009 HC RX 250: Performed by: EMERGENCY MEDICINE

## 2022-01-01 PROCEDURE — 84132 ASSAY OF SERUM POTASSIUM: CPT

## 2022-01-01 PROCEDURE — 83735 ASSAY OF MAGNESIUM: CPT

## 2022-01-01 PROCEDURE — 84300 ASSAY OF URINE SODIUM: CPT

## 2022-01-01 PROCEDURE — 99232 SBSQ HOSP IP/OBS MODERATE 35: CPT | Performed by: STUDENT IN AN ORGANIZED HEALTH CARE EDUCATION/TRAINING PROGRAM

## 2022-01-01 PROCEDURE — 99223 1ST HOSP IP/OBS HIGH 75: CPT | Mod: AI | Performed by: STUDENT IN AN ORGANIZED HEALTH CARE EDUCATION/TRAINING PROGRAM

## 2022-01-01 PROCEDURE — 80048 BASIC METABOLIC PNL TOTAL CA: CPT | Performed by: INTERNAL MEDICINE

## 2022-01-01 PROCEDURE — 93005 ELECTROCARDIOGRAM TRACING: CPT

## 2022-01-01 PROCEDURE — 81001 URINALYSIS AUTO W/SCOPE: CPT | Performed by: INTERNAL MEDICINE

## 2022-01-01 PROCEDURE — 99232 SBSQ HOSP IP/OBS MODERATE 35: CPT | Mod: GC | Performed by: STUDENT IN AN ORGANIZED HEALTH CARE EDUCATION/TRAINING PROGRAM

## 2022-01-01 PROCEDURE — 99223 1ST HOSP IP/OBS HIGH 75: CPT | Performed by: INTERNAL MEDICINE

## 2022-01-01 PROCEDURE — 272N000001 HC OR GENERAL SUPPLY STERILE: Performed by: INTERNAL MEDICINE

## 2022-01-01 PROCEDURE — 80048 BASIC METABOLIC PNL TOTAL CA: CPT | Performed by: EMERGENCY MEDICINE

## 2022-01-01 PROCEDURE — 86140 C-REACTIVE PROTEIN: CPT

## 2022-01-01 PROCEDURE — 83605 ASSAY OF LACTIC ACID: CPT | Performed by: FAMILY MEDICINE

## 2022-01-01 PROCEDURE — 250N000009 HC RX 250: Performed by: NURSE ANESTHETIST, CERTIFIED REGISTERED

## 2022-01-01 PROCEDURE — 85025 COMPLETE CBC W/AUTO DIFF WBC: CPT | Performed by: EMERGENCY MEDICINE

## 2022-01-01 PROCEDURE — 36569 INSJ PICC 5 YR+ W/O IMAGING: CPT

## 2022-01-01 PROCEDURE — 71045 X-RAY EXAM CHEST 1 VIEW: CPT

## 2022-01-01 PROCEDURE — 97165 OT EVAL LOW COMPLEX 30 MIN: CPT | Mod: GO

## 2022-01-01 PROCEDURE — 99238 HOSP IP/OBS DSCHRG MGMT 30/<: CPT | Mod: GC | Performed by: MASSAGE THERAPIST

## 2022-01-01 PROCEDURE — 80053 COMPREHEN METABOLIC PANEL: CPT

## 2022-01-01 PROCEDURE — 272N000452 HC KIT SHRLOCK 5FR POWER PICC TRIPLE LUMEN

## 2022-01-01 PROCEDURE — 97110 THERAPEUTIC EXERCISES: CPT | Mod: GO

## 2022-01-01 PROCEDURE — 86850 RBC ANTIBODY SCREEN: CPT

## 2022-01-01 PROCEDURE — 96375 TX/PRO/DX INJ NEW DRUG ADDON: CPT

## 2022-01-01 PROCEDURE — 210N000001 HC R&B IMCU HEART CARE

## 2022-01-01 PROCEDURE — 92611 MOTION FLUOROSCOPY/SWALLOW: CPT | Mod: GN

## 2022-01-01 PROCEDURE — 97161 PT EVAL LOW COMPLEX 20 MIN: CPT | Mod: GP

## 2022-01-01 PROCEDURE — 84145 PROCALCITONIN (PCT): CPT

## 2022-01-01 PROCEDURE — G0463 HOSPITAL OUTPT CLINIC VISIT: HCPCS

## 2022-01-01 PROCEDURE — 74177 CT ABD & PELVIS W/CONTRAST: CPT

## 2022-01-01 PROCEDURE — 86038 ANTINUCLEAR ANTIBODIES: CPT | Performed by: FAMILY MEDICINE

## 2022-01-01 PROCEDURE — 250N000011 HC RX IP 250 OP 636: Performed by: NURSE PRACTITIONER

## 2022-01-01 PROCEDURE — 85014 HEMATOCRIT: CPT | Performed by: INTERNAL MEDICINE

## 2022-01-01 PROCEDURE — 96365 THER/PROPH/DIAG IV INF INIT: CPT | Mod: 59

## 2022-01-01 PROCEDURE — 80053 COMPREHEN METABOLIC PANEL: CPT | Performed by: EMERGENCY MEDICINE

## 2022-01-01 PROCEDURE — 97116 GAIT TRAINING THERAPY: CPT | Mod: GP

## 2022-01-01 PROCEDURE — 82310 ASSAY OF CALCIUM: CPT

## 2022-01-01 PROCEDURE — 87636 SARSCOV2 & INF A&B AMP PRB: CPT | Performed by: EMERGENCY MEDICINE

## 2022-01-01 PROCEDURE — 83690 ASSAY OF LIPASE: CPT | Performed by: EMERGENCY MEDICINE

## 2022-01-01 PROCEDURE — 84132 ASSAY OF SERUM POTASSIUM: CPT | Performed by: INTERNAL MEDICINE

## 2022-01-01 PROCEDURE — 85379 FIBRIN DEGRADATION QUANT: CPT

## 2022-01-01 PROCEDURE — 250N000013 HC RX MED GY IP 250 OP 250 PS 637: Performed by: NURSE PRACTITIONER

## 2022-01-01 PROCEDURE — 85730 THROMBOPLASTIN TIME PARTIAL: CPT | Performed by: EMERGENCY MEDICINE

## 2022-01-01 PROCEDURE — 86160 COMPLEMENT ANTIGEN: CPT | Performed by: INTERNAL MEDICINE

## 2022-01-01 PROCEDURE — 86704 HEP B CORE ANTIBODY TOTAL: CPT | Performed by: MASSAGE THERAPIST

## 2022-01-01 PROCEDURE — 85007 BL SMEAR W/DIFF WBC COUNT: CPT | Performed by: FAMILY MEDICINE

## 2022-01-01 PROCEDURE — 258N000003 HC RX IP 258 OP 636

## 2022-01-01 PROCEDURE — 84132 ASSAY OF SERUM POTASSIUM: CPT | Performed by: FAMILY MEDICINE

## 2022-01-01 PROCEDURE — 82805 BLOOD GASES W/O2 SATURATION: CPT

## 2022-01-01 PROCEDURE — 82248 BILIRUBIN DIRECT: CPT | Performed by: EMERGENCY MEDICINE

## 2022-01-01 PROCEDURE — 84300 ASSAY OF URINE SODIUM: CPT | Performed by: EMERGENCY MEDICINE

## 2022-01-01 PROCEDURE — 96365 THER/PROPH/DIAG IV INF INIT: CPT

## 2022-01-01 PROCEDURE — 80048 BASIC METABOLIC PNL TOTAL CA: CPT | Performed by: STUDENT IN AN ORGANIZED HEALTH CARE EDUCATION/TRAINING PROGRAM

## 2022-01-01 PROCEDURE — 87506 IADNA-DNA/RNA PROBE TQ 6-11: CPT | Performed by: MASSAGE THERAPIST

## 2022-01-01 PROCEDURE — 97535 SELF CARE MNGMENT TRAINING: CPT | Mod: GO

## 2022-01-01 PROCEDURE — 343N000001 HC RX 343: Performed by: FAMILY MEDICINE

## 2022-01-01 PROCEDURE — 99207 PR CDG-CODE CATEGORY CHANGED: CPT | Performed by: EMERGENCY MEDICINE

## 2022-01-01 PROCEDURE — 999N000157 HC STATISTIC RCP TIME EA 10 MIN

## 2022-01-01 PROCEDURE — 96360 HYDRATION IV INFUSION INIT: CPT

## 2022-01-01 PROCEDURE — 999N000127 HC STATISTIC PERIPHERAL IV START W US GUIDANCE

## 2022-01-01 PROCEDURE — 83516 IMMUNOASSAY NONANTIBODY: CPT | Performed by: INTERNAL MEDICINE

## 2022-01-01 PROCEDURE — 370N000017 HC ANESTHESIA TECHNICAL FEE, PER MIN: Performed by: INTERNAL MEDICINE

## 2022-01-01 PROCEDURE — 0DBK8ZX EXCISION OF ASCENDING COLON, VIA NATURAL OR ARTIFICIAL OPENING ENDOSCOPIC, DIAGNOSTIC: ICD-10-PCS | Performed by: INTERNAL MEDICINE

## 2022-01-01 PROCEDURE — 93005 ELECTROCARDIOGRAM TRACING: CPT | Performed by: PHYSICIAN ASSISTANT

## 2022-01-01 PROCEDURE — 81003 URINALYSIS AUTO W/O SCOPE: CPT | Performed by: EMERGENCY MEDICINE

## 2022-01-01 PROCEDURE — C9113 INJ PANTOPRAZOLE SODIUM, VIA: HCPCS | Performed by: EMERGENCY MEDICINE

## 2022-01-01 PROCEDURE — 82374 ASSAY BLOOD CARBON DIOXIDE: CPT

## 2022-01-01 PROCEDURE — 99233 SBSQ HOSP IP/OBS HIGH 50: CPT | Performed by: INTERNAL MEDICINE

## 2022-01-01 PROCEDURE — 80053 COMPREHEN METABOLIC PANEL: CPT | Performed by: STUDENT IN AN ORGANIZED HEALTH CARE EDUCATION/TRAINING PROGRAM

## 2022-01-01 PROCEDURE — 85045 AUTOMATED RETICULOCYTE COUNT: CPT | Performed by: MASSAGE THERAPIST

## 2022-01-01 PROCEDURE — 250N000011 HC RX IP 250 OP 636: Performed by: NURSE ANESTHETIST, CERTIFIED REGISTERED

## 2022-01-01 PROCEDURE — 97530 THERAPEUTIC ACTIVITIES: CPT | Mod: GO

## 2022-01-01 PROCEDURE — 80053 COMPREHEN METABOLIC PANEL: CPT | Performed by: INTERNAL MEDICINE

## 2022-01-01 PROCEDURE — 999N000248 HC STATISTIC IV INSERT WITH US BY RN

## 2022-01-01 PROCEDURE — 258N000003 HC RX IP 258 OP 636: Performed by: HOSPITALIST

## 2022-01-01 PROCEDURE — 86200 CCP ANTIBODY: CPT | Performed by: MASSAGE THERAPIST

## 2022-01-01 PROCEDURE — 99239 HOSP IP/OBS DSCHRG MGMT >30: CPT | Performed by: INTERNAL MEDICINE

## 2022-01-01 PROCEDURE — P9016 RBC LEUKOCYTES REDUCED: HCPCS

## 2022-01-01 PROCEDURE — 84132 ASSAY OF SERUM POTASSIUM: CPT | Performed by: STUDENT IN AN ORGANIZED HEALTH CARE EDUCATION/TRAINING PROGRAM

## 2022-01-01 PROCEDURE — 250N000011 HC RX IP 250 OP 636: Performed by: PHYSICIAN ASSISTANT

## 2022-01-01 PROCEDURE — 258N000003 HC RX IP 258 OP 636: Performed by: ANESTHESIOLOGY

## 2022-01-01 PROCEDURE — 99238 HOSP IP/OBS DSCHRG MGMT 30/<: CPT | Mod: GC | Performed by: FAMILY MEDICINE

## 2022-01-01 PROCEDURE — 82784 ASSAY IGA/IGD/IGG/IGM EACH: CPT

## 2022-01-01 PROCEDURE — 84300 ASSAY OF URINE SODIUM: CPT | Performed by: STUDENT IN AN ORGANIZED HEALTH CARE EDUCATION/TRAINING PROGRAM

## 2022-01-01 PROCEDURE — C9113 INJ PANTOPRAZOLE SODIUM, VIA: HCPCS | Performed by: FAMILY MEDICINE

## 2022-01-01 PROCEDURE — 84443 ASSAY THYROID STIM HORMONE: CPT | Performed by: EMERGENCY MEDICINE

## 2022-01-01 PROCEDURE — 99233 SBSQ HOSP IP/OBS HIGH 50: CPT | Mod: GC

## 2022-01-01 PROCEDURE — 85379 FIBRIN DEGRADATION QUANT: CPT | Performed by: EMERGENCY MEDICINE

## 2022-01-01 PROCEDURE — C9803 HOPD COVID-19 SPEC COLLECT: HCPCS

## 2022-01-01 PROCEDURE — 258N000001 HC RX 258: Performed by: EMERGENCY MEDICINE

## 2022-01-01 PROCEDURE — 83550 IRON BINDING TEST: CPT | Performed by: STUDENT IN AN ORGANIZED HEALTH CARE EDUCATION/TRAINING PROGRAM

## 2022-01-01 PROCEDURE — 83935 ASSAY OF URINE OSMOLALITY: CPT | Performed by: HOSPITALIST

## 2022-01-01 PROCEDURE — 85027 COMPLETE CBC AUTOMATED: CPT | Performed by: PHYSICIAN ASSISTANT

## 2022-01-01 PROCEDURE — 87637 SARSCOV2&INF A&B&RSV AMP PRB: CPT | Performed by: EMERGENCY MEDICINE

## 2022-01-01 PROCEDURE — 83690 ASSAY OF LIPASE: CPT | Performed by: PHYSICIAN ASSISTANT

## 2022-01-01 PROCEDURE — 99214 OFFICE O/P EST MOD 30 MIN: CPT | Performed by: NURSE PRACTITIONER

## 2022-01-01 PROCEDURE — 250N000013 HC RX MED GY IP 250 OP 250 PS 637: Performed by: PHYSICIAN ASSISTANT

## 2022-01-01 PROCEDURE — 87641 MR-STAPH DNA AMP PROBE: CPT | Performed by: FAMILY MEDICINE

## 2022-01-01 PROCEDURE — 73560 X-RAY EXAM OF KNEE 1 OR 2: CPT | Mod: LT

## 2022-01-01 PROCEDURE — 84443 ASSAY THYROID STIM HORMONE: CPT | Performed by: MASSAGE THERAPIST

## 2022-01-01 PROCEDURE — 71275 CT ANGIOGRAPHY CHEST: CPT

## 2022-01-01 PROCEDURE — 84480 ASSAY TRIIODOTHYRONINE (T3): CPT | Performed by: STUDENT IN AN ORGANIZED HEALTH CARE EDUCATION/TRAINING PROGRAM

## 2022-01-01 PROCEDURE — 85379 FIBRIN DEGRADATION QUANT: CPT | Performed by: STUDENT IN AN ORGANIZED HEALTH CARE EDUCATION/TRAINING PROGRAM

## 2022-01-01 PROCEDURE — 84165 PROTEIN E-PHORESIS SERUM: CPT | Mod: TC

## 2022-01-01 PROCEDURE — 250N000009 HC RX 250: Performed by: INTERNAL MEDICINE

## 2022-01-01 PROCEDURE — 82310 ASSAY OF CALCIUM: CPT | Performed by: NURSE PRACTITIONER

## 2022-01-01 PROCEDURE — 120N000004 HC R&B MS OVERFLOW

## 2022-01-01 PROCEDURE — 71046 X-RAY EXAM CHEST 2 VIEWS: CPT

## 2022-01-01 PROCEDURE — 86850 RBC ANTIBODY SCREEN: CPT | Performed by: EMERGENCY MEDICINE

## 2022-01-01 PROCEDURE — 99223 1ST HOSP IP/OBS HIGH 75: CPT | Mod: AI | Performed by: FAMILY MEDICINE

## 2022-01-01 PROCEDURE — 120N000013 HC R&B IMCU

## 2022-01-01 PROCEDURE — 83735 ASSAY OF MAGNESIUM: CPT | Performed by: PHYSICIAN ASSISTANT

## 2022-01-01 PROCEDURE — 250N000011 HC RX IP 250 OP 636: Performed by: HOSPITALIST

## 2022-01-01 PROCEDURE — 99232 SBSQ HOSP IP/OBS MODERATE 35: CPT | Mod: GC | Performed by: MASSAGE THERAPIST

## 2022-01-01 PROCEDURE — 87493 C DIFF AMPLIFIED PROBE: CPT

## 2022-01-01 PROCEDURE — 70450 CT HEAD/BRAIN W/O DYE: CPT

## 2022-01-01 PROCEDURE — 250N000009 HC RX 250: Performed by: FAMILY MEDICINE

## 2022-01-01 PROCEDURE — 87086 URINE CULTURE/COLONY COUNT: CPT | Performed by: EMERGENCY MEDICINE

## 2022-01-01 PROCEDURE — 85027 COMPLETE CBC AUTOMATED: CPT | Performed by: HOSPITALIST

## 2022-01-01 PROCEDURE — 80048 BASIC METABOLIC PNL TOTAL CA: CPT | Performed by: FAMILY MEDICINE

## 2022-01-01 PROCEDURE — 83930 ASSAY OF BLOOD OSMOLALITY: CPT | Performed by: STUDENT IN AN ORGANIZED HEALTH CARE EDUCATION/TRAINING PROGRAM

## 2022-01-01 PROCEDURE — 84145 PROCALCITONIN (PCT): CPT | Performed by: EMERGENCY MEDICINE

## 2022-01-01 PROCEDURE — 85018 HEMOGLOBIN: CPT | Performed by: INTERNAL MEDICINE

## 2022-01-01 PROCEDURE — 99238 HOSP IP/OBS DSCHRG MGMT 30/<: CPT | Mod: GC

## 2022-01-01 PROCEDURE — 85025 COMPLETE CBC W/AUTO DIFF WBC: CPT | Performed by: STUDENT IN AN ORGANIZED HEALTH CARE EDUCATION/TRAINING PROGRAM

## 2022-01-01 PROCEDURE — 92610 EVALUATE SWALLOWING FUNCTION: CPT | Mod: GN

## 2022-01-01 PROCEDURE — 999N000285 HC STATISTIC VASC ACCESS LAB DRAW WITH PIV START

## 2022-01-01 PROCEDURE — 36415 COLL VENOUS BLD VENIPUNCTURE: CPT | Performed by: HOSPITALIST

## 2022-01-01 PROCEDURE — 82657 ENZYME CELL ACTIVITY: CPT | Performed by: INTERNAL MEDICINE

## 2022-01-01 PROCEDURE — 86923 COMPATIBILITY TEST ELECTRIC: CPT | Performed by: STUDENT IN AN ORGANIZED HEALTH CARE EDUCATION/TRAINING PROGRAM

## 2022-01-01 PROCEDURE — 85018 HEMOGLOBIN: CPT | Performed by: FAMILY MEDICINE

## 2022-01-01 PROCEDURE — 86140 C-REACTIVE PROTEIN: CPT | Performed by: EMERGENCY MEDICINE

## 2022-01-01 PROCEDURE — 36569 INSJ PICC 5 YR+ W/O IMAGING: CPT | Mod: 52

## 2022-01-01 PROCEDURE — 258N000003 HC RX IP 258 OP 636: Performed by: EMERGENCY MEDICINE

## 2022-01-01 PROCEDURE — 86334 IMMUNOFIX E-PHORESIS SERUM: CPT

## 2022-01-01 PROCEDURE — 83930 ASSAY OF BLOOD OSMOLALITY: CPT

## 2022-01-01 PROCEDURE — 82310 ASSAY OF CALCIUM: CPT | Performed by: INTERNAL MEDICINE

## 2022-01-01 PROCEDURE — 88305 TISSUE EXAM BY PATHOLOGIST: CPT | Mod: 26 | Performed by: PATHOLOGY

## 2022-01-01 PROCEDURE — 84132 ASSAY OF SERUM POTASSIUM: CPT | Performed by: EMERGENCY MEDICINE

## 2022-01-01 PROCEDURE — 250N000009 HC RX 250

## 2022-01-01 PROCEDURE — 84165 PROTEIN E-PHORESIS SERUM: CPT | Mod: 26 | Performed by: PATHOLOGY

## 2022-01-01 PROCEDURE — 71250 CT THORAX DX C-: CPT

## 2022-01-01 PROCEDURE — 82607 VITAMIN B-12: CPT

## 2022-01-01 PROCEDURE — 99285 EMERGENCY DEPT VISIT HI MDM: CPT

## 2022-01-01 PROCEDURE — 83993 ASSAY FOR CALPROTECTIN FECAL: CPT | Performed by: MASSAGE THERAPIST

## 2022-01-01 PROCEDURE — 74019 RADEX ABDOMEN 2 VIEWS: CPT

## 2022-01-01 PROCEDURE — 84155 ASSAY OF PROTEIN SERUM: CPT

## 2022-01-01 PROCEDURE — 99223 1ST HOSP IP/OBS HIGH 75: CPT | Performed by: HOSPITALIST

## 2022-01-01 PROCEDURE — 96368 THER/DIAG CONCURRENT INF: CPT

## 2022-01-01 PROCEDURE — 83605 ASSAY OF LACTIC ACID: CPT | Performed by: STUDENT IN AN ORGANIZED HEALTH CARE EDUCATION/TRAINING PROGRAM

## 2022-01-01 PROCEDURE — 76536 US EXAM OF HEAD AND NECK: CPT

## 2022-01-01 PROCEDURE — 80202 ASSAY OF VANCOMYCIN: CPT | Performed by: INTERNAL MEDICINE

## 2022-01-01 PROCEDURE — XW033E5 INTRODUCTION OF REMDESIVIR ANTI-INFECTIVE INTO PERIPHERAL VEIN, PERCUTANEOUS APPROACH, NEW TECHNOLOGY GROUP 5: ICD-10-PCS | Performed by: FAMILY MEDICINE

## 2022-01-01 PROCEDURE — 86803 HEPATITIS C AB TEST: CPT | Performed by: MASSAGE THERAPIST

## 2022-01-01 PROCEDURE — 83935 ASSAY OF URINE OSMOLALITY: CPT | Performed by: STUDENT IN AN ORGANIZED HEALTH CARE EDUCATION/TRAINING PROGRAM

## 2022-01-01 PROCEDURE — 84145 PROCALCITONIN (PCT): CPT | Performed by: STUDENT IN AN ORGANIZED HEALTH CARE EDUCATION/TRAINING PROGRAM

## 2022-01-01 PROCEDURE — 99223 1ST HOSP IP/OBS HIGH 75: CPT | Performed by: EMERGENCY MEDICINE

## 2022-01-01 PROCEDURE — 83935 ASSAY OF URINE OSMOLALITY: CPT | Performed by: EMERGENCY MEDICINE

## 2022-01-01 PROCEDURE — 0DB98ZX EXCISION OF DUODENUM, VIA NATURAL OR ARTIFICIAL OPENING ENDOSCOPIC, DIAGNOSTIC: ICD-10-PCS | Performed by: INTERNAL MEDICINE

## 2022-01-01 PROCEDURE — 80202 ASSAY OF VANCOMYCIN: CPT | Performed by: FAMILY MEDICINE

## 2022-01-01 PROCEDURE — 87493 C DIFF AMPLIFIED PROBE: CPT | Performed by: INTERNAL MEDICINE

## 2022-01-01 PROCEDURE — G0378 HOSPITAL OBSERVATION PER HR: HCPCS

## 2022-01-01 PROCEDURE — 86334 IMMUNOFIX E-PHORESIS SERUM: CPT | Mod: 26 | Performed by: PATHOLOGY

## 2022-01-01 PROCEDURE — 51798 US URINE CAPACITY MEASURE: CPT

## 2022-01-01 PROCEDURE — 83880 ASSAY OF NATRIURETIC PEPTIDE: CPT | Performed by: PHYSICIAN ASSISTANT

## 2022-01-01 PROCEDURE — 85045 AUTOMATED RETICULOCYTE COUNT: CPT | Performed by: FAMILY MEDICINE

## 2022-01-01 PROCEDURE — 88342 IMHCHEM/IMCYTCHM 1ST ANTB: CPT | Mod: 26 | Performed by: PATHOLOGY

## 2022-01-01 PROCEDURE — 87340 HEPATITIS B SURFACE AG IA: CPT | Performed by: INTERNAL MEDICINE

## 2022-01-01 PROCEDURE — 82310 ASSAY OF CALCIUM: CPT | Performed by: EMERGENCY MEDICINE

## 2022-01-01 PROCEDURE — 76705 ECHO EXAM OF ABDOMEN: CPT

## 2022-01-01 PROCEDURE — 83930 ASSAY OF BLOOD OSMOLALITY: CPT | Performed by: EMERGENCY MEDICINE

## 2022-01-01 PROCEDURE — 0DB68ZX EXCISION OF STOMACH, VIA NATURAL OR ARTIFICIAL OPENING ENDOSCOPIC, DIAGNOSTIC: ICD-10-PCS | Performed by: INTERNAL MEDICINE

## 2022-01-01 PROCEDURE — 200N000001 HC R&B ICU

## 2022-01-01 PROCEDURE — 83735 ASSAY OF MAGNESIUM: CPT | Performed by: NURSE PRACTITIONER

## 2022-01-01 PROCEDURE — 99223 1ST HOSP IP/OBS HIGH 75: CPT | Mod: AI

## 2022-01-01 PROCEDURE — 83010 ASSAY OF HAPTOGLOBIN QUANT: CPT | Performed by: FAMILY MEDICINE

## 2022-01-01 PROCEDURE — 99222 1ST HOSP IP/OBS MODERATE 55: CPT | Mod: AI

## 2022-01-01 PROCEDURE — 272N000451 HC KIT SHRLOCK 5FR POWER PICC DOUBLE LUMEN

## 2022-01-01 PROCEDURE — 84100 ASSAY OF PHOSPHORUS: CPT | Performed by: STUDENT IN AN ORGANIZED HEALTH CARE EDUCATION/TRAINING PROGRAM

## 2022-01-01 PROCEDURE — 88305 TISSUE EXAM BY PATHOLOGIST: CPT | Mod: TC | Performed by: INTERNAL MEDICINE

## 2022-01-01 PROCEDURE — 99205 OFFICE O/P NEW HI 60 MIN: CPT | Mod: 95 | Performed by: INTERNAL MEDICINE

## 2022-01-01 PROCEDURE — 84484 ASSAY OF TROPONIN QUANT: CPT | Performed by: PHYSICIAN ASSISTANT

## 2022-01-01 PROCEDURE — 85027 COMPLETE CBC AUTOMATED: CPT | Performed by: MASSAGE THERAPIST

## 2022-01-01 PROCEDURE — 82607 VITAMIN B-12: CPT | Performed by: STUDENT IN AN ORGANIZED HEALTH CARE EDUCATION/TRAINING PROGRAM

## 2022-01-01 PROCEDURE — 88312 SPECIAL STAINS GROUP 1: CPT | Mod: 26 | Performed by: PATHOLOGY

## 2022-01-01 PROCEDURE — 82607 VITAMIN B-12: CPT | Performed by: INTERNAL MEDICINE

## 2022-01-01 PROCEDURE — 97110 THERAPEUTIC EXERCISES: CPT | Mod: GP

## 2022-01-01 PROCEDURE — 92526 ORAL FUNCTION THERAPY: CPT | Mod: GN | Performed by: SPEECH-LANGUAGE PATHOLOGIST

## 2022-01-01 PROCEDURE — 87522 HEPATITIS C REVRS TRNSCRPJ: CPT | Performed by: FAMILY MEDICINE

## 2022-01-01 PROCEDURE — 999N000157 HC STATISTIC RCP TIME EA 10 MIN: Performed by: PEDIATRICS

## 2022-01-01 PROCEDURE — 87635 SARS-COV-2 COVID-19 AMP PRB: CPT | Performed by: MASSAGE THERAPIST

## 2022-01-01 PROCEDURE — 99605 MTMS BY PHARM NP 15 MIN: CPT | Performed by: PHARMACIST

## 2022-01-01 PROCEDURE — 80048 BASIC METABOLIC PNL TOTAL CA: CPT

## 2022-01-01 PROCEDURE — 86481 TB AG RESPONSE T-CELL SUSP: CPT | Performed by: INTERNAL MEDICINE

## 2022-01-01 PROCEDURE — 81001 URINALYSIS AUTO W/SCOPE: CPT | Performed by: STUDENT IN AN ORGANIZED HEALTH CARE EDUCATION/TRAINING PROGRAM

## 2022-01-01 PROCEDURE — 99214 OFFICE O/P EST MOD 30 MIN: CPT | Performed by: PHYSICIAN ASSISTANT

## 2022-01-01 PROCEDURE — 87389 HIV-1 AG W/HIV-1&-2 AB AG IA: CPT | Performed by: FAMILY MEDICINE

## 2022-01-01 PROCEDURE — 258N000001 HC RX 258

## 2022-01-01 PROCEDURE — 83605 ASSAY OF LACTIC ACID: CPT | Performed by: INTERNAL MEDICINE

## 2022-01-01 PROCEDURE — 82040 ASSAY OF SERUM ALBUMIN: CPT

## 2022-01-01 PROCEDURE — 84436 ASSAY OF TOTAL THYROXINE: CPT | Performed by: STUDENT IN AN ORGANIZED HEALTH CARE EDUCATION/TRAINING PROGRAM

## 2022-01-01 PROCEDURE — 93005 ELECTROCARDIOGRAM TRACING: CPT | Performed by: STUDENT IN AN ORGANIZED HEALTH CARE EDUCATION/TRAINING PROGRAM

## 2022-01-01 PROCEDURE — 82272 OCCULT BLD FECES 1-3 TESTS: CPT | Performed by: INTERNAL MEDICINE

## 2022-01-01 PROCEDURE — 80048 BASIC METABOLIC PNL TOTAL CA: CPT | Performed by: HOSPITALIST

## 2022-01-01 PROCEDURE — 80069 RENAL FUNCTION PANEL: CPT | Performed by: INTERNAL MEDICINE

## 2022-01-01 PROCEDURE — 78278 ACUTE GI BLOOD LOSS IMAGING: CPT

## 2022-01-01 PROCEDURE — 93970 EXTREMITY STUDY: CPT

## 2022-01-01 PROCEDURE — 85027 COMPLETE CBC AUTOMATED: CPT | Performed by: INTERNAL MEDICINE

## 2022-01-01 PROCEDURE — 83615 LACTATE (LD) (LDH) ENZYME: CPT

## 2022-01-01 PROCEDURE — 93306 TTE W/DOPPLER COMPLETE: CPT | Mod: 26 | Performed by: INTERNAL MEDICINE

## 2022-01-01 PROCEDURE — 83880 ASSAY OF NATRIURETIC PEPTIDE: CPT

## 2022-01-01 PROCEDURE — 96374 THER/PROPH/DIAG INJ IV PUSH: CPT | Mod: 59

## 2022-01-01 PROCEDURE — 250N000012 HC RX MED GY IP 250 OP 636 PS 637

## 2022-01-01 PROCEDURE — 84484 ASSAY OF TROPONIN QUANT: CPT | Performed by: HOSPITALIST

## 2022-01-01 PROCEDURE — 255N000002 HC RX 255 OP 636: Performed by: FAMILY MEDICINE

## 2022-01-01 PROCEDURE — 99221 1ST HOSP IP/OBS SF/LOW 40: CPT | Mod: AI

## 2022-01-01 PROCEDURE — 85018 HEMOGLOBIN: CPT | Performed by: STUDENT IN AN ORGANIZED HEALTH CARE EDUCATION/TRAINING PROGRAM

## 2022-01-01 PROCEDURE — 83036 HEMOGLOBIN GLYCOSYLATED A1C: CPT | Performed by: EMERGENCY MEDICINE

## 2022-01-01 PROCEDURE — 250N000009 HC RX 250: Performed by: ANESTHESIOLOGY

## 2022-01-01 PROCEDURE — 99205 OFFICE O/P NEW HI 60 MIN: CPT | Performed by: NURSE PRACTITIONER

## 2022-01-01 PROCEDURE — 82565 ASSAY OF CREATININE: CPT | Performed by: INTERNAL MEDICINE

## 2022-01-01 PROCEDURE — 74230 X-RAY XM SWLNG FUNCJ C+: CPT

## 2022-01-01 PROCEDURE — 84450 TRANSFERASE (AST) (SGOT): CPT

## 2022-01-01 PROCEDURE — 84443 ASSAY THYROID STIM HORMONE: CPT | Performed by: INTERNAL MEDICINE

## 2022-01-01 PROCEDURE — 85018 HEMOGLOBIN: CPT | Performed by: MASSAGE THERAPIST

## 2022-01-01 PROCEDURE — 84484 ASSAY OF TROPONIN QUANT: CPT | Performed by: FAMILY MEDICINE

## 2022-01-01 PROCEDURE — 99215 OFFICE O/P EST HI 40 MIN: CPT | Performed by: NURSE PRACTITIONER

## 2022-01-01 PROCEDURE — 94640 AIRWAY INHALATION TREATMENT: CPT

## 2022-01-01 PROCEDURE — 97166 OT EVAL MOD COMPLEX 45 MIN: CPT | Mod: GO

## 2022-01-01 PROCEDURE — 5A09357 ASSISTANCE WITH RESPIRATORY VENTILATION, LESS THAN 24 CONSECUTIVE HOURS, CONTINUOUS POSITIVE AIRWAY PRESSURE: ICD-10-PCS | Performed by: FAMILY MEDICINE

## 2022-01-01 PROCEDURE — 86431 RHEUMATOID FACTOR QUANT: CPT | Performed by: FAMILY MEDICINE

## 2022-01-01 PROCEDURE — 82374 ASSAY BLOOD CARBON DIOXIDE: CPT | Performed by: INTERNAL MEDICINE

## 2022-01-01 PROCEDURE — 86901 BLOOD TYPING SEROLOGIC RH(D): CPT | Performed by: STUDENT IN AN ORGANIZED HEALTH CARE EDUCATION/TRAINING PROGRAM

## 2022-01-01 PROCEDURE — 82746 ASSAY OF FOLIC ACID SERUM: CPT | Performed by: STUDENT IN AN ORGANIZED HEALTH CARE EDUCATION/TRAINING PROGRAM

## 2022-01-01 PROCEDURE — 82728 ASSAY OF FERRITIN: CPT

## 2022-01-01 PROCEDURE — 87506 IADNA-DNA/RNA PROBE TQ 6-11: CPT | Performed by: INTERNAL MEDICINE

## 2022-01-01 PROCEDURE — 93306 TTE W/DOPPLER COMPLETE: CPT

## 2022-01-01 PROCEDURE — 86140 C-REACTIVE PROTEIN: CPT | Performed by: FAMILY MEDICINE

## 2022-01-01 PROCEDURE — 94799 UNLISTED PULMONARY SVC/PX: CPT | Performed by: PEDIATRICS

## 2022-01-01 PROCEDURE — 83735 ASSAY OF MAGNESIUM: CPT | Performed by: HOSPITALIST

## 2022-01-01 PROCEDURE — 250N000012 HC RX MED GY IP 250 OP 636 PS 637: Performed by: EMERGENCY MEDICINE

## 2022-01-01 PROCEDURE — 99291 CRITICAL CARE FIRST HOUR: CPT | Mod: GC

## 2022-01-01 PROCEDURE — 84443 ASSAY THYROID STIM HORMONE: CPT

## 2022-01-01 PROCEDURE — 999N000208 ECHOCARDIOGRAM COMPLETE

## 2022-01-01 PROCEDURE — 93971 EXTREMITY STUDY: CPT | Mod: LT

## 2022-01-01 PROCEDURE — 82533 TOTAL CORTISOL: CPT | Performed by: INTERNAL MEDICINE

## 2022-01-01 PROCEDURE — 84484 ASSAY OF TROPONIN QUANT: CPT | Performed by: STUDENT IN AN ORGANIZED HEALTH CARE EDUCATION/TRAINING PROGRAM

## 2022-01-01 PROCEDURE — 0DBM8ZX EXCISION OF DESCENDING COLON, VIA NATURAL OR ARTIFICIAL OPENING ENDOSCOPIC, DIAGNOSTIC: ICD-10-PCS | Performed by: INTERNAL MEDICINE

## 2022-01-01 PROCEDURE — 87902 NFCT AGT GNTYP ALYS HEP C: CPT | Performed by: FAMILY MEDICINE

## 2022-01-01 PROCEDURE — 81003 URINALYSIS AUTO W/O SCOPE: CPT | Performed by: PHYSICIAN ASSISTANT

## 2022-01-01 PROCEDURE — 80048 BASIC METABOLIC PNL TOTAL CA: CPT | Performed by: MASSAGE THERAPIST

## 2022-01-01 PROCEDURE — 99222 1ST HOSP IP/OBS MODERATE 55: CPT | Performed by: PHYSICIAN ASSISTANT

## 2022-01-01 RX ORDER — FUROSEMIDE 20 MG
20 TABLET ORAL DAILY
Start: 2022-01-01 | End: 2022-01-01

## 2022-01-01 RX ORDER — POTASSIUM CHLORIDE 1.5 G/1.58G
20 POWDER, FOR SOLUTION ORAL ONCE
Status: COMPLETED | OUTPATIENT
Start: 2022-01-01 | End: 2022-01-01

## 2022-01-01 RX ORDER — POTASSIUM CHLORIDE 7.45 MG/ML
10 INJECTION INTRAVENOUS
Status: COMPLETED | OUTPATIENT
Start: 2022-01-01 | End: 2022-01-01

## 2022-01-01 RX ORDER — DEXTROSE MONOHYDRATE 25 G/50ML
25-50 INJECTION, SOLUTION INTRAVENOUS
Status: DISCONTINUED | OUTPATIENT
Start: 2022-01-01 | End: 2022-01-01

## 2022-01-01 RX ORDER — METOPROLOL SUCCINATE 25 MG/1
50 TABLET, EXTENDED RELEASE ORAL 2 TIMES DAILY
Status: DISCONTINUED | OUTPATIENT
Start: 2022-01-01 | End: 2022-01-01 | Stop reason: HOSPADM

## 2022-01-01 RX ORDER — ONDANSETRON 2 MG/ML
4 INJECTION INTRAMUSCULAR; INTRAVENOUS EVERY 6 HOURS PRN
Status: DISCONTINUED | OUTPATIENT
Start: 2022-01-01 | End: 2022-01-01 | Stop reason: HOSPADM

## 2022-01-01 RX ORDER — POTASSIUM CHLORIDE 750 MG/1
10 TABLET, EXTENDED RELEASE ORAL DAILY
Status: DISCONTINUED | OUTPATIENT
Start: 2022-01-01 | End: 2022-01-01 | Stop reason: HOSPADM

## 2022-01-01 RX ORDER — PANTOPRAZOLE SODIUM 40 MG/1
40 TABLET, DELAYED RELEASE ORAL DAILY
Status: DISCONTINUED | OUTPATIENT
Start: 2022-01-01 | End: 2022-01-01 | Stop reason: HOSPADM

## 2022-01-01 RX ORDER — HYDROXYZINE HYDROCHLORIDE 25 MG/1
25 TABLET, FILM COATED ORAL EVERY 6 HOURS PRN
Status: DISCONTINUED | OUTPATIENT
Start: 2022-01-01 | End: 2022-01-01 | Stop reason: HOSPADM

## 2022-01-01 RX ORDER — POTASSIUM CHLORIDE 1500 MG/1
40 TABLET, EXTENDED RELEASE ORAL ONCE
Status: COMPLETED | OUTPATIENT
Start: 2022-01-01 | End: 2022-01-01

## 2022-01-01 RX ORDER — FUROSEMIDE 20 MG
20 TABLET ORAL DAILY
Status: DISCONTINUED | OUTPATIENT
Start: 2022-01-01 | End: 2022-01-01 | Stop reason: HOSPADM

## 2022-01-01 RX ORDER — POLYETHYLENE GLYCOL 3350 17 G/17G
238 POWDER, FOR SOLUTION ORAL ONCE
Status: DISCONTINUED | OUTPATIENT
Start: 2022-01-01 | End: 2022-01-01

## 2022-01-01 RX ORDER — POTASSIUM CHLORIDE 1500 MG/1
20 TABLET, EXTENDED RELEASE ORAL ONCE
Status: COMPLETED | OUTPATIENT
Start: 2022-01-01 | End: 2022-01-01

## 2022-01-01 RX ORDER — FUROSEMIDE 10 MG/ML
10 INJECTION INTRAMUSCULAR; INTRAVENOUS ONCE
Status: COMPLETED | OUTPATIENT
Start: 2022-01-01 | End: 2022-01-01

## 2022-01-01 RX ORDER — SIMETHICONE 80 MG
80 TABLET,CHEWABLE ORAL EVERY 6 HOURS PRN
Status: DISCONTINUED | OUTPATIENT
Start: 2022-01-01 | End: 2022-01-01 | Stop reason: HOSPADM

## 2022-01-01 RX ORDER — ACETAMINOPHEN 500 MG
500-1000 TABLET ORAL EVERY 8 HOURS PRN
Status: DISCONTINUED | OUTPATIENT
Start: 2022-01-01 | End: 2022-01-01

## 2022-01-01 RX ORDER — MAGNESIUM SULFATE HEPTAHYDRATE 40 MG/ML
2 INJECTION, SOLUTION INTRAVENOUS ONCE
Status: COMPLETED | OUTPATIENT
Start: 2022-01-01 | End: 2022-01-01

## 2022-01-01 RX ORDER — LORAZEPAM 0.5 MG/1
.5-1 TABLET ORAL 3 TIMES DAILY
COMMUNITY

## 2022-01-01 RX ORDER — LIDOCAINE HYDROCHLORIDE 20 MG/ML
INJECTION, SOLUTION INFILTRATION; PERINEURAL PRN
Status: DISCONTINUED | OUTPATIENT
Start: 2022-01-01 | End: 2022-01-01

## 2022-01-01 RX ORDER — ONDANSETRON 4 MG/1
4 TABLET, ORALLY DISINTEGRATING ORAL EVERY 6 HOURS PRN
Status: DISCONTINUED | OUTPATIENT
Start: 2022-01-01 | End: 2022-01-01 | Stop reason: HOSPADM

## 2022-01-01 RX ORDER — CALCIUM CARBONATE/VITAMIN D3 600 MG-10
250 TABLET ORAL DAILY
Status: DISCONTINUED | OUTPATIENT
Start: 2022-01-01 | End: 2022-01-01 | Stop reason: HOSPADM

## 2022-01-01 RX ORDER — ONDANSETRON 2 MG/ML
4 INJECTION INTRAMUSCULAR; INTRAVENOUS EVERY 30 MIN PRN
Status: DISCONTINUED | OUTPATIENT
Start: 2022-01-01 | End: 2022-01-01 | Stop reason: HOSPADM

## 2022-01-01 RX ORDER — AMOXICILLIN 250 MG
2 CAPSULE ORAL 2 TIMES DAILY PRN
Status: DISCONTINUED | OUTPATIENT
Start: 2022-01-01 | End: 2022-01-01

## 2022-01-01 RX ORDER — FENTANYL CITRATE 50 UG/ML
25 INJECTION, SOLUTION INTRAMUSCULAR; INTRAVENOUS
Status: CANCELLED | OUTPATIENT
Start: 2022-01-01

## 2022-01-01 RX ORDER — FUROSEMIDE 20 MG
20 TABLET ORAL DAILY PRN
COMMUNITY
End: 2022-01-01

## 2022-01-01 RX ORDER — AMOXICILLIN 250 MG
2 CAPSULE ORAL 2 TIMES DAILY PRN
Status: DISCONTINUED | OUTPATIENT
Start: 2022-01-01 | End: 2022-01-01 | Stop reason: HOSPADM

## 2022-01-01 RX ORDER — BUMETANIDE 0.25 MG/ML
1 INJECTION INTRAMUSCULAR; INTRAVENOUS ONCE
Status: COMPLETED | OUTPATIENT
Start: 2022-01-01 | End: 2022-01-01

## 2022-01-01 RX ORDER — NYSTATIN 100000/ML
500000 SUSPENSION, ORAL (FINAL DOSE FORM) ORAL 4 TIMES DAILY
Status: DISCONTINUED | OUTPATIENT
Start: 2022-01-01 | End: 2022-01-01 | Stop reason: HOSPADM

## 2022-01-01 RX ORDER — ACETAMINOPHEN 650 MG/1
650 SUPPOSITORY RECTAL EVERY 4 HOURS PRN
Status: DISCONTINUED | OUTPATIENT
Start: 2022-01-01 | End: 2022-01-01 | Stop reason: HOSPADM

## 2022-01-01 RX ORDER — ALENDRONATE SODIUM 70 MG/1
70 TABLET ORAL
COMMUNITY
End: 2022-01-01

## 2022-01-01 RX ORDER — PIPERACILLIN SODIUM, TAZOBACTAM SODIUM 2; .25 G/10ML; G/10ML
2.25 INJECTION, POWDER, LYOPHILIZED, FOR SOLUTION INTRAVENOUS EVERY 12 HOURS
Status: DISCONTINUED | OUTPATIENT
Start: 2022-01-01 | End: 2022-01-01

## 2022-01-01 RX ORDER — TRAMADOL HYDROCHLORIDE 50 MG/1
50 TABLET ORAL EVERY 6 HOURS PRN
COMMUNITY
End: 2022-01-01

## 2022-01-01 RX ORDER — NALOXONE HYDROCHLORIDE 0.4 MG/ML
0.2 INJECTION, SOLUTION INTRAMUSCULAR; INTRAVENOUS; SUBCUTANEOUS
Status: DISCONTINUED | OUTPATIENT
Start: 2022-01-01 | End: 2022-01-01 | Stop reason: HOSPADM

## 2022-01-01 RX ORDER — ACETAMINOPHEN 500 MG
500-1000 TABLET ORAL EVERY 8 HOURS PRN
Qty: 90 TABLET | Refills: 1 | Status: SHIPPED | OUTPATIENT
Start: 2022-01-01 | End: 2022-01-01

## 2022-01-01 RX ORDER — IOPAMIDOL 755 MG/ML
75 INJECTION, SOLUTION INTRAVASCULAR ONCE
Status: COMPLETED | OUTPATIENT
Start: 2022-01-01 | End: 2022-01-01

## 2022-01-01 RX ORDER — IOPAMIDOL 755 MG/ML
100 INJECTION, SOLUTION INTRAVASCULAR ONCE
Status: COMPLETED | OUTPATIENT
Start: 2022-01-01 | End: 2022-01-01

## 2022-01-01 RX ORDER — VANCOMYCIN HYDROCHLORIDE 125 MG/1
125 CAPSULE ORAL 4 TIMES DAILY
Status: DISCONTINUED | OUTPATIENT
Start: 2022-01-01 | End: 2022-01-01 | Stop reason: HOSPADM

## 2022-01-01 RX ORDER — SODIUM CHLORIDE 9 MG/ML
INJECTION, SOLUTION INTRAVENOUS CONTINUOUS
Status: DISCONTINUED | OUTPATIENT
Start: 2022-01-01 | End: 2022-01-01 | Stop reason: HOSPADM

## 2022-01-01 RX ORDER — SODIUM CHLORIDE, SODIUM LACTATE, POTASSIUM CHLORIDE, CALCIUM CHLORIDE 600; 310; 30; 20 MG/100ML; MG/100ML; MG/100ML; MG/100ML
INJECTION, SOLUTION INTRAVENOUS CONTINUOUS
Status: CANCELLED | OUTPATIENT
Start: 2022-01-01

## 2022-01-01 RX ORDER — POTASSIUM CHLORIDE 750 MG/1
10 TABLET, EXTENDED RELEASE ORAL 2 TIMES DAILY
Status: DISCONTINUED | OUTPATIENT
Start: 2022-01-01 | End: 2022-01-01 | Stop reason: HOSPADM

## 2022-01-01 RX ORDER — POLYETHYLENE GLYCOL 3350 17 G/17G
17 POWDER, FOR SOLUTION ORAL DAILY PRN
Status: DISCONTINUED | OUTPATIENT
Start: 2022-01-01 | End: 2022-01-01

## 2022-01-01 RX ORDER — SALIVA STIMULANT COMB. NO.3
1-2 SPRAY, NON-AEROSOL (ML) MUCOUS MEMBRANE 4 TIMES DAILY
Status: DISCONTINUED | OUTPATIENT
Start: 2022-01-01 | End: 2022-01-01 | Stop reason: HOSPADM

## 2022-01-01 RX ORDER — CEFTRIAXONE 2 G/1
2 INJECTION, POWDER, FOR SOLUTION INTRAMUSCULAR; INTRAVENOUS ONCE
Status: DISCONTINUED | OUTPATIENT
Start: 2022-01-01 | End: 2022-01-01

## 2022-01-01 RX ORDER — NYSTATIN 100000/ML
500000 SUSPENSION, ORAL (FINAL DOSE FORM) ORAL 4 TIMES DAILY
COMMUNITY
Start: 2022-01-01

## 2022-01-01 RX ORDER — HYDROCODONE BITARTRATE AND ACETAMINOPHEN 5; 325 MG/1; MG/1
1 TABLET ORAL ONCE
Status: COMPLETED | OUTPATIENT
Start: 2022-01-01 | End: 2022-01-01

## 2022-01-01 RX ORDER — FENTANYL CITRATE 50 UG/ML
50 INJECTION, SOLUTION INTRAMUSCULAR; INTRAVENOUS ONCE
Status: COMPLETED | OUTPATIENT
Start: 2022-01-01 | End: 2022-01-01

## 2022-01-01 RX ORDER — FLUMAZENIL 0.1 MG/ML
0.2 INJECTION, SOLUTION INTRAVENOUS
Status: CANCELLED | OUTPATIENT
Start: 2022-01-01 | End: 2022-01-01

## 2022-01-01 RX ORDER — LORAZEPAM 1 MG/1
1 TABLET ORAL 2 TIMES DAILY
Status: DISCONTINUED | OUTPATIENT
Start: 2022-01-01 | End: 2022-01-01 | Stop reason: HOSPADM

## 2022-01-01 RX ORDER — DOXYCYCLINE 100 MG/1
100 CAPSULE ORAL 2 TIMES DAILY
Qty: 10 CAPSULE | Refills: 0 | Status: SHIPPED | OUTPATIENT
Start: 2022-01-01 | End: 2022-01-01

## 2022-01-01 RX ORDER — SODIUM CHLORIDE 1 G/1
2 TABLET ORAL 3 TIMES DAILY
Status: ON HOLD | COMMUNITY
End: 2022-01-01

## 2022-01-01 RX ORDER — NYSTATIN 100000/ML
500000 SUSPENSION, ORAL (FINAL DOSE FORM) ORAL 4 TIMES DAILY PRN
Status: DISCONTINUED | OUTPATIENT
Start: 2022-01-01 | End: 2022-01-01 | Stop reason: HOSPADM

## 2022-01-01 RX ORDER — PIPERACILLIN SODIUM, TAZOBACTAM SODIUM 3; .375 G/15ML; G/15ML
3.38 INJECTION, POWDER, LYOPHILIZED, FOR SOLUTION INTRAVENOUS EVERY 8 HOURS
Status: DISCONTINUED | OUTPATIENT
Start: 2022-01-01 | End: 2022-01-01

## 2022-01-01 RX ORDER — BUMETANIDE 0.5 MG/1
0.5 TABLET ORAL DAILY
Qty: 30 TABLET | Refills: 1 | Status: ON HOLD | OUTPATIENT
Start: 2022-01-01 | End: 2022-01-01

## 2022-01-01 RX ORDER — PANTOPRAZOLE SODIUM 40 MG/1
40 TABLET, DELAYED RELEASE ORAL 2 TIMES DAILY
Qty: 30 TABLET | Refills: 0 | Status: ON HOLD | OUTPATIENT
Start: 2022-01-01 | End: 2022-01-01

## 2022-01-01 RX ORDER — VANCOMYCIN HYDROCHLORIDE 1 G/200ML
1000 INJECTION, SOLUTION INTRAVENOUS ONCE
Status: COMPLETED | OUTPATIENT
Start: 2022-01-01 | End: 2022-01-01

## 2022-01-01 RX ORDER — MAGNESIUM SULFATE 4 G/50ML
4 INJECTION INTRAVENOUS ONCE
Status: COMPLETED | OUTPATIENT
Start: 2022-01-01 | End: 2022-01-01

## 2022-01-01 RX ORDER — POTASSIUM CHLORIDE 750 MG/1
10 TABLET, EXTENDED RELEASE ORAL ONCE
Status: COMPLETED | OUTPATIENT
Start: 2022-01-01 | End: 2022-01-01

## 2022-01-01 RX ORDER — LIDOCAINE 40 MG/G
CREAM TOPICAL
Status: DISCONTINUED | OUTPATIENT
Start: 2022-01-01 | End: 2022-01-01 | Stop reason: HOSPADM

## 2022-01-01 RX ORDER — DIPHENHYDRAMINE HCL 25 MG
25 CAPSULE ORAL EVERY 6 HOURS PRN
Status: DISCONTINUED | OUTPATIENT
Start: 2022-01-01 | End: 2022-01-01 | Stop reason: HOSPADM

## 2022-01-01 RX ORDER — CETIRIZINE HYDROCHLORIDE 5 MG/1
5 TABLET ORAL DAILY
Status: DISCONTINUED | OUTPATIENT
Start: 2022-01-01 | End: 2022-01-01 | Stop reason: HOSPADM

## 2022-01-01 RX ORDER — NICOTINE POLACRILEX 4 MG
15-30 LOZENGE BUCCAL
Status: DISCONTINUED | OUTPATIENT
Start: 2022-01-01 | End: 2022-01-01

## 2022-01-01 RX ORDER — SODIUM CHLORIDE 1 G/1
1 TABLET ORAL 3 TIMES DAILY
COMMUNITY
End: 2022-01-01

## 2022-01-01 RX ORDER — NYSTATIN 100000/ML
500000 SUSPENSION, ORAL (FINAL DOSE FORM) ORAL 4 TIMES DAILY PRN
Status: ON HOLD
Start: 2022-01-01 | End: 2022-01-01

## 2022-01-01 RX ORDER — PIPERACILLIN SODIUM, TAZOBACTAM SODIUM 3; .375 G/15ML; G/15ML
3.38 INJECTION, POWDER, LYOPHILIZED, FOR SOLUTION INTRAVENOUS ONCE
Status: COMPLETED | OUTPATIENT
Start: 2022-01-01 | End: 2022-01-01

## 2022-01-01 RX ORDER — ACETAMINOPHEN 325 MG/1
650 TABLET ORAL ONCE
Status: COMPLETED | OUTPATIENT
Start: 2022-01-01 | End: 2022-01-01

## 2022-01-01 RX ORDER — DULOXETIN HYDROCHLORIDE 20 MG/1
20 CAPSULE, DELAYED RELEASE ORAL
Status: DISCONTINUED | OUTPATIENT
Start: 2022-01-01 | End: 2022-01-01

## 2022-01-01 RX ORDER — MAGNESIUM OXIDE 400 MG/1
200 TABLET ORAL DAILY
Qty: 15 TABLET | Refills: 0 | Status: SHIPPED | OUTPATIENT
Start: 2022-01-01 | End: 2022-01-01

## 2022-01-01 RX ORDER — NALOXONE HYDROCHLORIDE 1 MG/ML
0.4 INJECTION INTRAMUSCULAR; INTRAVENOUS; SUBCUTANEOUS
Status: CANCELLED | OUTPATIENT
Start: 2022-01-01

## 2022-01-01 RX ORDER — PIPERACILLIN SODIUM, TAZOBACTAM SODIUM 3; .375 G/15ML; G/15ML
3.38 INJECTION, POWDER, LYOPHILIZED, FOR SOLUTION INTRAVENOUS EVERY 12 HOURS
Status: DISCONTINUED | OUTPATIENT
Start: 2022-01-01 | End: 2022-01-01

## 2022-01-01 RX ORDER — LEVOTHYROXINE SODIUM 25 UG/1
50 TABLET ORAL EVERY MORNING
Status: DISCONTINUED | OUTPATIENT
Start: 2022-01-01 | End: 2022-01-01 | Stop reason: HOSPADM

## 2022-01-01 RX ORDER — ONDANSETRON 2 MG/ML
4 INJECTION INTRAMUSCULAR; INTRAVENOUS
Status: CANCELLED | OUTPATIENT
Start: 2022-01-01

## 2022-01-01 RX ORDER — ALBUTEROL SULFATE 5 MG/ML
10 SOLUTION RESPIRATORY (INHALATION) ONCE
Status: COMPLETED | OUTPATIENT
Start: 2022-01-01 | End: 2022-01-01

## 2022-01-01 RX ORDER — POTASSIUM CHLORIDE 20MEQ/15ML
10 LIQUID (ML) ORAL ONCE
Status: DISCONTINUED | OUTPATIENT
Start: 2022-01-01 | End: 2022-01-01 | Stop reason: HOSPADM

## 2022-01-01 RX ORDER — CEFTRIAXONE 2 G/1
2 INJECTION, POWDER, FOR SOLUTION INTRAMUSCULAR; INTRAVENOUS ONCE
Status: COMPLETED | OUTPATIENT
Start: 2022-01-01 | End: 2022-01-01

## 2022-01-01 RX ORDER — OLANZAPINE 10 MG/2ML
5 INJECTION, POWDER, FOR SOLUTION INTRAMUSCULAR ONCE
Status: COMPLETED | OUTPATIENT
Start: 2022-01-01 | End: 2022-01-01

## 2022-01-01 RX ORDER — PROCHLORPERAZINE MALEATE 5 MG
5 TABLET ORAL EVERY 6 HOURS PRN
Status: DISCONTINUED | OUTPATIENT
Start: 2022-01-01 | End: 2022-01-01 | Stop reason: HOSPADM

## 2022-01-01 RX ORDER — DEXTROSE MONOHYDRATE 25 G/50ML
25 INJECTION, SOLUTION INTRAVENOUS ONCE
Status: COMPLETED | OUTPATIENT
Start: 2022-01-01 | End: 2022-01-01

## 2022-01-01 RX ORDER — BUMETANIDE 0.5 MG/1
0.5 TABLET ORAL DAILY
Status: DISCONTINUED | OUTPATIENT
Start: 2022-01-01 | End: 2022-01-01

## 2022-01-01 RX ORDER — BISACODYL 5 MG
10 TABLET, DELAYED RELEASE (ENTERIC COATED) ORAL ONCE
Status: COMPLETED | OUTPATIENT
Start: 2022-01-01 | End: 2022-01-01

## 2022-01-01 RX ORDER — OXYCODONE HYDROCHLORIDE 5 MG/1
5 TABLET ORAL EVERY 4 HOURS PRN
Status: CANCELLED | OUTPATIENT
Start: 2022-01-01

## 2022-01-01 RX ORDER — LOPERAMIDE HYDROCHLORIDE 2 MG/1
2 TABLET ORAL 2 TIMES DAILY
Status: ON HOLD | COMMUNITY
End: 2022-01-01

## 2022-01-01 RX ORDER — ONDANSETRON 4 MG/1
4 TABLET, ORALLY DISINTEGRATING ORAL EVERY 30 MIN PRN
Status: CANCELLED | OUTPATIENT
Start: 2022-01-01

## 2022-01-01 RX ORDER — POTASSIUM CHLORIDE 750 MG/1
20 TABLET, EXTENDED RELEASE ORAL DAILY
COMMUNITY

## 2022-01-01 RX ORDER — POLYETHYLENE GLYCOL 3350 17 G/17G
17 POWDER, FOR SOLUTION ORAL DAILY
Status: DISCONTINUED | OUTPATIENT
Start: 2022-01-01 | End: 2022-01-01

## 2022-01-01 RX ORDER — OXYBUTYNIN CHLORIDE 5 MG/1
1 TABLET, EXTENDED RELEASE ORAL DAILY
COMMUNITY
Start: 2022-01-01 | End: 2022-01-01

## 2022-01-01 RX ORDER — CHLORHEXIDINE GLUCONATE ORAL RINSE 1.2 MG/ML
15 SOLUTION DENTAL 2 TIMES DAILY
COMMUNITY

## 2022-01-01 RX ORDER — MULTIVITAMIN,THERAPEUTIC
1 TABLET ORAL DAILY
Status: DISCONTINUED | OUTPATIENT
Start: 2022-01-01 | End: 2022-01-01 | Stop reason: HOSPADM

## 2022-01-01 RX ORDER — MULTIPLE VITAMINS W/ MINERALS TAB 9MG-400MCG
1 TAB ORAL DAILY
Status: DISCONTINUED | OUTPATIENT
Start: 2022-01-01 | End: 2022-01-01 | Stop reason: HOSPADM

## 2022-01-01 RX ORDER — PROCHLORPERAZINE 25 MG
12.5 SUPPOSITORY, RECTAL RECTAL EVERY 12 HOURS PRN
Status: DISCONTINUED | OUTPATIENT
Start: 2022-01-01 | End: 2022-01-01 | Stop reason: HOSPADM

## 2022-01-01 RX ORDER — AMOXICILLIN 250 MG
1 CAPSULE ORAL 2 TIMES DAILY
Status: DISCONTINUED | OUTPATIENT
Start: 2022-01-01 | End: 2022-01-01

## 2022-01-01 RX ORDER — LIDOCAINE 40 MG/G
CREAM TOPICAL
Status: DISCONTINUED | OUTPATIENT
Start: 2022-01-01 | End: 2022-01-01

## 2022-01-01 RX ORDER — SODIUM CHLORIDE 1 G/1
1 TABLET ORAL 2 TIMES DAILY WITH MEALS
Status: DISCONTINUED | OUTPATIENT
Start: 2022-01-01 | End: 2022-01-01 | Stop reason: HOSPADM

## 2022-01-01 RX ORDER — ONDANSETRON 2 MG/ML
4 INJECTION INTRAMUSCULAR; INTRAVENOUS EVERY 30 MIN PRN
Status: CANCELLED | OUTPATIENT
Start: 2022-01-01

## 2022-01-01 RX ORDER — OLANZAPINE 10 MG/2ML
2.5 INJECTION, POWDER, FOR SOLUTION INTRAMUSCULAR
Status: COMPLETED | OUTPATIENT
Start: 2022-01-01 | End: 2022-01-01

## 2022-01-01 RX ORDER — NALOXONE HYDROCHLORIDE 0.4 MG/ML
0.4 INJECTION, SOLUTION INTRAMUSCULAR; INTRAVENOUS; SUBCUTANEOUS
Status: DISCONTINUED | OUTPATIENT
Start: 2022-01-01 | End: 2022-01-01 | Stop reason: HOSPADM

## 2022-01-01 RX ORDER — ACETAMINOPHEN 325 MG/1
650 TABLET ORAL EVERY 6 HOURS PRN
Status: DISCONTINUED | OUTPATIENT
Start: 2022-01-01 | End: 2022-01-01 | Stop reason: ALTCHOICE

## 2022-01-01 RX ORDER — CALCIUM CARBONATE 500 MG/1
1000 TABLET, CHEWABLE ORAL
Status: DISCONTINUED | OUTPATIENT
Start: 2022-01-01 | End: 2022-01-01 | Stop reason: HOSPADM

## 2022-01-01 RX ORDER — BUMETANIDE 1 MG/1
1 TABLET ORAL DAILY
Qty: 30 TABLET | Refills: 11 | Status: SHIPPED | OUTPATIENT
Start: 2022-01-01 | End: 2022-01-01

## 2022-01-01 RX ORDER — FUROSEMIDE 10 MG/ML
40 INJECTION INTRAMUSCULAR; INTRAVENOUS ONCE
Status: COMPLETED | OUTPATIENT
Start: 2022-01-01 | End: 2022-01-01

## 2022-01-01 RX ORDER — METOPROLOL SUCCINATE 100 MG/1
100 TABLET, EXTENDED RELEASE ORAL DAILY
Status: DISCONTINUED | OUTPATIENT
Start: 2022-01-01 | End: 2022-01-01 | Stop reason: HOSPADM

## 2022-01-01 RX ORDER — ONDANSETRON 2 MG/ML
4 INJECTION INTRAMUSCULAR; INTRAVENOUS EVERY 6 HOURS PRN
Status: CANCELLED | OUTPATIENT
Start: 2022-01-01

## 2022-01-01 RX ORDER — LORAZEPAM 0.5 MG/1
1 TABLET ORAL ONCE
Status: COMPLETED | OUTPATIENT
Start: 2022-01-01 | End: 2022-01-01

## 2022-01-01 RX ORDER — LOPERAMIDE HCL 2 MG
2 CAPSULE ORAL 4 TIMES DAILY PRN
Status: DISCONTINUED | OUTPATIENT
Start: 2022-01-01 | End: 2022-01-01 | Stop reason: HOSPADM

## 2022-01-01 RX ORDER — ENOXAPARIN SODIUM 100 MG/ML
30 INJECTION SUBCUTANEOUS EVERY 24 HOURS
Status: DISCONTINUED | OUTPATIENT
Start: 2022-01-01 | End: 2022-01-01

## 2022-01-01 RX ORDER — FENTANYL CITRATE 50 UG/ML
25 INJECTION, SOLUTION INTRAMUSCULAR; INTRAVENOUS
Status: DISCONTINUED | OUTPATIENT
Start: 2022-01-01 | End: 2022-01-01 | Stop reason: HOSPADM

## 2022-01-01 RX ORDER — AMILORIDE HYDROCHLORIDE 5 MG/1
5 TABLET ORAL DAILY
Status: DISCONTINUED | OUTPATIENT
Start: 2022-01-01 | End: 2022-01-01 | Stop reason: HOSPADM

## 2022-01-01 RX ORDER — CEFTRIAXONE 1 G/1
1 INJECTION, POWDER, FOR SOLUTION INTRAMUSCULAR; INTRAVENOUS EVERY 24 HOURS
Status: DISCONTINUED | OUTPATIENT
Start: 2022-01-01 | End: 2022-01-01 | Stop reason: HOSPADM

## 2022-01-01 RX ORDER — LORAZEPAM 0.5 MG/1
0.5 TABLET ORAL
Status: DISCONTINUED | OUTPATIENT
Start: 2022-01-01 | End: 2022-01-01 | Stop reason: HOSPADM

## 2022-01-01 RX ORDER — OXYBUTYNIN CHLORIDE 5 MG/1
5 TABLET, EXTENDED RELEASE ORAL EVERY EVENING
Status: DISCONTINUED | OUTPATIENT
Start: 2022-01-01 | End: 2022-01-01 | Stop reason: HOSPADM

## 2022-01-01 RX ORDER — METOPROLOL SUCCINATE 50 MG/1
50 TABLET, EXTENDED RELEASE ORAL 2 TIMES DAILY
Status: DISCONTINUED | OUTPATIENT
Start: 2022-01-01 | End: 2022-01-01 | Stop reason: HOSPADM

## 2022-01-01 RX ORDER — LORAZEPAM 0.5 MG/1
.5-1 TABLET ORAL 3 TIMES DAILY
Status: DISCONTINUED | OUTPATIENT
Start: 2022-01-01 | End: 2022-01-01

## 2022-01-01 RX ORDER — VANCOMYCIN HYDROCHLORIDE 1 G/200ML
1000 INJECTION, SOLUTION INTRAVENOUS ONCE
Status: DISCONTINUED | OUTPATIENT
Start: 2022-01-01 | End: 2022-01-01

## 2022-01-01 RX ORDER — AMOXICILLIN 250 MG
1 CAPSULE ORAL 2 TIMES DAILY PRN
Status: DISCONTINUED | OUTPATIENT
Start: 2022-01-01 | End: 2022-01-01 | Stop reason: HOSPADM

## 2022-01-01 RX ORDER — POLYETHYLENE GLYCOL 3350 17 G/17G
238 POWDER, FOR SOLUTION ORAL ONCE
Status: COMPLETED | OUTPATIENT
Start: 2022-01-01 | End: 2022-01-01

## 2022-01-01 RX ORDER — SODIUM CHLORIDE 1 G/1
1 TABLET ORAL
Status: DISCONTINUED | OUTPATIENT
Start: 2022-01-01 | End: 2022-01-01

## 2022-01-01 RX ORDER — OXYCODONE HYDROCHLORIDE 5 MG/1
5 TABLET ORAL EVERY 4 HOURS PRN
Status: DISCONTINUED | OUTPATIENT
Start: 2022-01-01 | End: 2022-01-01 | Stop reason: HOSPADM

## 2022-01-01 RX ORDER — NALOXONE HYDROCHLORIDE 1 MG/ML
0.2 INJECTION INTRAMUSCULAR; INTRAVENOUS; SUBCUTANEOUS
Status: CANCELLED | OUTPATIENT
Start: 2022-01-01

## 2022-01-01 RX ORDER — AMOXICILLIN 250 MG
2 CAPSULE ORAL 2 TIMES DAILY
Status: DISCONTINUED | OUTPATIENT
Start: 2022-01-01 | End: 2022-01-01 | Stop reason: HOSPADM

## 2022-01-01 RX ORDER — POTASSIUM CHLORIDE 750 MG/1
10 TABLET, EXTENDED RELEASE ORAL ONCE
Status: DISCONTINUED | OUTPATIENT
Start: 2022-01-01 | End: 2022-01-01

## 2022-01-01 RX ORDER — POTASSIUM CHLORIDE 1500 MG/1
40 TABLET, EXTENDED RELEASE ORAL ONCE
Status: DISCONTINUED | OUTPATIENT
Start: 2022-01-01 | End: 2022-01-01

## 2022-01-01 RX ORDER — SODIUM CHLORIDE 1 G/1
2 TABLET ORAL 2 TIMES DAILY
Status: DISCONTINUED | OUTPATIENT
Start: 2022-01-01 | End: 2022-01-01 | Stop reason: HOSPADM

## 2022-01-01 RX ORDER — SUCRALFATE 1 G/1
1 TABLET ORAL 4 TIMES DAILY
Status: DISCONTINUED | OUTPATIENT
Start: 2022-01-01 | End: 2022-01-01

## 2022-01-01 RX ORDER — DEXTROSE MONOHYDRATE 25 G/50ML
25-50 INJECTION, SOLUTION INTRAVENOUS
Status: DISCONTINUED | OUTPATIENT
Start: 2022-01-01 | End: 2022-01-01 | Stop reason: HOSPADM

## 2022-01-01 RX ORDER — HYDROMORPHONE HCL IN WATER/PF 6 MG/30 ML
0.2 PATIENT CONTROLLED ANALGESIA SYRINGE INTRAVENOUS ONCE
Status: COMPLETED | OUTPATIENT
Start: 2022-01-01 | End: 2022-01-01

## 2022-01-01 RX ORDER — MEPERIDINE HYDROCHLORIDE 25 MG/ML
12.5 INJECTION INTRAMUSCULAR; INTRAVENOUS; SUBCUTANEOUS
Status: CANCELLED | OUTPATIENT
Start: 2022-01-01

## 2022-01-01 RX ORDER — POTASSIUM CHLORIDE 7.45 MG/ML
10 INJECTION INTRAVENOUS ONCE
Status: COMPLETED | OUTPATIENT
Start: 2022-01-01 | End: 2022-01-01

## 2022-01-01 RX ORDER — POLYETHYLENE GLYCOL 3350 17 G/17G
17 POWDER, FOR SOLUTION ORAL DAILY
Status: DISCONTINUED | OUTPATIENT
Start: 2022-01-01 | End: 2022-01-01 | Stop reason: HOSPADM

## 2022-01-01 RX ORDER — AMOXICILLIN 250 MG
1 CAPSULE ORAL 2 TIMES DAILY PRN
Status: DISCONTINUED | OUTPATIENT
Start: 2022-01-01 | End: 2022-01-01

## 2022-01-01 RX ORDER — OXYBUTYNIN CHLORIDE 5 MG/1
5 TABLET, EXTENDED RELEASE ORAL DAILY
Status: DISCONTINUED | OUTPATIENT
Start: 2022-01-01 | End: 2022-01-01 | Stop reason: HOSPADM

## 2022-01-01 RX ORDER — FUROSEMIDE 10 MG/ML
40 INJECTION INTRAMUSCULAR; INTRAVENOUS EVERY 12 HOURS
Status: COMPLETED | OUTPATIENT
Start: 2022-01-01 | End: 2022-01-01

## 2022-01-01 RX ORDER — PANTOPRAZOLE SODIUM 20 MG/1
40 TABLET, DELAYED RELEASE ORAL
Status: DISCONTINUED | OUTPATIENT
Start: 2022-01-01 | End: 2022-01-01 | Stop reason: HOSPADM

## 2022-01-01 RX ORDER — PREDNISONE 20 MG/1
20 TABLET ORAL DAILY
Status: DISCONTINUED | OUTPATIENT
Start: 2022-01-01 | End: 2022-01-01 | Stop reason: HOSPADM

## 2022-01-01 RX ORDER — IPRATROPIUM BROMIDE AND ALBUTEROL SULFATE 2.5; .5 MG/3ML; MG/3ML
3 SOLUTION RESPIRATORY (INHALATION) EVERY 4 HOURS PRN
Status: DISCONTINUED | OUTPATIENT
Start: 2022-01-01 | End: 2022-01-01 | Stop reason: HOSPADM

## 2022-01-01 RX ORDER — ACETAMINOPHEN 325 MG/1
975 TABLET ORAL ONCE
Status: COMPLETED | OUTPATIENT
Start: 2022-01-01 | End: 2022-01-01

## 2022-01-01 RX ORDER — VANCOMYCIN HYDROCHLORIDE 125 MG/1
125 CAPSULE ORAL 4 TIMES DAILY
Qty: 40 CAPSULE | Refills: 0 | Status: SHIPPED | OUTPATIENT
Start: 2022-01-01 | End: 2022-01-01

## 2022-01-01 RX ORDER — POTASSIUM CHLORIDE 1500 MG/1
40 TABLET, EXTENDED RELEASE ORAL EVERY 4 HOURS
Status: COMPLETED | OUTPATIENT
Start: 2022-01-01 | End: 2022-01-01

## 2022-01-01 RX ORDER — CETIRIZINE HYDROCHLORIDE 5 MG/1
5 TABLET ORAL EVERY EVENING
COMMUNITY

## 2022-01-01 RX ORDER — LANOLIN ALCOHOL/MO/W.PET/CERES
3 CREAM (GRAM) TOPICAL AT BEDTIME
Status: DISCONTINUED | OUTPATIENT
Start: 2022-01-01 | End: 2022-01-01 | Stop reason: HOSPADM

## 2022-01-01 RX ORDER — MAGNESIUM HYDROXIDE/ALUMINUM HYDROXICE/SIMETHICONE 120; 1200; 1200 MG/30ML; MG/30ML; MG/30ML
15 SUSPENSION ORAL EVERY 4 HOURS PRN
COMMUNITY

## 2022-01-01 RX ORDER — BUMETANIDE 1 MG/1
1 TABLET ORAL DAILY
Status: DISCONTINUED | OUTPATIENT
Start: 2022-01-01 | End: 2022-01-01 | Stop reason: HOSPADM

## 2022-01-01 RX ORDER — TOLVAPTAN 15 MG/1
15 TABLET ORAL DAILY
Status: DISCONTINUED | OUTPATIENT
Start: 2022-01-01 | End: 2022-01-01

## 2022-01-01 RX ORDER — AMOXICILLIN 250 MG
2 CAPSULE ORAL 2 TIMES DAILY
Status: DISCONTINUED | OUTPATIENT
Start: 2022-01-01 | End: 2022-01-01

## 2022-01-01 RX ORDER — ACETAMINOPHEN 500 MG
500 TABLET ORAL EVERY 6 HOURS PRN
Status: DISCONTINUED | OUTPATIENT
Start: 2022-01-01 | End: 2022-01-01 | Stop reason: HOSPADM

## 2022-01-01 RX ORDER — NITROGLYCERIN 0.4 MG/1
0.4 TABLET SUBLINGUAL EVERY 5 MIN PRN
Status: DISCONTINUED | OUTPATIENT
Start: 2022-01-01 | End: 2022-01-01 | Stop reason: HOSPADM

## 2022-01-01 RX ORDER — TRAZODONE HYDROCHLORIDE 50 MG/1
50 TABLET, FILM COATED ORAL AT BEDTIME
Status: DISCONTINUED | OUTPATIENT
Start: 2022-01-01 | End: 2022-01-01

## 2022-01-01 RX ORDER — LORAZEPAM 0.5 MG/1
0.5 TABLET ORAL DAILY
Status: DISCONTINUED | OUTPATIENT
Start: 2022-01-01 | End: 2022-01-01 | Stop reason: HOSPADM

## 2022-01-01 RX ORDER — FUROSEMIDE 10 MG/ML
40 INJECTION INTRAMUSCULAR; INTRAVENOUS EVERY 12 HOURS
Status: DISCONTINUED | OUTPATIENT
Start: 2022-01-01 | End: 2022-01-01

## 2022-01-01 RX ORDER — PANTOPRAZOLE SODIUM 40 MG/1
40 TABLET, DELAYED RELEASE ORAL
Status: DISCONTINUED | OUTPATIENT
Start: 2022-01-01 | End: 2022-01-01 | Stop reason: HOSPADM

## 2022-01-01 RX ORDER — SODIUM CHLORIDE, SODIUM LACTATE, POTASSIUM CHLORIDE, CALCIUM CHLORIDE 600; 310; 30; 20 MG/100ML; MG/100ML; MG/100ML; MG/100ML
INJECTION, SOLUTION INTRAVENOUS CONTINUOUS
Status: DISCONTINUED | OUTPATIENT
Start: 2022-01-01 | End: 2022-01-01 | Stop reason: HOSPADM

## 2022-01-01 RX ORDER — SUCRALFATE 1 G/1
1 TABLET ORAL 4 TIMES DAILY
Status: DISCONTINUED | OUTPATIENT
Start: 2022-01-01 | End: 2022-01-01 | Stop reason: HOSPADM

## 2022-01-01 RX ORDER — BUMETANIDE 0.5 MG/1
0.5 TABLET ORAL DAILY
Status: DISCONTINUED | OUTPATIENT
Start: 2022-01-01 | End: 2022-01-01 | Stop reason: HOSPADM

## 2022-01-01 RX ORDER — MAGNESIUM HYDROXIDE/ALUMINUM HYDROXICE/SIMETHICONE 120; 1200; 1200 MG/30ML; MG/30ML; MG/30ML
15 SUSPENSION ORAL EVERY 4 HOURS PRN
Status: DISCONTINUED | OUTPATIENT
Start: 2022-01-01 | End: 2022-01-01 | Stop reason: HOSPADM

## 2022-01-01 RX ORDER — ALBUTEROL SULFATE 0.83 MG/ML
2.5 SOLUTION RESPIRATORY (INHALATION)
Status: DISCONTINUED | OUTPATIENT
Start: 2022-01-01 | End: 2022-01-01

## 2022-01-01 RX ORDER — PANTOPRAZOLE SODIUM 40 MG/1
40 TABLET, DELAYED RELEASE ORAL
Status: DISCONTINUED | OUTPATIENT
Start: 2022-01-01 | End: 2022-01-01

## 2022-01-01 RX ORDER — SALIVA STIMULANT COMB. NO.3
1-2 SPRAY, NON-AEROSOL (ML) MUCOUS MEMBRANE 4 TIMES DAILY
Qty: 44.3 ML | Refills: 1 | Status: ON HOLD | OUTPATIENT
Start: 2022-01-01 | End: 2022-01-01

## 2022-01-01 RX ORDER — HYDROMORPHONE HYDROCHLORIDE 1 MG/ML
0.2 INJECTION, SOLUTION INTRAMUSCULAR; INTRAVENOUS; SUBCUTANEOUS EVERY 5 MIN PRN
Status: DISCONTINUED | OUTPATIENT
Start: 2022-01-01 | End: 2022-01-01 | Stop reason: HOSPADM

## 2022-01-01 RX ORDER — LIDOCAINE 4 G/G
1 PATCH TOPICAL
Status: DISCONTINUED | OUTPATIENT
Start: 2022-01-01 | End: 2022-01-01 | Stop reason: HOSPADM

## 2022-01-01 RX ORDER — FLUORIDE TOOTHPASTE
40 TOOTHPASTE DENTAL 4 TIMES DAILY
Status: DISCONTINUED | OUTPATIENT
Start: 2022-01-01 | End: 2022-01-01

## 2022-01-01 RX ORDER — FENTANYL CITRATE 50 UG/ML
25 INJECTION, SOLUTION INTRAMUSCULAR; INTRAVENOUS EVERY 5 MIN PRN
Status: DISCONTINUED | OUTPATIENT
Start: 2022-01-01 | End: 2022-01-01 | Stop reason: HOSPADM

## 2022-01-01 RX ORDER — LIDOCAINE 40 MG/G
CREAM TOPICAL
Status: CANCELLED | OUTPATIENT
Start: 2022-01-01

## 2022-01-01 RX ORDER — FUROSEMIDE 10 MG/ML
20 INJECTION INTRAMUSCULAR; INTRAVENOUS EVERY 12 HOURS
Status: COMPLETED | OUTPATIENT
Start: 2022-01-01 | End: 2022-01-01

## 2022-01-01 RX ORDER — FUROSEMIDE 20 MG
20 TABLET ORAL
Status: DISCONTINUED | OUTPATIENT
Start: 2022-01-01 | End: 2022-01-01

## 2022-01-01 RX ORDER — POTASSIUM CHLORIDE 1500 MG/1
20 TABLET, EXTENDED RELEASE ORAL ONCE
Status: DISCONTINUED | OUTPATIENT
Start: 2022-01-01 | End: 2022-01-01 | Stop reason: CLARIF

## 2022-01-01 RX ORDER — AMILORIDE HYDROCHLORIDE 5 MG/1
5 TABLET ORAL DAILY
COMMUNITY

## 2022-01-01 RX ORDER — PROPOFOL 10 MG/ML
INJECTION, EMULSION INTRAVENOUS PRN
Status: DISCONTINUED | OUTPATIENT
Start: 2022-01-01 | End: 2022-01-01

## 2022-01-01 RX ORDER — ONDANSETRON 2 MG/ML
4 INJECTION INTRAMUSCULAR; INTRAVENOUS EVERY 6 HOURS PRN
Status: DISCONTINUED | OUTPATIENT
Start: 2022-01-01 | End: 2022-01-01

## 2022-01-01 RX ORDER — BUMETANIDE 0.25 MG/ML
1 INJECTION INTRAMUSCULAR; INTRAVENOUS EVERY 12 HOURS
Status: DISCONTINUED | OUTPATIENT
Start: 2022-01-01 | End: 2022-01-01

## 2022-01-01 RX ORDER — HEPARIN SODIUM 5000 [USP'U]/.5ML
5000 INJECTION, SOLUTION INTRAVENOUS; SUBCUTANEOUS EVERY 12 HOURS
Status: DISCONTINUED | OUTPATIENT
Start: 2022-01-01 | End: 2022-01-01

## 2022-01-01 RX ORDER — SODIUM CHLORIDE 1 G/1
2 TABLET ORAL 3 TIMES DAILY
Status: DISCONTINUED | OUTPATIENT
Start: 2022-01-01 | End: 2022-01-01 | Stop reason: HOSPADM

## 2022-01-01 RX ORDER — BUMETANIDE 1 MG/1
1 TABLET ORAL DAILY
Qty: 90 TABLET | Refills: 1 | Status: SHIPPED | OUTPATIENT
Start: 2022-01-01

## 2022-01-01 RX ORDER — MORPHINE SULFATE 2 MG/ML
2 INJECTION, SOLUTION INTRAMUSCULAR; INTRAVENOUS ONCE
Status: COMPLETED | OUTPATIENT
Start: 2022-01-01 | End: 2022-01-01

## 2022-01-01 RX ORDER — NICOTINE POLACRILEX 4 MG
15-30 LOZENGE BUCCAL
Status: DISCONTINUED | OUTPATIENT
Start: 2022-01-01 | End: 2022-01-01 | Stop reason: HOSPADM

## 2022-01-01 RX ORDER — CEPHALEXIN 500 MG/1
500 CAPSULE ORAL 3 TIMES DAILY
Qty: 30 CAPSULE | Refills: 0 | Status: SHIPPED | OUTPATIENT
Start: 2022-01-01 | End: 2022-01-01

## 2022-01-01 RX ORDER — SODIUM CHLORIDE 1 G/1
2 TABLET ORAL 3 TIMES DAILY
Status: DISCONTINUED | OUTPATIENT
Start: 2022-01-01 | End: 2022-01-01

## 2022-01-01 RX ORDER — ACETAMINOPHEN 325 MG/1
650 TABLET ORAL EVERY 4 HOURS PRN
Status: DISCONTINUED | OUTPATIENT
Start: 2022-01-01 | End: 2022-01-01 | Stop reason: HOSPADM

## 2022-01-01 RX ORDER — DULOXETIN HYDROCHLORIDE 20 MG/1
20 CAPSULE, DELAYED RELEASE ORAL DAILY
Status: ON HOLD | COMMUNITY
End: 2022-01-01

## 2022-01-01 RX ORDER — DIPHENHYDRAMINE HYDROCHLORIDE AND LIDOCAINE HYDROCHLORIDE AND ALUMINUM HYDROXIDE AND MAGNESIUM HYDRO
10 KIT EVERY 6 HOURS PRN
Status: DISCONTINUED | OUTPATIENT
Start: 2022-01-01 | End: 2022-01-01 | Stop reason: HOSPADM

## 2022-01-01 RX ORDER — MAGNESIUM OXIDE 400 MG/1
400 TABLET ORAL 2 TIMES DAILY
Status: DISCONTINUED | OUTPATIENT
Start: 2022-01-01 | End: 2022-01-01 | Stop reason: HOSPADM

## 2022-01-01 RX ORDER — ENOXAPARIN SODIUM 300 MG/3ML
0.5 INJECTION INTRAVENOUS; SUBCUTANEOUS 2 TIMES DAILY
Status: DISCONTINUED | OUTPATIENT
Start: 2022-01-01 | End: 2022-01-01 | Stop reason: HOSPADM

## 2022-01-01 RX ORDER — FERROUS SULFATE 325(65) MG
325 TABLET ORAL 2 TIMES DAILY
Status: DISCONTINUED | OUTPATIENT
Start: 2022-01-01 | End: 2022-01-01 | Stop reason: HOSPADM

## 2022-01-01 RX ORDER — POTASSIUM CHLORIDE 750 MG/1
10 TABLET, EXTENDED RELEASE ORAL DAILY
Status: ON HOLD | COMMUNITY
End: 2022-01-01

## 2022-01-01 RX ORDER — MAGNESIUM CARB/ALUMINUM HYDROX 105-160MG
296 TABLET,CHEWABLE ORAL ONCE
Status: DISCONTINUED | OUTPATIENT
Start: 2022-01-01 | End: 2022-01-01

## 2022-01-01 RX ORDER — ONDANSETRON 4 MG/1
4 TABLET, ORALLY DISINTEGRATING ORAL EVERY 30 MIN PRN
Status: DISCONTINUED | OUTPATIENT
Start: 2022-01-01 | End: 2022-01-01 | Stop reason: HOSPADM

## 2022-01-01 RX ORDER — METOPROLOL SUCCINATE 100 MG/1
100 TABLET, EXTENDED RELEASE ORAL ONCE
Status: COMPLETED | OUTPATIENT
Start: 2022-01-01 | End: 2022-01-01

## 2022-01-01 RX ORDER — POLYETHYLENE GLYCOL 3350 17 G/17G
17 POWDER, FOR SOLUTION ORAL DAILY PRN
Status: DISCONTINUED | OUTPATIENT
Start: 2022-01-01 | End: 2022-01-01 | Stop reason: HOSPADM

## 2022-01-01 RX ORDER — OXYBUTYNIN CHLORIDE 5 MG/1
5 TABLET, EXTENDED RELEASE ORAL DAILY
COMMUNITY

## 2022-01-01 RX ORDER — LORAZEPAM 0.5 MG/1
.5-1 TABLET ORAL 3 TIMES DAILY
Status: DISCONTINUED | OUTPATIENT
Start: 2022-01-01 | End: 2022-01-01 | Stop reason: HOSPADM

## 2022-01-01 RX ORDER — 3% SODIUM CHLORIDE 3 G/100ML
INJECTION, SOLUTION INTRAVENOUS CONTINUOUS
Status: DISCONTINUED | OUTPATIENT
Start: 2022-01-01 | End: 2022-01-01

## 2022-01-01 RX ORDER — LOPERAMIDE HCL 2 MG
2 CAPSULE ORAL 4 TIMES DAILY PRN
Qty: 60 CAPSULE | Refills: 0 | Status: SHIPPED | OUTPATIENT
Start: 2022-01-01

## 2022-01-01 RX ORDER — SUCRALFATE 1 G/1
1 TABLET ORAL
COMMUNITY
End: 2022-01-01

## 2022-01-01 RX ORDER — ONDANSETRON 2 MG/ML
4 INJECTION INTRAMUSCULAR; INTRAVENOUS
Status: DISCONTINUED | OUTPATIENT
Start: 2022-01-01 | End: 2022-01-01 | Stop reason: HOSPADM

## 2022-01-01 RX ORDER — ACETAMINOPHEN 325 MG/1
650 TABLET ORAL EVERY 6 HOURS PRN
Status: DISCONTINUED | OUTPATIENT
Start: 2022-01-01 | End: 2022-01-01 | Stop reason: HOSPADM

## 2022-01-01 RX ORDER — SUCRALFATE 1 G/1
1 TABLET ORAL
Status: DISCONTINUED | OUTPATIENT
Start: 2022-01-01 | End: 2022-01-01 | Stop reason: HOSPADM

## 2022-01-01 RX ORDER — CEPHALEXIN 500 MG/1
500 CAPSULE ORAL ONCE
Status: COMPLETED | OUTPATIENT
Start: 2022-01-01 | End: 2022-01-01

## 2022-01-01 RX ORDER — LIDOCAINE 40 MG/G
CREAM TOPICAL
Status: ACTIVE | OUTPATIENT
Start: 2022-01-01 | End: 2022-01-01

## 2022-01-01 RX ORDER — ACETAMINOPHEN 325 MG/1
650-975 TABLET ORAL EVERY 8 HOURS PRN
Status: DISCONTINUED | OUTPATIENT
Start: 2022-01-01 | End: 2022-01-01 | Stop reason: HOSPADM

## 2022-01-01 RX ORDER — AMOXICILLIN 250 MG
1 CAPSULE ORAL 2 TIMES DAILY
Status: DISCONTINUED | OUTPATIENT
Start: 2022-01-01 | End: 2022-01-01 | Stop reason: HOSPADM

## 2022-01-01 RX ORDER — DEXAMETHASONE SODIUM PHOSPHATE 10 MG/ML
6 INJECTION, SOLUTION INTRAMUSCULAR; INTRAVENOUS ONCE
Status: COMPLETED | OUTPATIENT
Start: 2022-01-01 | End: 2022-01-01

## 2022-01-01 RX ORDER — ONDANSETRON 4 MG/1
4 TABLET, ORALLY DISINTEGRATING ORAL EVERY 6 HOURS PRN
Status: CANCELLED | OUTPATIENT
Start: 2022-01-01

## 2022-01-01 RX ORDER — CETIRIZINE HYDROCHLORIDE 5 MG/1
5 TABLET ORAL EVERY EVENING
Status: DISCONTINUED | OUTPATIENT
Start: 2022-01-01 | End: 2022-01-01 | Stop reason: HOSPADM

## 2022-01-01 RX ORDER — OLANZAPINE 10 MG/2ML
5 INJECTION, POWDER, FOR SOLUTION INTRAMUSCULAR
Status: COMPLETED | OUTPATIENT
Start: 2022-01-01 | End: 2022-01-01

## 2022-01-01 RX ORDER — DIPHENHYDRAMINE HYDROCHLORIDE AND LIDOCAINE HYDROCHLORIDE AND ALUMINUM HYDROXIDE AND MAGNESIUM HYDRO
10 KIT ONCE
Status: COMPLETED | OUTPATIENT
Start: 2022-01-01 | End: 2022-01-01

## 2022-01-01 RX ORDER — PANTOPRAZOLE SODIUM 40 MG/1
40 TABLET, DELAYED RELEASE ORAL
Qty: 30 TABLET | Refills: 0 | Status: ON HOLD | OUTPATIENT
Start: 2022-01-01 | End: 2022-01-01

## 2022-01-01 RX ORDER — FUROSEMIDE 10 MG/ML
40 INJECTION INTRAMUSCULAR; INTRAVENOUS DAILY
Status: DISCONTINUED | OUTPATIENT
Start: 2022-01-01 | End: 2022-01-01

## 2022-01-01 RX ORDER — SODIUM CHLORIDE, SODIUM LACTATE, POTASSIUM CHLORIDE, CALCIUM CHLORIDE 600; 310; 30; 20 MG/100ML; MG/100ML; MG/100ML; MG/100ML
INJECTION, SOLUTION INTRAVENOUS CONTINUOUS PRN
Status: DISCONTINUED | OUTPATIENT
Start: 2022-01-01 | End: 2022-01-01

## 2022-01-01 RX ORDER — ACETAMINOPHEN 325 MG/1
975 TABLET ORAL 3 TIMES DAILY
Status: DISCONTINUED | OUTPATIENT
Start: 2022-01-01 | End: 2022-01-01 | Stop reason: HOSPADM

## 2022-01-01 RX ORDER — MULTIPLE VITAMINS W/ MINERALS TAB 9MG-400MCG
1 TAB ORAL DAILY
COMMUNITY
End: 2022-01-01

## 2022-01-01 RX ORDER — BISACODYL 10 MG
10 SUPPOSITORY, RECTAL RECTAL DAILY PRN
Status: DISCONTINUED | OUTPATIENT
Start: 2022-01-01 | End: 2022-01-01 | Stop reason: HOSPADM

## 2022-01-01 RX ORDER — PREDNISONE 20 MG/1
20 TABLET ORAL DAILY
Qty: 10 TABLET | Refills: 0 | Status: SHIPPED | OUTPATIENT
Start: 2022-01-01 | End: 2022-01-01

## 2022-01-01 RX ORDER — CEFTRIAXONE 1 G/1
1 INJECTION, POWDER, FOR SOLUTION INTRAMUSCULAR; INTRAVENOUS ONCE
Status: COMPLETED | OUTPATIENT
Start: 2022-01-01 | End: 2022-01-01

## 2022-01-01 RX ORDER — SODIUM CHLORIDE 9 MG/ML
INJECTION, SOLUTION INTRAVENOUS CONTINUOUS
Status: DISCONTINUED | OUTPATIENT
Start: 2022-01-01 | End: 2022-01-01

## 2022-01-01 RX ORDER — CALCIUM CARBONATE 500 MG/1
500 TABLET, CHEWABLE ORAL ONCE
Status: DISCONTINUED | OUTPATIENT
Start: 2022-01-01 | End: 2022-01-01 | Stop reason: HOSPADM

## 2022-01-01 RX ORDER — DULOXETIN HYDROCHLORIDE 20 MG/1
20 CAPSULE, DELAYED RELEASE ORAL DAILY
Status: DISCONTINUED | OUTPATIENT
Start: 2022-01-01 | End: 2022-01-01

## 2022-01-01 RX ORDER — IPRATROPIUM BROMIDE AND ALBUTEROL SULFATE 2.5; .5 MG/3ML; MG/3ML
3 SOLUTION RESPIRATORY (INHALATION) ONCE
Status: COMPLETED | OUTPATIENT
Start: 2022-01-01 | End: 2022-01-01

## 2022-01-01 RX ORDER — MULTIVITAMIN,THERAPEUTIC
1 TABLET ORAL DAILY
COMMUNITY
End: 2022-01-01

## 2022-01-01 RX ORDER — MORPHINE SULFATE 2 MG/ML
0.5 INJECTION, SOLUTION INTRAMUSCULAR; INTRAVENOUS EVERY 4 HOURS PRN
Status: DISCONTINUED | OUTPATIENT
Start: 2022-01-01 | End: 2022-01-01 | Stop reason: HOSPADM

## 2022-01-01 RX ORDER — CALCIUM CARBONATE 500 MG/1
500 TABLET, CHEWABLE ORAL EVERY 4 HOURS PRN
Status: DISCONTINUED | OUTPATIENT
Start: 2022-01-01 | End: 2022-01-01 | Stop reason: HOSPADM

## 2022-01-01 RX ORDER — BUMETANIDE 0.5 MG/1
0.5 TABLET ORAL
Status: DISCONTINUED | OUTPATIENT
Start: 2022-01-01 | End: 2022-01-01 | Stop reason: HOSPADM

## 2022-01-01 RX ORDER — LORAZEPAM 0.5 MG/1
0.5 TABLET ORAL EVERY 6 HOURS PRN
Status: DISCONTINUED | OUTPATIENT
Start: 2022-01-01 | End: 2022-01-01 | Stop reason: HOSPADM

## 2022-01-01 RX ORDER — AMILORIDE HYDROCHLORIDE 5 MG/1
5 TABLET ORAL DAILY
Status: DISCONTINUED | OUTPATIENT
Start: 2022-01-01 | End: 2022-01-01

## 2022-01-01 RX ORDER — MAGNESIUM OXIDE 400 MG/1
400 TABLET ORAL DAILY
Status: DISCONTINUED | OUTPATIENT
Start: 2022-01-01 | End: 2022-01-01 | Stop reason: HOSPADM

## 2022-01-01 RX ORDER — BUMETANIDE 0.25 MG/ML
1 INJECTION INTRAMUSCULAR; INTRAVENOUS ONCE
Status: DISCONTINUED | OUTPATIENT
Start: 2022-01-01 | End: 2022-01-01

## 2022-01-01 RX ORDER — CEFTRIAXONE 1 G/1
1 INJECTION, POWDER, FOR SOLUTION INTRAMUSCULAR; INTRAVENOUS EVERY 24 HOURS
Status: DISCONTINUED | OUTPATIENT
Start: 2022-01-01 | End: 2022-01-01

## 2022-01-01 RX ORDER — SUCRALFATE 1 G/1
1 TABLET ORAL
Status: DISCONTINUED | OUTPATIENT
Start: 2022-01-01 | End: 2022-01-01

## 2022-01-01 RX ORDER — ACETAMINOPHEN 650 MG/1
650 SUPPOSITORY RECTAL EVERY 6 HOURS PRN
Status: DISCONTINUED | OUTPATIENT
Start: 2022-01-01 | End: 2022-01-01 | Stop reason: ALTCHOICE

## 2022-01-01 RX ORDER — TOLVAPTAN 15 MG/1
15 TABLET ORAL ONCE
Status: COMPLETED | OUTPATIENT
Start: 2022-01-01 | End: 2022-01-01

## 2022-01-01 RX ORDER — PANTOPRAZOLE SODIUM 40 MG/1
40 TABLET, DELAYED RELEASE ORAL DAILY
COMMUNITY
Start: 2022-01-01

## 2022-01-01 RX ORDER — HYDROMORPHONE HYDROCHLORIDE 1 MG/ML
0.5 INJECTION, SOLUTION INTRAMUSCULAR; INTRAVENOUS; SUBCUTANEOUS EVERY 4 HOURS PRN
Status: DISCONTINUED | OUTPATIENT
Start: 2022-01-01 | End: 2022-01-01 | Stop reason: HOSPADM

## 2022-01-01 RX ORDER — PIPERACILLIN SODIUM, TAZOBACTAM SODIUM 3; .375 G/15ML; G/15ML
3.38 INJECTION, POWDER, LYOPHILIZED, FOR SOLUTION INTRAVENOUS EVERY 8 HOURS
Status: DISCONTINUED | OUTPATIENT
Start: 2022-01-01 | End: 2022-01-01 | Stop reason: DRUGHIGH

## 2022-01-01 RX ORDER — CHLORHEXIDINE GLUCONATE ORAL RINSE 1.2 MG/ML
15 SOLUTION DENTAL 2 TIMES DAILY
Status: DISCONTINUED | OUTPATIENT
Start: 2022-01-01 | End: 2022-01-01 | Stop reason: HOSPADM

## 2022-01-01 RX ORDER — NITROGLYCERIN 0.4 MG/1
0.4 TABLET SUBLINGUAL EVERY 5 MIN PRN
COMMUNITY

## 2022-01-01 RX ORDER — HEPARIN SODIUM 5000 [USP'U]/.5ML
5000 INJECTION, SOLUTION INTRAVENOUS; SUBCUTANEOUS EVERY 12 HOURS
Status: DISCONTINUED | OUTPATIENT
Start: 2022-01-01 | End: 2022-01-01 | Stop reason: HOSPADM

## 2022-01-01 RX ORDER — DULOXETIN HYDROCHLORIDE 20 MG/1
20 CAPSULE, DELAYED RELEASE ORAL
Status: DISCONTINUED | OUTPATIENT
Start: 2022-01-01 | End: 2022-01-01 | Stop reason: HOSPADM

## 2022-01-01 RX ORDER — PROPOFOL 10 MG/ML
INJECTION, EMULSION INTRAVENOUS CONTINUOUS PRN
Status: DISCONTINUED | OUTPATIENT
Start: 2022-01-01 | End: 2022-01-01

## 2022-01-01 RX ORDER — NYSTATIN 100000/ML
500000 SUSPENSION, ORAL (FINAL DOSE FORM) ORAL 4 TIMES DAILY
Qty: 60 ML | Refills: 0 | Status: ON HOLD | OUTPATIENT
Start: 2022-01-01 | End: 2022-01-01

## 2022-01-01 RX ORDER — SUCRALFATE 1 G/1
1 TABLET ORAL 4 TIMES DAILY
Qty: 30 TABLET | Refills: 0 | Status: SHIPPED | OUTPATIENT
Start: 2022-01-01

## 2022-01-01 RX ORDER — SODIUM CHLORIDE 9 MG/ML
INJECTION, SOLUTION INTRAVENOUS ONCE
Status: COMPLETED | OUTPATIENT
Start: 2022-01-01 | End: 2022-01-01

## 2022-01-01 RX ORDER — AMILORIDE HYDROCHLORIDE 5 MG/1
5 TABLET ORAL DAILY
Qty: 30 TABLET | Refills: 1 | Status: ON HOLD | OUTPATIENT
Start: 2022-01-01 | End: 2022-01-01

## 2022-01-01 RX ORDER — BUMETANIDE 1 MG/1
1 TABLET ORAL ONCE
Status: COMPLETED | OUTPATIENT
Start: 2022-01-01 | End: 2022-01-01

## 2022-01-01 RX ORDER — HYDROMORPHONE HYDROCHLORIDE 1 MG/ML
0.2 INJECTION, SOLUTION INTRAMUSCULAR; INTRAVENOUS; SUBCUTANEOUS EVERY 5 MIN PRN
Status: CANCELLED | OUTPATIENT
Start: 2022-01-01

## 2022-01-01 RX ORDER — HALOPERIDOL 5 MG/ML
2 INJECTION INTRAMUSCULAR EVERY 6 HOURS PRN
Status: DISCONTINUED | OUTPATIENT
Start: 2022-01-01 | End: 2022-01-01 | Stop reason: HOSPADM

## 2022-01-01 RX ORDER — OXYCODONE HYDROCHLORIDE 5 MG/1
5 TABLET ORAL EVERY 4 HOURS PRN
Qty: 10 TABLET | Refills: 0 | Status: SHIPPED | OUTPATIENT
Start: 2022-01-01

## 2022-01-01 RX ORDER — FERROUS SULFATE 325(65) MG
325 TABLET ORAL 2 TIMES DAILY
COMMUNITY
End: 2022-01-01

## 2022-01-01 RX ORDER — ACETAMINOPHEN 500 MG
500 TABLET ORAL 2 TIMES DAILY
COMMUNITY

## 2022-01-01 RX ORDER — MORPHINE SULFATE 4 MG/ML
4 INJECTION, SOLUTION INTRAMUSCULAR; INTRAVENOUS
Status: COMPLETED | OUTPATIENT
Start: 2022-01-01 | End: 2022-01-01

## 2022-01-01 RX ORDER — MAGNESIUM CARB/ALUMINUM HYDROX 105-160MG
296 TABLET,CHEWABLE ORAL ONCE
Status: COMPLETED | OUTPATIENT
Start: 2022-01-01 | End: 2022-01-01

## 2022-01-01 RX ORDER — CALCIUM CARBONATE 500 MG/1
1 TABLET, CHEWABLE ORAL EVERY 4 HOURS PRN
COMMUNITY

## 2022-01-01 RX ORDER — IOPAMIDOL 755 MG/ML
95 INJECTION, SOLUTION INTRAVASCULAR ONCE
Status: COMPLETED | OUTPATIENT
Start: 2022-01-01 | End: 2022-01-01

## 2022-01-01 RX ORDER — ACETAMINOPHEN 325 MG/1
975 TABLET ORAL EVERY 8 HOURS
Status: DISCONTINUED | OUTPATIENT
Start: 2022-01-01 | End: 2022-01-01 | Stop reason: HOSPADM

## 2022-01-01 RX ORDER — ACETAMINOPHEN 325 MG/1
650 TABLET ORAL EVERY 4 HOURS PRN
Status: DISCONTINUED | OUTPATIENT
Start: 2022-01-01 | End: 2022-01-01

## 2022-01-01 RX ORDER — LOPERAMIDE HCL 1 MG/7.5ML
2 SUSPENSION ORAL 4 TIMES DAILY PRN
Status: DISCONTINUED | OUTPATIENT
Start: 2022-01-01 | End: 2022-01-01 | Stop reason: HOSPADM

## 2022-01-01 RX ORDER — 3% SODIUM CHLORIDE 3 G/100ML
INJECTION, SOLUTION INTRAVENOUS CONTINUOUS
Status: DISPENSED | OUTPATIENT
Start: 2022-01-01 | End: 2022-01-01

## 2022-01-01 RX ORDER — FAMOTIDINE 20 MG/1
20 TABLET, FILM COATED ORAL 2 TIMES DAILY PRN
Status: DISCONTINUED | OUTPATIENT
Start: 2022-01-01 | End: 2022-01-01 | Stop reason: HOSPADM

## 2022-01-01 RX ORDER — PIPERACILLIN SODIUM, TAZOBACTAM SODIUM 3; .375 G/15ML; G/15ML
3.38 INJECTION, POWDER, LYOPHILIZED, FOR SOLUTION INTRAVENOUS ONCE
Status: DISCONTINUED | OUTPATIENT
Start: 2022-01-01 | End: 2022-01-01 | Stop reason: DRUGHIGH

## 2022-01-01 RX ORDER — SUCRALFATE ORAL 1 G/10ML
1 SUSPENSION ORAL
Status: DISCONTINUED | OUTPATIENT
Start: 2022-01-01 | End: 2022-01-01 | Stop reason: HOSPADM

## 2022-01-01 RX ORDER — HALOPERIDOL 5 MG/ML
2 INJECTION INTRAMUSCULAR ONCE
Status: COMPLETED | OUTPATIENT
Start: 2022-01-01 | End: 2022-01-01

## 2022-01-01 RX ORDER — HEPARIN SODIUM (PORCINE) LOCK FLUSH IV SOLN 100 UNIT/ML 100 UNIT/ML
30 SOLUTION INTRAVENOUS ONCE
Status: COMPLETED | OUTPATIENT
Start: 2022-01-01 | End: 2022-01-01

## 2022-01-01 RX ORDER — SODIUM CHLORIDE 1 G/1
1 TABLET ORAL 2 TIMES DAILY WITH MEALS
Qty: 60 TABLET | Refills: 3 | Status: SHIPPED | OUTPATIENT
Start: 2022-01-01

## 2022-01-01 RX ORDER — SUCRALFATE 1 G/1
1 TABLET ORAL 3 TIMES DAILY
Refills: 0 | COMMUNITY
Start: 2022-01-01 | End: 2022-01-01

## 2022-01-01 RX ORDER — POTASSIUM CHLORIDE 750 MG/1
20 TABLET, EXTENDED RELEASE ORAL ONCE
Status: COMPLETED | OUTPATIENT
Start: 2022-01-01 | End: 2022-01-01

## 2022-01-01 RX ORDER — LEVOTHYROXINE SODIUM 25 UG/1
50 TABLET ORAL
Status: DISCONTINUED | OUTPATIENT
Start: 2022-01-01 | End: 2022-01-01 | Stop reason: HOSPADM

## 2022-01-01 RX ORDER — ACETAMINOPHEN 650 MG/1
650 SUPPOSITORY RECTAL EVERY 6 HOURS PRN
Status: DISCONTINUED | OUTPATIENT
Start: 2022-01-01 | End: 2022-01-01 | Stop reason: HOSPADM

## 2022-01-01 RX ORDER — ACETAMINOPHEN 325 MG/1
975 TABLET ORAL EVERY 6 HOURS
Status: DISCONTINUED | OUTPATIENT
Start: 2022-01-01 | End: 2022-01-01

## 2022-01-01 RX ORDER — HYDROCODONE BITARTRATE AND ACETAMINOPHEN 5; 325 MG/1; MG/1
TABLET ORAL
Qty: 10 TABLET | Refills: 0 | Status: ON HOLD | OUTPATIENT
Start: 2022-01-01 | End: 2022-01-01

## 2022-01-01 RX ORDER — PIPERACILLIN SODIUM, TAZOBACTAM SODIUM 3; .375 G/15ML; G/15ML
3.38 INJECTION, POWDER, LYOPHILIZED, FOR SOLUTION INTRAVENOUS EVERY 8 HOURS
Status: DISCONTINUED | OUTPATIENT
Start: 2022-01-01 | End: 2022-01-01 | Stop reason: HOSPADM

## 2022-01-01 RX ORDER — FUROSEMIDE 10 MG/ML
10 INJECTION INTRAMUSCULAR; INTRAVENOUS DAILY
Status: DISCONTINUED | OUTPATIENT
Start: 2022-01-01 | End: 2022-01-01

## 2022-01-01 RX ORDER — FENTANYL CITRATE 50 UG/ML
25 INJECTION, SOLUTION INTRAMUSCULAR; INTRAVENOUS EVERY 5 MIN PRN
Status: CANCELLED | OUTPATIENT
Start: 2022-01-01

## 2022-01-01 RX ORDER — SUCRALFATE 1 G/1
1 TABLET ORAL 4 TIMES DAILY
Status: ON HOLD | COMMUNITY
End: 2022-01-01

## 2022-01-01 RX ADMIN — OLANZAPINE 5 MG: 10 INJECTION, POWDER, LYOPHILIZED, FOR SOLUTION INTRAMUSCULAR at 11:17

## 2022-01-01 RX ADMIN — SODIUM CHLORIDE TAB 1 GM 2 G: 1 TAB at 08:29

## 2022-01-01 RX ADMIN — CHLORHEXIDINE GLUCONATE 15 ML: 1.2 SOLUTION ORAL at 09:40

## 2022-01-01 RX ADMIN — THERA TABS 1 TABLET: TAB at 10:13

## 2022-01-01 RX ADMIN — FUROSEMIDE 10 MG: 10 INJECTION, SOLUTION INTRAMUSCULAR; INTRAVENOUS at 15:36

## 2022-01-01 RX ADMIN — FERROUS SULFATE TAB 325 MG (65 MG ELEMENTAL FE) 325 MG: 325 (65 FE) TAB at 19:50

## 2022-01-01 RX ADMIN — Medication 1 TABLET: at 21:50

## 2022-01-01 RX ADMIN — IOPAMIDOL 100 ML: 755 INJECTION, SOLUTION INTRAVENOUS at 11:07

## 2022-01-01 RX ADMIN — Medication 330 MG: at 22:03

## 2022-01-01 RX ADMIN — METOPROLOL SUCCINATE 50 MG: 50 TABLET, EXTENDED RELEASE ORAL at 08:55

## 2022-01-01 RX ADMIN — METOPROLOL SUCCINATE 50 MG: 25 TABLET, EXTENDED RELEASE ORAL at 11:00

## 2022-01-01 RX ADMIN — CEFTRIAXONE SODIUM 1 G: 1 INJECTION, POWDER, FOR SOLUTION INTRAMUSCULAR; INTRAVENOUS at 03:05

## 2022-01-01 RX ADMIN — DEXAMETHASONE 6 MG: 2 TABLET ORAL at 09:33

## 2022-01-01 RX ADMIN — BUMETANIDE 0.5 MG: 0.5 TABLET ORAL at 09:40

## 2022-01-01 RX ADMIN — ACETAMINOPHEN 650 MG: 325 TABLET, FILM COATED ORAL at 06:37

## 2022-01-01 RX ADMIN — Medication 1 TABLET: at 20:31

## 2022-01-01 RX ADMIN — SODIUM CHLORIDE: 9 INJECTION, SOLUTION INTRAVENOUS at 19:57

## 2022-01-01 RX ADMIN — METOPROLOL SUCCINATE 50 MG: 25 TABLET, EXTENDED RELEASE ORAL at 20:14

## 2022-01-01 RX ADMIN — SUCRALFATE 1 G: 1 SUSPENSION ORAL at 17:04

## 2022-01-01 RX ADMIN — MAGNESIUM OXIDE TAB 400 MG (241.3 MG ELEMENTAL MG) 400 MG: 400 (241.3 MG) TAB at 22:49

## 2022-01-01 RX ADMIN — ACETAMINOPHEN 650 MG: 325 TABLET, FILM COATED ORAL at 08:32

## 2022-01-01 RX ADMIN — HEPARIN SODIUM 5000 UNITS: 10000 INJECTION, SOLUTION INTRAVENOUS; SUBCUTANEOUS at 22:14

## 2022-01-01 RX ADMIN — LORAZEPAM 1 MG: 1 TABLET ORAL at 08:57

## 2022-01-01 RX ADMIN — SUCRALFATE 1 G: 1 TABLET ORAL at 22:13

## 2022-01-01 RX ADMIN — MAGNESIUM SULFATE IN WATER 2 G: 40 INJECTION, SOLUTION INTRAVENOUS at 13:06

## 2022-01-01 RX ADMIN — Medication 1 MG: at 20:17

## 2022-01-01 RX ADMIN — PIPERACILLIN AND TAZOBACTAM 3.38 G: 3; .375 INJECTION, POWDER, LYOPHILIZED, FOR SOLUTION INTRAVENOUS at 06:51

## 2022-01-01 RX ADMIN — THERA TABS 1 TABLET: TAB at 08:59

## 2022-01-01 RX ADMIN — Medication 1 TABLET: at 21:34

## 2022-01-01 RX ADMIN — LORAZEPAM 1 MG: 1 TABLET ORAL at 20:10

## 2022-01-01 RX ADMIN — FERROUS SULFATE TAB 325 MG (65 MG ELEMENTAL FE) 325 MG: 325 (65 FE) TAB at 19:55

## 2022-01-01 RX ADMIN — LEVOTHYROXINE SODIUM 50 MCG: 0.03 TABLET ORAL at 06:14

## 2022-01-01 RX ADMIN — LEVOTHYROXINE SODIUM 50 MCG: 0.03 TABLET ORAL at 07:52

## 2022-01-01 RX ADMIN — MAGNESIUM 64 MG (MAGNESIUM CHLORIDE) TABLET,DELAYED RELEASE 535 MG: at 09:06

## 2022-01-01 RX ADMIN — PIPERACILLIN SODIUM AND TAZOBACTAM SODIUM 3.38 G: 3; .375 INJECTION, POWDER, LYOPHILIZED, FOR SOLUTION INTRAVENOUS at 05:11

## 2022-01-01 RX ADMIN — PANTOPRAZOLE SODIUM 40 MG: 20 TABLET, DELAYED RELEASE ORAL at 07:52

## 2022-01-01 RX ADMIN — ACETAMINOPHEN 975 MG: 325 TABLET, FILM COATED ORAL at 18:31

## 2022-01-01 RX ADMIN — SODIUM CHLORIDE TAB 1 GM 2 G: 1 TAB at 10:59

## 2022-01-01 RX ADMIN — MAGNESIUM SULFATE HEPTAHYDRATE 2 G: 40 INJECTION, SOLUTION INTRAVENOUS at 17:52

## 2022-01-01 RX ADMIN — ACETAMINOPHEN 650 MG: 325 TABLET, FILM COATED ORAL at 17:13

## 2022-01-01 RX ADMIN — LORAZEPAM 1 MG: 1 TABLET ORAL at 10:21

## 2022-01-01 RX ADMIN — Medication 3 MG: at 20:32

## 2022-01-01 RX ADMIN — ALBUTEROL SULFATE 10 MG: 2.5 SOLUTION RESPIRATORY (INHALATION) at 10:19

## 2022-01-01 RX ADMIN — AMILORIDE HYDROCLORIDE 5 MG: 5 TABLET ORAL at 10:16

## 2022-01-01 RX ADMIN — LORAZEPAM 0.5 MG: 0.5 TABLET ORAL at 13:50

## 2022-01-01 RX ADMIN — LORAZEPAM 0.5 MG: 0.5 TABLET ORAL at 11:55

## 2022-01-01 RX ADMIN — VANCOMYCIN HYDROCHLORIDE 750 MG: 5 INJECTION, POWDER, LYOPHILIZED, FOR SOLUTION INTRAVENOUS at 22:54

## 2022-01-01 RX ADMIN — AMILORIDE HYDROCLORIDE 5 MG: 5 TABLET ORAL at 15:00

## 2022-01-01 RX ADMIN — LEVOTHYROXINE SODIUM 50 MCG: 0.03 TABLET ORAL at 04:28

## 2022-01-01 RX ADMIN — LEVOTHYROXINE SODIUM 50 MCG: 0.03 TABLET ORAL at 08:41

## 2022-01-01 RX ADMIN — FUROSEMIDE 20 MG: 20 TABLET ORAL at 08:46

## 2022-01-01 RX ADMIN — OXYCODONE HYDROCHLORIDE 2.5 MG: 5 TABLET ORAL at 01:03

## 2022-01-01 RX ADMIN — Medication 1 TABLET: at 09:43

## 2022-01-01 RX ADMIN — LEVOTHYROXINE SODIUM 50 MCG: 0.03 TABLET ORAL at 08:19

## 2022-01-01 RX ADMIN — METOPROLOL SUCCINATE 100 MG: 100 TABLET, EXTENDED RELEASE ORAL at 09:28

## 2022-01-01 RX ADMIN — SODIUM CHLORIDE TAB 1 GM 1 G: 1 TAB at 08:43

## 2022-01-01 RX ADMIN — POTASSIUM CHLORIDE 40 MEQ: 1500 TABLET, EXTENDED RELEASE ORAL at 08:26

## 2022-01-01 RX ADMIN — LORAZEPAM 1 MG: 1 TABLET ORAL at 08:26

## 2022-01-01 RX ADMIN — SODIUM CHLORIDE: 9 INJECTION, SOLUTION INTRAVENOUS at 20:26

## 2022-01-01 RX ADMIN — SODIUM CHLORIDE: 9 INJECTION, SOLUTION INTRAVENOUS at 01:52

## 2022-01-01 RX ADMIN — Medication 3 MG: at 20:51

## 2022-01-01 RX ADMIN — SUCRALFATE 1 G: 1 TABLET ORAL at 12:36

## 2022-01-01 RX ADMIN — Medication 325 MG: at 10:16

## 2022-01-01 RX ADMIN — METOPROLOL SUCCINATE 50 MG: 25 TABLET, EXTENDED RELEASE ORAL at 08:51

## 2022-01-01 RX ADMIN — PANTOPRAZOLE SODIUM 40 MG: 40 TABLET, DELAYED RELEASE ORAL at 10:37

## 2022-01-01 RX ADMIN — LEVOTHYROXINE SODIUM 50 MCG: 0.03 TABLET ORAL at 06:05

## 2022-01-01 RX ADMIN — CETIRIZINE HYDROCHLORIDE 5 MG: 5 TABLET ORAL at 09:42

## 2022-01-01 RX ADMIN — OXYBUTYNIN CHLORIDE 5 MG: 5 TABLET, EXTENDED RELEASE ORAL at 21:34

## 2022-01-01 RX ADMIN — LORAZEPAM 1 MG: 1 TABLET ORAL at 19:28

## 2022-01-01 RX ADMIN — METOPROLOL SUCCINATE 50 MG: 25 TABLET, EXTENDED RELEASE ORAL at 08:34

## 2022-01-01 RX ADMIN — POTASSIUM CHLORIDE 10 MEQ: 7.46 INJECTION, SOLUTION INTRAVENOUS at 10:47

## 2022-01-01 RX ADMIN — LEVOTHYROXINE SODIUM 50 MCG: 0.03 TABLET ORAL at 13:11

## 2022-01-01 RX ADMIN — NYSTATIN 500000 UNITS: 100000 SUSPENSION ORAL at 12:28

## 2022-01-01 RX ADMIN — Medication 2 SPRAY: at 12:01

## 2022-01-01 RX ADMIN — ACETAMINOPHEN 650 MG: 325 TABLET ORAL at 18:51

## 2022-01-01 RX ADMIN — LEVOTHYROXINE SODIUM 50 MCG: 0.03 TABLET ORAL at 08:56

## 2022-01-01 RX ADMIN — SODIUM CHLORIDE TAB 1 GM 2 G: 1 TAB at 20:30

## 2022-01-01 RX ADMIN — BUMETANIDE 0.5 MG: 0.5 TABLET ORAL at 08:42

## 2022-01-01 RX ADMIN — CEFTRIAXONE SODIUM 2 G: 2 INJECTION, POWDER, FOR SOLUTION INTRAMUSCULAR; INTRAVENOUS at 18:57

## 2022-01-01 RX ADMIN — ACETAMINOPHEN 650 MG: 325 TABLET ORAL at 12:54

## 2022-01-01 RX ADMIN — PIPERACILLIN AND TAZOBACTAM 3.38 G: 3; .375 INJECTION, POWDER, LYOPHILIZED, FOR SOLUTION INTRAVENOUS at 19:11

## 2022-01-01 RX ADMIN — Medication 23.1 MCI.: at 12:56

## 2022-01-01 RX ADMIN — PIPERACILLIN AND TAZOBACTAM 3.38 G: 3; .375 INJECTION, POWDER, LYOPHILIZED, FOR SOLUTION INTRAVENOUS at 06:42

## 2022-01-01 RX ADMIN — DULOXETINE HYDROCHLORIDE 20 MG: 20 CAPSULE, DELAYED RELEASE ORAL at 08:30

## 2022-01-01 RX ADMIN — LORAZEPAM 0.5 MG: 0.5 TABLET ORAL at 14:36

## 2022-01-01 RX ADMIN — PANTOPRAZOLE SODIUM 40 MG: 40 TABLET, DELAYED RELEASE ORAL at 14:17

## 2022-01-01 RX ADMIN — LORAZEPAM 0.5 MG: 0.5 TABLET ORAL at 13:45

## 2022-01-01 RX ADMIN — PIPERACILLIN AND TAZOBACTAM 3.38 G: 3; .375 INJECTION, POWDER, LYOPHILIZED, FOR SOLUTION INTRAVENOUS at 22:43

## 2022-01-01 RX ADMIN — LEVOTHYROXINE SODIUM 50 MCG: 0.03 TABLET ORAL at 07:09

## 2022-01-01 RX ADMIN — ACETAMINOPHEN 975 MG: 325 TABLET, FILM COATED ORAL at 02:33

## 2022-01-01 RX ADMIN — METOPROLOL SUCCINATE 50 MG: 25 TABLET, EXTENDED RELEASE ORAL at 19:55

## 2022-01-01 RX ADMIN — NYSTATIN 500000 UNITS: 100000 SUSPENSION ORAL at 20:13

## 2022-01-01 RX ADMIN — LORAZEPAM 1 MG: 1 TABLET ORAL at 08:37

## 2022-01-01 RX ADMIN — METOPROLOL SUCCINATE 50 MG: 50 TABLET, EXTENDED RELEASE ORAL at 08:41

## 2022-01-01 RX ADMIN — LEVOTHYROXINE SODIUM 50 MCG: 0.03 TABLET ORAL at 08:28

## 2022-01-01 RX ADMIN — PIPERACILLIN AND TAZOBACTAM 3.38 G: 3; .375 INJECTION, POWDER, LYOPHILIZED, FOR SOLUTION INTRAVENOUS at 21:04

## 2022-01-01 RX ADMIN — PIPERACILLIN AND TAZOBACTAM 3.38 G: 3; .375 INJECTION, POWDER, LYOPHILIZED, FOR SOLUTION INTRAVENOUS at 19:49

## 2022-01-01 RX ADMIN — METOPROLOL SUCCINATE 50 MG: 25 TABLET, EXTENDED RELEASE ORAL at 07:58

## 2022-01-01 RX ADMIN — SODIUM CHLORIDE TAB 1 GM 2 G: 1 TAB at 14:57

## 2022-01-01 RX ADMIN — FUROSEMIDE 20 MG: 20 TABLET ORAL at 08:54

## 2022-01-01 RX ADMIN — DEXTROSE MONOHYDRATE 25 G: 25 INJECTION, SOLUTION INTRAVENOUS at 17:25

## 2022-01-01 RX ADMIN — NYSTATIN 500000 UNITS: 100000 SUSPENSION ORAL at 17:10

## 2022-01-01 RX ADMIN — Medication 400 MG: at 17:09

## 2022-01-01 RX ADMIN — BUMETANIDE 1 MG: 0.25 INJECTION, SOLUTION INTRAMUSCULAR; INTRAVENOUS at 02:44

## 2022-01-01 RX ADMIN — SODIUM CHLORIDE: 9 INJECTION, SOLUTION INTRAVENOUS at 03:37

## 2022-01-01 RX ADMIN — REMDESIVIR 100 MG: 100 INJECTION, POWDER, LYOPHILIZED, FOR SOLUTION INTRAVENOUS at 18:11

## 2022-01-01 RX ADMIN — SODIUM CHLORIDE: 9 INJECTION, SOLUTION INTRAVENOUS at 17:36

## 2022-01-01 RX ADMIN — FERROUS SULFATE TAB 325 MG (65 MG ELEMENTAL FE) 325 MG: 325 (65 FE) TAB at 19:09

## 2022-01-01 RX ADMIN — HYDROXYZINE HYDROCHLORIDE 25 MG: 25 TABLET, FILM COATED ORAL at 20:41

## 2022-01-01 RX ADMIN — SODIUM CHLORIDE, POTASSIUM CHLORIDE, SODIUM LACTATE AND CALCIUM CHLORIDE: 600; 310; 30; 20 INJECTION, SOLUTION INTRAVENOUS at 16:45

## 2022-01-01 RX ADMIN — CHLORHEXIDINE GLUCONATE 15 ML: 1.2 SOLUTION ORAL at 09:50

## 2022-01-01 RX ADMIN — LORAZEPAM 0.5 MG: 0.5 TABLET ORAL at 14:22

## 2022-01-01 RX ADMIN — LORAZEPAM 0.5 MG: 0.5 TABLET ORAL at 13:24

## 2022-01-01 RX ADMIN — BUMETANIDE 1 MG: 1 TABLET ORAL at 09:00

## 2022-01-01 RX ADMIN — SIMETHICONE 80 MG: 80 TABLET, CHEWABLE ORAL at 05:12

## 2022-01-01 RX ADMIN — NYSTATIN 500000 UNITS: 100000 SUSPENSION ORAL at 13:10

## 2022-01-01 RX ADMIN — ACETAMINOPHEN 500 MG: 500 TABLET ORAL at 04:29

## 2022-01-01 RX ADMIN — LORAZEPAM 0.5 MG: 0.5 TABLET ORAL at 14:05

## 2022-01-01 RX ADMIN — SUCRALFATE 1 G: 1 TABLET ORAL at 12:25

## 2022-01-01 RX ADMIN — LEVOTHYROXINE SODIUM 50 MCG: 0.03 TABLET ORAL at 05:33

## 2022-01-01 RX ADMIN — POTASSIUM CHLORIDE 20 MEQ: 1500 TABLET, EXTENDED RELEASE ORAL at 08:24

## 2022-01-01 RX ADMIN — VANCOMYCIN HYDROCHLORIDE 1000 MG: 1 INJECTION, SOLUTION INTRAVENOUS at 23:12

## 2022-01-01 RX ADMIN — SUCRALFATE 1 G: 1 TABLET ORAL at 09:28

## 2022-01-01 RX ADMIN — MAGNESIUM SULFATE HEPTAHYDRATE 2 G: 40 INJECTION, SOLUTION INTRAVENOUS at 21:04

## 2022-01-01 RX ADMIN — PANTOPRAZOLE SODIUM 40 MG: 40 INJECTION, POWDER, FOR SOLUTION INTRAVENOUS at 21:50

## 2022-01-01 RX ADMIN — LOPERAMIDE HYDROCHLORIDE 2 MG: 2 CAPSULE ORAL at 06:04

## 2022-01-01 RX ADMIN — OXYCODONE HYDROCHLORIDE 5 MG: 5 TABLET ORAL at 10:34

## 2022-01-01 RX ADMIN — Medication 250 MCG: at 09:27

## 2022-01-01 RX ADMIN — SODIUM CHLORIDE TAB 1 GM 2 G: 1 TAB at 13:43

## 2022-01-01 RX ADMIN — PANTOPRAZOLE SODIUM 40 MG: 40 TABLET, DELAYED RELEASE ORAL at 02:37

## 2022-01-01 RX ADMIN — Medication 1 MG: at 22:34

## 2022-01-01 RX ADMIN — BUMETANIDE 0.5 MG: 0.5 TABLET ORAL at 08:28

## 2022-01-01 RX ADMIN — PANTOPRAZOLE SODIUM 40 MG: 40 TABLET, DELAYED RELEASE ORAL at 09:01

## 2022-01-01 RX ADMIN — ACETAMINOPHEN 650 MG: 325 TABLET, FILM COATED ORAL at 20:11

## 2022-01-01 RX ADMIN — BUMETANIDE 0.5 MG: 0.5 TABLET ORAL at 08:36

## 2022-01-01 RX ADMIN — METOPROLOL SUCCINATE 50 MG: 25 TABLET, EXTENDED RELEASE ORAL at 19:18

## 2022-01-01 RX ADMIN — AMILORIDE HYDROCLORIDE 5 MG: 5 TABLET ORAL at 08:39

## 2022-01-01 RX ADMIN — PANTOPRAZOLE SODIUM 40 MG: 40 INJECTION, POWDER, FOR SOLUTION INTRAVENOUS at 20:35

## 2022-01-01 RX ADMIN — CHLORHEXIDINE GLUCONATE 15 ML: 1.2 SOLUTION ORAL at 21:50

## 2022-01-01 RX ADMIN — ACETAMINOPHEN 650 MG: 325 TABLET ORAL at 00:26

## 2022-01-01 RX ADMIN — PANTOPRAZOLE SODIUM 40 MG: 20 TABLET, DELAYED RELEASE ORAL at 08:46

## 2022-01-01 RX ADMIN — PANTOPRAZOLE SODIUM 40 MG: 40 TABLET, DELAYED RELEASE ORAL at 06:51

## 2022-01-01 RX ADMIN — LORAZEPAM 1 MG: 1 TABLET ORAL at 10:22

## 2022-01-01 RX ADMIN — Medication 3 MG: at 21:34

## 2022-01-01 RX ADMIN — POTASSIUM CHLORIDE 10 MEQ: 750 TABLET, EXTENDED RELEASE ORAL at 20:46

## 2022-01-01 RX ADMIN — LEVOTHYROXINE SODIUM 50 MCG: 0.03 TABLET ORAL at 05:58

## 2022-01-01 RX ADMIN — Medication 200 MG: at 10:08

## 2022-01-01 RX ADMIN — SODIUM CHLORIDE: 9 INJECTION, SOLUTION INTRAVENOUS at 23:54

## 2022-01-01 RX ADMIN — METOPROLOL SUCCINATE 100 MG: 100 TABLET, EXTENDED RELEASE ORAL at 10:06

## 2022-01-01 RX ADMIN — NYSTATIN 500000 UNITS: 100000 SUSPENSION ORAL at 13:59

## 2022-01-01 RX ADMIN — PROPOFOL 40 MG: 10 INJECTION, EMULSION INTRAVENOUS at 16:50

## 2022-01-01 RX ADMIN — Medication 250 MCG: at 09:12

## 2022-01-01 RX ADMIN — SODIUM CHLORIDE 500 ML: 9 INJECTION, SOLUTION INTRAVENOUS at 01:52

## 2022-01-01 RX ADMIN — PANTOPRAZOLE SODIUM 40 MG: 40 TABLET, DELAYED RELEASE ORAL at 09:33

## 2022-01-01 RX ADMIN — LORAZEPAM 1 MG: 1 TABLET ORAL at 22:02

## 2022-01-01 RX ADMIN — SODIUM CHLORIDE TAB 1 GM 2 G: 1 TAB at 12:45

## 2022-01-01 RX ADMIN — PROPOFOL 100 MCG/KG/MIN: 10 INJECTION, EMULSION INTRAVENOUS at 16:50

## 2022-01-01 RX ADMIN — POTASSIUM CHLORIDE 10 MEQ: 750 TABLET, EXTENDED RELEASE ORAL at 08:30

## 2022-01-01 RX ADMIN — NYSTATIN 500000 UNITS: 100000 SUSPENSION ORAL at 17:33

## 2022-01-01 RX ADMIN — IOPAMIDOL 75 ML: 755 INJECTION, SOLUTION INTRAVENOUS at 14:27

## 2022-01-01 RX ADMIN — PANTOPRAZOLE SODIUM 40 MG: 40 TABLET, DELAYED RELEASE ORAL at 08:19

## 2022-01-01 RX ADMIN — Medication 250 MCG: at 08:58

## 2022-01-01 RX ADMIN — METOPROLOL SUCCINATE 50 MG: 25 TABLET, EXTENDED RELEASE ORAL at 20:29

## 2022-01-01 RX ADMIN — ENOXAPARIN SODIUM 25 MG: 300 INJECTION INTRAVENOUS; SUBCUTANEOUS at 10:23

## 2022-01-01 RX ADMIN — LORAZEPAM 1 MG: 1 TABLET ORAL at 08:41

## 2022-01-01 RX ADMIN — CETIRIZINE HYDROCHLORIDE 5 MG: 5 TABLET ORAL at 08:28

## 2022-01-01 RX ADMIN — PROPOFOL 100 MCG/KG/MIN: 10 INJECTION, EMULSION INTRAVENOUS at 16:12

## 2022-01-01 RX ADMIN — POTASSIUM CHLORIDE 10 MEQ: 7.46 INJECTION, SOLUTION INTRAVENOUS at 11:53

## 2022-01-01 RX ADMIN — SODIUM CHLORIDE TAB 1 GM 1 G: 1 TAB at 17:32

## 2022-01-01 RX ADMIN — LEVOTHYROXINE SODIUM 50 MCG: 0.03 TABLET ORAL at 07:40

## 2022-01-01 RX ADMIN — SODIUM CHLORIDE 4.54 UNITS: 9 INJECTION, SOLUTION INTRAVENOUS at 01:38

## 2022-01-01 RX ADMIN — SODIUM CHLORIDE TAB 1 GM 2 G: 1 TAB at 20:31

## 2022-01-01 RX ADMIN — LORAZEPAM 1 MG: 1 TABLET ORAL at 20:51

## 2022-01-01 RX ADMIN — AZITHROMYCIN DIHYDRATE 500 MG: 500 INJECTION, POWDER, LYOPHILIZED, FOR SOLUTION INTRAVENOUS at 05:46

## 2022-01-01 RX ADMIN — Medication 1 TABLET: at 08:49

## 2022-01-01 RX ADMIN — CETIRIZINE HYDROCHLORIDE 5 MG: 5 TABLET ORAL at 08:39

## 2022-01-01 RX ADMIN — ACETAMINOPHEN 325 MG: 325 TABLET, FILM COATED ORAL at 08:33

## 2022-01-01 RX ADMIN — PIPERACILLIN AND TAZOBACTAM 3.38 G: 3; .375 INJECTION, POWDER, LYOPHILIZED, FOR SOLUTION INTRAVENOUS at 04:42

## 2022-01-01 RX ADMIN — PIPERACILLIN SODIUM AND TAZOBACTAM SODIUM 3.38 G: 3; .375 INJECTION, POWDER, LYOPHILIZED, FOR SOLUTION INTRAVENOUS at 20:46

## 2022-01-01 RX ADMIN — SODIUM CHLORIDE: 9 INJECTION, SOLUTION INTRAVENOUS at 02:03

## 2022-01-01 RX ADMIN — CETIRIZINE HYDROCHLORIDE 5 MG: 5 TABLET ORAL at 09:05

## 2022-01-01 RX ADMIN — NYSTATIN 500000 UNITS: 100000 SUSPENSION ORAL at 16:49

## 2022-01-01 RX ADMIN — CETIRIZINE HYDROCHLORIDE 5 MG: 5 TABLET ORAL at 10:13

## 2022-01-01 RX ADMIN — LORAZEPAM 1 MG: 0.5 TABLET ORAL at 07:50

## 2022-01-01 RX ADMIN — METOPROLOL SUCCINATE 50 MG: 25 TABLET, EXTENDED RELEASE ORAL at 13:15

## 2022-01-01 RX ADMIN — FERROUS SULFATE TAB 325 MG (65 MG ELEMENTAL FE) 325 MG: 325 (65 FE) TAB at 19:28

## 2022-01-01 RX ADMIN — LORAZEPAM 1 MG: 1 TABLET ORAL at 21:35

## 2022-01-01 RX ADMIN — SUCRALFATE 1 G: 1 TABLET ORAL at 18:20

## 2022-01-01 RX ADMIN — Medication 250 MCG: at 10:24

## 2022-01-01 RX ADMIN — PANTOPRAZOLE SODIUM 40 MG: 40 TABLET, DELAYED RELEASE ORAL at 08:43

## 2022-01-01 RX ADMIN — LORAZEPAM 1 MG: 1 TABLET ORAL at 10:54

## 2022-01-01 RX ADMIN — NYSTATIN 500000 UNITS: 100000 SUSPENSION ORAL at 12:00

## 2022-01-01 RX ADMIN — MORPHINE SULFATE 2 MG: 2 INJECTION, SOLUTION INTRAMUSCULAR; INTRAVENOUS at 23:39

## 2022-01-01 RX ADMIN — PIPERACILLIN SODIUM AND TAZOBACTAM SODIUM 3.38 G: 3; .375 INJECTION, POWDER, LYOPHILIZED, FOR SOLUTION INTRAVENOUS at 01:09

## 2022-01-01 RX ADMIN — METOPROLOL SUCCINATE 50 MG: 50 TABLET, EXTENDED RELEASE ORAL at 14:21

## 2022-01-01 RX ADMIN — NYSTATIN 500000 UNITS: 100000 SUSPENSION ORAL at 20:00

## 2022-01-01 RX ADMIN — SUCRALFATE 1 G: 1 TABLET ORAL at 11:54

## 2022-01-01 RX ADMIN — POTASSIUM CHLORIDE 40 MEQ: 1500 TABLET, EXTENDED RELEASE ORAL at 12:36

## 2022-01-01 RX ADMIN — CETIRIZINE HYDROCHLORIDE 5 MG: 5 TABLET ORAL at 08:59

## 2022-01-01 RX ADMIN — LORAZEPAM 1 MG: 1 TABLET ORAL at 12:19

## 2022-01-01 RX ADMIN — METOPROLOL SUCCINATE 50 MG: 25 TABLET, EXTENDED RELEASE ORAL at 08:38

## 2022-01-01 RX ADMIN — ACETAMINOPHEN 975 MG: 325 TABLET, FILM COATED ORAL at 19:51

## 2022-01-01 RX ADMIN — BUMETANIDE 0.5 MG: 0.5 TABLET ORAL at 11:00

## 2022-01-01 RX ADMIN — Medication 325 MG: at 08:47

## 2022-01-01 RX ADMIN — HEPARIN SODIUM 5000 UNITS: 5000 INJECTION, SOLUTION INTRAVENOUS; SUBCUTANEOUS at 00:30

## 2022-01-01 RX ADMIN — ENOXAPARIN SODIUM 25 MG: 300 INJECTION INTRAVENOUS; SUBCUTANEOUS at 21:34

## 2022-01-01 RX ADMIN — LORAZEPAM 1 MG: 1 TABLET ORAL at 08:44

## 2022-01-01 RX ADMIN — FERROUS SULFATE TAB 325 MG (65 MG ELEMENTAL FE) 325 MG: 325 (65 FE) TAB at 09:41

## 2022-01-01 RX ADMIN — PANTOPRAZOLE SODIUM 40 MG: 20 TABLET, DELAYED RELEASE ORAL at 08:22

## 2022-01-01 RX ADMIN — ACETAMINOPHEN 975 MG: 325 TABLET, FILM COATED ORAL at 01:03

## 2022-01-01 RX ADMIN — METOPROLOL SUCCINATE 100 MG: 100 TABLET, EXTENDED RELEASE ORAL at 08:48

## 2022-01-01 RX ADMIN — Medication 3 MG: at 20:12

## 2022-01-01 RX ADMIN — SODIUM CHLORIDE TAB 1 GM 1 G: 1 TAB at 14:56

## 2022-01-01 RX ADMIN — Medication 3 MG: at 22:02

## 2022-01-01 RX ADMIN — BUMETANIDE 0.5 MG: 0.5 TABLET ORAL at 11:28

## 2022-01-01 RX ADMIN — LEVOTHYROXINE SODIUM 50 MCG: 0.03 TABLET ORAL at 10:17

## 2022-01-01 RX ADMIN — BUMETANIDE 0.5 MG: 0.5 TABLET ORAL at 10:08

## 2022-01-01 RX ADMIN — FERROUS SULFATE TAB 325 MG (65 MG ELEMENTAL FE) 325 MG: 325 (65 FE) TAB at 20:29

## 2022-01-01 RX ADMIN — OXYBUTYNIN CHLORIDE 5 MG: 5 TABLET, EXTENDED RELEASE ORAL at 09:33

## 2022-01-01 RX ADMIN — CETIRIZINE HYDROCHLORIDE 5 MG: 5 TABLET ORAL at 08:29

## 2022-01-01 RX ADMIN — POTASSIUM CHLORIDE 10 MEQ: 750 TABLET, EXTENDED RELEASE ORAL at 20:51

## 2022-01-01 RX ADMIN — PREDNISONE 20 MG: 20 TABLET ORAL at 08:25

## 2022-01-01 RX ADMIN — ACETAMINOPHEN 975 MG: 325 TABLET, FILM COATED ORAL at 13:42

## 2022-01-01 RX ADMIN — LORAZEPAM 0.5 MG: 0.5 TABLET ORAL at 12:09

## 2022-01-01 RX ADMIN — ACETAMINOPHEN 650 MG: 325 TABLET ORAL at 00:37

## 2022-01-01 RX ADMIN — SODIUM CHLORIDE TAB 1 GM 1 G: 1 TAB at 16:47

## 2022-01-01 RX ADMIN — SUCRALFATE 1 G: 1 TABLET ORAL at 09:12

## 2022-01-01 RX ADMIN — SODIUM CHLORIDE TAB 1 GM 2 G: 1 TAB at 08:09

## 2022-01-01 RX ADMIN — Medication 2 SPRAY: at 14:43

## 2022-01-01 RX ADMIN — POTASSIUM CHLORIDE 10 MEQ: 7.46 INJECTION, SOLUTION INTRAVENOUS at 17:52

## 2022-01-01 RX ADMIN — OXYCODONE HYDROCHLORIDE 5 MG: 5 TABLET ORAL at 09:55

## 2022-01-01 RX ADMIN — Medication 1 CAPSULE: at 13:45

## 2022-01-01 RX ADMIN — SODIUM CHLORIDE 5 UNITS: 9 INJECTION, SOLUTION INTRAVENOUS at 17:28

## 2022-01-01 RX ADMIN — FUROSEMIDE 20 MG: 20 TABLET ORAL at 16:50

## 2022-01-01 RX ADMIN — MAGNESIUM SULFATE IN WATER 2 G: 40 INJECTION, SOLUTION INTRAVENOUS at 16:46

## 2022-01-01 RX ADMIN — MAGNESIUM SULFATE HEPTAHYDRATE 2 G: 40 INJECTION, SOLUTION INTRAVENOUS at 09:14

## 2022-01-01 RX ADMIN — METOPROLOL SUCCINATE 50 MG: 25 TABLET, EXTENDED RELEASE ORAL at 10:17

## 2022-01-01 RX ADMIN — ACETAMINOPHEN 975 MG: 325 TABLET, FILM COATED ORAL at 19:09

## 2022-01-01 RX ADMIN — PREDNISONE 20 MG: 20 TABLET ORAL at 08:36

## 2022-01-01 RX ADMIN — SUCRALFATE 1 G: 1 TABLET ORAL at 20:12

## 2022-01-01 RX ADMIN — HYDROCODONE BITARTRATE AND ACETAMINOPHEN 1 TABLET: 5; 325 TABLET ORAL at 21:49

## 2022-01-01 RX ADMIN — LIDOCAINE HYDROCHLORIDE 1 ML: 10 INJECTION, SOLUTION EPIDURAL; INFILTRATION; INTRACAUDAL; PERINEURAL at 17:54

## 2022-01-01 RX ADMIN — DEXTROSE MONOHYDRATE 300 ML: 100 INJECTION, SOLUTION INTRAVENOUS at 01:42

## 2022-01-01 RX ADMIN — Medication 250 MCG: at 09:00

## 2022-01-01 RX ADMIN — DEXAMETHASONE 6 MG: 2 TABLET ORAL at 08:50

## 2022-01-01 RX ADMIN — POTASSIUM CHLORIDE 40 MEQ: 1500 TABLET, EXTENDED RELEASE ORAL at 08:22

## 2022-01-01 RX ADMIN — SODIUM CHLORIDE 750 ML: 9 INJECTION, SOLUTION INTRAVENOUS at 07:46

## 2022-01-01 RX ADMIN — IPRATROPIUM BROMIDE AND ALBUTEROL SULFATE 3 ML: .5; 3 SOLUTION RESPIRATORY (INHALATION) at 02:39

## 2022-01-01 RX ADMIN — LORAZEPAM 1 MG: 1 TABLET ORAL at 20:28

## 2022-01-01 RX ADMIN — ACETAMINOPHEN 650 MG: 325 TABLET, FILM COATED ORAL at 20:49

## 2022-01-01 RX ADMIN — BUMETANIDE 0.5 MG: 0.5 TABLET ORAL at 15:00

## 2022-01-01 RX ADMIN — AMILORIDE HYDROCLORIDE 5 MG: 5 TABLET ORAL at 09:44

## 2022-01-01 RX ADMIN — DEXTROSE MONOHYDRATE 25 G: 25 INJECTION, SOLUTION INTRAVENOUS at 01:33

## 2022-01-01 RX ADMIN — ACETAMINOPHEN 500 MG: 500 TABLET ORAL at 20:31

## 2022-01-01 RX ADMIN — FUROSEMIDE 40 MG: 10 INJECTION, SOLUTION INTRAVENOUS at 13:09

## 2022-01-01 RX ADMIN — SUCRALFATE 1 G: 1 SUSPENSION ORAL at 09:45

## 2022-01-01 RX ADMIN — PREDNISONE 20 MG: 20 TABLET ORAL at 08:16

## 2022-01-01 RX ADMIN — ACETAMINOPHEN 975 MG: 325 TABLET, FILM COATED ORAL at 08:35

## 2022-01-01 RX ADMIN — LORAZEPAM 1 MG: 1 TABLET ORAL at 19:20

## 2022-01-01 RX ADMIN — Medication 1 TABLET: at 10:14

## 2022-01-01 RX ADMIN — Medication 200 MG: at 20:49

## 2022-01-01 RX ADMIN — SUCRALFATE 1 G: 1 TABLET ORAL at 22:03

## 2022-01-01 RX ADMIN — Medication 1 MG: at 20:06

## 2022-01-01 RX ADMIN — ACETAMINOPHEN 500 MG: 500 TABLET ORAL at 06:58

## 2022-01-01 RX ADMIN — LORAZEPAM 0.5 MG: 0.5 TABLET ORAL at 15:00

## 2022-01-01 RX ADMIN — POTASSIUM CHLORIDE 10 MEQ: 7.46 INJECTION, SOLUTION INTRAVENOUS at 13:57

## 2022-01-01 RX ADMIN — SODIUM CHLORIDE TAB 1 GM 2 G: 1 TAB at 22:01

## 2022-01-01 RX ADMIN — LEVOTHYROXINE SODIUM 50 MCG: 0.03 TABLET ORAL at 09:38

## 2022-01-01 RX ADMIN — METOPROLOL SUCCINATE 50 MG: 50 TABLET, EXTENDED RELEASE ORAL at 20:54

## 2022-01-01 RX ADMIN — LEVOTHYROXINE SODIUM 50 MCG: 0.03 TABLET ORAL at 06:42

## 2022-01-01 RX ADMIN — PANTOPRAZOLE SODIUM 40 MG: 40 INJECTION, POWDER, FOR SOLUTION INTRAVENOUS at 12:22

## 2022-01-01 RX ADMIN — PANTOPRAZOLE SODIUM 40 MG: 40 INJECTION, POWDER, FOR SOLUTION INTRAVENOUS at 08:38

## 2022-01-01 RX ADMIN — Medication 2 SPRAY: at 18:23

## 2022-01-01 RX ADMIN — CHLORHEXIDINE GLUCONATE 15 ML: 1.2 SOLUTION ORAL at 08:59

## 2022-01-01 RX ADMIN — LORAZEPAM 1 MG: 1 TABLET ORAL at 08:43

## 2022-01-01 RX ADMIN — METOPROLOL SUCCINATE 50 MG: 25 TABLET, EXTENDED RELEASE ORAL at 22:49

## 2022-01-01 RX ADMIN — REMDESIVIR 200 MG: 100 INJECTION, POWDER, LYOPHILIZED, FOR SOLUTION INTRAVENOUS at 20:54

## 2022-01-01 RX ADMIN — PANTOPRAZOLE SODIUM 40 MG: 40 TABLET, DELAYED RELEASE ORAL at 10:14

## 2022-01-01 RX ADMIN — PANTOPRAZOLE SODIUM 40 MG: 40 TABLET, DELAYED RELEASE ORAL at 17:13

## 2022-01-01 RX ADMIN — ENOXAPARIN SODIUM 25 MG: 300 INJECTION INTRAVENOUS; SUBCUTANEOUS at 10:38

## 2022-01-01 RX ADMIN — POTASSIUM CHLORIDE 20 MEQ: 750 TABLET, EXTENDED RELEASE ORAL at 04:36

## 2022-01-01 RX ADMIN — LORAZEPAM 0.5 MG: 0.5 TABLET ORAL at 13:43

## 2022-01-01 RX ADMIN — NYSTATIN 500000 UNITS: 100000 SUSPENSION ORAL at 16:16

## 2022-01-01 RX ADMIN — DULOXETINE HYDROCHLORIDE 20 MG: 20 CAPSULE, DELAYED RELEASE ORAL at 16:52

## 2022-01-01 RX ADMIN — PIPERACILLIN AND TAZOBACTAM 3.38 G: 3; .375 INJECTION, POWDER, LYOPHILIZED, FOR SOLUTION INTRAVENOUS at 14:55

## 2022-01-01 RX ADMIN — POTASSIUM CHLORIDE 10 MEQ: 7.46 INJECTION, SOLUTION INTRAVENOUS at 13:58

## 2022-01-01 RX ADMIN — LORAZEPAM 1 MG: 1 TABLET ORAL at 09:41

## 2022-01-01 RX ADMIN — SODIUM CHLORIDE TAB 1 GM 2 G: 1 TAB at 21:34

## 2022-01-01 RX ADMIN — SUCRALFATE 1 G: 1 SUSPENSION ORAL at 20:32

## 2022-01-01 RX ADMIN — AMILORIDE HYDROCLORIDE 5 MG: 5 TABLET ORAL at 08:41

## 2022-01-01 RX ADMIN — POTASSIUM CHLORIDE 10 MEQ: 750 TABLET, EXTENDED RELEASE ORAL at 20:44

## 2022-01-01 RX ADMIN — POTASSIUM CHLORIDE 10 MEQ: 750 TABLET, EXTENDED RELEASE ORAL at 10:12

## 2022-01-01 RX ADMIN — Medication 1 TABLET: at 19:00

## 2022-01-01 RX ADMIN — DEXAMETHASONE 6 MG: 2 TABLET ORAL at 08:36

## 2022-01-01 RX ADMIN — POLYETHYLENE GLYCOL 3350 17 G: 17 POWDER, FOR SOLUTION ORAL at 13:45

## 2022-01-01 RX ADMIN — PANTOPRAZOLE SODIUM 40 MG: 40 TABLET, DELAYED RELEASE ORAL at 09:44

## 2022-01-01 RX ADMIN — LEVOTHYROXINE SODIUM 50 MCG: 0.03 TABLET ORAL at 06:01

## 2022-01-01 RX ADMIN — POTASSIUM CHLORIDE 10 MEQ: 7.46 INJECTION, SOLUTION INTRAVENOUS at 09:51

## 2022-01-01 RX ADMIN — CEFTRIAXONE SODIUM 1 G: 1 INJECTION, POWDER, FOR SOLUTION INTRAMUSCULAR; INTRAVENOUS at 19:07

## 2022-01-01 RX ADMIN — PANTOPRAZOLE SODIUM 40 MG: 20 TABLET, DELAYED RELEASE ORAL at 13:11

## 2022-01-01 RX ADMIN — SUCRALFATE 1 G: 1 TABLET ORAL at 20:06

## 2022-01-01 RX ADMIN — OXYCODONE HYDROCHLORIDE 2.5 MG: 5 TABLET ORAL at 08:56

## 2022-01-01 RX ADMIN — CHLORHEXIDINE GLUCONATE 15 ML: 1.2 SOLUTION ORAL at 21:33

## 2022-01-01 RX ADMIN — LORAZEPAM 1 MG: 0.5 TABLET ORAL at 20:36

## 2022-01-01 RX ADMIN — METOPROLOL SUCCINATE 50 MG: 50 TABLET, EXTENDED RELEASE ORAL at 22:13

## 2022-01-01 RX ADMIN — SUCRALFATE 1 G: 1 TABLET ORAL at 16:16

## 2022-01-01 RX ADMIN — FERROUS SULFATE TAB 325 MG (65 MG ELEMENTAL FE) 325 MG: 325 (65 FE) TAB at 08:25

## 2022-01-01 RX ADMIN — BUMETANIDE 0.5 MG: 0.5 TABLET ORAL at 10:14

## 2022-01-01 RX ADMIN — LEVOTHYROXINE SODIUM 50 MCG: 0.03 TABLET ORAL at 08:35

## 2022-01-01 RX ADMIN — PANTOPRAZOLE SODIUM 40 MG: 40 INJECTION, POWDER, FOR SOLUTION INTRAVENOUS at 08:39

## 2022-01-01 RX ADMIN — SODIUM CHLORIDE TAB 1 GM 2 G: 1 TAB at 14:59

## 2022-01-01 RX ADMIN — LEVOTHYROXINE SODIUM 50 MCG: 0.03 TABLET ORAL at 06:52

## 2022-01-01 RX ADMIN — POTASSIUM CHLORIDE 40 MEQ: 1500 TABLET, EXTENDED RELEASE ORAL at 14:58

## 2022-01-01 RX ADMIN — SODIUM CHLORIDE TAB 1 GM 2 G: 1 TAB at 12:43

## 2022-01-01 RX ADMIN — THERA TABS 1 TABLET: TAB at 12:52

## 2022-01-01 RX ADMIN — METOPROLOL SUCCINATE 50 MG: 25 TABLET, EXTENDED RELEASE ORAL at 19:28

## 2022-01-01 RX ADMIN — POTASSIUM CHLORIDE 10 MEQ: 7.46 INJECTION, SOLUTION INTRAVENOUS at 11:59

## 2022-01-01 RX ADMIN — Medication 1 TABLET: at 20:32

## 2022-01-01 RX ADMIN — LORAZEPAM 0.5 MG: 0.5 TABLET ORAL at 13:11

## 2022-01-01 RX ADMIN — ACETAMINOPHEN 325 MG: 325 TABLET, FILM COATED ORAL at 13:10

## 2022-01-01 RX ADMIN — LEVOTHYROXINE SODIUM 50 MCG: 0.03 TABLET ORAL at 06:22

## 2022-01-01 RX ADMIN — FERROUS SULFATE TAB 325 MG (65 MG ELEMENTAL FE) 325 MG: 325 (65 FE) TAB at 20:36

## 2022-01-01 RX ADMIN — MAGNESIUM SULFATE 4 G: 4 INJECTION INTRAVENOUS at 09:04

## 2022-01-01 RX ADMIN — AMILORIDE HYDROCLORIDE 5 MG: 5 TABLET ORAL at 10:07

## 2022-01-01 RX ADMIN — POLYETHYLENE GLYCOL 3350 238 G: 17 POWDER, FOR SOLUTION ORAL at 17:41

## 2022-01-01 RX ADMIN — NYSTATIN 500000 UNITS: 100000 SUSPENSION ORAL at 14:37

## 2022-01-01 RX ADMIN — Medication 200 MG: at 08:36

## 2022-01-01 RX ADMIN — SUCRALFATE 1 G: 1 TABLET ORAL at 16:53

## 2022-01-01 RX ADMIN — ANORECTAL OINTMENT: 15.7; .44; 24; 20.6 OINTMENT TOPICAL at 14:32

## 2022-01-01 RX ADMIN — MAGNESIUM SULFATE 4 G: 4 INJECTION INTRAVENOUS at 08:20

## 2022-01-01 RX ADMIN — DEXAMETHASONE 6 MG: 2 TABLET ORAL at 10:38

## 2022-01-01 RX ADMIN — Medication 330 MG: at 08:28

## 2022-01-01 RX ADMIN — Medication 330 MG: at 09:40

## 2022-01-01 RX ADMIN — SODIUM CHLORIDE: 9 INJECTION, SOLUTION INTRAVENOUS at 04:30

## 2022-01-01 RX ADMIN — HALOPERIDOL LACTATE 2 MG: 5 INJECTION, SOLUTION INTRAMUSCULAR at 13:52

## 2022-01-01 RX ADMIN — SODIUM CHLORIDE: 9 INJECTION, SOLUTION INTRAVENOUS at 07:34

## 2022-01-01 RX ADMIN — SODIUM CHLORIDE 250 ML: 9 INJECTION, SOLUTION INTRAVENOUS at 14:27

## 2022-01-01 RX ADMIN — MAGNESIUM SULFATE IN WATER 2 G: 40 INJECTION, SOLUTION INTRAVENOUS at 20:24

## 2022-01-01 RX ADMIN — PANTOPRAZOLE SODIUM 40 MG: 40 TABLET, DELAYED RELEASE ORAL at 06:52

## 2022-01-01 RX ADMIN — SENNOSIDES AND DOCUSATE SODIUM 1 TABLET: 50; 8.6 TABLET ORAL at 20:24

## 2022-01-01 RX ADMIN — PIPERACILLIN SODIUM AND TAZOBACTAM SODIUM 3.38 G: 3; .375 INJECTION, POWDER, LYOPHILIZED, FOR SOLUTION INTRAVENOUS at 16:46

## 2022-01-01 RX ADMIN — CETIRIZINE HYDROCHLORIDE 5 MG: 5 TABLET ORAL at 09:34

## 2022-01-01 RX ADMIN — ENOXAPARIN SODIUM 25 MG: 300 INJECTION INTRAVENOUS; SUBCUTANEOUS at 20:51

## 2022-01-01 RX ADMIN — BUMETANIDE 1 MG: 0.25 INJECTION INTRAMUSCULAR; INTRAVENOUS at 01:30

## 2022-01-01 RX ADMIN — CEFTRIAXONE SODIUM 1 G: 1 INJECTION, POWDER, FOR SOLUTION INTRAMUSCULAR; INTRAVENOUS at 03:23

## 2022-01-01 RX ADMIN — LORAZEPAM 0.5 MG: 0.5 TABLET ORAL at 13:37

## 2022-01-01 RX ADMIN — POTASSIUM CHLORIDE 10 MEQ: 750 TABLET, EXTENDED RELEASE ORAL at 09:44

## 2022-01-01 RX ADMIN — METOPROLOL SUCCINATE 50 MG: 25 TABLET, EXTENDED RELEASE ORAL at 09:33

## 2022-01-01 RX ADMIN — CEPHALEXIN 500 MG: 500 CAPSULE ORAL at 21:49

## 2022-01-01 RX ADMIN — FUROSEMIDE 20 MG: 10 INJECTION, SOLUTION INTRAMUSCULAR; INTRAVENOUS at 22:13

## 2022-01-01 RX ADMIN — ACETAMINOPHEN 650 MG: 325 TABLET ORAL at 17:09

## 2022-01-01 RX ADMIN — ONDANSETRON 4 MG: 2 INJECTION INTRAMUSCULAR; INTRAVENOUS at 20:09

## 2022-01-01 RX ADMIN — LEVOTHYROXINE SODIUM 50 MCG: 0.03 TABLET ORAL at 08:51

## 2022-01-01 RX ADMIN — SUCRALFATE 1 G: 1 TABLET ORAL at 12:49

## 2022-01-01 RX ADMIN — SUCRALFATE 1 G: 1 SUSPENSION ORAL at 17:09

## 2022-01-01 RX ADMIN — NYSTATIN 500000 UNITS: 100000 SUSPENSION ORAL at 22:49

## 2022-01-01 RX ADMIN — LORAZEPAM 0.5 MG: 0.5 TABLET ORAL at 12:43

## 2022-01-01 RX ADMIN — SODIUM CHLORIDE 500 ML: 9 INJECTION, SOLUTION INTRAVENOUS at 13:03

## 2022-01-01 RX ADMIN — LORAZEPAM 0.5 MG: 0.5 TABLET ORAL at 12:42

## 2022-01-01 RX ADMIN — ACETAMINOPHEN 975 MG: 325 TABLET, FILM COATED ORAL at 19:58

## 2022-01-01 RX ADMIN — PREDNISONE 20 MG: 20 TABLET ORAL at 08:26

## 2022-01-01 RX ADMIN — POTASSIUM CHLORIDE 10 MEQ: 7.46 INJECTION, SOLUTION INTRAVENOUS at 14:52

## 2022-01-01 RX ADMIN — FERROUS SULFATE TAB 325 MG (65 MG ELEMENTAL FE) 325 MG: 325 (65 FE) TAB at 08:37

## 2022-01-01 RX ADMIN — METOPROLOL SUCCINATE 50 MG: 25 TABLET, EXTENDED RELEASE ORAL at 08:15

## 2022-01-01 RX ADMIN — METOPROLOL SUCCINATE 100 MG: 100 TABLET, EXTENDED RELEASE ORAL at 11:56

## 2022-01-01 RX ADMIN — SODIUM CHLORIDE TAB 1 GM 1 G: 1 TAB at 08:50

## 2022-01-01 RX ADMIN — METOPROLOL SUCCINATE 50 MG: 50 TABLET, EXTENDED RELEASE ORAL at 20:35

## 2022-01-01 RX ADMIN — LORAZEPAM 1 MG: 1 TABLET ORAL at 21:49

## 2022-01-01 RX ADMIN — METOPROLOL SUCCINATE 50 MG: 25 TABLET, EXTENDED RELEASE ORAL at 10:02

## 2022-01-01 RX ADMIN — CEFTRIAXONE SODIUM 1 G: 1 INJECTION, POWDER, FOR SOLUTION INTRAMUSCULAR; INTRAVENOUS at 19:46

## 2022-01-01 RX ADMIN — DEXTROSE MONOHYDRATE 300 ML: 100 INJECTION, SOLUTION INTRAVENOUS at 17:31

## 2022-01-01 RX ADMIN — BUMETANIDE 0.5 MG: 0.5 TABLET ORAL at 08:39

## 2022-01-01 RX ADMIN — BUMETANIDE 0.5 MG: 0.5 TABLET ORAL at 09:05

## 2022-01-01 RX ADMIN — LOPERAMIDE HYDROCHLORIDE 2 MG: 2 CAPSULE ORAL at 11:40

## 2022-01-01 RX ADMIN — LORAZEPAM 1 MG: 1 TABLET ORAL at 19:55

## 2022-01-01 RX ADMIN — SUCRALFATE 1 G: 1 TABLET ORAL at 16:00

## 2022-01-01 RX ADMIN — PIPERACILLIN AND TAZOBACTAM 3.38 G: 3; .375 INJECTION, POWDER, LYOPHILIZED, FOR SOLUTION INTRAVENOUS at 11:54

## 2022-01-01 RX ADMIN — OXYBUTYNIN CHLORIDE 5 MG: 5 TABLET, EXTENDED RELEASE ORAL at 08:58

## 2022-01-01 RX ADMIN — SUCRALFATE 1 G: 1 TABLET ORAL at 12:44

## 2022-01-01 RX ADMIN — OXYCODONE HYDROCHLORIDE 5 MG: 5 TABLET ORAL at 04:20

## 2022-01-01 RX ADMIN — ENOXAPARIN SODIUM 25 MG: 300 INJECTION INTRAVENOUS; SUBCUTANEOUS at 21:47

## 2022-01-01 RX ADMIN — FERROUS SULFATE TAB 325 MG (65 MG ELEMENTAL FE) 325 MG: 325 (65 FE) TAB at 08:16

## 2022-01-01 RX ADMIN — ENOXAPARIN SODIUM 25 MG: 300 INJECTION INTRAVENOUS; SUBCUTANEOUS at 22:45

## 2022-01-01 RX ADMIN — MAGNESIUM SULFATE HEPTAHYDRATE 2 G: 40 INJECTION, SOLUTION INTRAVENOUS at 12:54

## 2022-01-01 RX ADMIN — LORAZEPAM 0.5 MG: 0.5 TABLET ORAL at 16:41

## 2022-01-01 RX ADMIN — BUMETANIDE 0.5 MG: 0.5 TABLET ORAL at 08:50

## 2022-01-01 RX ADMIN — SUCRALFATE 1 G: 1 TABLET ORAL at 09:08

## 2022-01-01 RX ADMIN — MAGNESIUM SULFATE IN WATER 2 G: 40 INJECTION, SOLUTION INTRAVENOUS at 09:10

## 2022-01-01 RX ADMIN — THERA TABS 1 TABLET: TAB at 09:41

## 2022-01-01 RX ADMIN — ACETAMINOPHEN 500 MG: 500 TABLET ORAL at 20:28

## 2022-01-01 RX ADMIN — FERROUS SULFATE TAB 325 MG (65 MG ELEMENTAL FE) 325 MG: 325 (65 FE) TAB at 07:49

## 2022-01-01 RX ADMIN — LORAZEPAM 1 MG: 1 TABLET ORAL at 09:34

## 2022-01-01 RX ADMIN — ACETAMINOPHEN 500 MG: 500 TABLET ORAL at 22:34

## 2022-01-01 RX ADMIN — LEVOTHYROXINE SODIUM 50 MCG: 0.03 TABLET ORAL at 08:37

## 2022-01-01 RX ADMIN — LEVOTHYROXINE SODIUM 50 MCG: 0.03 TABLET ORAL at 08:38

## 2022-01-01 RX ADMIN — LEVOTHYROXINE SODIUM 50 MCG: 0.03 TABLET ORAL at 15:00

## 2022-01-01 RX ADMIN — SUCRALFATE 1 G: 1 TABLET ORAL at 08:46

## 2022-01-01 RX ADMIN — NYSTATIN 500000 UNITS: 100000 SUSPENSION ORAL at 16:00

## 2022-01-01 RX ADMIN — LORAZEPAM 1 MG: 1 TABLET ORAL at 20:26

## 2022-01-01 RX ADMIN — PREDNISONE 20 MG: 20 TABLET ORAL at 13:24

## 2022-01-01 RX ADMIN — Medication 325 MG: at 22:05

## 2022-01-01 RX ADMIN — OXYBUTYNIN CHLORIDE 5 MG: 5 TABLET, EXTENDED RELEASE ORAL at 09:06

## 2022-01-01 RX ADMIN — Medication 330 MG: at 21:47

## 2022-01-01 RX ADMIN — BUMETANIDE 1 MG: 1 TABLET ORAL at 08:26

## 2022-01-01 RX ADMIN — Medication 1 TABLET: at 22:03

## 2022-01-01 RX ADMIN — LEVOTHYROXINE SODIUM 50 MCG: 0.03 TABLET ORAL at 07:38

## 2022-01-01 RX ADMIN — SODIUM CHLORIDE TAB 1 GM 2 G: 1 TAB at 08:59

## 2022-01-01 RX ADMIN — MAGNESIUM OXIDE TAB 400 MG (241.3 MG ELEMENTAL MG) 400 MG: 400 (241.3 MG) TAB at 08:46

## 2022-01-01 RX ADMIN — Medication 200 MG: at 08:39

## 2022-01-01 RX ADMIN — LORAZEPAM 0.5 MG: 0.5 TABLET ORAL at 11:41

## 2022-01-01 RX ADMIN — SODIUM CHLORIDE TAB 1 GM 1 G: 1 TAB at 16:49

## 2022-01-01 RX ADMIN — CETIRIZINE HYDROCHLORIDE 5 MG: 5 TABLET ORAL at 10:22

## 2022-01-01 RX ADMIN — ACETAMINOPHEN 650 MG: 325 TABLET, FILM COATED ORAL at 04:27

## 2022-01-01 RX ADMIN — LEVOTHYROXINE SODIUM 50 MCG: 0.03 TABLET ORAL at 05:28

## 2022-01-01 RX ADMIN — LORAZEPAM 1 MG: 1 TABLET ORAL at 20:50

## 2022-01-01 RX ADMIN — LORAZEPAM 1 MG: 1 TABLET ORAL at 19:09

## 2022-01-01 RX ADMIN — SODIUM CHLORIDE TAB 1 GM 2 G: 1 TAB at 22:34

## 2022-01-01 RX ADMIN — PROPOFOL 50 MG: 10 INJECTION, EMULSION INTRAVENOUS at 16:12

## 2022-01-01 RX ADMIN — SUCRALFATE 1 G: 1 SUSPENSION ORAL at 14:59

## 2022-01-01 RX ADMIN — PIPERACILLIN AND TAZOBACTAM 3.38 G: 3; .375 INJECTION, POWDER, LYOPHILIZED, FOR SOLUTION INTRAVENOUS at 04:35

## 2022-01-01 RX ADMIN — LORAZEPAM 0.5 MG: 0.5 TABLET ORAL at 12:28

## 2022-01-01 RX ADMIN — ACETAMINOPHEN 975 MG: 325 TABLET, FILM COATED ORAL at 19:27

## 2022-01-01 RX ADMIN — SODIUM CHLORIDE 50 ML: 9 INJECTION, SOLUTION INTRAVENOUS at 18:48

## 2022-01-01 RX ADMIN — ACETAMINOPHEN 975 MG: 325 TABLET, FILM COATED ORAL at 08:38

## 2022-01-01 RX ADMIN — PANTOPRAZOLE SODIUM 40 MG: 40 TABLET, DELAYED RELEASE ORAL at 08:10

## 2022-01-01 RX ADMIN — PIPERACILLIN AND TAZOBACTAM 3.38 G: 3; .375 INJECTION, POWDER, LYOPHILIZED, FOR SOLUTION INTRAVENOUS at 23:38

## 2022-01-01 RX ADMIN — ACETAMINOPHEN 975 MG: 325 TABLET, FILM COATED ORAL at 18:12

## 2022-01-01 RX ADMIN — DEXAMETHASONE 6 MG: 2 TABLET ORAL at 08:37

## 2022-01-01 RX ADMIN — CETIRIZINE HYDROCHLORIDE 5 MG: 5 TABLET ORAL at 10:18

## 2022-01-01 RX ADMIN — SUCRALFATE 1 G: 1 TABLET ORAL at 08:56

## 2022-01-01 RX ADMIN — SUCRALFATE 1 G: 1 TABLET ORAL at 10:58

## 2022-01-01 RX ADMIN — CHLORHEXIDINE GLUCONATE 15 ML: 1.2 SOLUTION ORAL at 20:45

## 2022-01-01 RX ADMIN — Medication 1 TABLET: at 10:16

## 2022-01-01 RX ADMIN — SUCRALFATE 1 G: 1 TABLET ORAL at 16:49

## 2022-01-01 RX ADMIN — ACETAMINOPHEN 650 MG: 325 TABLET, FILM COATED ORAL at 20:34

## 2022-01-01 RX ADMIN — SUCRALFATE 1 G: 1 TABLET ORAL at 12:53

## 2022-01-01 RX ADMIN — SODIUM CHLORIDE TAB 1 GM 2 G: 1 TAB at 10:55

## 2022-01-01 RX ADMIN — METOPROLOL SUCCINATE 100 MG: 100 TABLET, EXTENDED RELEASE ORAL at 10:09

## 2022-01-01 RX ADMIN — OXYCODONE HYDROCHLORIDE 2.5 MG: 5 TABLET ORAL at 20:49

## 2022-01-01 RX ADMIN — Medication 1 TABLET: at 20:51

## 2022-01-01 RX ADMIN — LEVOTHYROXINE SODIUM 50 MCG: 0.03 TABLET ORAL at 08:27

## 2022-01-01 RX ADMIN — METOPROLOL SUCCINATE 50 MG: 50 TABLET, EXTENDED RELEASE ORAL at 11:28

## 2022-01-01 RX ADMIN — POTASSIUM CHLORIDE 10 MEQ: 750 TABLET, EXTENDED RELEASE ORAL at 09:10

## 2022-01-01 RX ADMIN — LORAZEPAM 1 MG: 1 TABLET ORAL at 19:50

## 2022-01-01 RX ADMIN — METOPROLOL SUCCINATE 100 MG: 100 TABLET, EXTENDED RELEASE ORAL at 08:28

## 2022-01-01 RX ADMIN — PANTOPRAZOLE SODIUM 40 MG: 40 TABLET, DELAYED RELEASE ORAL at 09:05

## 2022-01-01 RX ADMIN — PANTOPRAZOLE SODIUM 40 MG: 40 TABLET, DELAYED RELEASE ORAL at 09:12

## 2022-01-01 RX ADMIN — HEPARIN SODIUM 5000 UNITS: 10000 INJECTION, SOLUTION INTRAVENOUS; SUBCUTANEOUS at 21:00

## 2022-01-01 RX ADMIN — BUMETANIDE 0.5 MG: 0.5 TABLET ORAL at 09:34

## 2022-01-01 RX ADMIN — ACETAMINOPHEN 650 MG: 325 TABLET ORAL at 17:07

## 2022-01-01 RX ADMIN — ACETAMINOPHEN 650 MG: 325 TABLET, FILM COATED ORAL at 02:37

## 2022-01-01 RX ADMIN — PANTOPRAZOLE SODIUM 40 MG: 40 TABLET, DELAYED RELEASE ORAL at 10:13

## 2022-01-01 RX ADMIN — AZITHROMYCIN MONOHYDRATE 500 MG: 500 INJECTION, POWDER, LYOPHILIZED, FOR SOLUTION INTRAVENOUS at 19:37

## 2022-01-01 RX ADMIN — PANTOPRAZOLE SODIUM 40 MG: 40 INJECTION, POWDER, FOR SOLUTION INTRAVENOUS at 09:09

## 2022-01-01 RX ADMIN — LORAZEPAM 0.5 MG: 0.5 TABLET ORAL at 11:58

## 2022-01-01 RX ADMIN — LORAZEPAM 1 MG: 1 TABLET ORAL at 21:34

## 2022-01-01 RX ADMIN — LIDOCAINE HYDROCHLORIDE 60 MG: 20 INJECTION, SOLUTION INFILTRATION; PERINEURAL at 16:50

## 2022-01-01 RX ADMIN — SODIUM CHLORIDE TAB 1 GM 2 G: 1 TAB at 09:42

## 2022-01-01 RX ADMIN — PANTOPRAZOLE SODIUM 40 MG: 40 TABLET, DELAYED RELEASE ORAL at 08:16

## 2022-01-01 RX ADMIN — SODIUM CHLORIDE: 9 INJECTION, SOLUTION INTRAVENOUS at 05:47

## 2022-01-01 RX ADMIN — METOPROLOL SUCCINATE 50 MG: 25 TABLET, EXTENDED RELEASE ORAL at 20:50

## 2022-01-01 RX ADMIN — BUMETANIDE 0.5 MG: 0.5 TABLET ORAL at 12:38

## 2022-01-01 RX ADMIN — POTASSIUM CHLORIDE 10 MEQ: 750 TABLET, EXTENDED RELEASE ORAL at 08:59

## 2022-01-01 RX ADMIN — LORAZEPAM 1 MG: 1 TABLET ORAL at 08:10

## 2022-01-01 RX ADMIN — ACETAMINOPHEN 975 MG: 325 TABLET, FILM COATED ORAL at 09:41

## 2022-01-01 RX ADMIN — ACETAMINOPHEN 650 MG: 325 TABLET ORAL at 00:29

## 2022-01-01 RX ADMIN — IRON SUCROSE 200 MG: 20 INJECTION, SOLUTION INTRAVENOUS at 18:16

## 2022-01-01 RX ADMIN — METOPROLOL SUCCINATE 50 MG: 25 TABLET, EXTENDED RELEASE ORAL at 09:38

## 2022-01-01 RX ADMIN — DULOXETINE HYDROCHLORIDE 20 MG: 20 CAPSULE, DELAYED RELEASE ORAL at 16:15

## 2022-01-01 RX ADMIN — FERROUS SULFATE TAB 325 MG (65 MG ELEMENTAL FE) 325 MG: 325 (65 FE) TAB at 19:18

## 2022-01-01 RX ADMIN — Medication 250 MCG: at 10:14

## 2022-01-01 RX ADMIN — LORAZEPAM 0.5 MG: 0.5 TABLET ORAL at 13:13

## 2022-01-01 RX ADMIN — SODIUM CHLORIDE, POTASSIUM CHLORIDE, SODIUM LACTATE AND CALCIUM CHLORIDE: 600; 310; 30; 20 INJECTION, SOLUTION INTRAVENOUS at 15:14

## 2022-01-01 RX ADMIN — OLANZAPINE 2.5 MG: 10 INJECTION, POWDER, FOR SOLUTION INTRAMUSCULAR at 01:22

## 2022-01-01 RX ADMIN — NYSTATIN 500000 UNITS: 100000 SUSPENSION ORAL at 08:44

## 2022-01-01 RX ADMIN — Medication 200 MG: at 10:35

## 2022-01-01 RX ADMIN — Medication 1 TABLET: at 10:13

## 2022-01-01 RX ADMIN — AZITHROMYCIN DIHYDRATE 500 MG: 500 INJECTION, POWDER, LYOPHILIZED, FOR SOLUTION INTRAVENOUS at 05:50

## 2022-01-01 RX ADMIN — SODIUM CHLORIDE TAB 1 GM 2 G: 1 TAB at 14:00

## 2022-01-01 RX ADMIN — METOPROLOL SUCCINATE 50 MG: 25 TABLET, EXTENDED RELEASE ORAL at 19:59

## 2022-01-01 RX ADMIN — FERROUS SULFATE TAB 325 MG (65 MG ELEMENTAL FE) 325 MG: 325 (65 FE) TAB at 11:41

## 2022-01-01 RX ADMIN — SUCRALFATE 1 G: 1 TABLET ORAL at 19:00

## 2022-01-01 RX ADMIN — SUCRALFATE 1 G: 1 SUSPENSION ORAL at 13:38

## 2022-01-01 RX ADMIN — PANTOPRAZOLE SODIUM 40 MG: 40 TABLET, DELAYED RELEASE ORAL at 10:06

## 2022-01-01 RX ADMIN — Medication 200 MG: at 08:29

## 2022-01-01 RX ADMIN — VANCOMYCIN HYDROCHLORIDE 125 MG: 125 CAPSULE ORAL at 10:55

## 2022-01-01 RX ADMIN — SUCRALFATE 1 G: 1 SUSPENSION ORAL at 08:00

## 2022-01-01 RX ADMIN — POTASSIUM CHLORIDE 10 MEQ: 750 TABLET, EXTENDED RELEASE ORAL at 10:02

## 2022-01-01 RX ADMIN — LORAZEPAM 1 MG: 0.5 TABLET ORAL at 08:49

## 2022-01-01 RX ADMIN — METOPROLOL SUCCINATE 50 MG: 25 TABLET, EXTENDED RELEASE ORAL at 08:25

## 2022-01-01 RX ADMIN — SODIUM CHLORIDE TAB 1 GM 1 G: 1 TAB at 16:00

## 2022-01-01 RX ADMIN — SODIUM CHLORIDE TAB 1 GM 2 G: 1 TAB at 13:25

## 2022-01-01 RX ADMIN — PANTOPRAZOLE SODIUM 40 MG: 40 TABLET, DELAYED RELEASE ORAL at 08:51

## 2022-01-01 RX ADMIN — PIPERACILLIN AND TAZOBACTAM 3.38 G: 3; .375 INJECTION, POWDER, LYOPHILIZED, FOR SOLUTION INTRAVENOUS at 04:36

## 2022-01-01 RX ADMIN — METOPROLOL SUCCINATE 50 MG: 50 TABLET, EXTENDED RELEASE ORAL at 19:55

## 2022-01-01 RX ADMIN — HEPARIN 30 UNITS: 100 SYRINGE at 12:32

## 2022-01-01 RX ADMIN — LORAZEPAM 1 MG: 1 TABLET ORAL at 09:10

## 2022-01-01 RX ADMIN — METOPROLOL SUCCINATE 100 MG: 100 TABLET, EXTENDED RELEASE ORAL at 10:23

## 2022-01-01 RX ADMIN — SUCRALFATE 1 G: 1 TABLET ORAL at 07:52

## 2022-01-01 RX ADMIN — PIPERACILLIN AND TAZOBACTAM 3.38 G: 3; .375 INJECTION, POWDER, LYOPHILIZED, FOR SOLUTION INTRAVENOUS at 14:39

## 2022-01-01 RX ADMIN — AMILORIDE HYDROCLORIDE 5 MG: 5 TABLET ORAL at 09:40

## 2022-01-01 RX ADMIN — SENNOSIDES AND DOCUSATE SODIUM 2 TABLET: 50; 8.6 TABLET ORAL at 10:17

## 2022-01-01 RX ADMIN — LORAZEPAM 1 MG: 1 TABLET ORAL at 09:12

## 2022-01-01 RX ADMIN — Medication 1 MG: at 20:01

## 2022-01-01 RX ADMIN — CETIRIZINE HYDROCHLORIDE 5 MG: 5 TABLET ORAL at 20:43

## 2022-01-01 RX ADMIN — SODIUM CHLORIDE TAB 1 GM 2 G: 1 TAB at 10:12

## 2022-01-01 RX ADMIN — ACETAMINOPHEN 975 MG: 325 TABLET, FILM COATED ORAL at 03:45

## 2022-01-01 RX ADMIN — AZITHROMYCIN DIHYDRATE 500 MG: 500 INJECTION, POWDER, LYOPHILIZED, FOR SOLUTION INTRAVENOUS at 03:11

## 2022-01-01 RX ADMIN — ENOXAPARIN SODIUM 25 MG: 300 INJECTION INTRAVENOUS; SUBCUTANEOUS at 09:33

## 2022-01-01 RX ADMIN — METOPROLOL SUCCINATE 50 MG: 25 TABLET, EXTENDED RELEASE ORAL at 08:37

## 2022-01-01 RX ADMIN — PANTOPRAZOLE SODIUM 40 MG: 40 TABLET, DELAYED RELEASE ORAL at 08:24

## 2022-01-01 RX ADMIN — OXYBUTYNIN CHLORIDE 5 MG: 5 TABLET, EXTENDED RELEASE ORAL at 10:13

## 2022-01-01 RX ADMIN — OXYBUTYNIN CHLORIDE 5 MG: 5 TABLET, EXTENDED RELEASE ORAL at 08:48

## 2022-01-01 RX ADMIN — IOPAMIDOL 75 ML: 755 INJECTION, SOLUTION INTRAVENOUS at 14:20

## 2022-01-01 RX ADMIN — METOPROLOL SUCCINATE 50 MG: 25 TABLET, EXTENDED RELEASE ORAL at 21:35

## 2022-01-01 RX ADMIN — LORAZEPAM 1 MG: 1 TABLET ORAL at 08:16

## 2022-01-01 RX ADMIN — BUMETANIDE 1 MG: 1 TABLET ORAL at 08:43

## 2022-01-01 RX ADMIN — LEVOTHYROXINE SODIUM 50 MCG: 0.03 TABLET ORAL at 10:20

## 2022-01-01 RX ADMIN — MORPHINE SULFATE 2 MG: 2 INJECTION, SOLUTION INTRAMUSCULAR; INTRAVENOUS at 17:02

## 2022-01-01 RX ADMIN — ACETAMINOPHEN 975 MG: 325 TABLET, FILM COATED ORAL at 20:28

## 2022-01-01 RX ADMIN — HEPARIN SODIUM 5000 UNITS: 10000 INJECTION, SOLUTION INTRAVENOUS; SUBCUTANEOUS at 11:43

## 2022-01-01 RX ADMIN — HYDROMORPHONE HYDROCHLORIDE 0.2 MG: 0.2 INJECTION, SOLUTION INTRAMUSCULAR; INTRAVENOUS; SUBCUTANEOUS at 23:17

## 2022-01-01 RX ADMIN — SODIUM CHLORIDE 1000 ML: 9 INJECTION, SOLUTION INTRAVENOUS at 16:56

## 2022-01-01 RX ADMIN — LEVOTHYROXINE SODIUM 50 MCG: 0.03 TABLET ORAL at 08:22

## 2022-01-01 RX ADMIN — POTASSIUM CHLORIDE 10 MEQ: 750 TABLET, EXTENDED RELEASE ORAL at 10:14

## 2022-01-01 RX ADMIN — OXYBUTYNIN CHLORIDE 5 MG: 5 TABLET, EXTENDED RELEASE ORAL at 20:49

## 2022-01-01 RX ADMIN — ACETAMINOPHEN 975 MG: 325 TABLET, FILM COATED ORAL at 12:11

## 2022-01-01 RX ADMIN — CHLORHEXIDINE GLUCONATE 15 ML: 1.2 SOLUTION ORAL at 10:18

## 2022-01-01 RX ADMIN — NYSTATIN 500000 UNITS: 100000 SUSPENSION ORAL at 13:13

## 2022-01-01 RX ADMIN — AMILORIDE HYDROCLORIDE 5 MG: 5 TABLET ORAL at 08:58

## 2022-01-01 RX ADMIN — Medication 2 SPRAY: at 10:43

## 2022-01-01 RX ADMIN — SODIUM CHLORIDE, POTASSIUM CHLORIDE, SODIUM LACTATE AND CALCIUM CHLORIDE: 600; 310; 30; 20 INJECTION, SOLUTION INTRAVENOUS at 15:35

## 2022-01-01 RX ADMIN — SUCRALFATE 1 G: 1 TABLET ORAL at 08:23

## 2022-01-01 RX ADMIN — POTASSIUM CHLORIDE 10 MEQ: 750 TABLET, EXTENDED RELEASE ORAL at 08:47

## 2022-01-01 RX ADMIN — SODIUM CHLORIDE: 9 INJECTION, SOLUTION INTRAVENOUS at 14:23

## 2022-01-01 RX ADMIN — Medication 250 MCG: at 09:43

## 2022-01-01 RX ADMIN — SODIUM CHLORIDE: 9 INJECTION, SOLUTION INTRAVENOUS at 12:28

## 2022-01-01 RX ADMIN — FERROUS SULFATE TAB 325 MG (65 MG ELEMENTAL FE) 325 MG: 325 (65 FE) TAB at 09:00

## 2022-01-01 RX ADMIN — SUCRALFATE 1 G: 1 SUSPENSION ORAL at 17:14

## 2022-01-01 RX ADMIN — ACETAMINOPHEN 975 MG: 325 TABLET, FILM COATED ORAL at 01:47

## 2022-01-01 RX ADMIN — METOPROLOL SUCCINATE 50 MG: 25 TABLET, EXTENDED RELEASE ORAL at 10:37

## 2022-01-01 RX ADMIN — FENTANYL CITRATE 50 MCG: 50 INJECTION, SOLUTION INTRAMUSCULAR; INTRAVENOUS at 13:01

## 2022-01-01 RX ADMIN — NYSTATIN 500000 UNITS: 100000 SUSPENSION ORAL at 20:46

## 2022-01-01 RX ADMIN — METOPROLOL SUCCINATE 50 MG: 25 TABLET, EXTENDED RELEASE ORAL at 08:27

## 2022-01-01 RX ADMIN — LORAZEPAM 1 MG: 1 TABLET ORAL at 08:39

## 2022-01-01 RX ADMIN — MAGNESIUM OXIDE TAB 400 MG (241.3 MG ELEMENTAL MG) 400 MG: 400 (241.3 MG) TAB at 10:17

## 2022-01-01 RX ADMIN — Medication 5 MG: at 00:35

## 2022-01-01 RX ADMIN — FUROSEMIDE 40 MG: 10 INJECTION, SOLUTION INTRAMUSCULAR; INTRAVENOUS at 19:12

## 2022-01-01 RX ADMIN — PANTOPRAZOLE SODIUM 40 MG: 20 TABLET, DELAYED RELEASE ORAL at 08:55

## 2022-01-01 RX ADMIN — METOPROLOL SUCCINATE 100 MG: 100 TABLET, EXTENDED RELEASE ORAL at 10:59

## 2022-01-01 RX ADMIN — Medication 325 MG: at 22:49

## 2022-01-01 RX ADMIN — LEVOTHYROXINE SODIUM 50 MCG: 0.03 TABLET ORAL at 10:02

## 2022-01-01 RX ADMIN — LORAZEPAM 1 MG: 1 TABLET ORAL at 09:43

## 2022-01-01 RX ADMIN — LORAZEPAM 1 MG: 1 TABLET ORAL at 21:03

## 2022-01-01 RX ADMIN — LORAZEPAM 1 MG: 1 TABLET ORAL at 20:12

## 2022-01-01 RX ADMIN — SUCRALFATE 1 G: 1 TABLET ORAL at 16:59

## 2022-01-01 RX ADMIN — SODIUM CHLORIDE TAB 1 GM 1 G: 1 TAB at 08:24

## 2022-01-01 RX ADMIN — SODIUM CHLORIDE TAB 1 GM 2 G: 1 TAB at 13:21

## 2022-01-01 RX ADMIN — AMILORIDE HYDROCLORIDE 5 MG: 5 TABLET ORAL at 08:30

## 2022-01-01 RX ADMIN — Medication 325 MG: at 20:46

## 2022-01-01 RX ADMIN — PIPERACILLIN SODIUM AND TAZOBACTAM SODIUM 3.38 G: 3; .375 INJECTION, POWDER, LYOPHILIZED, FOR SOLUTION INTRAVENOUS at 08:00

## 2022-01-01 RX ADMIN — LORAZEPAM 1 MG: 1 TABLET ORAL at 09:38

## 2022-01-01 RX ADMIN — BUMETANIDE 0.5 MG: 0.5 TABLET ORAL at 14:22

## 2022-01-01 RX ADMIN — PANTOPRAZOLE SODIUM 40 MG: 40 INJECTION, POWDER, FOR SOLUTION INTRAVENOUS at 08:30

## 2022-01-01 RX ADMIN — MAGNESIUM SULFATE HEPTAHYDRATE 2 G: 40 INJECTION, SOLUTION INTRAVENOUS at 12:49

## 2022-01-01 RX ADMIN — SODIUM CHLORIDE TAB 1 GM 2 G: 1 TAB at 13:44

## 2022-01-01 RX ADMIN — ACETAMINOPHEN 975 MG: 325 TABLET, FILM COATED ORAL at 01:38

## 2022-01-01 RX ADMIN — LORAZEPAM 1 MG: 1 TABLET ORAL at 07:49

## 2022-01-01 RX ADMIN — LORAZEPAM 0.5 MG: 0.5 TABLET ORAL at 16:52

## 2022-01-01 RX ADMIN — PIPERACILLIN SODIUM AND TAZOBACTAM SODIUM 3.38 G: 3; .375 INJECTION, POWDER, LYOPHILIZED, FOR SOLUTION INTRAVENOUS at 11:03

## 2022-01-01 RX ADMIN — MORPHINE SULFATE 4 MG: 4 INJECTION INTRAVENOUS at 13:07

## 2022-01-01 RX ADMIN — BUMETANIDE 0.5 MG: 0.5 TABLET ORAL at 17:09

## 2022-01-01 RX ADMIN — PANTOPRAZOLE SODIUM 40 MG: 40 INJECTION, POWDER, FOR SOLUTION INTRAVENOUS at 21:04

## 2022-01-01 RX ADMIN — LORAZEPAM 1 MG: 1 TABLET ORAL at 20:45

## 2022-01-01 RX ADMIN — IRON SUCROSE 200 MG: 20 INJECTION, SOLUTION INTRAVENOUS at 10:18

## 2022-01-01 RX ADMIN — LORAZEPAM 1 MG: 1 TABLET ORAL at 20:31

## 2022-01-01 RX ADMIN — LORAZEPAM 1 MG: 1 TABLET ORAL at 20:19

## 2022-01-01 RX ADMIN — HEPARIN SODIUM 5000 UNITS: 5000 INJECTION, SOLUTION INTRAVENOUS; SUBCUTANEOUS at 13:11

## 2022-01-01 RX ADMIN — SUCRALFATE 1 G: 1 TABLET ORAL at 12:43

## 2022-01-01 RX ADMIN — BUMETANIDE 0.5 MG: 0.5 TABLET ORAL at 12:18

## 2022-01-01 RX ADMIN — SODIUM CHLORIDE TAB 1 GM 2 G: 1 TAB at 14:42

## 2022-01-01 RX ADMIN — CETIRIZINE HYDROCHLORIDE 5 MG: 5 TABLET ORAL at 10:07

## 2022-01-01 RX ADMIN — THERA TABS 1 TABLET: TAB at 08:41

## 2022-01-01 RX ADMIN — FERROUS SULFATE TAB 325 MG (65 MG ELEMENTAL FE) 325 MG: 325 (65 FE) TAB at 08:36

## 2022-01-01 RX ADMIN — PREDNISONE 20 MG: 20 TABLET ORAL at 08:38

## 2022-01-01 RX ADMIN — FUROSEMIDE 10 MG: 10 INJECTION, SOLUTION INTRAMUSCULAR; INTRAVENOUS at 17:58

## 2022-01-01 RX ADMIN — CHLORHEXIDINE GLUCONATE 15 ML: 1.2 SOLUTION ORAL at 22:02

## 2022-01-01 RX ADMIN — BISACODYL 10 MG: 10 SUPPOSITORY RECTAL at 10:13

## 2022-01-01 RX ADMIN — BUMETANIDE 0.5 MG: 0.5 TABLET ORAL at 08:26

## 2022-01-01 RX ADMIN — AMILORIDE HYDROCLORIDE 5 MG: 5 TABLET ORAL at 08:26

## 2022-01-01 RX ADMIN — CHLORHEXIDINE GLUCONATE 15 ML: 1.2 SOLUTION ORAL at 21:27

## 2022-01-01 RX ADMIN — Medication 2 SPRAY: at 16:16

## 2022-01-01 RX ADMIN — FERROUS SULFATE TAB 325 MG (65 MG ELEMENTAL FE) 325 MG: 325 (65 FE) TAB at 08:26

## 2022-01-01 RX ADMIN — LEVOTHYROXINE SODIUM 50 MCG: 0.03 TABLET ORAL at 08:15

## 2022-01-01 RX ADMIN — DEXAMETHASONE 6 MG: 2 TABLET ORAL at 09:37

## 2022-01-01 RX ADMIN — OXYBUTYNIN CHLORIDE 5 MG: 5 TABLET, EXTENDED RELEASE ORAL at 10:21

## 2022-01-01 RX ADMIN — BUMETANIDE 0.5 MG: 0.5 TABLET ORAL at 10:35

## 2022-01-01 RX ADMIN — ACETAMINOPHEN 975 MG: 325 TABLET, FILM COATED ORAL at 02:50

## 2022-01-01 RX ADMIN — CHLORHEXIDINE GLUCONATE 15 ML: 1.2 SOLUTION ORAL at 10:15

## 2022-01-01 RX ADMIN — FUROSEMIDE 20 MG: 20 TABLET ORAL at 09:19

## 2022-01-01 RX ADMIN — BUMETANIDE 0.5 MG: 0.5 TABLET ORAL at 09:43

## 2022-01-01 RX ADMIN — BUMETANIDE 1 MG: 0.25 INJECTION INTRAMUSCULAR; INTRAVENOUS at 19:19

## 2022-01-01 RX ADMIN — LORAZEPAM 0.5 MG: 0.5 TABLET ORAL at 14:57

## 2022-01-01 RX ADMIN — PIPERACILLIN AND TAZOBACTAM 3.38 G: 3; .375 INJECTION, POWDER, LYOPHILIZED, FOR SOLUTION INTRAVENOUS at 14:23

## 2022-01-01 RX ADMIN — SUCRALFATE 1 G: 1 TABLET ORAL at 11:55

## 2022-01-01 RX ADMIN — SIMETHICONE 80 MG: 80 TABLET, CHEWABLE ORAL at 01:08

## 2022-01-01 RX ADMIN — Medication 1 MG: at 22:40

## 2022-01-01 RX ADMIN — LORAZEPAM 0.5 MG: 0.5 TABLET ORAL at 06:34

## 2022-01-01 RX ADMIN — SODIUM CHLORIDE TAB 1 GM 2 G: 1 TAB at 15:33

## 2022-01-01 RX ADMIN — LEVOTHYROXINE SODIUM 50 MCG: 0.03 TABLET ORAL at 08:46

## 2022-01-01 RX ADMIN — LIDOCAINE HYDROCHLORIDE 2 ML: 10 INJECTION, SOLUTION EPIDURAL; INFILTRATION; INTRACAUDAL; PERINEURAL at 11:46

## 2022-01-01 RX ADMIN — SUCRALFATE 1 G: 1 SUSPENSION ORAL at 10:04

## 2022-01-01 RX ADMIN — IOPAMIDOL 75 ML: 755 INJECTION, SOLUTION INTRAVENOUS at 01:13

## 2022-01-01 RX ADMIN — NYSTATIN 500000 UNITS: 100000 SUSPENSION ORAL at 09:10

## 2022-01-01 RX ADMIN — VANCOMYCIN HYDROCHLORIDE 1000 MG: 1 INJECTION, SOLUTION INTRAVENOUS at 15:03

## 2022-01-01 RX ADMIN — SUCRALFATE 1 G: 1 TABLET ORAL at 06:35

## 2022-01-01 RX ADMIN — Medication 1 TABLET: at 10:22

## 2022-01-01 RX ADMIN — CETIRIZINE HYDROCHLORIDE 5 MG: 5 TABLET ORAL at 08:49

## 2022-01-01 RX ADMIN — LORAZEPAM 0.5 MG: 0.5 TABLET ORAL at 16:15

## 2022-01-01 RX ADMIN — BISACODYL 10 MG: 5 TABLET, COATED ORAL at 15:43

## 2022-01-01 RX ADMIN — Medication 250 MCG: at 09:34

## 2022-01-01 RX ADMIN — MAGNESIUM 64 MG (MAGNESIUM CHLORIDE) TABLET,DELAYED RELEASE 535 MG: at 10:15

## 2022-01-01 RX ADMIN — LORAZEPAM 1 MG: 1 TABLET ORAL at 20:33

## 2022-01-01 RX ADMIN — Medication 2 SPRAY: at 09:58

## 2022-01-01 RX ADMIN — Medication 2 SPRAY: at 20:47

## 2022-01-01 RX ADMIN — POTASSIUM CHLORIDE 20 MEQ: 1500 TABLET, EXTENDED RELEASE ORAL at 18:05

## 2022-01-01 RX ADMIN — CHLORHEXIDINE GLUCONATE 15 ML: 1.2 SOLUTION ORAL at 20:11

## 2022-01-01 RX ADMIN — PANTOPRAZOLE SODIUM 40 MG: 40 TABLET, DELAYED RELEASE ORAL at 16:46

## 2022-01-01 RX ADMIN — LOPERAMIDE HYDROCHLORIDE 2 MG: 2 CAPSULE ORAL at 18:32

## 2022-01-01 RX ADMIN — PANTOPRAZOLE SODIUM 40 MG: 40 TABLET, DELAYED RELEASE ORAL at 18:16

## 2022-01-01 RX ADMIN — THERA TABS 1 TABLET: TAB at 09:42

## 2022-01-01 RX ADMIN — POTASSIUM CHLORIDE 10 MEQ: 750 TABLET, EXTENDED RELEASE ORAL at 20:31

## 2022-01-01 RX ADMIN — POTASSIUM CHLORIDE 10 MEQ: 750 TABLET, EXTENDED RELEASE ORAL at 21:52

## 2022-01-01 RX ADMIN — ENOXAPARIN SODIUM 25 MG: 300 INJECTION INTRAVENOUS; SUBCUTANEOUS at 20:33

## 2022-01-01 RX ADMIN — Medication 5 MG: at 21:48

## 2022-01-01 RX ADMIN — IOPAMIDOL 95 ML: 755 INJECTION, SOLUTION INTRAVENOUS at 23:45

## 2022-01-01 RX ADMIN — CEFTRIAXONE SODIUM 1 G: 1 INJECTION, POWDER, FOR SOLUTION INTRAMUSCULAR; INTRAVENOUS at 02:42

## 2022-01-01 RX ADMIN — LEVOTHYROXINE SODIUM 50 MCG: 0.03 TABLET ORAL at 06:03

## 2022-01-01 RX ADMIN — SODIUM CHLORIDE TAB 1 GM 1 G: 1 TAB at 12:15

## 2022-01-01 RX ADMIN — DIPHENHYDRAMINE HYDROCHLORIDE AND LIDOCAINE HYDROCHLORIDE AND ALUMINUM HYDROXIDE AND MAGNESIUM HYDRO 10 ML: KIT at 20:21

## 2022-01-01 RX ADMIN — PIPERACILLIN AND TAZOBACTAM 3.38 G: 3; .375 INJECTION, POWDER, LYOPHILIZED, FOR SOLUTION INTRAVENOUS at 03:58

## 2022-01-01 RX ADMIN — VANCOMYCIN HYDROCHLORIDE 750 MG: 1 INJECTION, POWDER, LYOPHILIZED, FOR SOLUTION INTRAVENOUS at 14:08

## 2022-01-01 RX ADMIN — SUCRALFATE 1 G: 1 TABLET ORAL at 16:12

## 2022-01-01 RX ADMIN — PIPERACILLIN AND TAZOBACTAM 3.38 G: 3; .375 INJECTION, POWDER, LYOPHILIZED, FOR SOLUTION INTRAVENOUS at 06:36

## 2022-01-01 RX ADMIN — SODIUM CHLORIDE TAB 1 GM 1 G: 1 TAB at 08:44

## 2022-01-01 RX ADMIN — SODIUM CHLORIDE TAB 1 GM 1 G: 1 TAB at 11:41

## 2022-01-01 RX ADMIN — SUCRALFATE 1 G: 1 SUSPENSION ORAL at 13:45

## 2022-01-01 RX ADMIN — MAGNESIUM SULFATE 4 G: 4 INJECTION INTRAVENOUS at 07:42

## 2022-01-01 RX ADMIN — AMILORIDE HYDROCLORIDE 5 MG: 5 TABLET ORAL at 13:26

## 2022-01-01 RX ADMIN — Medication 1 TABLET: at 09:10

## 2022-01-01 RX ADMIN — Medication 325 MG: at 07:52

## 2022-01-01 RX ADMIN — NYSTATIN 500000 UNITS: 100000 SUSPENSION ORAL at 09:05

## 2022-01-01 RX ADMIN — HYDROXYZINE HYDROCHLORIDE 25 MG: 25 TABLET, FILM COATED ORAL at 08:51

## 2022-01-01 RX ADMIN — POTASSIUM CHLORIDE 10 MEQ: 750 TABLET, EXTENDED RELEASE ORAL at 08:48

## 2022-01-01 RX ADMIN — SUCRALFATE 1 G: 1 TABLET ORAL at 09:41

## 2022-01-01 RX ADMIN — CHLORHEXIDINE GLUCONATE 15 ML: 1.2 SOLUTION ORAL at 10:04

## 2022-01-01 RX ADMIN — SODIUM CHLORIDE, PRESERVATIVE FREE 50 ML: 5 INJECTION INTRAVENOUS at 23:02

## 2022-01-01 RX ADMIN — LORAZEPAM 0.5 MG: 0.5 TABLET ORAL at 08:56

## 2022-01-01 RX ADMIN — VANCOMYCIN HYDROCHLORIDE 750 MG: 5 INJECTION, POWDER, LYOPHILIZED, FOR SOLUTION INTRAVENOUS at 01:11

## 2022-01-01 RX ADMIN — BUMETANIDE 0.5 MG: 0.5 TABLET ORAL at 10:22

## 2022-01-01 RX ADMIN — ACETAMINOPHEN 650 MG: 325 TABLET, FILM COATED ORAL at 21:09

## 2022-01-01 RX ADMIN — ACETAMINOPHEN 650 MG: 325 TABLET, FILM COATED ORAL at 18:51

## 2022-01-01 RX ADMIN — AMILORIDE HYDROCLORIDE 5 MG: 5 TABLET ORAL at 11:28

## 2022-01-01 RX ADMIN — LEVOTHYROXINE SODIUM 50 MCG: 0.03 TABLET ORAL at 09:57

## 2022-01-01 RX ADMIN — PANTOPRAZOLE SODIUM 40 MG: 40 INJECTION, POWDER, FOR SOLUTION INTRAVENOUS at 08:05

## 2022-01-01 RX ADMIN — LORAZEPAM 1 MG: 1 TABLET ORAL at 20:55

## 2022-01-01 RX ADMIN — Medication 1 TABLET: at 20:12

## 2022-01-01 RX ADMIN — THERA TABS 1 TABLET: TAB at 10:22

## 2022-01-01 RX ADMIN — SUCRALFATE 1 G: 1 SUSPENSION ORAL at 18:16

## 2022-01-01 RX ADMIN — FUROSEMIDE 20 MG: 20 TABLET ORAL at 17:59

## 2022-01-01 RX ADMIN — PANTOPRAZOLE SODIUM 40 MG: 40 TABLET, DELAYED RELEASE ORAL at 06:21

## 2022-01-01 RX ADMIN — Medication 250 MCG: at 08:54

## 2022-01-01 RX ADMIN — HYDROXYZINE HYDROCHLORIDE 25 MG: 25 TABLET, FILM COATED ORAL at 03:17

## 2022-01-01 RX ADMIN — AMILORIDE HYDROCLORIDE 5 MG: 5 TABLET ORAL at 11:01

## 2022-01-01 RX ADMIN — PIPERACILLIN AND TAZOBACTAM 3.38 G: 3; .375 INJECTION, POWDER, LYOPHILIZED, FOR SOLUTION INTRAVENOUS at 21:47

## 2022-01-01 RX ADMIN — OXYBUTYNIN CHLORIDE 5 MG: 5 TABLET, EXTENDED RELEASE ORAL at 21:03

## 2022-01-01 RX ADMIN — POTASSIUM CHLORIDE 10 MEQ: 7.46 INJECTION, SOLUTION INTRAVENOUS at 07:54

## 2022-01-01 RX ADMIN — BUMETANIDE 0.5 MG: 0.5 TABLET ORAL at 10:01

## 2022-01-01 RX ADMIN — METOPROLOL SUCCINATE 100 MG: 100 TABLET, EXTENDED RELEASE ORAL at 09:43

## 2022-01-01 RX ADMIN — BUMETANIDE 0.5 MG: 0.5 TABLET ORAL at 17:13

## 2022-01-01 RX ADMIN — Medication 400 MG: at 10:20

## 2022-01-01 RX ADMIN — SIMETHICONE 80 MG: 80 TABLET, CHEWABLE ORAL at 23:10

## 2022-01-01 RX ADMIN — BUMETANIDE 0.5 MG: 0.5 TABLET ORAL at 18:16

## 2022-01-01 RX ADMIN — AZITHROMYCIN DIHYDRATE 500 MG: 500 INJECTION, POWDER, LYOPHILIZED, FOR SOLUTION INTRAVENOUS at 03:59

## 2022-01-01 RX ADMIN — SUCRALFATE 1 G: 1 TABLET ORAL at 21:49

## 2022-01-01 RX ADMIN — NYSTATIN 500000 UNITS: 100000 SUSPENSION ORAL at 20:53

## 2022-01-01 RX ADMIN — AMILORIDE HYDROCLORIDE 5 MG: 5 TABLET ORAL at 08:42

## 2022-01-01 RX ADMIN — LORAZEPAM 0.5 MG: 0.5 TABLET ORAL at 08:48

## 2022-01-01 RX ADMIN — PIPERACILLIN AND TAZOBACTAM 3.38 G: 3; .375 INJECTION, POWDER, LYOPHILIZED, FOR SOLUTION INTRAVENOUS at 21:35

## 2022-01-01 RX ADMIN — NYSTATIN 500000 UNITS: 100000 SUSPENSION ORAL at 13:32

## 2022-01-01 RX ADMIN — METOPROLOL SUCCINATE 50 MG: 50 TABLET, EXTENDED RELEASE ORAL at 09:00

## 2022-01-01 RX ADMIN — LEVOTHYROXINE SODIUM 50 MCG: 0.03 TABLET ORAL at 06:31

## 2022-01-01 RX ADMIN — ENOXAPARIN SODIUM 25 MG: 300 INJECTION INTRAVENOUS; SUBCUTANEOUS at 08:37

## 2022-01-01 RX ADMIN — Medication 250 MCG: at 08:40

## 2022-01-01 RX ADMIN — AMILORIDE HYDROCLORIDE 5 MG: 5 TABLET ORAL at 10:01

## 2022-01-01 RX ADMIN — POTASSIUM CHLORIDE 10 MEQ: 7.46 INJECTION, SOLUTION INTRAVENOUS at 08:47

## 2022-01-01 RX ADMIN — LORAZEPAM 0.5 MG: 0.5 TABLET ORAL at 20:06

## 2022-01-01 RX ADMIN — PIPERACILLIN AND TAZOBACTAM 3.38 G: 3; .375 INJECTION, POWDER, LYOPHILIZED, FOR SOLUTION INTRAVENOUS at 22:31

## 2022-01-01 RX ADMIN — SODIUM CHLORIDE: 3 INJECTION, SOLUTION INTRAVENOUS at 21:49

## 2022-01-01 RX ADMIN — SODIUM CHLORIDE TAB 1 GM 2 G: 1 TAB at 10:20

## 2022-01-01 RX ADMIN — IOPAMIDOL 100 ML: 755 INJECTION, SOLUTION INTRAVENOUS at 00:18

## 2022-01-01 RX ADMIN — NYSTATIN 500000 UNITS: 100000 SUSPENSION ORAL at 18:19

## 2022-01-01 RX ADMIN — PANTOPRAZOLE SODIUM 40 MG: 40 TABLET, DELAYED RELEASE ORAL at 08:25

## 2022-01-01 RX ADMIN — SODIUM CHLORIDE TAB 1 GM 1 G: 1 TAB at 08:18

## 2022-01-01 RX ADMIN — IOPAMIDOL 100 ML: 755 INJECTION, SOLUTION INTRAVENOUS at 17:41

## 2022-01-01 RX ADMIN — SODIUM CHLORIDE TAB 1 GM 2 G: 1 TAB at 13:50

## 2022-01-01 RX ADMIN — LEVOTHYROXINE SODIUM 50 MCG: 0.03 TABLET ORAL at 09:10

## 2022-01-01 RX ADMIN — SUCRALFATE 1 G: 1 TABLET ORAL at 12:37

## 2022-01-01 RX ADMIN — OXYCODONE HYDROCHLORIDE 2.5 MG: 5 TABLET ORAL at 01:00

## 2022-01-01 RX ADMIN — Medication 330 MG: at 12:32

## 2022-01-01 RX ADMIN — PANTOPRAZOLE SODIUM 40 MG: 40 TABLET, DELAYED RELEASE ORAL at 18:34

## 2022-01-01 RX ADMIN — BUMETANIDE 0.5 MG: 0.5 TABLET ORAL at 10:13

## 2022-01-01 RX ADMIN — CHLORHEXIDINE GLUCONATE 15 ML: 1.2 SOLUTION ORAL at 09:28

## 2022-01-01 RX ADMIN — BUMETANIDE 0.5 MG: 0.5 TABLET ORAL at 08:59

## 2022-01-01 RX ADMIN — HEPARIN SODIUM 5000 UNITS: 10000 INJECTION, SOLUTION INTRAVENOUS; SUBCUTANEOUS at 09:12

## 2022-01-01 RX ADMIN — ACETAMINOPHEN 650 MG: 325 TABLET ORAL at 23:45

## 2022-01-01 RX ADMIN — Medication 1 TABLET: at 09:00

## 2022-01-01 RX ADMIN — ACETAMINOPHEN 650 MG: 325 TABLET ORAL at 08:58

## 2022-01-01 RX ADMIN — VANCOMYCIN HYDROCHLORIDE 500 MG: 1 INJECTION, POWDER, LYOPHILIZED, FOR SOLUTION INTRAVENOUS at 23:02

## 2022-01-01 RX ADMIN — TOLVAPTAN 15 MG: 15 TABLET ORAL at 10:14

## 2022-01-01 RX ADMIN — LORAZEPAM 1 MG: 1 TABLET ORAL at 08:24

## 2022-01-01 RX ADMIN — LORAZEPAM 0.5 MG: 0.5 TABLET ORAL at 13:59

## 2022-01-01 RX ADMIN — SUCRALFATE 1 G: 1 TABLET ORAL at 12:28

## 2022-01-01 RX ADMIN — SODIUM BICARBONATE: 84 INJECTION, SOLUTION INTRAVENOUS at 14:16

## 2022-01-01 RX ADMIN — ACETAMINOPHEN 650 MG: 325 TABLET ORAL at 04:25

## 2022-01-01 RX ADMIN — OXYCODONE HYDROCHLORIDE 2.5 MG: 5 TABLET ORAL at 21:34

## 2022-01-01 RX ADMIN — Medication 1 TABLET: at 20:46

## 2022-01-01 RX ADMIN — LIDOCAINE 1 PATCH: 246 PATCH TOPICAL at 16:37

## 2022-01-01 RX ADMIN — PANTOPRAZOLE SODIUM 40 MG: 20 TABLET, DELAYED RELEASE ORAL at 10:18

## 2022-01-01 RX ADMIN — POTASSIUM CHLORIDE 10 MEQ: 7.46 INJECTION, SOLUTION INTRAVENOUS at 18:22

## 2022-01-01 RX ADMIN — METOPROLOL SUCCINATE 100 MG: 100 TABLET, EXTENDED RELEASE ORAL at 12:15

## 2022-01-01 RX ADMIN — CHLORHEXIDINE GLUCONATE 15 ML: 1.2 SOLUTION ORAL at 08:28

## 2022-01-01 RX ADMIN — THERA TABS 1 TABLET: TAB at 08:30

## 2022-01-01 RX ADMIN — LEVOTHYROXINE SODIUM 50 MCG: 0.03 TABLET ORAL at 06:37

## 2022-01-01 RX ADMIN — SODIUM BICARBONATE: 84 INJECTION, SOLUTION INTRAVENOUS at 17:49

## 2022-01-01 RX ADMIN — PANTOPRAZOLE SODIUM 40 MG: 40 INJECTION, POWDER, FOR SOLUTION INTRAVENOUS at 21:26

## 2022-01-01 RX ADMIN — Medication 3 MG: at 20:26

## 2022-01-01 RX ADMIN — LORAZEPAM 1 MG: 0.5 TABLET ORAL at 09:11

## 2022-01-01 RX ADMIN — ACETAMINOPHEN 650 MG: 325 TABLET ORAL at 06:05

## 2022-01-01 RX ADMIN — ACETAMINOPHEN 975 MG: 325 TABLET, FILM COATED ORAL at 09:38

## 2022-01-01 RX ADMIN — BISACODYL 10 MG: 10 SUPPOSITORY RECTAL at 10:19

## 2022-01-01 RX ADMIN — LORAZEPAM 1 MG: 1 TABLET ORAL at 20:16

## 2022-01-01 RX ADMIN — PIPERACILLIN AND TAZOBACTAM 3.38 G: 3; .375 INJECTION, POWDER, LYOPHILIZED, FOR SOLUTION INTRAVENOUS at 20:34

## 2022-01-01 RX ADMIN — METOPROLOL SUCCINATE 50 MG: 50 TABLET, EXTENDED RELEASE ORAL at 20:16

## 2022-01-01 RX ADMIN — OLANZAPINE 2.5 MG: 10 INJECTION, POWDER, FOR SOLUTION INTRAMUSCULAR at 16:50

## 2022-01-01 RX ADMIN — BISACODYL 10 MG: 5 TABLET, COATED ORAL at 19:10

## 2022-01-01 RX ADMIN — NYSTATIN 500000 UNITS: 100000 SUSPENSION ORAL at 17:46

## 2022-01-01 RX ADMIN — ACETAMINOPHEN 975 MG: 325 TABLET, FILM COATED ORAL at 18:59

## 2022-01-01 RX ADMIN — CETIRIZINE HYDROCHLORIDE 5 MG: 5 TABLET ORAL at 09:32

## 2022-01-01 RX ADMIN — LORAZEPAM 1 MG: 1 TABLET ORAL at 10:38

## 2022-01-01 RX ADMIN — PANTOPRAZOLE SODIUM 40 MG: 40 TABLET, DELAYED RELEASE ORAL at 15:00

## 2022-01-01 RX ADMIN — POTASSIUM CHLORIDE 10 MEQ: 7.46 INJECTION, SOLUTION INTRAVENOUS at 19:46

## 2022-01-01 RX ADMIN — METOPROLOL SUCCINATE 50 MG: 25 TABLET, EXTENDED RELEASE ORAL at 22:05

## 2022-01-01 RX ADMIN — SENNOSIDES AND DOCUSATE SODIUM 2 TABLET: 50; 8.6 TABLET ORAL at 07:57

## 2022-01-01 RX ADMIN — LEVOTHYROXINE SODIUM 50 MCG: 0.03 TABLET ORAL at 08:39

## 2022-01-01 RX ADMIN — SUCRALFATE 1 G: 1 TABLET ORAL at 12:05

## 2022-01-01 RX ADMIN — LORAZEPAM 0.5 MG: 0.5 TABLET ORAL at 07:52

## 2022-01-01 RX ADMIN — SODIUM CHLORIDE TAB 1 GM 1 G: 1 TAB at 16:21

## 2022-01-01 RX ADMIN — SUCRALFATE 1 G: 1 TABLET ORAL at 09:04

## 2022-01-01 RX ADMIN — SUCRALFATE 1 G: 1 TABLET ORAL at 08:50

## 2022-01-01 RX ADMIN — SUCRALFATE 1 G: 1 TABLET ORAL at 10:15

## 2022-01-01 RX ADMIN — OLANZAPINE 5 MG: 10 INJECTION, POWDER, LYOPHILIZED, FOR SOLUTION INTRAMUSCULAR at 13:24

## 2022-01-01 RX ADMIN — ACETAMINOPHEN 500 MG: 500 TABLET ORAL at 12:43

## 2022-01-01 RX ADMIN — Medication 1 CAPSULE: at 16:45

## 2022-01-01 RX ADMIN — ACETAMINOPHEN 975 MG: 325 TABLET ORAL at 08:32

## 2022-01-01 RX ADMIN — NYSTATIN 500000 UNITS: 100000 SUSPENSION ORAL at 22:05

## 2022-01-01 RX ADMIN — PIPERACILLIN AND TAZOBACTAM 3.38 G: 3; .375 INJECTION, POWDER, LYOPHILIZED, FOR SOLUTION INTRAVENOUS at 22:08

## 2022-01-01 RX ADMIN — METOPROLOL SUCCINATE 100 MG: 100 TABLET, EXTENDED RELEASE ORAL at 08:58

## 2022-01-01 RX ADMIN — SODIUM CHLORIDE TAB 1 GM 1 G: 1 TAB at 17:45

## 2022-01-01 RX ADMIN — NYSTATIN 500000 UNITS: 100000 SUSPENSION ORAL at 17:04

## 2022-01-01 RX ADMIN — AZITHROMYCIN MONOHYDRATE 500 MG: 500 INJECTION, POWDER, LYOPHILIZED, FOR SOLUTION INTRAVENOUS at 20:58

## 2022-01-01 RX ADMIN — POTASSIUM CHLORIDE 40 MEQ: 1500 TABLET, EXTENDED RELEASE ORAL at 10:16

## 2022-01-01 RX ADMIN — SUCRALFATE 1 G: 1 SUSPENSION ORAL at 10:22

## 2022-01-01 RX ADMIN — PANTOPRAZOLE SODIUM 40 MG: 40 TABLET, DELAYED RELEASE ORAL at 18:06

## 2022-01-01 RX ADMIN — MAGNESIUM SULFATE HEPTAHYDRATE 4 G: 4 INJECTION, SOLUTION INTRAVENOUS at 01:56

## 2022-01-01 RX ADMIN — IOPAMIDOL 75 ML: 755 INJECTION, SOLUTION INTRAVENOUS at 02:20

## 2022-01-01 RX ADMIN — SODIUM CHLORIDE TAB 1 GM 2 G: 1 TAB at 20:44

## 2022-01-01 RX ADMIN — POTASSIUM CHLORIDE 40 MEQ: 1500 TABLET, EXTENDED RELEASE ORAL at 12:55

## 2022-01-01 RX ADMIN — POLYETHYLENE GLYCOL 3350 238 G: 17 POWDER, FOR SOLUTION ORAL at 17:00

## 2022-01-01 RX ADMIN — ACETAMINOPHEN 500 MG: 500 TABLET ORAL at 10:39

## 2022-01-01 RX ADMIN — AZITHROMYCIN DIHYDRATE 500 MG: 500 INJECTION, POWDER, LYOPHILIZED, FOR SOLUTION INTRAVENOUS at 04:43

## 2022-01-01 RX ADMIN — BUMETANIDE 1 MG: 0.25 INJECTION, SOLUTION INTRAMUSCULAR; INTRAVENOUS at 16:00

## 2022-01-01 RX ADMIN — CALCIUM CARBONATE (ANTACID) CHEW TAB 500 MG 500 MG: 500 CHEW TAB at 02:32

## 2022-01-01 RX ADMIN — SUCRALFATE 1 G: 1 TABLET ORAL at 20:45

## 2022-01-01 RX ADMIN — PANTOPRAZOLE SODIUM 40 MG: 40 TABLET, DELAYED RELEASE ORAL at 10:20

## 2022-01-01 RX ADMIN — POTASSIUM CHLORIDE 20 MEQ: 1.5 POWDER, FOR SOLUTION ORAL at 13:46

## 2022-01-01 RX ADMIN — MAGNESIUM CITRATE 296 ML: 1.75 LIQUID ORAL at 04:08

## 2022-01-01 RX ADMIN — POTASSIUM CHLORIDE 10 MEQ: 750 TABLET, EXTENDED RELEASE ORAL at 22:34

## 2022-01-01 RX ADMIN — NYSTATIN 500000 UNITS: 100000 SUSPENSION ORAL at 16:47

## 2022-01-01 RX ADMIN — FERROUS SULFATE TAB 325 MG (65 MG ELEMENTAL FE) 325 MG: 325 (65 FE) TAB at 20:16

## 2022-01-01 RX ADMIN — POTASSIUM CHLORIDE 10 MEQ: 7.46 INJECTION, SOLUTION INTRAVENOUS at 13:11

## 2022-01-01 RX ADMIN — ACETAMINOPHEN 650 MG: 325 TABLET ORAL at 10:16

## 2022-01-01 RX ADMIN — LORAZEPAM 1 MG: 1 TABLET ORAL at 08:58

## 2022-01-01 RX ADMIN — PANTOPRAZOLE SODIUM 40 MG: 40 TABLET, DELAYED RELEASE ORAL at 09:28

## 2022-01-01 RX ADMIN — OXYCODONE HYDROCHLORIDE 2.5 MG: 5 TABLET ORAL at 10:36

## 2022-01-01 RX ADMIN — PHENYLEPHRINE HYDROCHLORIDE 200 MCG: 10 INJECTION INTRAVENOUS at 17:04

## 2022-01-01 RX ADMIN — LEVOTHYROXINE SODIUM 50 MCG: 0.03 TABLET ORAL at 09:33

## 2022-01-01 RX ADMIN — POTASSIUM CHLORIDE 40 MEQ: 1500 TABLET, EXTENDED RELEASE ORAL at 09:27

## 2022-01-01 RX ADMIN — LORAZEPAM 0.5 MG: 0.5 TABLET ORAL at 13:25

## 2022-01-01 RX ADMIN — Medication 330 MG: at 09:58

## 2022-01-01 RX ADMIN — ACETAMINOPHEN 650 MG: 325 TABLET, FILM COATED ORAL at 18:57

## 2022-01-01 RX ADMIN — SUCRALFATE 1 G: 1 TABLET ORAL at 08:59

## 2022-01-01 RX ADMIN — OXYBUTYNIN CHLORIDE 5 MG: 5 TABLET, EXTENDED RELEASE ORAL at 10:16

## 2022-01-01 RX ADMIN — LORAZEPAM 1 MG: 1 TABLET ORAL at 09:01

## 2022-01-01 RX ADMIN — FUROSEMIDE 40 MG: 10 INJECTION, SOLUTION INTRAMUSCULAR; INTRAVENOUS at 23:17

## 2022-01-01 RX ADMIN — FERROUS SULFATE TAB 325 MG (65 MG ELEMENTAL FE) 325 MG: 325 (65 FE) TAB at 08:39

## 2022-01-01 RX ADMIN — OXYCODONE HYDROCHLORIDE 5 MG: 5 TABLET ORAL at 10:15

## 2022-01-01 RX ADMIN — ACETAMINOPHEN 975 MG: 325 TABLET ORAL at 11:36

## 2022-01-01 RX ADMIN — SUCRALFATE 1 G: 1 TABLET ORAL at 22:34

## 2022-01-01 RX ADMIN — NYSTATIN 500000 UNITS: 100000 SUSPENSION ORAL at 08:24

## 2022-01-01 RX ADMIN — NYSTATIN 500000 UNITS: 100000 SUSPENSION ORAL at 08:43

## 2022-01-01 RX ADMIN — AMILORIDE HYDROCLORIDE 5 MG: 5 TABLET ORAL at 10:13

## 2022-01-01 RX ADMIN — FERROUS SULFATE TAB 325 MG (65 MG ELEMENTAL FE) 325 MG: 325 (65 FE) TAB at 20:54

## 2022-01-01 RX ADMIN — POTASSIUM CHLORIDE 10 MEQ: 7.46 INJECTION, SOLUTION INTRAVENOUS at 12:56

## 2022-01-01 RX ADMIN — POLYETHYLENE GLYCOL 3350 17 G: 17 POWDER, FOR SOLUTION ORAL at 08:16

## 2022-01-01 RX ADMIN — PHENYLEPHRINE HYDROCHLORIDE 100 MCG: 10 INJECTION INTRAVENOUS at 16:18

## 2022-01-01 RX ADMIN — SODIUM CHLORIDE TAB 1 GM 1 G: 1 TAB at 09:05

## 2022-01-01 RX ADMIN — LOPERAMIDE HYDROCHLORIDE 2 MG: 2 CAPSULE ORAL at 10:36

## 2022-01-01 RX ADMIN — METOPROLOL SUCCINATE 50 MG: 25 TABLET, EXTENDED RELEASE ORAL at 09:41

## 2022-01-01 RX ADMIN — PANTOPRAZOLE SODIUM 40 MG: 40 TABLET, DELAYED RELEASE ORAL at 08:44

## 2022-01-01 RX ADMIN — SODIUM CHLORIDE TAB 1 GM 1 G: 1 TAB at 17:13

## 2022-01-01 RX ADMIN — LORAZEPAM 1 MG: 1 TABLET ORAL at 20:01

## 2022-01-01 RX ADMIN — POTASSIUM CHLORIDE 10 MEQ: 7.46 INJECTION, SOLUTION INTRAVENOUS at 08:57

## 2022-01-01 RX ADMIN — FUROSEMIDE 40 MG: 10 INJECTION, SOLUTION INTRAMUSCULAR; INTRAVENOUS at 11:46

## 2022-01-01 RX ADMIN — METOPROLOL SUCCINATE 50 MG: 25 TABLET, EXTENDED RELEASE ORAL at 21:03

## 2022-01-01 RX ADMIN — LEVOTHYROXINE SODIUM 50 MCG: 0.03 TABLET ORAL at 08:16

## 2022-01-01 RX ADMIN — POTASSIUM CHLORIDE 10 MEQ: 750 TABLET, EXTENDED RELEASE ORAL at 20:26

## 2022-01-01 RX ADMIN — METOPROLOL SUCCINATE 100 MG: 100 TABLET, EXTENDED RELEASE ORAL at 10:16

## 2022-01-01 RX ADMIN — CETIRIZINE HYDROCHLORIDE 5 MG: 5 TABLET ORAL at 10:37

## 2022-01-01 RX ADMIN — THERA TABS 1 TABLET: TAB at 09:02

## 2022-01-01 RX ADMIN — METOPROLOL SUCCINATE 100 MG: 100 TABLET, EXTENDED RELEASE ORAL at 09:01

## 2022-01-01 RX ADMIN — Medication 250 MCG: at 08:29

## 2022-01-01 RX ADMIN — SUCRALFATE 1 G: 1 TABLET ORAL at 20:44

## 2022-01-01 RX ADMIN — ACETAMINOPHEN 650 MG: 325 TABLET, FILM COATED ORAL at 08:49

## 2022-01-01 RX ADMIN — LORAZEPAM 1 MG: 1 TABLET ORAL at 19:57

## 2022-01-01 RX ADMIN — ACETAMINOPHEN 325 MG: 325 TABLET, FILM COATED ORAL at 08:16

## 2022-01-01 RX ADMIN — LORAZEPAM 1 MG: 1 TABLET ORAL at 20:09

## 2022-01-01 RX ADMIN — FERROUS SULFATE TAB 325 MG (65 MG ELEMENTAL FE) 325 MG: 325 (65 FE) TAB at 15:43

## 2022-01-01 RX ADMIN — BUMETANIDE 0.5 MG: 0.5 TABLET ORAL at 08:41

## 2022-01-01 RX ADMIN — ACETAMINOPHEN 500 MG: 500 TABLET ORAL at 22:04

## 2022-01-01 RX ADMIN — FUROSEMIDE 40 MG: 10 INJECTION, SOLUTION INTRAVENOUS at 01:09

## 2022-01-01 RX ADMIN — SUCRALFATE 1 G: 1 TABLET ORAL at 18:34

## 2022-01-01 RX ADMIN — FUROSEMIDE 10 MG: 10 INJECTION, SOLUTION INTRAMUSCULAR; INTRAVENOUS at 08:08

## 2022-01-01 RX ADMIN — POTASSIUM CHLORIDE 10 MEQ: 7.46 INJECTION, SOLUTION INTRAVENOUS at 12:58

## 2022-01-01 RX ADMIN — METOPROLOL SUCCINATE 50 MG: 25 TABLET, EXTENDED RELEASE ORAL at 20:46

## 2022-01-01 RX ADMIN — PANTOPRAZOLE SODIUM 40 MG: 40 TABLET, DELAYED RELEASE ORAL at 06:54

## 2022-01-01 RX ADMIN — SODIUM CHLORIDE TAB 1 GM 2 G: 1 TAB at 09:03

## 2022-01-01 RX ADMIN — ACETAMINOPHEN 975 MG: 325 TABLET, FILM COATED ORAL at 10:35

## 2022-01-01 RX ADMIN — SODIUM CHLORIDE 500 ML: 9 INJECTION, SOLUTION INTRAVENOUS at 22:07

## 2022-01-01 RX ADMIN — OXYBUTYNIN CHLORIDE 5 MG: 5 TABLET, EXTENDED RELEASE ORAL at 12:29

## 2022-01-01 RX ADMIN — OXYBUTYNIN CHLORIDE 5 MG: 5 TABLET, EXTENDED RELEASE ORAL at 08:28

## 2022-01-01 RX ADMIN — LIDOCAINE HYDROCHLORIDE 2.5 ML: 10 INJECTION, SOLUTION EPIDURAL; INFILTRATION; INTRACAUDAL; PERINEURAL at 15:50

## 2022-01-01 RX ADMIN — ENOXAPARIN SODIUM 30 MG: 30 INJECTION SUBCUTANEOUS at 13:24

## 2022-01-01 RX ADMIN — NYSTATIN 500000 UNITS: 100000 SUSPENSION ORAL at 23:02

## 2022-01-01 RX ADMIN — PANTOPRAZOLE SODIUM 40 MG: 40 TABLET, DELAYED RELEASE ORAL at 17:09

## 2022-01-01 RX ADMIN — ACETAMINOPHEN 650 MG: 325 TABLET, FILM COATED ORAL at 20:33

## 2022-01-01 RX ADMIN — PIPERACILLIN AND TAZOBACTAM 3.38 G: 3; .375 INJECTION, POWDER, LYOPHILIZED, FOR SOLUTION INTRAVENOUS at 02:39

## 2022-01-01 RX ADMIN — ACETAMINOPHEN 500 MG: 500 TABLET ORAL at 06:32

## 2022-01-01 RX ADMIN — METOPROLOL SUCCINATE 50 MG: 50 TABLET, EXTENDED RELEASE ORAL at 08:22

## 2022-01-01 RX ADMIN — MAGNESIUM CITRATE 296 ML: 1.75 LIQUID ORAL at 04:11

## 2022-01-01 RX ADMIN — PANTOPRAZOLE SODIUM 40 MG: 40 TABLET, DELAYED RELEASE ORAL at 06:36

## 2022-01-01 RX ADMIN — METOPROLOL SUCCINATE 50 MG: 25 TABLET, EXTENDED RELEASE ORAL at 21:54

## 2022-01-01 RX ADMIN — AMILORIDE HYDROCLORIDE 5 MG: 5 TABLET ORAL at 14:21

## 2022-01-01 RX ADMIN — LORAZEPAM 0.5 MG: 0.5 TABLET ORAL at 11:54

## 2022-01-01 RX ADMIN — Medication 2 SPRAY: at 22:59

## 2022-01-01 RX ADMIN — PREDNISONE 20 MG: 20 TABLET ORAL at 09:40

## 2022-01-01 RX ADMIN — BUMETANIDE 1 MG: 1 TABLET ORAL at 08:44

## 2022-01-01 RX ADMIN — NYSTATIN 500000 UNITS: 100000 SUSPENSION ORAL at 16:21

## 2022-01-01 RX ADMIN — SODIUM CHLORIDE TAB 1 GM 2 G: 1 TAB at 21:49

## 2022-01-01 RX ADMIN — Medication 3 MG: at 20:46

## 2022-01-01 RX ADMIN — LORAZEPAM 1 MG: 1 TABLET ORAL at 19:59

## 2022-01-01 RX ADMIN — PIPERACILLIN AND TAZOBACTAM 3.38 G: 3; .375 INJECTION, POWDER, LYOPHILIZED, FOR SOLUTION INTRAVENOUS at 22:36

## 2022-01-01 RX ADMIN — PIPERACILLIN AND TAZOBACTAM 3.38 G: 3; .375 INJECTION, POWDER, LYOPHILIZED, FOR SOLUTION INTRAVENOUS at 15:03

## 2022-01-01 RX ADMIN — METOPROLOL SUCCINATE 50 MG: 25 TABLET, EXTENDED RELEASE ORAL at 20:49

## 2022-01-01 RX ADMIN — LORAZEPAM 1 MG: 1 TABLET ORAL at 08:28

## 2022-01-01 RX ADMIN — SODIUM CHLORIDE, POTASSIUM CHLORIDE, SODIUM LACTATE AND CALCIUM CHLORIDE: 600; 310; 30; 20 INJECTION, SOLUTION INTRAVENOUS at 07:00

## 2022-01-01 RX ADMIN — FUROSEMIDE 40 MG: 10 INJECTION, SOLUTION INTRAMUSCULAR; INTRAVENOUS at 23:02

## 2022-01-01 RX ADMIN — SODIUM CHLORIDE, POTASSIUM CHLORIDE, SODIUM LACTATE AND CALCIUM CHLORIDE: 600; 310; 30; 20 INJECTION, SOLUTION INTRAVENOUS at 16:10

## 2022-01-01 RX ADMIN — Medication 3 MG: at 20:34

## 2022-01-01 RX ADMIN — ACETAMINOPHEN 975 MG: 325 TABLET, FILM COATED ORAL at 14:32

## 2022-01-01 RX ADMIN — DEXAMETHASONE 6 MG: 2 TABLET ORAL at 08:39

## 2022-01-01 RX ADMIN — OXYBUTYNIN CHLORIDE 5 MG: 5 TABLET, EXTENDED RELEASE ORAL at 09:44

## 2022-01-01 RX ADMIN — LORAZEPAM 0.5 MG: 0.5 TABLET ORAL at 13:23

## 2022-01-01 RX ADMIN — PANTOPRAZOLE SODIUM 40 MG: 40 TABLET, DELAYED RELEASE ORAL at 10:58

## 2022-01-01 RX ADMIN — METOPROLOL SUCCINATE 100 MG: 100 TABLET, EXTENDED RELEASE ORAL at 09:44

## 2022-01-01 RX ADMIN — SUCRALFATE 1 G: 1 TABLET ORAL at 12:15

## 2022-01-01 RX ADMIN — CHLORHEXIDINE GLUCONATE 15 ML: 1.2 SOLUTION ORAL at 22:35

## 2022-01-01 RX ADMIN — PANTOPRAZOLE SODIUM 40 MG: 40 INJECTION, POWDER, FOR SOLUTION INTRAVENOUS at 20:49

## 2022-01-01 RX ADMIN — AMILORIDE HYDROCLORIDE 5 MG: 5 TABLET ORAL at 08:29

## 2022-01-01 RX ADMIN — CHLORHEXIDINE GLUCONATE 15 ML: 1.2 SOLUTION ORAL at 20:31

## 2022-01-01 RX ADMIN — AMILORIDE HYDROCLORIDE 5 MG: 5 TABLET ORAL at 08:37

## 2022-01-01 RX ADMIN — PIPERACILLIN AND TAZOBACTAM 3.38 G: 3; .375 INJECTION, POWDER, LYOPHILIZED, FOR SOLUTION INTRAVENOUS at 11:32

## 2022-01-01 RX ADMIN — SODIUM CHLORIDE TAB 1 GM 2 G: 1 TAB at 10:14

## 2022-01-01 RX ADMIN — LORAZEPAM 0.5 MG: 0.5 TABLET ORAL at 12:27

## 2022-01-01 RX ADMIN — THERA TABS 1 TABLET: TAB at 10:23

## 2022-01-01 RX ADMIN — PIPERACILLIN AND TAZOBACTAM 3.38 G: 3; .375 INJECTION, POWDER, LYOPHILIZED, FOR SOLUTION INTRAVENOUS at 06:03

## 2022-01-01 RX ADMIN — SUCRALFATE 1 G: 1 TABLET ORAL at 15:41

## 2022-01-01 RX ADMIN — NYSTATIN 500000 UNITS: 100000 SUSPENSION ORAL at 20:14

## 2022-01-01 RX ADMIN — PIPERACILLIN AND TAZOBACTAM 3.38 G: 3; .375 INJECTION, POWDER, LYOPHILIZED, FOR SOLUTION INTRAVENOUS at 13:14

## 2022-01-01 RX ADMIN — Medication 2 SPRAY: at 13:46

## 2022-01-01 RX ADMIN — SODIUM CHLORIDE TAB 1 GM 2 G: 1 TAB at 09:33

## 2022-01-01 RX ADMIN — Medication 330 MG: at 22:34

## 2022-01-01 RX ADMIN — CETIRIZINE HYDROCHLORIDE 5 MG: 5 TABLET ORAL at 08:37

## 2022-01-01 RX ADMIN — POTASSIUM CHLORIDE 10 MEQ: 750 TABLET, EXTENDED RELEASE ORAL at 21:27

## 2022-01-01 RX ADMIN — POLYETHYLENE GLYCOL 3350 17 G: 17 POWDER, FOR SOLUTION ORAL at 08:37

## 2022-01-01 RX ADMIN — ACETAMINOPHEN 975 MG: 325 TABLET, FILM COATED ORAL at 19:49

## 2022-01-01 RX ADMIN — PHENYLEPHRINE HYDROCHLORIDE 100 MCG: 10 INJECTION INTRAVENOUS at 16:17

## 2022-01-01 RX ADMIN — SODIUM CHLORIDE TAB 1 GM 2 G: 1 TAB at 20:12

## 2022-01-01 RX ADMIN — LORAZEPAM 1 MG: 1 TABLET ORAL at 08:25

## 2022-01-01 RX ADMIN — Medication 1 TABLET: at 09:41

## 2022-01-01 RX ADMIN — OXYBUTYNIN CHLORIDE 5 MG: 5 TABLET, EXTENDED RELEASE ORAL at 20:14

## 2022-01-01 RX ADMIN — SUCRALFATE 1 G: 1 TABLET ORAL at 08:30

## 2022-01-01 RX ADMIN — LEVOTHYROXINE SODIUM 50 MCG: 0.03 TABLET ORAL at 10:35

## 2022-01-01 RX ADMIN — SODIUM CHLORIDE TAB 1 GM 2 G: 1 TAB at 09:08

## 2022-01-01 RX ADMIN — NYSTATIN 500000 UNITS: 100000 SUSPENSION ORAL at 13:12

## 2022-01-01 RX ADMIN — BUMETANIDE 1 MG: 0.25 INJECTION, SOLUTION INTRAMUSCULAR; INTRAVENOUS at 15:03

## 2022-01-01 RX ADMIN — LORAZEPAM 1 MG: 1 TABLET ORAL at 20:14

## 2022-01-01 RX ADMIN — SODIUM CHLORIDE TAB 1 GM 2 G: 1 TAB at 20:56

## 2022-01-01 RX ADMIN — PANTOPRAZOLE SODIUM 40 MG: 40 TABLET, DELAYED RELEASE ORAL at 17:04

## 2022-01-01 RX ADMIN — PIPERACILLIN AND TAZOBACTAM 3.38 G: 3; .375 INJECTION, POWDER, LYOPHILIZED, FOR SOLUTION INTRAVENOUS at 04:28

## 2022-01-01 RX ADMIN — SENNOSIDES AND DOCUSATE SODIUM 2 TABLET: 50; 8.6 TABLET ORAL at 10:25

## 2022-01-01 RX ADMIN — SUCRALFATE 1 G: 1 TABLET ORAL at 20:41

## 2022-01-01 RX ADMIN — SODIUM CHLORIDE TAB 1 GM 2 G: 1 TAB at 18:58

## 2022-01-01 RX ADMIN — PIPERACILLIN AND TAZOBACTAM 3.38 G: 3; .375 INJECTION, POWDER, LYOPHILIZED, FOR SOLUTION INTRAVENOUS at 05:57

## 2022-01-01 RX ADMIN — SUCRALFATE 1 G: 1 SUSPENSION ORAL at 13:23

## 2022-01-01 RX ADMIN — AMILORIDE HYDROCLORIDE 5 MG: 5 TABLET ORAL at 12:19

## 2022-01-01 RX ADMIN — PIPERACILLIN AND TAZOBACTAM 3.38 G: 3; .375 INJECTION, POWDER, LYOPHILIZED, FOR SOLUTION INTRAVENOUS at 11:56

## 2022-01-01 RX ADMIN — METOPROLOL SUCCINATE 50 MG: 50 TABLET, EXTENDED RELEASE ORAL at 08:49

## 2022-01-01 RX ADMIN — Medication 1 TABLET: at 22:34

## 2022-01-01 RX ADMIN — PANTOPRAZOLE SODIUM 40 MG: 40 INJECTION, POWDER, FOR SOLUTION INTRAVENOUS at 19:09

## 2022-01-01 RX ADMIN — SODIUM CHLORIDE 50 ML: 9 INJECTION, SOLUTION INTRAVENOUS at 18:41

## 2022-01-01 RX ADMIN — SUCRALFATE 1 G: 1 TABLET ORAL at 20:30

## 2022-01-01 RX ADMIN — ACETAMINOPHEN 650 MG: 325 TABLET, FILM COATED ORAL at 09:34

## 2022-01-01 RX ADMIN — POTASSIUM CHLORIDE 10 MEQ: 7.46 INJECTION, SOLUTION INTRAVENOUS at 09:45

## 2022-01-01 RX ADMIN — 3% SODIUM CHLORIDE: 3 INJECTION, SOLUTION INTRAVENOUS at 03:35

## 2022-01-01 RX ADMIN — ACETAMINOPHEN 975 MG: 325 TABLET, FILM COATED ORAL at 16:03

## 2022-01-01 RX ADMIN — OLANZAPINE 2.5 MG: 10 INJECTION, POWDER, FOR SOLUTION INTRAMUSCULAR at 03:06

## 2022-01-01 RX ADMIN — CETIRIZINE HYDROCHLORIDE 5 MG: 5 TABLET ORAL at 08:36

## 2022-01-01 RX ADMIN — OXYBUTYNIN CHLORIDE 5 MG: 5 TABLET, EXTENDED RELEASE ORAL at 20:30

## 2022-01-01 RX ADMIN — SUCRALFATE 1 G: 1 TABLET ORAL at 20:46

## 2022-01-01 RX ADMIN — FUROSEMIDE 20 MG: 20 TABLET ORAL at 16:46

## 2022-01-01 RX ADMIN — TOLVAPTAN 15 MG: 15 TABLET ORAL at 13:26

## 2022-01-01 RX ADMIN — SODIUM CHLORIDE TAB 1 GM 2 G: 1 TAB at 22:03

## 2022-01-01 RX ADMIN — SODIUM CHLORIDE TAB 1 GM 2 G: 1 TAB at 10:13

## 2022-01-01 RX ADMIN — PIPERACILLIN AND TAZOBACTAM 3.38 G: 3; .375 INJECTION, POWDER, LYOPHILIZED, FOR SOLUTION INTRAVENOUS at 12:39

## 2022-01-01 RX ADMIN — Medication 1 TABLET: at 20:26

## 2022-01-01 RX ADMIN — ACETAMINOPHEN 975 MG: 325 TABLET, FILM COATED ORAL at 19:18

## 2022-01-01 RX ADMIN — AMILORIDE HYDROCLORIDE 5 MG: 5 TABLET ORAL at 10:21

## 2022-01-01 RX ADMIN — DULOXETINE HYDROCHLORIDE 20 MG: 20 CAPSULE, DELAYED RELEASE ORAL at 17:09

## 2022-01-01 RX ADMIN — POTASSIUM CHLORIDE 10 MEQ: 7.46 INJECTION, SOLUTION INTRAVENOUS at 06:22

## 2022-01-01 RX ADMIN — HYDROXYZINE HYDROCHLORIDE 25 MG: 25 TABLET, FILM COATED ORAL at 22:35

## 2022-01-01 RX ADMIN — Medication 200 MG: at 08:37

## 2022-01-01 RX ADMIN — POTASSIUM CHLORIDE 40 MEQ: 1500 TABLET, EXTENDED RELEASE ORAL at 03:02

## 2022-01-01 RX ADMIN — PIPERACILLIN AND TAZOBACTAM 3.38 G: 3; .375 INJECTION, POWDER, LYOPHILIZED, FOR SOLUTION INTRAVENOUS at 22:19

## 2022-01-01 RX ADMIN — METOPROLOL SUCCINATE 50 MG: 25 TABLET, EXTENDED RELEASE ORAL at 19:09

## 2022-01-01 RX ADMIN — LORAZEPAM 1 MG: 1 TABLET ORAL at 20:49

## 2022-01-01 RX ADMIN — SUCRALFATE 1 G: 1 SUSPENSION ORAL at 21:33

## 2022-01-01 RX ADMIN — POTASSIUM CHLORIDE 10 MEQ: 750 TABLET, EXTENDED RELEASE ORAL at 10:22

## 2022-01-01 RX ADMIN — SENNOSIDES AND DOCUSATE SODIUM 2 TABLET: 50; 8.6 TABLET ORAL at 10:01

## 2022-01-01 RX ADMIN — OXYBUTYNIN CHLORIDE 5 MG: 5 TABLET, EXTENDED RELEASE ORAL at 20:51

## 2022-01-01 RX ADMIN — PANTOPRAZOLE SODIUM 40 MG: 40 TABLET, DELAYED RELEASE ORAL at 16:50

## 2022-01-01 RX ADMIN — POTASSIUM CHLORIDE 40 MEQ: 1500 TABLET, EXTENDED RELEASE ORAL at 09:12

## 2022-01-01 RX ADMIN — POTASSIUM CHLORIDE 10 MEQ: 7.46 INJECTION, SOLUTION INTRAVENOUS at 03:17

## 2022-01-01 RX ADMIN — ACETAMINOPHEN 975 MG: 325 TABLET, FILM COATED ORAL at 11:48

## 2022-01-01 RX ADMIN — METOPROLOL SUCCINATE 100 MG: 100 TABLET, EXTENDED RELEASE ORAL at 08:50

## 2022-01-01 RX ADMIN — LORAZEPAM 1 MG: 0.5 TABLET ORAL at 20:53

## 2022-01-01 RX ADMIN — REMDESIVIR 100 MG: 100 INJECTION, POWDER, LYOPHILIZED, FOR SOLUTION INTRAVENOUS at 18:48

## 2022-01-01 RX ADMIN — ANORECTAL OINTMENT: 15.7; .44; 24; 20.6 OINTMENT TOPICAL at 17:47

## 2022-01-01 RX ADMIN — LIDOCAINE HYDROCHLORIDE 40 MG: 10 INJECTION, SOLUTION INFILTRATION; PERINEURAL at 16:11

## 2022-01-01 RX ADMIN — PIPERACILLIN AND TAZOBACTAM 3.38 G: 3; .375 INJECTION, POWDER, LYOPHILIZED, FOR SOLUTION INTRAVENOUS at 15:01

## 2022-01-01 RX ADMIN — PIPERACILLIN AND TAZOBACTAM 3.38 G: 3; .375 INJECTION, POWDER, LYOPHILIZED, FOR SOLUTION INTRAVENOUS at 05:54

## 2022-01-01 RX ADMIN — PIPERACILLIN AND TAZOBACTAM 3.38 G: 3; .375 INJECTION, POWDER, LYOPHILIZED, FOR SOLUTION INTRAVENOUS at 20:11

## 2022-01-01 RX ADMIN — BUMETANIDE 1 MG: 0.25 INJECTION, SOLUTION INTRAMUSCULAR; INTRAVENOUS at 03:29

## 2022-01-01 RX ADMIN — VANCOMYCIN HYDROCHLORIDE 125 MG: 125 CAPSULE ORAL at 13:00

## 2022-01-01 RX ADMIN — LEVOTHYROXINE SODIUM 50 MCG: 0.03 TABLET ORAL at 05:15

## 2022-01-01 RX ADMIN — POTASSIUM CHLORIDE 10 MEQ: 750 TABLET, EXTENDED RELEASE ORAL at 20:12

## 2022-01-01 RX ADMIN — OXYCODONE HYDROCHLORIDE 5 MG: 5 TABLET ORAL at 13:44

## 2022-01-01 RX ADMIN — SODIUM CHLORIDE: 9 INJECTION, SOLUTION INTRAVENOUS at 09:18

## 2022-01-01 RX ADMIN — METOPROLOL SUCCINATE 50 MG: 25 TABLET, EXTENDED RELEASE ORAL at 08:55

## 2022-01-01 RX ADMIN — ACETAMINOPHEN 650 MG: 325 TABLET ORAL at 08:23

## 2022-01-01 RX ADMIN — SODIUM CHLORIDE: 9 INJECTION, SOLUTION INTRAVENOUS at 06:32

## 2022-01-01 RX ADMIN — AMILORIDE HYDROCLORIDE 5 MG: 5 TABLET ORAL at 09:05

## 2022-01-01 RX ADMIN — PSYLLIUM HUSK 1 PACKET: 3.4 POWDER ORAL at 16:33

## 2022-01-01 RX ADMIN — PANTOPRAZOLE SODIUM 40 MG: 40 TABLET, DELAYED RELEASE ORAL at 10:02

## 2022-01-01 RX ADMIN — METOPROLOL SUCCINATE 100 MG: 100 TABLET, EXTENDED RELEASE ORAL at 10:08

## 2022-01-01 RX ADMIN — MAGNESIUM 64 MG (MAGNESIUM CHLORIDE) TABLET,DELAYED RELEASE 535 MG: at 10:13

## 2022-01-01 RX ADMIN — CHLORHEXIDINE GLUCONATE 15 ML: 1.2 SOLUTION ORAL at 20:49

## 2022-01-01 RX ADMIN — LORAZEPAM 1 MG: 1 TABLET ORAL at 19:12

## 2022-01-01 RX ADMIN — FERROUS SULFATE TAB 325 MG (65 MG ELEMENTAL FE) 325 MG: 325 (65 FE) TAB at 11:28

## 2022-01-01 RX ADMIN — LEVOTHYROXINE SODIUM 50 MCG: 0.03 TABLET ORAL at 08:25

## 2022-01-01 RX ADMIN — PANTOPRAZOLE SODIUM 40 MG: 40 TABLET, DELAYED RELEASE ORAL at 08:59

## 2022-01-01 RX ADMIN — ACETAMINOPHEN 500 MG: 500 TABLET ORAL at 18:15

## 2022-01-01 RX ADMIN — Medication 330 MG: at 21:28

## 2022-01-01 RX ADMIN — SUCRALFATE 1 G: 1 SUSPENSION ORAL at 20:49

## 2022-01-01 RX ADMIN — METOPROLOL SUCCINATE 50 MG: 25 TABLET, EXTENDED RELEASE ORAL at 08:36

## 2022-01-01 RX ADMIN — PIPERACILLIN AND TAZOBACTAM 3.38 G: 3; .375 INJECTION, POWDER, LYOPHILIZED, FOR SOLUTION INTRAVENOUS at 06:24

## 2022-01-01 RX ADMIN — MAGNESIUM SULFATE HEPTAHYDRATE 4 G: 4 INJECTION, SOLUTION INTRAVENOUS at 18:05

## 2022-01-01 RX ADMIN — Medication 325 MG: at 13:12

## 2022-01-01 RX ADMIN — Medication 250 MCG: at 10:59

## 2022-01-01 RX ADMIN — PANTOPRAZOLE SODIUM 40 MG: 40 TABLET, DELAYED RELEASE ORAL at 08:36

## 2022-01-01 RX ADMIN — NYSTATIN 500000 UNITS: 100000 SUSPENSION ORAL at 11:54

## 2022-01-01 RX ADMIN — SODIUM CHLORIDE TAB 1 GM 2 G: 1 TAB at 09:28

## 2022-01-01 RX ADMIN — ACETAMINOPHEN 975 MG: 325 TABLET, FILM COATED ORAL at 10:07

## 2022-01-01 RX ADMIN — LORAZEPAM 0.5 MG: 0.5 TABLET ORAL at 14:59

## 2022-01-01 RX ADMIN — SUCRALFATE 1 G: 1 TABLET ORAL at 21:31

## 2022-01-01 RX ADMIN — LORAZEPAM 0.5 MG: 0.5 TABLET ORAL at 13:10

## 2022-01-01 RX ADMIN — Medication 5 MG: at 20:40

## 2022-01-01 RX ADMIN — FUROSEMIDE 40 MG: 10 INJECTION, SOLUTION INTRAMUSCULAR; INTRAVENOUS at 06:03

## 2022-01-01 RX ADMIN — LEVOTHYROXINE SODIUM 50 MCG: 0.03 TABLET ORAL at 06:35

## 2022-01-01 RX ADMIN — SUCRALFATE 1 G: 1 TABLET ORAL at 14:37

## 2022-01-01 RX ADMIN — BUMETANIDE 1 MG: 1 TABLET ORAL at 08:18

## 2022-01-01 RX ADMIN — Medication 250 MCG: at 10:15

## 2022-01-01 RX ADMIN — BUMETANIDE 1 MG: 1 TABLET ORAL at 09:11

## 2022-01-01 RX ADMIN — SUCRALFATE 1 G: 1 TABLET ORAL at 13:25

## 2022-01-01 RX ADMIN — SODIUM CHLORIDE TAB 1 GM 2 G: 1 TAB at 20:46

## 2022-01-01 RX ADMIN — LEVOTHYROXINE SODIUM 50 MCG: 0.03 TABLET ORAL at 05:48

## 2022-01-01 RX ADMIN — SODIUM CHLORIDE TAB 1 GM 1 G: 1 TAB at 09:11

## 2022-01-01 RX ADMIN — CETIRIZINE HYDROCHLORIDE 5 MG: 5 TABLET ORAL at 20:31

## 2022-01-01 RX ADMIN — LORAZEPAM 1 MG: 1 TABLET ORAL at 21:26

## 2022-01-01 RX ADMIN — CEFTRIAXONE SODIUM 1 G: 1 INJECTION, POWDER, FOR SOLUTION INTRAMUSCULAR; INTRAVENOUS at 03:51

## 2022-01-01 RX ADMIN — POTASSIUM CHLORIDE 40 MEQ: 1500 TABLET, EXTENDED RELEASE ORAL at 07:52

## 2022-01-01 RX ADMIN — BUMETANIDE 1 MG: 1 TABLET ORAL at 08:49

## 2022-01-01 RX ADMIN — LEVOTHYROXINE SODIUM 50 MCG: 0.03 TABLET ORAL at 09:12

## 2022-01-01 RX ADMIN — PERFLUTREN 3 ML: 6.52 INJECTION, SUSPENSION INTRAVENOUS at 14:30

## 2022-01-01 RX ADMIN — NYSTATIN 500000 UNITS: 100000 SUSPENSION ORAL at 08:18

## 2022-01-01 RX ADMIN — BUMETANIDE 0.5 MG: 0.5 TABLET ORAL at 09:12

## 2022-01-01 RX ADMIN — MAGNESIUM SULFATE HEPTAHYDRATE 4 G: 80 INJECTION, SOLUTION INTRAVENOUS at 12:27

## 2022-01-01 RX ADMIN — Medication 1 TABLET: at 08:27

## 2022-01-01 RX ADMIN — LORAZEPAM 0.5 MG: 0.5 TABLET ORAL at 11:40

## 2022-01-01 RX ADMIN — IRON SUCROSE 200 MG: 20 INJECTION, SOLUTION INTRAVENOUS at 09:40

## 2022-01-01 RX ADMIN — PIPERACILLIN AND TAZOBACTAM 3.38 G: 3; .375 INJECTION, POWDER, LYOPHILIZED, FOR SOLUTION INTRAVENOUS at 16:45

## 2022-01-01 RX ADMIN — LORAZEPAM 0.5 MG: 0.5 TABLET ORAL at 12:54

## 2022-01-01 RX ADMIN — POTASSIUM CHLORIDE 10 MEQ: 750 TABLET, EXTENDED RELEASE ORAL at 22:02

## 2022-01-01 RX ADMIN — OXYCODONE HYDROCHLORIDE 2.5 MG: 5 TABLET ORAL at 08:34

## 2022-01-01 RX ADMIN — PANTOPRAZOLE SODIUM 40 MG: 40 INJECTION, POWDER, FOR SOLUTION INTRAVENOUS at 08:08

## 2022-01-01 RX ADMIN — LORAZEPAM 0.5 MG: 0.5 TABLET ORAL at 11:36

## 2022-01-01 RX ADMIN — LEVOTHYROXINE SODIUM 50 MCG: 0.03 TABLET ORAL at 14:21

## 2022-01-01 RX ADMIN — LORAZEPAM 0.5 MG: 0.5 TABLET ORAL at 12:39

## 2022-01-01 RX ADMIN — FUROSEMIDE 40 MG: 10 INJECTION, SOLUTION INTRAMUSCULAR; INTRAVENOUS at 17:46

## 2022-01-01 RX ADMIN — FUROSEMIDE 20 MG: 10 INJECTION, SOLUTION INTRAMUSCULAR; INTRAVENOUS at 09:30

## 2022-01-01 RX ADMIN — Medication 330 MG: at 20:46

## 2022-01-01 RX ADMIN — POTASSIUM CHLORIDE 10 MEQ: 750 TABLET, EXTENDED RELEASE ORAL at 21:34

## 2022-01-01 RX ADMIN — Medication 400 MG: at 09:44

## 2022-01-01 RX ADMIN — FUROSEMIDE 20 MG: 20 TABLET ORAL at 08:55

## 2022-01-01 RX ADMIN — LORAZEPAM 1 MG: 1 TABLET ORAL at 08:36

## 2022-01-01 RX ADMIN — SUCRALFATE 1 G: 1 TABLET ORAL at 16:38

## 2022-01-01 RX ADMIN — ACETAMINOPHEN 650 MG: 325 TABLET ORAL at 11:55

## 2022-01-01 RX ADMIN — ACETAMINOPHEN 650 MG: 325 TABLET, FILM COATED ORAL at 06:43

## 2022-01-01 RX ADMIN — LORAZEPAM 1 MG: 1 TABLET ORAL at 19:49

## 2022-01-01 RX ADMIN — CEFTRIAXONE SODIUM 1 G: 1 INJECTION, POWDER, FOR SOLUTION INTRAMUSCULAR; INTRAVENOUS at 02:25

## 2022-01-01 RX ADMIN — METOPROLOL SUCCINATE 50 MG: 50 TABLET, EXTENDED RELEASE ORAL at 20:06

## 2022-01-01 RX ADMIN — AMILORIDE HYDROCLORIDE 5 MG: 5 TABLET ORAL at 10:17

## 2022-01-01 RX ADMIN — SODIUM CHLORIDE: 9 INJECTION, SOLUTION INTRAVENOUS at 19:45

## 2022-01-01 RX ADMIN — ACETAMINOPHEN 650 MG: 325 TABLET, FILM COATED ORAL at 17:04

## 2022-01-01 RX ADMIN — NYSTATIN 500000 UNITS: 100000 SUSPENSION ORAL at 12:54

## 2022-01-01 RX ADMIN — LOPERAMIDE HYDROCHLORIDE 2 MG: 2 CAPSULE ORAL at 11:41

## 2022-01-01 RX ADMIN — CETIRIZINE HYDROCHLORIDE 5 MG: 5 TABLET ORAL at 21:30

## 2022-01-01 RX ADMIN — LORAZEPAM 1 MG: 1 TABLET ORAL at 10:14

## 2022-01-01 RX ADMIN — ACETAMINOPHEN 975 MG: 325 TABLET, FILM COATED ORAL at 09:34

## 2022-01-01 RX ADMIN — CHLORHEXIDINE GLUCONATE 15 ML: 1.2 SOLUTION ORAL at 20:56

## 2022-01-01 RX ADMIN — PIPERACILLIN AND TAZOBACTAM 3.38 G: 3; .375 INJECTION, POWDER, LYOPHILIZED, FOR SOLUTION INTRAVENOUS at 16:34

## 2022-01-01 RX ADMIN — SODIUM CHLORIDE TAB 1 GM 2 G: 1 TAB at 20:26

## 2022-01-01 RX ADMIN — SENNOSIDES AND DOCUSATE SODIUM 1 TABLET: 50; 8.6 TABLET ORAL at 20:31

## 2022-01-01 RX ADMIN — DEXAMETHASONE SODIUM PHOSPHATE 6 MG: 10 INJECTION, SOLUTION INTRAMUSCULAR; INTRAVENOUS at 13:01

## 2022-01-01 RX ADMIN — OXYCODONE HYDROCHLORIDE 5 MG: 5 TABLET ORAL at 19:40

## 2022-01-01 RX ADMIN — CETIRIZINE HYDROCHLORIDE 5 MG: 5 TABLET ORAL at 19:20

## 2022-01-01 ASSESSMENT — ACTIVITIES OF DAILY LIVING (ADL)
ADLS_ACUITY_SCORE: 40
ADLS_ACUITY_SCORE: 47
ADLS_ACUITY_SCORE: 45
TOILETING_MANAGEMENT: ASSISTANCE
ADLS_ACUITY_SCORE: 46
ADLS_ACUITY_SCORE: 43
ADLS_ACUITY_SCORE: 9
ADLS_ACUITY_SCORE: 47
ADLS_ACUITY_SCORE: 43
ADLS_ACUITY_SCORE: 11
TOILETING: 1-->ASSISTANCE (EQUIPMENT/PERSON) NEEDED (NOT DEVELOPMENTALLY APPROPRIATE)
ADLS_ACUITY_SCORE: 48
ADLS_ACUITY_SCORE: 46
ADLS_ACUITY_SCORE: 44
ADLS_ACUITY_SCORE: 46
TOILETING: 1-->ASSISTANCE (EQUIPMENT/PERSON) NEEDED
ADLS_ACUITY_SCORE: 44
ADLS_ACUITY_SCORE: 54
ADLS_ACUITY_SCORE: 43
ADLS_ACUITY_SCORE: 9
ADLS_ACUITY_SCORE: 50
ADLS_ACUITY_SCORE: 40
ADLS_ACUITY_SCORE: 11
ADLS_ACUITY_SCORE: 45
ADLS_ACUITY_SCORE: 44
ADLS_ACUITY_SCORE: 54
ADLS_ACUITY_SCORE: 39
CHANGE_IN_FUNCTIONAL_STATUS_SINCE_ONSET_OF_CURRENT_ILLNESS/INJURY: NO
ADLS_ACUITY_SCORE: 15
ADLS_ACUITY_SCORE: 40
ADLS_ACUITY_SCORE: 35
ADLS_ACUITY_SCORE: 44
FALL_HISTORY_WITHIN_LAST_SIX_MONTHS: NO
ADLS_ACUITY_SCORE: 50
ADLS_ACUITY_SCORE: 15
ADLS_ACUITY_SCORE: 29
ADLS_ACUITY_SCORE: 44
ADLS_ACUITY_SCORE: 11
ADLS_ACUITY_SCORE: 39
ADLS_ACUITY_SCORE: 11
ADLS_ACUITY_SCORE: 40
ADLS_ACUITY_SCORE: 11
ADLS_ACUITY_SCORE: 44
ADLS_ACUITY_SCORE: 9
ADLS_ACUITY_SCORE: 52
ADLS_ACUITY_SCORE: 45
ADLS_ACUITY_SCORE: 58
DRESSING/BATHING: DRESSING DIFFICULTY, ASSISTANCE 1 PERSON;BATHING DIFFICULTY, REQUIRES EQUIPMENT
ADLS_ACUITY_SCORE: 45
ADLS_ACUITY_SCORE: 41
ADLS_ACUITY_SCORE: 34
ADLS_ACUITY_SCORE: 50
ADLS_ACUITY_SCORE: 37
ADLS_ACUITY_SCORE: 12
ADLS_ACUITY_SCORE: 9
ADLS_ACUITY_SCORE: 54
ADLS_ACUITY_SCORE: 18
ADLS_ACUITY_SCORE: 50
ADLS_ACUITY_SCORE: 50
ADLS_ACUITY_SCORE: 53
ADLS_ACUITY_SCORE: 9
ADLS_ACUITY_SCORE: 9
ADLS_ACUITY_SCORE: 43
EQUIPMENT_CURRENTLY_USED_AT_HOME: WALKER, STANDARD;WHEELCHAIR, MANUAL
ADLS_ACUITY_SCORE: 51
ADLS_ACUITY_SCORE: 9
ADLS_ACUITY_SCORE: 46
ADLS_ACUITY_SCORE: 50
ADLS_ACUITY_SCORE: 37
ADLS_ACUITY_SCORE: 46
ADLS_ACUITY_SCORE: 35
ADLS_ACUITY_SCORE: 45
ADLS_ACUITY_SCORE: 37
TOILETING_ISSUES: NO
ADLS_ACUITY_SCORE: 50
ADLS_ACUITY_SCORE: 41
ADLS_ACUITY_SCORE: 40
ADLS_ACUITY_SCORE: 40
ADLS_ACUITY_SCORE: 11
ADLS_ACUITY_SCORE: 50
ADLS_ACUITY_SCORE: 50
DRESSING/BATHING_DIFFICULTY: NO
WALKING_OR_CLIMBING_STAIRS: AMBULATION DIFFICULTY, REQUIRES EQUIPMENT
DRESS: 1-->ASSISTANCE (EQUIPMENT/PERSON) NEEDED
FALL_HISTORY_WITHIN_LAST_SIX_MONTHS: NO
ADLS_ACUITY_SCORE: 50
WEAR_GLASSES_OR_BLIND: NO
ADLS_ACUITY_SCORE: 50
ADLS_ACUITY_SCORE: 50
FALL_HISTORY_WITHIN_LAST_SIX_MONTHS: NO
ADLS_ACUITY_SCORE: 52
WALKING_OR_CLIMBING_STAIRS: AMBULATION DIFFICULTY, ASSISTANCE 1 PERSON;STAIR CLIMBING DIFFICULTY, ASSISTANCE 1 PERSON;TRANSFERRING DIFFICULTY, ASSISTANCE 1 PERSON
ADLS_ACUITY_SCORE: 43
ADLS_ACUITY_SCORE: 11
ADLS_ACUITY_SCORE: 43
ADLS_ACUITY_SCORE: 40
ADLS_ACUITY_SCORE: 11
ADLS_ACUITY_SCORE: 37
ADLS_ACUITY_SCORE: 58
ADLS_ACUITY_SCORE: 43
ADLS_ACUITY_SCORE: 11
DOING_ERRANDS_INDEPENDENTLY_DIFFICULTY: YES
ADLS_ACUITY_SCORE: 16
ADLS_ACUITY_SCORE: 48
ADLS_ACUITY_SCORE: 34
ADLS_ACUITY_SCORE: 40
ADLS_ACUITY_SCORE: 9
DRESSING/BATHING_MANAGEMENT: ASSIST OF 1
ADLS_ACUITY_SCORE: 12
ADLS_ACUITY_SCORE: 46
ADLS_ACUITY_SCORE: 16
ADLS_ACUITY_SCORE: 11
ADLS_ACUITY_SCORE: 9
ADLS_ACUITY_SCORE: 9
ADLS_ACUITY_SCORE: 11
ADLS_ACUITY_SCORE: 35
ADLS_ACUITY_SCORE: 54
ADLS_ACUITY_SCORE: 40
DRESSING/BATHING: DRESSING DIFFICULTY, ASSISTANCE 1 PERSON;BATHING DIFFICULTY, REQUIRES EQUIPMENT
ADLS_ACUITY_SCORE: 12
ADLS_ACUITY_SCORE: 21
ADLS_ACUITY_SCORE: 37
ADLS_ACUITY_SCORE: 9
ADLS_ACUITY_SCORE: 50
ADLS_ACUITY_SCORE: 44
ADLS_ACUITY_SCORE: 11
ADLS_ACUITY_SCORE: 15
WALKING_OR_CLIMBING_STAIRS_DIFFICULTY: YES
ADLS_ACUITY_SCORE: 9
ADLS_ACUITY_SCORE: 21
ADLS_ACUITY_SCORE: 39
ADLS_ACUITY_SCORE: 44
ADLS_ACUITY_SCORE: 53
CHANGE_IN_FUNCTIONAL_STATUS_SINCE_ONSET_OF_CURRENT_ILLNESS/INJURY: NO
ADLS_ACUITY_SCORE: 46
ADLS_ACUITY_SCORE: 43
ADLS_ACUITY_SCORE: 37
ADLS_ACUITY_SCORE: 54
ADLS_ACUITY_SCORE: 33
ADLS_ACUITY_SCORE: 10
ADLS_ACUITY_SCORE: 43
ADLS_ACUITY_SCORE: 35
ADLS_ACUITY_SCORE: 43
ADLS_ACUITY_SCORE: 9
ADLS_ACUITY_SCORE: 44
WEAR_GLASSES_OR_BLIND: NO
ADLS_ACUITY_SCORE: 34
ADLS_ACUITY_SCORE: 40
ADLS_ACUITY_SCORE: 37
ADLS_ACUITY_SCORE: 35
ADLS_ACUITY_SCORE: 35
ADLS_ACUITY_SCORE: 50
ADLS_ACUITY_SCORE: 39
ADLS_ACUITY_SCORE: 53
BATHING: 1-->ASSISTANCE NEEDED
ADLS_ACUITY_SCORE: 9
ADLS_ACUITY_SCORE: 44
ADLS_ACUITY_SCORE: 46
EQUIPMENT_CURRENTLY_USED_AT_HOME: WALKER, STANDARD
ADLS_ACUITY_SCORE: 54
WALKING_OR_CLIMBING_STAIRS: OTHER (SEE COMMENTS);AMBULATION DIFFICULTY, REQUIRES EQUIPMENT
CHANGE_IN_FUNCTIONAL_STATUS_SINCE_ONSET_OF_CURRENT_ILLNESS/INJURY: NO
DOING_ERRANDS_INDEPENDENTLY_DIFFICULTY: YES
ADLS_ACUITY_SCORE: 46
ADLS_ACUITY_SCORE: 44
ADLS_ACUITY_SCORE: 45
DIFFICULTY_EATING/SWALLOWING: NO
ADLS_ACUITY_SCORE: 50
ADLS_ACUITY_SCORE: 35
ADLS_ACUITY_SCORE: 9
ADLS_ACUITY_SCORE: 35
ADLS_ACUITY_SCORE: 35
ADLS_ACUITY_SCORE: 9
ADLS_ACUITY_SCORE: 52
ADLS_ACUITY_SCORE: 12
ADLS_ACUITY_SCORE: 44
ADLS_ACUITY_SCORE: 40
ADLS_ACUITY_SCORE: 11
ADLS_ACUITY_SCORE: 44
ADLS_ACUITY_SCORE: 50
EQUIPMENT_CURRENTLY_USED_AT_HOME: WALKER, ROLLING;WHEELCHAIR, MANUAL
ADLS_ACUITY_SCORE: 40
ADLS_ACUITY_SCORE: 54
EQUIPMENT_CURRENTLY_USED_AT_HOME: WALKER, ROLLING
ADLS_ACUITY_SCORE: 35
ADLS_ACUITY_SCORE: 39
ADLS_ACUITY_SCORE: 40
TRANSFERRING: 0-->ASSISTANCE NEEDED (DEVELOPMETNALLY APPROPRIATE)
ADLS_ACUITY_SCORE: 44
ADLS_ACUITY_SCORE: 15
ADLS_ACUITY_SCORE: 53
ADLS_ACUITY_SCORE: 43
ADLS_ACUITY_SCORE: 35
ADLS_ACUITY_SCORE: 46
ADLS_ACUITY_SCORE: 40
ADLS_ACUITY_SCORE: 9
ADLS_ACUITY_SCORE: 45
HEARING_DIFFICULTY_OR_DEAF: NO
ADLS_ACUITY_SCORE: 21
ADLS_ACUITY_SCORE: 52
ADLS_ACUITY_SCORE: 21
ADLS_ACUITY_SCORE: 35
ADLS_ACUITY_SCORE: 40
ADLS_ACUITY_SCORE: 52
ADLS_ACUITY_SCORE: 50
WALKING_OR_CLIMBING_STAIRS_DIFFICULTY: YES
ADLS_ACUITY_SCORE: 40
ADLS_ACUITY_SCORE: 51
ADLS_ACUITY_SCORE: 34
WALKING_OR_CLIMBING_STAIRS: AMBULATION DIFFICULTY, REQUIRES EQUIPMENT
ADLS_ACUITY_SCORE: 37
ADLS_ACUITY_SCORE: 11
ADLS_ACUITY_SCORE: 41
ADLS_ACUITY_SCORE: 37
ADLS_ACUITY_SCORE: 46
ADLS_ACUITY_SCORE: 35
ADLS_ACUITY_SCORE: 48
ADLS_ACUITY_SCORE: 16
ADLS_ACUITY_SCORE: 21
ADLS_ACUITY_SCORE: 46
ADLS_ACUITY_SCORE: 46
ADLS_ACUITY_SCORE: 50
ADLS_ACUITY_SCORE: 48
ADLS_ACUITY_SCORE: 11
ADLS_ACUITY_SCORE: 34
TOILETING: 1-->ASSISTANCE (EQUIPMENT/PERSON) NEEDED (NOT DEVELOPMENTALLY APPROPRIATE)
ADLS_ACUITY_SCORE: 47
ADLS_ACUITY_SCORE: 37
ADLS_ACUITY_SCORE: 37
ADLS_ACUITY_SCORE: 12
ADLS_ACUITY_SCORE: 41
ADLS_ACUITY_SCORE: 11
ADLS_ACUITY_SCORE: 39
ADLS_ACUITY_SCORE: 46
ADLS_ACUITY_SCORE: 12
ADLS_ACUITY_SCORE: 34
ADLS_ACUITY_SCORE: 39
ADLS_ACUITY_SCORE: 41
ADLS_ACUITY_SCORE: 50
ADLS_ACUITY_SCORE: 41
ADLS_ACUITY_SCORE: 35
ADLS_ACUITY_SCORE: 40
EQUIPMENT_CURRENTLY_USED_AT_HOME: WALKER, ROLLING;WHEELCHAIR, MANUAL
ADLS_ACUITY_SCORE: 34
ADLS_ACUITY_SCORE: 46
ADLS_ACUITY_SCORE: 40
ADLS_ACUITY_SCORE: 11
DRESSING/BATHING_DIFFICULTY: NO
ADLS_ACUITY_SCORE: 46
ADLS_ACUITY_SCORE: 43
TRANSFERRING: 2-->COMPLETELY DEPENDENT
ADLS_ACUITY_SCORE: 51
ADLS_ACUITY_SCORE: 45
ADLS_ACUITY_SCORE: 45
WEAR_GLASSES_OR_BLIND: NO
ADLS_ACUITY_SCORE: 40
ADLS_ACUITY_SCORE: 35
ADLS_ACUITY_SCORE: 40
ADLS_ACUITY_SCORE: 11
ADLS_ACUITY_SCORE: 37
ADLS_ACUITY_SCORE: 44
ADLS_ACUITY_SCORE: 50
ADLS_ACUITY_SCORE: 37
ADLS_ACUITY_SCORE: 40
ADLS_ACUITY_SCORE: 43
ADLS_ACUITY_SCORE: 45
ADLS_ACUITY_SCORE: 45
ADLS_ACUITY_SCORE: 9
ADLS_ACUITY_SCORE: 15
ADLS_ACUITY_SCORE: 47
ADLS_ACUITY_SCORE: 43
ADLS_ACUITY_SCORE: 43
ADLS_ACUITY_SCORE: 46
ADLS_ACUITY_SCORE: 44
ADLS_ACUITY_SCORE: 18
BATHING: 1-->ASSISTANCE NEEDED
WALKING_OR_CLIMBING_STAIRS: AMBULATION DIFFICULTY, ASSISTANCE 1 PERSON
ADLS_ACUITY_SCORE: 37
ADLS_ACUITY_SCORE: 40
ADLS_ACUITY_SCORE: 35
ADLS_ACUITY_SCORE: 54
ADLS_ACUITY_SCORE: 37
TOILETING_ISSUES: NO
ADLS_ACUITY_SCORE: 21
ADLS_ACUITY_SCORE: 50
ADLS_ACUITY_SCORE: 45
ADLS_ACUITY_SCORE: 21
ADLS_ACUITY_SCORE: 52
ADLS_ACUITY_SCORE: 9
ADLS_ACUITY_SCORE: 50
ADLS_ACUITY_SCORE: 48
ADLS_ACUITY_SCORE: 11
ADLS_ACUITY_SCORE: 35
ADLS_ACUITY_SCORE: 46
ADLS_ACUITY_SCORE: 45
ADLS_ACUITY_SCORE: 12
TOILETING: 1-->ASSISTANCE (EQUIPMENT/PERSON) NEEDED
ADLS_ACUITY_SCORE: 44
ADLS_ACUITY_SCORE: 39
CONCENTRATING,_REMEMBERING_OR_MAKING_DECISIONS_DIFFICULTY: NO
HEARING_DIFFICULTY_OR_DEAF: NO
ADLS_ACUITY_SCORE: 40
ADLS_ACUITY_SCORE: 46
ADLS_ACUITY_SCORE: 41
ADLS_ACUITY_SCORE: 50
WALKING_OR_CLIMBING_STAIRS_DIFFICULTY: YES
ADLS_ACUITY_SCORE: 9
ADLS_ACUITY_SCORE: 9
ADLS_ACUITY_SCORE: 42
DRESSING/BATHING_DIFFICULTY: YES
ADLS_ACUITY_SCORE: 44
ADLS_ACUITY_SCORE: 45
ADLS_ACUITY_SCORE: 44
ADLS_ACUITY_SCORE: 52
ADLS_ACUITY_SCORE: 11
ADLS_ACUITY_SCORE: 40
CONCENTRATING,_REMEMBERING_OR_MAKING_DECISIONS_DIFFICULTY: YES
ADLS_ACUITY_SCORE: 37
ADLS_ACUITY_SCORE: 30
ADLS_ACUITY_SCORE: 11
DEPENDENT_IADLS:: CLEANING;COOKING;LAUNDRY;SHOPPING;MEAL PREPARATION;MEDICATION MANAGEMENT;MONEY MANAGEMENT;TRANSPORTATION
ADLS_ACUITY_SCORE: 50
ADLS_ACUITY_SCORE: 43
ADLS_ACUITY_SCORE: 50
ADLS_ACUITY_SCORE: 35
ADLS_ACUITY_SCORE: 52
ADLS_ACUITY_SCORE: 35
ADLS_ACUITY_SCORE: 35
ADLS_ACUITY_SCORE: 46
ADLS_ACUITY_SCORE: 48
DIFFICULTY_EATING/SWALLOWING: NO
ADLS_ACUITY_SCORE: 47
ADLS_ACUITY_SCORE: 51
ADLS_ACUITY_SCORE: 53
DOING_ERRANDS_INDEPENDENTLY_DIFFICULTY: YES
ADLS_ACUITY_SCORE: 9
ADLS_ACUITY_SCORE: 43
ADLS_ACUITY_SCORE: 51
ADLS_ACUITY_SCORE: 46
WALKING_OR_CLIMBING_STAIRS_DIFFICULTY: YES
ADLS_ACUITY_SCORE: 43
ADLS_ACUITY_SCORE: 12
ADLS_ACUITY_SCORE: 52
DIFFICULTY_EATING/SWALLOWING: OTHER (SEE COMMENTS)
ADLS_ACUITY_SCORE: 9
ADLS_ACUITY_SCORE: 44
ADLS_ACUITY_SCORE: 11
ADLS_ACUITY_SCORE: 35
ADLS_ACUITY_SCORE: 35
ADLS_ACUITY_SCORE: 34
TOILETING_ISSUES: YES
ADLS_ACUITY_SCORE: 9
TRANSFERRING: 1-->ASSISTANCE (EQUIPMENT/PERSON) NEEDED
TOILETING_ISSUES: NO
ADLS_ACUITY_SCORE: 11
FALL_HISTORY_WITHIN_LAST_SIX_MONTHS: NO
ADLS_ACUITY_SCORE: 35
ADLS_ACUITY_SCORE: 50
ADLS_ACUITY_SCORE: 52
ADLS_ACUITY_SCORE: 50
ADLS_ACUITY_SCORE: 9
ADLS_ACUITY_SCORE: 9
DRESSING/BATHING: BATHING DIFFICULTY, ASSISTANCE 1 PERSON
ADLS_ACUITY_SCORE: 40
ADLS_ACUITY_SCORE: 12
EQUIPMENT_CURRENTLY_USED_AT_HOME: WALKER, ROLLING;WHEELCHAIR, MANUAL
ADLS_ACUITY_SCORE: 52
ADLS_ACUITY_SCORE: 50
ADLS_ACUITY_SCORE: 53
ADLS_ACUITY_SCORE: 34
ADLS_ACUITY_SCORE: 51
ADLS_ACUITY_SCORE: 50
DRESSING/BATHING_DIFFICULTY: YES
CONCENTRATING,_REMEMBERING_OR_MAKING_DECISIONS_DIFFICULTY: NO
ADLS_ACUITY_SCORE: 9
ADLS_ACUITY_SCORE: 46
ADLS_ACUITY_SCORE: 44
ADLS_ACUITY_SCORE: 44
ADLS_ACUITY_SCORE: 43
CONCENTRATING,_REMEMBERING_OR_MAKING_DECISIONS_DIFFICULTY: NO
ADLS_ACUITY_SCORE: 52
WALKING_OR_CLIMBING_STAIRS: OTHER (SEE COMMENTS);AMBULATION DIFFICULTY, REQUIRES EQUIPMENT
ADLS_ACUITY_SCORE: 46
ADLS_ACUITY_SCORE: 11
ADLS_ACUITY_SCORE: 46
ADLS_ACUITY_SCORE: 34
ADLS_ACUITY_SCORE: 11
ADLS_ACUITY_SCORE: 45
ADLS_ACUITY_SCORE: 9
ADLS_ACUITY_SCORE: 57
ADLS_ACUITY_SCORE: 46
TRANSFERRING: 1-->ASSISTANCE (EQUIPMENT/PERSON) NEEDED
ADLS_ACUITY_SCORE: 54
ADLS_ACUITY_SCORE: 50
ADLS_ACUITY_SCORE: 35
DRESS: 1-->ASSISTANCE (EQUIPMENT/PERSON) NEEDED (NOT DEVELOPMENTALLY APPROPRIATE)
ADLS_ACUITY_SCORE: 16
ADLS_ACUITY_SCORE: 44
ADLS_ACUITY_SCORE: 11
ADLS_ACUITY_SCORE: 45
ADLS_ACUITY_SCORE: 42
ADLS_ACUITY_SCORE: 44
ADLS_ACUITY_SCORE: 34
TRANSFERRING: 1-->ASSISTANCE (EQUIPMENT/PERSON) NEEDED (NOT DEVELOPMENTALLY APPROPRIATE)
ADLS_ACUITY_SCORE: 21
ADLS_ACUITY_SCORE: 34
ADLS_ACUITY_SCORE: 48
ADLS_ACUITY_SCORE: 50
ADLS_ACUITY_SCORE: 9
ADLS_ACUITY_SCORE: 39
ADLS_ACUITY_SCORE: 44
ADLS_ACUITY_SCORE: 11
CHANGE_IN_FUNCTIONAL_STATUS_SINCE_ONSET_OF_CURRENT_ILLNESS/INJURY: NO
ADLS_ACUITY_SCORE: 52
DIFFICULTY_EATING/SWALLOWING: NO
ADLS_ACUITY_SCORE: 45
ADLS_ACUITY_SCORE: 37
ADLS_ACUITY_SCORE: 44
ADLS_ACUITY_SCORE: 40
WALKING_OR_CLIMBING_STAIRS: AMBULATION DIFFICULTY, REQUIRES EQUIPMENT
ADLS_ACUITY_SCORE: 43
ADLS_ACUITY_SCORE: 44
ADLS_ACUITY_SCORE: 44
ADLS_ACUITY_SCORE: 11
ADLS_ACUITY_SCORE: 41
FALL_HISTORY_WITHIN_LAST_SIX_MONTHS: YES
CONCENTRATING,_REMEMBERING_OR_MAKING_DECISIONS_DIFFICULTY: NO
ADLS_ACUITY_SCORE: 45
ADLS_ACUITY_SCORE: 11
ADLS_ACUITY_SCORE: 46
DRESSING/BATHING_DIFFICULTY: YES
ADLS_ACUITY_SCORE: 48
ADLS_ACUITY_SCORE: 54
ADLS_ACUITY_SCORE: 46
ADLS_ACUITY_SCORE: 10
ADLS_ACUITY_SCORE: 52
DRESSING/BATHING_DIFFICULTY: YES
ADLS_ACUITY_SCORE: 44
ADLS_ACUITY_SCORE: 37
CONCENTRATING,_REMEMBERING_OR_MAKING_DECISIONS_DIFFICULTY: NO
ADLS_ACUITY_SCORE: 37
ADLS_ACUITY_SCORE: 7
ADLS_ACUITY_SCORE: 50
ADLS_ACUITY_SCORE: 45
ADLS_ACUITY_SCORE: 50
ADLS_ACUITY_SCORE: 52
ADLS_ACUITY_SCORE: 48
ADLS_ACUITY_SCORE: 51
ADLS_ACUITY_SCORE: 40
WALKING_OR_CLIMBING_STAIRS_DIFFICULTY: YES
ADLS_ACUITY_SCORE: 50
ADLS_ACUITY_SCORE: 45
ADLS_ACUITY_SCORE: 50
ADLS_ACUITY_SCORE: 58
ADLS_ACUITY_SCORE: 41
ADLS_ACUITY_SCORE: 9
ADLS_ACUITY_SCORE: 51
ADLS_ACUITY_SCORE: 35
ADLS_ACUITY_SCORE: 43
TRANSFERRING: 0-->ASSISTANCE NEEDED (DEVELOPMENTALLY APPROPRIATE)
ADLS_ACUITY_SCORE: 11
ADLS_ACUITY_SCORE: 37
ADLS_ACUITY_SCORE: 50
ADLS_ACUITY_SCORE: 11
ADLS_ACUITY_SCORE: 11
ADLS_ACUITY_SCORE: 44
ADLS_ACUITY_SCORE: 16
ADLS_ACUITY_SCORE: 34
ADLS_ACUITY_SCORE: 21
DOING_ERRANDS_INDEPENDENTLY_DIFFICULTY: YES
ADLS_ACUITY_SCORE: 41
ADLS_ACUITY_SCORE: 35
ADLS_ACUITY_SCORE: 15
ADLS_ACUITY_SCORE: 46
ADLS_ACUITY_SCORE: 12
DIFFICULTY_EATING/SWALLOWING: NO
ADLS_ACUITY_SCORE: 44
ADLS_ACUITY_SCORE: 11
ADLS_ACUITY_SCORE: 50
WALKING_OR_CLIMBING_STAIRS_DIFFICULTY: YES
ADLS_ACUITY_SCORE: 35
ADLS_ACUITY_SCORE: 35
ADLS_ACUITY_SCORE: 43
CONCENTRATING,_REMEMBERING_OR_MAKING_DECISIONS_DIFFICULTY: YES
ADLS_ACUITY_SCORE: 54
ADLS_ACUITY_SCORE: 44
ADLS_ACUITY_SCORE: 50
TRANSFERRING: 1-->ASSISTANCE (EQUIPMENT/PERSON) NEEDED (NOT DEVELOPMENTALLY APPROPRIATE)
ADLS_ACUITY_SCORE: 50
ADLS_ACUITY_SCORE: 37
DRESS: 1-->ASSISTANCE (EQUIPMENT/PERSON) NEEDED
ADLS_ACUITY_SCORE: 47
ADLS_ACUITY_SCORE: 58
ADLS_ACUITY_SCORE: 9
ADLS_ACUITY_SCORE: 18
ADLS_ACUITY_SCORE: 40
ADLS_ACUITY_SCORE: 44
ADLS_ACUITY_SCORE: 51
ADLS_ACUITY_SCORE: 48
ADLS_ACUITY_SCORE: 40
ADLS_ACUITY_SCORE: 51
WEAR_GLASSES_OR_BLIND: NO
CHANGE_IN_FUNCTIONAL_STATUS_SINCE_ONSET_OF_CURRENT_ILLNESS/INJURY: NO
ADLS_ACUITY_SCORE: 45
ADLS_ACUITY_SCORE: 34
ADLS_ACUITY_SCORE: 10
TOILETING_ISSUES: YES
ADLS_ACUITY_SCORE: 50
ADLS_ACUITY_SCORE: 47
ADLS_ACUITY_SCORE: 54
ADLS_ACUITY_SCORE: 9
ADLS_ACUITY_SCORE: 52
ADLS_ACUITY_SCORE: 9
ADLS_ACUITY_SCORE: 41
ADLS_ACUITY_SCORE: 9
ADLS_ACUITY_SCORE: 16
ADLS_ACUITY_SCORE: 18
ADLS_ACUITY_SCORE: 35
ADLS_ACUITY_SCORE: 35
ADLS_ACUITY_SCORE: 45
ADLS_ACUITY_SCORE: 16
ADLS_ACUITY_SCORE: 16
ADLS_ACUITY_SCORE: 50
ADLS_ACUITY_SCORE: 41
ADLS_ACUITY_SCORE: 47
ADLS_ACUITY_SCORE: 50
WEAR_GLASSES_OR_BLIND: NO
ADLS_ACUITY_SCORE: 50
ADLS_ACUITY_SCORE: 34
ADLS_ACUITY_SCORE: 40
DOING_ERRANDS_INDEPENDENTLY_DIFFICULTY: YES
FALL_HISTORY_WITHIN_LAST_SIX_MONTHS: NO
ADLS_ACUITY_SCORE: 39
ADLS_ACUITY_SCORE: 34
ADLS_ACUITY_SCORE: 40
ADLS_ACUITY_SCORE: 50
ADLS_ACUITY_SCORE: 50
ADLS_ACUITY_SCORE: 11
DIFFICULTY_COMMUNICATING: NO
FALL_HISTORY_WITHIN_LAST_SIX_MONTHS: NO
ADLS_ACUITY_SCORE: 11
ADLS_ACUITY_SCORE: 44
ADLS_ACUITY_SCORE: 54
ADLS_ACUITY_SCORE: 40
DIFFICULTY_COMMUNICATING: NO
ADLS_ACUITY_SCORE: 37
EQUIPMENT_CURRENTLY_USED_AT_HOME: WALKER, ROLLING
ADLS_ACUITY_SCORE: 11
ADLS_ACUITY_SCORE: 48
ADLS_ACUITY_SCORE: 35
ADLS_ACUITY_SCORE: 55
ADLS_ACUITY_SCORE: 43
ADLS_ACUITY_SCORE: 57
ADLS_ACUITY_SCORE: 46
DRESSING/BATHING_DIFFICULTY: OTHER (SEE COMMENTS)
ADLS_ACUITY_SCORE: 11
ADLS_ACUITY_SCORE: 53
ADLS_ACUITY_SCORE: 37
DOING_ERRANDS_INDEPENDENTLY_DIFFICULTY: YES
TOILETING_ASSISTANCE: TOILETING DIFFICULTY, ASSISTANCE 1 PERSON
ADLS_ACUITY_SCORE: 40
ADLS_ACUITY_SCORE: 38
DIFFICULTY_EATING/SWALLOWING: NO
ADLS_ACUITY_SCORE: 44
ADLS_ACUITY_SCORE: 48
ADLS_ACUITY_SCORE: 9
ADLS_ACUITY_SCORE: 37
ADLS_ACUITY_SCORE: 43
ADLS_ACUITY_SCORE: 50
ADLS_ACUITY_SCORE: 44
ADLS_ACUITY_SCORE: 54
TRANSFERRING: 1-->ASSISTANCE (EQUIPMENT/PERSON) NEEDED
ADLS_ACUITY_SCORE: 50
ADLS_ACUITY_SCORE: 40
ADLS_ACUITY_SCORE: 34
ADLS_ACUITY_SCORE: 50
ADLS_ACUITY_SCORE: 41
ADLS_ACUITY_SCORE: 45
DEPENDENT_IADLS:: CLEANING;COOKING;LAUNDRY;SHOPPING;MEAL PREPARATION;MEDICATION MANAGEMENT;TRANSPORTATION
ADLS_ACUITY_SCORE: 44
ADLS_ACUITY_SCORE: 41
ADLS_ACUITY_SCORE: 54
ADLS_ACUITY_SCORE: 46
ADLS_ACUITY_SCORE: 46
ADLS_ACUITY_SCORE: 50
ADLS_ACUITY_SCORE: 37
ADLS_ACUITY_SCORE: 11
ADLS_ACUITY_SCORE: 44
ADLS_ACUITY_SCORE: 37
ADLS_ACUITY_SCORE: 35
ADLS_ACUITY_SCORE: 50
DRESSING/BATHING: BATHING DIFFICULTY, ASSISTANCE 1 PERSON
ADLS_ACUITY_SCORE: 39
BATHING: 1-->ASSISTANCE NEEDED
DEPENDENT_IADLS:: CLEANING;LAUNDRY;COOKING;SHOPPING;MEAL PREPARATION;MEDICATION MANAGEMENT;MONEY MANAGEMENT;TRANSPORTATION;INCONTINENCE
ADLS_ACUITY_SCORE: 40
ADLS_ACUITY_SCORE: 56
ADLS_ACUITY_SCORE: 48
ADLS_ACUITY_SCORE: 44
ADLS_ACUITY_SCORE: 52
ADLS_ACUITY_SCORE: 46
ADLS_ACUITY_SCORE: 9
ADLS_ACUITY_SCORE: 11
ADLS_ACUITY_SCORE: 44
ADLS_ACUITY_SCORE: 41
ADLS_ACUITY_SCORE: 46
ADLS_ACUITY_SCORE: 46
ADLS_ACUITY_SCORE: 35
ADLS_ACUITY_SCORE: 46
ADLS_ACUITY_SCORE: 50
ADLS_ACUITY_SCORE: 53
ADLS_ACUITY_SCORE: 45
ADLS_ACUITY_SCORE: 11
ADLS_ACUITY_SCORE: 45
TOILETING: 1-->ASSISTANCE (EQUIPMENT/PERSON) NEEDED (NOT DEVELOPMENTALLY APPROPRIATE)
ADLS_ACUITY_SCORE: 44
ADLS_ACUITY_SCORE: 9
ADLS_ACUITY_SCORE: 50
ADLS_ACUITY_SCORE: 50
ADLS_ACUITY_SCORE: 47
ADLS_ACUITY_SCORE: 34
ADLS_ACUITY_SCORE: 35
ADLS_ACUITY_SCORE: 50
ADLS_ACUITY_SCORE: 9
ADLS_ACUITY_SCORE: 46
ADLS_ACUITY_SCORE: 34
ADLS_ACUITY_SCORE: 44
ADLS_ACUITY_SCORE: 9
ADLS_ACUITY_SCORE: 45
ADLS_ACUITY_SCORE: 45
ADLS_ACUITY_SCORE: 43
ADLS_ACUITY_SCORE: 41
ADLS_ACUITY_SCORE: 9
ADLS_ACUITY_SCORE: 50
ADLS_ACUITY_SCORE: 9
ADLS_ACUITY_SCORE: 58
ADLS_ACUITY_SCORE: 35
DRESSING/BATHING_DIFFICULTY: YES
ADLS_ACUITY_SCORE: 41
ADLS_ACUITY_SCORE: 44
ADLS_ACUITY_SCORE: 11
CONCENTRATING,_REMEMBERING_OR_MAKING_DECISIONS_DIFFICULTY: NO
ADLS_ACUITY_SCORE: 37
ADLS_ACUITY_SCORE: 50
ADLS_ACUITY_SCORE: 35
ADLS_ACUITY_SCORE: 46
ADLS_ACUITY_SCORE: 34
EQUIPMENT_CURRENTLY_USED_AT_HOME: WALKER, ROLLING
ADLS_ACUITY_SCORE: 34
ADLS_ACUITY_SCORE: 21
TRANSFERRING: 1-->ASSISTANCE (EQUIPMENT/PERSON) NEEDED (NOT DEVELOPMENTALLY APPROPRIATE)
ADLS_ACUITY_SCORE: 40
WEAR_GLASSES_OR_BLIND: NO
ADLS_ACUITY_SCORE: 43
DIFFICULTY_COMMUNICATING: NO
ADLS_ACUITY_SCORE: 54
ADLS_ACUITY_SCORE: 43
ADLS_ACUITY_SCORE: 46
DIFFICULTY_COMMUNICATING: NO
ADLS_ACUITY_SCORE: 41
ADLS_ACUITY_SCORE: 40
ADLS_ACUITY_SCORE: 17
ADLS_ACUITY_SCORE: 11
CONCENTRATING,_REMEMBERING_OR_MAKING_DECISIONS_DIFFICULTY: YES
ADLS_ACUITY_SCORE: 35
ADLS_ACUITY_SCORE: 35
ADLS_ACUITY_SCORE: 45
ADLS_ACUITY_SCORE: 53
ADLS_ACUITY_SCORE: 51
ADLS_ACUITY_SCORE: 35
ADLS_ACUITY_SCORE: 11
ADLS_ACUITY_SCORE: 37
ADLS_ACUITY_SCORE: 21
ADLS_ACUITY_SCORE: 35
ADLS_ACUITY_SCORE: 44
ADLS_ACUITY_SCORE: 54
ADLS_ACUITY_SCORE: 46
ADLS_ACUITY_SCORE: 11
ADLS_ACUITY_SCORE: 51
ADLS_ACUITY_SCORE: 40
ADLS_ACUITY_SCORE: 11
ADLS_ACUITY_SCORE: 52
ADLS_ACUITY_SCORE: 50
ADLS_ACUITY_SCORE: 9
ADLS_ACUITY_SCORE: 45
DEPENDENT_IADLS:: CLEANING;LAUNDRY;COOKING;SHOPPING;MEAL PREPARATION;MEDICATION MANAGEMENT;MONEY MANAGEMENT;TRANSPORTATION;INCONTINENCE
TOILETING: 1-->ASSISTANCE (EQUIPMENT/PERSON) NEEDED
ADLS_ACUITY_SCORE: 47
WALKING_OR_CLIMBING_STAIRS_DIFFICULTY: YES
ADLS_ACUITY_SCORE: 38
ADLS_ACUITY_SCORE: 40
ADLS_ACUITY_SCORE: 46
ADLS_ACUITY_SCORE: 41
ADLS_ACUITY_SCORE: 48
ADLS_ACUITY_SCORE: 34
ADLS_ACUITY_SCORE: 10
ADLS_ACUITY_SCORE: 41
TOILETING_ASSISTANCE: TOILETING DIFFICULTY, REQUIRES EQUIPMENT
ADLS_ACUITY_SCORE: 45
ADLS_ACUITY_SCORE: 50
ADLS_ACUITY_SCORE: 9
ADLS_ACUITY_SCORE: 44
ADLS_ACUITY_SCORE: 50
ADLS_ACUITY_SCORE: 46
TRANSFERRING: 1-->ASSISTANCE (EQUIPMENT/PERSON) NEEDED
ADLS_ACUITY_SCORE: 11
ADLS_ACUITY_SCORE: 48
ADLS_ACUITY_SCORE: 40
DRESS: 1-->ASSISTANCE (EQUIPMENT/PERSON) NEEDED (NOT DEVELOPMENTALLY APPROPRIATE)
ADLS_ACUITY_SCORE: 53
ADLS_ACUITY_SCORE: 43
TOILETING_ASSISTANCE: TOILETING DIFFICULTY, ASSISTANCE 1 PERSON
ADLS_ACUITY_SCORE: 40
ADLS_ACUITY_SCORE: 11
DEPENDENT_IADLS:: CLEANING;COOKING;LAUNDRY;SHOPPING;MEAL PREPARATION;MEDICATION MANAGEMENT;MONEY MANAGEMENT;TRANSPORTATION
DRESSING/BATHING_MANAGEMENT: ASSIST
ADLS_ACUITY_SCORE: 35
DOING_ERRANDS_INDEPENDENTLY_DIFFICULTY: YES
ADLS_ACUITY_SCORE: 46
ADLS_ACUITY_SCORE: 39
DRESSING/BATHING_MANAGEMENT: ASSIST
ADLS_ACUITY_SCORE: 40
ADLS_ACUITY_SCORE: 37
ADLS_ACUITY_SCORE: 46
WEAR_GLASSES_OR_BLIND: NO
ADLS_ACUITY_SCORE: 50
ADLS_ACUITY_SCORE: 18
WEAR_GLASSES_OR_BLIND: YES
ADLS_ACUITY_SCORE: 54
ADLS_ACUITY_SCORE: 9
DRESSING/BATHING_DIFFICULTY: OTHER (SEE COMMENTS)
ADLS_ACUITY_SCORE: 9
ADLS_ACUITY_SCORE: 50
ADLS_ACUITY_SCORE: 34
ADLS_ACUITY_SCORE: 44
ADLS_ACUITY_SCORE: 9
WALKING_OR_CLIMBING_STAIRS_DIFFICULTY: YES
ADLS_ACUITY_SCORE: 53
ADLS_ACUITY_SCORE: 37
ADLS_ACUITY_SCORE: 40
TOILETING_MANAGEMENT: ASSIST OF 1
ADLS_ACUITY_SCORE: 43
ADLS_ACUITY_SCORE: 37
DOING_ERRANDS_INDEPENDENTLY_DIFFICULTY: YES
ADLS_ACUITY_SCORE: 37
ADLS_ACUITY_SCORE: 43
DRESS: 1-->ASSISTANCE (EQUIPMENT/PERSON) NEEDED
NUMBER_OF_TIMES_PATIENT_HAS_FALLEN_WITHIN_LAST_SIX_MONTHS: 2
ADLS_ACUITY_SCORE: 41
ADLS_ACUITY_SCORE: 50
ADLS_ACUITY_SCORE: 41
ADLS_ACUITY_SCORE: 48
ADLS_ACUITY_SCORE: 50
ADLS_ACUITY_SCORE: 40
ADLS_ACUITY_SCORE: 37
ADLS_ACUITY_SCORE: 44
ADLS_ACUITY_SCORE: 35
ADLS_ACUITY_SCORE: 34
ADLS_ACUITY_SCORE: 47
ADLS_ACUITY_SCORE: 53
DRESS: 1-->ASSISTANCE (EQUIPMENT/PERSON) NEEDED (NOT DEVELOPMENTALLY APPROPRIATE)
WEAR_GLASSES_OR_BLIND: NO
DIFFICULTY_EATING/SWALLOWING: NO
ADLS_ACUITY_SCORE: 9
TOILETING_ISSUES: YES
WALKING_OR_CLIMBING_STAIRS: AMBULATION DIFFICULTY, REQUIRES EQUIPMENT
ADLS_ACUITY_SCORE: 40
ADLS_ACUITY_SCORE: 39
ADLS_ACUITY_SCORE: 7
FALL_HISTORY_WITHIN_LAST_SIX_MONTHS: NO
ADLS_ACUITY_SCORE: 9
DIFFICULTY_EATING/SWALLOWING: NO
ADLS_ACUITY_SCORE: 50
TOILETING_ISSUES: NO
DEPENDENT_IADLS:: INDEPENDENT
ADLS_ACUITY_SCORE: 37
ADLS_ACUITY_SCORE: 12
ADLS_ACUITY_SCORE: 19
ADLS_ACUITY_SCORE: 43
ADLS_ACUITY_SCORE: 44
ADLS_ACUITY_SCORE: 46
ADLS_ACUITY_SCORE: 50
ADLS_ACUITY_SCORE: 50
ADLS_ACUITY_SCORE: 11
DOING_ERRANDS_INDEPENDENTLY_DIFFICULTY: NO
ADLS_ACUITY_SCORE: 37
ADLS_ACUITY_SCORE: 37
ADLS_ACUITY_SCORE: 52
ADLS_ACUITY_SCORE: 39
ADLS_ACUITY_SCORE: 46
ADLS_ACUITY_SCORE: 9
ADLS_ACUITY_SCORE: 52
ADLS_ACUITY_SCORE: 7
ADLS_ACUITY_SCORE: 52
ADLS_ACUITY_SCORE: 34
TOILETING_ISSUES: YES
ADLS_ACUITY_SCORE: 39
ADLS_ACUITY_SCORE: 41
ADLS_ACUITY_SCORE: 44
ADLS_ACUITY_SCORE: 44
ADLS_ACUITY_SCORE: 52
ADLS_ACUITY_SCORE: 43
ADLS_ACUITY_SCORE: 54
ADLS_ACUITY_SCORE: 39
ADLS_ACUITY_SCORE: 12
ADLS_ACUITY_SCORE: 46
ADLS_ACUITY_SCORE: 34
CHANGE_IN_FUNCTIONAL_STATUS_SINCE_ONSET_OF_CURRENT_ILLNESS/INJURY: NO
ADLS_ACUITY_SCORE: 41
WALKING_OR_CLIMBING_STAIRS_DIFFICULTY: YES
ADLS_ACUITY_SCORE: 34
ADLS_ACUITY_SCORE: 41
DEPENDENT_IADLS:: CLEANING;COOKING;LAUNDRY;SHOPPING;MEAL PREPARATION;MEDICATION MANAGEMENT;TRANSPORTATION;INCONTINENCE
ADLS_ACUITY_SCORE: 47
ADLS_ACUITY_SCORE: 35
ADLS_ACUITY_SCORE: 52
TOILETING_ISSUES: YES
ADLS_ACUITY_SCORE: 50
ADLS_ACUITY_SCORE: 9
TOILETING_ASSISTANCE: TOILETING DIFFICULTY, REQUIRES EQUIPMENT;TOILETING DIFFICULTY, ASSISTANCE 1 PERSON
ADLS_ACUITY_SCORE: 44
CHANGE_IN_FUNCTIONAL_STATUS_SINCE_ONSET_OF_CURRENT_ILLNESS/INJURY: YES
ADLS_ACUITY_SCORE: 44
ADLS_ACUITY_SCORE: 35

## 2022-01-01 ASSESSMENT — ENCOUNTER SYMPTOMS
ABDOMINAL PAIN: 0
CHILLS: 0
WEAKNESS: 1
ORTHOPNEA: 1
FEVER: 0
DIARRHEA: 1
NUMBNESS: 0
ORTHOPNEA: 1
VOMITING: 0
FEVER: 0
WEAKNESS: 1
FEVER: 0
COUGH: 1
MYALGIAS: 1
BLOOD IN STOOL: 1
COUGH: 0
SHORTNESS OF BREATH: 1
VOMITING: 0
FATIGUE: 1
DIARRHEA: 0
DIARRHEA: 1
DIZZINESS: 0
PALPITATIONS: 1
WOUND: 0
VOMITING: 0
CONFUSION: 1
FEVER: 0
COUGH: 1
ABDOMINAL PAIN: 1
BLOOD IN STOOL: 0
NAUSEA: 0
CHILLS: 0
HEMATURIA: 0
NUMBNESS: 1
SHORTNESS OF BREATH: 1
WEAKNESS: 1
SHORTNESS OF BREATH: 1
SHORTNESS OF BREATH: 1
VOMITING: 0
COUGH: 0
ABDOMINAL PAIN: 1
DIAPHORESIS: 0
SHORTNESS OF BREATH: 1
FREQUENCY: 1
SHORTNESS OF BREATH: 1
ABDOMINAL PAIN: 1
DYSURIA: 0
FLANK PAIN: 1
BACK PAIN: 1
LIGHT-HEADEDNESS: 1
DYSURIA: 1
NAUSEA: 0
VOMITING: 0
HEMATURIA: 0
FREQUENCY: 0
ARTHRALGIAS: 1
NAUSEA: 0
LIGHT-HEADEDNESS: 1
HEADACHES: 0
DIZZINESS: 1
DIARRHEA: 1
FEVER: 0
BLOOD IN STOOL: 0
NECK PAIN: 0
FREQUENCY: 1
APPETITE CHANGE: 1
FEVER: 0
MYALGIAS: 1
BLOOD IN STOOL: 0
BACK PAIN: 0

## 2022-01-01 ASSESSMENT — SLEEP AND FATIGUE QUESTIONNAIRES
HOW LIKELY ARE YOU TO NOD OFF OR FALL ASLEEP WHILE SITTING AND READING: MODERATE CHANCE OF DOZING
HOW LIKELY ARE YOU TO NOD OFF OR FALL ASLEEP WHILE SITTING QUIETLY AFTER LUNCH WITHOUT ALCOHOL: HIGH CHANCE OF DOZING
HOW LIKELY ARE YOU TO NOD OFF OR FALL ASLEEP WHILE SITTING INACTIVE IN A PUBLIC PLACE: HIGH CHANCE OF DOZING
HOW LIKELY ARE YOU TO NOD OFF OR FALL ASLEEP WHILE LYING DOWN TO REST IN THE AFTERNOON WHEN CIRCUMSTANCES PERMIT: HIGH CHANCE OF DOZING
HOW LIKELY ARE YOU TO NOD OFF OR FALL ASLEEP WHILE WATCHING TV: MODERATE CHANCE OF DOZING
HOW LIKELY ARE YOU TO NOD OFF OR FALL ASLEEP WHEN YOU ARE A PASSENGER IN A CAR FOR AN HOUR WITHOUT A BREAK: HIGH CHANCE OF DOZING
HOW LIKELY ARE YOU TO NOD OFF OR FALL ASLEEP WHILE SITTING AND TALKING TO SOMEONE: WOULD NEVER DOZE
HOW LIKELY ARE YOU TO NOD OFF OR FALL ASLEEP IN A CAR, WHILE STOPPED FOR A FEW MINUTES IN TRAFFIC: HIGH CHANCE OF DOZING

## 2022-01-01 ASSESSMENT — PAIN SCALES - GENERAL: PAINLEVEL: NO PAIN (0)

## 2022-01-01 ASSESSMENT — CHADS2 SCORE: AGE GREATER THAN OR EQUAL TO 75: YES

## 2022-02-15 PROBLEM — K27.4 GASTROINTESTINAL HEMORRHAGE ASSOCIATED WITH PEPTIC ULCER: Status: ACTIVE | Noted: 2022-01-01

## 2022-02-15 NOTE — H&P
ADMISSION HISTORY & PHYSICAL      Kizzy Villanueva, [phone]  ASSESSMENT AND PLAN:  86 year old female presenting with:    1. GI bleeding: Seen by GI, stool studies ordered, clear liquids for today then n.p.o. after midnight with colonoscopy prep tonight for colonoscopy tomorrow. Hemoglobin stable from prior but will trend. She does have history of peptic ulcer disease with last EGD November 2020 showing healed gastric ulcers.    2. History of heart failure with preserved ejection fraction: No evidence of exacerbation, will monitor.    3. Chronic anemia: Hemoglobin stable today around 8, has seen hematology in the past and felt this was secondary to iron deficiency.  Will trend.    4. Hypothyroidism: Continue home meds    5. Hypertension: Continue home metoprolol.    6. Hyperglycemia: Blood glucose 160, no chart history of diabetes but has monitored home glucose in the past, will follow blood sugar and check A1c.    Barriers to discharge: GI bleed      CHIEF COMPLAINT:  GI bleed    HISTORY OF PRESENTING ILLNESS:  Chris Bell is a 86 year old female who presented with bloody diarrhea since yesterday afternoon around 3 PM. She reports countless numbers of episodes. Did have one episode here this afternoon.  Interview done through professional , I did call and leave a message for her son.  She expresses understanding of the plan including n.p.o. after midnight and colonoscopy.  No reported chest pain or shortness of breath or fevers or chills.  She does complain of intermittent abdominal pain.    PMH/PSH:  Patient Active Problem List   Diagnosis     Anemia due to GI blood loss     Hypokalemia     PUD (peptic ulcer disease)     T12 compression fracture (H)     Accelerated hypertension     Abdominal pain     Abnormal finding on imaging     Kyphosis of thoracic region     Electrolyte imbalance     Episode of shaking     Bilateral hip pain     Edema     Heart failure with preserved ejection  fraction (H)     Cancer of soft palate (H)     Gastrointestinal hemorrhage associated with peptic ulcer       ALLERGIES:  Allergies   Allergen Reactions     Unknown [No Clinical Screening - See Comments]        MEDICATIONS:  Reviewed.  Current Facility-Administered Medications   Medication     bisacodyl (DULCOLAX) EC tablet 10 mg    Followed by     polyethylene glycol (MIRALAX) powder 238 g    Followed by     [START ON 2/16/2022] magnesium citrate solution 296 mL     Current Outpatient Medications   Medication     acetaminophen (TYLENOL) 500 MG tablet     calcium carbonate (TUMS) 500 MG chewable tablet     levothyroxine (SYNTHROID/LEVOTHROID) 50 MCG tablet     LORazepam (ATIVAN) 0.5 MG tablet     metoprolol succinate ER (TOPROL-XL) 50 MG 24 hr tablet     pantoprazole (PROTONIX) 40 MG EC tablet     potassium chloride ER (KLOR-CON M) 10 MEQ CR tablet     sodium chloride 1 GM tablet     Alcohol Swabs (B-D SINGLE USE SWABS REGULAR) PADS     blood glucose monitoring (ONETOUCH VERIO) meter device kit     ONETOUCH VERIO IQ test strip       SOCIAL HISTORY:  Social History     Socioeconomic History     Marital status:      Spouse name: Not on file     Number of children: Not on file     Years of education: Not on file     Highest education level: Not on file   Occupational History     Not on file   Tobacco Use     Smoking status: Never Smoker     Smokeless tobacco: Never Used   Substance and Sexual Activity     Alcohol use: No     Drug use: No     Sexual activity: Never   Other Topics Concern     Not on file   Social History Narrative     Not on file     Social Determinants of Health     Financial Resource Strain: Not on file   Food Insecurity: Not on file   Transportation Needs: Not on file   Physical Activity: Not on file   Stress: Not on file   Social Connections: Not on file   Intimate Partner Violence: Not on file   Housing Stability: Not on file       FAMILY HISTORY:  Family History   Problem Relation Age of  Onset     Diabetes No family hx of      Cancer No family hx of      Heart Disease No family hx of        ROS:  12 point ROS was performed and found to be negative except for the pertinent positives mentioned in the HPI.      PHYSICAL EXAM:  /76   Pulse 88   Temp 99.2  F (37.3  C) (Oral)   Resp 26   SpO2 97%   No intake/output data recorded.  No intake/output data recorded.  CONSTITUTIONAL: No apparent distress talkative  HEENT:       Sclera- anicteric      Mucous membrane-dry  LUNGS: Clear to auscultation bilaterally  CARDIOVASCULAR: S1S2 regular. No murmurs, rubs or gallops,no pedal edema  GI: Soft. Non-tender, non-distended. No organomegaly. No guarding or rigidity. Bowel sounds- active  NEURO: Cranial nerves II-XII grossly intact. No focal neurological deficit. No involuntary movements  INTGM: No pallor no cyanosis or clubbing  LYMPH:   MSK: no joint swelling or tenderness  PSYCH: alert and oriented to place, no agitation      DIAGNOSTIC DATA:  Recent Results (from the past 24 hour(s))   Extra Red Top Tube    Collection Time: 02/15/22 10:16 AM   Result Value Ref Range    Hold Specimen [facility]    Basic metabolic panel    Collection Time: 02/15/22 10:16 AM   Result Value Ref Range    Sodium 133 (L) 136 - 145 mmol/L    Potassium 3.8 3.5 - 5.0 mmol/L    Chloride 97 (L) 98 - 107 mmol/L    Carbon Dioxide (CO2) 25 22 - 31 mmol/L    Anion Gap 11 5 - 18 mmol/L    Urea Nitrogen 11 8 - 28 mg/dL    Creatinine 0.78 0.60 - 1.10 mg/dL    Calcium 7.7 (L) 8.5 - 10.5 mg/dL    Glucose 160 (H) 70 - 125 mg/dL    GFR Estimate 74 >60 mL/min/1.73m2   INR    Collection Time: 02/15/22 10:16 AM   Result Value Ref Range    INR 1.03 0.90 - 1.15   PTT    Collection Time: 02/15/22 10:16 AM   Result Value Ref Range    aPTT 27 22 - 38 Seconds   Hepatic function panel    Collection Time: 02/15/22 10:16 AM   Result Value Ref Range    Bilirubin Total 0.3 0.0 - 1.0 mg/dL    Bilirubin Direct 0.1 <=0.5 mg/dL    Protein Total 6.1 6.0 - 8.0 g/dL     Albumin 2.5 (L) 3.5 - 5.0 g/dL    Alkaline Phosphatase 63 45 - 120 U/L    AST 10 0 - 40 U/L    ALT <9 0 - 45 U/L   Lipase    Collection Time: 02/15/22 10:16 AM   Result Value Ref Range    Lipase 11 0 - 52 U/L   CBC with platelets and differential    Collection Time: 02/15/22 10:16 AM   Result Value Ref Range    WBC Count 15.6 (H) 4.0 - 11.0 10e3/uL    RBC Count 2.20 (L) 3.80 - 5.20 10e6/uL    Hemoglobin 8.1 (L) 11.7 - 15.7 g/dL    Hematocrit 24.7 (L) 35.0 - 47.0 %     (H) 78 - 100 fL    MCH 36.8 (H) 26.5 - 33.0 pg    MCHC 32.8 31.5 - 36.5 g/dL    RDW 14.1 10.0 - 15.0 %    Platelet Count 196 150 - 450 10e3/uL   Adult Type and Screen    Collection Time: 02/15/22 10:16 AM   Result Value Ref Range    ABO/RH(D) O POS     Antibody Screen Negative Negative    SPECIMEN EXPIRATION DATE [id number]    Manual Differential    Collection Time: 02/15/22 10:16 AM   Result Value Ref Range    % Neutrophils 83 %    % Lymphocytes 6 %    % Monocytes 8 %    % Eosinophils 1 %    % Basophils 0 %    % Metamyelocytes 1 %    % Myelocytes 1 %    Absolute Neutrophils 12.9 (H) 1.6 - 8.3 10e3/uL    Absolute Lymphocytes 0.9 0.8 - 5.3 10e3/uL    Absolute Monocytes 1.2 0.0 - 1.3 10e3/uL    Absolute Eosinophils 0.2 0.0 - 0.7 10e3/uL    Absolute Basophils 0.0 0.0 - 0.2 10e3/uL    Absolute Metamyelocytes 0.2 (H) <=0.0 10e3/uL    Absolute Myelocytes 0.2 (H) <=0.0 10e3/uL    RBC Morphology Confirmed RBC Indices     Platelet Assessment  Automated Count Confirmed. Platelet morphology is normal.     Automated Count Confirmed. Platelet morphology is normal.    Basophilic Stippling Present (A) None Seen    Elliptocytes Slight (A) None Seen   Occult blood stool POCT    Collection Time: 02/15/22 11:50 AM   Result Value Ref Range    Occult Blood POCT Positive (A) Negative    Internal QC Check POCT Valid Valid    Test Card Lot Number [id number]     Test Card Expiration Date 5/2024    Asymptomatic COVID-19 Virus (Coronavirus) by PCR Nasopharyngeal     Collection Time: 02/15/22  1:29 PM    Specimen: Nasopharyngeal; Swab   Result Value Ref Range    SARS CoV2 PCR Negative Negative     All lab studies reviewed personally    CT Abdomen Pelvis w Contrast    Result Date: 2/15/2022  EXAM: CT ABDOMEN PELVIS W CONTRAST LOCATION: [facility] DATE/TIME: 2/15/2022 10:55 AM INDICATION: Left-sided abdominal pain. History of rectal cancer. COMPARISON: 01/16/2018 CT abdomen. 07/25/2019 PET scan. Lumbar spine x-ray 01/27/2021 TECHNIQUE: CT scan of the abdomen and pelvis was performed following injection of IV contrast. Multiplanar reformats were obtained. Dose reduction techniques were used. CONTRAST: isovue 370 100ml FINDINGS: LOWER CHEST: Normal. HEPATOBILIARY: Normal. PANCREAS: Normal. SPLEEN: Normal. ADRENAL GLANDS: Normal. KIDNEYS/BLADDER: Normal. BOWEL: Tiny left inguinal hernia containing a loop of small intestine similar to previous imaging. No obstruction. LYMPH NODES: Normal. VASCULATURE: Moderate calcified plaque. No aneurysms. PELVIC ORGANS: Postop change. MUSCULOSKELETAL: Chronic appearing compression fractures L4 and T12.     IMPRESSION: 1.  No significant findings to explain the patient's pain. 2.  Tiny left inguinal hernia without complication similar to 07/25/2019 PET scan. 3.  Stable compression fractures in the lower spine.    Radiology report reviewed.        Karel Jones MD  [facility]

## 2022-02-15 NOTE — CONSULTS
[facility] consult         Name: Chris Bell    Medical Record #: [id number]    YOB: 1935    Date/Time: 2/15/2022/12:35 PM    Reason for Consultation: Sonia Carter, * has asked me to evaluate Chris Bell regarding BRBPR.    HPI: 86-year-old female with history of hypertension, peptic ulcer disease, depression, tonic anemia who presented to the ED with rectal bleeding.  Has been experiencing multiple liquid red bowel movements since 3 PM yesterday.  The color is dark red and is associated with some left lower quadrant abdominal pain.  No nausea, vomiting, fevers.  No unintentional weight loss.    She does have a history of gastric ulcers in the past previously seen in 2016 and 2018.  Biopsies were negative for H. pylori and malignancy.  Most recent EGD in November 2020 showed resolution of ulcers with only mild reactive gastropathy remaining. Patient denies NSAID use.    In the ED shows hemoglobin 8.1 which is at her baseline from 2021.  No hemodynamic instability.  CT abdomen and pelvis with contrast did not show any findings to explain pain or bleeding.    Review of Systems (ROS): Complete ROS otherwise negative except for as above.    Past Medical History:  Past Medical History:   Diagnosis Date     Anemia      Depression      Disease of thyroid gland      HTN (hypertension)      Osteoporosis      PUD (peptic ulcer disease)        Medications:   (Not in a hospital admission)    No current facility-administered medications for this encounter.    Current Outpatient Medications:      acetaminophen (TYLENOL) 500 MG tablet, Take 1-2 tablets (500-1,000 mg) by mouth every 8 hours as needed for pain, Disp: 90 tablet, Rfl: 1     calcium carbonate (TUMS) 500 MG chewable tablet, Take 2 chew tab by mouth every 2 hours as needed , Disp: , Rfl: 3     levothyroxine (SYNTHROID/LEVOTHROID) 50 MCG tablet, Take 50 mcg by mouth every morning , Disp: , Rfl:      LORazepam (ATIVAN) 0.5 MG tablet, Take 0.5  mg by mouth every 6 hours as needed , Disp: , Rfl: 0     metoprolol succinate ER (TOPROL-XL) 50 MG 24 hr tablet, Take 50 mg by mouth 2 times daily , Disp: , Rfl:      pantoprazole (PROTONIX) 40 MG EC tablet, Take 40 mg by mouth daily , Disp: , Rfl:      potassium chloride ER (KLOR-CON M) 10 MEQ CR tablet, Take 10 mEq by mouth daily, Disp: , Rfl:      sodium chloride 1 GM tablet, Take 2 g by mouth 2 times daily , Disp: , Rfl:      Alcohol Swabs (B-D SINGLE USE SWABS REGULAR) PADS, USE UTD, Disp: , Rfl: 0     blood glucose monitoring (ONETOUCH VERIO) meter device kit, 2 times daily, Disp: , Rfl: 0     ONETOUCH VERIO IQ test strip, 2 times daily, Disp: , Rfl: 2       Allergies: Unknown [no clinical screening - see comments]    Family History:  Family History   Problem Relation Age of Onset     Diabetes No family hx of      Cancer No family hx of      Heart Disease No family hx of        Social History:  Social History     Tobacco Use     Smoking status: Never Smoker     Smokeless tobacco: Never Used   Substance Use Topics     Alcohol use: No     Drug use: No       Physical Exam: BP 96/53   Pulse 96   Temp 99.2  F (37.3  C) (Oral)   Resp 19   SpO2 100%     General: NAD  Eyes: Anicteric  Pulmonary: Normal work of breathing, no accessory muscle use, no audible wheezing  Cardiovascular: Regular rate and rhythm   Gastrointestinal: Soft, ND, mild LLQ tenderness without rebound or guarding  Skin: No jaundice  Neurologic: Alert and oriented ×3, no focal deficits  Extremities: No edema  Psych: Normal    Labs:    CBC RESULTS:   Recent Labs   Lab Test 02/15/22  1016   WBC 15.6*   RBC 2.20*   HGB 8.1*   HCT 24.7*   *   MCH 36.8*   MCHC 32.8   RDW 14.1           CMP Results:   Recent Labs   Lab Test 02/15/22  1016   *   POTASSIUM 3.8   CHLORIDE 97*   CO2 25   ANIONGAP 11   *   BUN 11   CR 0.78   BILITOTAL 0.3   ALKPHOS 63   ALT <9   AST 10        INR Results:   Recent Labs   Lab Test 02/15/22  1016    INR 1.03          Radiology: CT Abdomen Pelvis w Contrast    Result Date: 2/15/2022  EXAM: CT ABDOMEN PELVIS W CONTRAST LOCATION: [facility] DATE/TIME: 2/15/2022 10:55 AM INDICATION: Left-sided abdominal pain. History of rectal cancer. COMPARISON: 01/16/2018 CT abdomen. 07/25/2019 PET scan. Lumbar spine x-ray 01/27/2021 TECHNIQUE: CT scan of the abdomen and pelvis was performed following injection of IV contrast. Multiplanar reformats were obtained. Dose reduction techniques were used. CONTRAST: isovue 370 100ml FINDINGS: LOWER CHEST: Normal. HEPATOBILIARY: Normal. PANCREAS: Normal. SPLEEN: Normal. ADRENAL GLANDS: Normal. KIDNEYS/BLADDER: Normal. BOWEL: Tiny left inguinal hernia containing a loop of small intestine similar to previous imaging. No obstruction. LYMPH NODES: Normal. VASCULATURE: Moderate calcified plaque. No aneurysms. PELVIC ORGANS: Postop change. MUSCULOSKELETAL: Chronic appearing compression fractures L4 and T12.     IMPRESSION: 1.  No significant findings to explain the patient's pain. 2.  Tiny left inguinal hernia without complication similar to 07/25/2019 PET scan. 3.  Stable compression fractures in the lower spine.       Impression:   1.  Bloody diarrhea with left lower quadrant abdominal pain -acute onset.  Differentials include diverticular bleeding, infectious colitis, ischemic colitis, hemorrhoid bleeding, neoplasm etc. Doubt upper GI bleeding given stable hemodynamics and stable hemoglobin despite BRBPR.    2.  History of peptic ulcer disease.  Last EGD in November 2020 showed healing of gastric ulcers.    3.  Chronic anemia    Recommendation:   -Clear liquid diet today  -Stool studies for infection  -Colonoscopy prep tonight  -N.p.o. after midnight  -Colonoscopy tomorrow  -Monitor hemoglobin and transfuse as needed    Total visit time = 44 minutes; more than 50% spent counseling/coordinating care.                                                  Jr Turner  CARY  Thank you for the opportunity to participate in the care of this patient.   Please feel free to call me with any questions or concerns.  Phone number [phone].

## 2022-02-15 NOTE — ED TRIAGE NOTES
"Presents via [facility] from private living (w/ dtrs) for c/o bloody stools discovered this morning.  Had 1 episode this AM.  Doesn't take thinners.  Hx of \"ulcer with bleeding.\"  Reported to have AP for a month.  Ambulated at scene.  .  Hmong speaking, dtrs are on their way.    "

## 2022-02-15 NOTE — ED PROVIDER NOTES
EMERGENCY DEPARTMENT ENCOUNTER      NAME: Chris Bell  AGE: 86 year old female  YOB: 1935  MRN: [id number]  EVALUATION DATE & TIME: 2/15/2022  9:33 AM    PCP: Kizzy Villanueva    ED PROVIDER: Sonia Carter MD      Chief Complaint   Patient presents with     Rectal Bleeding     Abdominal Pain         FINAL IMPRESSION:  1. Anemia due to GI blood loss    2. Gastrointestinal hemorrhage associated with peptic ulcer          ED COURSE & MEDICAL DECISION MAKING:    Pertinent Labs & Imaging studies reviewed. (See chart for details)  86 year old female presents to the Emergency Department for evaluation of GI bleed.  The patient has a history of peptic ulcer disease.  She denies regular use of NSAIDs.  She is not anticoagulated.  She reports that her bloody diarrhea started yesterday afternoon at 3 PM.  She has had constant diarrhea.  She also notes some left-sided abdominal pain that is a burning discomfort.  On rectal exam, the patient has dark red bloody stool that is guaiac positive.  Hemoglobin is stable.  CT scan of the abdomen pelvis was obtained and was unremarkable. Patient was given a dose of IV protonix.  I consulted GI who agrees with admission for serial hemoglobins and evaluation by GI.  I spoke with the hospitalist who agrees with this plan as well.    9:40 AM I met with the patient to gather history and to perform my initial exam. I discussed the plan for care while in the Emergency Department. PPE (gloves, eye protection, N95 mask) was worn by me during patient encounters while patient wore mask.   11:23 AM I re-evaluated and updated patient. Performed a rectal exam.  12:32 PM I discussed patient's case with LATRICE Rain.  12:45 PM I discussed the case with hospitalist, Dr. Jones, who accepts the patient for admission.     At the conclusion of the encounter I discussed the results of all of the tests and the disposition. The questions were answered. The patient or family  "acknowledged understanding and was agreeable with the care plan.       MEDICATIONS GIVEN IN THE EMERGENCY:  Medications   bisacodyl (DULCOLAX) EC tablet 10 mg (has no administration in time range)     Followed by   polyethylene glycol (MIRALAX) powder 238 g (has no administration in time range)     Followed by   magnesium citrate solution 296 mL (has no administration in time range)   iopamidol (ISOVUE-370) solution 100 mL (100 mLs Intravenous Given 2/15/22 1107)   pantoprazole (PROTONIX) IV push injection 40 mg (40 mg Intravenous Given 2/15/22 1222)       NEW PRESCRIPTIONS STARTED AT TODAY'S ER VISIT  New Prescriptions    No medications on file          =================================================================    HPI    Patient information was obtained from: Patient    Use of : Yes (Phone) - Language: Sukhjinder Bell is a 86 year old female with a pertinent history of peptic ulcer disease, anemia due to GI blood loss, s/p hysterectomy, HTN, who presents to this ED via EMS for evaluation of diarrhea.    Patient reports \"constant diarrhea\" from 1500 yesterday to 0600 today and states she has had too many episodes to count. The color of her stool is dark red and she believes this may be due to blood. She additionally notes LLQ abdominal pain that is burning in nature. Patient currently rates her pain at a 5-7/10 severity. No associated nausea or vomiting but patient endorses decreased appetite. Patient notes mild lightheadedness but denies dizziness. She does have a known ulcer for the past 2 years. Patient reports taking tylenol but denies regular use of ibuprofen or motrin. Denies taking any aspirin or additional blood thinning medications.    Patient notes 3 years of ongoing hip pain. She otherwise denies fever, chills, sweats, cough, congestion, urinary symptoms, or additional medical concerns or complaints at this time.      REVIEW OF SYSTEMS   Review of Systems   Constitutional: " Positive for appetite change (decrease). Negative for chills, diaphoresis and fever.   HENT: Negative for congestion.    Respiratory: Negative for cough.    Gastrointestinal: Positive for abdominal pain (LLQ) and blood in stool (dark red). Negative for nausea and vomiting.   Genitourinary: Negative for dysuria, frequency, hematuria and urgency.   Musculoskeletal: Positive for arthralgias (ongoing hip pain).   Neurological: Positive for light-headedness. Negative for dizziness.   All other systems reviewed and are negative.    PAST MEDICAL HISTORY:  Past Medical History:   Diagnosis Date     Anemia      Depression      Disease of thyroid gland      HTN (hypertension)      Osteoporosis      PUD (peptic ulcer disease)        PAST SURGICAL HISTORY:  Past Surgical History:   Procedure Laterality Date     ESOPHAGOSCOPY, GASTROSCOPY, DUODENOSCOPY (EGD), COMBINED N/A 1/17/2018    Procedure: ESOPHAGOGASTRODUODENOSCOPY (EGD) with h.pylori and gastric ulcer biopsies;  Surgeon: Colin Alvarez MD;  Location: [facility];  Service:      HYSTERECTOMY       US BIOPSY FINE NEEDLE ASPIRATION LYMPH NODE  8/14/2019           CURRENT MEDICATIONS:    acetaminophen (TYLENOL) 500 MG tablet  calcium carbonate (TUMS) 500 MG chewable tablet  levothyroxine (SYNTHROID/LEVOTHROID) 50 MCG tablet  LORazepam (ATIVAN) 0.5 MG tablet  metoprolol succinate ER (TOPROL-XL) 50 MG 24 hr tablet  pantoprazole (PROTONIX) 40 MG EC tablet  potassium chloride ER (KLOR-CON M) 10 MEQ CR tablet  sodium chloride 1 GM tablet  Alcohol Swabs (B-D SINGLE USE SWABS REGULAR) PADS  blood glucose monitoring (ONETOUCH VERIO) meter device kit  ONETOUCH VERIO IQ test strip        ALLERGIES:  Allergies   Allergen Reactions     Unknown [No Clinical Screening - See Comments]        FAMILY HISTORY:  Family History   Problem Relation Age of Onset     Diabetes No family hx of      Cancer No family hx of      Heart Disease No family hx of        SOCIAL HISTORY:   Social History      Socioeconomic History     Marital status:      Spouse name: Not on file     Number of children: Not on file     Years of education: Not on file     Highest education level: Not on file   Occupational History     Not on file   Tobacco Use     Smoking status: Never Smoker     Smokeless tobacco: Never Used   Substance and Sexual Activity     Alcohol use: No     Drug use: No     Sexual activity: Never   Other Topics Concern     Not on file   Social History Narrative     Not on file     Social Determinants of Health     Financial Resource Strain: Not on file   Food Insecurity: Not on file   Transportation Needs: Not on file   Physical Activity: Not on file   Stress: Not on file   Social Connections: Not on file   Intimate Partner Violence: Not on file   Housing Stability: Not on file       VITALS:  BP 96/53   Pulse 96   Temp 99.2  F (37.3  C) (Oral)   Resp 19   SpO2 100%     PHYSICAL EXAM    Constitutional: Well developed, Well nourished, NAD  HENT: Normocephalic, Atraumatic, Bilateral external ears normal, Oropharynx normal, mucous membranes moist, Nose normal.   Neck- Normal range of motion, No tenderness, Supple, No stridor.  Eyes: PERRL, EOMI, Conjunctiva normal, No discharge.   Respiratory: Normal breath sounds, No respiratory distress  Cardiovascular: Normal heart rate, Regular rhythm  GI: Bowel sounds normal, Soft, tenderness in left mid abdomen and left lower quadrant  Rectal: Dark, red, bloody stool  Musculoskeletal: No edema. Good range of motion in all major joints. No tenderness to palpation or major deformities noted.   Integument: Warm, Dry, No erythema, No rash  Neurologic: Alert & oriented x 3, Normal motor function, Normal sensory function, No focal deficits noted. Normal gait.   Psychiatric: Affect normal, Judgment normal, Mood normal.      LAB:  All pertinent labs reviewed and interpreted.  Results for orders placed or performed during the hospital encounter of 02/15/22   CT Abdomen  Pelvis w Contrast    Impression    IMPRESSION:   1.  No significant findings to explain the patient's pain.    2.  Tiny left inguinal hernia without complication similar to 07/25/2019 PET scan.    3.  Stable compression fractures in the lower spine.   Extra Red Top Tube   Result Value Ref Range    Hold Specimen JIC    Basic metabolic panel   Result Value Ref Range    Sodium 133 (L) 136 - 145 mmol/L    Potassium 3.8 3.5 - 5.0 mmol/L    Chloride 97 (L) 98 - 107 mmol/L    Carbon Dioxide (CO2) 25 22 - 31 mmol/L    Anion Gap 11 5 - 18 mmol/L    Urea Nitrogen 11 8 - 28 mg/dL    Creatinine 0.78 0.60 - 1.10 mg/dL    Calcium 7.7 (L) 8.5 - 10.5 mg/dL    Glucose 160 (H) 70 - 125 mg/dL    GFR Estimate 74 >60 mL/min/1.73m2   Result Value Ref Range    INR 1.03 0.90 - 1.15   Result Value Ref Range    aPTT 27 22 - 38 Seconds   Hepatic function panel   Result Value Ref Range    Bilirubin Total 0.3 0.0 - 1.0 mg/dL    Bilirubin Direct 0.1 <=0.5 mg/dL    Protein Total 6.1 6.0 - 8.0 g/dL    Albumin 2.5 (L) 3.5 - 5.0 g/dL    Alkaline Phosphatase 63 45 - 120 U/L    AST 10 0 - 40 U/L    ALT <9 0 - 45 U/L   Result Value Ref Range    Lipase 11 0 - 52 U/L   CBC with platelets and differential   Result Value Ref Range    WBC Count 15.6 (H) 4.0 - 11.0 10e3/uL    RBC Count 2.20 (L) 3.80 - 5.20 10e6/uL    Hemoglobin 8.1 (L) 11.7 - 15.7 g/dL    Hematocrit 24.7 (L) 35.0 - 47.0 %     (H) 78 - 100 fL    MCH 36.8 (H) 26.5 - 33.0 pg    MCHC 32.8 31.5 - 36.5 g/dL    RDW 14.1 10.0 - 15.0 %    Platelet Count 196 150 - 450 10e3/uL   Manual Differential   Result Value Ref Range    % Neutrophils 83 %    % Lymphocytes 6 %    % Monocytes 8 %    % Eosinophils 1 %    % Basophils 0 %    % Metamyelocytes 1 %    % Myelocytes 1 %    Absolute Neutrophils 12.9 (H) 1.6 - 8.3 10e3/uL    Absolute Lymphocytes 0.9 0.8 - 5.3 10e3/uL    Absolute Monocytes 1.2 0.0 - 1.3 10e3/uL    Absolute Eosinophils 0.2 0.0 - 0.7 10e3/uL    Absolute Basophils 0.0 0.0 - 0.2 10e3/uL     Absolute Metamyelocytes 0.2 (H) <=0.0 10e3/uL    Absolute Myelocytes 0.2 (H) <=0.0 10e3/uL    RBC Morphology Confirmed RBC Indices     Platelet Assessment  Automated Count Confirmed. Platelet morphology is normal.     Automated Count Confirmed. Platelet morphology is normal.    Basophilic Stippling Present (A) None Seen    Elliptocytes Slight (A) None Seen   Occult blood stool POCT   Result Value Ref Range    Occult Blood POCT Positive (A) Negative    Internal QC Check POCT Valid Valid    Test Card Lot Number [id number]     Test Card Expiration Date 5/2024    Adult Type and Screen   Result Value Ref Range    ABO/RH(D) O POS     Antibody Screen Negative Negative    SPECIMEN EXPIRATION DATE [id number]        RADIOLOGY:  Reviewed all pertinent imaging. Please see official radiology report.  CT Abdomen Pelvis w Contrast   Final Result   IMPRESSION:    1.  No significant findings to explain the patient's pain.      2.  Tiny left inguinal hernia without complication similar to 07/25/2019 PET scan.      3.  Stable compression fractures in the lower spine.          I, Catherine Dorsey, am serving as a scribe to document services personally performed by Sonia Carter MD, based on my observation and the provider's statements to me. I, Sonia Carter MD, attest that Catherine Dorsey is acting in a scribe capacity, has observed my performance of the services and has documented them in accordance with my direction.    Sonia Carter MD  Emergency Medicine  [facility] EMERGENCY DEPARTMENT  [address] [place] [id number]  [phone]     Sonia Carter MD  02/15/22 7844

## 2022-02-15 NOTE — ED NOTES
2L of oxygen given to pt as her sats drop into mid 60s-70s while sleeping.  Sats are in upper 90s while awake.

## 2022-02-15 NOTE — PHARMACY-ADMISSION MEDICATION HISTORY
Pharmacy Note - Admission Medication History    Pertinent Provider Information: she has discontinued many medications recently and in the previous year in an attempt to alleviate her constant GI issues that may be medication related.  See list below for medications discontinued.      ______________________________________________________________________    Prior To Admission (PTA) med list completed and updated in EMR.       PTA Med List   Medication Sig Last Dose     acetaminophen (TYLENOL) 500 MG tablet Take 1-2 tablets (500-1,000 mg) by mouth every 8 hours as needed for pain      calcium carbonate (TUMS) 500 MG chewable tablet Take 2 chew tab by mouth every 2 hours as needed       levothyroxine (SYNTHROID/LEVOTHROID) 50 MCG tablet Take 50 mcg by mouth every morning  2/15/2022 at am     LORazepam (ATIVAN) 0.5 MG tablet Take 0.5 mg by mouth every 6 hours as needed  2/15/2022 at am     metoprolol succinate ER (TOPROL-XL) 50 MG 24 hr tablet Take 50 mg by mouth 2 times daily  2/15/2022 at am     pantoprazole (PROTONIX) 40 MG EC tablet Take 40 mg by mouth daily  2/15/2022 at am     potassium chloride ER (KLOR-CON M) 10 MEQ CR tablet Take 10 mEq by mouth daily 2/15/2022 at am     sodium chloride 1 GM tablet Take 2 g by mouth 2 times daily  2/15/2022 at am       Information source(s): Patient, Family member, Caregiver, Clinic records, Prescription bottles, CareEverywhere/SureScripts and daughter in law Tita   Method of interview communication: in-person and phone    Summary of Changes to PTA Med List  New: Tums  Discontinued: iron, duloxetine, furosemide, lisinopril, magic mouthwash, magnesium, mirtazapine, mvi, ranitidine, sucralfate, trazodone, b12  Changed: omeprazole to protonix, metoprolol from once daily 100mg to 50 mg bid.     Patient was asked about OTC/herbal products specifically.  PTA med list reflects this.    In the past week, patient estimated taking medication this percent of the time:  greater than  90%.    Allergies were reviewed, assessed, and updated with the patient.      Patient did not bring any medications to the hospital and can't retrieve from home. No multi-dose medications are available for use during hospital stay.     The information provided in this note is only as accurate as the sources available at the time of the update(s).    Thank you for the opportunity to participate in the care of this patient.    Pantera Harris [facility]  2/15/2022 12:09 PM

## 2022-02-16 PROBLEM — D50.0 ANEMIA DUE TO GI BLOOD LOSS: Status: ACTIVE | Noted: 2017-12-24

## 2022-02-16 NOTE — PROGRESS NOTES
Reviewed AVS with patient and her daughter in law Tita at bedside. Patient left unit via wheelchair in stable condition.

## 2022-02-16 NOTE — PLAN OF CARE
Problem: Adult Inpatient Plan of Care  Goal: Plan of Care Review  Outcome: Ongoing, Progressing     Problem: Pain Acute  Goal: Acceptable Pain Control and Functional Ability  Outcome: Ongoing, Progressing     Problem: Diarrhea  Goal: Fluid and Electrolyte Balance  Outcome: Ongoing, Progressing   Goal Outcome Evaluation:

## 2022-02-16 NOTE — UTILIZATION REVIEW
Admission Status; Secondary Review Determination   Under the authority of the Utilization Management Committee, the utilization review process indicated a secondary review on Chris Bell. The review outcome is based on review of the medical records, discussions with staff, and applying clinical experience noted on the date of the review.   (x) Inpatient Status Appropriate - This patient's medical care is consistent with medical management for inpatient care and reasonable inpatient medical practice.     RATIONALE FOR DETERMINATION   86 year old female with a pertinent history of peptic ulcer disease, anemia due to GI blood loss, s/p hysterectomy, HTN, who presents to the ED for evaluation of diarrhea and rectal bleeding. GI consult , found to have C diff, no scope because of C diff, plan to advance diet and monitor , keep Inpatient for now , if decision to discharge later today , Dr. Jones will change to Obs     At the time of admission with the information available to the attending physician more than 2 nights Hospital complex care was anticipated, based on patient risk of adverse outcome if treated as outpatient and complex care required. Inpatient admission is appropriate based on the Medicare guidelines.   The information on this document is developed by the utilization review team in order for the business office to ensure compliance. This only denotes the appropriateness of proper admission status and does not reflect the quality of care rendered.   The definitions of Inpatient Status and Observation Status used in making the determination above are those provided in the CMS Coverage Manual, Chapter 1 and Chapter 6, section 70.4.   Sincerely,   Edwardo Koehler MD  Utilization Review  Physician Advisor  [facility]

## 2022-02-16 NOTE — PLAN OF CARE
Problem: Risk for Delirium  Goal: Improved Attention and Thought Clarity  Outcome: No Change  Goal: Improved Sleep  Outcome: No Change    Pt taking bowel prep Q15 minutes. Had a total of 5 bloody loose stools. Reports pain of 6 in LUQ and LLQ (abdomen), declines PRN. On clear liquids and NPO at midnight for colonoscopy tomorrow. . SBA to bedside commode.

## 2022-02-16 NOTE — PLAN OF CARE
Goal Outcome Evaluation:    Plan of Care Reviewed With: patient, caregiver     Overall Patient Progress: improving             PRIMARY DIAGNOSIS: GASTROENTERITIS    OUTPATIENT/OBSERVATION GOALS TO BE MET BEFORE DISCHARGE  1. Orthostatic performed: N/A    2. Tolerating PO fluid and/or antibiotics (if applicable): Yes    3. Nausea/Vomiting/Diarrhea symptoms improved: No,  watery, brown    4. Pain status: Pain free.    5. Return to near baseline physical activity: Yes    Discharge Planner Nurse   Safe discharge environment identified: Yes  Barriers to discharge:  tolerate diet, no bloody stool       Entered by: Delma Melgoza 02/16/2022 11:45 AM     Please review provider order for any additional goals.   Nurse to notify provider when observation goals have been met and patient is ready for discharge.

## 2022-02-16 NOTE — ED NOTES
CONNIE assessed, lives alone in senior [facility], has PCA svcs 7 days/week,  dtr May can transport, and very [employer] but understands some English.

## 2022-02-16 NOTE — PROGRESS NOTES
BRIEF GI NOTE    C Diff PCR positive overnight which is the likely etiology of current symptoms  Will cancel colonoscopy at this time and treat for C Diff  Full progress note to follow.    CARY Sam  [facility] Digestive health  2/16/2022   7:15 AM

## 2022-02-16 NOTE — PROGRESS NOTES
Pt is alert and oriented.Homong speaking.  line utilized. pt uncooperative and demanding at times.   pt refused to take Magnesium citrate but Agreed after several approach and talking  to her son.Vs stable.Denied having abdominal pain. BS hyperactive.NPO status maintained after midnight for coloscopy. Had several loose stools through the shift.

## 2022-02-16 NOTE — DISCHARGE INSTRUCTIONS
Patient Education    Vancomycin Hydrochloride Oral capsule    Vancomycin Hydrochloride Oral solution    Vancomycin Hydrochloride Solution for injection    Vancomycin Hydrochloride, Dextrose Solution for injection  Vancomycin Hydrochloride, Dextrose Solution for injection  What is this medicine?  VANCOMYCIN (van koe MYE sin) is a glycopeptide antibiotic. It is used to treat certain kinds of bacterial infections. It will not work for colds, flu, or other viral infections.  This medicine may be used for other purposes; ask your health care provider or pharmacist if you have questions.  What should I tell my health care provider before I take this medicine?  They need to know if you have any of these conditions:    dehydration    hearing loss    kidney disease    other chronic illness    an unusual or allergic reaction to vancomycin, other medicines, foods, dyes, or preservatives    pregnant or trying to get pregnant    breast-feeding  How should I use this medicine?  This medicine is infused into a vein. It is usually given by a health care provider in a hospital or clinic.  If you receive this medicine at home, you will receive special instructions. Take your medicine at regular intervals. Do not take your medicine more often than directed. Take all of your medicine as directed even if you think you are better. Do not skip doses or stop your medicine early.  It is important that you put your used needles and syringes in a special sharps container. Do not put them in a trash can. If you do not have a sharps container, call your pharmacist or healthcare provider to get one.  Talk to your pediatrician regarding the use of this medicine in children. While this drug may be prescribed for even very young infants for selected conditions, precautions do apply.  Overdosage: If you think you have taken too much of this medicine contact a poison control center or emergency room at once.  NOTE: This medicine is only for you. Do  not share this medicine with others.  What if I miss a dose?  If you miss a dose, take it as soon as you can. If it is almost time for your next dose, take only that dose. Do not take double or extra doses.  What may interact with this medicine?    amphotericin B    anesthetics    bacitracin    birth control pills    cisplatin    colistin    diuretics    other aminoglycoside antibiotics    polymyxin B  This list may not describe all possible interactions. Give your health care provider a list of all the medicines, herbs, non-prescription drugs, or dietary supplements you use. Also tell them if you smoke, drink alcohol, or use illegal drugs. Some items may interact with your medicine.  What should I watch for while using this medicine?  Tell your doctor or health care professional if your symptoms do not improve or if you get new symptoms. Your condition and lab work will be monitored while you are taking this medicine.  Do not treat diarrhea with over the counter products. Contact your doctor if you have diarrhea that lasts more than 2 days or if it is severe and watery.  What side effects may I notice from receiving this medicine?  Side effects that you should report to your doctor or health care professional as soon as possible:    allergic reactions like skin rash, itching or hives, swelling of the face, lips, or tongue    breathing difficulty, wheezing    change in amount, color of urine    change in hearing    chest pain    dizziness    fever, chills    flushing of the face and neck (reddening)    low blood pressure    redness, blistering, peeling or loosening of the skin, including inside the mouth    unusual bleeding or bruising    unusually weak or tired  Side effects that usually do not require medical attention (report to your doctor or health care professional if they continue or are bothersome):    nausea, vomiting    pain, swelling where injected    stomach cramps  This list may not describe all  possible side effects. Call your doctor for medical advice about side effects. You may report side effects to FDA at 0-772-WVU-5902.  Where should I keep my medicine?  Keep out of the reach of children.  You will be instructed on how to store this medicine, if needed. Throw away any unused medicine after the expiration date on the label.  NOTE:This sheet is a summary. It may not cover all possible information. If you have questions about this medicine, talk to your doctor, pharmacist, or health care provider. Copyright  2016 [employer] Standard        About C. diff Infection  For Patients, Family and Visitors  What is C. diff (clostridium difficile)?  C. diff are germs (bacteria). These germs can live in the guts of healthy people. Antibiotic medicine can change the balance of germs in the gut, causing C. diff infection and loose, watery stools (diarrhea).  You can also get C. diff infection during a hospital stay, after surgery, or if you have a weak immune system or IBD (inflammatory bowel disease).  For a video, visit https://youtu.be/[email or web address].  What are the symptoms?  If you have these symptoms, your doctor will ask for a stool (poop) sample for testing:    Diarrhea (loose, watery stools)    Belly pain, tenderness and cramping    Fever  How does it spread?  C. diff safely leaves your body as part of your stool. However, it can make you ill if:  1. You touch a surface that has C. diff germs, then  2. You touch food or objects that go in your mouth.  How can you prevent C. diff infections?     Wash hands often, especially in the hospital, after using the bathroom and before you eat.    Use antibiotics only when you need them. Don't ask for them if your doctor says you have a virus.    If you take antibiotics, follow the directions. Finish all the pills, even if you feel better.    If you have C. diff infection, try to use a separate restroom until you are well.    At home, clean countertops, sinks, faucets, bathroom  doorknobs and toilets often. Use warm water with soap or cleaning products with bleach. (Don't use pure bleach. It's too strong.)    In the hospital, your care team should wear gloves and gowns. They should clean their hands before touching you and before leaving the room. If they don't, please remind them.  Handwashing  For this illness, soap and water works better than hand .    Wash hands with warm water and plenty of soap. Wash for 15 to 20 seconds.    Clean under nails, between fingers and up the wrists.    Rinse hands, letting water run down your fingers.    Dry hands well. Use a paper towel to turn off the faucet and open the door.   How is it treated?  Your doctor may change your antibiotics and give you medicine for diarrhea. Don't take other medicine for diarrhea. They will make things worse.  You may get extra fluids through an IV (small tube in the arm or hand).   Sometimes, the infection comes back. If symptoms return, please call your doctor.   For informational purposes only. Not to replace the advice of your health care provider. Copyright   2014 [employer]. All rights reserved. [employer] 220002 - REV 09/17.

## 2022-02-16 NOTE — ED NOTES
[facility] ED Handoff Report    ED Chief Complaint: rectal bleeding    ED Diagnosis:  (D50.0) Anemia due to GI blood loss  (primary encounter diagnosis)  Comment:   Plan: Case Request: COLONOSCOPY            (K27.4) Gastrointestinal hemorrhage associated with peptic ulcer  Comment:   Plan: Case Request: COLONOSCOPY               PMH:    Past Medical History:   Diagnosis Date     Anemia      Depression      Disease of thyroid gland      HTN (hypertension)      Osteoporosis      PUD (peptic ulcer disease)         Code Status:  No Order     Falls Risk: No Band: Not applicable    Current Living Situation/Residence: lives with their son or daughter     Elimination Status: Continent: Yes     Activity Level: SBA    Patients Preferred Language:  Hmong     Needed: Yes    Vital Signs:  /66   Pulse 84   Temp 99.2  F (37.3  C) (Oral)   Resp 20   SpO2 95%      Cardiac Rhythm: NSR    Pain Score: 0/10    Is the Patient Confused:  No    Last Food or Drink: 02/15/22 at 1900 (bowel prep)    Focused Assessment:  Patient had bloody stools this morning has history of ulcer with bleeding, NPO at midnight, is currently doing colonoscopy prep, patient prefers to have warm liquids so mixing with warm water.     Tests Performed: Done: Labs and Imaging    Treatments Provided:  Labs, imaging and bowel prep    Family Dynamics/Concerns: No    Family Updated On Visitor Policy: Yes    Plan of Care Communicated to Family: Yes    Who Was Updated about Plan of Care: son    Medications sent with patient: insulin pen    Covid: asymptomatic , negative    Additional Information:     RN: Kate Murray   2/15/2022 6:59 PM

## 2022-02-16 NOTE — PROGRESS NOTES
[facility] DIGESTIVE HEALTH PROGRESS NOTE      Subjective:              C Diff PCR positive overnight. Colonoscopy cancelled this morning. Hemoglobin overall stable. Stools were brown liquid at the end of the prep without any blood per nursing staff.     Objective:                  Vital signs in last 24 hrs;  Temp:  [97.4  F (36.3  C)-99.2  F (37.3  C)] 97.9  F (36.6  C)  Pulse:  [] 91  Resp:  [16-35] 16  BP: ()/(53-90) 128/60  SpO2:  [51 %-100 %] 98 %    Physical Exam:   General: NAD  Eyes: Anicteric  Pulmonary: Normal work of breathing, no accessory muscle use, no audible wheezing  Cardiovascular: Regular rate and rhythm   Gastrointestinal: Soft, ND, mild LLQ tenderness without rebound or guarding  Skin: No jaundice  Neurologic: Alert and oriented ×3, no focal deficits  Extremities: No edema  Psych: Normal      Current Labs:  CBC RESULTS: Recent Labs   Lab Test 02/16/22  0652   WBC 13.4*   RBC 2.14*   HGB 7.9*   HCT 23.9*   *   MCH 36.9*   MCHC 33.1   RDW 14.6           CMP Results:   Recent Labs   Lab Test 02/16/22  0652 02/15/22  1630 02/15/22  1016   *  --  133*   POTASSIUM 3.7  --  3.8   CHLORIDE 100  --  97*   CO2 24  --  25   ANIONGAP 10  --  11      < > 160*   BUN 11  --  11   CR 0.89  --  0.78   BILITOTAL  --   --  0.3   ALKPHOS  --   --  63   ALT  --   --  <9   AST  --   --  10    < > = values in this interval not displayed.        INR Results:   Recent Labs   Lab Test 02/15/22  1016   INR 1.03        Impression:   1.  Bloody diarrhea with left lower quadrant abdominal pain -acute onset - C Diff positive.      2.  History of peptic ulcer disease.  Last EGD in November 2020 showed healing of gastric ulcers.     3.  Chronic anemia     Recommendation:   - PO Vancomycin 125 mg four times daily x 10-14 days  - Diet as tolerated  - If tolerating diet, could discharge later today and continue outpatient treatment as patient requesting to go home.    Will sign off at this time,  please do not hesitate to contact us with additional questions or concerns.    Total visit time = 30 minutes; more than 50% spent counseling/coordinating care.            CARY Sam  Thank you for the opportunity to participate in the care of this patient.   Please feel free to call me with any questions or concerns.  Phone number [phone].

## 2022-02-16 NOTE — DISCHARGE SUMMARY
[facility] MEDICINE  DISCHARGE SUMMARY     Primary Care Physician: Kizzy Villanueva  Admission Date: 2/15/2022   Discharge Provider: Chandler Jones MD Discharge Date: 2/16/2022   Diet:   Active Diet and Nourishment Order   Procedures     Regular Diet Adult     Diet       Code Status: No Order   Activity: DCACTIVITY: Activity as tolerated        Condition at Discharge: Good     REASON FOR PRESENTATION(See Admission Note for Details)   Bloody diarrhea, subsequent C. difficile diagnosis    PRINCIPAL & ACTIVE DISCHARGE DIAGNOSES     Active Problems:    Anemia due to GI blood loss    Gastrointestinal hemorrhage associated with peptic ulcer      PENDING LABS     Unresulted Labs Ordered in the Past 30 Days of this Admission     No orders found for last 31 day(s).            PROCEDURES ( this hospitalization only)      Procedure(s):  COLONOSCOPY    RECOMMENDATIONS TO OUTPATIENT PROVIDER FOR F/U VISIT     Follow-up Appointments     Follow-up and recommended labs and tests       Follow-up with primary care clinic in 2 to 5 days             {Additional follow-up instructions/to-do's for PCP    : Monitor white blood cell count and hemoglobin, monitor response to oral vancomycin    DISPOSITION     Home    SUMMARY OF HOSPITAL COURSE:      86-year-old female admitted with frequent watery bright red stool.  Initial plan was to have colonoscopy but she tested positive for C. difficile and was started on oral vancomycin.  Her bowel movements have slowed down substantially and are more solid and not bloody.  She is eating and drinking well, appetite is good, vitals are stable and she feels comfortable going home.  Interview done with family as .  She was seen by GI and started on oral vancomycin.  Hemoglobin stable during admission, white count decreasing.    Discharge Medications with Med changes:     Current Discharge Medication List      START taking these medications     Details   vancomycin (VANCOCIN) 125 MG capsule Take 1 capsule (125 mg) by mouth 4 times daily for 10 days  Qty: 40 capsule, Refills: 0    Associated Diagnoses: C. difficile colitis         CONTINUE these medications which have NOT CHANGED    Details   acetaminophen (TYLENOL) 500 MG tablet Take 1-2 tablets (500-1,000 mg) by mouth every 8 hours as needed for pain  Qty: 90 tablet, Refills: 1    Associated Diagnoses: Right lumbar radiculitis; Chronic right-sided low back pain with right-sided sciatica      calcium carbonate (TUMS) 500 MG chewable tablet Take 2 chew tab by mouth every 2 hours as needed   Refills: 3      levothyroxine (SYNTHROID/LEVOTHROID) 50 MCG tablet Take 50 mcg by mouth every morning       LORazepam (ATIVAN) 0.5 MG tablet Take 0.5 mg by mouth every 6 hours as needed   Refills: 0      metoprolol succinate ER (TOPROL-XL) 50 MG 24 hr tablet Take 50 mg by mouth 2 times daily       pantoprazole (PROTONIX) 40 MG EC tablet Take 40 mg by mouth daily       potassium chloride ER (KLOR-CON M) 10 MEQ CR tablet Take 10 mEq by mouth daily      sodium chloride 1 GM tablet Take 2 g by mouth 2 times daily       Alcohol Swabs (B-D SINGLE USE SWABS REGULAR) PADS USE UTD  Refills: 0      blood glucose monitoring (ONETOUCH VERIO) meter device kit 2 times daily  Refills: 0      ONETOUCH VERIO IQ test strip 2 times daily  Refills: 2                   Rationale for medication changes:              Consults   GI    None    Immunizations given this encounter     Most Recent Immunizations   Administered Date(s) Administered     Flu, Unspecified 09/29/2017     Influenza (H1N1) 10/06/2021           Anticoagulation Information      Recent INR results:   Recent Labs   Lab 02/15/22  1016   INR 1.03     Warfarin doses (if applicable) or name of other anticoagulant:       SIGNIFICANT IMAGING FINDINGS     Results for orders placed or performed during the hospital encounter of 02/15/22   CT Abdomen Pelvis w Contrast    Impression     IMPRESSION:   1.  No significant findings to explain the patient's pain.    2.  Tiny left inguinal hernia without complication similar to 07/25/2019 PET scan.    3.  Stable compression fractures in the lower spine.       SIGNIFICANT LABORATORY FINDINGS     Most Recent 3 CBC's:Recent Labs   Lab Test 02/16/22  0652 02/15/22  1016 03/09/21  0510   WBC 13.4* 15.6* 4.2   HGB 7.9* 8.1* 8.7*   * 112* 105*    196 251           Discharge Orders        Reason for your hospital stay    C. difficile infection     Follow-up and recommended labs and tests     Follow-up with primary care clinic in 2 to 5 days     Activity    Your activity upon discharge: activity as tolerated     Diet    Follow this diet upon discharge: Orders Placed This Encounter      Regular Diet Adult       Examination   Physical Exam   Temp:  [97.4  F (36.3  C)-98.2  F (36.8  C)] 97.9  F (36.6  C)  Pulse:  [] 91  Resp:  [16-35] 16  BP: ()/(56-90) 116/58  SpO2:  [51 %-100 %] 98 %  Wt Readings from Last 1 Encounters:   02/15/22 52 kg (114 lb 10.9 oz)       General: No apparent distress  Heart: Rate and rhythm  Lungs: No audible wheezing  Abdomen: Soft, very mild left lower quadrant tenderness with no rebound or guarding.    Please see EMR for more detailed significant labs, imaging, consultant notes etc.    IChandler MD, personally saw the patient today and spent greater than 30 minutes discharging this patient.    Chandler Jones MD  [facility]    CC:Kizzy Villanueva

## 2022-02-16 NOTE — ANESTHESIA PREPROCEDURE EVALUATION
Anesthesia Pre-Procedure Evaluation    Patient: Chris Bell   MRN: [id number] : 1935        Preoperative Diagnosis: Gastrointestinal hemorrhage associated with peptic ulcer [K27.4]  Anemia due to GI blood loss [D50.0]    Procedure : Procedure(s):  COLONOSCOPY          Past Medical History:   Diagnosis Date     Anemia      Depression      Disease of thyroid gland      HTN (hypertension)      Osteoporosis      PUD (peptic ulcer disease)       Past Surgical History:   Procedure Laterality Date     ESOPHAGOSCOPY, GASTROSCOPY, DUODENOSCOPY (EGD), COMBINED N/A 2018    Procedure: ESOPHAGOGASTRODUODENOSCOPY (EGD) with h.pylori and gastric ulcer biopsies;  Surgeon: Colin Alvarez MD;  Location: [facility];  Service:      HYSTERECTOMY       US BIOPSY FINE NEEDLE ASPIRATION LYMPH NODE  2019      Allergies   Allergen Reactions     Unknown [No Clinical Screening - See Comments]       Social History     Tobacco Use     Smoking status: Never Smoker     Smokeless tobacco: Never Used   Substance Use Topics     Alcohol use: No      Wt Readings from Last 1 Encounters:   02/15/22 52 kg (114 lb 10.9 oz)        Anesthesia Evaluation   Pt has had prior anesthetic. Type: General.    No history of anesthetic complications       ROS/MED HX  ENT/Pulmonary:  - neg pulmonary ROS     Neurologic:  - neg neurologic ROS     Cardiovascular: Comment: 18: Echo  Impression  No impression found.      Narrative    No previous study for comparison.    Normal left ventricular size.    Left ventricle ejection fraction is normal. The calculated left ventricular ejection fraction is 56%.    Normal right ventricular size and systolic function.    No significant valve abnormality observed.    Small circumferential pericardial effusion most prominent overlying the right ventricle.             (+) hypertension-----CHF  (-) murmur   METS/Exercise Tolerance:     Hematologic:  - neg hematologic  ROS     Musculoskeletal:  - neg  musculoskeletal ROS     GI/Hepatic:     (+) GERD, esophageal disease, bowel prep,     Renal/Genitourinary:  - neg Renal ROS     Endo:     (+) thyroid problem,     Psychiatric/Substance Use:  - neg psychiatric ROS     Infectious Disease:  - neg infectious disease ROS     Malignancy:  - neg malignancy ROS     Other:            Physical Exam    Airway        Mallampati: II   TM distance: > 3 FB   Neck ROM: full   Mouth opening: > 3 cm    Respiratory Devices and Support         Dental       (+) missing      Cardiovascular          Rhythm and rate: regular and normal (-) no murmur    Pulmonary   pulmonary exam normal        breath sounds clear to auscultation           OUTSIDE LABS:  CBC:   Lab Results   Component Value Date    WBC 15.6 (H) 02/15/2022    WBC 4.2 03/09/2021    HGB 8.1 (L) 02/15/2022    HGB 8.7 (L) 03/09/2021    HCT 24.7 (L) 02/15/2022    HCT 27.6 (L) 03/09/2021     02/15/2022     03/09/2021     BMP:   Lab Results   Component Value Date     (L) 02/15/2022     03/09/2021    POTASSIUM 3.8 02/15/2022    POTASSIUM 3.3 (L) 03/09/2021    CHLORIDE 97 (L) 02/15/2022    CHLORIDE 102 03/09/2021    CO2 25 02/15/2022    CO2 26 03/09/2021    BUN 11 02/15/2022    BUN 4 (L) 03/09/2021    CR 0.78 02/15/2022    CR 0.71 03/09/2021     (H) 02/16/2022    GLC 94 02/16/2022     COAGS:   Lab Results   Component Value Date    PTT 27 02/15/2022    INR 1.03 02/15/2022     POC: No results found for: BGM, HCG, HCGS  HEPATIC:   Lab Results   Component Value Date    ALBUMIN 2.5 (L) 02/15/2022    PROTTOTAL 6.1 02/15/2022    ALT <9 02/15/2022    AST 10 02/15/2022    ALKPHOS 63 02/15/2022    BILITOTAL 0.3 02/15/2022     OTHER:   Lab Results   Component Value Date    PH 7.34 (L) 01/18/2018    A1C 5.7 (H) 02/15/2022    MANE 7.7 (L) 02/15/2022    PHOS 2.8 01/16/2018    MAG 1.5 (L) 02/25/2021    LIPASE 11 02/15/2022    TSH 3.15 07/20/2018    CRP 2.7 (H) 10/17/2018     (H) 10/18/2018       Anesthesia  Plan    ASA Status:  3      Anesthesia Type: MAC.     - Reason for MAC: chronic cardiopulmonary disease, immobility needed, straight local not clinically adequate              Consents    Anesthesia Plan(s) and associated risks, benefits, and realistic alternatives discussed. Questions answered and patient/representative(s) expressed understanding.     - Discussed: Risks, Benefits and Alternatives for the PROCEDURE were discussed     - Discussed with:  Patient,          Postoperative Care       PONV prophylaxis: Ondansetron (or other 5HT-3)     Comments:    Other Comments: 2/15/22: COVID NEGATIVE    MAC w/ propofol gtt.            Danny Jasso MD

## 2022-03-20 PROBLEM — E87.6 HYPOKALEMIA: Status: ACTIVE | Noted: 2017-12-24

## 2022-03-20 PROBLEM — E83.42 HYPOMAGNESEMIA: Status: ACTIVE | Noted: 2022-01-01

## 2022-03-20 PROBLEM — J96.01 ACUTE RESPIRATORY FAILURE WITH HYPOXIA (H): Status: ACTIVE | Noted: 2022-01-01

## 2022-03-20 PROBLEM — I50.33 ACUTE ON CHRONIC HEART FAILURE WITH PRESERVED EJECTION FRACTION (H): Status: ACTIVE | Noted: 2019-01-11

## 2022-03-20 PROBLEM — S32.020A CLOSED COMPRESSION FRACTURE OF L2 LUMBAR VERTEBRA, INITIAL ENCOUNTER (H): Status: ACTIVE | Noted: 2022-01-01

## 2022-03-20 PROBLEM — D64.9 ANEMIA, UNSPECIFIED TYPE: Status: ACTIVE | Noted: 2022-01-01

## 2022-03-20 PROBLEM — J18.9 PNEUMONIA DUE TO INFECTIOUS ORGANISM, UNSPECIFIED LATERALITY, UNSPECIFIED PART OF LUNG: Status: ACTIVE | Noted: 2022-01-01

## 2022-03-20 PROBLEM — I50.30 HEART FAILURE WITH PRESERVED EJECTION FRACTION (H): Status: ACTIVE | Noted: 2019-01-11

## 2022-03-20 NOTE — ED NOTES
Pt sleeping, no apparent distress. Morphine held at this time. Sats dropped to 79% on RA while asleep, placed 2L O2 NC.

## 2022-03-20 NOTE — ED PROVIDER NOTES
EMERGENCY DEPARTMENT ENCOUNTER      NAME: Chris Bell  AGE: 86 year old female  YOB: 1935  MRN: [id number]  EVALUATION DATE & TIME: 3/20/2022  1:54 PM    PCP: Kizzy Villanueva    ED PROVIDER: Sarah Reyes M.D.      CHIEF COMPLAINT     Chief Complaint   Patient presents with     Chest Pain         FINAL IMPRESSION:     1. Acute respiratory failure with hypoxia (H)    2. Pneumonia due to infectious organism, unspecified laterality, unspecified part of lung    3. Closed compression fracture of L2 lumbar vertebra, initial encounter (H)    4. Hypokalemia    5. Hypomagnesemia    6. Anemia, unspecified type          MEDICAL DECISION MAKING:       Pertinent Labs & Imaging studies reviewed. (See chart for details)    86 year old female presents to the Emergency Department for evaluation of abdominal pain    ED Course as of 03/23/22 1446   Sun Mar 20, 2022   1831 Ms. Bell is a 86-year-old female she presents with her daughter who is also her PCA .the story is provided via PCA .  The PCA IS also her daughter declined an official .   1832 She complains of shortness of breath and left-sided abdominal pain and left back pain for the last 2 weeks.  No trauma   1832 On exam elderly.  Nontoxic cooperative pleasant with exam.  She is noted to have excruciating pain when I sit her up to listen to her lungs and had left lower lumbar tenderness palpation.   1832 Bilateral lower extremity edema.  It appears she does not actually have chest pain per se.  I asked her to point where it hurts she points to the left lower abdomen.  Per her daughter she is just concerned about her heart sometimes she feels like his beating fast.   1833 Differential diagnosis include but not limited to CHF acute coronary syndrome pulmonary embolism diverticulitis kidney stone lumbar fracture Afshin arrhythmia anemia among others.   1833 Patient placed in postop cardiac and pressure monitor.  Per nurse patient desatted to  the 80s when she fell asleep.  Was given morphine for pain.  Her left lung lesion pain was better.   1833 Noted to be hypokalemic and hypomagnesemic.  No history of diarrhea.  Started on replacements.   1833 Mild elevation of the BNP with elevated estimate edema concern for congestive heart failure.   1833 CT lumbar reveals L2 compression fracture which is new.  CT  chest reveals no evidence of PE.  Concern for inflammation versus consolidation.  Procalcitonin ordered.  Given hypoxia will start on Rocephin and azithromycin.  CT abdomen reveals no acute abnormalities.     1834 Given the hypoxia and electrolyte abnormalities and a new compression fracture and pain will admit for further evaluation.           Vital Signs: reviewed  EKG: Sinus rhythm  Imaging:  Home Meds: reviewed  ED meds/abx: morphine  Fluids:    Labs  K 3  Cr 0.75  Wbc 5.4  Hgb 8.8  Platelets 274  Troponin 0.02 magnesium 1.4      Review of Previous Records  Admitted to [facility] 2/15/22 - 2/16/22 (28 hours)  86-year-old female admitted with frequent watery bright red stool.  Initial plan was to have colonoscopy but she tested positive for C. difficile and was started on oral vancomycin.  Her bowel movements have slowed down substantially and are more solid and not bloody.  She is eating and drinking well, appetite is good, vitals are stable and she feels comfortable going home.  Interview done with family as .  She was seen by GI and started on oral vancomycin.  Hemoglobin stable during admission, white count decreasing.      Consults  Hospitalist - Dr. Luis    ED COURSE     2:07 PM I introduced myself to the patient, performed my physical exam, and discussed plan for ED workup.    6:12 PM Rechecked and updated the patient. Per the patient's nurse, her saturations dropped to 80%.    6:23 PM Placed call to admitting physician. Updated the patient and her daughter on plan for admission.    7:11 PM PM I spoke with the admitting  physician, Dr. Luis We discussed the patient's case, and they agree to admit the patient.    At the conclusion of the encounter I discussed the results of all of the tests and the disposition. The questions were answered. The patient and her daughter acknowledged understanding and was agreeable with the care plan.           MEDICATIONS GIVEN IN THE EMERGENCY:     Medications   morphine (PF) injection 2 mg (2 mg Intravenous Given 3/20/22 1702)   potassium chloride 10 mEq in 100 mL sterile water intermittent infusion (premix) (0 mEq Intravenous Stopped 3/20/22 2133)   magnesium sulfate 2 g in water intermittent infusion (0 g Intravenous Stopped 3/20/22 2133)   iopamidol (ISOVUE-370) solution 100 mL (100 mLs Intravenous Given 3/20/22 1741)   cefTRIAXone (ROCEPHIN) 2 g vial to attach to  ml bag for ADULTS or NS 50 ml bag for PEDS (0 g Intravenous Stopped 3/20/22 1950)   azithromycin (ZITHROMAX) 500 mg in sodium chloride 0.9 % 250 mL intermittent infusion (0 mg Intravenous Stopped 3/20/22 2138)   furosemide (LASIX) injection 20 mg (20 mg Intravenous Given 3/21/22 0930)   potassium chloride ER (KLOR-CON M) CR tablet 40 mEq (40 mEq Oral Given 3/21/22 0822)   potassium chloride ER (KLOR-CON M) CR tablet 40 mEq (40 mEq Oral Given 3/21/22 0912)   magnesium sulfate 2 g in water intermittent infusion (2 g Intravenous New Bag 3/21/22 0914)       NEW PRESCRIPTIONS STARTED AT TODAY'S ER VISIT     Discharge Medication List as of 3/22/2022  2:08 PM      START taking these medications    Details   doxycycline hyclate (VIBRAMYCIN) 100 MG capsule Take 1 capsule (100 mg) by mouth 2 times daily for 5 days, Disp-10 capsule, R-0, E-Prescribe                =================================================================    HPI     Patient information was obtained from: Patient    Use of : Yes (In Person - Daughter) - Language: Sukhjinder Bell is a 86 year old female with a history of HTN, heart failure, anemia, and  s/p hysterectomy who presents by walk in escorted by her daughter for evaluation of abdominal pain and flank pain.    The patient is coming from home with her daughter. Her daughter currently provides her 24 hour assistance. The patient reports two weeks of left sided abdominal pain that radiates through to her left flank. She describes the pain as a stabbing sensation. The pain is worse with movement. She has also felt her heart racing at sometimes over the past two weeks, but has not had any chest pain.    The patient has also had worsening leg swelling over the past week. The leg swelling was its worst 3 days ago, and has decreased over the past few days, but has not resolved. She also endorses shortness of breath.    She uses a walker and requires stand by assist at baseline. She has not had any recent falls. She requires assistance with her activities of [facility] living which her daughter provides.    She denies fever, vomiting, diarrhea, bloody or black stool, hematuria, rash, and any other complaints at this time.    REVIEW OF SYSTEMS   Review of Systems   Constitutional: Negative for fever.   Respiratory: Positive for shortness of breath.    Cardiovascular: Positive for palpitations and leg swelling. Negative for chest pain.   Gastrointestinal: Positive for abdominal pain (left sided). Negative for blood in stool, diarrhea and vomiting.   Genitourinary: Positive for flank pain (left). Negative for hematuria.   Skin: Negative for rash.   All other systems reviewed and are negative.       PAST MEDICAL HISTORY:     Past Medical History:   Diagnosis Date     Anemia      Depression      Disease of thyroid gland      HTN (hypertension)      Osteoporosis      PUD (peptic ulcer disease)        PAST SURGICAL HISTORY:     Past Surgical History:   Procedure Laterality Date     ESOPHAGOSCOPY, GASTROSCOPY, DUODENOSCOPY (EGD), COMBINED N/A 1/17/2018    Procedure: ESOPHAGOGASTRODUODENOSCOPY (EGD) with h.pylori and gastric  ulcer biopsies;  Surgeon: Colin Alvarez MD;  Location: [facility];  Service:      HYSTERECTOMY       US BIOPSY FINE NEEDLE ASPIRATION LYMPH NODE  8/14/2019         CURRENT MEDICATIONS:   acetaminophen (TYLENOL) 500 MG tablet  calcium carbonate (TUMS) 500 MG chewable tablet  doxycycline hyclate (VIBRAMYCIN) 100 MG capsule  ferrous sulfate (FEROSUL) 325 (65 Fe) MG tablet  furosemide (LASIX) 20 MG tablet  levothyroxine (SYNTHROID/LEVOTHROID) 50 MCG tablet  LORazepam (ATIVAN) 0.5 MG tablet  metoprolol succinate ER (TOPROL-XL) 50 MG 24 hr tablet  multivitamin w/minerals (MULTI-VITAMIN) tablet  pantoprazole (PROTONIX) 40 MG EC tablet  potassium chloride ER (KLOR-CON M) 10 MEQ CR tablet  sucralfate (CARAFATE) 1 GM tablet  Alcohol Swabs (B-D SINGLE USE SWABS REGULAR) PADS  blood glucose monitoring (ONETOUCH VERIO) meter device kit  ONETOUCH VERIO IQ test strip         ALLERGIES:     Allergies   Allergen Reactions     Unknown [No Clinical Screening - See Comments]        FAMILY HISTORY:     Family History   Problem Relation Age of Onset     Diabetes No family hx of      Cancer No family hx of      Heart Disease No family hx of        SOCIAL HISTORY:     Social History     Socioeconomic History     Marital status:      Spouse name: Not on file     Number of children: Not on file     Years of education: Not on file     Highest education level: Not on file   Occupational History     Not on file   Tobacco Use     Smoking status: Never Smoker     Smokeless tobacco: Never Used   Substance and Sexual Activity     Alcohol use: No     Drug use: No     Sexual activity: Never   Other Topics Concern     Not on file   Social History Narrative     Not on file     Social Determinants of Health     Financial Resource Strain: Not on file   Food Insecurity: Not on file   Transportation Needs: Not on file   Physical Activity: Not on file   Stress: Not on file   Social Connections: Not on file   Intimate Partner Violence: Not on  "file   Housing Stability: Not on file       VITALS:   BP (!) 155/72 (BP Location: Right arm)   Pulse 76   Temp 97.8  F (36.6  C) (Oral)   Resp 18   Ht 1.499 m (4' 11\")   Wt 54.4 kg (120 lb)   SpO2 97%   BMI 24.24 kg/m      PHYSICAL EXAM     Physical Exam  Vitals and nursing note reviewed.   Musculoskeletal:      Right lower leg: Edema present.      Left lower leg: Edema present.      Comments: Patient quite uncomfortable with sitting up.  Requires help..  Complains of left lower back pain.  Has tenderness palpation.   Neurological:      Mental Status: She is alert.         Physical Exam   Constitutional: Nontoxic, chronically ill appearing, appears stated age, no distress. Daughter serves as  and PCP.    Head: Atraumatic.     Nose: Nose normal.     Mouth/Throat: Oropharynx is clear. Dry mucus membranes.     Eyes: EOM are normal. Pupils are equal, round, and reactive to light.     Ears: Bilateral pearly white TMs    Neck: Normal range of motion. Neck supple.     Cardiovascular: Normal rate, regular rhythm and normal heart sounds.      Pulmonary/Chest: Normal effort and breath sounds normal.     Abdominal: Left sided abdominal tenderness to palpation, no guarding or rebounding. Left lower paravertebral lumbar tenderness to palpation.    Musculoskeletal: Normal range of motion. Moves upper and lower extremities equally.  Left paravertebral exquisite tenderness palpation and worse with movement.    Neurological: Appears at baseline per daughter    Lymphatics: 3+ lower extremity edema    Skin: Skin is warm and dry.     Psychiatric: Normal mood and affect. Behavior is normal.       LAB:     All pertinent labs reviewed and interpreted.  Labs Ordered and Resulted from Time of ED Arrival to Time of ED Departure   BASIC METABOLIC PANEL - Abnormal       Result Value    Sodium 137      Potassium 3.0 (*)     Chloride 103      Carbon Dioxide (CO2) 22      Anion Gap 12      Urea Nitrogen 5 (*)     Creatinine 0.75  "     Calcium 8.3 (*)     Glucose 115      GFR Estimate 77     MAGNESIUM - Abnormal    Magnesium 1.4 (*)    B-TYPE NATRIURETIC PEPTIDE ( EAST ONLY) - Abnormal     (*)    HEPATIC FUNCTION PANEL - Abnormal    Bilirubin Total 0.2      Bilirubin Direct 0.1      Protein Total 7.8      Albumin 2.8 (*)     Alkaline Phosphatase 96      AST 16      ALT <9     D DIMER QUANTITATIVE - Abnormal    D-Dimer Quantitative 2.16 (*)    ROUTINE UA WITH MICROSCOPIC REFLEX TO CULTURE - Abnormal    Color Urine Colorless      Appearance Urine Clear      Glucose Urine Negative      Bilirubin Urine Negative      Ketones Urine Negative      Specific Gravity Urine 1.004      Blood Urine Negative      pH Urine 6.0      Protein Albumin Urine Negative      Urobilinogen Urine <2.0      Nitrite Urine Negative      Leukocyte Esterase Urine Negative      Bacteria Urine Few (*)     Mucus Urine Present (*)     RBC Urine <1      WBC Urine 1     CBC WITH PLATELETS AND DIFFERENTIAL - Abnormal    WBC Count 5.4      RBC Count 2.56 (*)     Hemoglobin 8.8 (*)     Hematocrit 28.2 (*)      (*)     MCH 34.4 (*)     MCHC 31.2 (*)     RDW 14.5      Platelet Count 274     INR - Normal    INR 1.10     TROPONIN I - Normal    Troponin I 0.02     LIPASE - Normal    Lipase 18     PROCALCITONIN - Normal    Procalcitonin 0.03     COVID-19 VIRUS (CORONAVIRUS) BY PCR - Normal    SARS CoV2 PCR Negative     DIFFERENTIAL    % Neutrophils 55      % Lymphocytes 21      % Monocytes 22      % Eosinophils 2      % Basophils 0      Absolute Neutrophils 3.0      Absolute Lymphocytes 1.1      Absolute Monocytes 1.2      Absolute Eosinophils 0.1      Absolute Basophils 0.0      RBC Morphology Confirmed RBC Indices      Platelet Assessment        Value: Automated Count Confirmed. Platelet morphology is normal.        RADIOLOGY:     Reviewed all pertinent imaging. Please see official radiology report.  XR Lumbar Spine 2/3 Views   Final Result   IMPRESSION: Recent L2  vertebral body fracture with mild height loss, not appreciably changed. Chronic appearing fractures involving the T12 and L4 vertebral bodies, not appreciably changed. No new fractures are identified. Multilevel degenerative change.    Scoliosis. Vascular calcifications. Sacrum is partially obscured by bowel gas. Left lung basilar opacities.      CT Abdomen Pelvis w Contrast   Final Result   IMPRESSION:    1.  No abdominal pain etiology found.   2.  Left inguinal hernia containing small bowel without obstruction.   3.  Left renal 2 mm nonobstructive stone.      CT Chest Pulmonary Embolism w Contrast   Final Result   IMPRESSION:   1.  No evidence for pulmonary embolism.   2.  Patchy bibasilar pulmonary opacities with peribronchial wall thickening and areas of mucus impaction. This may be pneumonia and/or bronchitis.   3.  Coronary artery calcifications.   4.  Stable T12 compression deformity.      Lumbar spine CT w/o contrast   Final Result   IMPRESSION:   1.  New probable recent L2 inferior endplate fracture with mild retropulsion of the posterior inferior fracture fragment along the ventral aspect of the spinal canal, contributing to mild to moderate spinal canal stenosis at the level of L2-L3.   2.  Unchanged chronic T12 and L4 compression fractures with moderate to severe/severe height loss.   3.  No significant change in alignment. Multilevel degenerative changes, as described.   4.  Unchanged severe spinal canal stenosis at L4-L5 and moderate or moderate to severe spinal canal stenosis at L3-L4. Varying degrees of multilevel neural foraminal stenosis, as described.           EKG:     EKG #1  Sinus rhythm sinus arrhythmia no anterior progression normal axis  Inverted t wave in V2    Time: [id number]    Ventricular rate 78 bmp  Axis normal  KS interval 168 ms  QRS duration 80 ms  QT//442 ms    Compared to previous EKG on 10/18/2018 no significant changes  I have independently reviewed and interpreted the  EKG(s) documented above.      PROCEDURES:     Procedures    I, Kiel Rufino, am serving as a scribe to document services personally performed by Dr. Reyes based on my observation and the provider's statements to me. I, Sarah Reyes MD attest that Kiel Joy is acting in a scribe capacity, has observed my performance of the services and has documented them in accordance with my direction.    Sarah Reyes M.D.  Emergency Medicine  [facility] EMERGENCY DEPARTMENT  [address] [place] [id number]  [phone]  Dept: [phone]     Sarah Reyes MD  03/20/22 2064       Sarah Reyes MD  03/23/22 0716       Sarah Reyes MD  03/23/22 3860

## 2022-03-20 NOTE — ED NOTES
Nursing Assessment: Cardiovascular:  Cardiac monitor reveals normal sinus rhythm with a 1st degree AV block. No chest pain at this time. Respiratory: Lung sounds are clear. No shortness of breath. GI: Patient is reporting right lower quadrant pain with radiation to the back that has occurred over the past two weeks. No nausea, vomiting or diarrhea. Bowel sounds are active and abdomen is soft.

## 2022-03-20 NOTE — ED TRIAGE NOTES
The pt reports right sided chest pain that started two weeks ago. She feels SOB when it happens and feels that her heart is racing. The pt reports increased swelling in lower extremities.

## 2022-03-21 NOTE — PHARMACY-ADMISSION MEDICATION HISTORY
Pharmacy Note - Admission Medication History    Pertinent Provider Information:      ______________________________________________________________________    Prior To Admission (PTA) med list completed and updated in EMR.       PTA Med List   Medication Sig Last Dose     acetaminophen (TYLENOL) 500 MG tablet Take 1-2 tablets (500-1,000 mg) by mouth every 8 hours as needed for pain Past Month at Unknown time     calcium carbonate (TUMS) 500 MG chewable tablet Take 2 chew tab by mouth every 2 hours as needed  More than a month at Unknown time     ferrous sulfate (FEROSUL) 325 (65 Fe) MG tablet Take 325 mg by mouth 2 times daily 3/20/2022 at am     furosemide (LASIX) 20 MG tablet Take 20 mg by mouth daily 3/20/2022 at Unknown time     levothyroxine (SYNTHROID/LEVOTHROID) 50 MCG tablet Take 50 mcg by mouth every morning  3/20/2022 at Unknown time     LORazepam (ATIVAN) 0.5 MG tablet Take 0.5 mg by mouth every 6 hours as needed  3/20/2022 at Unknown time     metoprolol succinate ER (TOPROL-XL) 50 MG 24 hr tablet Take 50 mg by mouth 2 times daily  3/20/2022 at Unknown time     multivitamin w/minerals (MULTI-VITAMIN) tablet Take 1 tablet by mouth daily 3/20/2022 at Unknown time     pantoprazole (PROTONIX) 40 MG EC tablet Take 40 mg by mouth daily  3/20/2022 at Unknown time     potassium chloride ER (KLOR-CON M) 10 MEQ CR tablet Take 10 mEq by mouth daily 3/20/2022 at Unknown time     sodium chloride 1 GM tablet Take 2 g by mouth 2 times daily  3/20/2022 at am     sucralfate (CARAFATE) 1 GM tablet Take 1 g by mouth 4 times daily 3/20/2022 at Unknown time       Information source(s): Patient, Family member and Prescription bottles  Method of interview communication: in-person    Summary of Changes to PTA Med List  New: iron, furosemide, multivitamin, sucralfate  Discontinued: none  Changed: none    Patient was asked about OTC/herbal products specifically.  PTA med list reflects this.    In the past week, patient estimated  taking medication this percent of the time:  greater than 90%.    Allergies were reviewed, assessed, and updated with the patient.      Patient does not use any multi-dose medications prior to admission.    The information provided in this note is only as accurate as the sources available at the time of the update(s).    Thank you for the opportunity to participate in the care of this patient.    Zabrina Valdez [facility]  3/20/2022 7:11 PM

## 2022-03-21 NOTE — H&P
[facility]    History and Physical - Hospitalist Service       Date of Admission:  3/20/2022  Chris Bell,  1935, MRN [id number]  PCP: INOCENCIA PRINGLE    Assessment & Plan      Chris Bell is a 86 year old female admitted on 3/20/2022. She has history of diastolic heart failure, hypertension, necrosis of soft palate, recent hospitalization overnight on 2/15 for GI bleed attributed to C. difficile, here for evaluation of back pain found to have L2 compression fracture    #L2 compression fracture  Patient complained of back pain, CT lumbar spine showing new probable recent L2 inferior endplate fracture with fracture fragment causing some mild to moderate spinal stenosis  Bedrest with bathroom privileges  Neurosurgery consultation  Has multiple other chronic compression fractures T12, L4 etc. likely needs osteoporosis evaluation if not already done as outpatient    #Possible hospital-acquired pneumonia  #Acute hypoxic respiratory failure  Patient noted to be hypoxic during sleep in the ER, O2 applied, currently requiring 2 L nasal cannula  CT chest showing patchy bibasilar opacities with peribronchial thickening and mucus impaction consistent with pneumonia versus bronchitis  She was in the hospital just a little bit over 24 hours more than a month ago.  I do not think she is high risk for multidrug-resistant organisms.  Getting ceftriaxone and azithromycin for community-acquired pneumonia  Sputum culture if patient is able to provide  Not currently septic  Has a fleshy neck. Hypoxia may be from undiagnosed JUSTIN. Consider outpatient sleep study.    #Chest pain  EKG reassuring  Sounds like it is related to her compression fracture    #Abdominal pain  She denied this to me, belly nontender on exam  CT abdomen unremarkable, no cause for pain evident  Urinalysis no infection    #Some degree of acute diastolic heart failure  Patient with worsening peripheral edema, elevated BNP similar to  previous value from 2018, lungs without rales  Lung imaging findings all consistent with pneumonia or bronchitis, no pleural effusions  Can give 24 hours of IV lasix to expedite diuresis and then resume home oral lasix, may need dose adjustment, likely needs compression too  Lymphedema therapy consultation  Home salt tabs might be exacerbating edema.  Stop home salt tabs    #HypoK  #Hypomag  Protocols    #History of hyponatremia  Hold home salt tabs given worsening edema  Fluid restrict 2L    #History of peptic ulcer disease  Continue home PPI  Apparently she takes sucralfate 4 times daily, this could probably be stopped    #Recent c diff  Denies current diarrhea    #History of soft palate necrosis  Saw ENT most recently in 2020, area of necrosis healed with oral cares. Not felt to be malignant.       Diet: Combination Diet Regular Diet Adult; 2 gm NA Diet  Fluid restriction 2000 ML FLUID  DVT Prophylaxis: Moderate risk. SQH    Javed Catheter: Not present  Central Lines: None  Code Status: Full Code    Clinically Significant Risk Factors Present on Admission        Disposition Plan   Expected Discharge: 03/22/2022   Anticipated discharge location: home     The patient's care was discussed with the Patient.    Chanda Luis MD  [facility]  Text page via [facility] Paging/Directory      ______________________________________________________________________    Chief Complaint   Chris Bell is a 86 year old female who presents for back pain    History of Present Illness   Patient tells me she came in because her back has been hurting her for the past 2 weeks. Says it is a mid back pain that wraps around to her ribs and chest. Denies abdominal pain, diarrhea, bloody stool, fever, cough. Does endorse dyspnea due to the back pain. She denies any falls.    Review of Systems    The 5 point Review of Systems is negative other than noted in the HPI or here.     Past Medical History    I have reviewed this  patient's medical history and updated it with pertinent information if needed.   Past Medical History:   Diagnosis Date     Anemia      Depression      Disease of thyroid gland      HTN (hypertension)      Osteoporosis      PUD (peptic ulcer disease)        Past Surgical History   I have reviewed this patient's surgical history and updated it with pertinent information if needed.  Past Surgical History:   Procedure Laterality Date     ESOPHAGOSCOPY, GASTROSCOPY, DUODENOSCOPY (EGD), COMBINED N/A 2018    Procedure: ESOPHAGOGASTRODUODENOSCOPY (EGD) with h.pylori and gastric ulcer biopsies;  Surgeon: Colin Alvarez MD;  Location: [facility];  Service:      HYSTERECTOMY       US BIOPSY FINE NEEDLE ASPIRATION LYMPH NODE  2019       Social History   I have reviewed this patient's social history and updated it with pertinent information if needed.  Social History     Tobacco Use     Smoking status: Never Smoker     Smokeless tobacco: Never Used   Substance Use Topics     Alcohol use: No     Drug use: No       Family History   No family history of DM, cancer, or heart disease on chart review    Prior to Admission Medications   Prior to Admission Medications   Prescriptions Last Dose Informant Patient Reported? Taking?   Alcohol Swabs (B-D SINGLE USE SWABS REGULAR) PADS   Yes No   Sig: USE UTD   LORazepam (ATIVAN) 0.5 MG tablet 3/20/2022 at Unknown time  Yes Yes   Sig: Take 0.5 mg by mouth every 6 hours as needed    ONETOUCH VERIO IQ test strip   Yes No   Si times daily   acetaminophen (TYLENOL) 500 MG tablet Past Month at Unknown time  No Yes   Sig: Take 1-2 tablets (500-1,000 mg) by mouth every 8 hours as needed for pain   blood glucose monitoring (ONETOUCH VERIO) meter device kit   Yes No   Si times daily   calcium carbonate (TUMS) 500 MG chewable tablet More than a month at Unknown time  Yes Yes   Sig: Take 2 chew tab by mouth every 2 hours as needed    ferrous sulfate (FEROSUL) 325 (65 Fe) MG  tablet 3/20/2022 at am  Yes Yes   Sig: Take 325 mg by mouth 2 times daily   furosemide (LASIX) 20 MG tablet 3/20/2022 at Unknown time  Yes Yes   Sig: Take 20 mg by mouth daily   levothyroxine (SYNTHROID/LEVOTHROID) 50 MCG tablet 3/20/2022 at Unknown time  Yes Yes   Sig: Take 50 mcg by mouth every morning    metoprolol succinate ER (TOPROL-XL) 50 MG 24 hr tablet 3/20/2022 at Unknown time  Yes Yes   Sig: Take 50 mg by mouth 2 times daily    multivitamin w/minerals (MULTI-VITAMIN) tablet 3/20/2022 at Unknown time  Yes Yes   Sig: Take 1 tablet by mouth daily   pantoprazole (PROTONIX) 40 MG EC tablet 3/20/2022 at Unknown time  Yes Yes   Sig: Take 40 mg by mouth daily    potassium chloride ER (KLOR-CON M) 10 MEQ CR tablet 3/20/2022 at Unknown time  Yes Yes   Sig: Take 10 mEq by mouth daily   sodium chloride 1 GM tablet 3/20/2022 at am  Yes Yes   Sig: Take 2 g by mouth 2 times daily    sucralfate (CARAFATE) 1 GM tablet 3/20/2022 at Unknown time  Yes Yes   Sig: Take 1 g by mouth 4 times daily      Facility-Administered Medications: None     Allergies   Allergies   Allergen Reactions     Unknown [No Clinical Screening - See Comments]        Physical Exam   Vital Signs: Temp: 98.6  F (37  C) Temp src: Oral BP: (!) 161/76 Pulse: 68   Resp: 28 SpO2: 100 % O2 Device: Nasal cannula Oxygen Delivery: 2 LPM  Weight: 0 lbs 0 oz    General: in no apparent distress, non-toxic and alert female lying in hospital bed oriented x3  HEENT: Head normocephalic atraumatic, oral mucosa moist. Sclerae anicteric. Has generous amount of fatty tissue on her neck.  CV: Regular rhythm, normal rate, no murmurs  Resp: No wheezes, no rales or rhonchi, no focal consolidations  GI: Belly soft, nondistended, nontender, bowel sounds present  Skin: Changes of stasis dermatitis bilateral shins  Extremities: 2+ pitting edema bilateral ankles  Psych: Normal affect, mood euthymic. Verbose historian going on long tangents with the  telling her about  everything that has happened to her in previous hospitalization and current hospital stay etc, keeps trying to continue talking and  has to cut her off to relay long statements  Neuro: CNII-XII grossly intact, moving all 4 extremities    Data   Data reviewed today: I reviewed all medications, new labs and imaging results over the last 24 hours.  EKG personally reviewed shows: nsr rate 78, sinus arrhythmia    Recent Results (from the past 12 hour(s))   Basic metabolic panel    Collection Time: 03/20/22  3:02 PM   Result Value Ref Range    Sodium 137 136 - 145 mmol/L    Potassium 3.0 (L) 3.5 - 5.0 mmol/L    Chloride 103 98 - 107 mmol/L    Carbon Dioxide (CO2) 22 22 - 31 mmol/L    Anion Gap 12 5 - 18 mmol/L    Urea Nitrogen 5 (L) 8 - 28 mg/dL    Creatinine 0.75 0.60 - 1.10 mg/dL    Calcium 8.3 (L) 8.5 - 10.5 mg/dL    Glucose 115 70 - 125 mg/dL    GFR Estimate 77 >60 mL/min/1.73m2   INR    Collection Time: 03/20/22  3:02 PM   Result Value Ref Range    INR 1.10 0.90 - 1.15   Troponin I (now)    Collection Time: 03/20/22  3:02 PM   Result Value Ref Range    Troponin I 0.02 0.00 - 0.29 ng/mL   Magnesium    Collection Time: 03/20/22  3:02 PM   Result Value Ref Range    Magnesium 1.4 (L) 1.8 - 2.6 mg/dL   B-Type Natriuretic Peptide ([facility] Only)    Collection Time: 03/20/22  3:02 PM   Result Value Ref Range     (H) 0 - 167 pg/mL   Hepatic function panel    Collection Time: 03/20/22  3:02 PM   Result Value Ref Range    Bilirubin Total 0.2 0.0 - 1.0 mg/dL    Bilirubin Direct 0.1 <=0.5 mg/dL    Protein Total 7.8 6.0 - 8.0 g/dL    Albumin 2.8 (L) 3.5 - 5.0 g/dL    Alkaline Phosphatase 96 45 - 120 U/L    AST 16 0 - 40 U/L    ALT <9 0 - 45 U/L   Lipase    Collection Time: 03/20/22  3:02 PM   Result Value Ref Range    Lipase 18 0 - 52 U/L   D dimer quantitative    Collection Time: 03/20/22  3:02 PM   Result Value Ref Range    D-Dimer Quantitative 2.16 (H) 0.00 - 0.50 ug/mL FEU   CBC with platelets and  differential    Collection Time: 03/20/22  3:02 PM   Result Value Ref Range    WBC Count 5.4 4.0 - 11.0 10e3/uL    RBC Count 2.56 (L) 3.80 - 5.20 10e6/uL    Hemoglobin 8.8 (L) 11.7 - 15.7 g/dL    Hematocrit 28.2 (L) 35.0 - 47.0 %     (H) 78 - 100 fL    MCH 34.4 (H) 26.5 - 33.0 pg    MCHC 31.2 (L) 31.5 - 36.5 g/dL    RDW 14.5 10.0 - 15.0 %    Platelet Count 274 150 - 450 10e3/uL   Manual Differential    Collection Time: 03/20/22  3:02 PM   Result Value Ref Range    % Neutrophils 55 %    % Lymphocytes 21 %    % Monocytes 22 %    % Eosinophils 2 %    % Basophils 0 %    Absolute Neutrophils 3.0 1.6 - 8.3 10e3/uL    Absolute Lymphocytes 1.1 0.8 - 5.3 10e3/uL    Absolute Monocytes 1.2 0.0 - 1.3 10e3/uL    Absolute Eosinophils 0.1 0.0 - 0.7 10e3/uL    Absolute Basophils 0.0 0.0 - 0.2 10e3/uL    RBC Morphology Confirmed RBC Indices     Platelet Assessment  Automated Count Confirmed. Platelet morphology is normal.     Automated Count Confirmed. Platelet morphology is normal.   UA with Microscopic reflex to Culture    Collection Time: 03/20/22  5:05 PM    Specimen: Urine, Clean Catch   Result Value Ref Range    Color Urine Colorless Colorless, Straw, Light Yellow, Yellow    Appearance Urine Clear Clear    Glucose Urine Negative Negative mg/dL    Bilirubin Urine Negative Negative    Ketones Urine Negative Negative mg/dL    Specific Gravity Urine 1.004 1.001 - 1.030    Blood Urine Negative Negative    pH Urine 6.0 5.0 - 7.0    Protein Albumin Urine Negative Negative mg/dL    Urobilinogen Urine <2.0 <2.0 mg/dL    Nitrite Urine Negative Negative    Leukocyte Esterase Urine Negative Negative    Bacteria Urine Few (A) None Seen /HPF    Mucus Urine Present (A) None Seen /LPF    RBC Urine <1 <=2 /HPF    WBC Urine 1 <=5 /HPF   Procalcitonin    Collection Time: 03/20/22  6:55 PM   Result Value Ref Range    Procalcitonin 0.03 0.00 - 0.49 ng/mL   Asymptomatic COVID-19 Virus (Coronavirus) by PCR Nasopharyngeal    Collection Time:  03/20/22  6:57 PM    Specimen: Nasopharyngeal; Swab   Result Value Ref Range    SARS CoV2 PCR Negative Negative

## 2022-03-21 NOTE — PROGRESS NOTES
[facility] Internal Medicine Progress Note      ASSESSMENT:    Principal Problem:    Pneumonia due to infectious organism, unspecified laterality, unspecified part of lung  Active Problems:    Hypokalemia    Acute on chronic heart failure with preserved ejection fraction (H)    Hypomagnesemia    Acute respiratory failure with hypoxia (H)    Closed compression fracture of L2 lumbar vertebra, initial encounter (H)    Anemia, unspecified type      PLAN:    86-year-old female with history of chronic diastolic CHF, hyponatremia, hypothyroidism, hypertension, PUD, recent C. difficile, soft palate necrosis, presents 3/20/2022 with L2 compression fracture and found to have possible pneumonia and mild CHF exacerbation      Acute hypoxemic respiratory failure: Patient required 2 L of oxygen on admission.  Etiology likely component of pneumonia and CHF  --Hypoxia has resolved with IV antibiotics and Lasix  --Continue empiric IV antibiotics  --Given resolution of hypoxia can resume home Lasix dosing.  Patient received 2 doses of 20 mg IV Lasix with improvement  --Continue monitoring for any oxygen needs        Pneumonia: CT chest on admission shows bibasilar pulmonary opacities consistent with pneumonia versus bronchitis  --Continue empiric IV ceftriaxone azithromycin and likely can transition to orals prior to discharge  --Hypoxemia resolved  --Noted is recent hospitalization for about 24 hours more than a month ago and concern for healthcare associated pneumonia is low  --No evidence of sepsis        Concern for mild acute on chronic diastolic CHF: Noted is presentation with worsening peripheral edema, elevated BN P similar to previous.  CT imaging consistent with pneumonia or bronchitis, no pleural effusions noted on  --Resume home Lasix 20 mg daily now that she has had improvement in hypoxia and edema with intermittent IV Lasix dosing  --Continue home metoprolol  --Hold home salt tabs, trend sodium      Acute L2 compression  fracture: CT L-spine shows probably recent L2 inferior endplate fracture with mild to moderate spinal stenosis  --Neurosurgery recommends conservative cares, plan TLSO brace, lift bedrest with bathroom privileges once follow-up imaging of the spine with brace in place are obtained  --Continue current supportive cares with as needed Tylenol, add lidocaine patch        Hypokalemia: Replace and recheck      Hypomagnesemia: Replace and recheck      Chest pain: Likely related to pulmonary process.  No evidence of ACS on admission EKG and could also have referred pain from L2 compression fracture      Abdominal pain: Patient denies any abdominal pain today, CT abdomen unremarkable, UA without evidence of UTI   --suspect radiation of pain from compression fracture  --Monitor      History of hyponatremia  --Continue holding home salt tabs given peripheral edema  --Continue fluid restriction      Recent history of C. difficile: Denies any current diarrhea      History of soft palate necrosis  --Previously evaluated by ENT,most recently in 2020, area of necrosis healed with oral cares. Not felt to be malignant.        History of PUD: Continue home sucralfate and PPI        Hypothyroidism: Continue home Synthroid        Malnutrition: ask RD to assess         DVT PPX: Subcu heparin                # Hypoalbuminemia: Albumin = 2.8 g/dL (Ref range: 3.5 - 5.0 g/dL) on admission, will monitor as appropriate                    Needs for Discharge:   Follow-up imaging with TLSO brace in place  Assess for any home care needs with PT/OT  Assess for any further oxygen needs    ESTIMATED DISCHARGE:  1D  Home        Mario Britt D.O.              -------------------------------------------------------------------------------------------------------------  SUBJECTIVE: NAD. Denies any nausea, vomiting, abdominal pain, chest pain, SOB, new swelling, fevers, chills, confusion or headache.   Cough improved, breathing much  "improved    Exam:  /70 (BP Location: Right arm)   Pulse 74   Temp 98.2  F (36.8  C) (Oral)   Resp 18   Ht 1.499 m (4' 11\")   Wt 54.4 kg (120 lb)   SpO2 96%   BMI 24.24 kg/m    General: NAD  RESPIRATORY: Breathing nonlabored  CARDIOVASCULAR: 1+ le edema bilat.   ABDOMEN: soft and non-tender  NEUROLOGIC: Non focal, motor and sensory intact, speech clear      Diagnostics Reviewed:      Recent Results (from the past 24 hour(s))   UA with Microscopic reflex to Culture    Collection Time: 03/20/22  5:05 PM    Specimen: Urine, Clean Catch   Result Value Ref Range    Color Urine Colorless Colorless, Straw, Light Yellow, Yellow    Appearance Urine Clear Clear    Glucose Urine Negative Negative mg/dL    Bilirubin Urine Negative Negative    Ketones Urine Negative Negative mg/dL    Specific Gravity Urine 1.004 1.001 - 1.030    Blood Urine Negative Negative    pH Urine 6.0 5.0 - 7.0    Protein Albumin Urine Negative Negative mg/dL    Urobilinogen Urine <2.0 <2.0 mg/dL    Nitrite Urine Negative Negative    Leukocyte Esterase Urine Negative Negative    Bacteria Urine Few (A) None Seen /HPF    Mucus Urine Present (A) None Seen /LPF    RBC Urine <1 <=2 /HPF    WBC Urine 1 <=5 /HPF   Procalcitonin    Collection Time: 03/20/22  6:55 PM   Result Value Ref Range    Procalcitonin 0.03 0.00 - 0.49 ng/mL   Asymptomatic COVID-19 Virus (Coronavirus) by PCR Nasopharyngeal    Collection Time: 03/20/22  6:57 PM    Specimen: Nasopharyngeal; Swab   Result Value Ref Range    SARS CoV2 PCR Negative Negative   Basic metabolic panel    Collection Time: 03/21/22  6:58 AM   Result Value Ref Range    Sodium 139 136 - 145 mmol/L    Potassium 3.0 (L) 3.5 - 5.0 mmol/L    Chloride 100 98 - 107 mmol/L    Carbon Dioxide (CO2) 29 22 - 31 mmol/L    Anion Gap 10 5 - 18 mmol/L    Urea Nitrogen 3 (L) 8 - 28 mg/dL    Creatinine 0.68 0.60 - 1.10 mg/dL    Calcium 8.1 (L) 8.5 - 10.5 mg/dL    Glucose 105 70 - 125 mg/dL    GFR Estimate 84 >60 mL/min/1.73m2 "   CBC with platelets    Collection Time: 03/21/22  6:58 AM   Result Value Ref Range    WBC Count 5.8 4.0 - 11.0 10e3/uL    RBC Count 2.64 (L) 3.80 - 5.20 10e6/uL    Hemoglobin 9.0 (L) 11.7 - 15.7 g/dL    Hematocrit 28.0 (L) 35.0 - 47.0 %     (H) 78 - 100 fL    MCH 34.1 (H) 26.5 - 33.0 pg    MCHC 32.1 31.5 - 36.5 g/dL    RDW 14.3 10.0 - 15.0 %    Platelet Count 286 150 - 450 10e3/uL   Magnesium    Collection Time: 03/21/22  6:58 AM   Result Value Ref Range    Magnesium 1.6 (L) 1.8 - 2.6 mg/dL

## 2022-03-21 NOTE — ED NOTES
Pt. Reported to the ED today with her daughter due to CP.  Upon further assessment the pain was determined to be LLQ pain that radiated to the back.  Upon imaging study pt has a fx to L2  And an incidental finding was bilateral pneumonia.  Pt has been given antibiotics, has an IV established in her left hand and has a pur-wick inserted as she has lasix ordered.  Called pharmacy and will give 2100 medication prior to sending to  for the evening.

## 2022-03-21 NOTE — PROGRESS NOTES
Lymphedema       03/21/22 0830   Quick Adds   Quick Adds Edema   Living Environment   People in Home child(chriss), adult   Living Environment Comments Daughter is PCA, patient has assistance with all ADLs.   Self-Care   Equipment Currently Used at Home walker, rolling   General Information   Onset of Illness/Injury or Date of Surgery 03/20/22   Referring Physician Chanda Luis MD   Patient/Family Therapy Goal Statement (OT) none stated   Existing Precautions/Restrictions fall   Edema General Information   Onset of Edema   (unknown)   Affected Body Part(s) Left LE;Right LE   Edema Etiology Unknown   Edema Examination/Assessment   Skin Condition Pitting   Skin Condition Comments Intact   Scar No   Ulcerations No   Stemmer Sign Negative   Skin Integrity Dry, but intact   Pitting Assessment 2+-near ankles only   Clinical Impression   Criteria for Skilled Therapeutic Interventions Met (OT) Yes, treatment indicated   Edema: Patient Presentation Edema   Edema: Planned Interventions Fit for compression garment;Education;Precautions to prevent infection/exacerbation   Clinical Decision Making Complexity (OT) low complexity   OT Discharge Planning   OT Discharge Recommendation (DC Rec) home with assist   Total Evaluation Time (Minutes)   Total Evaluation Time (Minutes) 10   OT Goals   Therapy Frequency (OT) One time eval and treatment   OT Predicated Duration/Target Date for Goal Attainment 03/22/22   OT Goals Edema   OT: Edema education to increase ability to manage edema after discharge from the hospital Patient;Verbalize   OT: Management of edema bandages Patient;Verbalize   OT: Functional edema exercise program to reduce limb volume, increase activity tolerance and improve independence with ADL Patient

## 2022-03-21 NOTE — PLAN OF CARE
Lymphedema Discharge Summary    Reason for therapy discharge:    No further expectations of functional progress.    Progress towards therapy goal(s). See goals on Care Plan in [facility] electronic health record for goal details.  Goals not met.  Barriers to achieving goals:   limited tolerance for therapy.    Therapy recommendation(s):    No further therapy is recommended.  Patient refusing lymphedema treatment. Attempted light tubular compression, patient only last 10 minutes. No appropriate for lymphedema treatment at this time.

## 2022-03-21 NOTE — CONSULTS
[facility]    Neurosurgery Consultation     Date of Admission:  3/20/2022  Date of Consult (When I saw the patient): 03/21/22    Assessment & Plan   Chris Bell is a 86 year old female who was admitted on 3/20/2022. I was asked to see the patient for L2 fracture.    Active Problems:   Hypokalemia   Acute on chronic heart failure with preserved ejection fraction (H)   Hypomagnesemia   Acute respiratory failure with hypoxia (H)   Anemia, unspecified type   Pneumonia due to infectious organism, unspecified laterality, unspecified part of lung    Closed compression fracture of L2 lumbar vertebra, initial encounter (H)    Plan:   Spoke with son about possible treatment options. Would recommend conservative treatment with TLSO brace for L2 fracture to be work when out of bed or sitting greater than 20 degrees. Will obtain standing xray with brace in place  In regards to her lumbar spinal stenosis would recommend physical therapy and follow up with spine clinic for possible ZAHRAA as a on outpatient. Should patient and family want to explore possible surgical intervention could follow up outpatient for further discussion should conservative treatment no provide relief. Also would recommend outpatient treatment for osteoporosis.         I have discussed the following assessment and plan with  who is in agreement with initial plan and will follow up with further consultation recommendations.    Opal Hernandez PA-C  [facility] Neurosurgery  O: [phone]          Code Status    Full Code    Reason for Consult   Reason for consult: I was asked by Dr. Luis to evaluate this patient for back pain with L2 fracture     Primary Care Physician   INOCENCIA PRINGLE    Chief Complaint   Back pain     History is obtained from the patient and chart review    History of Present Illness   Chris Bell is a 86 year old female who presents with complaint of chronic low back pain, states chronic bilateral lower  extremity pain with ambulation notes that her legs are heavy and has to take a break to ambulate further. She denies any injuries or falls recently. She states that her back and leg pain has been present for years and remains unchanged. She states that she presented to the ED just because her pain has not been improving. She is a patient at the spine clinic over the past year and has been treated conservatively. She denies any changes in her bladder or bowel habits. She has history of osteoporosis but is unable to tell if she is currently being treated. On lumbar CT noted stable appearance of T12 and L4 chronic fractures with acute L2 endplate fracture noted.     Past Medical History   I have reviewed this patient's medical history and updated it with pertinent information if needed.   Past Medical History:   Diagnosis Date     Anemia      Depression      Disease of thyroid gland      HTN (hypertension)      Osteoporosis      PUD (peptic ulcer disease)        Past Surgical History   I have reviewed this patient's surgical history and updated it with pertinent information if needed.  Past Surgical History:   Procedure Laterality Date     ESOPHAGOSCOPY, GASTROSCOPY, DUODENOSCOPY (EGD), COMBINED N/A 2018    Procedure: ESOPHAGOGASTRODUODENOSCOPY (EGD) with h.pylori and gastric ulcer biopsies;  Surgeon: Colin Alvarez MD;  Location: [facility];  Service:      HYSTERECTOMY       US BIOPSY FINE NEEDLE ASPIRATION LYMPH NODE  2019       Prior to Admission Medications   Prior to Admission Medications   Prescriptions Last Dose Informant Patient Reported? Taking?   Alcohol Swabs (B-D SINGLE USE SWABS REGULAR) PADS   Yes No   Sig: USE UTD   LORazepam (ATIVAN) 0.5 MG tablet 3/20/2022 at Unknown time  Yes Yes   Sig: Take 0.5 mg by mouth every 6 hours as needed    ONETOUCH VERIO IQ test strip   Yes No   Si times daily   acetaminophen (TYLENOL) 500 MG tablet Past Month at Unknown time  No Yes   Sig: Take 1-2  tablets (500-1,000 mg) by mouth every 8 hours as needed for pain   blood glucose monitoring (ONETOUCH VERIO) meter device kit   Yes No   Si times daily   calcium carbonate (TUMS) 500 MG chewable tablet More than a month at Unknown time  Yes Yes   Sig: Take 2 chew tab by mouth every 2 hours as needed    ferrous sulfate (FEROSUL) 325 (65 Fe) MG tablet 3/20/2022 at am  Yes Yes   Sig: Take 325 mg by mouth 2 times daily   furosemide (LASIX) 20 MG tablet 3/20/2022 at Unknown time  Yes Yes   Sig: Take 20 mg by mouth daily   levothyroxine (SYNTHROID/LEVOTHROID) 50 MCG tablet 3/20/2022 at Unknown time  Yes Yes   Sig: Take 50 mcg by mouth every morning    metoprolol succinate ER (TOPROL-XL) 50 MG 24 hr tablet 3/20/2022 at Unknown time  Yes Yes   Sig: Take 50 mg by mouth 2 times daily    multivitamin w/minerals (MULTI-VITAMIN) tablet 3/20/2022 at Unknown time  Yes Yes   Sig: Take 1 tablet by mouth daily   pantoprazole (PROTONIX) 40 MG EC tablet 3/20/2022 at Unknown time  Yes Yes   Sig: Take 40 mg by mouth daily    potassium chloride ER (KLOR-CON M) 10 MEQ CR tablet 3/20/2022 at Unknown time  Yes Yes   Sig: Take 10 mEq by mouth daily   sodium chloride 1 GM tablet 3/20/2022 at am  Yes Yes   Sig: Take 2 g by mouth 2 times daily    sucralfate (CARAFATE) 1 GM tablet 3/20/2022 at Unknown time  Yes Yes   Sig: Take 1 g by mouth 4 times daily      Facility-Administered Medications: None     Allergies   Allergies   Allergen Reactions     Unknown [No Clinical Screening - See Comments]        Social History   I have reviewed this patient's social history and updated it with pertinent information if needed. Chris Bell  reports that she has never smoked. She has never used smokeless tobacco. She reports that she does not drink alcohol and does not use drugs.    Family History   I have reviewed this patient's family history and updated it with pertinent information if needed.   Family History   Problem Relation Age of Onset     Diabetes  "No family hx of      Cancer No family hx of      Heart Disease No family hx of        Review of Systems   10 point review of system negative exception for HPI     Physical Exam   Temp: 98.7  F (37.1  C) Temp src: Oral BP: (!) 163/74 Pulse: 71   Resp: 26 SpO2: 100 % O2 Device: Nasal cannula Oxygen Delivery: 2 LPM  Vital Signs with Ranges  Temp:  [98.6  F (37  C)-98.7  F (37.1  C)] 98.7  F (37.1  C)  Pulse:  [58-80] 71  Resp:  [16-42] 26  BP: (122-175)/(58-83) 163/74  SpO2:  [85 %-100 %] 100 %  120 lbs 0 oz     , Blood pressure (!) 163/74, pulse 71, temperature 98.7  F (37.1  C), temperature source Oral, resp. rate 26, height 1.499 m (4' 11\"), weight 54.4 kg (120 lb), SpO2 100 %.  120 lbs 0 oz  HEENT:  Normocephalic, atraumatic.  PERRLA.  EOM s intact.   Neck:  Supple, non-tender, without lymphadenopathy.  Heart:  No peripheral edema  Lungs:  No SOB  Abdomen:  Soft, non-tender, non-distended.  Normal bowel sounds.  Skin:  Warm and dry, good capillary refill.  Extremities:  Good radial and dorsalis pedis pulses bilaterally, no edema, cyanosis or clubbing.    NEUROLOGICAL EXAMINATION:   Mental status:  Alert and Oriented x 3, speech is fluent.  Cranial nerves:  II-XII intact.   Motor:  Strength is 5/5 throughout the upper and lower extremities limited exam do to language barrier but lifts both legs off of bed without difficulty   Deltoids:  Right: 5/5   Left:  5/5  Biceps:  Right: 5/5   Left:  5/5  Triceps: Right: 5/5   Left:  5/5                       Wrist Extensors: Right: 5/5   Left:  5/5        Wrist Flexors:Right: 5/5   Left:  5/5             Hand : Right: 5/5   Left:  5/5         Hip Flexor: Right: 5/5   Left:  5/5                 Hip Adductor:Right: 5/5   Left:  5/5               Hip Abductor: Right: 5/5   Left:  5/5              Gastroc Soleus:Right: 5/5   Left:  5/5        Tib/Ant:Right: 5/5   Left:  5/5                        EHL:Right: 5/5   Left:  5/5                     Sensation:  Intact throughout "   Reflexes:  Negative Babinski.  Negative Clonus.    Coordination:  Smooth finger to nose testing.   Negative pronator drift.  Smooth tandem walking  Gait:  Stable, no difficulty with heel or toe walking.    Cervical examination reveals good range of motion.  No tenderness to palpation of the cervical spine or paraspinous muscles bilaterally.     Lumbar examination reveals no tenderness of the spine or paraspinous muscles. Straight leg raise is negative bilaterally.       Data     CBC RESULTS:   Recent Labs   Lab Test 03/20/22  1502   WBC 5.4   RBC 2.56*   HGB 8.8*   HCT 28.2*   *   MCH 34.4*   MCHC 31.2*   RDW 14.5        Basic Metabolic Panel:  Lab Results   Component Value Date     03/20/2022      Lab Results   Component Value Date    POTASSIUM 3.0 03/20/2022     Lab Results   Component Value Date    CHLORIDE 103 03/20/2022     Lab Results   Component Value Date    MANE 8.3 03/20/2022     Lab Results   Component Value Date    CO2 22 03/20/2022     Lab Results   Component Value Date    BUN 5 03/20/2022     Lab Results   Component Value Date    CR 0.75 03/20/2022     Lab Results   Component Value Date     03/20/2022     INR:  Lab Results   Component Value Date    INR 1.10 03/20/2022    INR 1.03 02/15/2022    INR 1.03 01/16/2018     Images: noted L2 acute without stenosis associated, noted degenerative lumbar spinal stenosis unchanged compared to previous images      Opal Hernandez PA-C  [facility] Neurosurgery  O: [phone]

## 2022-03-21 NOTE — PROGRESS NOTES
S: Pt. seen at [facility] with daughter. David HU. RX: OTS TLSO. DX: L2 acute Fx. with T12 and L4 Fx.   O:A: Pt. fell at home. Pt. has a LSO. Today I F/D a [facility] 456 TLSO with nurse. Pt. was fit in bed and did not want to stand. Donning doffing wear and care explained to Pt., daughter and nurse.   P: Pt. to be seen as needed.     Bean BARRETT,LO

## 2022-03-21 NOTE — ED NOTES
Pt. Complained of right arm pain. Assessed arm and IV site was red.  Pulled IV, placed ice pack to area and.

## 2022-03-21 NOTE — PLAN OF CARE
Problem: Plan of Care - These are the overarching goals to be used throughout the patient stay.    Goal: Optimal Comfort and Wellbeing  Intervention: Monitor Pain and Promote Comfort  Recent Flowsheet Documentation  Taken 3/21/2022 0215 by Nadira Burgos, RN  Pain Management Interventions: rest     Problem: Plan of Care - These are the overarching goals to be used throughout the patient stay.    Goal: Absence of Hospital-Acquired Illness or Injury  Intervention: Identify and Manage Fall Risk  Recent Flowsheet Documentation  Taken 3/21/2022 0215 by Nadira Burgos, RN     Goal Outcome Evaluation:    Overnight, patient complained of trouble moving due to weakness. She also complained of pain at a 5 but refused pain medicine. Patient was call light appropriate overnight.

## 2022-03-21 NOTE — PROGRESS NOTES
Patient arrived from ED around 2300. Patient was settled into her room, VS obtained, patient was cleaned up and new pure wick placed. Patient is now sleeping, daughter has gone home. Reported that patient prefers lights on at night and warm water to drink.  Bed alarm on.  Requires .

## 2022-03-21 NOTE — PLAN OF CARE
Problem: Plan of Care - These are the overarching goals to be used throughout the patient stay.    Goal: Optimal Comfort and Wellbeing  Outcome: Ongoing, Progressing  Intervention: Monitor Pain and Promote Comfort  Recent Flowsheet Documentation  Taken 3/21/2022 1537 by Kate Martinez, RN  Pain Management Interventions: declines  Taken 3/21/2022 0800 by Kate Martinez, RN  Pain Management Interventions: medication (see MAR)   Reporting pain in lower back, PRN tylenol given this am, declined medication this afternoon as she doesn't think tylenol helps at all. Lidocaine patch applied, removed after an hour due to pt request. Brace on when out of bed, xray complete this evening. Replaced magnesium and potassium, recheck in the am. Call light within reach, calls appropriately. VSS, will continue to monitor.     Goal Outcome Evaluation:

## 2022-03-22 NOTE — DISCHARGE SUMMARY
[facility]  Hospitalist Discharge Summary      Date of Admission:  3/20/2022  Date of Discharge:  3/22/2022  3:35 PM  Discharging Provider: Becki Crain DO  Discharge Service: Hospitalist Service    Discharge Diagnoses   L2 compression fracture  Acute hypoxic respiratory failure  Pneumonia  Acute on chronic diastolic CHF exacerbation  Hypokalemia  Hypomagnesemia  Chest pain  Abdominal pain    Follow-ups Needed After Discharge   Follow-up Appointments     Follow-up and recommended labs and tests       Follow up with primary care provider, INOCENCIA PRINGLE, within 7   days for hospital follow- up.  Follow up with Neurosurgery in 2 weeks             Unresulted Labs Ordered in the Past 30 Days of this Admission     No orders found from 2/18/2022 to 3/21/2022.          Discharge Disposition   Discharged to home  Condition at discharge: Stable      Hospital Course   86-year-old female with history of chronic diastolic CHF, hyponatremia, hypothyroidism, hypertension, PUD, recent C. difficile, soft palate necrosis, presents 3/20/2022 with L2 compression fracture and found to have possible pneumonia and mild CHF exacerbation        Acute hypoxemic respiratory failure: Patient required 2 L of oxygen on admission.  Etiology likely component of pneumonia and CHF  --Hypoxia has resolved with IV antibiotics and Lasix  --Transition IV antibiotics to doxycycline at discharge  --Given resolution of hypoxia can resume home Lasix dosing.  Patient received 2 doses of 20 mg IV Lasix with improvement  --Continue monitoring for any oxygen needs           Pneumonia: CT chest on admission shows bibasilar pulmonary opacities consistent with pneumonia versus bronchitis  --Continue empiric IV ceftriaxone azithromycin and transition to doxycycline at discharge  --Hypoxemia resolved  --Noted is recent hospitalization for about 24 hours more than a month ago and concern for healthcare associated pneumonia  is low  --No evidence of sepsis           Concern for mild acute on chronic diastolic CHF: Noted is presentation with worsening peripheral edema, elevated BN P similar to previous.  CT imaging consistent with pneumonia or bronchitis, no pleural effusions noted on  --Resume home Lasix 20 mg daily now that she has had improvement in hypoxia and edema with intermittent IV Lasix dosing  --Continue home metoprolol  --Hold home salt tabs, trend sodium        Acute L2 compression fracture: CT L-spine shows probably recent L2 inferior endplate fracture with mild to moderate spinal stenosis  --Neurosurgery recommends conservative cares, plan TLSO brace, lift bedrest with bathroom privileges once follow-up imaging of the spine with brace in place are obtained.  Follow-up with neurosurgery in 2 weeks  --Continue current supportive cares with as needed Tylenol, add lidocaine patch           Hypokalemia: Replaced        Hypomagnesemia: Replaced        Chest pain: Likely related to pulmonary process.  No evidence of ACS on admission EKG and could also have referred pain from L2 compression fracture        Abdominal pain: Patient denies any abdominal pain today, CT abdomen unremarkable, UA without evidence of UTI   --suspect radiation of pain from compression fracture  --Monitor        History of hyponatremia  --Continue holding home salt tabs given peripheral edema  --Continue fluid restriction        Recent history of C. difficile: Denies any current diarrhea        History of soft palate necrosis  --Previously evaluated by ENT,most recently in 2020, area of necrosis healed with oral cares. Not felt to be malignant.           History of PUD: Continue home sucralfate and PPI           Hypothyroidism: Continue home Synthroid           Malnutrition: ask RD to assess            DVT PPX: Subcu heparin                  # Hypoalbuminemia: Albumin = 2.8 g/dL (Ref range: 3.5 - 5.0 g/dL) on admission, will monitor as appropriate              Consultations This Hospital Stay   NEUROSURGERY IP CONSULT  LYMPHEDEMA THERAPY IP CONSULT  ORTHOSIS BRACE IP CONSULT  OCCUPATIONAL THERAPY ADULT IP CONSULT  PHYSICAL THERAPY ADULT IP CONSULT  NUTRITION SERVICES ADULT IP CONSULT    Code Status   Full Code    Time Spent on this Encounter   IBecki DO, personally saw the patient today and spent greater than 30 minutes discharging this patient.       Becki Crain DO  [address] [address]  Phone: [phone]  Fax: [phone]  ______________________________________________________________________    Physical Exam   Vital Signs: Temp: 97.8  F (36.6  C) Temp src: Oral BP: (!) 155/72 Pulse: 76   Resp: 18 SpO2: 97 % O2 Device: None (Room air)    Weight: 120 lbs 0 oz  GENRL: Alert and answering questions appropriately through the . Not in acute distress. Lying in bed   HEENT: no JVP elevation, no lymphadenopathy or thyromegaly  CHEST: Clear to auscultation bilaterally. No wheezes, rhonchi or crackles. Breathing easily   HEART: Regular rate   ABDMN: Soft. Non-tender, non-distended. No organomegaly. No guarding or rigidity. Bowel sounds present   EXTRM: No pedal edema  NEURO: Following commands and moving all extremities.    PSYCH: pleasant affect and mood.   INTGM: No skin rash, no cyanosis or clubbing       Primary Care Physician   INOCENCIA PRINGLE    Discharge Orders      Home Care Referral      Reason for your hospital stay    Pneumonia and L2 compression fracture     Follow-up and recommended labs and tests     Follow up with primary care provider, INOCENCIA PRINGLE, within 7 days for hospital follow- up.  Follow up with Neurosurgery in 2 weeks     Activity    Your activity upon discharge: activity as tolerated     Diet    Follow this diet upon discharge: Orders Placed This Encounter      Fluid restriction 2000 ML FLUID      Combination Diet Regular  Diet Adult; 2 gm NA Diet       Significant Results and Procedures   Most Recent 3 CBC's:Recent Labs   Lab Test 03/21/22  0658 03/20/22  1502 02/16/22  0652   WBC 5.8 5.4 13.4*   HGB 9.0* 8.8* 7.9*   * 110* 112*    274 215     Most Recent 3 BMP's:Recent Labs   Lab Test 03/22/22  0614 03/21/22  1529 03/21/22  0658 03/20/22  1502     --  139 137   POTASSIUM 3.6 4.2 3.0* 3.0*   CHLORIDE 99  --  100 103   CO2 28  --  29 22   BUN 3*  --  3* 5*   CR 0.70  --  0.68 0.75   ANIONGAP 11  --  10 12   MANE 8.8  --  8.1* 8.3*     --  105 115     Most Recent 2 LFT's:Recent Labs   Lab Test 03/20/22  1502 02/15/22  1016   AST 16 10   ALT <9 <9   ALKPHOS 96 63   BILITOTAL 0.2 0.3   ,   Results for orders placed or performed during the hospital encounter of 03/20/22   CT Chest Pulmonary Embolism w Contrast    Narrative    EXAM: CT CHEST PULMONARY EMBOLISM W CONTRAST  LOCATION: [facility]  DATE/TIME: 3/20/2022 5:21 PM    INDICATION: PE suspected, low/intermediate prob, positive D-dimer.  COMPARISON: CT chest 06/05/2018.  TECHNIQUE: CT chest pulmonary angiogram during arterial phase injection of IV contrast. Multiplanar reformats and MIP reconstructions were performed. Dose reduction techniques were used.   CONTRAST: Isovue 370 100 ml.    FINDINGS:  ANGIOGRAM CHEST: Pulmonary arteries are normal caliber and negative for pulmonary emboli. Thoracic aorta is negative for dissection. No CT evidence of right heart strain.    LUNGS AND PLEURA: Motion artifact significantly limits assessment. Some patchy ill-defined pulmonary opacities at the bilateral lower lobes along with peribronchial wall thickening and small areas of mucus impaction suggested. No effusions.    MEDIASTINUM/AXILLAE: No additional acute abnormality. Stable lymph nodes.    CORONARY ARTERY CALCIFICATION: Severe.    UPPER ABDOMEN: No acute upper abdominal abnormality.    MUSCULOSKELETAL: Severe compression deformity at T12 is  stable. No acute abnormality.      Impression    IMPRESSION:  1.  No evidence for pulmonary embolism.  2.  Patchy bibasilar pulmonary opacities with peribronchial wall thickening and areas of mucus impaction. This may be pneumonia and/or bronchitis.  3.  Coronary artery calcifications.  4.  Stable T12 compression deformity.   CT Abdomen Pelvis w Contrast    Narrative    EXAM: CT ABDOMEN PELVIS W CONTRAST  LOCATION: [facility]  DATE/TIME: 3/20/2022 5:22 PM    INDICATION: Left abdominal pain.  COMPARISON: CT 02/15/2022. PET/CT 07/25/2019  TECHNIQUE: CT scan of the abdomen and pelvis was performed following injection of IV contrast. Multiplanar reformats were obtained. Dose reduction techniques were used.  CONTRAST: Isovue 370    100ml    FINDINGS:   LOWER CHEST: Lung base scarring. Coronary atherosclerosis.    HEPATOBILIARY: Normal.    PANCREAS: Normal.    SPLEEN: Short-term stability 5 mm hypodense lesion.    ADRENAL GLANDS: Normal.    KIDNEYS/BLADDER: Left upper renal 2 mm nonobstructive stone.    BOWEL: No obstruction or inflammatory change.    LYMPH NODES: Normal.    VASCULATURE: There is high-grade stenosis in the proximal celiac with poststenotic dilatation. However, the 3 mesenteric vessels are patent, as are the renal arteries.    PELVIC ORGANS: Absent uterus.    MUSCULOSKELETAL: Left inguinal hernia containing small bowel.  Stable T12 and L4 vertebral body compression fractures. Severe spinal degenerative change.      Impression    IMPRESSION:   1.  No abdominal pain etiology found.  2.  Left inguinal hernia containing small bowel without obstruction.  3.  Left renal 2 mm nonobstructive stone.   Lumbar spine CT w/o contrast    Narrative    EXAM: CT LUMBAR SPINE W/O CONTRAST  LOCATION: [facility]  DATE/TIME: 3/20/2022 5:21 PM    INDICATION: Low back pain, no red flags.  COMPARISON: Lumbar spine radiographs 09/03/2021.  TECHNIQUE: Routine CT Lumbar Spine without  IV contrast. Multiplanar reformats. Dose reduction techniques were used.     FINDINGS:  VERTEBRA: Nomenclature is based on 5 lumbar vertebral bodies. Diffuse osseous demineralization. New recent-appearing compression fracture of L2 primarily involving the inferior endplate with mild to moderate height loss and mild retropulsion of the   posterior inferior endplate fracture fragment along the ventral aspect of the spinal canal. Unchanged severe T12 compression fracture. Unchanged moderate to severe L4 compression fracture. Unchanged mild to moderate focal kyphosis centered at L1. Mild   grade I anterolisthesis of L4 on L5 appears unchanged. Mild dextroconvex curvature of the upper lumbar spine and mild levoconvex curvature of the lower lumbar spine.    CANAL/FORAMINA: There is mild to moderate disc height loss, most pronounced at T11-T12 and T12-L1 and seen to a lesser degree elsewhere. Scattered posterior endplate spurring and mild to moderate multilevel degenerative facet disease. Mild to moderate   spinal canal stenosis is again noted at the T11-T12 level related to retropulsion of the posterior superior endplate of T12 into the ventral aspect of the spinal canal, with mild flattening of the normal cord contour at that level. There is again severe   spinal canal stenosis at L4-L5 in the setting of a diffuse disc osteophyte complex with facet arthropathy and ligamentum flavum thickening. There is at least moderate versus moderate to severe spinal canal stenosis at L3-L4, as before. There is mild to   moderate spinal canal stenosis at L2-L3. There is moderate to severe right L4-L5 neural foraminal stenosis, as before. There appears to be moderate bilateral L3-L4 and left L4-L5 neural foraminal stenosis with lesser degrees of neural foraminal narrowing   elsewhere.    PARASPINAL: Bilateral sacroiliac joint degenerative changes are noted. Diffuse atherosclerotic calcifications involving the abdominal aorta and iliac  arteries. Please see separate reports from CT angiogram of the chest and CT abdomen and pelvis of same   day for additional details regarding extraspinal findings.      Impression    IMPRESSION:  1.  New probable recent L2 inferior endplate fracture with mild retropulsion of the posterior inferior fracture fragment along the ventral aspect of the spinal canal, contributing to mild to moderate spinal canal stenosis at the level of L2-L3.  2.  Unchanged chronic T12 and L4 compression fractures with moderate to severe/severe height loss.  3.  No significant change in alignment. Multilevel degenerative changes, as described.  4.  Unchanged severe spinal canal stenosis at L4-L5 and moderate or moderate to severe spinal canal stenosis at L3-L4. Varying degrees of multilevel neural foraminal stenosis, as described.   XR Lumbar Spine 2/3 Views    Narrative    EXAM: XR LUMBAR SPINE 2-3 VIEWS  LOCATION: [facility]  DATE/TIME: 3/21/2022 5:08 PM    INDICATION: L2 fracture.  COMPARISON: CT 03/20/2022  TECHNIQUE: CR Lumbar Spine.      Impression    IMPRESSION: Recent L2 vertebral body fracture with mild height loss, not appreciably changed. Chronic appearing fractures involving the T12 and L4 vertebral bodies, not appreciably changed. No new fractures are identified. Multilevel degenerative change.   Scoliosis. Vascular calcifications. Sacrum is partially obscured by bowel gas. Left lung basilar opacities.       Discharge Medications   Discharge Medication List as of 3/22/2022  2:08 PM      START taking these medications    Details   doxycycline hyclate (VIBRAMYCIN) 100 MG capsule Take 1 capsule (100 mg) by mouth 2 times daily for 5 days, Disp-10 capsule, R-0, E-Prescribe         CONTINUE these medications which have NOT CHANGED    Details   acetaminophen (TYLENOL) 500 MG tablet Take 1-2 tablets (500-1,000 mg) by mouth every 8 hours as needed for pain, Disp-90 tablet, R-1, E-Prescribe      calcium  carbonate (TUMS) 500 MG chewable tablet Take 2 chew tab by mouth every 2 hours as needed , R-3, Historical      ferrous sulfate (FEROSUL) 325 (65 Fe) MG tablet Take 325 mg by mouth 2 times daily, Historical      furosemide (LASIX) 20 MG tablet Take 20 mg by mouth daily, Historical      levothyroxine (SYNTHROID/LEVOTHROID) 50 MCG tablet Take 50 mcg by mouth every morning , Historical      LORazepam (ATIVAN) 0.5 MG tablet Take 0.5 mg by mouth every 6 hours as needed , R-0, Historical      metoprolol succinate ER (TOPROL-XL) 50 MG 24 hr tablet Take 50 mg by mouth 2 times daily , Historical      multivitamin w/minerals (MULTI-VITAMIN) tablet Take 1 tablet by mouth daily, Historical      pantoprazole (PROTONIX) 40 MG EC tablet Take 40 mg by mouth daily , Historical      potassium chloride ER (KLOR-CON M) 10 MEQ CR tablet Take 10 mEq by mouth daily, Historical      sucralfate (CARAFATE) 1 GM tablet Take 1 g by mouth 4 times daily, Historical      Alcohol Swabs (B-D SINGLE USE SWABS REGULAR) PADS USE UTD, R-0, Historical      blood glucose monitoring (ONETOUCH VERIO) meter device kit 2 times dailyR-0Historical      ONETOUCH VERIO IQ test strip 2 times daily, R-2, GAYLE, Historical         STOP taking these medications       sodium chloride 1 GM tablet Comments:   Reason for Stopping:             Allergies   Allergies   Allergen Reactions     Unknown [No Clinical Screening - See Comments]

## 2022-03-22 NOTE — CONSULTS
CLINICAL NUTRITION SERVICES - ASSESSMENT NOTE     Nutrition Prescription    RECOMMENDATIONS FOR MDs/PROVIDERS TO ORDER:      Malnutrition Status:    Moderate in chronic illness    Recommendations already ordered by Registered Dietitian (RD):  MVI as pt intake <75% estimated needs    Future/Additional Recommendations:  Intake/wt.     REASON FOR ASSESSMENT  Chris Bell is a/an 86 year old female assessed by the dietitian for Provider Order - concern for malnutrition    Pt hx hypothyroid, compression Fxs, CHF.    NUTRITION HISTORY  Pt lives with family.  Pt's daughter-in-law is visiting and assists with interpreting.  Family bring in food for patient.  Pt's daughter-in-law has written menu choices that the patient will eat on the white board.   At home pt eats rice, vegetables, quail, drinks water and broth.  Pt with decreased po intake for >1 month.   Pt has not lost wt though edema may mask wt loss.      CURRENT NUTRITION ORDERS  Diet: 2 g Sodium and 2000 mL Fluid Restriction  Intake/Tolerance: no intake recorded.  Pt has ordered one meal since admission, family bringing in other food.  Pt's daughter-in-law reports pt not eating much.    LABS  Labs reviewed    MEDICATIONS    azithromycin  500 mg Intravenous Q24H     cefTRIAXone  1 g Intravenous Q24H     furosemide  20 mg Oral Daily     heparin ANTICOAGULANT  5,000 Units Subcutaneous Q12H     levothyroxine  50 mcg Oral QAM     lidocaine  1 patch Transdermal Q24h    And     lidocaine   Transdermal Q8H     metoprolol succinate ER  50 mg Oral BID     pantoprazole  40 mg Oral QAM AC     potassium chloride  10 mEq Oral or Feeding Tube Once     sodium chloride (PF)  3 mL Intracatheter Q8H     sucralfate  1 g Oral 4x Daily         acetaminophen, bisacodyl, calcium carbonate, famotidine, lidocaine 4%, lidocaine (buffered or not buffered), LORazepam, melatonin, ondansetron **OR** ondansetron, polyethylene glycol, sodium chloride (PF)   Medications  "reviewed    ANTHROPOMETRICS  Height: 149.9 cm (4' 11\")  Most Recent Weight: 54.4 kg (120 lb)      BMI: Normal BMI  Weight History:   Wt Readings from Last 8 Encounters:   03/20/22 54.4 kg (120 lb)   02/15/22 52 kg (114 lb 10.9 oz)   08/18/21 53.1 kg (117 lb)   11/26/19 48.6 kg (107 lb 1.6 oz)   10/15/19 48.7 kg (107 lb 4.8 oz)   08/22/19 49.5 kg (109 lb 3.2 oz)   07/19/19 49.3 kg (108 lb 11.2 oz)   01/22/19 52.8 kg (116 lb 6.4 oz)     Dosing Weight: 54.4 kg    ASSESSED NUTRITION NEEDS  Estimated Energy Needs: 1360+ kcals/day (25+)  Justification: Maintenance  Estimated Protein Needs: 54+ grams protein/day (1+)  Justification: Increased needs  Estimated Fluid Needs: 1360 mL/day (or per MD)   Justification: On a fluid restriction    PHYSICAL FINDINGS  See malnutrition section below.     MALNUTRITION:  % Weight Loss:  None noted. Edema may mask loss of lean body mass.  % Intake:  <75% for >30 days (moderate malnutrition)  Subcutaneous Fat Loss:  Orbital region mild depletion and Upper arm region mild depletion  Muscle Loss:  Acromion bone region mild to moderate depletion  Fluid Retention:  Mild 2+ karen LE    Malnutrition Diagnosis: Moderate malnutrition  In Context of:  Chronic illness or disease    NUTRITION DIAGNOSIS  Malnutrition related to chronic illness as evidenced by decreased intake, loss of lean body mass, edema      INTERVENTIONS  Implementation  Nutrition Education: we discussed protein foods for healing, wt maintenance   Medical food supplement therapy-pt declines.   Start MVI as intake is <75% estimated needs.    Goals  Patient to consume % of nutritionally adequate meals three times per day, or the equivalent with supplements/snacks.     Monitoring/Evaluation  Progress toward goals will be monitored and evaluated per protocol.    "

## 2022-03-22 NOTE — PLAN OF CARE
Problem: Mood Impairment (Anxiety Signs/Symptoms)  Goal: Improved Mood Symptoms (Anxiety Signs/Symptoms)  Outcome: Ongoing, Progressing  Flowsheets (Taken 3/22/2022 0507)  Mutually Determined Action Steps (Improved Mood Symptoms): adheres to medication regimen     Problem: Sleep Impairment (Anxiety Signs/Symptoms)  Goal: Improved Sleep (Anxiety Signs/Symptoms)  Outcome: Ongoing, Progressing  Flowsheets (Taken 3/22/2022 0507)  Mutually Determined Action Steps (Improved Sleep): uses relaxation techniques     Problem: Anxiety  Goal: Anxiety Reduction or Resolution  Outcome: Ongoing, Progressing   Goal Outcome Evaluation:      Pt restless, unable to sleep, has been anxious. Interpreters are not able to understand her either, Dtr as well was here and was all in vain. PRN Ativan given and helped for a few hours, pt snoozed and then was up

## 2022-03-22 NOTE — PLAN OF CARE
Problem: Plan of Care - These are the overarching goals to be used throughout the patient stay.    Goal: Optimal Comfort and Wellbeing  Outcome: Ongoing, Progressing  Intervention: Monitor Pain and Promote Comfort  Recent Flowsheet Documentation  Taken 3/22/2022 1100 by Rajeev Peter RN  Pain Management Interventions: medication (see MAR)     Problem: Anxiety  Goal: Anxiety Reduction or Resolution  Outcome: Ongoing, Progressing   Goal Outcome Evaluation:    Pt was very anxious.  Talking non-stop.  Gave prn Ativan.  Pt was up to the bed side commode x 3 for loose stool.  TLSO brace on when out of bed.  Pt took meds crush with warm applesauce. Pt stated that she lives alone,  daughter and daughter in law are her PCA.  Pt has order for discharge.  Called daughter in law.  She will be coming to  pt.

## 2022-03-22 NOTE — PROGRESS NOTES
Care Management Discharge Note    Discharge Date: 03/22/2022       Discharge Disposition: Home    Discharge Services:  Home Care (home PT)    Discharge DME:      Discharge Transportation: family or friend will provide    Private pay costs discussed: Not applicable    PAS Confirmation Code:    Patient/family educated on Medicare website which has current facility and service quality ratings:      Education Provided on the Discharge Plan:    Persons Notified of Discharge Plans: MD, , Home Care Agency, family  Patient/Family in Agreement with the Plan: yes    Handoff Referral Completed: Yes    Additional Information:      Plan for pt to return home to apartment with resumption of PCA services from daughters. Accepted with [facility] for home PT ([facility] has faxed orders to HC agency already). Family transport.         LANETTE FrySW

## 2022-03-22 NOTE — PROGRESS NOTES
2:15 PM - Family C left VM that they received referral and are able to accept pt. Staff has connected with pt/family and will see pt tomorrow at 11 AM. [facility] faxed orders to [phone].    [facility] met pt in room - no family present, no . Will re-approach later. [facility] called daughter May to discuss interest in home PT. Daughter reports that her LAMONT Tita Chapa is present at hospital since it's her take to be with patient. Tita Chapa will be able to drive patient home once [facility] verifies that pt wants home PT.     Referral sent to [facility] for home PT referral screening. Possible availability on Fri 3/25.

## 2022-03-22 NOTE — PROGRESS NOTES
Neurosurgery Treatment Plan:   Reviewed patients chart and spoke with nurse   Patient with continued back pain but feels slight improvement with brace on able to ambulate with less assistance today and currently sitting in chair with brace in place.      Images:   Xray reviewed with stable appearance of L2 fracture and unchanged chronic appearance of T12 and L4 fractures      Plan:   Spoke to son yesterday and attempted to call daughter today but was told incorrect number  Plan for conservative treatment with TLSO brace   Will sign off and arrange outpatient follow up in 2 weeks   Please re-consult if indicated      Opal Hernandez PA-C  [facility] Neurosurgery  O: [phone]

## 2022-03-22 NOTE — PLAN OF CARE
Physical Therapy Discharge Summary    Reason for therapy discharge:    Discharged to home with home therapy.    Progress towards therapy goal(s). [facility] for goal details.  Goals not met.  Barriers to achieving goals:   discharge from facility.    Therapy recommendation(s):    Continued therapy is recommended.  Rationale/Recommendations:  PT goals not met.    Goal Outcome Evaluation:

## 2022-03-22 NOTE — TELEPHONE ENCOUNTER
PATIENT NAME:  Chris Bell  YOB: 1935  MRN: [id number]  SURGEON: Dr. Ayala  DATE of CONSULT: 03/21/2022  Consult for L2 fracture    FOLLOW-UP PLAN:    Hospital Follow Up Visit: 2 weeks  Provider: LISANDRO on Dr. Ayala clinic day    DIAGNOSTICS: XR  DISPOSITION: pending    ADDITIONAL INSTRUCTIONS FOR MEDICAL STAFF:    Jennifer Monk RN, CNRN

## 2022-03-22 NOTE — PROGRESS NOTES
03/22/22 1030   Quick Adds   Type of Visit Initial PT Evaluation       Present yes  (language line )   Language Hmong    Living Environment   Current Living Arrangements apartment   Home Accessibility no concerns  (per pt no stairs )   General Information   Onset of Illness/Injury or Date of Surgery 03/20/22   Referring Physician Dr. Becki Crain    Patient/Family Therapy Goals Statement (PT) wants to go home today   Pertinent History of Current Problem (include personal factors and/or comorbidities that impact the POC) admit pneumonia/ L2 compression fx   Existing Precautions/Restrictions brace worn when out of bed;spinal  (TLSO OOB or hob>20 degrees)   General Observations pt up in recliner with brace on    Cognition   Orientation Status (Cognition) oriented x 3   Pain Assessment   Patient Currently in Pain No  (until bed mobility)   Range of Motion (ROM)   ROM Comment BLEs WFL    Strength (Manual Muscle Testing)   Strength Comments BLEs WFL    Bed Mobility   Bed Mobility Limitations decreased ability to use legs for bridging/pushing   Impairments Contributing to Impaired Bed Mobility pain   Comment, (Bed Mobility) teaching log rolling pt declined, mod/maxAx1 LEs into bed    Transfers   Comment, (Transfers) mod I with FWW    Gait/Stairs (Locomotion)   Trimble Level (Gait) modified independence   Assistive Device (Gait) walker, front-wheeled   Distance in Feet (Required for LE Total Joints) 100'   Pattern (Gait) step-through   Comment, (Gait/Stairs) slow steady pace, no LOB   Clinical Impression   Criteria for Skilled Therapeutic Intervention Yes, treatment indicated   PT Diagnosis (PT) decreased mobility   Influenced by the following impairments pain    Functional limitations due to impairments decreased bed mobility    Clinical Presentation (PT Evaluation Complexity) Stable/Uncomplicated   Clinical Presentation Rationale per medical diagnosis   Clinical Decision Making  (Complexity) low complexity   Planned Therapy Interventions (PT) bed mobility training;gait training   Anticipated Equipment Needs at Discharge (PT) walker, rolling  (pt has at home )   Risk & Benefits of therapy have been explained evaluation/treatment results reviewed   PT Discharge Planning   PT Discharge Recommendation (DC Rec) home with assist;home with home care physical therapy   PT Rationale for DC Rec pt mod I with FWW for transfer and gait , mod A for bed mobility    Total Evaluation Time   Total Evaluation Time (Minutes) 12   Physical Therapy Goals   PT Frequency Daily   PT Predicated Duration/Target Date for Goal Attainment 03/26/22   PT Goals Bed Mobility;Transfers;Gait   PT: Bed Mobility Minimal assist;Rolling;Supine to/from sit  (using log rolling technique)   PT: Transfers Modified independent;Assistive device;Completed   PT: Gait Modified independent;Greater than 200 feet;Rolling walker

## 2022-03-23 NOTE — PROGRESS NOTES
Clinic Care Coordination Contact  [facility]       Clinical Data: Care Coordinator Outreach  Outreach attempted x 1. Hmong  called Mobile phone which is son's number who stated he doesn't live with his Mother but he knows she was still having pain for PT was out at her place today and didn't think his Mother had any questions or concerns about discharge but if she did I given son my direct number.   Care Coordinator will do no further outreaches at this time.      Juanis Momin  [phone]  Care

## 2022-03-23 NOTE — PROGRESS NOTES
agency (Phone: [phone], Fax: [phone]) called to say they didn't get discharge orders. [facility] Refaxed orders.

## 2022-04-06 NOTE — PATIENT INSTRUCTIONS
Patient has been advised to continue to wear her brace at all times when out of bed. She has been advised to ambulate as much as tolerated. No lifting greater than 5 pounds. Avoid NSAIDs. She has also been advised to follow up with her PCP for continues lower extremity swelling and possible treatment of osteoporosis as noted on DEXA from August 2021. Follow up has been requested in 6 weeks with repeat xray and she understands that should any symptoms worsen to contact the office sooner.

## 2022-04-06 NOTE — PROGRESS NOTES
Neurosurgery Progress Note: 4/6/2022     A/P:   L2 endplate compression fracture noted on 3/21/2022   Chronic T12 and L4 fractures   Lumbar spinal stenosis     Plan:  Patient has been advised to continue to wear her brace at all times when out of bed. She has been advised to ambulate as much as tolerated. No lifting greater than 5 pounds. Avoid NSAIDs. She has also been advised to follow up with her PCP for continues lower extremity swelling and possible treatment of osteoporosis as noted on DEXA from August 2021. Follow up has been requested in 6 weeks with repeat xray and she understands that should any symptoms worsen to contact the office sooner.     Ms. Bell is a pleasant 86 year old right handed female who presented with her son who aided on interpretation. She states that overall she is doing well. She notes continued lower extremity heaviness and neuro claudication. She denies any back pain or radicular lower extremity pain or numbness. She has bilateral lower extremity distal swelling that she states is present all day and has been there for over two weeks. She continues to wear her TLSO and her son states that she will ambulate short distances. She denies any changes in her urinary or bowel habits.   Had discussion with son that once her fracture is healed we could further discuss treatment options for her lumbar spinal stenosis that could include surgical decompression but both patient and son state that they would not want to proceed with surgery. Could have referral to spine clinic and physical therapy for further conservative treatment.       HPI:   Chris Bell is a 86 year old female who presents with complaint of chronic low back pain, states chronic bilateral lower extremity pain with ambulation notes that her legs are heavy and has to take a break to ambulate further. She denies any injuries or falls recently. She states that her back and leg pain has been present for years and remains unchanged. She  "states that she presented to the ED just because her pain has not been improving. She is a patient at the spine clinic over the past year and has been treated conservatively. She denies any changes in her bladder or bowel habits. She has history of osteoporosis but is unable to tell if she is currently being treated. On lumbar CT noted stable appearance of T12 and L4 chronic fractures with acute L2 endplate fracture noted.       Exam:  /70   Pulse 68   Ht 1.499 m (4' 11\")   Wt 54.4 kg (120 lb)   SpO2 96%   BMI 24.24 kg/m      General: alert and oriented x3     Strength is 5/5 throughout both lower extremities throughout.    Sensation is intact throughout both upper and lower extremities      Imaging:  Xray reviewed personally and noted slight increase in compression deformity without change in mild retropulsion     Opal Hernandez PA-C  [facility]  O: [phone]  "

## 2022-04-06 NOTE — LETTER
4/6/2022         RE: Chris Bell  [address]  [address]  [place] [address]        Dear Colleague,    Thank you for referring your patient, Chris Bell, to the [facility]. Please see a copy of my visit note below.    Neurosurgery Progress Note: 4/6/2022     A/P:   L2 endplate compression fracture noted on 3/21/2022   Chronic T12 and L4 fractures   Lumbar spinal stenosis     Plan:  Patient has been advised to continue to wear her brace at all times when out of bed. She has been advised to ambulate as much as tolerated. No lifting greater than 5 pounds. Avoid NSAIDs. She has also been advised to follow up with her PCP for continues lower extremity swelling and possible treatment of osteoporosis as noted on DEXA from August 2021. Follow up has been requested in 6 weeks with repeat xray and she understands that should any symptoms worsen to contact the office sooner.     Ms. Bell is a pleasant 86 year old right handed female who presented with her son who aided on interpretation. She states that overall she is doing well. She notes continued lower extremity heaviness and neuro claudication. She denies any back pain or radicular lower extremity pain or numbness. She has bilateral lower extremity distal swelling that she states is present all day and has been there for over two weeks. She continues to wear her TLSO and her son states that she will ambulate short distances. She denies any changes in her urinary or bowel habits.   Had discussion with son that once her fracture is healed we could further discuss treatment options for her lumbar spinal stenosis that could include surgical decompression but both patient and son state that they would not want to proceed with surgery. Could have referral to spine clinic and physical therapy for further conservative treatment.       HPI:   Chris Bell is a 86 year old female who presents with complaint of chronic low back pain, states chronic  "bilateral lower extremity pain with ambulation notes that her legs are heavy and has to take a break to ambulate further. She denies any injuries or falls recently. She states that her back and leg pain has been present for years and remains unchanged. She states that she presented to the ED just because her pain has not been improving. She is a patient at the spine clinic over the past year and has been treated conservatively. She denies any changes in her bladder or bowel habits. She has history of osteoporosis but is unable to tell if she is currently being treated. On lumbar CT noted stable appearance of T12 and L4 chronic fractures with acute L2 endplate fracture noted.       Exam:  /70   Pulse 68   Ht 1.499 m (4' 11\")   Wt 54.4 kg (120 lb)   SpO2 96%   BMI 24.24 kg/m      General: alert and oriented x3     Strength is 5/5 throughout both lower extremities throughout.    Sensation is intact throughout both upper and lower extremities      Imaging:  Xray reviewed personally and noted slight increase in compression deformity without change in mild retropulsion     Opal Hernandez PA-C  [facility]  O: [phone]      Again, thank you for allowing me to participate in the care of your patient.        Sincerely,        Opal Hernandez PA-C    "

## 2022-04-13 PROBLEM — E87.1 HYPONATREMIA: Status: ACTIVE | Noted: 2022-01-01

## 2022-04-13 PROBLEM — R06.02 SHORTNESS OF BREATH: Status: ACTIVE | Noted: 2022-01-01

## 2022-04-13 PROBLEM — J18.9 PNEUMONIA OF LEFT LOWER LOBE DUE TO INFECTIOUS ORGANISM: Status: ACTIVE | Noted: 2022-01-01

## 2022-04-13 PROBLEM — N17.9 ACUTE KIDNEY INJURY (H): Status: ACTIVE | Noted: 2022-01-01

## 2022-04-13 PROBLEM — R06.01 ORTHOPNEA: Status: ACTIVE | Noted: 2022-01-01

## 2022-04-13 NOTE — PLAN OF CARE
Goal Outcome Evaluation:    Plan of Care Reviewed With: patient, family     Overall Patient Progress: improving    Outcome Evaluation: Titrated off of oxygen. Saturations >92% on room air. Patient denies shortness of breath.    Patient did have multiple soft stools. Two of which were light maroon in color. Updated Dr. Crain.     Mg+ 1.2. magnesium protocol in place.     /79 (BP Location: Right arm)   Pulse 81   Temp 97.4  F (36.3  C) (Oral)   Resp 20   Wt 54.9 kg (121 lb 1.6 oz)   SpO2 100%   BMI 24.46 kg/m

## 2022-04-13 NOTE — ED PROVIDER NOTES
NAME: Chris Bell  AGE: 86 year old female  YOB: 1935  MRN: [id number]  EVALUATION DATE & TIME: 2022 12:30 AM    PCP: Kizzy Villanueva    ED PROVIDER: Juan Waller M.D.      Chief Complaint   Patient presents with     Shortness of Breath     FINAL IMPRESSION:  1. Shortness of breath    2. Orthopnea    3. Hypokalemia    4. Pneumonia of left lower lobe due to infectious organism    5. Hyponatremia    6. Acute kidney injury (H)      MEDICAL DECISION MAKIN:33 AM I met with the patient, obtained history, performed an initial exam, and discussed options and plan for diagnostics and treatment here in the ED.   Patient was clinically assessed and consented to treatment. After assessment, medical decision making and workup were discussed with the patient. The patient was agreeable to plan for testing, workup, and treatment.  Pertinent Labs & Imaging studies reviewed. (See chart for details)    1:45 AM I rechecked and updated the patient.    2:01 AM I discussed the case with hospitalist, Dr Alonso, who accepts the patient.      Chris Bell is a 86 year old female who presents with shortness of breath.   Differential diagnosis includes but not limited to CHF exacerbation, acute coronary syndrome, COPD exacerbation, pneumonia, hypoxemia, pulmonary embolism.  Patient presented with shortness of breath and has bilateral crackles consistent with possible pulmonary edema.  Patient does have history of pulmonary edema in the past and likely component of congestive heart failure.  Oxygenation is 100% and vital signs stable at this time with no tachycardia regular rhythm.  EKG was unremarkable for any ischemia.  Patient is afebrile though could be repeated or new infection triggering the CHF.  Patient also has some bilateral lower extremity edema.  Will consider Lasix but awaiting imaging.  Labs returned showing white blood cell count of 20,000 which would be more concerning for infectious process.   Lactic acid, procalcitonin, and blood cultures to be obtained.  Patient will be started on Zosyn given patient was just on hospital could have hospital-acquired pneumonia.  Metabolic panel showed hyponatremia, hypokalemia and slight kidney injury which could be related to the Lasix or possibly dehydration with intravascular volume depletion but still with pulmonary and peripheral edema.  Patient was given a small dose of IV fluids to help with kidney function and reassessment of pulmonary edema as chest x-ray does not show traumatic pulmonary edema just some interstitial prominence.  There is a possibility of a small left-sided pneumonia which could be related to the white blood cell count however will better imaged with CT scan of the chest.  Given the kidney function will do a noncontrast CT scan of the chest.  Antibiotics already started and troponin returned negative.  BNP slightly elevated which would be more consistent with CHF exacerbation however again white blood cell count does not correlate with this.  Awaiting CT scan of the chest and patient discussed with hospitalist, Dr. Alonso for inpatient consultation while being placed on the transfer list as there are no beds presently at [facility].  Unfortunately there are no beds within the [facility] system or outside system such as [facility] or [facility] at this time.  Patient will remain in the ER and hospitalist consultation will be placed for continued help with management while trying to obtain a bed or boarding her in the ER for eventual bed at [facility].  CT scan of the chest did show the left lower lobe infiltrate likely and will continue antibiotic treatment of this as well as plan for admission.    0 minutes of critical care time    MEDICATIONS GIVEN IN THE EMERGENCY:  Medications   ipratropium - albuterol 0.5 mg/2.5 mg/3 mL (DUONEB) neb solution 3 mL (3 mLs Nebulization Given 4/13/22 0239)   0.9% sodium chloride BOLUS (0 mLs  Intravenous Stopped 4/13/22 0230)   potassium chloride 10 mEq in 100 mL sterile water intermittent infusion (premix) (0 mEq Intravenous Stopped 4/13/22 0417)   potassium chloride ER (KLOR-CON M) CR tablet 40 mEq (40 mEq Oral Given 4/13/22 0302)   piperacillin-tazobactam (ZOSYN) 3.375 g vial to attach to  mL bag (0 g Intravenous Stopped 4/13/22 0313)       NEW PRESCRIPTIONS STARTED AT TODAY'S ER VISIT:  New Prescriptions    No medications on file       =================================================================    HPI    Patient information was obtained from: The patient and nursing staff    Use of : Yes (Vocera: Language Line)  Language: Sukhjinder Bell is a 86 year old female with a past medical history of hypertension, anemia, and pneumonia, who presents to the ED via EMS for evaluation of shortness of breath.    The patient reports that ~2 months ago she had pneumonia and finished a course of antibiotics ~1 month ago that improved her symptoms. However, recently her symptoms have returned and she currently endorses shortness of breath and a cough. Additionally, she states that she has fluid in her legs and tingling to her extremities that cause difficulty sleeping. She takes a diuretic however she does not believe it is working. Of note, the patient reports having chronic back pain. The patient denies fever, chest pain, and any other symptoms or complaints at this time.   Per nursing staff, the patient is satting to the upper 90s on room air. EMS states her symptoms are worse when lying down.    REVIEW OF SYSTEMS   Review of Systems   Constitutional: Negative for fever.   Respiratory: Positive for cough and shortness of breath.    Cardiovascular: Positive for leg swelling. Negative for chest pain.   Musculoskeletal: Positive for back pain (chronic).   Neurological:        Positive for tingling   All other systems reviewed and are negative.     PAST MEDICAL HISTORY:  Past Medical  History:   Diagnosis Date     Anemia      Depression      Disease of thyroid gland      HTN (hypertension)      Osteoporosis      PUD (peptic ulcer disease)        PAST SURGICAL HISTORY:  Past Surgical History:   Procedure Laterality Date     ESOPHAGOSCOPY, GASTROSCOPY, DUODENOSCOPY (EGD), COMBINED N/A 1/17/2018    Procedure: ESOPHAGOGASTRODUODENOSCOPY (EGD) with h.pylori and gastric ulcer biopsies;  Surgeon: Colin Alvarez MD;  [facility];  Service:      HYSTERECTOMY       US BIOPSY FINE NEEDLE ASPIRATION LYMPH NODE  8/14/2019       CURRENT MEDICATIONS:    No current facility-administered medications for this encounter.    Current Outpatient Medications:      acetaminophen (TYLENOL) 500 MG tablet, Take 1-2 tablets (500-1,000 mg) by mouth every 8 hours as needed for pain, Disp: 90 tablet, Rfl: 1     Alcohol Swabs (B-D SINGLE USE SWABS REGULAR) PADS, USE UTD, Disp: , Rfl: 0     blood glucose monitoring (ONETOUCH VERIO) meter device kit, 2 times daily, Disp: , Rfl: 0     calcium carbonate (TUMS) 500 MG chewable tablet, Take 2 chew tab by mouth every 2 hours as needed , Disp: , Rfl: 3     ferrous sulfate (FEROSUL) 325 (65 Fe) MG tablet, Take 325 mg by mouth 2 times daily, Disp: , Rfl:      furosemide (LASIX) 20 MG tablet, Take 20 mg by mouth daily, Disp: , Rfl:      levothyroxine (SYNTHROID/LEVOTHROID) 50 MCG tablet, Take 50 mcg by mouth every morning , Disp: , Rfl:      LORazepam (ATIVAN) 0.5 MG tablet, Take 0.5 mg by mouth every 6 hours as needed , Disp: , Rfl: 0     metoprolol succinate ER (TOPROL-XL) 50 MG 24 hr tablet, Take 50 mg by mouth 2 times daily , Disp: , Rfl:      multivitamin w/minerals (MULTI-VITAMIN) tablet, Take 1 tablet by mouth daily, Disp: , Rfl:      ONETOUCH VERIO IQ test strip, 2 times daily, Disp: , Rfl: 2     pantoprazole (PROTONIX) 40 MG EC tablet, Take 40 mg by mouth daily , Disp: , Rfl:      potassium chloride ER (KLOR-CON M) 10 MEQ CR tablet, Take 10 mEq by mouth daily,  Disp: , Rfl:      sucralfate (CARAFATE) 1 GM tablet, Take 1 g by mouth 4 times daily, Disp: , Rfl:     ALLERGIES:  Allergies   Allergen Reactions     Unknown [No Clinical Screening - See Comments]        FAMILY HISTORY:  Family History   Problem Relation Age of Onset     Diabetes No family hx of      Cancer No family hx of      Heart Disease No family hx of        SOCIAL HISTORY:   Social History     Socioeconomic History     Marital status:    Tobacco Use     Smoking status: Never Smoker     Smokeless tobacco: Never Used   Substance and Sexual Activity     Alcohol use: No     Drug use: No     Sexual activity: Never       PHYSICAL EXAM:    Vitals: BP 94/55   Pulse 79   Temp 98.5  F (36.9  C)   Resp 24   SpO2 100%    Physical Exam  Vitals and nursing note reviewed.   Constitutional:       General: She is not in acute distress.     Appearance: She is well-developed and normal weight. She is not ill-appearing or toxic-appearing.   HENT:      Head: Normocephalic.      Mouth/Throat:      Pharynx: Oropharynx is clear.   Neck:      Vascular: No JVD.   Cardiovascular:      Rate and Rhythm: Normal rate and regular rhythm.      Pulses: Normal pulses.      Heart sounds: Normal heart sounds.   Pulmonary:      Effort: Accessory muscle usage present. No tachypnea, bradypnea or respiratory distress.      Breath sounds: No stridor. Examination of the right-lower field reveals decreased breath sounds and rales. Examination of the left-lower field reveals decreased breath sounds and rales. Decreased breath sounds and rales present. No wheezing or rhonchi.   Chest:      Chest wall: No tenderness.   Abdominal:      Palpations: Abdomen is soft.      Tenderness: There is no abdominal tenderness. There is no guarding or rebound.   Musculoskeletal:      Cervical back: Normal range of motion.      Right lower leg: No tenderness. Edema (Trace bilateral lower extremity edema) present.      Left lower leg: No tenderness. Edema  present.   Skin:     General: Skin is warm and dry.      Coloration: Skin is not cyanotic or pale.   Neurological:      General: No focal deficit present.      Mental Status: She is alert.   Psychiatric:         Behavior: Behavior normal.        LAB:  All pertinent labs reviewed and interpreted.  Labs Ordered and Resulted from Time of ED Arrival to Time of ED Departure   BASIC METABOLIC PANEL - Abnormal       Result Value    Sodium 128 (*)     Potassium 3.1 (*)     Chloride 98      Carbon Dioxide (CO2) 18 (*)     Anion Gap 12      Urea Nitrogen 15      Creatinine 1.21 (*)     Calcium 8.3 (*)     Glucose 93      GFR Estimate 43 (*)    B-TYPE NATRIURETIC PEPTIDE ([facility] ONLY) - Abnormal     (*)    CBC WITH PLATELETS AND DIFFERENTIAL - Abnormal    WBC Count 28.8 (*)     RBC Count 2.51 (*)     Hemoglobin 8.3 (*)     Hematocrit 24.4 (*)     MCV 97      MCH 33.1 (*)     MCHC 34.0      RDW 15.5 (*)     Platelet Count 286     PROCALCITONIN - Abnormal    Procalcitonin 0.88 (*)    ROUTINE UA WITH MICROSCOPIC REFLEX TO CULTURE - Abnormal    Color Urine Light Yellow      Appearance Urine Clear      Glucose Urine Negative      Bilirubin Urine Negative      Ketones Urine Negative      Specific Gravity Urine 1.005      Blood Urine Negative      pH Urine 5.0      Protein Albumin Urine Negative      Urobilinogen Urine <2.0      Nitrite Urine Negative      Leukocyte Esterase Urine 250 Joanna/uL (*)     Bacteria Urine Few (*)     RBC Urine <1      WBC Urine 6 (*)     Squamous Epithelials Urine 1     DIFFERENTIAL - Abnormal    % Neutrophils 82      % Lymphocytes 4      % Monocytes 11      % Eosinophils 1      % Basophils 0      % Metamyelocytes 2      Absolute Neutrophils 23.6 (*)     Absolute Lymphocytes 1.2      Absolute Monocytes 3.2 (*)     Absolute Eosinophils 0.3      Absolute Basophils 0.0      Absolute Metamyelocytes 0.6 (*)     RBC Morphology Confirmed RBC Indices      Platelet Assessment        Value: Automated Count  Confirmed. Platelet morphology is normal.    Vanessa Cells Slight (*)     RBC Fragments Slight (*)    TROPONIN I - Normal    Troponin I 0.03     LACTIC ACID WHOLE BLOOD - Normal    Lactic Acid 0.9     INFLUENZA A/B & SARS-COV2 PCR MULTIPLEX - Normal    Influenza A PCR Negative      Influenza B PCR Negative      SARS CoV2 PCR Negative     SODIUM RANDOM URINE    Sodium Urine mmol/L <20     OSMOLALITY, RANDOM URINE   BLOOD CULTURE   BLOOD CULTURE   URINE CULTURE       RADIOLOGY:  Chest CT w/o contrast   Final Result   IMPRESSION:    1.  Significant motion artifact limits evaluation. Allowing for this there has been slight interval improvement in bilateral lower lung interstitial and alveolar opacities. There remains some mild underlying interstitial and alveolar infiltrate in the    left lower lobe with bronchial wall thickening compatible with residual changes of pneumonia.      2.  No pleural fluid.      3.  Evidence for old granulomatous disease.         XR Chest Port 1 View   Final Result   IMPRESSION: Minimal change. Mild cardiomegaly. Blunting at left costophrenic angle relates to mild pleural thickening. There is very slight diffuse interstitial prominence but no new focal pneumonia.        EKG:   Performed at: 12:55 AM on April 13, 2022.  Impression: Normal sinus rhythm with no signs of acute ST elevation ischemia, no arrhythmias, no ectopy.  Rate: 86 bpm  Rhythm: Normal sinus rhythm  QRS Interval: 88 ms  QTc Interval: 469 ms  Comparison: Comparison prior EKG from March 20, 2022 with no signs of acute ischemic or rhythm change.  I have independently reviewed and interpreted the EKG(s) documented above.     PROCEDURES:   Procedures     I, Td Springer, am serving as a scribe to document services personally performed by Dr. Juan Waller  based on my observation and the provider's statements to me. I, Juan Waller MD attest that Td Springer is acting in a scribe capacity, has observed my performance of  the services and has documented them in accordance with my direction.      Juan Waller M.D.  Emergency Medicine  [facility]     Juan Waller MD  04/13/22 0536

## 2022-04-13 NOTE — PHARMACY-ADMISSION MEDICATION HISTORY
Pharmacy Note - Admission Medication History    Pertinent Provider Information: Spoke with Tita (daughter-in-law, PCA: [phone]). Patient was prescribed alendronate but hasn't started taking it yet. Pantoprazole is not recommended due to increase risk of bone fractures. Consider using alternative therapy if appropriate. Also, patient only takes furosemide as needed because it makes her urinate too much. Provider is aware.    ______________________________________________________________________    Prior To Admission (PTA) med list completed and updated in EMR.       PTA Med List   Medication Sig Last Dose     acetaminophen (TYLENOL) 500 MG tablet Take 1-2 tablets (500-1,000 mg) by mouth every 8 hours as needed for pain Past Week at Unknown time     alendronate (FOSAMAX) 70 MG tablet Take 70 mg by mouth every 7 days Unknown at not started yet     calcium carbonate (TUMS) 500 MG chewable tablet Take 2 chew tab by mouth every 2 hours as needed  Past Month at Unknown time     ferrous sulfate (FEROSUL) 325 (65 Fe) MG tablet Take 325 mg by mouth 2 times daily 4/12/2022 at Unknown time     furosemide (LASIX) 20 MG tablet Take 20 mg by mouth daily as needed Past Week at Unknown time     levothyroxine (SYNTHROID/LEVOTHROID) 50 MCG tablet Take 50 mcg by mouth every morning  4/12/2022 at Unknown time     LORazepam (ATIVAN) 0.5 MG tablet Take 0.5 mg by mouth 3 times daily (before meals) 4/12/2022 at Unknown time     metoprolol succinate ER (TOPROL-XL) 50 MG 24 hr tablet Take 50 mg by mouth 2 times daily  4/12/2022 at Unknown time     multivitamin w/minerals (THERA-VIT-M) tablet Take 1 tablet by mouth daily 4/12/2022 at Unknown time     pantoprazole (PROTONIX) 40 MG EC tablet Take 40 mg by mouth daily  4/12/2022 at Unknown time     potassium chloride ER (KLOR-CON M) 10 MEQ CR tablet Take 10 mEq by mouth daily 4/12/2022 at Unknown time     sucralfate (CARAFATE) 1 GM tablet Take 1 g by mouth 3 times daily (before meals)  4/12/2022 at Unknown time       Information source(s): Caregiver and CareEverywhere/SureScripts  Method of interview communication: phone    Summary of Changes to PTA Med List  New: alendronate  Discontinued: tramadol, sodium chloride - last took in March, providers thought it might have been the cause of her feet swelling  Changed: furosemide daily to prn, lorazepam Q6H prn to TID with meals, sucralfate QID to TID    Patient was asked about OTC/herbal products specifically.  PTA med list reflects this.    In the past week, patient estimated taking medication this percent of the time:  greater than 90%.    Allergies were reviewed, assessed, and updated with the patient.      Patient does not use any multi-dose medications prior to admission.    The information provided in this note is only as accurate as the sources available at the time of the update(s).    Thank you for the opportunity to participate in the care of this patient.    Sue Servin [facility]  4/13/2022 11:00 AM

## 2022-04-13 NOTE — H&P
ADMISSION HISTORY & PHYSICAL      Kizzy Villanueva, [phone]  ASSESSMENT AND PLAN:  86 year old female with a history of PUD, L2 compression fracture, essential hypertension, anemia, GI bleed, heart failure, necrosis of soft palate, hypothyroidism, recent C. difficile, chronic diastolic CHF who presented with shortness of breath.      Acute hypoxic respiratory failure likely secondary to fluid overload and pneumonia   Acute on chronic diastolic heart failure.  EF of 55 to 60%.  Mild to moderate tricuspid regurg, small pericardial effusion   BNP elevated, troponin normal   IV Lasix.  Strict I's and O's.  Telemetry.  Daily electrolyte checks and replacement   Pneumonia: Procalcitonin elevated.  CT chest reviewed with residual changes of pneumonia noted.   Vancomycin and Zosyn   Follow-up blood cultures    Acute tongue pain-possibly thrush   History of necrosis of soft palate   Plan for nystatin swish and swallow.  Artificial saliva also ordered   No new medications.  No ACE or ARB's.  Check C1 esterase, C1q and C4.  No evidence for respiratory compromise currently.  Patient denies swelling to lips.    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  Fluid restrict.   Was on salt tabs previously    Hypokalemia-replace per protocol    Leukocytosis-significantly elevated   Likely related to pneumonia    Chronic anemia-no acute signs of bleeding   Appears near baseline.  Continue iron supplements    Abnormal urinalysis   Already on antibiotics for pneumonia.  Follow-up urine culture.    Subacute L2 compression fracture   Was evaluated by neurosurgery and given TLSO brace.  Requested son bring this brace back in to work with PT.    Known history of PUD and GI bleed   Continue home PPI and Carafate    Hypothyroidism-continue home levothyroxine    Hypertension-continue home  metoprolol    Recent C. difficile   Monitor for recurrence    Code Status: DNR. OK for prearrest intubation.  Discussed with son Shiv over the phone.  Had attempted to address this through  with patient but had difficulty discussing.  DVT prophylaxis: Lovenox    Disposition:  -Anticipated Length of Stay in midnights and medical necessity (including a midnight in the Emergency Department after triage if applicable): 2 midnights  -Discharge barriers: Improvement in respiratory status      CHIEF COMPLAINT:  Shortness of breath    HISTORY OF PRESENTING ILLNESS:  Patient is a 86 year old female with history significant for PUD, L2 compression fracture, essential hypertension, anemia, GI bleed, heart failure, necrosis of soft palate, hypothyroidism, recent C. difficile, chronic diastolic CHF who presented with shortness of breath.  History obtained through the  and also through discussions with tyrel Chaney.  Patient states that she presented for shortness of breath but also complains of tongue and mouth pain for the last 3 days.  She denies difficulty swallowing due to her tongue but notes pain with eating.  Denies any swelling to her lips.  No new medications.  She notes that she takes Lasix as needed for lower extremity edema.  It is unclear if she has been taking this medication recently.  She complains of a cough and shortness of breath as well as chronic back pain and abdominal pain.  No other complaints.    PMH/PSH:  Patient Active Problem List   Diagnosis     Anemia due to GI blood loss     Hypokalemia     PUD (peptic ulcer disease)     T12 compression fracture (H)     Accelerated hypertension     Abdominal pain     Abnormal finding on imaging     Kyphosis of thoracic region     Electrolyte imbalance     Episode of shaking     Bilateral hip pain     Edema     Acute on chronic heart failure with preserved ejection fraction (H)     Cancer of soft palate (H)     Gastrointestinal hemorrhage  associated with peptic ulcer     Hypomagnesemia     Acute respiratory failure with hypoxia (H)     Closed compression fracture of L2 lumbar vertebra, initial encounter (H)     Anemia, unspecified type     Pneumonia due to infectious organism, unspecified laterality, unspecified part of lung     Orthopnea     Shortness of breath     Hyponatremia     Acute kidney injury (H)     Pneumonia of left lower lobe due to infectious organism       ALLERGIES:  Allergies   Allergen Reactions     Unknown [No Clinical Screening - See Comments]        MEDICATIONS:  Reviewed.  Current Facility-Administered Medications   Medication     - MEDICATION INSTRUCTIONS -     acetaminophen (TYLENOL) tablet 650 mg    Or     acetaminophen (TYLENOL) Suppository 650 mg     artificial saliva (BIOTENE DRY MOUTHWASH) liquid 40 mL     calcium carbonate (TUMS) chewable tablet 1,000 mg     Continuing beta blocker from home medication list OR beta blocker order already placed during this visit     diphenhydrAMINE (BENADRYL) capsule 25 mg     enoxaparin ANTICOAGULANT (LOVENOX) injection 40 mg     ferrous sulfate (FEROSUL) tablet 325 mg     furosemide (LASIX) injection 40 mg     guaiFENesin (ROBITUSSIN) 20 mg/mL solution 10 mL     HOLD: nitroGLYcerin IF     ipratropium - albuterol 0.5 mg/2.5 mg/3 mL (DUONEB) neb solution 3 mL     levothyroxine (SYNTHROID/LEVOTHROID) tablet 50 mcg     lidocaine (LMX4) cream     lidocaine 1 % 0.1-1 mL     LORazepam (ATIVAN) tablet 0.5 mg     melatonin tablet 1 mg     metoprolol succinate ER (TOPROL-XL) 24 hr tablet 50 mg     nystatin (MYCOSTATIN) suspension 500,000 Units     ondansetron (ZOFRAN-ODT) ODT tab 4 mg    Or     ondansetron (ZOFRAN) injection 4 mg     pantoprazole (PROTONIX) EC tablet 40 mg     piperacillin-tazobactam (ZOSYN) 3.375 g vial to attach to  mL bag     polyethylene glycol (MIRALAX) Packet 17 g     senna-docusate (SENOKOT-S/PERICOLACE) 8.6-50 MG per tablet 1 tablet    Or     senna-docusate  (SENOKOT-S/PERICOLACE) 8.6-50 MG per tablet 2 tablet     sodium chloride (PF) 0.9% PF flush 3 mL     sodium chloride (PF) 0.9% PF flush 3 mL     sucralfate (CARAFATE) tablet 1 g     Current Outpatient Medications   Medication     acetaminophen (TYLENOL) 500 MG tablet     alendronate (FOSAMAX) 70 MG tablet     calcium carbonate (TUMS) 500 MG chewable tablet     ferrous sulfate (FEROSUL) 325 (65 Fe) MG tablet     furosemide (LASIX) 20 MG tablet     levothyroxine (SYNTHROID/LEVOTHROID) 50 MCG tablet     LORazepam (ATIVAN) 0.5 MG tablet     metoprolol succinate ER (TOPROL-XL) 50 MG 24 hr tablet     multivitamin w/minerals (THERA-VIT-M) tablet     pantoprazole (PROTONIX) 40 MG EC tablet     potassium chloride ER (KLOR-CON M) 10 MEQ CR tablet     sucralfate (CARAFATE) 1 GM tablet     Alcohol Swabs (B-D SINGLE USE SWABS REGULAR) PADS     blood glucose monitoring (ONETOUCH VERIO) meter device kit     ONETOUCH VERIO IQ test strip       SOCIAL HISTORY:  Social History     Socioeconomic History     Marital status:      Spouse name: Not on file     Number of children: Not on file     Years of education: Not on file     Highest education level: Not on file   Occupational History     Not on file   Tobacco Use     Smoking status: Never Smoker     Smokeless tobacco: Never Used   Substance and Sexual Activity     Alcohol use: No     Drug use: No     Sexual activity: Never   Other Topics Concern     Not on file   Social History Narrative     Not on file     Social Determinants of Health     Financial Resource Strain: Not on file   Food Insecurity: Not on file   Transportation Needs: Not on file   Physical Activity: Not on file   Stress: Not on file   Social Connections: Not on file   Intimate Partner Violence: Not on file   Housing Stability: Not on file       FAMILY HISTORY:  Family History   Problem Relation Age of Onset     Diabetes No family hx of      Cancer No family hx of      Heart Disease No family hx of         ROS:  12 point review of systems is reviewed and is negative except for what has already been mention in the history of presenting illness.     PHYSICAL EXAM:  /82 (BP Location: Left arm)   Pulse 79   Temp 97.5  F (36.4  C) (Axillary)   Resp 24   SpO2 100%   No intake/output data recorded.  I/O this shift:  In: -   Out: 50 [Urine:50]  GENRL: Alert and answering questions through . Not in acute distress. Lying in bed   HEENT: no lymphadenopathy or thyromegaly.  Tongue and mouth appear dry.  Possible some edema noted to tongue.  No uvula edema or lip edema.  No stridor.  CHEST: Diminished throughout.  Breathing easily   HEART: Regular rate, S1S2 auscultated. No murmurs  ABDMN: Soft. Non-tender, non-distended. No organomegaly. No guarding or rigidity. Bowel sounds present   EXTRM: + pedal edema bilaterally  NEURO: Following commands.  No involuntary movements.   PSYCH: Normal affect and mood.   INTGM: No skin rash, no cyanosis or clubbing    DIAGNOSTIC DATA:  Recent Results (from the past 24 hour(s))   ECG 12-LEAD WITH MUSE ([facility])    Collection Time: 04/13/22 12:55 AM   Result Value Ref Range    Systolic Blood Pressure 123 mmHg    Diastolic Blood Pressure 63 mmHg    Ventricular Rate 86 BPM    Atrial Rate 86 BPM    KY Interval 164 ms    QRS Duration 88 ms     ms    QTc 469 ms    P Axis 11 degrees    R AXIS 44 degrees    T Axis 51 degrees    Interpretation ECG       Sinus rhythm  Normal ECG  When compared with ECG of 20-MAR-2022 14:16,  No significant change was found  Confirmed by SEE ED PROVIDER NOTE FOR, ECG INTERPRETATION ([id number]),  MARITZA QUINTANILLA ([id number]) on 4/13/2022 8:55:13 AM     Basic metabolic panel    Collection Time: 04/13/22  1:03 AM   Result Value Ref Range    Sodium 128 (L) 136 - 145 mmol/L    Potassium 3.1 (L) 3.5 - 5.0 mmol/L    Chloride 98 98 - 107 mmol/L    Carbon Dioxide (CO2) 18 (L) 22 - 31 mmol/L    Anion Gap 12 5 - 18 mmol/L    Urea Nitrogen 15 8 - 28 mg/dL     Creatinine 1.21 (H) 0.60 - 1.10 mg/dL    Calcium 8.3 (L) 8.5 - 10.5 mg/dL    Glucose 93 70 - 125 mg/dL    GFR Estimate 43 (L) >60 mL/min/1.73m2   Troponin I    Collection Time: 04/13/22  1:03 AM   Result Value Ref Range    Troponin I 0.03 0.00 - 0.29 ng/mL   B-Type Natriuretic Peptide ([facility] Only)    Collection Time: 04/13/22  1:03 AM   Result Value Ref Range     (H) 0 - 167 pg/mL   CBC with platelets and differential    Collection Time: 04/13/22  1:03 AM   Result Value Ref Range    WBC Count 28.8 (H) 4.0 - 11.0 10e3/uL    RBC Count 2.51 (L) 3.80 - 5.20 10e6/uL    Hemoglobin 8.3 (L) 11.7 - 15.7 g/dL    Hematocrit 24.4 (L) 35.0 - 47.0 %    MCV 97 78 - 100 fL    MCH 33.1 (H) 26.5 - 33.0 pg    MCHC 34.0 31.5 - 36.5 g/dL    RDW 15.5 (H) 10.0 - 15.0 %    Platelet Count 286 150 - 450 10e3/uL   Manual Differential    Collection Time: 04/13/22  1:03 AM   Result Value Ref Range    % Neutrophils 82 %    % Lymphocytes 4 %    % Monocytes 11 %    % Eosinophils 1 %    % Basophils 0 %    % Metamyelocytes 2 %    Absolute Neutrophils 23.6 (H) 1.6 - 8.3 10e3/uL    Absolute Lymphocytes 1.2 0.8 - 5.3 10e3/uL    Absolute Monocytes 3.2 (H) 0.0 - 1.3 10e3/uL    Absolute Eosinophils 0.3 0.0 - 0.7 10e3/uL    Absolute Basophils 0.0 0.0 - 0.2 10e3/uL    Absolute Metamyelocytes 0.6 (H) <=0.0 10e3/uL    RBC Morphology Confirmed RBC Indices     Platelet Assessment  Automated Count Confirmed. Platelet morphology is normal.     Automated Count Confirmed. Platelet morphology is normal.    Vanessa Cells Slight (A) None Seen    RBC Fragments Slight (A) None Seen   Symptomatic; Yes; 4/6/2022 Influenza A/B & SARS-CoV2 (COVID-19) Virus PCR Multiplex Nasopharyngeal    Collection Time: 04/13/22  1:47 AM    Specimen: Nasopharyngeal; Swab   Result Value Ref Range    Influenza A PCR Negative Negative    Influenza B PCR Negative Negative    SARS CoV2 PCR Negative Negative   Lactic acid whole blood    Collection Time: 04/13/22  1:48 AM   Result Value Ref  Range    Lactic Acid 0.9 0.7 - 2.0 mmol/L   Procalcitonin    Collection Time: 04/13/22  1:48 AM   Result Value Ref Range    Procalcitonin 0.88 (H) 0.00 - 0.49 ng/mL   UA with Microscopic reflex to Culture    Collection Time: 04/13/22  2:57 AM    Specimen: Urine, Midstream   Result Value Ref Range    Color Urine Light Yellow Colorless, Straw, Light Yellow, Yellow    Appearance Urine Clear Clear    Glucose Urine Negative Negative mg/dL    Bilirubin Urine Negative Negative    Ketones Urine Negative Negative mg/dL    Specific Gravity Urine 1.005 1.001 - 1.030    Blood Urine Negative Negative    pH Urine 5.0 5.0 - 7.0    Protein Albumin Urine Negative Negative mg/dL    Urobilinogen Urine <2.0 <2.0 mg/dL    Nitrite Urine Negative Negative    Leukocyte Esterase Urine 250 Joanna/uL (A) Negative    Bacteria Urine Few (A) None Seen /HPF    RBC Urine <1 <=2 /HPF    WBC Urine 6 (H) <=5 /HPF    Squamous Epithelials Urine 1 <=1 /HPF   Osmolality urine    Collection Time: 04/13/22  2:57 AM   Result Value Ref Range    Osmolality Urine 113 100 - 1,200 mmol/kg   Sodium random urine    Collection Time: 04/13/22  2:57 AM   Result Value Ref Range    Sodium Urine mmol/L <20 mmol/L   Echocardiogram Complete    Collection Time: 04/13/22 11:30 AM   Result Value Ref Range    LVEF  55-60%      All lab studies reviewed personally  Radiology report reviewed.      Becki Crain DO  [facility]  [phone]

## 2022-04-13 NOTE — ED NOTES
Bed: JNED-18  Expected date: 4/13/22  Expected time: 12:24 AM  Means of arrival: Ambulance  Comments:  [facility]  89 F, SOB ?PNA

## 2022-04-13 NOTE — PHARMACY-AMINOGLYCOSIDE DOSING SERVICE
Pharmacy Vancomycin Initial Note  Date of Service 2022  Patient's  1935  86 year old, female    Indication: Community Acquired Pneumonia    Current estimated CrCl = Estimated Creatinine Clearance: 28.7 mL/min (A) (based on SCr of 1.21 mg/dL (H)).    Creatinine for last 3 days  2022:  1:03 AM Creatinine 1.21 mg/dL    Recent Vancomycin Level(s) for last 3 days  No results found for requested labs within last 72 hours.      Vancomycin IV Administrations (past 72 hours)      No vancomycin orders with administrations in past 72 hours.                Nephrotoxins and other renal medications (From now, onward)    Start     Dose/Rate Route Frequency Ordered Stop    22 1300  vancomycin (VANCOCIN) 1000 mg in dextrose 5% 200 mL PREMIX         1,000 mg  200 mL/hr over 1 Hours Intravenous ONCE 22 1241      22 1230  furosemide (LASIX) injection 40 mg         40 mg  over 1-3 Minutes Intravenous EVERY 12 HOURS 22 1158 22 1229    22 0830  piperacillin-tazobactam (ZOSYN) 3.375 g vial to attach to  mL bag         3.375 g  over 240 Minutes Intravenous EVERY 8 HOURS 22 0635            Contrast Orders - past 72 hours (72h ago, onward)            None          [employer] Prediction of Planned Initial Vancomycin Regimen  Loading dose: N/A  Regimen: 750 mg IV every 24 hours.  Start time: 13:00 on 2022  Exposure target: AUC24 (range)400-600 mg/L.hr   AUC24,ss: 488 mg/L.hr  Probability of AUC24 > 400: 73 %  Ctrough,ss: 15.9 mg/L  Probability of Ctrough,ss > 20: 26 %  Probability of nephrotoxicity (Lodise HIPOLITO ): 11 %     Plan:  1. Start vancomycin 1000 mg IV once followed by 750 mg every 24 hours.   2. Vancomycin monitoring method: AUC  3. Vancomycin therapeutic monitoring goal: 400-600 mg*h/L  4. Pharmacy will check vancomycin levels as appropriate in 1-3 Days.    5. Serum creatinine levels will be ordered daily for the first week of therapy and at least twice  weekly for subsequent weeks.      Sue Servin [facility]  April 13, 2022 1:00 PM

## 2022-04-13 NOTE — CONSULTS
Care Management Initial Consult    General Information  Assessment completed with: Patient, Children, pt and dtr May  Type of CM/SW Visit: Initial Assessment    Primary Care Provider verified and updated as needed: Yes   Readmission within the last 30 days: no previous admission in last 30 days   Return Category: Progression of disease  Reason for Consult: discharge planning  Advance Care Planning: Advance Care Planning Reviewed: no concerns identified     General Information Comments: lives alone and dtr May is PCA, uses walker and family to transport    Communication Assessment  Patient's communication style: spoken language (English or Bilingual)             Cognitive  Cognitive/Neuro/Behavioral: WDL                      Living Environment:   People in home: alone     Current living Arrangements: apartment      Able to return to prior arrangements: yes       Family/Social Support:  Care provided by: self, other (see comments), child(chriss) (PCA)  Provides care for: no one, no one, unable/limited ability to care for self  Marital Status: Single  Children, PCA          Description of Support System: Supportive, Involved    Support Assessment: Adequate family and caregiver support, Adequate social supports    Current Resources:   Patient receiving home care services: No     Community Resources: DME, PCA  Equipment currently used at home: walker, rolling  Supplies currently used at home: None    Employment/Financial:  Employment Status:          Financial Concerns: No concerns identified   Referral to Financial Worker: No       Lifestyle & Psychosocial Needs:  Social Determinants of Health     Tobacco Use: Low Risk      Smoking Tobacco Use: Never Smoker     Smokeless Tobacco Use: Never Used   Alcohol Use: Not on file   Financial Resource Strain: Not on file   Food Insecurity: Not on file   Transportation Needs: Not on file   Physical Activity: Not on file   Stress: Not on file   Social Connections: Not on file    Intimate Partner Violence: Not on file   Depression: At risk     PHQ-2 Score: 3   Housing Stability: Not on file       Functional Status:  Prior to admission patient needed assistance:   Dependent ADLs:: Grooming, Bathing, Ambulation-walker  Dependent IADLs:: Cleaning, Cooking, Laundry, Shopping, Meal Preparation, Medication Management, Transportation  Assesssment of Functional Status: Not at baseline with ADL Functioning, Not at baseline with mobility    Mental Health Status:  Mental Health Status: No Current Concerns       Chemical Dependency Status:                Values/Beliefs:  Spiritual, Cultural Beliefs, Church Practices, Values that affect care:                 Additional Information:  CONNIE assessed, lives alone and dtr May is her PCA. Pt uses walker and dtr May will transport.      Marilee Covarrubias RN

## 2022-04-13 NOTE — PROGRESS NOTES
Nutrition Therapy    Diet ed consult noted.  RD will see pt for diet education after pt transfers to patient unit.

## 2022-04-13 NOTE — ED TRIAGE NOTES
Recently treated for pneumonia, after treatment symptoms did not resolve and patient continues to have shortness of breath.

## 2022-04-14 NOTE — CONSULTS
NUTRITION EDUCATION      REASON FOR ASSESSMENT:  Instruct on 2 gram Na    NUTRITION HISTORY:  Information obtained from pt's son Shiv Chaney, pt eats very bland foods-usually rice porridge.  They do not add any salt to her food in the cooking or at the table    CURRENT DIET:  Regular diet with FR of 1500 ml/day    NUTRITION DIAGNOSIS:  Food- and nutrition-related knowledge deficit R/t CHF as evidenced by consulted to do low sodium diet instruction    INTERVENTIONS:    Nutrition Prescription:  2 gram Na diet education    Implementation:      *  Nutrition Education (Content):   A)  Provided handout low sodium nutrition therapy, low sodium shopping guidelines   B)  Discussed foods high and low in Na, low sodium seasonings      *  Nutrition Education (Application):   A)  Discussed current eating habits and recommended alternative food choices      *  Anticipate good compliance      *  Diet Education - refer to Education Flowsheet    Will trial magic cup daily with meal due to acute tongue pain (possibly thrush)    Goals:      *  Patient will verbalize understanding of diet-met.      *  All of the above goals met during the education session    Follow Up/Monitoring:      *  Provided RD contact information for future questions      *  Recommended Out-Patient Nutrition Referral, if further diet instructions are needed

## 2022-04-14 NOTE — PLAN OF CARE
Problem: Anemia  Goal: Anemia Symptom Improvement  Outcome: Ongoing, Not Progressing  Intervention: Monitor and Manage Anemia  Pt Hgb 7.3 this am.  Pt had bloody stool this morning.  MD notified.  Hgb recheck this afternoon was 7.8.  Pt very anxious most of day and calmed down with scheduled Ativan.  Pt talks continuously when staff in room and talks over .  Pt's son here earlier and updated on plan of care.  Will continue to monitor pt for any changes.

## 2022-04-14 NOTE — PROGRESS NOTES
[facility]    PROGRESS NOTE - Hospitalist Service    ASSESSMENT AND PLAN     Active Problems:    Hypokalemia    Orthopnea    Shortness of breath    Hyponatremia    Acute kidney injury (H)    Pneumonia of left lower lobe due to infectious organism    Chris Bell is a 86 year old female with a history of PUD, L2 compression fracture, essential hypertension, anemia, GI bleed, heart failure, necrosis of soft palate, hypothyroidism, recent C. difficile, chronic diastolic CHF who presented with shortness of breath.       Acute hypoxic respiratory failure likely secondary to fluid overload and pneumonia              Acute on chronic diastolic heart failure.  EF of 55 to 60%.  Mild to moderate tricuspid regurg, small pericardial effusion              BNP elevated, troponin normal              IV Lasix- switch to PO home dose lasix 4/14   Strict I's and O's.  Telemetry.  Daily electrolyte checks and replacement              Pneumonia: Procalcitonin elevated.  CT chest reviewed with residual changes of pneumonia noted.              Vancomycin and Zosyn initiated 4/13              Follow-up blood cultures    Acute worsening of chronic anemia   Concern for possible GIB as there is report of bloody stools- known history of PUD    Repeat HGB this afternoon and consider GI consult               Appears near baseline.  Continue iron supplements     Acute tongue pain-possibly thrush- improving               History of necrosis of soft palate              Plan for nystatin swish and swallow.  Artificial saliva also ordered              No new medications.  No ACE or ARB's.  Check C1 esterase, C1q and C4.  No evidence for respiratory compromise currently.  Patient denies swelling to lips.     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   Fluid restrict.              Was on salt tabs previously     Hypokalemia-replace per protocol     Leukocytosis-significantly elevated but trending down               Likely related to pneumonia     Abnormal urinalysis              Already on antibiotics for pneumonia.  Follow-up urine culture.     Subacute L2 compression fracture              Was evaluated by neurosurgery and given TLSO brace.  Requested son bring this brace back in to work with PT.     Known history of PUD and GI bleed              Continue home PPI and Carafate     Hypothyroidism-continue home levothyroxine     Hypertension-continue home metoprolol     Recent C. difficile              Monitor for recurrence     Barriers to discharge: Improvement in respiratory status, stabilization of hemoglobin, IV antibiotics    Anticipated length of stay: 2 to 3 days    Subjective:   used at bedside.  Patient notes her breathing is slightly better today.  Slight improvement in tongue pain.  No other new complaints today.  Nursing noted bloody stool.    PHYSICAL EXAM  Temp:  [97.4  F (36.3  C)-98.8  F (37.1  C)] 98.8  F (37.1  C)  Pulse:  [72-90] 84  Resp:  [18-20] 18  BP: ()/(54-82) 101/57  SpO2:  [94 %-100 %] 100 %  Wt Readings from Last 1 Encounters:   04/14/22 54 kg (119 lb)       Intake/Output Summary (Last 24 hours) at 4/14/2022 1151  Last data filed at 4/14/2022 1030  Gross per 24 hour   Intake 180 ml   Output 2150 ml   Net -1970 ml      Body mass index is 24.04 kg/m .    GENRL: Alert and answering questions through . Not in acute distress. Lying in bed   HEENT: no lymphadenopathy or thyromegaly.    Good moisture noted to the oral area and tongue.  Tongue appears less erythematous today.  CHEST: Diminished throughout.  Breathing easily   HEART: Regular rate, S1S2 auscultated. No murmurs  ABDMN: Soft. Non-tender, non-distended. No organomegaly. No guarding or rigidity. Bowel sounds present   EXTRM: trace pedal edema  bilaterally  NEURO: Following commands.  No involuntary movements.   PSYCH: Normal affect and mood.   INTGM: No skin rash, no cyanosis or clubbing    PERTINENT LABS/IMAGING:  Results for orders placed or performed during the hospital encounter of 04/13/22   XR Chest Port 1 View    Impression    IMPRESSION: Minimal change. Mild cardiomegaly. Blunting at left costophrenic angle relates to mild pleural thickening. There is very slight diffuse interstitial prominence but no new focal pneumonia.   Chest CT w/o contrast    Impression    IMPRESSION:   1.  Significant motion artifact limits evaluation. Allowing for this there has been slight interval improvement in bilateral lower lung interstitial and alveolar opacities. There remains some mild underlying interstitial and alveolar infiltrate in the   left lower lobe with bronchial wall thickening compatible with residual changes of pneumonia.    2.  No pleural fluid.    3.  Evidence for old granulomatous disease.     Echocardiogram Complete   Result Value Ref Range    LVEF  55-60%      Most Recent 3 CBC's:Recent Labs   Lab Test 04/14/22  0503 04/13/22  0103 03/21/22  0658   WBC 17.7* 28.8* 5.8   HGB 7.3* 8.3* 9.0*   MCV 98 97 106*    286 286       No results for input(s): CHOL, HDL, LDL, TRIG, CHOLHDLRATIO in the last 18731 hours.  No results for input(s): LDL in the last 27398 hours.  Recent Labs   Lab Test 04/14/22  0503      POTASSIUM 3.0*   CHLORIDE 106   CO2 19*   GLC 94   BUN 19   CR 1.27*   GFRESTIMATED 41*   MANE 7.8*     Recent Labs   Lab Test 02/15/22  1016   A1C 5.7*     Recent Labs   Lab Test 04/14/22  0503 04/13/22  0103 03/21/22  0658   HGB 7.3* 8.3* 9.0*     Recent Labs   Lab Test 04/13/22  0103 03/20/22  1502 10/17/18  1308   TROPONINI 0.03 0.02 0.01     Recent Labs   Lab Test 04/13/22  0103 03/20/22  1502 10/17/18  1308   * 272* 265*     Recent Labs   Lab Test 07/20/18  1250   TSH 3.15     Recent Labs   Lab Test 03/20/22  1502  02/15/22  1016 01/16/18  2216   INR 1.10 1.03 1.03       Becki Crain DO  [facility]  [phone]

## 2022-04-14 NOTE — PROGRESS NOTES
Occupational Therapy      04/14/22 1430   Quick Adds   Type of Visit Initial Occupational Therapy Evaluation       Present yes   Language Hmong   Living Environment   People in Home child(chriss), adult   Current Living Arrangements apartment   Home Accessibility no concerns   Transportation Anticipated family or friend will provide   Living Environment Comments Pt unable to determine PLOF/home HOLGUIN at this time   Self-Care   Equipment Currently Used at Home walker, rolling   Fall history within last six months no   General Information   Onset of Illness/Injury or Date of Surgery 04/13/22   Referring Physician Becki Crain DO   Patient/Family Therapy Goal Statement (OT) none stated   Additional Occupational Profile Info/Pertinent History of Current Problem crosis of soft palat, Hypothyroidism, REcent C-diff infection, presenting for Shortness of breath, JOSE   Existing Precautions/Restrictions fall;spinal;brace worn when out of bed  (TLSO when OOB)   Range of Motion Comprehensive   General Range of Motion no range of motion deficits identified   Bed Mobility   Bed Mobility supine-sit;sit-supine   Supine-Sit Blanco (Bed Mobility) moderate assist (50% patient effort)   Sit-Supine Blanco (Bed Mobility) moderate assist (50% patient effort)   Assistive Device (Bed Mobility) bed rails   Comment (Bed Mobility) w/ Log roll tech   Activities of Daily Living   BADL Assessment/Intervention upper body dressing   Upper Body Dressing Assessment/Training   Position (Upper Body Dressing) unsupported sitting   Comment, (Upper Body Dressing) Max.A doff/don TLSO   Blanco Level (Upper Body Dressing) maximum assist (25% patient effort)   Clinical Impression   Criteria for Skilled Therapeutic Interventions Met (OT) Yes, treatment indicated   OT Diagnosis Decreased ADL ind.   OT Problem List-Impairments impacting ADL problems related to;activity tolerance  impaired;balance;cognition;mobility;strength   Assessment of Occupational Performance 3-5 Performance Deficits   Identified Performance Deficits dressing w/ AE, trnf/mobility, doff/don TLSO, safety awareness   Planned Therapy Interventions (OT) ADL retraining;IADL retraining;balance training;strengthening;transfer training;cognition;bed mobility training   Clinical Decision Making Complexity (OT) low complexity   Risk & Benefits of therapy have been explained evaluation/treatment results reviewed;patient   OT Discharge Planning   OT Discharge Recommendation (DC Rec) Transitional Care Facility;home with assist   OT Rationale for DC Rec Assistx1 for bed mobility- pt declined OOB act w/ OT- OT recommending 24 hour supervision/assist.   Total Evaluation Time (Minutes)   Total Evaluation Time (Minutes) 4   OT Goals   Therapy Frequency (OT) Daily   OT Predicted Duration/Target Date for Goal Attainment 04/19/22   OT Goals Lower Body Dressing;Bed Mobility;Transfers;Toilet Transfer/Toileting;Cognition   OT: Lower Body Dressing Supervision/stand-by assist;within precautions;using adaptive equipment   OT: Bed Mobility Supervision/stand-by assist;within precautions   OT: Transfer Supervision/stand-by assist;with assistive device   OT: Toilet Transfer/Toileting Supervision/stand-by assist;using adaptive equipment   OT: Cognitive Patient/caregiver will verbalize understanding of cognitive assessment results/recommendations as needed for safe discharge planning

## 2022-04-14 NOTE — PLAN OF CARE
Problem: Plan of Care - These are the overarching goals to be used throughout the patient stay.    Goal: Optimal Comfort and Wellbeing  Intervention: Monitor Pain and Promote Comfort  Recent Flowsheet Documentation  Taken 4/13/2022 1657 by Rose Encarnacion RN  Pain Management Interventions: repositioned   Goal Outcome Evaluation:           Patient has dry lips and oral mucosa. Nystatin given as order, lubricant applied to lips. BM x2 this shift. Purwick in place. Recheck magnesium and potassium in AM per protocols. Lactic acid 1.4. Sacral mepilex in place for protection. Hmong  needed.

## 2022-04-14 NOTE — PLAN OF CARE
Goal Outcome Evaluation:           Pt. Denies pain per , pure wick in place, bedpan being used   Waiting for family to bring in back brace for pt.   Grouping cares to help promote sleep     name was Christel, ID # [id number]    Update also given to pt.'s daughter in law Garret over the phone per pt. Request    Bed alarm on, call light in reach  Continue to monitor

## 2022-04-15 NOTE — PLAN OF CARE
Problem: Anemia  Goal: Anemia Symptom Improvement  Outcome: Ongoing, Progressing     Problem: Plan of Care - These are the overarching goals to be used throughout the patient stay.    Goal: Optimal Comfort and Wellbeing  Outcome: Ongoing, Progressing   Goal Outcome Evaluation:     Pt is alert and oriented, tearful a time, wishes to be home. Denies pain at this time. Hmong speaking, used  to translate. Purick in place. Administered scheduled medication.

## 2022-04-15 NOTE — PROGRESS NOTES
Patient is seen for a bronchial hygiene. Respiratory distress not noted; on room air, pt saturates high 90s. BS decreased; pt tolerated flutter valve. RT following.    Calvin Arias, RT

## 2022-04-15 NOTE — PLAN OF CARE
Goal Outcome Evaluation:    Plan of Care Reviewed With: patient      Heart Failure Care Map  GOALS TO BE MET BEFORE DISCHARGE:    1. Decrease congestion and/or edema with diuretic therapy to achieve near optimal volume status.     Dyspnea improved: Yes, satisfactory for discharge.   Edema improved: Yes, satisfactory for discharge.        Net I/O and Weights since admission:   03/16 1500 - 04/15 1459  In: 912 [P.O.:480; I.V.:432]  Out: 2500 [Urine:2500]  Net: -1588     Vitals:    04/13/22 1618 04/14/22 0543 04/15/22 0311   Weight: 54.9 kg (121 lb 1.6 oz) 54 kg (119 lb) 54.3 kg (119 lb 9.6 oz)       2.  O2 sats > 90% on room air, or at prior home O2 therapy level.      Able to wean O2 this shift to keep sats above 90%?: Yes, satisfactory for discharge.   Does patient use Home O2? No          Current oxygenation status:   SpO2: 98 %     O2 Device: None (Room air), Oxygen Delivery: 1 LPM    3.  Tolerates ambulation and mobility near baseline.     Ambulation: No, further care required to meet this goal. Please explain .Needs further ambulation   Times patient ambulated with staff this shift: 0    Please review the Heart Failure Care Map for additional HF goal outcomes.    Sydni Marie RN  4/15/2022

## 2022-04-15 NOTE — PROGRESS NOTES
Care Management Follow Up    Length of Stay (days): 2    Expected Discharge Date: 04/16/2022     Concerns to be Addressed:  discharge planning      Patient plan of care discussed at interdisciplinary rounds: Yes    Anticipated Discharge Disposition:  Home with HC and PCA vs TCU      Anticipated Discharge Services:  TBD   Anticipated Discharge DME:  None     Education Provided on the Discharge Plan:  Call attempted to family   Patient/Family in Agreement with the Plan:  TBD     Referrals Placed by CM/SW:  None to date   Private pay costs discussed: Not applicable    Additional Information:  LESLEE reviewed chart and recs and attempted to contact pt dtr May but no answer and mailbox full.   LESLEE left VM for pt son Shiv to discuss discharge planning.       Mari Garrett, SISI

## 2022-04-15 NOTE — PHARMACY-VANCOMYCIN DOSING SERVICE
"Pharmacy Vancomycin Note  Date of Service April 15, 2022  Patient's  1935   86 year old, female    Indication: Community Acquired Pneumonia  Day of Therapy: 3  Current vancomycin regimen:  750 mg IV q24h  Current vancomycin monitoring method: AUC  Current vancomycin therapeutic monitoring goal: 400-600 mg*h/L    [employer] Prediction of Current Vancomycin Regimen  Regimen: 750 mg IV every 24 hours.  Exposure target: AUC24 (range)400-600 mg/L.hr   AUC24,ss: 458 mg/L.hr  Probability of AUC24 > 400: 74 %  Ctrough,ss: 14.0 mg/L  Probability of Ctrough,ss > 20: 10 %  Probability of nephrotoxicity (Lodise HIPOLITO ): 9 %  Current estimated CrCl = Estimated Creatinine Clearance: 37.6 mL/min (based on SCr of 0.92 mg/dL).    Creatinine for last 3 days  2022:  1:03 AM Creatinine 1.21 mg/dL  2022:  5:03 AM Creatinine 1.27 mg/dL  4/15/2022:  5:22 AM Creatinine 0.92 mg/dL    Recent Vancomycin Levels (past 3 days)  4/15/2022:  5:22 AM Vancomycin 16.1 mg/L    Vancomycin IV Administrations (past 72 hours)                   vancomycin (VANCOCIN) 750 mg in sodium chloride 0.9 % 250 mL intermittent infusion (mg) 750 mg New Bag 22 1408    vancomycin (VANCOCIN) 1000 mg in dextrose 5% 200 mL PREMIX (mg) 1,000 mg New Bag 22 1503                Nephrotoxins and other renal medications (From now, onward)    Start     Dose/Rate Route Frequency Ordered Stop    22 1400  vancomycin (VANCOCIN) 750 mg in sodium chloride 0.9 % 250 mL intermittent infusion        \"Followed by\" Linked Group Details    750 mg  over 60-90 Minutes Intravenous EVERY 24 HOURS 22 1306      22 0900  furosemide (LASIX) tablet 20 mg         20 mg Oral DAILY 22 0709      22 0830  piperacillin-tazobactam (ZOSYN) 3.375 g vial to attach to  mL bag         3.375 g  over 240 Minutes Intravenous EVERY 8 HOURS 22 0635               Contrast Orders - past 72 hours (72h ago, onward)    None          Interpretation of " levels and current regimen:  Vancomycin level is reflective of -600    Has serum creatinine changed greater than 50% in last 72 hours: No    Renal Function: Improving    [employer] Prediction of Planned New Vancomycin Regimen  Regimen: 750 mg IV every 24 hours.  Exposure target: AUC24 (range)400-600 mg/L.hr   AUC24,ss: 458 mg/L.hr  Probability of AUC24 > 400: 74 %  Ctrough,ss: 14.0 mg/L  Probability of Ctrough,ss > 20: 10 %  Probability of nephrotoxicity (Lodise HIPOLITO 2009): 9 %    Plan:  1. Continue Current Dose  2. Vancomycin monitoring method: AUC  3. Vancomycin therapeutic monitoring goal: 400-600 mg*h/L  4. Pharmacy will check vancomycin levels as appropriate.  5. Serum creatinine levels will be ordered daily..    Haroldo Gutierrez RPH

## 2022-04-16 NOTE — PLAN OF CARE
Goal Outcome Evaluation:    Plan of Care Reviewed With: patient     Overall Patient Progress: improving     congestive heart failure   Pt. Was up and awake much of the night, somewhat anxious and with , kept repeating her statements. Denied pain, but did state she felt very tired and weak. Refused to try and ambulate to BR or stand on scale, despite reminding her she can do so with her brace on. Using mouth moisturizer for dry lips and mouth sores, and has scheduled nystatin and biotene during the day.

## 2022-04-16 NOTE — CONSULTS
"[facility] DIGESTIVE HEALTH CONSULTATION    Chris Bell   [address]    [facility] [address]  86 year old female  Admission Date/Time: 4/13/2022 12:30 AM    Primary Care Provider:  Kizzy Villanueva    Requesting Physician: Tanisha Garcia MD      CHIEF COMPLAINT:   Difficulty swallowing and chronic anemia    HPI:     Chris is 86 year old [place] female with history of PUD, L2 compression fracture,  hypertension, anemia, GI bleed, heart failure, necrosis of soft palate, hypothyroidism, recent C. difficile, chronic diastolic CHF.      Admit 3 days ago 4/13/2022 for shortness of breath and tongue and mouth pain.  During hospitalization being treated for hypoxia from pneumonia and volume overload, initial WBC 28.8.  We were asked to see her because of the mouth pains.    She describes mouth sores along her tongue and lips.  She refused medicines today because it is painful to swallow.  She needs all food and applesauce to be the \"perfect temperature,\" not too hot and not too cold.  She denies any pain in her neck and chest when swallowing;  the difficulty swallowing is oral related.  She denies any nausea or vomiting.  Stools are soft and brown.  When asked about pain she states that its too difficult to explain because its \"all over\" and she pointed diffusely throughout her chest abdomen and pelvis.    History of gastric ulcers.  She has had 4 EGDs (2006, 2016, 2018, 2020).   Biopsies negative for H. pylori and malignancy.    Most recent EGD 11/4/2020 revealed mild erythema and biopsies were unremarkable.  Outpatient medications list Protonix, sucralfate, and iron     2 months ago 2/15/2022 seen by [facility] for BRBPR.  Discussed colonoscopy but was diagnosed with C. difficile and colonoscopy not pursued.  I discussed possible colonoscopy with Katharina and her daughter.  She does not want to undergo colonoscopy.  Is also doubtful she could get through the prep with her current oral complaints and refusing " medicines.      During this admission hemoglobin range 7.3-8.3.  No overt signs of bleeding.  As noted above stools are brown.  Reviewing hemoglobin over past 4 years it has ranged 7.6-11.7.    ASSESSMENT:     We were asked to see Blia because of difficulty swallowing.  Her swallowing difficulties appear to be oral related and not esophageal.  She will need ENT to evaluate her oropharyngeal area.      Additionally she has a chronic anemia.  Currently no overt signs of GI bleeding.  At home on chronic Protonix, Carafate, and iron but its not clear that she is always compliant.  No reports of aspirin/NSAID use.  Has had multiple EGDs as noted above.  Has never undergone colonoscopy but tells me that she does not want to have one.  Also unclear if she would be able to prep.    PLAN:    - Ask ENT regarding mouth pain and ulcers  - If she would be agreeable to colonoscopy and able to fully prep please call us back  - Continue outpatient Protonix, Carafate, and iron  -We will sign off but call us if she is agreeable to proceeding with colonoscopy.      PAST MEDICAL HISTORY:   Past Medical History:   Diagnosis Date     Anemia      Depression      Disease of thyroid gland      HTN (hypertension)      Osteoporosis      PUD (peptic ulcer disease)        PAST SURGICAL HISTORY:   Past Surgical History:   Procedure Laterality Date     ESOPHAGOSCOPY, GASTROSCOPY, DUODENOSCOPY (EGD), COMBINED N/A 1/17/2018    Procedure: ESOPHAGOGASTRODUODENOSCOPY (EGD) with h.pylori and gastric ulcer biopsies;  Surgeon: Colin Alvarez MD;  Location: [facility];  Service:      HYSTERECTOMY       US BIOPSY FINE NEEDLE ASPIRATION LYMPH NODE  8/14/2019       FAMILY HISTORY:   Family History   Problem Relation Age of Onset     Diabetes No family hx of      Cancer No family hx of      Heart Disease No family hx of        SOCIAL HISTORY:   Social History     Tobacco Use     Smoking status: Never Smoker     Smokeless tobacco: Never Used   Substance  Use Topics     Alcohol use: No     Drug use: No        REVIEW OF SYSTEMS: 13 point review of systems is negative except that noted above.    MEDS:  Medications Prior to Admission   Medication Sig Dispense Refill Last Dose     acetaminophen (TYLENOL) 500 MG tablet Take 1-2 tablets (500-1,000 mg) by mouth every 8 hours as needed for pain 90 tablet 1 Past Week at Unknown time     alendronate (FOSAMAX) 70 MG tablet Take 70 mg by mouth every 7 days   Unknown at not started yet     calcium carbonate (TUMS) 500 MG chewable tablet Take 2 chew tab by mouth every 2 hours as needed   3 Past Month at Unknown time     ferrous sulfate (FEROSUL) 325 (65 Fe) MG tablet Take 325 mg by mouth 2 times daily   4/12/2022 at Unknown time     furosemide (LASIX) 20 MG tablet Take 20 mg by mouth daily as needed   Past Week at Unknown time     levothyroxine (SYNTHROID/LEVOTHROID) 50 MCG tablet Take 50 mcg by mouth every morning    4/12/2022 at Unknown time     LORazepam (ATIVAN) 0.5 MG tablet Take 0.5 mg by mouth 3 times daily (before meals)  0 4/12/2022 at Unknown time     metoprolol succinate ER (TOPROL-XL) 50 MG 24 hr tablet Take 50 mg by mouth 2 times daily    4/12/2022 at Unknown time     multivitamin w/minerals (THERA-VIT-M) tablet Take 1 tablet by mouth daily   4/12/2022 at Unknown time     pantoprazole (PROTONIX) 40 MG EC tablet Take 40 mg by mouth daily    4/12/2022 at Unknown time     potassium chloride ER (KLOR-CON M) 10 MEQ CR tablet Take 10 mEq by mouth daily   4/12/2022 at Unknown time     sucralfate (CARAFATE) 1 GM tablet Take 1 g by mouth 3 times daily (before meals)   4/12/2022 at Unknown time     Alcohol Swabs (B-D SINGLE USE SWABS REGULAR) PADS USE UTD  0      blood glucose monitoring (ONETOUCH VERIO) meter device kit 2 times daily  0      ONETOUCH VERIO IQ test strip 2 times daily  2        ALLERGIES/SENSITIVITIES: Unknown [no clinical screening - see comments]    MEDICATIONS:  Current Outpatient Medications   Medication  Sig Dispense Refill     artificial saliva (BIOTENE MT) SOLN solution Swish and spit 1-2 mLs (1-2 sprays) in mouth 4 times daily 44.3 mL 1     nystatin (MYCOSTATIN) 071491 UNIT/ML suspension Swish and swallow 5 mLs (500,000 Units) in mouth 4 times daily 60 mL 0       PHYSICAL EXAM:  Temp:  [97.6  F (36.4  C)-98.6  F (37  C)] 98  F (36.7  C)  Pulse:  [73-90] 86  Resp:  [18-20] 20  BP: (119-162)/(59-73) 124/60  SpO2:  [95 %-98 %] 97 %  Body mass index is 23.73 kg/m .  GEN: Well developed, well nourished 86 year old in no acute distress.  HEENT: sclera anicteric, moist mucous membranes.   LYMPH: No cervical lymphadenopathy  PULM: Nonlabored breathing. Breath sounds equal.   CARDIO: Regular rate  GI: Non-distended. Soft.  Non-tender to palpation.  No guarding. No rebound tenderness.  EXT: warm, no lower extremity edema  NEURO: Alert. No focal defects.   PSYCH: Mental status appropriate, mood and affect normal.    SKIN: No rashes  MSK: No joint abnormalities    LABORATORY DATA:  CBC RESULTS:   Recent Labs   Lab Test 04/16/22  1004 04/16/22  0516 04/15/22  0522 04/14/22  1232 04/14/22  0503   WBC  --  9.9 12.6*  --  17.7*   RBC  --  2.31* 2.32*  --  2.23*   HGB 7.5* 7.3* 7.6*   < > 7.3*   HCT  --  23.4* 23.1*  --  21.9*   MCV  --  101* 100  --  98   MCH  --  31.6 32.8  --  32.7   MCHC  --  31.2* 32.9  --  33.3   RDW  --  15.9* 16.0*  --  15.9*   PLT  --  298 296  --  276    < > = values in this interval not displayed.        CMP Results:   Recent Labs   Lab Test 04/16/22  0516 04/15/22  0522 04/14/22  1832 04/14/22  1232 04/14/22  0503 04/13/22  1210 04/13/22  0103 03/21/22  0658 03/20/22  1502 02/15/22  1630 02/15/22  1016   NA  --  138  --   --  136  --  128*   < > 137   < > 133*   POTASSIUM 3.5 3.6 3.6   < > 3.0*   < > 3.1*   < > 3.0*   < > 3.8   CHLORIDE  --  110*  --   --  106  --  98   < > 103   < > 97*   CO2  --  20*  --   --  19*  --  18*   < > 22   < > 25   ANIONGAP  --  8  --   --  11  --  12   < > 12   < > 11    GLC  --  87  --   --  94  --  93   < > 115   < > 160*   BUN  --  12  --   --  19  --  15   < > 5*   < > 11   CR 0.72 0.92  --   --  1.27*  --  1.21*   < > 0.75   < > 0.78   BILITOTAL  --   --   --   --  0.2  --   --   --  0.2  --  0.3   ALKPHOS  --   --   --   --  85  --   --   --  96  --  63   ALT  --   --   --   --  <9  --   --   --  <9  --  <9   AST  --   --   --   --  8  --   --   --  16  --  10    < > = values in this interval not displayed.        INR Results:   Lab Results   Component Value Date    INR 1.10 03/20/2022          RELEVANT IMAGING:      No results found.     I spent 45 minutes reviewing the Epic chart, [facility] records, talking with and examining the patient, synthesizing data and formulating an impression/recommendation.I spent 45 minutes reviewing the Epic chart, [facility] records, talking with and examining the patient, synthesizing data and formulating an impression/recommendation.             López Hinson M.D.  Thank you for the opportunity to participate in the care of this patient.   Please feel free to call me with any questions or concerns.  Phone number [phone].            CC: [facility], Kizzy Villanueva

## 2022-04-16 NOTE — PROGRESS NOTES
.Care Management Discharge Note    Discharge Date: 04/16/2022     Discharge Disposition: Home    Discharge Services: Other (see comment)    Discharge DME: Other (see comment)    Discharge Transportation:  (Family)    Private pay costs discussed: Not applicable    PAS Confirmation Code:  (NA)  Patient/family educated on Medicare website which has current facility and service quality ratings: no    Education Provided on the Discharge Plan:    Persons Notified of Discharge Plans: Nursing; left message for clinic as patient's family is requesting clinic care coordination.  Patient/Family in Agreement with the Plan: yes    Handoff Referral Completed: Yes    Additional Information:  Discharge to home with family support.    Kate Ang RN

## 2022-04-16 NOTE — PROVIDER NOTIFICATION
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 I have documented this and I'm notifying you.

## 2022-04-16 NOTE — PLAN OF CARE
Goal Outcome Evaluation:  Patient is discharging to home. AVS discussed with patient's daughter May who is her primary caregiver. Belongings returned. Vest is put on on when pt is upright.

## 2022-04-16 NOTE — PLAN OF CARE
Problem: Plan of Care - These are the overarching goals to be used throughout the patient stay.    Goal: Optimal Comfort and Wellbeing  Outcome: Ongoing, Progressing   Goal Outcome Evaluation:    Patient will discharge home, orders are placed. Waiting to hear back from patients daughter to come and pick her up.

## 2022-04-16 NOTE — PLAN OF CARE
Occupational Therapy Discharge Summary    Reason for therapy discharge:    Discharged to home.    Progress towards therapy goal(s). See goals on Care Plan in [facility] electronic health record for goal details.  Goals met    Therapy recommendation(s):    No further therapy is recommended.

## 2022-04-16 NOTE — DISCHARGE SUMMARY
[facility] MEDICINE  DISCHARGE SUMMARY     Primary Care Physician: Kizzy Villanueva              Admission Date: 4/13/2022   Discharge Provider: Tanisha Garcia Discharge Date: 4/16/2022   Diet:   Active Diet and Nourishment Order   Procedures     Fluid restriction 1500 ML FLUID     Combination Diet 2 gm NA Diet     Diet         Activity: DCACTIVITY: Activity as tolerated  Ambulance with assist; fall precaution    Code Status: No CPR- Pre-arrest intubation OK         Condition at Discharge: improving      REASON FOR PRESENTATION(See Admission Note for Details)   sob    PRINCIPAL & ACTIVE DISCHARGE DIAGNOSES     Active Problems:    Hypokalemia    Orthopnea    Shortness of breath    Hyponatremia    Acute kidney injury (H)    Pneumonia of left lower lobe due to infectious organism      SIGNIFICANT FINDINGS (Imaging, labs):   EXAM: CT CHEST W/O CONTRAST  LOCATION: [facility]  DATE/TIME: 4/13/2022 2:30 AM     INDICATION: Pneumonia, effusion or abscess suspected, x-ray done. Shortness of breath with orthopnea.                                                                   IMPRESSION:   1.  Significant motion artifact limits evaluation. Allowing for this there has been slight interval improvement in bilateral lower lung interstitial and alveolar opacities. There remains some mild underlying interstitial and alveolar infiltrate in the   left lower lobe with bronchial wall thickening compatible with residual changes of pneumonia.     2.  No pleural fluid.     3.  Evidence for old granulomatous disease.     Latest Reference Range & Units 04/13/22 01:03 04/13/22 01:48   Sodium 136 - 145 mmol/L 128 (L)    Potassium 3.5 - 5.0 mmol/L 3.1 (L)    Chloride 98 - 107 mmol/L 98    Carbon Dioxide 22 - 31 mmol/L 18 (L)    Urea Nitrogen 8 - 28 mg/dL 15    Creatinine 0.60 - 1.10 mg/dL 1.21 (H)    GFR Estimate >60 mL/min/1.73m2 43 (L) [1]    Calcium 8.5 - 10.5 mg/dL 8.3 (L)    Anion Gap 5 - 18 mmol/L  12    BNP 0 - 167 pg/mL 396 (H)    Lactic Acid 0.7 - 2.0 mmol/L  0.9   Procalcitonin 0.00 - 0.49 ng/mL  0.88 (H) [2]   Troponin I 0.00 - 0.29 ng/mL 0.03    Glucose 70 - 125 mg/dL 93    WBC 4.0 - 11.0 10e3/uL 28.8 (H)    Hemoglobin 11.7 - 15.7 g/dL 8.3 (L)    Hematocrit 35.0 - 47.0 % 24.4 (L)    Platelet Count 150 - 450 10e3/uL 286    RBC Count 3.80 - 5.20 10e6/uL 2.51 (L)    MCV 78 - 100 fL 97    MCH 26.5 - 33.0 pg 33.1 (H)    MCHC 31.5 - 36.5 g/dL 34.0    RDW 10.0 - 15.0 % 15.5 (H)    % Neutrophils % 82    % Lymphocytes % 4    % Monocytes % 11    % Eosinophils % 1    % Basophils % 0    % Metamyelocytes % 2    Absolute Basophils 0.0 - 0.2 10e3/uL 0.0    Absolute Neutrophil 1.6 - 8.3 10e3/uL 23.6 (H)    Absolute Lymphocytes 0.8 - 5.3 10e3/uL 1.2    Absolute Monocytes 0.0 - 1.3 10e3/uL 3.2 (H)    Absolute Eosinophils 0.0 - 0.7 10e3/uL 0.3    Absolute Metamyelocytes <=0.0 10e3/uL 0.6 (H)    RBC Morphology  Confirmed RBC Indices    Platelet Morphology Automated Count Confirmed. Platelet morphology is normal.  Automated Count Confirmed. Platelet morphology is normal.    RBC Fragments None Seen  Slight !    Marshall Cells None Seen  Slight !    (L): Data is abnormally low  (H): Data is abnormally high  !: Data is abnormal  [1] Effective December 21, 2021 eGFRcr in adults is calculated using the 2021 CKD-EPI creatinine equation which includes age and gender (Clarisa et al., NEJM, DOI: 10.1056/CIILjw0760547)  [2] Normal Procalcitonin in healthy populations: <=0.07  ng/ml       Procalcitonin Interpretation Guidelines:    Diagnosis of systemic bacterial infection/sepsis/septic shock:        <0.5 ng/mL:           Low risk of sepsis; localized bacterial infection possible        >=0.5 to <=2.0 ng/mL: Sepsis possible. Interpret in context of the specific clinical background and condition of the patient. Retest PCL within 6-24 hours.        >2.0 ng.mL:           High risk of sepsis and/or septic shock.           Antibiotic therapy may  be discontinued if the current PCL is <=0.5 ng/mL or the Change in PCL (using highest PCL this admission and current PCL) is >80%. Do PCL testing on patients being treated with antibiotics for sepsis every 1-2 days.       Diagnosis of lower respiratory tract infection(LRTI) and decision making on antibiotic therapy:       <0.1 ng/ml:            Bacterial infection very unlikely. Antibiotic therapy strongly discouraged.       0.1 to 0.25 ng/mL :    Bacterial infection unlikely. Antibiotic therapy discouraged.       0.26 to 0.5 ng/ml:     Bacterial infection likely. Antibiotic therapy encouraged.       >0.5 ng/mL:            Bacterial infection very likely. Antibiotic therapy strongly encouraged.                     If antibiotics are not started for suspected LRTI, repeat PCL within 6-24 hours if symptoms persist or worsen.        Antibiotic therapy for LRTI may be discontinued if the PCL is <=0.25 ng/ml or the Change in PCL is >80%        Do PCL testing on patients being treated with antibiotics for LRTI every 1-2 days.       Decisions regarding antibiotic therapy should not be based solely on PCL concentrations. PCL results should be used in conjunction with the laboratory findings and clinical signs and be interpreted in the context of the patient's clinical situation.    PENDING LABS     none    PROCEDURES ( this hospitalization only)      none    RECOMMENDATION FOR F/U VISIT     Follow up with primary care physician within 1 week  Follow up with GI through PCP  Follow up with ENT if tongue ulcers not improving as expected      DISPOSITION     Home with family    SUMMARY OF HOSPITAL COURSE:      Chris Bell is a 86 year old female with a history of PUD, L2 compression fracture, essential hypertension, anemia, GI bleed, heart failure, necrosis of soft palate, hypothyroidism, recent C. Difficile (02/22), chronic diastolic CHF who presented with shortness of breath.       Acute hypoxic respiratory failure likely  secondary to fluid overload and pneumonia              Acute on chronic diastolic heart failure.  EF of 55 to 60%.  Mild to moderate tricuspid regurg, small pericardial effusion              BNP elevated, troponin normal              IV Lasix- switch to PO home dose lasix 4/14 -feeling better              Strict I's and O's.  Telemetry.  Daily electrolyte checks and replacement              Pneumonia: Procalcitonin elevated.  CT chest reviewed with residual changes of pneumonia noted.              Vancomycin and Zosyn initiated 4/13 -now procalcitonin wnl, no clinical pneumonia sysptoms, with recent c diff, and loose stool now, will discontinue abx and close monitoring on 4/15-stable; check c diff toxins neg-stable while off abx              Follow-up blood cultures - no growth so far     Acute worsening of chronic anemia              Concern for possible GIB as there is report of bloody stools- known history of PUD -on PPI              Repeat HGB has been stable around 7.5  Continue iron supplements  -stool occult blood+;   -consulted GI. As per GI, chronic anemia;  patient has refused colonoscopy; continue PPI. Iron and carafate; no further recommendation unless pt changes her mind about colonoscopy.      Acute tongue pain-possibly thrush- improving               History of necrosis of soft palate              Plan for nystatin swish and swallow.  Artificial saliva also ordered              No new medications.  No ACE or ARB's.  Check C1 esterase, C1q and C4.  No evidence for respiratory compromise currently.  Patient denies swelling to lips.     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  Fluid restrict.              Was on salt tabs previously  -resolved     Hypokalemia-replace per protocol; on K supplement at  [place]     Leukocytosis-significantly elevated but trending down               Likely related to pneumonia  -resolved     Abnormal urinalysis              Already on antibiotics for pneumonia.  Follow-up urine culture- no growth      Subacute L2 compression fracture              Was evaluated by neurosurgery and given TLSO brace.  Requested son bring this brace back in to work with PT.     Known history of PUD and GI bleed              Continue home PPI and Carafate     Hypothyroidism-continue home levothyroxine     Hypertension-continue home metoprolol     Recent C. difficile              Monitor for recurrence  Rechecked c diff toxins which were negative    Disposition: patient has been refusing medications and pt/ot therapies; and patient insisted on going home. Discussed with family who are willing to take patient home and hope that patient will feel better and more willing to be compliant with medications.     Patient's other chronic medical conditions are stable and home medications were continued.    Discharge Medications with Med changes:       Current Discharge Medication List      START taking these medications    Details   artificial saliva (BIOTENE MT) SOLN solution Swish and spit 1-2 mLs (1-2 sprays) in mouth 4 times daily  Qty: 44.3 mL, Refills: 1    Associated Diagnoses: Oral ulcer      nystatin (MYCOSTATIN) 637150 UNIT/ML suspension Swish and swallow 5 mLs (500,000 Units) in mouth 4 times daily  Qty: 60 mL, Refills: 0    Associated Diagnoses: Oral ulcer         CONTINUE these medications which have NOT CHANGED    Details   acetaminophen (TYLENOL) 500 MG tablet Take 1-2 tablets (500-1,000 mg) by mouth every 8 hours as needed for pain  Qty: 90 tablet, Refills: 1    Associated Diagnoses: Right lumbar radiculitis; Chronic right-sided low back pain with right-sided sciatica      alendronate (FOSAMAX) 70 MG tablet Take 70 mg by mouth every 7 days      calcium carbonate (TUMS) 500 MG chewable tablet Take 2  chew tab by mouth every 2 hours as needed   Refills: 3      ferrous sulfate (FEROSUL) 325 (65 Fe) MG tablet Take 325 mg by mouth 2 times daily      furosemide (LASIX) 20 MG tablet Take 20 mg by mouth daily as needed      levothyroxine (SYNTHROID/LEVOTHROID) 50 MCG tablet Take 50 mcg by mouth every morning       LORazepam (ATIVAN) 0.5 MG tablet Take 0.5 mg by mouth 3 times daily (before meals)  Refills: 0      metoprolol succinate ER (TOPROL-XL) 50 MG 24 hr tablet Take 50 mg by mouth 2 times daily       multivitamin w/minerals (THERA-VIT-M) tablet Take 1 tablet by mouth daily      pantoprazole (PROTONIX) 40 MG EC tablet Take 40 mg by mouth daily       potassium chloride ER (KLOR-CON M) 10 MEQ CR tablet Take 10 mEq by mouth daily      sucralfate (CARAFATE) 1 GM tablet Take 1 g by mouth 3 times daily (before meals)      Alcohol Swabs (B-D SINGLE USE SWABS REGULAR) PADS USE UTD  Refills: 0      blood glucose monitoring (ONETOUCH VERIO) meter device kit 2 times daily  Refills: 0      ONETOUCH VERIO IQ test strip 2 times daily  Refills: 2               Rationale for medication changes:      Oral ulcers    Patient's long term medication were not changed during this hospitalization.    Consults     nutrition      Discharge Procedure Orders   Reason for your hospital stay   Order Comments: Sob  Fluid overload     Follow-up and recommended labs and tests    Order Comments: Follow up with primary care provider, INOCENCIA PRINGLE, within 7 days to evaluate medication change and for hospital follow- up.  The following labs/tests are recommended: cbc, bmp, bnp.    Follow up with GI ore report to ER if blood in stool     Activity   Order Comments: Your activity upon discharge: activity as tolerated; fall precaution     Order Specific Question Answer Comments   Is discharge order? Yes      When to contact your care team   Order Comments: Call your primary doctor if you have any of the following: temperature greater than 100.5f  or less than 96f,  increased shortness of breath, increased swelling, or increased pain, or blood in stool or any other concerns     Diet   Order Comments: Follow this diet upon discharge: Orders Placed This Encounter      Fluid restriction 1500 ML FLUID      Combination Diet 2 gm NA Diet     Order Specific Question Answer Comments   Is discharge order? Yes      Examination     Vital Signs in last 24 hours:   vss    General appearance: Not in acute distress  HEENT: PERRL, EOMI; several small ulcers on edge of tongue   Neck: Supple, no JVD  Lungs: Clear breath sounds in bilateral lung fields  Cardiovascular: Regular rate and rhythm, normal S1-S2  Abdomen: Soft, non tender, no distension  Musculoskeletal: No joint swelling  Skin: No rash and no edema  Neurology: at baseline.        Please see EMR for more detailed significant labs, imaging, consultant notes etc.    Total time spent on discharge: 50 minutes    Tanisha Garcia MD (Alex)  [facility] Service: Ph:[phone]    CC:Kizzy Villanueva   [facility] Reference Range & Units 04/15/22 14:36 04/16/22 05:16 04/16/22 10:04   Potassium 3.5 - 5.0 mmol/L  3.5    Creatinine 0.60 - 1.10 mg/dL  0.72    GFR Estimate >60 mL/min/1.73m2  81 [1]    Occult Blood Negative  Positive !     WBC 4.0 - 11.0 10e3/uL  9.9    Hemoglobin 11.7 - 15.7 g/dL  7.3 (L) 7.5 (L)   Hematocrit 35.0 - 47.0 %  23.4 (L)    Platelet Count 150 - 450 10e3/uL  298    RBC Count 3.80 - 5.20 10e6/uL  2.31 (L)    MCV 78 - 100 fL  101 (H)    MCH 26.5 - 33.0 pg  31.6    MCHC 31.5 - 36.5 g/dL  31.2 (L)    RDW 10.0 - 15.0 %  15.9 (H)    C Difficile Toxin B by PCR Negative  Negative [2]     !: Data is abnormal  (L): Data is abnormally low  (H): Data is abnormally high  [1] Effective December 21, 2021 eGFRcr in adults is calculated using the 2021 CKD-EPI creatinine equation which includes age and gender (Clarisa boyd al., NEJ, DOI: 10.1056/RFMWtm4397316)  [2] A negative result does not exclude actual  disease due to C. difficile and may be due to improper collection, handling and storage of the specimen or the number of organisms in the specimen is below the detection limit of the assay.

## 2022-04-16 NOTE — PLAN OF CARE
Goal Outcome Evaluation:  Sent stool for guiac and cdiff. Guaic + blood. Cdiff is pending yet. Pt has little motivation to be up in chair. Tlso when up out of bed.  Pt does eat or drink much for meals. Pt states her mouth hurts too much and will only eat rice cereal and warm water. Held po ativan at supper d/t pt too sleepy.

## 2022-04-16 NOTE — PLAN OF CARE
Problem: Plan of Care - These are the overarching goals to be used throughout the patient stay.    Goal: Readiness for Transition of Care  Outcome: Ongoing, Progressing     Problem: Risk for Delirium  Goal: Improved Behavioral Control  Outcome: Ongoing, Progressing  Goal: Improved Attention and Thought Clarity  Outcome: Ongoing, Progressing  Intervention: Maximize Cognitive Function  Recent Flowsheet Documentation  Taken 4/15/2022 2000 by Peter Tate RN  Reorientation Measures:    reorientation provided    clock in view     Problem: Anemia  Goal: Anemia Symptom Improvement  Outcome: Ongoing, Progressing  Intervention: Monitor and Manage Anemia  Recent Flowsheet Documentation  Taken 4/15/2022 2000 by Peter Tate RN  Safety Promotion/Fall Prevention:    bed alarm on    chair alarm on   Goal Outcome Evaluation:        Tool over patient at 1900.    Alert. Forgetful. Disoriented to time and situation. Keeps repeating things when conversing with patient.  used via [place].     Calm and pleasant.    Denied pain.    Diminished crackles left lower lobe. Denied SOB. In room air, above 90s O2 sat.    Cdiff test came back negative. No further diarrhea this evening shift.    Will continue to monitor.

## 2022-04-18 NOTE — PLAN OF CARE
Physical Therapy Discharge Summary    Reason for therapy discharge:    Discharged to home.    Progress towards therapy goal(s). See goals on Care Plan in [facility] electronic health record for goal details.  Goals partially met.  Barriers to achieving goals:   limited tolerance for therapy and discharge from facility.    Therapy recommendation(s):    Continue home exercise program.

## 2022-04-18 NOTE — PROGRESS NOTES
Clinic Care Coordination Contact  [facility]/Voicemail       Clinical Data: Care Coordinator Outreach  Outreach attempted x 1.Hmong  Left message on patient's voicemail with call back information and requested return call.   Care Coordinator will try to reach patient again in 1-2 business days.      Juanis Momin  [phone]  Care

## 2022-04-19 NOTE — PROGRESS NOTES
Clinic Care Coordination Contact  [facility]       Clinical Data: Care Coordinator Outreach  Outreach attempted x 2.   Daughter took message for her Mother for she wasn't with her right now but she stated her mom had a appointment on the 21st and took my direct number if Patient had and questions or concerns. Care Coordinator will do no further outreaches at this time.      Juanis Momin  [phone]  Care

## 2022-05-06 PROBLEM — M25.552 BILATERAL HIP PAIN: Status: RESOLVED | Noted: 2018-06-19 | Resolved: 2022-01-01

## 2022-05-06 PROBLEM — R10.9 ABDOMINAL PAIN: Status: RESOLVED | Noted: 2018-01-16 | Resolved: 2022-01-01

## 2022-05-06 PROBLEM — I50.30 (HFPEF) HEART FAILURE WITH PRESERVED EJECTION FRACTION (H): Status: ACTIVE | Noted: 2022-01-01

## 2022-05-06 PROBLEM — J96.01 ACUTE RESPIRATORY FAILURE WITH HYPOXIA (H): Status: RESOLVED | Noted: 2022-01-01 | Resolved: 2022-01-01

## 2022-05-06 PROBLEM — M25.551 BILATERAL HIP PAIN: Status: RESOLVED | Noted: 2018-06-19 | Resolved: 2022-01-01

## 2022-05-06 NOTE — LETTER
5/6/2022    INOCENCIAAWILDA PRINGLE  [facility] [address] [address]    RE: Chris Bell       Dear Colleague,     I had the pleasure of seeing Chris Bell in the [facility].  HEART CARE PROGRESS NOTE      [facility]  [phone]      Assessment/Recommendations   Assessment:    1.  Heart failure with preserved ejection fraction: She has had increased shortness of breath and has crackles in her lungs.  She continues to have lower extremity edema.  It is unclear how she is taking her furosemide.  It is listed as 20 mg daily as needed.  Her daughter will check when she gets home today about what dose she is taking.  We discussed increasing the dose due to signs of fluid retention.  We reviewed heart failure diagnosis, medications, treatment plan, low sodium diet, weight monitoring, and symptom monitoring.      2.  Hypertension: Blood pressure 110/62.    Plan:  1.  Start taking Lasix 20 mg daily.  If she is already taking daily, increase to 20 mg twice daily.  I discussed this with the daughter today with the .  Her daughter understands these instructions.  2.  She reports labs were drawn on April 20 when she saw her primary care provider.  Lab results requested from medical records.  Her daughter states that labs are stable except hemoglobin which has been chronically low.  3.  Start daily weights  4.  Low-sodium diet    Chris Bell will follow up in the heart failure clinic in 1 to 2 weeks.  She will establish care with Dr. Germain in 1 month.       History of Present Illness/Subjective    Ms. Chris Bell is a 86 year old female seen at [facility] today for hospital follow-up.  She was hospitalized in March 2022 with heart failure exacerbation and pneumonia.  She was again hospitalized April 13 to April 16, 2022 with heart failure exacerbation and pneumonia.  Echocardiogram on April 13, 2022 showed ejection fraction of 55 to  "60%.    She notes increasing shortness of breath since hospital discharge.  She continues to have lower extremity edema and slight edema in her hands bilaterally.  She notes orthopnea.  She has pain on her left thorax from a recent fall. She denies lightheadedness, chest pain and abdominal fullness/bloating.      She is not weighing herself at home.  She is following a low-sodium diet.      ECHO 4/13/2022:   Echo result w/o MOPS: Interpretation Summary The left ventricle is normal in size.Left ventricular systolic function is normal.The visual ejection fraction is 55-60%.No regional wall motion abnormalities noted.Diastolic Doppler findings (E/E' ratio and/or other parameters) suggest leftventricular filling pressures are increased.Normal right ventricle size and systolic function.The right atrium is mildly dilated.The left atrium is mildly dilated.There is mild to moderate (1-2+) tricuspid regurgitation.Estimate of RVsystolic pressure is 27mmhg plus right atrial pressureSmall pericardial effusionThere are no echocardiographic indications of cardiac tamponade.Effusion measures approximately 1cm anterior to the right ventricleCompared to 11/2018 findings are similar.There was a small pericardialeffusion noted on the prior studyThe myocardium appears \"brightâ   if clinically indicated consider amyloid           Physical Examination Review of Systems   Vitals: /62 (BP Location: Right arm, Patient Position: Sitting, Cuff Size: Adult Regular)   Pulse 96   Resp 16   Wt 53.5 kg (118 lb)   BMI 23.83 kg/m    BMI= Body mass index is 23.83 kg/m .  Wt Readings from Last 3 Encounters:   05/06/22 53.5 kg (118 lb)   04/16/22 53.3 kg (117 lb 8 oz)   04/06/22 54.4 kg (120 lb)       General Appearance:     Alert, cooperative and in no acute distress.   ENT/Mouth: membranes moist, no oral lesions or bleeding gums.      EYES:  no scleral icterus, normal conjunctivae   Chest/Lungs:    Crackles in bases bilaterally "   Cardiovascular:   Regular. Normal first and second heart sounds, 1+ edema bilateral lower extremities; trace edema of hands bilaterally   Abdomen:  Soft, nontender, nondistended, bowel sounds present   Extremities: no cyanosis or clubbing   Skin: warm, dry.    Neurologic: mood and affect are appropriate, alert and oriented x3         Please refer above for cardiac ROS details.      Medical History  Surgical History Family History Social History   Past Medical History:   Diagnosis Date     Anemia      Depression      Disease of thyroid gland      HTN (hypertension)      Osteoporosis      PUD (peptic ulcer disease)      Past Surgical History:   Procedure Laterality Date     ESOPHAGOSCOPY, GASTROSCOPY, DUODENOSCOPY (EGD), COMBINED N/A 1/17/2018    Procedure: ESOPHAGOGASTRODUODENOSCOPY (EGD) with h.pylori and gastric ulcer biopsies;  Surgeon: Colin Alvarez MD;  Location: [facility];  Service:      HYSTERECTOMY       US BIOPSY FINE NEEDLE ASPIRATION LYMPH NODE  8/14/2019     Family History   Problem Relation Age of Onset     Diabetes No family hx of      Cancer No family hx of      Heart Disease No family hx of     Social History     Socioeconomic History     Marital status:      Spouse name: Not on file     Number of children: Not on file     Years of education: Not on file     Highest education level: Not on file   Occupational History     Not on file   Tobacco Use     Smoking status: Never Smoker     Smokeless tobacco: Never Used   Substance and Sexual Activity     Alcohol use: No     Drug use: No     Sexual activity: Never   Other Topics Concern     Not on file   Social History Narrative     Not on file     Social Determinants of Health     Financial Resource Strain: Not on file   Food Insecurity: Not on file   Transportation Needs: Not on file   Physical Activity: Not on file   Stress: Not on file   Social Connections: Not on file   Intimate Partner Violence: Not on file   Housing Stability: Not on  file          Medications  Allergies   Current Outpatient Medications   Medication Sig Dispense Refill     acetaminophen (TYLENOL) 500 MG tablet Take 1-2 tablets (500-1,000 mg) by mouth every 8 hours as needed for pain 90 tablet 1     Alcohol Swabs (B-D SINGLE USE SWABS REGULAR) PADS USE UTD  0     alendronate (FOSAMAX) 70 MG tablet Take 70 mg by mouth every 7 days       artificial saliva (BIOTENE MT) SOLN solution Swish and spit 1-2 mLs (1-2 sprays) in mouth 4 times daily 44.3 mL 1     blood glucose monitoring (ONETOUCH VERIO) meter device kit 2 times daily  0     calcium carbonate (TUMS) 500 MG chewable tablet Take 2 chew tab by mouth every 2 hours as needed   3     ferrous sulfate (FEROSUL) 325 (65 Fe) MG tablet Take 325 mg by mouth 2 times daily       furosemide (LASIX) 20 MG tablet Take 1 tablet (20 mg) by mouth daily       levothyroxine (SYNTHROID/LEVOTHROID) 50 MCG tablet Take 50 mcg by mouth every morning        LORazepam (ATIVAN) 0.5 MG tablet Take 0.5 mg by mouth 3 times daily (before meals)  0     metoprolol succinate ER (TOPROL-XL) 50 MG 24 hr tablet Take 50 mg by mouth 2 times daily        multivitamin w/minerals (THERA-VIT-M) tablet Take 1 tablet by mouth daily       nystatin (MYCOSTATIN) 461000 UNIT/ML suspension Swish and swallow 5 mLs (500,000 Units) in mouth 4 times daily 60 mL 0     ONETOUCH VERIO IQ test strip 2 times daily  2     pantoprazole (PROTONIX) 40 MG EC tablet Take 40 mg by mouth daily        potassium chloride ER (KLOR-CON M) 10 MEQ CR tablet Take 10 mEq by mouth daily       sucralfate (CARAFATE) 1 GM tablet Take 1 g by mouth 3 times daily (before meals)      Allergies   Allergen Reactions     Unknown [No Clinical Screening - See Comments]          Lab Results    Chemistry/lipid CBC Cardiac Enzymes/BNP/TSH/INR   No results for input(s): CHOL, HDL, LDL, TRIG, CHOLHDLRATIO in the last 59555 hours.  No results for input(s): LDL in the last 20713 hours.  Recent Labs   Lab Test  04/16/22  0516 04/15/22  0522   NA  --  138   POTASSIUM 3.5 3.6   CHLORIDE  --  110*   CO2  --  20*   GLC  --  87   BUN  --  12   CR 0.72 0.92   GFRESTIMATED 81 60*   MANE  --  7.9*     Recent Labs   Lab Test 04/16/22  0516 04/15/22  0522 04/14/22  0503   CR 0.72 0.92 1.27*     Recent Labs   Lab Test 02/15/22  1016   A1C 5.7*    Recent Labs   Lab Test 04/16/22  1004 04/16/22  0516   WBC  --  9.9   HGB 7.5* 7.3*   HCT  --  23.4*   MCV  --  101*   PLT  --  298     Recent Labs   Lab Test 04/16/22  1004 04/16/22  0516 04/15/22  0522   HGB 7.5* 7.3* 7.6*    Recent Labs   Lab Test 04/13/22  0103 03/20/22  1502 10/17/18  1308   TROPONINI 0.03 0.02 0.01     Recent Labs   Lab Test 04/13/22  0103 03/20/22  1502 10/17/18  1308   * 272* 265*     Recent Labs   Lab Test 07/20/18  1250   TSH 3.15     Recent Labs   Lab Test 03/20/22  1502 02/15/22  1016 01/16/18  2216   INR 1.10 1.03 1.03        60 minutes spent on the date of encounter doing chart review, review of outside records, review of test results, patient visit and documentation.            Thank you for allowing me to participate in the care of your patient.      Sincerely,     Ashlee Sebastian NP     [facility]  cc:   Ari Germain MD  [address] [place] [address]

## 2022-05-06 NOTE — PATIENT INSTRUCTIONS
Chris Bell,    It was a pleasure to see you today at the [facility].     My recommendations after this visit include:  - Take furosemide daily.  If you are already taking daily, please increase to twice daily.  This will help with your breathing and swelling.    - Limit salt in your diet  - Start weighing yourself daily.   - See Ashlee in 1-2 weeks and Dr. Germain (krista) in 1 month.   - If you have any questions or concerns, please call [phone] to talk with my nurse.    Ashlee Tucker, CNP

## 2022-05-06 NOTE — PROGRESS NOTES
HEART CARE PROGRESS NOTE      [facility]  [phone]      Assessment/Recommendations   Assessment:    1.  Heart failure with preserved ejection fraction: She has had increased shortness of breath and has crackles in her lungs.  She continues to have lower extremity edema.  It is unclear how she is taking her furosemide.  It is listed as 20 mg daily as needed.  Her daughter will check when she gets home today about what dose she is taking.  We discussed increasing the dose due to signs of fluid retention.  We reviewed heart failure diagnosis, medications, treatment plan, low sodium diet, weight monitoring, and symptom monitoring.      2.  Hypertension: Blood pressure 110/62.    Plan:  1.  Start taking Lasix 20 mg daily.  If she is already taking daily, increase to 20 mg twice daily.  I discussed this with the daughter today with the .  Her daughter understands these instructions.  2.  She reports labs were drawn on April 20 when she saw her primary care provider.  Lab results requested from medical records.  Her daughter states that labs are stable except hemoglobin which has been chronically low.  3.  Start daily weights  4.  Low-sodium diet    Chris Bell will follow up in the heart failure clinic in 1 to 2 weeks.  She will establish care with Dr. Germain in 1 month.       History of Present Illness/Subjective    Ms. Chris Bell is a 86 year old female seen at [facility] today for hospital follow-up.  She was hospitalized in March 2022 with heart failure exacerbation and pneumonia.  She was again hospitalized April 13 to April 16, 2022 with heart failure exacerbation and pneumonia.  Echocardiogram on April 13, 2022 showed ejection fraction of 55 to 60%.    She notes increasing shortness of breath since hospital discharge.  She continues to have lower extremity edema and slight edema in her hands bilaterally.  She notes orthopnea.  She has pain on her left  "thorax from a recent fall. She denies lightheadedness, chest pain and abdominal fullness/bloating.      She is not weighing herself at home.  She is following a low-sodium diet.      ECHO 4/13/2022:   Echo result w/o MOPS: Interpretation Summary The left ventricle is normal in size.Left ventricular systolic function is normal.The visual ejection fraction is 55-60%.No regional wall motion abnormalities noted.Diastolic Doppler findings (E/E' ratio and/or other parameters) suggest leftventricular filling pressures are increased.Normal right ventricle size and systolic function.The right atrium is mildly dilated.The left atrium is mildly dilated.There is mild to moderate (1-2+) tricuspid regurgitation.Estimate of RVsystolic pressure is 27mmhg plus right atrial pressureSmall pericardial effusionThere are no echocardiographic indications of cardiac tamponade.Effusion measures approximately 1cm anterior to the right ventricleCompared to 11/2018 findings are similar.There was a small pericardialeffusion noted on the prior studyThe myocardium appears \"brightâ   if clinically indicated consider amyloid           Physical Examination Review of Systems   Vitals: /62 (BP Location: Right arm, Patient Position: Sitting, Cuff Size: Adult Regular)   Pulse 96   Resp 16   Wt 53.5 kg (118 lb)   BMI 23.83 kg/m    BMI= Body mass index is 23.83 kg/m .  Wt Readings from Last 3 Encounters:   05/06/22 53.5 kg (118 lb)   04/16/22 53.3 kg (117 lb 8 oz)   04/06/22 54.4 kg (120 lb)       General Appearance:     Alert, cooperative and in no acute distress.   ENT/Mouth: membranes moist, no oral lesions or bleeding gums.      EYES:  no scleral icterus, normal conjunctivae   Chest/Lungs:    Crackles in bases bilaterally   Cardiovascular:   Regular. Normal first and second heart sounds, 1+ edema bilateral lower extremities; trace edema of hands bilaterally   Abdomen:  Soft, nontender, nondistended, bowel sounds present   Extremities: no " cyanosis or clubbing   Skin: warm, dry.    Neurologic: mood and affect are appropriate, alert and oriented x3         Please refer above for cardiac ROS details.      Medical History  Surgical History Family History Social History   Past Medical History:   Diagnosis Date     Anemia      Depression      Disease of thyroid gland      HTN (hypertension)      Osteoporosis      PUD (peptic ulcer disease)      Past Surgical History:   Procedure Laterality Date     ESOPHAGOSCOPY, GASTROSCOPY, DUODENOSCOPY (EGD), COMBINED N/A 1/17/2018    Procedure: ESOPHAGOGASTRODUODENOSCOPY (EGD) with h.pylori and gastric ulcer biopsies;  Surgeon: Colin Alvarez MD;  Location: [facility];  Service:      HYSTERECTOMY       US BIOPSY FINE NEEDLE ASPIRATION LYMPH NODE  8/14/2019     Family History   Problem Relation Age of Onset     Diabetes No family hx of      Cancer No family hx of      Heart Disease No family hx of     Social History     Socioeconomic History     Marital status:      Spouse name: Not on file     Number of children: Not on file     Years of education: Not on file     Highest education level: Not on file   Occupational History     Not on file   Tobacco Use     Smoking status: Never Smoker     Smokeless tobacco: Never Used   Substance and Sexual Activity     Alcohol use: No     Drug use: No     Sexual activity: Never   Other Topics Concern     Not on file   Social History Narrative     Not on file     Social Determinants of Health     Financial Resource Strain: Not on file   Food Insecurity: Not on file   Transportation Needs: Not on file   Physical Activity: Not on file   Stress: Not on file   Social Connections: Not on file   Intimate Partner Violence: Not on file   Housing Stability: Not on file          Medications  Allergies   Current Outpatient Medications   Medication Sig Dispense Refill     acetaminophen (TYLENOL) 500 MG tablet Take 1-2 tablets (500-1,000 mg) by mouth every 8 hours as needed for pain 90  tablet 1     Alcohol Swabs (B-D SINGLE USE SWABS REGULAR) PADS USE UTD  0     alendronate (FOSAMAX) 70 MG tablet Take 70 mg by mouth every 7 days       artificial saliva (BIOTENE MT) SOLN solution Swish and spit 1-2 mLs (1-2 sprays) in mouth 4 times daily 44.3 mL 1     blood glucose monitoring (ONETOUCH VERIO) meter device kit 2 times daily  0     calcium carbonate (TUMS) 500 MG chewable tablet Take 2 chew tab by mouth every 2 hours as needed   3     ferrous sulfate (FEROSUL) 325 (65 Fe) MG tablet Take 325 mg by mouth 2 times daily       furosemide (LASIX) 20 MG tablet Take 1 tablet (20 mg) by mouth daily       levothyroxine (SYNTHROID/LEVOTHROID) 50 MCG tablet Take 50 mcg by mouth every morning        LORazepam (ATIVAN) 0.5 MG tablet Take 0.5 mg by mouth 3 times daily (before meals)  0     metoprolol succinate ER (TOPROL-XL) 50 MG 24 hr tablet Take 50 mg by mouth 2 times daily        multivitamin w/minerals (THERA-VIT-M) tablet Take 1 tablet by mouth daily       nystatin (MYCOSTATIN) 719757 UNIT/ML suspension Swish and swallow 5 mLs (500,000 Units) in mouth 4 times daily 60 mL 0     ONETOUCH VERIO IQ test strip 2 times daily  2     pantoprazole (PROTONIX) 40 MG EC tablet Take 40 mg by mouth daily        potassium chloride ER (KLOR-CON M) 10 MEQ CR tablet Take 10 mEq by mouth daily       sucralfate (CARAFATE) 1 GM tablet Take 1 g by mouth 3 times daily (before meals)      Allergies   Allergen Reactions     Unknown [No Clinical Screening - See Comments]          Lab Results    Chemistry/lipid CBC Cardiac Enzymes/BNP/TSH/INR   No results for input(s): CHOL, HDL, LDL, TRIG, CHOLHDLRATIO in the last 14096 hours.  No results for input(s): LDL in the last 99978 hours.  Recent Labs   Lab Test 04/16/22  0516 04/15/22  0522   NA  --  138   POTASSIUM 3.5 3.6   CHLORIDE  --  110*   CO2  --  20*   GLC  --  87   BUN  --  12   CR 0.72 0.92   GFRESTIMATED 81 60*   MANE  --  7.9*     Recent Labs   Lab Test 04/16/22 0516  04/15/22  0522 04/14/22  0503   CR 0.72 0.92 1.27*     Recent Labs   Lab Test 02/15/22  1016   A1C 5.7*    Recent Labs   Lab Test 04/16/22  1004 04/16/22  0516   WBC  --  9.9   HGB 7.5* 7.3*   HCT  --  23.4*   MCV  --  101*   PLT  --  298     Recent Labs   Lab Test 04/16/22  1004 04/16/22  0516 04/15/22  0522   HGB 7.5* 7.3* 7.6*    Recent Labs   Lab Test 04/13/22  0103 03/20/22  1502 10/17/18  1308   TROPONINI 0.03 0.02 0.01     Recent Labs   Lab Test 04/13/22  0103 03/20/22  1502 10/17/18  1308   * 272* 265*     Recent Labs   Lab Test 07/20/18  1250   TSH 3.15     Recent Labs   Lab Test 03/20/22  1502 02/15/22  1016 01/16/18  2216   INR 1.10 1.03 1.03        60 minutes spent on the date of encounter doing chart review, review of outside records, review of test results, patient visit and documentation.

## 2022-05-20 PROBLEM — J18.9 PNEUMONIA OF LEFT LOWER LOBE DUE TO INFECTIOUS ORGANISM: Status: RESOLVED | Noted: 2022-01-01 | Resolved: 2022-01-01

## 2022-05-20 PROBLEM — J18.9 PNEUMONIA DUE TO INFECTIOUS ORGANISM, UNSPECIFIED LATERALITY, UNSPECIFIED PART OF LUNG: Status: RESOLVED | Noted: 2022-01-01 | Resolved: 2022-01-01

## 2022-05-20 NOTE — ED PROVIDER NOTES
"EMERGENCY DEPARTMENT NOTE     Name: Chris Bell    Age/Sex: 86 year old female   MRN: [id number]   Evaluation Date & Time:  5/19/2022  8:09 PM    PCP:    Kizzy Villanueva   ED Provider: Marty Don D.O.       CHIEF COMPLAINT    Shortness of Breath and Leg Swelling       DIAGNOSIS & DISPOSITION     1. Lower extremity edema      DISPOSITION: Home    At the conclusion of the encounter I discussed the results of all of the tests and the disposition. The questions were answered. The patient or family acknowledged understanding and was agreeable with the care plan.    TOTAL CRITICAL CARE TIME (EXCLUDING PROCEDURES): Not applicable    PROCEDURES:   None    EMERGENCY DEPARTMENT COURSE/MEDICAL DECISION MAKING   8:19 PM I met with the patient to gather history and to perform my initial exam.  We discussed treatment options and the plan for care while in the Emergency Department.  11:37 PM Rechecked patient. Updated her and daughter on results. Discussed plan for discharge - patient and daughter agreeable.    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  Triage note reviewed:Pt reports an increase in shortness of breath and lower extremity edema. Pt states this started when she increased her Lasix dose two weeks ago. Pt is a poor historian through . Pt has history of CHF.     Differential  diagnosis  considered included but not limited to CHF, ARF, DVT  Vital signs:BP (!) 162/72   Pulse 80   Temp 99.2  F (37.3  C) (Temporal)   Resp 19   Ht 1.499 m (4' 11\")   Wt 53.5 kg (118 lb)   SpO2 100%   BMI 23.83 kg/m    Pertinent physical exam findings:  Cardiac: Regular rate and rhythm S1-S2 without murmur rub  Pulmonary: Lungs are clear to ascultation bilaterally with good breath sounds  Musculoskeletal: 2+ edema bilateral lower extremities nonpitting, " no cellulitis  Diagnostic studies:  Imaging:  US Lower Extremity Venous Duplex Bilateral   Final Result   IMPRESSION:   1.  No deep venous thrombosis in the bilateral lower extremities.   2.  Left calf edema.   3.  Left popliteal fossa cysts.      XR Chest Port 1 View   Final Result   IMPRESSION: Strands of fibrosis and/or linear atelectasis unchanged. Lungs otherwise clear. No visible pleural fluid. Mild stable cardiac enlargement further exaggerated by the prominent left apical paracardial fat. Normal pulmonary vascularity. Bones    are demineralized.        Lab:  Labs Ordered and Resulted from Time of ED Arrival to Time of ED Departure   COMPREHENSIVE METABOLIC PANEL - Abnormal       Result Value    Sodium 135 (*)     Potassium 3.4 (*)     Chloride 105      Carbon Dioxide (CO2) 22      Anion Gap 8      Urea Nitrogen 8      Creatinine 0.78      Calcium 8.1 (*)     Glucose 128 (*)     Alkaline Phosphatase 90      AST 17      ALT <9      Protein Total 8.0      Albumin 2.2 (*)     Bilirubin Total 0.2      GFR Estimate 74     ROUTINE UA WITH MICROSCOPIC REFLEX TO CULTURE - Abnormal    Color Urine Colorless      Appearance Urine Clear      Glucose Urine Negative      Bilirubin Urine Negative      Ketones Urine Negative      Specific Gravity Urine 1.006      Blood Urine Negative      pH Urine 6.0      Protein Albumin Urine Negative      Urobilinogen Urine <2.0      Nitrite Urine Negative      Leukocyte Esterase Urine 75 Joanna/uL (*)     Bacteria Urine Few (*)     Mucus Urine Present (*)     RBC Urine 0      WBC Urine 8 (*)     Squamous Epithelials Urine <1     B-TYPE NATRIURETIC PEPTIDE ( [facility] ONLY) - Abnormal     (*)    CBC WITH PLATELETS AND DIFFERENTIAL - Abnormal    WBC Count 5.4      RBC Count 2.43 (*)     Hemoglobin 7.4 (*)     Hematocrit 24.4 (*)           MCH 30.5      MCHC 30.3 (*)     RDW 17.6 (*)     Platelet Count 268      % Neutrophils 54      % Lymphocytes 22      % Monocytes 17      %  Eosinophils 4      % Basophils 1      % Immature Granulocytes 2      NRBCs per 100 WBC 0      Absolute Neutrophils 3.1      Absolute Lymphocytes 1.2      Absolute Monocytes 0.9      Absolute Eosinophils 0.2      Absolute Basophils 0.0      Absolute Immature Granulocytes 0.1      Absolute NRBCs 0.0     TROPONIN I - Normal    Troponin I 0.01     INFLUENZA A/B & SARS-COV2 PCR MULTIPLEX - Normal    Influenza A PCR Negative      Influenza B PCR Negative      SARS CoV2 PCR Negative        EKG: Sinus rhythm with sinus arrhythmia. TWI in V2, nonspecific. Compared to most recent ECG from 4/12/2022, no significant change.  Interventions: Acetaminophen  Medical decision making: No evidence of congestive heart failure on EKG.  Ultrasound bilateral lower extremities negative for DVT.  Patient is currently on Lasix 20 mg twice daily.  They have scheduled follow-up with cardiology tomorrow from prior visit earlier this month and will recommend to the daughter and the patient that they keep this appointment and in decision for change in diuretics.  Discussed a dose of IV diuretics here in the emergency department with the daughter is concerned because of the patient's urinary frequency while on diuretics at this would be difficult tonight  Return criteria are discussed and if progressive symptoms including development of increased leg swelling or shortness of breath will return to the emergency department.    ED INTERVENTIONS     Medications   acetaminophen (TYLENOL) tablet 650 mg (650 mg Oral Given 5/19/22 1955)       DISCHARGE MEDICATIONS        Review of your medicines      UNREVIEWED medicines. Ask your doctor about these medicines      Dose / Directions   acetaminophen 500 MG tablet  Commonly known as: TYLENOL  Used for: Right lumbar radiculitis, Chronic right-sided low back pain with right-sided sciatica      Dose: 500-1,000 mg  Take 1-2 tablets (500-1,000 mg) by mouth every 8 hours as needed for pain  Quantity: 90  tablet  Refills: 1     alendronate 70 MG tablet  Commonly known as: FOSAMAX      Dose: 70 mg  Take 70 mg by mouth every 7 days  Refills: 0     artificial saliva Soln solution  Used for: Oral ulcer      Dose: 1-2 spray  Swish and spit 1-2 mLs (1-2 sprays) in mouth 4 times daily  Quantity: 44.3 mL  Refills: 1     calcium carbonate 500 MG chewable tablet  Commonly known as: TUMS      Dose: 2 chew tab  Take 2 chew tab by mouth every 2 hours as needed  Refills: 3     ferrous sulfate 325 (65 Fe) MG tablet  Commonly known as: FEROSUL      Dose: 325 mg  Take 325 mg by mouth 2 times daily  Refills: 0     furosemide 20 MG tablet  Commonly known as: LASIX  Used for: Chronic heart failure with preserved ejection fraction (H)      Dose: 20 mg  Take 1 tablet (20 mg) by mouth daily  Refills: 0     levothyroxine 50 MCG tablet  Commonly known as: SYNTHROID/LEVOTHROID      Dose: 50 mcg  Take 50 mcg by mouth every morning  Refills: 0     LORazepam 0.5 MG tablet  Commonly known as: ATIVAN      Dose: 0.5 mg  Take 0.5 mg by mouth 3 times daily (before meals)  Refills: 0     metoprolol succinate ER 50 MG 24 hr tablet  Commonly known as: TOPROL XL      Dose: 50 mg  Take 50 mg by mouth 2 times daily  Refills: 0     multivitamin w/minerals tablet      Dose: 1 tablet  Take 1 tablet by mouth daily  Refills: 0     nystatin 939000 UNIT/ML suspension  Commonly known as: MYCOSTATIN  Indication: Candidiasis Fungal Infection of the Oropharynx  Used for: Oral ulcer      Dose: 500,000 Units  Swish and swallow 5 mLs (500,000 Units) in mouth 4 times daily  Quantity: 60 mL  Refills: 0     pantoprazole 40 MG EC tablet  Commonly known as: PROTONIX      Dose: 40 mg  Take 40 mg by mouth daily  Refills: 0     potassium chloride ER 10 MEQ CR tablet  Commonly known as: KLOR-CON M      Dose: 10 mEq  Take 10 mEq by mouth daily  Refills: 0     sucralfate 1 GM tablet  Commonly known as: CARAFATE      Dose: 1 g  Take 1 g by mouth 3 times daily (before  meals)  Refills: 0        CONTINUE these medicines which have NOT CHANGED      Dose / Directions   B-D SINGLE USE SWABS REGULAR Pads      USE UTD  Refills: 0     blood glucose monitoring meter device kit      2 times daily  Refills: 0     ONETOUCH VERIO IQ test strip  Generic drug: blood glucose      2 times daily  Refills: 2            INFORMATION SOURCE AND LIMITATIONS    History/Exam limitations: None  Patient information was obtained from: Patient, daughter  Use of : Yes (Phone) - Language Hmong    HISTORY OF PRESENT ILLNESS   Chris Bell is a 86 year old female with a 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 Patient reports 2 months of bilateral leg swelling and shortness of breath. Reports she was admitted here around one month ago and given medication which she reports has not been helping. She reports swelling from her feet up to her knees bilaterally. She also complains of hand swelling, however daughter states that her hands are no longer swollen. Patient has difficulty sleeping at night secondary to the shortness of breath. Patient does not weight herself so is unsure of any weight gain.    Patient also complains of 5 days of abdominal pain and diarrhea. She states she only has the abdominal pain when she needs to have a BM, but is resolved after stooling. No nausea or vomiting. She endorses frequent BMs and urination. Denies any other urinary symptoms. Denies fevers or any other concerns.    Per chart review, patient was admitted to this ED from 4/13/22 - 4/16/22 after presenting with shortness of breath. Acute hypoxic respiratory failure likely secondary to fluid overload and pneumonia. Acute on chronic diastolic heart failure, EF of 55 to 60%. Mild to moderate tricuspid regurgitation, small pericardial effusion. BNP elevated. Troponin normal. Was on IV lasix, switched to PO home  dose lasix on 4/14 and was feeling improved.    Procalcitonin was elevated. CT chest with residual changes of pneumonia noted. Vacomycin and Zosyn initiated 4/13. Procalcitonin then wnl. No clinical pneumonia symptoms. Discharged home on 4/16/22.    REVIEW OF SYSTEMS:   Constitutional: Negative for fever.   HENT: Negative for URI symptoms or sore throat.    Eyes: Negative for visual disturbance.   Cardiac: Negative for chest pain, palpitations, near syncope or syncope. Positive for bilateral leg swelling.  Respiratory: Negative for cough. Positive for shortness of breath.  Gastrointestinal: Negative for nausea, vomiting, constipation, rectal bleeding or melena. Positive for abdominal pain, diarrhea, frequent BMs.   Genitourinary: Negative for dysuria, flank pain and hematuria. Positive for frequency.  Musculoskeletal: Negative for back pain.   Skin: Negative for rash  Neurological: Negative for dizziness, headache, syncope, speech difficulty, unilateral weakness or imbalance with walking.   Hematological: Negative for adenopathy. Does not bruise/bleed easily.   Psychiatric/Behavioral: Negative for confusion.       PATIENT HISTORY     Past Medical History:   Diagnosis Date     Anemia      Depression      Disease of thyroid gland      HTN (hypertension)      Osteoporosis      PUD (peptic ulcer disease)      Patient Active Problem List   Diagnosis     Anemia due to GI blood loss     Hypokalemia     PUD (peptic ulcer disease)     T12 compression fracture (H)     Accelerated hypertension     Abnormal finding on imaging     Kyphosis of thoracic region     Electrolyte imbalance     Episode of shaking     Edema     Cancer of soft palate (H)     Gastrointestinal hemorrhage associated with peptic ulcer     Hypomagnesemia     Closed compression fracture of L2 lumbar vertebra, initial encounter (H)     Anemia, unspecified type     Pneumonia due to infectious organism, unspecified laterality, unspecified part of lung      Orthopnea     Shortness of breath     Hyponatremia     Acute kidney injury (H)     Pneumonia of left lower lobe due to infectious organism     (HFpEF) heart failure with preserved ejection fraction (H)     Past Surgical History:   Procedure Laterality Date     ESOPHAGOSCOPY, GASTROSCOPY, DUODENOSCOPY (EGD), COMBINED N/A 1/17/2018    Procedure: ESOPHAGOGASTRODUODENOSCOPY (EGD) with h.pylori and gastric ulcer biopsies;  Surgeon: Colin Alvarez MD;  Location: [facility];  Service:      HYSTERECTOMY       US BIOPSY FINE NEEDLE ASPIRATION LYMPH NODE  8/14/2019     Social Histrory  Smoking:none  Alcohol Use:none  Allergies   Allergen Reactions     Unknown [No Clinical Screening - See Comments]      Pt states she has a medication allergy but does not know what it is       OUTPATIENT MEDICATIONS     Discharge Medication List as of 5/19/2022 11:57 PM         Vitals:    05/19/22 2235 05/19/22 2300 05/19/22 2330 05/19/22 2333   BP:  (!) 149/75 (!) 162/72    Pulse: 77 77 79 80   Resp: 24 28 18 19   Temp:       TempSrc:       SpO2: 98% 99% 99% 100%   Weight:       Height:           Physical Exam   Constitutional: Oriented to person, place, and time. Appears well-developed and well-nourished.   HEENT:    Head: Atraumatic.   Neck: Normal range of motion. Neck supple.   Cardiovascular: Normal rate, regular rhythm and normal heart sounds.    Pulmonary/Chest: Normal effort. Crackles at the lung bases.  Abdominal: Soft. Bowel sounds are normal.   Musculoskeletal: Normal range of motion. 2+ nonpitting edema of the lower extremities bilaterally. No cellulitis.  Neurological: Alert and oriented to person, place, and time. Normal strength. No sensory deficit. No cranial nerve deficit.  Skin: Skin is warm and dry.   Psychiatric: Normal mood and affect. Behavior is normal. Thought content normal.      DIAGNOSTICS    LABORATORY FINDINGS (REVIEWED AND INTERPRETED):  Labs Ordered and Resulted from Time of ED Arrival to Time of ED  Departure   COMPREHENSIVE METABOLIC PANEL - Abnormal       Result Value    Sodium 135 (*)     Potassium 3.4 (*)     Chloride 105      Carbon Dioxide (CO2) 22      Anion Gap 8      Urea Nitrogen 8      Creatinine 0.78      Calcium 8.1 (*)     Glucose 128 (*)     Alkaline Phosphatase 90      AST 17      ALT <9      Protein Total 8.0      Albumin 2.2 (*)     Bilirubin Total 0.2      GFR Estimate 74     ROUTINE UA WITH MICROSCOPIC REFLEX TO CULTURE - Abnormal    Color Urine Colorless      Appearance Urine Clear      Glucose Urine Negative      Bilirubin Urine Negative      Ketones Urine Negative      Specific Gravity Urine 1.006      Blood Urine Negative      pH Urine 6.0      Protein Albumin Urine Negative      Urobilinogen Urine <2.0      Nitrite Urine Negative      Leukocyte Esterase Urine 75 Joanna/uL (*)     Bacteria Urine Few (*)     Mucus Urine Present (*)     RBC Urine 0      WBC Urine 8 (*)     Squamous Epithelials Urine <1     B-TYPE NATRIURETIC PEPTIDE ([facility] ONLY) - Abnormal     (*)    CBC WITH PLATELETS AND DIFFERENTIAL - Abnormal    WBC Count 5.4      RBC Count 2.43 (*)     Hemoglobin 7.4 (*)     Hematocrit 24.4 (*)           MCH 30.5      MCHC 30.3 (*)     RDW 17.6 (*)     Platelet Count 268      % Neutrophils 54      % Lymphocytes 22      % Monocytes 17      % Eosinophils 4      % Basophils 1      % Immature Granulocytes 2      NRBCs per 100 WBC 0      Absolute Neutrophils 3.1      Absolute Lymphocytes 1.2      Absolute Monocytes 0.9      Absolute Eosinophils 0.2      Absolute Basophils 0.0      Absolute Immature Granulocytes 0.1      Absolute NRBCs 0.0     TROPONIN I - Normal    Troponin I 0.01     INFLUENZA A/B & SARS-COV2 PCR MULTIPLEX - Normal    Influenza A PCR Negative      Influenza B PCR Negative      SARS CoV2 PCR Negative         IMAGING (REVIEWED AND INTERPRETED):  US Lower Extremity Venous Duplex Bilateral   Final Result   IMPRESSION:   1.  No deep venous thrombosis in the  bilateral lower extremities.   2.  Left calf edema.   3.  Left popliteal fossa cysts.      XR Chest Port 1 View   Final Result   IMPRESSION: Strands of fibrosis and/or linear atelectasis unchanged. Lungs otherwise clear. No visible pleural fluid. Mild stable cardiac enlargement further exaggerated by the prominent left apical paracardial fat. Normal pulmonary vascularity. Bones    are demineralized.          ECG (REVIEWED AND INTERPRETED):   ECG:   Performed at: 20:44  HR:  76 bpm  Rhythm: Sinus  Axis: 30, 69, 56  QRS duration: 82  QTC: 427  ST changes: No ST segment elevation or depression, no T wave inversion,No Q wave  Interpretation: Sinus rhythm with sinus arrhythmia. TWI in V2, nonspecific.   Compared to most recent ECG from 4/12/2022, no significant change.    I have reviewed the patient's ECG, with comments made as listed above. Please see scanned image for full interpretation.       I, Chidi Canales, am serving as a scribe to document services personally performed by Marty Don D.O., based on my observation and the provider s statements to me.    I, Marty Don D.O., attest that Chidi Canales is acting in a scribe capacity, has observed my performance of the services and has documented them in accordance with my direction.    Marty Don D.O.  EMERGENCY MEDICINE   05/19/22  [facility] EMERGENCY DEPARTMENT  [address] [place] [id number]  [phone]  Dept: [phone]     Marty Don DO  05/20/22 0100

## 2022-05-20 NOTE — DISCHARGE INSTRUCTIONS
Continue Lasix as previously prescribed.  Follow-up at the cardiology clinic tomorrow as scheduled.  Karina scale and start weighing yourself daily and documenting weights.  Return to the emergency department if you have progressive symptoms including development of shortness of breath.

## 2022-05-20 NOTE — PATIENT INSTRUCTIONS
Chris Bell,    It was a pleasure to see you today at the [facility].     My recommendations after this visit include:  - Stop furosemide (lasix) and start bumetanide (bumex) 1 mg daily.    - Start wearing compression stockings.   - Schedule follow up with Ashlee on Monday.   - If you have any questions or concerns, please call [phone] to talk with my nurse.    Ashlee Tucker, CNP

## 2022-05-20 NOTE — LETTER
5/20/2022    INOCENCIA WOOTENPierceBRYAN  [facility] [address] [address]    RE: Chris Bell       Dear Colleague,     I had the pleasure of seeing Chris Bell in the [facility].  HEART CARE PROGRESS NOTE      [facility]  [phone]      Assessment/Recommendations   Assessment:    1.  Heart failure with preserved ejection fraction: She continues to have shortness of breath and orthopnea.  Crackles in her left lower lobe.  Her edema is worsening.  Increased dose of Lasix did not help symptoms.    2.  Hypertension: Blood pressure 106/60.    Plan:  1.  Stop Lasix and start Bumex 1 mg daily  2.  Start wearing compression stockings daily  3.  Start daily weights  4.  Low-sodium diet    Chris Bell will follow up in the heart failure clinic in 3 days.  She will see Dr. Germain on June 13.       History of Present Illness/Subjective    Ms. Chris Bell is a 86 year old female seen at [facility] today for follow-up.  She was hospitalized in March 2022 with heart failure exacerbation and pneumonia.  She was again hospitalized April 13 to April 16, 2022 with heart failure exacerbation and pneumonia.  Echocardiogram on April 13, 2022 showed ejection fraction of 55 to 60%.    She was seen in clinic 2 weeks ago with increased shortness of breath, orthopnea, and edema.  Her Lasix was increased to 20 mg twice daily.  Her symptoms did not improve.  She was seen in the emergency room on May 19 with shortness of breath and edema.  .  She was not given any IV diuretics.chest x-ray showed no pleural fluid.  Negative for DVT.  She continues have shortness of breath with minimal activity and orthopnea.  Lower extremity edema has worsened over the past 2 weeks.  She denies chest pain and abdominal fullness/bloating.      She is not weighing herself at home.  Her clinic weight is down 2 pounds in the past 2 weeks.  She is following a low-sodium  "diet.      ECHO 4/13/2022:   Echo result w/o MOPS: Interpretation Summary The left ventricle is normal in size.Left ventricular systolic function is normal.The visual ejection fraction is 55-60%.No regional wall motion abnormalities noted.Diastolic Doppler findings (E/E' ratio and/or other parameters) suggest leftventricular filling pressures are increased.Normal right ventricle size and systolic function.The right atrium is mildly dilated.The left atrium is mildly dilated.There is mild to moderate (1-2+) tricuspid regurgitation.Estimate of RVsystolic pressure is 27mmhg plus right atrial pressureSmall pericardial effusionThere are no echocardiographic indications of cardiac tamponade.Effusion measures approximately 1cm anterior to the right ventricleCompared to 11/2018 findings are similar.There was a small pericardialeffusion noted on the prior studyThe myocardium appears \"brightâ   if clinically indicated consider amyloid           Physical Examination Review of Systems   Vitals: /60 (BP Location: Left arm, Patient Position: Sitting, Cuff Size: Adult Regular)   Pulse 82   Resp 14   Ht 1.499 m (4' 11\")   Wt 52.6 kg (116 lb)   BMI 23.43 kg/m    BMI= Body mass index is 23.43 kg/m .  Wt Readings from Last 3 Encounters:   05/20/22 52.6 kg (116 lb)   05/19/22 53.5 kg (118 lb)   05/06/22 53.5 kg (118 lb)       General Appearance:     Alert, cooperative and in no acute distress.   ENT/Mouth: membranes moist, no oral lesions or bleeding gums.      EYES:  no scleral icterus, normal conjunctivae   Chest/Lungs:    Crackles in left lower lobe   Cardiovascular:   Regular. Normal first and second heart sounds, 2+ edema bilateral lower extremities; trace edema of hands bilaterally   Abdomen:  Soft, nontender, nondistended, bowel sounds present   Extremities: no cyanosis or clubbing   Skin: warm, dry.    Neurologic: mood and affect are appropriate, alert and oriented x3         Please refer above for cardiac ROS " details.      Medical History  Surgical History Family History Social History   Past Medical History:   Diagnosis Date     Anemia      Depression      Disease of thyroid gland      HTN (hypertension)      Osteoporosis      PUD (peptic ulcer disease)      Past Surgical History:   Procedure Laterality Date     ESOPHAGOSCOPY, GASTROSCOPY, DUODENOSCOPY (EGD), COMBINED N/A 1/17/2018    Procedure: ESOPHAGOGASTRODUODENOSCOPY (EGD) with h.pylori and gastric ulcer biopsies;  Surgeon: Colin Alvarez MD;  Location: [facility];  Service:      HYSTERECTOMY       US BIOPSY FINE NEEDLE ASPIRATION LYMPH NODE  8/14/2019     Family History   Problem Relation Age of Onset     Diabetes No family hx of      Cancer No family hx of      Heart Disease No family hx of     Social History     Socioeconomic History     Marital status:      Spouse name: Not on file     Number of children: Not on file     Years of education: Not on file     Highest education level: Not on file   Occupational History     Not on file   Tobacco Use     Smoking status: Never Smoker     Smokeless tobacco: Never Used   Substance and Sexual Activity     Alcohol use: No     Drug use: No     Sexual activity: Never   Other Topics Concern     Not on file   Social History Narrative     Not on file     Social Determinants of Health     Financial Resource Strain: Not on file   Food Insecurity: Not on file   Transportation Needs: Not on file   Physical Activity: Not on file   Stress: Not on file   Social Connections: Not on file   Intimate Partner Violence: Not on file   Housing Stability: Not on file          Medications  Allergies   Current Outpatient Medications   Medication Sig Dispense Refill     acetaminophen (TYLENOL) 500 MG tablet Take 1-2 tablets (500-1,000 mg) by mouth every 8 hours as needed for pain 90 tablet 1     Alcohol Swabs (B-D SINGLE USE SWABS REGULAR) PADS USE UTD  0     alendronate (FOSAMAX) 70 MG tablet Take 70 mg by mouth every 7 days        artificial saliva (BIOTENE MT) SOLN solution Swish and spit 1-2 mLs (1-2 sprays) in mouth 4 times daily 44.3 mL 1     blood glucose monitoring (ONETOUCH VERIO) meter device kit 2 times daily  0     bumetanide (BUMEX) 1 MG tablet Take 1 tablet (1 mg) by mouth daily 30 tablet 11     calcium carbonate (TUMS) 500 MG chewable tablet Take 2 chew tab by mouth every 2 hours as needed   3     ferrous sulfate (FEROSUL) 325 (65 Fe) MG tablet Take 325 mg by mouth 2 times daily       levothyroxine (SYNTHROID/LEVOTHROID) 50 MCG tablet Take 50 mcg by mouth every morning        LORazepam (ATIVAN) 0.5 MG tablet Take 0.5 mg by mouth 3 times daily (before meals)  0     metoprolol succinate ER (TOPROL-XL) 50 MG 24 hr tablet Take 50 mg by mouth 2 times daily        multivitamin w/minerals (THERA-VIT-M) tablet Take 1 tablet by mouth daily       nystatin (MYCOSTATIN) 563816 UNIT/ML suspension Swish and swallow 5 mLs (500,000 Units) in mouth 4 times daily 60 mL 0     ONETOUCH VERIO IQ test strip 2 times daily  2     pantoprazole (PROTONIX) 40 MG EC tablet Take 40 mg by mouth daily        potassium chloride ER (KLOR-CON M) 10 MEQ CR tablet Take 10 mEq by mouth daily       sucralfate (CARAFATE) 1 GM tablet Take 1 g by mouth 3 times daily (before meals)      Allergies   Allergen Reactions     Unknown [No Clinical Screening - See Comments]      Pt states she has a medication allergy but does not know what it is         Lab Results    Chemistry/lipid CBC Cardiac Enzymes/BNP/TSH/INR   No results for input(s): CHOL, HDL, LDL, TRIG, CHOLHDLRATIO in the last 78865 hours.  No results for input(s): LDL in the last 97234 hours.  Recent Labs   Lab Test 04/16/22  0516 04/15/22  0522   NA  --  138   POTASSIUM 3.5 3.6   CHLORIDE  --  110*   CO2  --  20*   GLC  --  87   BUN  --  12   CR 0.72 0.92   GFRESTIMATED 81 60*   MANE  --  7.9*     Recent Labs   Lab Test 04/16/22  0516 04/15/22  0522 04/14/22  0503   CR 0.72 0.92 1.27*     Recent Labs   Lab Test  02/15/22  1016   A1C 5.7*    Recent Labs   Lab Test 04/16/22  1004 04/16/22  0516   WBC  --  9.9   HGB 7.5* 7.3*   HCT  --  23.4*   MCV  --  101*   PLT  --  298     Recent Labs   Lab Test 04/16/22  1004 04/16/22  0516 04/15/22  0522   HGB 7.5* 7.3* 7.6*    Recent Labs   Lab Test 04/13/22  0103 03/20/22  1502 10/17/18  1308   TROPONINI 0.03 0.02 0.01     Recent Labs   Lab Test 04/13/22  0103 03/20/22  1502 10/17/18  1308   * 272* 265*     Recent Labs   Lab Test 07/20/18  1250   TSH 3.15     Recent Labs   Lab Test 03/20/22  1502 02/15/22  1016 01/16/18  2216   INR 1.10 1.03 1.03        45 minutes spent on the date of encounter doing chart review, review of test results, patient visit and documentation.                Thank you for allowing me to participate in the care of your patient.      Sincerely,     Ashlee Sebastian NP     [facility]  cc:   Ashlee Sebastian NP  [address] [place] [address]

## 2022-05-20 NOTE — ED TRIAGE NOTES
Pt reports an increase in shortness of breath and lower extremity edema. Pt states this started when she increased her Lasix dose two weeks ago. Pt is a poor historian through . Pt has history of CHF.

## 2022-05-20 NOTE — PROGRESS NOTES
HEART CARE PROGRESS NOTE      [facility]  [phone]      Assessment/Recommendations   Assessment:    1.  Heart failure with preserved ejection fraction: She continues to have shortness of breath and orthopnea.  Crackles in her left lower lobe.  Her edema is worsening.  Increased dose of Lasix did not help symptoms.    2.  Hypertension: Blood pressure 106/60.    Plan:  1.  Stop Lasix and start Bumex 1 mg daily  2.  Start wearing compression stockings daily  3.  Start daily weights  4.  Low-sodium diet    Chris Bell will follow up in the heart failure clinic in 3 days.  She will see Dr. Germain on June 13.       History of Present Illness/Subjective    Ms. Chris Bell is a 86 year old female seen at [facility] today for follow-up.  She was hospitalized in March 2022 with heart failure exacerbation and pneumonia.  She was again hospitalized April 13 to April 16, 2022 with heart failure exacerbation and pneumonia.  Echocardiogram on April 13, 2022 showed ejection fraction of 55 to 60%.    She was seen in clinic 2 weeks ago with increased shortness of breath, orthopnea, and edema.  Her Lasix was increased to 20 mg twice daily.  Her symptoms did not improve.  She was seen in the emergency room on May 19 with shortness of breath and edema.  .  She was not given any IV diuretics.chest x-ray showed no pleural fluid.  Negative for DVT.  She continues have shortness of breath with minimal activity and orthopnea.  Lower extremity edema has worsened over the past 2 weeks.  She denies chest pain and abdominal fullness/bloating.      She is not weighing herself at home.  Her clinic weight is down 2 pounds in the past 2 weeks.  She is following a low-sodium diet.      ECHO 4/13/2022:   Echo result w/o MOPS: Interpretation Summary The left ventricle is normal in size.Left ventricular systolic function is normal.The visual ejection fraction is 55-60%.No regional wall motion  "abnormalities noted.Diastolic Doppler findings (E/E' ratio and/or other parameters) suggest leftventricular filling pressures are increased.Normal right ventricle size and systolic function.The right atrium is mildly dilated.The left atrium is mildly dilated.There is mild to moderate (1-2+) tricuspid regurgitation.Estimate of RVsystolic pressure is 27mmhg plus right atrial pressureSmall pericardial effusionThere are no echocardiographic indications of cardiac tamponade.Effusion measures approximately 1cm anterior to the right ventricleCompared to 11/2018 findings are similar.There was a small pericardialeffusion noted on the prior studyThe myocardium appears \"brightâ   if clinically indicated consider amyloid           Physical Examination Review of Systems   Vitals: /60 (BP Location: Left arm, Patient Position: Sitting, Cuff Size: Adult Regular)   Pulse 82   Resp 14   Ht 1.499 m (4' 11\")   Wt 52.6 kg (116 lb)   BMI 23.43 kg/m    BMI= Body mass index is 23.43 kg/m .  Wt Readings from Last 3 Encounters:   05/20/22 52.6 kg (116 lb)   05/19/22 53.5 kg (118 lb)   05/06/22 53.5 kg (118 lb)       General Appearance:     Alert, cooperative and in no acute distress.   ENT/Mouth: membranes moist, no oral lesions or bleeding gums.      EYES:  no scleral icterus, normal conjunctivae   Chest/Lungs:    Crackles in left lower lobe   Cardiovascular:   Regular. Normal first and second heart sounds, 2+ edema bilateral lower extremities; trace edema of hands bilaterally   Abdomen:  Soft, nontender, nondistended, bowel sounds present   Extremities: no cyanosis or clubbing   Skin: warm, dry.    Neurologic: mood and affect are appropriate, alert and oriented x3         Please refer above for cardiac ROS details.      Medical History  Surgical History Family History Social History   Past Medical History:   Diagnosis Date     Anemia      Depression      Disease of thyroid gland      HTN (hypertension)      Osteoporosis      PUD " (peptic ulcer disease)      Past Surgical History:   Procedure Laterality Date     ESOPHAGOSCOPY, GASTROSCOPY, DUODENOSCOPY (EGD), COMBINED N/A 1/17/2018    Procedure: ESOPHAGOGASTRODUODENOSCOPY (EGD) with h.pylori and gastric ulcer biopsies;  Surgeon: Colin Alvarez MD;  Location: [facility];  Service:      HYSTERECTOMY       US BIOPSY FINE NEEDLE ASPIRATION LYMPH NODE  8/14/2019     Family History   Problem Relation Age of Onset     Diabetes No family hx of      Cancer No family hx of      Heart Disease No family hx of     Social History     Socioeconomic History     Marital status:      Spouse name: Not on file     Number of children: Not on file     Years of education: Not on file     Highest education level: Not on file   Occupational History     Not on file   Tobacco Use     Smoking status: Never Smoker     Smokeless tobacco: Never Used   Substance and Sexual Activity     Alcohol use: No     Drug use: No     Sexual activity: Never   Other Topics Concern     Not on file   Social History Narrative     Not on file     Social Determinants of Health     Financial Resource Strain: Not on file   Food Insecurity: Not on file   Transportation Needs: Not on file   Physical Activity: Not on file   Stress: Not on file   Social Connections: Not on file   Intimate Partner Violence: Not on file   Housing Stability: Not on file          Medications  Allergies   Current Outpatient Medications   Medication Sig Dispense Refill     acetaminophen (TYLENOL) 500 MG tablet Take 1-2 tablets (500-1,000 mg) by mouth every 8 hours as needed for pain 90 tablet 1     Alcohol Swabs (B-D SINGLE USE SWABS REGULAR) PADS USE UTD  0     alendronate (FOSAMAX) 70 MG tablet Take 70 mg by mouth every 7 days       artificial saliva (BIOTENE MT) SOLN solution Swish and spit 1-2 mLs (1-2 sprays) in mouth 4 times daily 44.3 mL 1     blood glucose monitoring (ONETOUCH VERIO) meter device kit 2 times daily  0     bumetanide (BUMEX) 1 MG  tablet Take 1 tablet (1 mg) by mouth daily 30 tablet 11     calcium carbonate (TUMS) 500 MG chewable tablet Take 2 chew tab by mouth every 2 hours as needed   3     ferrous sulfate (FEROSUL) 325 (65 Fe) MG tablet Take 325 mg by mouth 2 times daily       levothyroxine (SYNTHROID/LEVOTHROID) 50 MCG tablet Take 50 mcg by mouth every morning        LORazepam (ATIVAN) 0.5 MG tablet Take 0.5 mg by mouth 3 times daily (before meals)  0     metoprolol succinate ER (TOPROL-XL) 50 MG 24 hr tablet Take 50 mg by mouth 2 times daily        multivitamin w/minerals (THERA-VIT-M) tablet Take 1 tablet by mouth daily       nystatin (MYCOSTATIN) 637081 UNIT/ML suspension Swish and swallow 5 mLs (500,000 Units) in mouth 4 times daily 60 mL 0     ONETOUCH VERIO IQ test strip 2 times daily  2     pantoprazole (PROTONIX) 40 MG EC tablet Take 40 mg by mouth daily        potassium chloride ER (KLOR-CON M) 10 MEQ CR tablet Take 10 mEq by mouth daily       sucralfate (CARAFATE) 1 GM tablet Take 1 g by mouth 3 times daily (before meals)      Allergies   Allergen Reactions     Unknown [No Clinical Screening - See Comments]      Pt states she has a medication allergy but does not know what it is         Lab Results    Chemistry/lipid CBC Cardiac Enzymes/BNP/TSH/INR   No results for input(s): CHOL, HDL, LDL, TRIG, CHOLHDLRATIO in the last 19988 hours.  No results for input(s): LDL in the last 20327 hours.  Recent Labs   Lab Test 04/16/22  0516 04/15/22  0522   NA  --  138   POTASSIUM 3.5 3.6   CHLORIDE  --  110*   CO2  --  20*   GLC  --  87   BUN  --  12   CR 0.72 0.92   GFRESTIMATED 81 60*   MANE  --  7.9*     Recent Labs   Lab Test 04/16/22  0516 04/15/22  0522 04/14/22  0503   CR 0.72 0.92 1.27*     Recent Labs   Lab Test 02/15/22  1016   A1C 5.7*    Recent Labs   Lab Test 04/16/22  1004 04/16/22  0516   WBC  --  9.9   HGB 7.5* 7.3*   HCT  --  23.4*   MCV  --  101*   PLT  --  298     Recent Labs   Lab Test 04/16/22  1004 04/16/22  0516  04/15/22  0522   HGB 7.5* 7.3* 7.6*    Recent Labs   Lab Test 04/13/22  0103 03/20/22  1502 10/17/18  1308   TROPONINI 0.03 0.02 0.01     Recent Labs   Lab Test 04/13/22  0103 03/20/22  1502 10/17/18  1308   * 272* 265*     Recent Labs   Lab Test 07/20/18  1250   TSH 3.15     Recent Labs   Lab Test 03/20/22  1502 02/15/22  1016 01/16/18  2216   INR 1.10 1.03 1.03        45 minutes spent on the date of encounter doing chart review, review of test results, patient visit and documentation.

## 2022-05-20 NOTE — ED NOTES
Using language line pt reports sob and trouble sleeping x 2 months, urinary frequency and diarrhea x 1 week. Lasix dose was increased 2 weeks ago. She has bilat pedal edema with edema to L calf

## 2022-05-23 NOTE — LETTER
5/23/2022    INOCENCIA WOOTENBRYAN  [facility] [address] [address]    RE: Chris Bell       Dear Colleague,     I had the pleasure of seeing Chris Bell in the [facility].  HEART CARE PROGRESS NOTE      [facility]  [phone]      Assessment/Recommendations   Assessment:    1.  Heart failure with preserved ejection fraction: Since changing to Bumex, she no longer has crackles in her lungs and lower extremity edema has improved slightly.  She has only taken 2 doses of Bumex.    2.  Hypertension: Blood pressure 114/72.    Plan:  1.  Continue Bumex 1 mg daily  2.  BMP and magnesium pending  3.  Start daily weights  4.  Low-sodium diet  5.  Recommend compression stockings again today.    Chris Bell will see Dr. Germain on June 13.  Follow-up in heart failure clinic will be determined after reviewing lab results.       History of Present Illness/Subjective    Ms. Chris Bell is a 86 year old female seen at [facility] today for follow-up.  She was hospitalized in March 2022 with heart failure exacerbation and pneumonia.  She was again hospitalized April 13 to April 16, 2022 with heart failure exacerbation and pneumonia.  Echocardiogram on April 13, 2022 showed ejection fraction of 55 to 60%.    She was seen in clinic last week with continued symptoms of fluid retention.  She had 2+ edema and crackles in her lungs.  Lasix was changed to Bumex.  She has only had 2 doses of Bumex.  She did not take today since she had this clinic appointment.  Lower extremity edema has improved slightly.  She has shortness of breath with activity.  She denies chest pain and abdominal fullness/bloating.  She notes a slight tingling since starting Bumex.  She has no rash.    Her clinic weight is down 1 pound.  She is following a low-sodium diet.      ECHO 4/13/2022:   Echo result w/o MOPS: Interpretation Summary The left ventricle is normal in size.Left  "ventricular systolic function is normal.The visual ejection fraction is 55-60%.No regional wall motion abnormalities noted.Diastolic Doppler findings (E/E' ratio and/or other parameters) suggest leftventricular filling pressures are increased.Normal right ventricle size and systolic function.The right atrium is mildly dilated.The left atrium is mildly dilated.There is mild to moderate (1-2+) tricuspid regurgitation.Estimate of RVsystolic pressure is 27mmhg plus right atrial pressureSmall pericardial effusionThere are no echocardiographic indications of cardiac tamponade.Effusion measures approximately 1cm anterior to the right ventricleCompared to 11/2018 findings are similar.There was a small pericardialeffusion noted on the prior studyThe myocardium appears \"brightâ   if clinically indicated consider amyloid           Physical Examination Review of Systems   Vitals: /72 (BP Location: Left arm, Patient Position: Sitting, Cuff Size: Adult Regular)   Pulse 86   Resp 16   Wt 52.2 kg (115 lb)   BMI 23.23 kg/m    BMI= Body mass index is 23.23 kg/m .  Wt Readings from Last 3 Encounters:   05/23/22 52.2 kg (115 lb)   05/20/22 52.6 kg (116 lb)   05/19/22 53.5 kg (118 lb)       General Appearance:     Alert, cooperative and in no acute distress.   ENT/Mouth: membranes moist, no oral lesions or bleeding gums.      EYES:  no scleral icterus, normal conjunctivae   Chest/Lungs:    Lung sounds clear   Cardiovascular:   Regular. Normal first and second heart sounds, 1-2+ edema bilateral lower extremities   Abdomen:  Soft, nontender, nondistended, bowel sounds present   Extremities: no cyanosis or clubbing   Skin: warm, dry.    Neurologic: mood and affect are appropriate, alert and oriented x3         Please refer above for cardiac ROS details.      Medical History  Surgical History Family History Social History   Past Medical History:   Diagnosis Date     Anemia      Depression      Disease of thyroid gland      HTN " (hypertension)      Osteoporosis      PUD (peptic ulcer disease)      Past Surgical History:   Procedure Laterality Date     ESOPHAGOSCOPY, GASTROSCOPY, DUODENOSCOPY (EGD), COMBINED N/A 1/17/2018    Procedure: ESOPHAGOGASTRODUODENOSCOPY (EGD) with h.pylori and gastric ulcer biopsies;  Surgeon: Colin Alvarez MD;  Location: [facility];  Service:      HYSTERECTOMY       US BIOPSY FINE NEEDLE ASPIRATION LYMPH NODE  8/14/2019     Family History   Problem Relation Age of Onset     Diabetes No family hx of      Cancer No family hx of      Heart Disease No family hx of     Social History     Socioeconomic History     Marital status:      Spouse name: Not on file     Number of children: Not on file     Years of education: Not on file     Highest education level: Not on file   Occupational History     Not on file   Tobacco Use     Smoking status: Never Smoker     Smokeless tobacco: Never Used   Substance and Sexual Activity     Alcohol use: No     Drug use: No     Sexual activity: Never   Other Topics Concern     Not on file   Social History Narrative     Not on file     Social Determinants of Health     Financial Resource Strain: Not on file   Food Insecurity: Not on file   Transportation Needs: Not on file   Physical Activity: Not on file   Stress: Not on file   Social Connections: Not on file   Intimate Partner Violence: Not on file   Housing Stability: Not on file          Medications  Allergies   Current Outpatient Medications   Medication Sig Dispense Refill     acetaminophen (TYLENOL) 500 MG tablet Take 1-2 tablets (500-1,000 mg) by mouth every 8 hours as needed for pain 90 tablet 1     Alcohol Swabs (B-D SINGLE USE SWABS REGULAR) PADS USE UTD  0     alendronate (FOSAMAX) 70 MG tablet Take 70 mg by mouth every 7 days       artificial saliva (BIOTENE MT) SOLN solution Swish and spit 1-2 mLs (1-2 sprays) in mouth 4 times daily 44.3 mL 1     blood glucose monitoring (ONETOUCH VERIO) meter device kit 2 times  daily  0     bumetanide (BUMEX) 1 MG tablet TAKE 1 TABLET(1 MG) BY MOUTH DAILY 90 tablet 3     ferrous sulfate (FEROSUL) 325 (65 Fe) MG tablet Take 325 mg by mouth 2 times daily       levothyroxine (SYNTHROID/LEVOTHROID) 50 MCG tablet Take 50 mcg by mouth every morning        LORazepam (ATIVAN) 0.5 MG tablet Take 0.5 mg by mouth 3 times daily (before meals)  0     metoprolol succinate ER (TOPROL-XL) 50 MG 24 hr tablet Take 50 mg by mouth 2 times daily        multivitamin w/minerals (THERA-VIT-M) tablet Take 1 tablet by mouth daily       nystatin (MYCOSTATIN) 149908 UNIT/ML suspension Swish and swallow 5 mLs (500,000 Units) in mouth 4 times daily 60 mL 0     ONETOUCH VERIO IQ test strip 2 times daily  2     potassium chloride ER (KLOR-CON M) 10 MEQ CR tablet Take 10 mEq by mouth daily       sucralfate (CARAFATE) 1 GM tablet Take 1 g by mouth 3 times daily (before meals)      Allergies   Allergen Reactions     Unknown [No Clinical Screening - See Comments]      Pt states she has a medication allergy but does not know what it is         Lab Results    Chemistry/lipid CBC Cardiac Enzymes/BNP/TSH/INR   No results for input(s): CHOL, HDL, LDL, TRIG, CHOLHDLRATIO in the last 86692 hours.  No results for input(s): LDL in the last 19801 hours.  Recent Labs   Lab Test 04/16/22  0516 04/15/22  0522   NA  --  138   POTASSIUM 3.5 3.6   CHLORIDE  --  110*   CO2  --  20*   GLC  --  87   BUN  --  12   CR 0.72 0.92   GFRESTIMATED 81 60*   MANE  --  7.9*     Recent Labs   Lab Test 04/16/22  0516 04/15/22  0522 04/14/22  0503   CR 0.72 0.92 1.27*     Recent Labs   Lab Test 02/15/22  1016   A1C 5.7*    Recent Labs   Lab Test 04/16/22  1004 04/16/22  0516   WBC  --  9.9   HGB 7.5* 7.3*   HCT  --  23.4*   MCV  --  101*   PLT  --  298     Recent Labs   Lab Test 04/16/22  1004 04/16/22  0516 04/15/22  0522   HGB 7.5* 7.3* 7.6*    Recent Labs   Lab Test 04/13/22  0103 03/20/22  1502 10/17/18  1308   TROPONINI 0.03 0.02 0.01     Recent Labs    Lab Test 04/13/22  0103 03/20/22  1502 10/17/18  1308   * 272* 265*     Recent Labs   Lab Test 07/20/18  1250   TSH 3.15     Recent Labs   Lab Test 03/20/22  1502 02/15/22  1016 01/16/18  2216   INR 1.10 1.03 1.03                  Thank you for allowing me to participate in the care of your patient.      Sincerely,     Ashlee Sebastain NP     [facility]  cc:   Ashlee Sebastian NP  [address] [place] [address]

## 2022-05-23 NOTE — PATIENT INSTRUCTIONS
Chris Bell,    It was a pleasure to see you today at the [facility].     My recommendations after this visit include:  - You will be called with the results of your labs.   - If you have any questions or concerns, please call [phone] to talk with my nurse.    Ashlee Tucker, CNP

## 2022-05-23 NOTE — PROGRESS NOTES
HEART CARE PROGRESS NOTE      [facility]  [phone]      Assessment/Recommendations   Assessment:    1.  Heart failure with preserved ejection fraction: Since changing to Bumex, she no longer has crackles in her lungs and lower extremity edema has improved slightly.  She has only taken 2 doses of Bumex.    2.  Hypertension: Blood pressure 114/72.    Plan:  1.  Continue Bumex 1 mg daily  2.  BMP and magnesium pending  3.  Start daily weights  4.  Low-sodium diet  5.  Recommend compression stockings again today.    Chris Bell will see Dr. Germain on June 13.  Follow-up in heart failure clinic will be determined after reviewing lab results.       History of Present Illness/Subjective    Ms. Chris Bell is a 86 year old female seen at [facility] today for follow-up.  She was hospitalized in March 2022 with heart failure exacerbation and pneumonia.  She was again hospitalized April 13 to April 16, 2022 with heart failure exacerbation and pneumonia.  Echocardiogram on April 13, 2022 showed ejection fraction of 55 to 60%.    She was seen in clinic last week with continued symptoms of fluid retention.  She had 2+ edema and crackles in her lungs.  Lasix was changed to Bumex.  She has only had 2 doses of Bumex.  She did not take today since she had this clinic appointment.  Lower extremity edema has improved slightly.  She has shortness of breath with activity.  She denies chest pain and abdominal fullness/bloating.  She notes a slight tingling since starting Bumex.  She has no rash.    Her clinic weight is down 1 pound.  She is following a low-sodium diet.      ECHO 4/13/2022:   Echo result w/o MOPS: Interpretation Summary The left ventricle is normal in size.Left ventricular systolic function is normal.The visual ejection fraction is 55-60%.No regional wall motion abnormalities noted.Diastolic Doppler findings (E/E' ratio and/or other parameters) suggest leftventricular  "filling pressures are increased.Normal right ventricle size and systolic function.The right atrium is mildly dilated.The left atrium is mildly dilated.There is mild to moderate (1-2+) tricuspid regurgitation.Estimate of RVsystolic pressure is 27mmhg plus right atrial pressureSmall pericardial effusionThere are no echocardiographic indications of cardiac tamponade.Effusion measures approximately 1cm anterior to the right ventricleCompared to 11/2018 findings are similar.There was a small pericardialeffusion noted on the prior studyThe myocardium appears \"brightâ   if clinically indicated consider amyloid           Physical Examination Review of Systems   Vitals: /72 (BP Location: Left arm, Patient Position: Sitting, Cuff Size: Adult Regular)   Pulse 86   Resp 16   Wt 52.2 kg (115 lb)   BMI 23.23 kg/m    BMI= Body mass index is 23.23 kg/m .  Wt Readings from Last 3 Encounters:   05/23/22 52.2 kg (115 lb)   05/20/22 52.6 kg (116 lb)   05/19/22 53.5 kg (118 lb)       General Appearance:     Alert, cooperative and in no acute distress.   ENT/Mouth: membranes moist, no oral lesions or bleeding gums.      EYES:  no scleral icterus, normal conjunctivae   Chest/Lungs:    Lung sounds clear   Cardiovascular:   Regular. Normal first and second heart sounds, 1-2+ edema bilateral lower extremities   Abdomen:  Soft, nontender, nondistended, bowel sounds present   Extremities: no cyanosis or clubbing   Skin: warm, dry.    Neurologic: mood and affect are appropriate, alert and oriented x3         Please refer above for cardiac ROS details.      Medical History  Surgical History Family History Social History   Past Medical History:   Diagnosis Date     Anemia      Depression      Disease of thyroid gland      HTN (hypertension)      Osteoporosis      PUD (peptic ulcer disease)      Past Surgical History:   Procedure Laterality Date     ESOPHAGOSCOPY, GASTROSCOPY, DUODENOSCOPY (EGD), COMBINED N/A 1/17/2018    Procedure: " ESOPHAGOGASTRODUODENOSCOPY (EGD) with h.pylori and gastric ulcer biopsies;  Surgeon: Colin Alvarez MD;  Location: [facility];  Service:      HYSTERECTOMY       US BIOPSY FINE NEEDLE ASPIRATION LYMPH NODE  8/14/2019     Family History   Problem Relation Age of Onset     Diabetes No family hx of      Cancer No family hx of      Heart Disease No family hx of     Social History     Socioeconomic History     Marital status:      Spouse name: Not on file     Number of children: Not on file     Years of education: Not on file     Highest education level: Not on file   Occupational History     Not on file   Tobacco Use     Smoking status: Never Smoker     Smokeless tobacco: Never Used   Substance and Sexual Activity     Alcohol use: No     Drug use: No     Sexual activity: Never   Other Topics Concern     Not on file   Social History Narrative     Not on file     Social Determinants of Health     Financial Resource Strain: Not on file   Food Insecurity: Not on file   Transportation Needs: Not on file   Physical Activity: Not on file   Stress: Not on file   Social Connections: Not on file   Intimate Partner Violence: Not on file   Housing Stability: Not on file          Medications  Allergies   Current Outpatient Medications   Medication Sig Dispense Refill     acetaminophen (TYLENOL) 500 MG tablet Take 1-2 tablets (500-1,000 mg) by mouth every 8 hours as needed for pain 90 tablet 1     Alcohol Swabs (B-D SINGLE USE SWABS REGULAR) PADS USE UTD  0     alendronate (FOSAMAX) 70 MG tablet Take 70 mg by mouth every 7 days       artificial saliva (BIOTENE MT) SOLN solution Swish and spit 1-2 mLs (1-2 sprays) in mouth 4 times daily 44.3 mL 1     blood glucose monitoring (ONETOUCH VERIO) meter device kit 2 times daily  0     bumetanide (BUMEX) 1 MG tablet TAKE 1 TABLET(1 MG) BY MOUTH DAILY 90 tablet 3     ferrous sulfate (FEROSUL) 325 (65 Fe) MG tablet Take 325 mg by mouth 2 times daily       levothyroxine  (SYNTHROID/LEVOTHROID) 50 MCG tablet Take 50 mcg by mouth every morning        LORazepam (ATIVAN) 0.5 MG tablet Take 0.5 mg by mouth 3 times daily (before meals)  0     metoprolol succinate ER (TOPROL-XL) 50 MG 24 hr tablet Take 50 mg by mouth 2 times daily        multivitamin w/minerals (THERA-VIT-M) tablet Take 1 tablet by mouth daily       nystatin (MYCOSTATIN) 870334 UNIT/ML suspension Swish and swallow 5 mLs (500,000 Units) in mouth 4 times daily 60 mL 0     ONETOUCH VERIO IQ test strip 2 times daily  2     potassium chloride ER (KLOR-CON M) 10 MEQ CR tablet Take 10 mEq by mouth daily       sucralfate (CARAFATE) 1 GM tablet Take 1 g by mouth 3 times daily (before meals)      Allergies   Allergen Reactions     Unknown [No Clinical Screening - See Comments]      Pt states she has a medication allergy but does not know what it is         Lab Results    Chemistry/lipid CBC Cardiac Enzymes/BNP/TSH/INR   No results for input(s): CHOL, HDL, LDL, TRIG, CHOLHDLRATIO in the last 83061 hours.  No results for input(s): LDL in the last 05317 hours.  Recent Labs   Lab Test 04/16/22  0516 04/15/22  0522   NA  --  138   POTASSIUM 3.5 3.6   CHLORIDE  --  110*   CO2  --  20*   GLC  --  87   BUN  --  12   CR 0.72 0.92   GFRESTIMATED 81 60*   MANE  --  7.9*     Recent Labs   Lab Test 04/16/22  0516 04/15/22  0522 04/14/22  0503   CR 0.72 0.92 1.27*     Recent Labs   Lab Test 02/15/22  1016   A1C 5.7*    Recent Labs   Lab Test 04/16/22  1004 04/16/22  0516   WBC  --  9.9   HGB 7.5* 7.3*   HCT  --  23.4*   MCV  --  101*   PLT  --  298     Recent Labs   Lab Test 04/16/22  1004 04/16/22  0516 04/15/22  0522   HGB 7.5* 7.3* 7.6*    Recent Labs   Lab Test 04/13/22  0103 03/20/22  1502 10/17/18  1308   TROPONINI 0.03 0.02 0.01     Recent Labs   Lab Test 04/13/22  0103 03/20/22  1502 10/17/18  1308   * 272* 265*     Recent Labs   Lab Test 07/20/18  1250   TSH 3.15     Recent Labs   Lab Test 03/20/22  1502 02/15/22  1016  01/16/18  2216   INR 1.10 1.03 1.03

## 2022-05-24 PROBLEM — M62.81 GENERALIZED MUSCLE WEAKNESS: Status: ACTIVE | Noted: 2022-01-01

## 2022-05-24 PROBLEM — I50.9 CHF (CONGESTIVE HEART FAILURE) (H): Status: ACTIVE | Noted: 2022-01-01

## 2022-05-24 NOTE — ED NOTES
Tried to get pt. Up and to walk to the bathroom to collect a urine sample but she refused to try and stand. She said she already wet her diaper and she wants to be changed. I let her know that we will try and find her a private area to change that as soon as we can because right now she has a bed in the hallway.

## 2022-05-24 NOTE — ED TRIAGE NOTES
Patient comes from home via ambulance, family states she has been  more lethargic and weak, also said she recently had some abnormal labs.

## 2022-05-24 NOTE — TELEPHONE ENCOUNTER
Pt's daughter May was contacted with [facility]  today to review lab results and recommendations for ED evaluation for Mg++ replacement IV, along with IV diuresis Along with potential for admission given her current status.  As per Ashlee Tucker CNP.      Betty Munoz RN BSN, CHFN

## 2022-05-24 NOTE — H&P
[facility]    History and Physical - Hospitalist Service       Date of Admission:  5/24/2022    Assessment & Plan      Chris Bell is a 86 year old female admitted on 5/24/2022. She has a history of peptic ulcer disease, HFpEF, hypothyroidism, and is admitted for weakness secondary to severe hypomagnesemia.    Generalized Weakness  Hypomagnesemia - 1.0  Hypokalemia - 3.4  Patient presents with subacute generalized weakness and inability to ambulate, worse over the past few days.  Per family, has been able to ambulate with walker and standby assist up until yesterday.  Exam shows diffuse weakness.  Neurologic exam otherwise intact.  CT head without any acute findings.  She does have chronic compression fractures in her back, but no change in her back pain or any concerns for cauda equina syndrome.  Magnesium found to be 1.0.  She has had loose stools over the past week or so.  Chart review shows chronically low magnesium down to about 1.4.  Appears that she may have been on magnesium supplementation as an outpatient in the past.  She is also on potassium supplementation.  -RN driven magnesium and potassium replacement protocols  -Will need oral magnesium supplementation as an outpatient  -PT and OT consults, appreciate recommendations for disposition    Chronic Macrocytic Anemia  Hemoglobin of 7.7 upon admission, baseline is 7-8. MCV of 100.  History of peptic ulcer disease with prior GI bleeds, has needed transfusions.  On iron supplementation at baseline.  Has refused colonoscopy in the past.  B12 in April was 1700, will recheck along with folate which has not been tested in over a year.  -No indication for acute transfusion, however patient desires transfusion to avoid future hospitalization  -B12 and folate  -ferritin  -CBC in a.m.  -Continue iron supplementation 325 mg twice daily    Gamma Gap  Total protein 8.1, albumin 2.2.  This gamma gap has been noted in previous records (12/2018).   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  No proteinuria on UA.  -SPEP    HFpEF, not in acute exacerbation  Complained of some shortness of breath upon arrival, however vitals are stable and her lungs are clear.  BNP is 175 which is the lowest it has been for her in a couple of years.  Ejection fraction of 55 to 60% in April 2022.  Has been seen in heart failure clinic about once a week for the past few weeks to titrate her diuretics.  She was switched from Lasix to Bumex on 5/20 as she was no longer responding to Lasix..  Was seen in cardiology clinic the day prior to admission and was noted to be down 1 pound in weight and otherwise looked well compared to the prior week.  Patient and family very concerned with amount of urination, however this is going to be a chronic issue for her with diuretic usage.  -Continue Bumex 1 mg daily and metoprolol succinate 50 mg twice daily  -Has appointment with Dr. Germain on June 13    Chronic lumbar compression fractures  Patient has TLSO brace for comfort.  No new concerning symptoms.  Her difficulty ambulating appears to be due to generalized weakness rather than acute cord compression.  -Continue to monitor    Hypothyroidism  Continue PTA levothyroxine 50 mcg daily.    Anxiety  Continue scheduled Ativan 1 mg in the morning, 0.5 mg at lunch, 1 mg in the evening.  Unclear why she is on this medication, would benefit from exploring this further with patient and family in the morning.     Diet: Combination Diet Low Saturated Fat Na <2400mg Diet, No Caffeine Diet  DVT Prophylaxis: Pneumatic Compression Devices  Javed Catheter: Not present  Fluids: None  Central Lines: None  Cardiac Monitoring: ACTIVE order. Indication: Electrolyte Imbalance (24 hours)- Magnesium <1.3 mg/ml; Potassium < =2.8 or > 5.5 mg/ml  Code Status: No CPR- Do NOT Intubate -confirmed with patient's son, Shiv, over the phone    Clinically Significant Risk Factors Present on Admission           # Hypomagnesemia:  Mg = 1.0 mg/dL (Ref range: 1.8 - 2.6 mg/dL) on admission, will replace as needed   # Hypoalbuminemia: Albumin = 2.2 g/dL (Ref range: 3.5 - 5.0 g/dL) on admission, will monitor as appropriate          Disposition Plan    Expected Discharge: 1-2 days back home versus placement in nursing home    Jennifer Mohr MD  [facility], PGY-1  Pager #: [phone]    ______________________________________________________________________    Chief Complaint   Weakness    History is obtained from the patient with the assistance of a [employer]  (telephone)    History of Present Illness   Chris Bell is a 86 year old female who has a history of peptic ulcer disease, L2 compression fracture, hypertension, anemia, HFpEF, who presents to the ED due to low magnesium.  She was at a cardiology appointment yesterday for follow-up on her heart failure and had labs drawn.  These resulted today with a magnesium of 1.1.  The clinic called her and told her to go to the hospital. She states she is feeling very weak all over in her arms and her legs. She could not even hold a tissue to her nose to blow her nose. She states this has been going on for one month and that she has been too weak to get up to the bathroom, so her daughter has been changing her brief for her. She is very concerned about how much and how often she is urinating. She is hoping to get a hernandez catheter so that the nurses don't have to clean her up as often here at the hospital. She is also asking for a blood transfusion because she feels weak and does not want to have to come back to the hospital in a few weeks for a blood transfusion if she needs it then. She has been having shortness of breath which is chronic for her. No chest pain. She is having swelling in her legs which has not resolved despite switching from lasix to bumex a few days ago. She took 2 bumex tablets (1 mg) this morning.  She also has some pain and numbness in her  legs that has been chronic for her.  She states that her leg pain does somewhat prevent her from getting up to stand or walk.  She does not have any numbness in her genital region.  No fecal incontinence.  She is also having chronic back pain that is not new for her.  She has been having diarrhea for the past week or so, which she states she gets when she eats meat that is salted.    In the ED, her magnesium was found to be 1.0. She was not able to stand at the bedside due to weakness in her legs, prompting admission to the hospital.    Review of Systems    The 10 point Review of Systems is negative other than noted in the HPI or here.     Past Medical History    I have reviewed this patient's medical history and updated it with pertinent information if needed.   Past Medical History:   Diagnosis Date     Anemia      Depression      Disease of thyroid gland      HTN (hypertension)      Osteoporosis      PUD (peptic ulcer disease)       Past Surgical History   I have reviewed this patient's surgical history and updated it with pertinent information if needed.  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 Family considering moving her to a nursing home in the near future for additional caregiving support.    Family History   No pertinent family history.    Prior to Admission Medications   Prior to Admission Medications   Prescriptions Last Dose Informant Patient Reported? Taking?   Alcohol Swabs (B-D SINGLE USE SWABS REGULAR) PADS   Yes No   Sig: USE UTD   LORazepam (ATIVAN) 0.5 MG tablet 5/23/2022 at Unknown  time  Yes Yes   Sig: Take 0.5 mg by mouth daily (with lunch)   LORazepam (ATIVAN) 0.5 MG tablet 2022 at am  Yes Yes   Sig: Take 1 mg by mouth 2 times daily In the am and evening   ONETOUCH VERIO IQ test strip   Yes No   Si times daily   acetaminophen (TYLENOL) 500 MG tablet 2022 at Unknown time  No Yes   Sig: Take 1-2 tablets (500-1,000 mg) by mouth every 8 hours as needed for pain   alendronate (FOSAMAX) 70 MG tablet   Yes No   Sig: Take 70 mg by mouth every 7 days   artificial saliva (BIOTENE MT) SOLN solution Not Taking at Unknown time  No No   Sig: Swish and spit 1-2 mLs (1-2 sprays) in mouth 4 times daily   Patient not taking: Reported on 2022   blood glucose monitoring (ONETOUCH VERIO) meter device kit   Yes No   Si times daily   bumetanide (BUMEX) 1 MG tablet 2022 at Unknown time  No Yes   Sig: TAKE 1 TABLET(1 MG) BY MOUTH DAILY   ferrous sulfate (FEROSUL) 325 (65 Fe) MG tablet 2022 at am  Yes Yes   Sig: Take 325 mg by mouth 2 times daily   levothyroxine (SYNTHROID/LEVOTHROID) 50 MCG tablet 2022 at Unknown time  Yes Yes   Sig: Take 50 mcg by mouth every morning    metoprolol succinate ER (TOPROL-XL) 50 MG 24 hr tablet 2022 at am  Yes Yes   Sig: Take 50 mg by mouth 2 times daily    multivitamin w/minerals (THERA-VIT-M) tablet 2022 at Unknown time  Yes Yes   Sig: Take 1 tablet by mouth daily   nystatin (MYCOSTATIN) 604668 UNIT/ML suspension Unknown at Unknown time  No Yes   Sig: Swish and swallow 5 mLs (500,000 Units) in mouth 4 times daily   Patient taking differently: Swish and swallow 500,000 Units in mouth 4 times daily as needed   potassium chloride ER (KLOR-CON M) 10 MEQ CR tablet 2022 at Unknown time  Yes Yes   Sig: Take 10 mEq by mouth daily      Facility-Administered Medications: None     Allergies   Allergies   Allergen Reactions     Unknown [No Clinical Screening - See Comments]      Pt states she has a medication allergy but does not know what it  is     Physical Exam   Vital Signs: Temp: 98.2  F (36.8  C) Temp src: Oral BP: 111/59 Pulse: 75   Resp: 16 SpO2: 98 % O2 Device: None (Room air)    Weight: 0 lbs 0 oz  GEN: Patient laying comfortably in no acute distress.  HEEN: Head is atraumatic, normocephalic, eyes anicteric, mucous membranes moist.  CV: Regular rate and rhythm without obvious murmurs.  PULM: Clear to auscultation bilaterally without wheezing or rales.  ABD: Soft, nontender, bowel sounds present.  NEURO: Alert and oriented x3.  Diffuse symmetric weakness (4/5) in the arms and legs. Face symmetric.  PSYCH: Appropriate affect.  SKIN: No rashes, bruising, or other lesions.     Data   Data reviewed today: I reviewed all medications, new labs and imaging results over the last 24 hours.     Recent Labs   Lab 05/24/22  1024 05/23/22  1537 05/19/22  2056   WBC 6.7  --  5.4   HGB 7.7*  --  7.4*     --  100     --  268   * 135* 135*   POTASSIUM 3.4* 3.6 3.4*   CHLORIDE 101 101 105   CO2 23 23 22   BUN 7* 8 8   CR 0.73 0.80 0.78   ANIONGAP 9 11 8   MANE 8.0* 8.2* 8.1*    113 128*   ALBUMIN 2.2*  --  2.2*   PROTTOTAL 8.1*  --  8.0   BILITOTAL 0.3  --  0.2   ALKPHOS 78  --  90   ALT 10  --  <9   AST 12  --  17   LIPASE 11  --   --      Recent Results (from the past 24 hour(s))   Head CT w/o contrast    Narrative    EXAM: CT HEAD W/O CONTRAST  LOCATION: [facility]  DATE/TIME: 5/24/2022 12:37 PM    INDICATION: Weakness, near-syncope.  COMPARISON: None.  TECHNIQUE: Routine CT Head without IV contrast. Multiplanar reformats. Dose reduction techniques were used.    FINDINGS:  INTRACRANIAL CONTENTS: No intracranial hemorrhage, extraaxial collection, or mass effect.  No CT evidence of acute infarct. Moderate to severe presumed chronic small vessel ischemic changes. Mild generalized volume loss. No hydrocephalus.     VISUALIZED ORBITS/SINUSES/MASTOIDS: No intraorbital abnormality. Mild mucosal thickening scattered  about the paranasal sinuses. No middle ear or mastoid effusion.    BONES/SOFT TISSUES: No acute abnormality.      Impression    IMPRESSION:  1.  No acute intracranial abnormality.  2.  Chronic changes as detailed.                 Chest XR,  PA & LAT    Narrative    EXAM: XR CHEST 2 VW  LOCATION: [facility]  DATE/TIME: 5/24/2022 12:31 PM    INDICATION: SOB  COMPARISON: 05/19/2022      Impression    IMPRESSION: Strands of fibrosis and/or linear atelectasis in both lower lungs unchanged. Lungs otherwise clear. No visible pleural fluid. Mild stable cardiac enlargement and borderline pulmonary venous congestion further exaggerated by the shallow   inspiration and prominent left apical paracardial fat. Bones are demineralized.

## 2022-05-24 NOTE — ED NOTES
Bed: JNEDP-08  Expected date: 5/24/22  Expected time:   Means of arrival: Ambulance  Comments:  SPF  86 F  Lethargy   To HW by CSC

## 2022-05-24 NOTE — TELEPHONE ENCOUNTER
----- Message from Ashlee Sebastian NP sent at 5/24/2022  8:05 AM CDT -----  Can you please call Chris.  Her magnesium is low and she has acute HF symptoms.  Please have her go to ED to get Mg replaced and potentially may need IV diuretics.

## 2022-05-24 NOTE — ED PROVIDER NOTES
Emergency Department Encounter   NAME: Chris Bell ; AGE: 86 year old female ; YOB: 1935 ; MRN: [id number] ; PCP: Kizzy Villanueva   ED PROVIDER: Virginia Peterson PA-C    Evaluation Date & Time:   5/24/2022  9:56 AM    CHIEF COMPLAINT:  Lethargy and Abnormal Labs      Impression and Plan   MDM: Chris Bell is a 86 year old female with a pertinent history of anemia, peptic ulcer, hypertension, hypokalemia, edema, cancer of soft palate, gastrointestinal hemorrhage,  who presents to the ED by EMS for evaluation of lethargy and generalized weakness. Per chart review, patient was seen in her cardiology clinic yesterday at which time basic labs were drawn.  She received a call today that her magnesium was low and was referred to the emergency department for magnesium replacement and consideration of diuresis.  Here in the ED, patient is afebrile and vitally stable.  She does appear fatigued and was sleeping on initial exam though arouses to voice and is now alert.  She is oriented x2, and has diffuse generalized weakness on exam.  She does have swelling that is mild to her bilateral knees and lower extremities, some pitting edema on the left calf as well as some faint crackles in her left lung base.  Patient's main complaint is her generalized weakness, and states that she is unable to walk or move around her home at which she lives by herself.  Plan at this time is for EKG, blood work, and chest x-ray to further evaluate.  We will place patient on cardiac monitor.  If she is unable to care for self, would expect likely admission.    No focal neurologic complaints concerning for CVA.  Did obtain a head CT which showed no evidence of spontaneous intracranial bleed. EKG shows normal sinus rhythm.  She does have T wave inversions in V1, V2, lead3 and aVR.  When compared to previous EKG from 5/19/2022, lead III T wave inversion is new.  Troponin returned negative.  Overall suspicion that her symptoms are due to  ACS is quite low.  Her magnesium did return at 1.0 and her potassium at 3.4.  IV magnesium and oral potassium ordered.  No concerning EKG changes.  Her very low magnesium could certainly be the source of her diffuse generalized weakness.  COVID-19 returned negative.  Her BNP is very mildly elevated at 175 with 167 being within normal limits.  Her chest x-ray is without any convincing pulmonary congestion.  Per her cardiology notes yesterday, they did not recommend hospital admission for diuresis, and had felt she was improving on Bumex which she has only been on for several days.  At this time, no convincing evidence of CHF exacerbation or indications for IV diuresis.  Patient can continue her Bumex as prescribed.  She does have a severe chronic anemia, although this is unchanged from her baseline which is around 7.5.  No GI symptoms or concern for active GI bleed at this time.  UA still pending.  Given her severe weakness and patient living on her own, she is not safe to return home to her residence at this time.  Will admit to [facility] telemetry for continued monitoring, electrolyte replacement, likely PT/OT evaluation.  Patient and her daughter are comfortable with the plan.  Dr. Mohr has accepted the patient for admission.  She will currently board in the [facility] emergency department as there are no inpatient beds at this time.    *All conversations today were had using a professional CoreFlow .    ED COURSE:  11:22 AM I met and introduced myself to the patient. I gathered initial history and performed my physical exam. We discussed plan for initial workup.   12:08 PM Magnesium is 1.0. Ordered IV Magnesium.   12:10 PM Reviewed EKG. Discussed with nursing staff, who shares that patient is still feeling weak and unable to walk to the bathroom.  12:41 PM Spoke with the patient's daughter.  1:12 PM Staffed the patient with Dr. Obando.  1:15 PM Paged for admitting provider.  1:41 PM Spoke with [place]  admitting provider, Dr. Mohr, who accepts the patient for admission.    At the conclusion of the encounter I discussed the results of all the tests and the disposition. The questions were answered. The patient or family acknowledged understanding and was agreeable with the care plan.    FINAL IMPRESSION:    ICD-10-CM    1. Generalized muscle weakness  M62.81    2. Hypomagnesemia  E83.42    3. CHF (congestive heart failure) (H)  I50.9    4. Hypokalemia  E87.6          MEDICATIONS GIVEN IN THE EMERGENCY DEPARTMENT:  Medications   magnesium sulfate 2 g in water intermittent infusion (0 g Intravenous Stopped 5/24/22 1345)   potassium chloride ER (KLOR-CON M) CR tablet 40 mEq (40 mEq Oral Given 5/24/22 1255)         NEW PRESCRIPTIONS STARTED AT TODAY'S ED VISIT:  New Prescriptions    No medications on file         HPI   Patient information was obtained from: Patient and chart review  Use of Intrepreter: Yes (Phone) - Language Sukhjinder Bell is a 86 year old female with a pertinent history of anemia, peptic ulcer, hypertension, hypokalemia, edema, cancer of soft palate, gastrointestinal hemorrhage,  who presents to the ED by EMS for evaluation of lethargy and generalized weakness.     Per chart review (5/24), the patient's daughter was contacted to review lab results. Recommended ED evaluation for Mg++ replacement IV, with IV diuresis.    Last night and this morning (5/24), the patient endorsed feeling very generally weak to the point that she was afraid she would fall or pass out prompting her to present to the ED today. In addition, she reports swelling to her bilateral knees, making it difficult to walk.  The patient reports being very short of breath, but no chest pain. She also reports increased urinary frequency for 1 month, and increased fatigue due to anemia. Reports yellow diarrhea. She also states that her arms are very weak, and her legs are numb. The patient lives alone, and her daughter comes and takes  care of her. The patient denies fever, chills, cough, vomiting, falls, headache, melena, hematochezia, or any other complaints at this time.    REVIEW OF SYSTEMS:  Review of Systems   Constitutional: Positive for fatigue. Negative for chills and fever.   Respiratory: Positive for shortness of breath. Negative for cough.    Cardiovascular: Positive for leg swelling (knees bilaterally). Negative for chest pain.   Gastrointestinal: Positive for diarrhea. Negative for blood in stool and vomiting.   Genitourinary: Positive for frequency and urgency.   Musculoskeletal: Negative for back pain and neck pain.   Neurological: Positive for weakness (arms and legs) and numbness (legs). Negative for headaches. Syncope: syncopal feelings.   All other systems reviewed and are negative.      Medical History     Past Medical History:   Diagnosis Date     Anemia      Depression      Disease of thyroid gland      HTN (hypertension)      Osteoporosis      PUD (peptic ulcer disease)        Past Surgical History:   Procedure Laterality Date     ESOPHAGOSCOPY, GASTROSCOPY, DUODENOSCOPY (EGD), COMBINED N/A 1/17/2018    Procedure: ESOPHAGOGASTRODUODENOSCOPY (EGD) with h.pylori and gastric ulcer biopsies;  Surgeon: Colin Alvarez MD;  Location: [facility];  Service:      HYSTERECTOMY       US BIOPSY FINE NEEDLE ASPIRATION LYMPH NODE  8/14/2019       Family History   Problem Relation Age of Onset     Diabetes No family hx of      Cancer No family hx of      Heart Disease No family hx of        Social History     Tobacco Use     Smoking status: Never Smoker     Smokeless tobacco: Never Used   Substance Use Topics     Alcohol use: No     Drug use: No       acetaminophen (TYLENOL) 500 MG tablet  bumetanide (BUMEX) 1 MG tablet  ferrous sulfate (FEROSUL) 325 (65 Fe) MG tablet  levothyroxine (SYNTHROID/LEVOTHROID) 50 MCG tablet  LORazepam (ATIVAN) 0.5 MG tablet  LORazepam (ATIVAN) 0.5 MG tablet  metoprolol succinate ER (TOPROL-XL) 50 MG 24 hr  tablet  multivitamin w/minerals (THERA-VIT-M) tablet  nystatin (MYCOSTATIN) 379151 UNIT/ML suspension  potassium chloride ER (KLOR-CON M) 10 MEQ CR tablet  Alcohol Swabs (B-D SINGLE USE SWABS REGULAR) PADS  alendronate (FOSAMAX) 70 MG tablet  artificial saliva (BIOTENE MT) SOLN solution  blood glucose monitoring (ONETOUCH VERIO) meter device kit  ONETOUCH VERIO IQ test strip          Physical Exam     First Vitals:  Patient Vitals for the past 24 hrs:   BP Temp Temp src Pulse Resp SpO2   05/24/22 1342 132/78 -- -- 88 16 99 %   05/24/22 1242 -- -- -- -- 18 --   05/24/22 1112 128/60 -- -- 70 18 98 %   05/24/22 1008 132/64 98.7  F (37.1  C) Oral 76 16 99 %         PHYSICAL EXAM:   Physical Exam  Vitals and nursing note reviewed.   Constitutional:       General: She is not in acute distress.     Appearance: She is not toxic-appearing.      Comments: Patient appears pale.  She does appear fatigued and was actively sleeping upon initial exam, although arouses to voice.   HENT:      Head: Normocephalic and atraumatic.      Mouth/Throat:      Mouth: Mucous membranes are moist.      Pharynx: Oropharynx is clear.   Eyes:      Extraocular Movements: Extraocular movements intact.      Conjunctiva/sclera: Conjunctivae normal.      Pupils: Pupils are equal, round, and reactive to light.   Neck:      Comments: No midline spinal tenderness or palpable bony step-offs.  Cardiovascular:      Rate and Rhythm: Normal rate and regular rhythm.      Pulses: Normal pulses.   Pulmonary:      Effort: Pulmonary effort is normal. No respiratory distress.      Breath sounds: No wheezing.      Comments: Patient has faint crackles in her left lung base.  Abdominal:      General: Abdomen is flat. Bowel sounds are normal. There is no distension.      Palpations: Abdomen is soft.      Tenderness: There is no abdominal tenderness. There is no right CVA tenderness, left CVA tenderness, guarding or rebound.   Musculoskeletal:      Cervical back: Normal  range of motion and neck supple.      Comments: Patient is moving hands and feet, however not lifting either arms or legs at shoulder or hip joints due to generalized weakness.  If I lift her extremities for her, she is able to load him slowly down to the bed.   Skin:     Coloration: Skin is pale.   Neurological:      GCS: GCS eye subscore is 4. GCS verbal subscore is 5. GCS motor subscore is 6.      Comments: Patient was initially sleeping and arouses to voice and is now alert.  She is oriented to self and place, however not year.  She reports intact sensation to light touch to face along with all 4 extremities.             Results     LAB:  All pertinent labs reviewed and interpreted  Labs Ordered and Resulted from Time of ED Arrival to Time of ED Departure   CBC WITH PLATELETS - Abnormal       Result Value    WBC Count 6.7      RBC Count 2.48 (*)     Hemoglobin 7.7 (*)     Hematocrit 24.8 (*)           MCH 31.0      MCHC 31.0 (*)     RDW 18.2 (*)     Platelet Count 244     COMPREHENSIVE METABOLIC PANEL - Abnormal    Sodium 133 (*)     Potassium 3.4 (*)     Chloride 101      Carbon Dioxide (CO2) 23      Anion Gap 9      Urea Nitrogen 7 (*)     Creatinine 0.73      Calcium 8.0 (*)     Glucose 105      Alkaline Phosphatase 78      AST 12      ALT 10      Protein Total 8.1 (*)     Albumin 2.2 (*)     Bilirubin Total 0.3      GFR Estimate 80     B-TYPE NATRIURETIC PEPTIDE ([facility] ONLY) - Abnormal     (*)    MAGNESIUM - Abnormal    Magnesium 1.0 (*)    LIPASE - Normal    Lipase 11     TROPONIN I - Normal    Troponin I 0.01     COVID-19 VIRUS (CORONAVIRUS) BY PCR - Normal    SARS CoV2 PCR Negative     UA MACROSCOPIC WITH REFLEX TO MICRO AND CULTURE       RADIOLOGY:  Chest XR,  PA & LAT   Final Result   IMPRESSION: Strands of fibrosis and/or linear atelectasis in both lower lungs unchanged. Lungs otherwise clear. No visible pleural fluid. Mild stable cardiac enlargement and borderline pulmonary venous  congestion further exaggerated by the shallow    inspiration and prominent left apical paracardial fat. Bones are demineralized.      Head CT w/o contrast   Final Result   IMPRESSION:   1.  No acute intracranial abnormality.   2.  Chronic changes as detailed.                              ECG:    Performed at: 24-MAY-2022 11:58:27    Impression: Normal sinus rhythm  Nonspecific ST and T wave abnormality  Abnormal ECG    Rate: 71 bpm  Rhythm: NSR  Axis: 30  OR Interval: 170 ms  QRS Interval: 82 ms  QTc Interval: 428 ms  Comparison: Compared to EKG of 19-MAY-2022, sinus rhythm with sinus arrhythmia, normal EKG.    EKG results reviewed and interpreted by Dr. Obando, ED MD.         I, Areli Llanes, am serving as a scribe to document services personally performed by Virginia Peterson PA-C, based on my observation and the provider's statements to me. Virginia BLEVINS PA-C attest that Areli Llanes is acting in a scribe capacity, has observed my performance of the services and has documented them in accordance with my direction.       Virginia Peterson PA-C   Emergency Medicine   [facility] EMERGENCY DEPARTMENT       Virginia Peterson PA-C  05/24/22 7206

## 2022-05-24 NOTE — PHARMACY-ADMISSION MEDICATION HISTORY
Pharmacy Note - Admission Medication History    Pertinent Provider Information: none     ______________________________________________________________________    Prior To Admission (PTA) med list completed and updated in EMR.       PTA Med List   Medication Sig Last Dose     acetaminophen (TYLENOL) 500 MG tablet Take 1-2 tablets (500-1,000 mg) by mouth every 8 hours as needed for pain 5/24/2022 at Unknown time     bumetanide (BUMEX) 1 MG tablet TAKE 1 TABLET(1 MG) BY MOUTH DAILY 5/24/2022 at Unknown time     ferrous sulfate (FEROSUL) 325 (65 Fe) MG tablet Take 325 mg by mouth 2 times daily 5/24/2022 at am     levothyroxine (SYNTHROID/LEVOTHROID) 50 MCG tablet Take 50 mcg by mouth every morning  5/24/2022 at Unknown time     LORazepam (ATIVAN) 0.5 MG tablet Take 1 mg by mouth 2 times daily In the am and evening 5/24/2022 at am     LORazepam (ATIVAN) 0.5 MG tablet Take 0.5 mg by mouth daily (with lunch) 5/23/2022 at Unknown time     metoprolol succinate ER (TOPROL-XL) 50 MG 24 hr tablet Take 50 mg by mouth 2 times daily  5/24/2022 at am     multivitamin w/minerals (THERA-VIT-M) tablet Take 1 tablet by mouth daily 5/24/2022 at Unknown time     nystatin (MYCOSTATIN) 109857 UNIT/ML suspension Swish and swallow 5 mLs (500,000 Units) in mouth 4 times daily (Patient taking differently: Swish and swallow 500,000 Units in mouth 4 times daily as needed) Unknown at Unknown time     potassium chloride ER (KLOR-CON M) 10 MEQ CR tablet Take 10 mEq by mouth daily 5/24/2022 at Unknown time       Information source(s): Family member and CareEverywhere/SureScripts  Method of interview communication: in-person    Summary of Changes to PTA Med List  New: none  Discontinued: sucralfate- doesn't like how it makes her feel  Changed: lorazepam, nystatin    Patient was asked about OTC/herbal products specifically.  PTA med list reflects this.         Patient does not use any multi-dose medications prior to admission.    The information  provided in this note is only as accurate as the sources available at the time of the update(s).    Thank you for the opportunity to participate in the care of this patient.    Deanna Mueller RPH  5/24/2022 12:50 PM

## 2022-05-24 NOTE — ED PROVIDER NOTES
Emergency Department Midlevel Supervisory Note     I personally saw the patient and performed a substantive portion of the visit including all aspects of the medical decision making.    ED Course:  1:12 PM Gina Peterson PA-C staffed patient with me. I agree with their assessment and plan of management, and I will see the patient.  1:14 PM  I met with the patient to introduce myself, gather additional history, perform my initial exam, and discuss the plan.     Brief HPI:     Chris Bell is a 86 year old female who presents for evaluation of lethargy and syncopal feelings.  Per chart review, patient was seen in her cardiology clinic yesterday at which time basic labs were drawn.  She received a call today that her magnesium was low and was referred to the emergency department for magnesium replacement and consideration of diuresis.    I, Davi Guy, am serving as a scribe to document services personally performed by Khanh Obando, based on my observations and the provider's statements to me.   I, Khanh Obando attest that Davi Guy was acting in a scribe capacity, has observed my performance of the services and has documented them in accordance with my direction.    Brief Physical Exam: /60   Pulse 70   Temp 98.7  F (37.1  C) (Oral)   Resp 18   SpO2 98%   Constitutional:  Alert, in no acute distress  EYES: Conjunctivae clear  HENT:  Atraumatic, normocephalic  Respiratory:  Respirations even, unlabored, in no acute respiratory distress  Cardiovascular:  Regular rate and rhythm, good peripheral perfusion  GI: Soft, nondistended, nontender, no palpable masses, no rebound, no guarding   Musculoskeletal:  No edema. No cyanosis. Range of motion major extremities intact.    Integument: Warm, Dry, No erythema, No rash.   Neurologic:  Alert & oriented, no focal deficits noted  Psych: Normal mood and affect     MDM:  Patient with significantly low magnesium level at 1.0.  This will be supplemented.  Patient  also with generalized weakness and unable to stand or ambulate without major assistance.  Patient will need to be admitted to as a result.  Hemoglobin low at 7.7.  Patient may require transfusion upstairs.     Diagnostic impression:    1.  Acute generalized weakness.  2.  Hypomagnesemia.  3.  Anemia.    Labs and Imaging:  Results for orders placed or performed during the hospital encounter of 05/24/22   Head CT w/o contrast    Impression    IMPRESSION:  1.  No acute intracranial abnormality.  2.  Chronic changes as detailed.                 Chest XR,  PA & LAT    Impression    IMPRESSION: Strands of fibrosis and/or linear atelectasis in both lower lungs unchanged. Lungs otherwise clear. No visible pleural fluid. Mild stable cardiac enlargement and borderline pulmonary venous congestion further exaggerated by the shallow   inspiration and prominent left apical paracardial fat. Bones are demineralized.   Extra Blue Top Tube   Result Value Ref Range    Hold Specimen JIC    Extra Red Top Tube   Result Value Ref Range    Hold Specimen JIC    Extra Green Top (Lithium Heparin) Tube   Result Value Ref Range    Hold Specimen JIC    Extra Purple Top Tube   Result Value Ref Range    Hold Specimen JIC    CBC (+ platelets, no diff)   Result Value Ref Range    WBC Count 6.7 4.0 - 11.0 10e3/uL    RBC Count 2.48 (L) 3.80 - 5.20 10e6/uL    Hemoglobin 7.7 (L) 11.7 - 15.7 g/dL    Hematocrit 24.8 (L) 35.0 - 47.0 %     78 - 100 fL    MCH 31.0 26.5 - 33.0 pg    MCHC 31.0 (L) 31.5 - 36.5 g/dL    RDW 18.2 (H) 10.0 - 15.0 %    Platelet Count 244 150 - 450 10e3/uL   Comprehensive metabolic panel   Result Value Ref Range    Sodium 133 (L) 136 - 145 mmol/L    Potassium 3.4 (L) 3.5 - 5.0 mmol/L    Chloride 101 98 - 107 mmol/L    Carbon Dioxide (CO2) 23 22 - 31 mmol/L    Anion Gap 9 5 - 18 mmol/L    Urea Nitrogen 7 (L) 8 - 28 mg/dL    Creatinine 0.73 0.60 - 1.10 mg/dL    Calcium 8.0 (L) 8.5 - 10.5 mg/dL    Glucose 105 70 - 125 mg/dL     Alkaline Phosphatase 78 45 - 120 U/L    AST 12 0 - 40 U/L    ALT 10 0 - 45 U/L    Protein Total 8.1 (H) 6.0 - 8.0 g/dL    Albumin 2.2 (L) 3.5 - 5.0 g/dL    Bilirubin Total 0.3 0.0 - 1.0 mg/dL    GFR Estimate 80 >60 mL/min/1.73m2   Result Value Ref Range    Lipase 11 0 - 52 U/L   B-Type Natriuretic Peptide ( [facility] Only)   Result Value Ref Range     (H) 0 - 167 pg/mL   Troponin I (now)   Result Value Ref Range    Troponin I 0.01 0.00 - 0.29 ng/mL   Asymptomatic COVID-19 Virus (Coronavirus) by PCR Nasopharyngeal    Specimen: Nasopharyngeal; Swab   Result Value Ref Range    SARS CoV2 PCR Negative Negative   Result Value Ref Range    Magnesium 1.0 (LL) 1.8 - 2.6 mg/dL     I have reviewed the relevant laboratory and radiology studies    Procedures:  I was present for the key portions of this procedure: none    Meño Obando MD  [facility] EMERGENCY DEPARTMENT  [address] [place] [id number] [phone]     Khanh Obando MD  05/24/22 6563

## 2022-05-24 NOTE — PLAN OF CARE
Goal Outcome Evaluation:  Pt is alert and oriented, Vss  IV mag  given. Recheck lab in Am  Prn tylenol was given for pain control.

## 2022-05-25 NOTE — PROGRESS NOTES
Patient is pleasant, calm and A/o. Maintained pt on 3 L/min via oxymask as patient desats into 50s while sleeping. Nurse observed what appears to be apneic episodes while asleep, resulting in O2 desat as low as 50%.   External catheter remains in place per pt request.   Pt afebrile, with b/ps running 150s/70s. Pt in sinus dysrythmia.

## 2022-05-25 NOTE — PROGRESS NOTES
05/25/22 1445   Quick Adds   Type of Visit Initial PT Evaluation       Present yes  (language line)   Language Hmong   Living Environment   Living Environment Comments per OT   Self-Care   Current Activity Tolerance moderate  (pt states she is very weak but actually does quite well)   Equipment Currently Used at Home walker, rolling   Activity/Exercise/Self-Care Comment per pt, family does almost everything for her   General Information   Onset of Illness/Injury or Date of Surgery 05/24/22   Referring Physician CANELO Mohr   Patient/Family Therapy Goals Statement (PT) go home   Pertinent History of Current Problem (include personal factors and/or comorbidities that impact the POC) CHF, muscle weakness, decreased ambulation.  Hx chronic lumbar comp fxs   Existing Precautions/Restrictions fall   General Observations pt agrees to therapy but insists on having 2 people help her   Cognition   Affect/Mental Status (Cognition) WFL   Orientation Status (Cognition) oriented to;person;place;situation   Follows Commands (Cognition) follows one-step commands;repetition of directions required   Cognitive Status Comments pt very talkative ofter talking over the    Pain Assessment   Patient Currently in Pain No   Range of Motion (ROM)   ROM Comment LE ROM WFL   Strength (Manual Muscle Testing)   Strength Comments LE strength 4/5   Bed Mobility   Bed Mobility supine-sit;sit-supine   Supine-Sit [place] (Bed Mobility) minimum assist (75% patient effort)   Sit-Supine [place] (Bed Mobility) moderate assist (50% patient effort)  (mostly 2/2 high bed and pt is only 4'11'')   Impairments Contributing to Impaired Bed Mobility decreased strength   Assistive Device (Bed Mobility) bed rails;draw sheet   Comment, (Bed Mobility) pt able to do more than she realizes, moves well, sitting balance is good   Transfers   Transfers sit-stand transfer   Sit-Stand Transfer   Sit-Stand [place] (Transfers)  contact guard   Assistive Device (Sit-Stand Transfers) walker, front-wheeled   Comment, (Sit-Stand Transfer) cues for safety/hand placement   Gait/Stairs (Locomotion)   [place] Level (Gait) supervision;verbal cues;contact guard   Assistive Device (Gait) walker, front-wheeled   Distance in Feet (Required for LE Total Joints) 150'   Pattern (Gait) step-through   Deviations/Abnormal Patterns (Gait) stride length decreased   Comment, (Gait/Stairs) slower pace, no LOB, steady on turns   Clinical Impression   Criteria for Skilled Therapeutic Intervention Yes, treatment indicated   PT Diagnosis (PT) decreased functional mobility   Influenced by the following impairments weakness, fatigue   Functional limitations due to impairments transfers, gait   Clinical Presentation (PT Evaluation Complexity) Stable/Uncomplicated   Clinical Presentation Rationale presents as diagnosed   Clinical Decision Making (Complexity) low complexity   Planned Therapy Interventions (PT) bed mobility training;transfer training;gait training   Anticipated Equipment Needs at Discharge (PT) walker, rolling   Risk & Benefits of therapy have been explained care plan/treatment goals reviewed;patient   PT Discharge Planning   PT Discharge Recommendation (DC Rec) home with assist   PT Rationale for DC Rec as prior to admit   PT Brief overview of current status pt tolerated PT well   Total Evaluation Time   Total Evaluation Time (Minutes) 10   Physical Therapy Goals   PT Frequency One time eval and treatment only   PT Predicted Duration/Target Date for Goal Attainment 05/25/22   PT Goals Bed Mobility;Transfers;Gait   PT: Bed Mobility Minimal assist   PT: Transfers   (CGA)   PT: Gait 150 feet;Within precautions;Rolling walker  (CGA)

## 2022-05-25 NOTE — PROGRESS NOTES
Physical Therapy Discharge Summary    Reason for therapy discharge:    All goals and outcomes met, no further needs identified.    Progress towards therapy goal(s). See goals on Care Plan in [facility] electronic health record for goal details.  Goals met    Therapy recommendation(s):    Defer to OT/Cardiac Rehab to continue with ambualtion

## 2022-05-25 NOTE — CONSULTS
Care Management Initial Consult    General Information  Assessment completed with: Children,  (son-jayda)  Type of CM/SW Visit: Initial Assessment    Primary Care Provider verified and updated as needed:     Readmission within the last 30 days: no previous admission in last 30 days         Advance Care Planning:            Communication Assessment  Patient's communication style: spoken language (non-English)             Cognitive  Cognitive/Neuro/Behavioral: .WDL except, mood/behavior  Level of Consciousness: alert  Arousal Level: opens eyes spontaneously     Mood/Behavior: anxious     Speech: fluent, clear, spontaneous    Living Environment:   People in home: alone     Current living Arrangements: apartment      Able to return to prior arrangements: yes       Family/Social Support:  Care provided by: child(chriss), self  Provides care for: no one     Children, PCA          Description of Support System: Supportive, Involved         Current Resources:   Patient receiving home care services: No     Community Resources: PCA  Equipment currently used at home: walker, rolling  Supplies currently used at home: None    Employment/Financial:  Employment Status:          Financial Concerns:             Lifestyle & Psychosocial Needs:  Social Determinants of Health     Tobacco Use: Low Risk      Smoking Tobacco Use: Never Smoker     Smokeless Tobacco Use: Never Used   Alcohol Use: Not on file   Financial Resource Strain: Not on file   Food Insecurity: Not on file   Transportation Needs: Not on file   Physical Activity: Not on file   Stress: Not on file   Social Connections: Not on file   Intimate Partner Violence: Not on file   Depression: At risk     PHQ-2 Score: 3   Housing Stability: Not on file       Functional Status:  Prior to admission patient needed assistance:   Dependent ADLs:: Ambulation-walker, Bathing  Dependent IADLs:: Cleaning, Cooking, Laundry, Shopping, Meal Preparation, Medication Management, Money  Management, Transportation                  Additional Information:    Assessment completed with son Shiv. Patient lives alone in her apartment. She has PCA services-daughter May is her PCA. She requires assistance with some ADLs and most IADLs. She ambulates with walker. Shiv states even though she lives alone family is there almost 24 hours. Family will transport home.    Patient to return home with support from family and resume PCA services.      Leona Coon RN

## 2022-05-25 NOTE — PROGRESS NOTES
"   05/25/22 1450   Quick Adds   Type of Visit Initial Occupational Therapy Evaluation       Present yes   Living Environment   People in Home alone   Current Living Arrangements apartment   Transportation Anticipated family or friend will provide   Living Environment Comments daughter lives with patient when she is needing increased cares.   Self-Care   Usual Activity Tolerance good   Current Activity Tolerance moderate   Equipment Currently Used at Home walker, rolling   Instrumental Activities of Daily Living (IADL)   IADL Comments daughter provides cares   General Information   Onset of Illness/Injury or Date of Surgery 05/24/22   Referring Physician Dr. Mohr   Patient/Family Therapy Goal Statement (OT) return home with daughter PCA cares   Performance Patterns (Routines, Roles, Habits) funciton fluctuates based on HF symptoms   Existing Precautions/Restrictions cardiac   Strength Comprehensive (MMT)   Comment, General Manual Muscle Testing (MMT) Assessment attempted muscle testing.  pt states she feels to weak to resist testing.   Bed Mobility   Bed Mobility supine-sit;sit-supine   Supine-Sit [place] (Bed Mobility) minimum assist (75% patient effort)   Sit-Supine [place] (Bed Mobility) minimum assist (75% patient effort)   Comment (Bed Mobility) pt states her daughter helps her get in and out of bed every day   Transfers   Transfers bed-chair transfer   Transfer Skill: Bed to Chair/Chair to Bed   Bed-Chair [place] (Transfers) contact guard   Transfer Comments daughter provides assistance with walking   Activities of Daily Living   BADL Assessment/Intervention lower body dressing;toileting   Lower Body Dressing Assessment/Training   Position (Lower Body Dressing) unsupported sitting   Comment, (Lower Body Dressing) \"my daughtet does it for me\"   [place] Level (Lower Body Dressing) maximum assist (25% patient effort)   Toileting   Comment, (Toileting) independent " pericare   [place] Level (Toileting) contact guard assist   Clinical Impression   Criteria for Skilled Therapeutic Interventions Met (OT) Yes, treatment indicated   OT Diagnosis CHF monitoring needs and decline in endurance. and functional independence   Influenced by the following impairments chf   OT Problem List-Impairments impacting ADL mobility;problems related to;activity tolerance impaired   Assessment of Occupational Performance 1-3 Performance Deficits   Identified Performance Deficits endurance, moblity   Planned Therapy Interventions (OT) progressive activity/exercise;home program guidelines   Clinical Decision Making Complexity (OT) low complexity   Risk & Benefits of therapy have been explained care plan/treatment goals reviewed;evaluation/treatment results reviewed;risks/benefits reviewed;current/potential barriers reviewed;participants included;participants voiced agreement with care plan;patient   OT Discharge Planning   OT Discharge Recommendation (DC Rec) home with assist   OT Rationale for DC Rec pt states her daughter is her PCA.  When she is sick, she can provide full 24 hour care until she is well enough to be safe and independent at home   Total Evaluation Time (Minutes)   Total Evaluation Time (Minutes) 18   OT Goals   Therapy Frequency (OT) Daily   OT Predicted Duration/Target Date for Goal Attainment 05/30/22   OT Goals Cardiac Phase 1   OT: Understanding of cardiac education to maximize quality of life, condition management, and health outcomes Patient   OT: Perform aerobic activity with stable cardiovascular response 15 minutes   OT: Functional/aerobic ambulation tolerance with stable cardiovascular response in order to return to home and community environment Rolling walker;50 feet;Supervision/SBA

## 2022-05-25 NOTE — PROGRESS NOTES
[facility]    Progress Note - Hospitalist Service       Date of Admission:  5/24/2022    Assessment & Plan          Chris Bell is a 86 year old female admitted on 5/24/2022. She has a history of peptic ulcer disease, HFpEF, hypothyroidism, and is admitted for weakness secondary to severe hypomagnesemia.  Awaiting PT/OT evaluation for disposition planning.     Generalized Weakness, improving  Hypomagnesemia, resolved  Hypokalemia, resolved  Patient presents with subacute generalized weakness and inability to ambulate, worse over the past few days.  Per family, has been able to ambulate with walker and standby assist up until yesterday.  Exam shows diffuse weakness.  Neurologic exam otherwise intact.  CT head without any acute findings.  She does have chronic compression fractures in her back, but no change in her back pain or any concerns for cauda equina syndrome.  Magnesium found to be 1.0.  She has had loose stools over the past week or so.  Chart review shows chronically low magnesium down to about 1.4.  Appears that she may have been on magnesium supplementation as an outpatient in the past.  She is also on potassium supplementation.  -RN driven magnesium and potassium replacement protocols  -Will need oral magnesium supplementation as an outpatient and potentially amiloride for magnesium retention  -PT and OT consults, appreciate recommendations for disposition     Chronic Macrocytic Anemia  Hemoglobin of 7.7 upon admission, baseline is 7-8. MCV of 100.  History of peptic ulcer disease with prior GI bleeds, has needed transfusions.  On iron supplementation at baseline.  Has refused colonoscopy in the past.  B12 in April was 1700, will recheck along with folate which has not been tested in over a year.  -No indication for acute transfusion, however patient desires transfusion to avoid future hospitalization  -B12 and folate both normal  -ferritin slightly high, may be combination of  anemia of chronic disease with iron deficiency  -CBC in a.m.  -Continue iron supplementation 325 mg twice daily     Gamma Gap  Total protein 8.1, albumin 2.2.  This gamma gap has been noted in previous records (12/2018).  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  No proteinuria on UA.  -SPEP     HFpEF, not in acute exacerbation  Complained of some shortness of breath upon arrival, however vitals are stable and her lungs are clear.  BNP is 175 which is the lowest it has been for her in a couple of years.  Ejection fraction of 55 to 60% in April 2022.  Has been seen in heart failure clinic about once a week for the past few weeks to titrate her diuretics.  She was switched from Lasix to Bumex on 5/20 as she was no longer responding to Lasix..  Was seen in cardiology clinic the day prior to admission and was noted to be down 1 pound in weight and otherwise looked well compared to the prior week.  Patient and family very concerned with amount of urination, however this is going to be a chronic issue for her with diuretic usage.  -Continue Bumex 1 mg daily and metoprolol succinate 50 mg twice daily  -Has appointment with Dr. Germain on June 13     Chronic lumbar compression fractures  Patient has TLSO brace for comfort.  No new concerning symptoms.  Her difficulty ambulating appears to be due to generalized weakness rather than acute cord compression.  -Continue to monitor     Hypothyroidism  Continue PTA levothyroxine 50 mcg daily.     Anxiety  Continue scheduled Ativan 1 mg in the morning, 0.5 mg at lunch, 1 mg in the evening.  Unclear why she is on this medication, would benefit from exploring this further with patient and family in the morning.     Diet: Combination Diet Low Saturated Fat Na <2400mg Diet, No Caffeine Diet    DVT Prophylaxis: Pneumatic Compression Devices  Javed Catheter: Not present  Fluids: None  Central Lines: None  Cardiac Monitoring: ACTIVE order. Indication: Electrolyte Imbalance (24 hours)-  Magnesium <1.3 mg/ml; Potassium < =2.8 or > 5.5 mg/ml  Code Status: No CPR- Do NOT Intubate      Disposition Plan   Expected Discharge: 1-2 days back home versus placement in nursing home     Jennifer Mohr MD  [facility], PGY-1  Pager #: [phone]     ______________________________________________________________________    Interval History   No acute events overnight.  Bleeding was moved to a room in the ICU only due to lack of bed availability.  She is feeling like her strength is better in her arms.  She is not sure how strong her legs are as she has not tried to get up and walk yet.  Doing really well with the pure wick for urinary management.    Data reviewed today: I reviewed all medications, new labs and imaging results over the last 24 hours.     Physical Exam   Vital Signs: Temp: 98  F (36.7  C) Temp src: Oral BP: 116/57 Pulse: 63   Resp: (!) 31 SpO2: 93 % O2 Device: Oxymask Oxygen Delivery: 2 LPM  Weight: 111 lbs 5.32 oz  GEN: Patient laying comfortably in no acute distress.  HEEN: Head is atraumatic, normocephalic, eyes anicteric, mucous membranes moist.  CV: Regular rate and rhythm without obvious murmurs.  PULM: Clear to auscultation bilaterally without wheezing or rales.  ABD: Soft, nontender, bowel sounds present.  NEURO: Alert and oriented x3.  Diffuse symmetric weakness (4/5) in the arms and legs, improved as compared to yesterday. Face symmetric.  PSYCH: Appropriate affect.  SKIN: No rashes, bruising, or other lesions.     Data   Recent Labs   Lab 05/25/22  0508 05/24/22  2135 05/24/22  1024 05/23/22  1537 05/19/22  2056   WBC 5.4  --  6.7  --  5.4   HGB 8.3*  --  7.7*  --  7.4*     --  100  --  100     --  244  --  268   *  --  133* 135* 135*   POTASSIUM 3.6 3.8 3.4* 3.6 3.4*   CHLORIDE 103  --  101 101 105   CO2 25  --  23 23 22   BUN 5*  --  7* 8 8   CR 0.62  --  0.73 0.80 0.78   ANIONGAP 7  --  9 11 8   MANE 8.5  --  8.0* 8.2* 8.1*    GLC 95  --  105 113 128*   ALBUMIN  --   --  2.2*  --  2.2*   PROTTOTAL  --   --  8.1*  --  8.0   BILITOTAL  --   --  0.3  --  0.2   ALKPHOS  --   --  78  --  90   ALT  --   --  10  --  <9   AST  --   --  12  --  17   LIPASE  --   --  11  --   --      Recent Results (from the past 24 hour(s))   Head CT w/o contrast    Narrative    EXAM: CT HEAD W/O CONTRAST  LOCATION: [facility]  DATE/TIME: 5/24/2022 12:37 PM    INDICATION: Weakness, near-syncope.  COMPARISON: None.  TECHNIQUE: Routine CT Head without IV contrast. Multiplanar reformats. Dose reduction techniques were used.    FINDINGS:  INTRACRANIAL CONTENTS: No intracranial hemorrhage, extraaxial collection, or mass effect.  No CT evidence of acute infarct. Moderate to severe presumed chronic small vessel ischemic changes. Mild generalized volume loss. No hydrocephalus.     VISUALIZED ORBITS/SINUSES/MASTOIDS: No intraorbital abnormality. Mild mucosal thickening scattered about the paranasal sinuses. No middle ear or mastoid effusion.    BONES/SOFT TISSUES: No acute abnormality.      Impression    IMPRESSION:  1.  No acute intracranial abnormality.  2.  Chronic changes as detailed.                 Chest XR,  PA & LAT    Narrative    EXAM: XR CHEST 2 VW  LOCATION: [facility]  DATE/TIME: 5/24/2022 12:31 PM    INDICATION: SOB  COMPARISON: 05/19/2022      Impression    IMPRESSION: Strands of fibrosis and/or linear atelectasis in both lower lungs unchanged. Lungs otherwise clear. No visible pleural fluid. Mild stable cardiac enlargement and borderline pulmonary venous congestion further exaggerated by the shallow   inspiration and prominent left apical paracardial fat. Bones are demineralized.     Medications       bumetanide  1 mg Oral Daily     ferrous sulfate  325 mg Oral BID     levothyroxine  50 mcg Oral QAM     LORazepam  0.5 mg Oral Daily with lunch     LORazepam  1 mg Oral BID     metoprolol succinate ER  50 mg  Oral BID     multivitamin w/minerals  1 tablet Oral Daily     sodium chloride (PF)  3 mL Intracatheter Q8H

## 2022-05-25 NOTE — PLAN OF CARE
Problem: Risk for Delirium  Goal: Optimal Coping  Outcome: Ongoing, Progressing   Calm and cooperative.  used through [employer].     Problem: Electrolyte Imbalance  Goal: Electrolyte Balance  Outcome: Ongoing, Progressing   Pt is on K and mag protocol. Recheck in the morning.     Problem: Anxiety  Goal: Anxiety Reduction or Resolution  Outcome: Ongoing, Progressing   Goal Outcome Evaluation:  Was concerned about her voiding,  has purewick in place. Bladder scan done and was 120.   Pt desats when asleep, on 2L oxygen through oxymask. Without o2, sats would drop to the 50s on RA when asleep.     Pt transferred to ICU room 359 at 0415. Called  to notify patient. She went with all her personal belongings. Report given to Oscar.     Cheyenne Page RN 05/25/22 4:19 AM

## 2022-05-26 NOTE — PLAN OF CARE
"  Problem: Plan of Care - These are the overarching goals to be used throughout the patient stay.    Goal: Patient-Specific Goal (Individualized)  Description: You can add care plan individualizations to a care plan. Examples of Individualization might be:  \"Parent requests to be called daily at 9am for status\", \"I have a hard time hearing out of my right ear\", or \"Do not touch me to wake me up as it startles me\".  Outcome: Ongoing, Progressing   Goal Outcome Evaluation:    Pt able to make her needs known.   Chronic back pain, relieved with tylenol. This is a PTA practice for the patient.   Sleep well this shift.   Is continent of urine  Uses walker to get to bathroom. Steady on her feet.   Able to eat rice poridge this am that was brought by her daughter.                     "

## 2022-05-26 NOTE — DISCHARGE SUMMARY
[facility]  Discharge Summary - Medicine & Pediatrics       Date of Admission:  5/24/2022  Date of Discharge:  5/26/2022  Discharging Provider: Jennifer Mohr MD  Discharge Service: Hospitalist Service    Discharge Diagnoses      Generalized muscle weakness  Hypomagnesemia  CHF (congestive heart failure) (H)  Hypokalemia  Chronic heart failure with preserved ejection fraction (H)  Oral ulcer    Follow-ups Needed After Discharge   Follow-up Appointments     Follow-up and recommended labs and tests       Follow up with primary care provider, INOCENCIA PRINGLE or with your   cardiologist, within 7 days to evaluate medication change and for hospital   follow- up.  The following labs/tests are recommended: BMP.          Consider sooner follow-up with sleep medicine clinic. RNs noticed periods of apnea lasting up to 1 minute during sleep.    Discharge Disposition   Discharged to home  Condition at discharge: Stable    Hospital Course   Chris Bell was admitted on 5/24/2022 for weakness.  The following problems were addressed during her hospitalization:    Generalized Weakness, improving  Hypomagnesemia, resolved  Hypokalemia, resolved  Patient presented with subacute generalized weakness and inability to ambulate, worse over the past few days.  Per family, has been able to ambulate with walker and standby assist up until day before admission.  Exam shows diffuse weakness.  Neurologic exam otherwise intact.  CT head without any acute findings.  She does have chronic compression fractures in her back, but no change in her back pain or any concerns for cauda equina syndrome.  Magnesium found to be 1.0.  She has had loose stools over the past week or so.  Chart review shows chronically low magnesium down to about 1.4.  Appears that she may have been on magnesium supplementation as an outpatient in the past.  She is also on potassium supplementation.  Her magnesium and potassium were replaced and  levels improved.  Her weakness resolved and she was able to ambulate at her baseline with physical therapy.  Due to severity of her hypomagnesemia, started her on amiloride 5 mg to help prevent further magnesium losses.  At this will also spare her potassium, discontinued her potassium supplementation.  Decrease her Bumex to 0.5 mg daily and will have her follow-up in heart failure clinic next week to further evaluate.     Chronic Macrocytic Anemia, stable  Hemoglobin of 7.7 upon admission, baseline is 7-8. MCV of 100.  History of peptic ulcer disease with prior GI bleeds, has needed transfusions.  On iron supplementation at baseline.  Has refused colonoscopy in the past.  B12 and folate normal, ferritin slightly high.  We continued her iron supplementation.  She desired a blood transfusion during this hospitalization in order to prevent a future hospitalization, however we discussed that there is no indication for this at this time.  Hemoglobin was 7.6 on day of discharge.     Gamma Gap  Polyclonal gammopathy  Total protein 8.1, albumin 2.2.  This gamma gap has been noted in previous records (12/2018).  SPEP on this admission showed polyclonal gammopathy indicating likely protein calorie malnutrition and chronic inflammatory process.  No proteinuria on UA.  No further work-up needed at this time.     HFpEF, not in acute exacerbation  Complained of some shortness of breath upon arrival, however vitals are stable and her lungs are clear.  BNP is 175 which is the lowest it has been for her in a couple of years.  Ejection fraction of 55 to 60% in April 2022.  Has been seen in heart failure clinic about once a week for the past few weeks to titrate her diuretics.  She was switched from Lasix to Bumex on 5/20 as she was no longer responding to Lasix..  Was seen in cardiology clinic the day prior to admission and was noted to be down 1 pound in weight and otherwise looked well compared to the prior week.  Patient and  family very concerned with amount of urination, however this is going to be a chronic issue for her with diuretic usage.  We continued her 1 mg of Bumex daily until day of discharge when this was decreased to 0.5 mg daily due to the addition of amiloride.  We also continued her metoprolol succinate 50 mg twice daily.  She has an appointment with Dr. Germain, cardiology, on June 13, but had [facility] help to make appointment sooner in heart failure clinic.     Chronic lumbar compression fractures  Patient has TLSO brace for comfort.  No new concerning symptoms.  Her difficulty ambulating appears to be due to generalized weakness rather than acute cord compression.     Hypothyroidism  Continued PTA levothyroxine 50 mcg daily.     Anxiety  Continued scheduled Ativan 1 mg in the morning, 0.5 mg at lunch, 1 mg in the evening.  Unclear why she is on this medication, would benefit from exploring this further with patient and family.    Consultations This Hospital Stay   PHYSICAL THERAPY ADULT IP CONSULT  OCCUPATIONAL THERAPY ADULT IP CONSULT  CARE MANAGEMENT / SOCIAL WORK IP CONSULT    Code Status   No CPR- Do NOT Intubate       The patient was discussed with Dr. Arias.    Jennifer Mohr MD  [facility] Medicine Residency Program, PGY-1  Pager #: [phone]    ______________________________________________________________________    Physical Exam   Vital Signs: Temp: 98.1  F (36.7  C) Temp src: Oral BP: (!) 149/76 Pulse: 79   Resp: 20 SpO2: 100 %      Weight: 111 lbs 5.32 oz  GEN: Patient laying comfortably in no acute distress.  HEEN: Head is atraumatic, normocephalic, eyes anicteric, mucous membranes moist.  CV: Regular rate and rhythm without obvious murmurs.  PULM: Clear to auscultation bilaterally without wheezing or rales.  ABD: Soft, nontender, bowel sounds present.  NEURO: Alert and oriented x3.  Weakness has resolved.  Face symmetric.  PSYCH: Appropriate affect.  SKIN: No rashes, bruising, or other  lesions.       Primary Care Physician   INOCENCIA PRINGLE    Discharge Orders      Reason for your hospital stay    You were hospitalized for weakness caused by low magnesium levels in your blood.  We gave you magnesium supplementation and this resolved.  We will start you on a new medication called amiloride that will help your body to hold onto magnesium and not pee it out.  This is another diuretic medication, so we did decrease your Bumex to half a milligram daily.  We also stopped your potassium supplementation because amiloride also helps your body hold onto potassium and we do not want your potassium level to go too high.  You should see your cardiologist next week for close follow-up.     Activity    Your activity upon discharge: activity as tolerated     Follow-up and recommended labs and tests     Follow up with primary care provider, INOCENCIA PRINGLE or with your cardiologist, within 7 days to evaluate medication change and for hospital follow- up.  The following labs/tests are recommended: BMP.     Diet    Follow this diet upon discharge: Regular       Significant Results and Procedures   Most Recent 3 CBC's:Recent Labs   Lab Test 05/26/22  0403 05/25/22  0508 05/24/22  1024 05/19/22  2056   WBC  --  5.4 6.7 5.4   HGB 7.6* 8.3* 7.7* 7.4*   MCV  --  100 100 100   PLT  --  251 244 268     Most Recent 3 BMP's:Recent Labs   Lab Test 05/25/22  0508 05/24/22  2135 05/24/22  1024 05/23/22  1537   *  --  133* 135*   POTASSIUM 3.6 3.8 3.4* 3.6   CHLORIDE 103  --  101 101   CO2 25  --  23 23   BUN 5*  --  7* 8   CR 0.62  --  0.73 0.80   ANIONGAP 7  --  9 11   MANE 8.5  --  8.0* 8.2*   GLC 95  --  105 113   ,   Results for orders placed or performed during the hospital encounter of 05/24/22   Head CT w/o contrast    Narrative    EXAM: CT HEAD W/O CONTRAST  LOCATION: [facility]  DATE/TIME: 5/24/2022 12:37 PM    INDICATION: Weakness, near-syncope.  COMPARISON:  None.  TECHNIQUE: Routine CT Head without IV contrast. Multiplanar reformats. Dose reduction techniques were used.    FINDINGS:  INTRACRANIAL CONTENTS: No intracranial hemorrhage, extraaxial collection, or mass effect.  No CT evidence of acute infarct. Moderate to severe presumed chronic small vessel ischemic changes. Mild generalized volume loss. No hydrocephalus.     VISUALIZED ORBITS/SINUSES/MASTOIDS: No intraorbital abnormality. Mild mucosal thickening scattered about the paranasal sinuses. No middle ear or mastoid effusion.    BONES/SOFT TISSUES: No acute abnormality.      Impression    IMPRESSION:  1.  No acute intracranial abnormality.  2.  Chronic changes as detailed.                 Chest XR,  PA & LAT    Narrative    EXAM: XR CHEST 2 VW  LOCATION: [facility]  DATE/TIME: 5/24/2022 12:31 PM    INDICATION: SOB  COMPARISON: 05/19/2022      Impression    IMPRESSION: Strands of fibrosis and/or linear atelectasis in both lower lungs unchanged. Lungs otherwise clear. No visible pleural fluid. Mild stable cardiac enlargement and borderline pulmonary venous congestion further exaggerated by the shallow   inspiration and prominent left apical paracardial fat. Bones are demineralized.       Discharge Medications   Current Discharge Medication List      START taking these medications    Details   aMILoride (MIDAMOR) 5 MG tablet Take 1 tablet (5 mg) by mouth daily  Qty: 30 tablet, Refills: 1    Associated Diagnoses: Hypomagnesemia         CONTINUE these medications which have CHANGED    Details   bumetanide (BUMEX) 0.5 MG tablet Take 1 tablet (0.5 mg) by mouth daily  Qty: 30 tablet, Refills: 1    Associated Diagnoses: Chronic heart failure with preserved ejection fraction (H)      nystatin (MYCOSTATIN) 965186 UNIT/ML suspension Swish and swallow 5 mLs (500,000 Units) in mouth 4 times daily as needed    Associated Diagnoses: Oral ulcer         CONTINUE these medications which have NOT CHANGED     Details   acetaminophen (TYLENOL) 500 MG tablet Take 1-2 tablets (500-1,000 mg) by mouth every 8 hours as needed for pain  Qty: 90 tablet, Refills: 1    Associated Diagnoses: Right lumbar radiculitis; Chronic right-sided low back pain with right-sided sciatica      ferrous sulfate (FEROSUL) 325 (65 Fe) MG tablet Take 325 mg by mouth 2 times daily      levothyroxine (SYNTHROID/LEVOTHROID) 50 MCG tablet Take 50 mcg by mouth every morning       !! LORazepam (ATIVAN) 0.5 MG tablet Take 1 mg by mouth 2 times daily In the am and evening      !! LORazepam (ATIVAN) 0.5 MG tablet Take 0.5 mg by mouth daily (with lunch)  Refills: 0      metoprolol succinate ER (TOPROL-XL) 50 MG 24 hr tablet Take 50 mg by mouth 2 times daily       multivitamin w/minerals (THERA-VIT-M) tablet Take 1 tablet by mouth daily      Alcohol Swabs (B-D SINGLE USE SWABS REGULAR) PADS USE UTD  Refills: 0      blood glucose monitoring (ONETOUCH VERIO) meter device kit 2 times daily  Refills: 0      ONETOUCH VERIO IQ test strip 2 times daily  Refills: 2       !! - Potential duplicate medications found. Please discuss with provider.      STOP taking these medications       alendronate (FOSAMAX) 70 MG tablet Comments:   Reason for Stopping:         artificial saliva (BIOTENE MT) SOLN solution Comments:   Reason for Stopping:         potassium chloride ER (KLOR-CON M) 10 MEQ CR tablet Comments:   Reason for Stopping:             Allergies   Allergies   Allergen Reactions     Unknown [No Clinical Screening - See Comments]      Pt states she has a medication allergy but does not know what it is

## 2022-05-26 NOTE — PROGRESS NOTES
Patient discharging to home. Family will transport. MD updated on apnea when sleeping. Sats dropped as low at 18 with a good waveform. Patient to follow up with sleep clinic outpatient. Per MD, this is a known chronic central apnea. Will review discharge instructions with family. All belongings sent with patient.

## 2022-05-26 NOTE — PROGRESS NOTES
Care Management Discharge Note    Discharge Date: 05/26/2022       Discharge Disposition: Home    Discharge Services: PCA    Discharge DME: None    Discharge Transportation: family          Education Provided on the Discharge Plan:  Per care team    Persons Notified of Discharge Plans:  yes    Patient/Family in Agreement with the Plan: yes    Handoff Referral Completed:  yes    Additional Information:  Patient to return home with support from family and resumption of PCA services. Family will transport.        Leona Coon RN

## 2022-05-27 NOTE — PROGRESS NOTES
Clinic Care Coordination Contact  [facility]/Voicemail       Clinical Data: Care Coordinator Outreach  Outreach attempted x 1.  Left message with daughter who stated her Mother was doing good with no questions or concerns.  Care Coordinator will do no further outreaches at this time.      Juanis Momin  [phone]  Care

## 2022-06-04 NOTE — ED TRIAGE NOTES
Left leg swelling for a week, takes diuretics.  No cp or sob.      Triage Assessment     Row Name 06/04/22 4770       Triage Assessment (Adult)    Airway WDL WDL       Respiratory WDL    Respiratory WDL WDL       Skin Circulation/Temperature WDL    Skin Circulation/Temperature WDL X;circulation       Cardiac WDL    Cardiac WDL WDL       Peripheral/Neurovascular WDL    Peripheral Neurovascular WDL X  leg swelling       Cognitive/Neuro/Behavioral WDL    Cognitive/Neuro/Behavioral WDL WDL

## 2022-06-05 NOTE — DISCHARGE INSTRUCTIONS
Your left leg pain is due to a Baker's cyst.  Continue to take the hydrocodone as needed for any further pain.  The Baker's cyst will eventually resolve on its own over time.  The urinalysis shows that she also have a urinary tract infection.  Take the antibiotic Keflex as directed to treat the urinary tract infection.  Please follow-up with your primary care provider for routine reevaluation or return back to ED sooner for any worsening leg pain, fevers, abdominal pain, chest pain, shortness of breath, or any other new or concerning symptoms.

## 2022-06-05 NOTE — ED PROVIDER NOTES
EMERGENCY DEPARTMENT ENCOUNTER      NAME: Chris Bell  AGE: 86 year old female  YOB: 1935  MRN: [id number]  EVALUATION DATE & TIME: 2022  6:08 PM    PCP: Kizzy Villanueva    ED PROVIDER: Philip Munguia DO      Chief Complaint   Patient presents with     Leg Swelling         FINAL IMPRESSION:  1. Baker's cyst of knee, left    2. Urinary tract infection without hematuria, site unspecified          ED COURSE & MEDICAL DECISION MAKIN-year-old female presented to the ED for evaluation of left leg pain and swelling that been ongoing for the last few weeks.  Patient also notes swelling in the right leg but she denies any pain in the right leg.  Patient also states that she has been urinating frequently since having her diuretic increased recently.  She denies any associated chest pain or shortness of breath.  Upon arrival to the ED the patient was noted to be hemodynamically stable.  She did not appear to be in any obvious distress or discomfort at time of her initial evaluation.  On exam the patient was noted to have mild swelling in the left lower leg when compared to the right.  The patient had mild to moderate tenderness palpation noted behind her left knee and there is also trace edema located in the left lower extremity.  There is no significant areas of erythema or warmth noted within the left leg.  No other neurovascular deficits were noted.      The patient's white blood cell count was elevated at 14.3.  10 days ago the white blood cell count was 5.4.  The patient was noted to have a hemoglobin of 7.7 which appears to be her baseline.  The BMP was unremarkable.  Urinalysis shows a likely urinary tract infection.  An EKG was also obtained which revealed normal sinus rhythm without any new or concerning ST or T wave changes.    Sound of the left leg does not show evidence of a DVT.  A 4 cm Baker's cyst was noted as well as some calf edema.     The patient was reevaluated and both she and  her daughter were informed of the lab, UA, and ultrasound results with the help of a [facility] [place] [facility] .  The patient was informed that her left leg pain is likely due to the Baker's cyst.  The patient was educated on her Baker's cyst and reassured.  She was given a dose of hydrocodone here in the ED to treat her pain.  The patient was also informed of the likely urinary tract infection.  This may be part of the reason why she has been experiencing urinary frequency.  The patient was given a dose of Keflex here in the ED to treat the urinary tract infection.  Following this the patient and her daughter felt comfortable returning home.  The patient was sent home with Keflex to treat the urinary tract infection.  She is also instructed to take 2.5 mg of hydrocodone as needed for any further leg pain.  The patient was instructed to follow-up with her primary care provider for reevaluation or to return back to ED sooner for any worsening leg pain, abdominal pain, chest pain, shortness of breath, fevers, or any other new or concerning symptoms.    Pertinent Labs & Imaging studies reviewed. (See chart for details)    9:04 PM I met with the patient to gather history and to perform my initial exam. We discussed plans for the ED course, including diagnostic testing and treatment.       At the conclusion of the encounter I discussed the results of all of the tests and the disposition. The questions were answered. The patient or family acknowledged understanding and was agreeable with the care plan.       PPE worn: n95 mask, goggles    MEDICATIONS GIVEN IN THE EMERGENCY:  Medications   cephALEXin (KEFLEX) capsule 500 mg (500 mg Oral Given 6/4/22 2149)   HYDROcodone-acetaminophen (NORCO) 5-325 MG per tablet 1 tablet (1 tablet Oral Given 6/4/22 2149)       NEW PRESCRIPTIONS STARTED AT TODAY'S ER VISIT  Discharge Medication List as of 6/4/2022  9:58 PM      START taking these medications    Details   cephALEXin (KEFLEX) 500 MG  capsule Take 1 capsule (500 mg) by mouth 3 times daily for 10 days, Disp-30 capsule, R-0, Local Print      HYDROcodone-acetaminophen (NORCO) 5-325 MG tablet Take 1/2 tab every 6 hours as needed for leg pain, Disp-10 tablet, R-0, Local Print                =================================================================    HPI    Patient information was obtained from: patient    Use of : Yes (Phone) - Language: Sukhjinder Bell is a 86 year old female with a pertinent history of hypertension, anemia, PUD, cancer of soft palate, GI bleed, and CHF who presents to this ED via wheelchair for evaluation of leg swelling.    Patient reports that she has been experiencing bilateral leg pain up into the thighs as well as leg swelling bilaterally, but left greater than right. She has been taking diuretics for two weeks and has been urinating more often but feels generally weak. Patient also notes bilateral arm weakness and tingling but states that this has been ongoing for approximately five months. Otherwise denies any other complaints.    REVIEW OF SYSTEMS   Review of Systems   Cardiovascular: Positive for leg swelling (bilateral, L > R).   Musculoskeletal: Positive for myalgias (bilateral legs).   Neurological: Positive for weakness (generalized).   All other systems reviewed and are negative.      PAST MEDICAL HISTORY:  Past Medical History:   Diagnosis Date     Anemia      Depression      Disease of thyroid gland      HTN (hypertension)      Osteoporosis      PUD (peptic ulcer disease)        PAST SURGICAL HISTORY:  Past Surgical History:   Procedure Laterality Date     ESOPHAGOSCOPY, GASTROSCOPY, DUODENOSCOPY (EGD), COMBINED N/A 1/17/2018    Procedure: ESOPHAGOGASTRODUODENOSCOPY (EGD) with h.pylori and gastric ulcer biopsies;  Surgeon: Colin Alvarez MD;  Location: [facility];  Service:      HYSTERECTOMY       US BIOPSY FINE NEEDLE ASPIRATION LYMPH NODE  8/14/2019     CURRENT MEDICATIONS:     cephALEXin (KEFLEX) 500 MG capsule  HYDROcodone-acetaminophen (NORCO) 5-325 MG tablet  acetaminophen (TYLENOL) 500 MG tablet  Alcohol Swabs (B-D SINGLE USE SWABS REGULAR) PADS  aMILoride (MIDAMOR) 5 MG tablet  blood glucose monitoring (ONETOUCH VERIO) meter device kit  bumetanide (BUMEX) 0.5 MG tablet  ferrous sulfate (FEROSUL) 325 (65 Fe) MG tablet  levothyroxine (SYNTHROID/LEVOTHROID) 50 MCG tablet  LORazepam (ATIVAN) 0.5 MG tablet  LORazepam (ATIVAN) 0.5 MG tablet  metoprolol succinate ER (TOPROL-XL) 50 MG 24 hr tablet  multivitamin w/minerals (THERA-VIT-M) tablet  nystatin (MYCOSTATIN) 424581 UNIT/ML suspension  ONETOUCH VERIO IQ test strip      ALLERGIES:  Allergies   Allergen Reactions     Unknown [No Clinical Screening - See Comments]      Pt states she has a medication allergy but does not know what it is       FAMILY HISTORY:  Family History   Problem Relation Age of Onset     Diabetes No family hx of      Cancer No family hx of      Heart Disease No family hx of        SOCIAL HISTORY:   Social History     Socioeconomic History     Marital status:    Tobacco Use     Smoking status: Never Smoker     Smokeless tobacco: Never Used   Substance and Sexual Activity     Alcohol use: No     Drug use: No     Sexual activity: Never       VITALS:  /61   Pulse 83   Temp 99.8  F (37.7  C) (Temporal)   Resp 20   Wt 49.9 kg (110 lb)   SpO2 97%   BMI 22.22 kg/m      PHYSICAL EXAM    General presentation: Alert, Vital signs reviewed. NAD  HENT: ENT inspection is normal. Oropharynx is moist and clear.   Eye: Pupils are equal and reactive to light. EOMI  Neck: The neck is supple, with full ROM, with no evidence of meningismus.  Pulmonary: Currently in no acute respiratory distress. Normal, non labored respirations, the lung sounds are normal with good equal air movement. Clear to auscultation bilaterally.   Circulatory: Regular rate and rhythm. Peripheral pulses are strong and equal. No murmurs, rubs, or  gallops.   Abdominal: The abdomen is soft. Nontender. No rigidity, guarding, or rebound. Bowel sounds normal.   Neurologic: Alert, oriented to person, place, and time. No motor deficit. No sensory deficit. Cranial nerves II through XII are intact.  Musculoskeletal: Full range of motion in all extremities. Left leg mildly swollen compared to right, no erythema or warmth. Mild to moderate tenderness to palpation behind left knee. Trace edema in left lower extremity.  Skin: Skin color is normal. No rash. Warm. Dry to touch.      LAB:  All pertinent labs reviewed and interpreted.  Results for orders placed or performed during the hospital encounter of 06/04/22   US Lower Extremity Venous Duplex Left    Impression    IMPRESSION:  1.  No deep venous thrombosis in the left lower extremity.  2.  4 cm Baker's cyst.   3.  Calf edema.    UA with Microscopic reflex to Culture    Specimen: Urine, Clean Catch   Result Value Ref Range    Color Urine Light Yellow Colorless, Straw, Light Yellow, Yellow    Appearance Urine Clear Clear    Glucose Urine Negative Negative mg/dL    Bilirubin Urine Negative Negative    Ketones Urine Negative Negative mg/dL    Specific Gravity Urine 1.012 1.001 - 1.030    Blood Urine Negative Negative    pH Urine 6.0 5.0 - 7.0    Protein Albumin Urine 20  (A) Negative mg/dL    Urobilinogen Urine <2.0 <2.0 mg/dL    Nitrite Urine Negative Negative    Leukocyte Esterase Urine 75 Joanna/uL (A) Negative    Bacteria Urine Few (A) None Seen /HPF    RBC Urine 2 <=2 /HPF    WBC Urine 10 (H) <=5 /HPF    Squamous Epithelials Urine 1 <=1 /HPF    Hyaline Casts Urine 1 <=2 /LPF   CBC (+ platelets, no diff)   Result Value Ref Range    WBC Count 14.3 (H) 4.0 - 11.0 10e3/uL    RBC Count 2.49 (L) 3.80 - 5.20 10e6/uL    Hemoglobin 7.7 (L) 11.7 - 15.7 g/dL    Hematocrit 25.1 (L) 35.0 - 47.0 %     (H) 78 - 100 fL    MCH 30.9 26.5 - 33.0 pg    MCHC 30.7 (L) 31.5 - 36.5 g/dL    RDW 18.0 (H) 10.0 - 15.0 %    Platelet Count  397 150 - 450 10e3/uL   Basic metabolic panel   Result Value Ref Range    Sodium 132 (L) 136 - 145 mmol/L    Potassium 3.3 (L) 3.5 - 5.0 mmol/L    Chloride 102 98 - 107 mmol/L    Carbon Dioxide (CO2) 20 (L) 22 - 31 mmol/L    Anion Gap 10 5 - 18 mmol/L    Urea Nitrogen 31 (H) 8 - 28 mg/dL    Creatinine 1.09 0.60 - 1.10 mg/dL    Calcium 8.6 8.5 - 10.5 mg/dL    Glucose 114 70 - 125 mg/dL    GFR Estimate 49 (L) >60 mL/min/1.73m2   Extra Blue Top Tube   Result Value Ref Range    Hold Specimen JIC    Extra Red Top Tube   Result Value Ref Range    Hold Specimen JIC    Extra Green Top (Lithium Heparin) Tube   Result Value Ref Range    Hold Specimen JIC    Extra Purple Top Tube   Result Value Ref Range    Hold Specimen JIC        RADIOLOGY:  Reviewed all pertinent imaging. Please see official radiology report.  US Lower Extremity Venous Duplex Left   Final Result   IMPRESSION:   1.  No deep venous thrombosis in the left lower extremity.   2.  4 cm Baker's cyst.    3.  Calf edema.           EKG:    Normal sinus rhythm.  Rate of 82.  Normal QRS.  Normal QT.  No ST or T wave changes.  Compared to EKG on 5/24/2022 the nonspecific ST and T wave changes have resolved.    I have independently reviewed and interpreted the EKG(s) documented above.        I, Klaudia Silver , am serving as a scribe to document services personally performed by Philip Munguia DO based on my observation and the provider's statements to me. I, Philip Munguia, attest that Klaudia Sivler is acting in a scribe capacity, has observed my performance of the services and has documented them in accordance with my direction.    Philip Munguia DO  Emergency Medicine  [facility] EMERGENCY DEPARTMENT  [address] [place] [address] [phone]     Philip Munguia DO  06/04/22 9915

## 2022-06-22 NOTE — CONSULTS
Care Management Initial Consult    General Information  Assessment completed with: Carolee, Shiv son via phone  Type of CM/SW Visit: Initial Assessment    Primary Care Provider verified and updated as needed: Yes   Readmission within the last 30 days: previous discharge plan unsuccessful (5/24/22 - 5/26/22)   Return Category: Exacerbation of disease  Reason for Consult: discharge planning  Advance Care Planning: Advance Care Planning Reviewed: no concerns identified          Communication Assessment  Patient's communication style: spoken language (non-English) (speaks Hmong)             Cognitive  Cognitive/Neuro/Behavioral:                        Living Environment:   People in home: alone     Current living Arrangements: apartment      Able to return to prior arrangements: yes       Family/Social Support:  Care provided by: child(chriss), self  Provides care for: no one, unable/limited ability to care for self  Marital Status:   Children, PCA          Description of Support System: Supportive, Involved    Support Assessment: Adequate family and caregiver support, Adequate social supports, Patient communicates needs well met    Current Resources:   Patient receiving home care services: No     Community Resources: PCA (daughter May is PCA)  Equipment currently used at home: walker, rolling  Supplies currently used at home: None    Employment/Financial:  Employment Status: retired        Financial Concerns:     Referral to Financial Worker: No       Lifestyle & Psychosocial Needs:  Social Determinants of Health     Tobacco Use: Low Risk      Smoking Tobacco Use: Never Smoker     Smokeless Tobacco Use: Never Used   Alcohol Use: Not on file   Financial Resource Strain: Not on file   Food Insecurity: Not on file   Transportation Needs: Not on file   Physical Activity: Not on file   Stress: Not on file   Social Connections: Not on file   Intimate Partner Violence: Not on file   Depression: At risk     PHQ-2 Score:  3   Housing Stability: Not on file       Functional Status:  Prior to admission patient needed assistance:   Dependent ADLs:: Ambulation-walker, Bathing, Dressing  Dependent IADLs:: Cleaning, Cooking, Laundry, Shopping, Meal Preparation, Medication Management, Money Management, Transportation  Assesssment of Functional Status: Not at baseline with ADL Functioning, Not at baseline with mobility, Not at  functional baseline    Mental Health Status:          Chemical Dependency Status:                Values/Beliefs:  Spiritual, Cultural Beliefs, Judaism Practices, Values that affect care:                 Additional Information:  Chris lives in an apartment alone. Her daughter May is her PCA and checks on her daily. She helps with most ADLs. She uses a walker for mobility.    She is on an Assisted Living waiting list, but should be able to return to her current living situation and continue waiting.    Likely no discharge needs at this time.    Family to transport at discharge.    Anuradha Dior RN

## 2022-06-22 NOTE — PROGRESS NOTES
[facility]    Progress Note - Hospitalist Service       Date of Admission:  6/22/2022    Assessment & Plan          Chris Bell is a 86 year old female with PMH significant for PUD, HFpEF, hypothyroidism, and chronic macrocytic anemia who presented to [facility] ED on 6/21 for weakness and some shortness of breath. Found to be anemic to 6.7 without evidence of bleed. Requiring blood transfusion and further monitoring with PT/OT to see.     Active Problems:    Generalized muscle weakness     Generalized Weakness  Hyponatremia  Diarrhea   Patient presents with subacute generalized weakness and inability to ambulate, worse over the past few days. Very similar to last presentation in May 2022.  Work-up notable for hemoglobin of 6.7, down from her baseline of 7-8, as well as sodium 129.  Family has been working to get patient to assisted living facility but currently is living alone with frequent visits from daughter while waiting for process to be completed.  She does report several months of diarrhea, so most likely seems to be hypovolemia.  She is on oral iron and hydrocodone at home, so also considered encopresis. However, abdominal XR without significant stool burden. Repeat Na after fluid bolus improved to 138 .  -Regular diet   -PT/OT   -Stool panel, fecal calprotectin   - imodium        Acute on chronic anemia, macrocytic  Hemoglobin of 6.7 upon admission, baseline is 7-8. MCV of 102. Unclear etiology at this time given no melena, hematochezia, hematuria, or other bleeding source. Guaic currently negative, do not suspect GI bleed. Does have history of peptic ulcer disease with prior GI bleeds, has needed transfusions. On iron supplementation at baseline.  Has refused colonoscopy in the past.  Folate and B12 checked a month ago and were normal.   -1 unit pRBC now then recheck Hgb after  -Hgb check after transfusion  - Continue PTA iron   - Recheck TSH      Shortness of breath  Endorsing  some vague increased SOB over last few days. Satting 100% on RA currently. Suspect most likely 2/2 anemia and some deconditioning. Unlikely HF given BNP <100 and no clinical evidence of HF; trop wnl. CT PE was negative. No evidence of pneumonia or other mass on imaging.  -Management as above     HFpEF, not in acute exacerbation  Complained of some shortness of breath upon arrival, however vitals stable and lungs are clear- satting 100% on RA.  BNP is 84, lowest it has been for her in a couple of years.  Ejection fraction of 55 to 60% in April 2022.  Has been seen in heart failure clinic about once a week for the past few weeks to titrate her diuretics.  She was switched from Lasix to Bumex on 5/20 as she was no longer responding to Lasix.  -Continue PTA metoprolol succinate, amloride, bumex   -I/Os     Reduced GFR  GFR 49 today. Last checked 6/4 was also 49 however prior to that was in the 70s/80s. Unclear if has protracted JOSE vs newer CKD.  -BMP in AM     Chronic lumbar compression fractures  Patient has TLSO brace for comfort.  No new concerning symptoms.  Her difficulty ambulating appears to be due to generalized weakness rather than acute cord compression.  -TLSO as needed     Gamma Gap: Total protein 8.1, albumin 2.2.  This gamma gap has been noted in previous records (12/2018).  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  No proteinuria on UA.   Hypothyroidism:  continue PTA levothyroxine 50 mcg daily.  Anxiety:  continue scheduled Ativan 1 mg in the morning, 0.5 mg at lunch, 1 mg in the evening.  Unclear why she is on this medication, would benefit from exploring this further with patient and family in the morning.          Diet: Regular Diet Adult    DVT Prophylaxis: Pneumatic Compression Devices  Javed Catheter: Not present  Fluids: PO  Central Lines: None  Cardiac Monitoring: None  Code Status: Full Code      Disposition Plan      Expected Discharge Date: 06/25/2022                 The patient's care was discussed with the Attending Physician, Dr. Floyd.    Sonny Brothers MD  Hospitalist Service  [facility]        Clinically Significant Risk Factors Present on Admission         # Hyponatremia: Na = 129 mmol/L (Ref range: 136 - 145 mmol/L) on admission, will monitor as appropriate   # Hypomagnesemia: Mg = 1.2 mg/dL (Ref range: 1.8 - 2.6 mg/dL) on admission, will replace as needed   # Hypoalbuminemia: Albumin = 2.3 g/dL (Ref range: 3.5 - 5.0 g/dL) on admission, will monitor as appropriate   # Coagulation Defect: INR = 1.20 (Ref range: 0.85 - 1.15) and/or PTT = 32 Seconds (Ref range: 22 - 38 Seconds) on admission, will monitor for bleeding            ______________________________________________________________________    Interval History   Reports she is feeling much better today.  Dizziness has completely resolved.  Her breathing feels better.  She does continue to report significant diarrhea.  Says this has been happening for a few months.  Nonbloody, not dark black.  She is hungry and would like to eat.      Data reviewed today: I reviewed all medications, new labs and imaging results over the last 24 hours. I personally reviewed the abdominal x-ray image(s) showing no significant stool burden.    Physical Exam   Vital Signs: Temp: 97.6  F (36.4  C) Temp src: Oral BP: (!) 145/71 Pulse: 75   Resp: 20 SpO2: 100 % O2 Device: None (Room air) Oxygen Delivery: 2 LPM  Weight: 108 lbs 9.6 oz  EXAM  General: Alert, well-appearing, no acute distress  Head: Nontraumatic   Eyes:  EOM intact   Oropharynx: moist oral mucus membranes  Pulm: CTA BL, no tachypnea, speaking in full sentences  CV: RRR, no murmur  Abd: soft, mild generalized tenderness  Ext: Warm, well perfused, no LE edema  Skin: No rash on limited skin exam  Neuro: CN II-XII intact, moves all 4 extremities  Psych: Mood appropriate to visit content, full affect, rational thought content and process      Data    Recent Labs   Lab 06/22/22  1339 06/21/22  2156   WBC  --  10.8   HGB  --  6.7*   MCV  --  102*   PLT  --  284   INR  --  1.20*    129*   POTASSIUM 3.6 3.6   CHLORIDE 107 99   CO2 21* 20*   BUN 11 18   CR 0.70 1.10   ANIONGAP 10 10   MANE 8.5 8.6   GLC 93 131*   ALBUMIN  --  2.3*   PROTTOTAL  --  8.9*   BILITOTAL  --  0.3   ALKPHOS  --  89   ALT  --  <9   AST  --  15

## 2022-06-22 NOTE — ED NOTES
Patient incontinent of stool dark green stool x2, sample sent to lab for guaiac. Abdomin soft, non tender. IV fluids started

## 2022-06-22 NOTE — PHARMACY-ADMISSION MEDICATION HISTORY
Pharmacy Note - Admission Medication History  -Attempted to reach daughter to ask about medications, but when the home number was called and daughter May was asked for, the person answering stated we had the wrong number. Unsure if this is the wrong number or if there was a misunderstanding.  -Med list therefore based on last admit 5/26-5/27/22 and fill history, which is consistent with list.  ______________________________________________________________________    Prior To Admission (PTA) med list completed and updated in EMR.       PTA Med List   Medication Sig Last Dose     acetaminophen (TYLENOL) 500 MG tablet Take 1-2 tablets (500-1,000 mg) by mouth every 8 hours as needed for pain      aMILoride (MIDAMOR) 5 MG tablet Take 1 tablet (5 mg) by mouth daily      bumetanide (BUMEX) 0.5 MG tablet Take 1 tablet (0.5 mg) by mouth daily      ferrous sulfate (FEROSUL) 325 (65 Fe) MG tablet Take 325 mg by mouth 2 times daily      HYDROcodone-acetaminophen (NORCO) 5-325 MG tablet Take 1/2 tab every 6 hours as needed for leg pain      levothyroxine (SYNTHROID/LEVOTHROID) 50 MCG tablet Take 50 mcg by mouth every morning       LORazepam (ATIVAN) 0.5 MG tablet Take 1 mg by mouth 2 times daily In the am and evening      LORazepam (ATIVAN) 0.5 MG tablet Take 0.5 mg by mouth daily (with lunch)      metoprolol succinate ER (TOPROL-XL) 50 MG 24 hr tablet Take 50 mg by mouth 2 times daily       multivitamin w/minerals (THERA-VIT-M) tablet Take 1 tablet by mouth daily      nystatin (MYCOSTATIN) [id number] UNIT/ML suspension Swish and swallow 5 mLs (500,000 Units) in mouth 4 times daily as needed        Information source(s): Hospital records and [facility]  Method of interview communication: N/A    Summary of Changes to PTA Med List  New: -  Discontinued: -  Changed: -    Patient does not use any multi-dose medications prior to admission.    The information provided in this note is only as accurate as the sources  available at the time of the update(s).    Thank you for the opportunity to participate in the care of this patient.    Zabrina Aceves, PharmD, BCPS 06/22/22 7:22 AM

## 2022-06-22 NOTE — PLAN OF CARE
"Goal Outcome Evaluation:                      Problem: Plan of Care - These are the overarching goals to be used throughout the patient stay.    Goal: Plan of Care Review/Shift Note  Description: The Plan of Care Review/Shift note should be completed every shift.  The Outcome Evaluation is a brief statement about your assessment that the patient is improving, declining, or no change.  This information will be displayed automatically on your shift note.  Outcome: Ongoing, Progressing  Goal: Patient-Specific Goal (Individualized)  Description: You can add care plan individualizations to a care plan. Examples of Individualization might be:  \"Parent requests to be called daily at 9am for status\", \"I have a hard time hearing out of my right ear\", or \"Do not touch me to wake me up as it startles me\".  Outcome: Ongoing, Progressing  Goal: Absence of Hospital-Acquired Illness or Injury  Outcome: Ongoing, Progressing  Goal: Optimal Comfort and Wellbeing  Outcome: Ongoing, Progressing  Goal: Readiness for Transition of Care  Outcome: Ongoing, Progressing   Patient arrived from ED about 1300 and settled into bed in room 124.  Call placed to  to review plan of care.  Patient states some \"general aches and pains\" but denies need for pain medication at this time.  Dr Brothers notified for completion of blood consent form.  VSS at this time.  Patient is AAO.  Will continue to monitor.    "

## 2022-06-22 NOTE — ED NOTES
[facility] ED Handoff Report      ED Chief Complaint: edema    ED Diagnosis:  (M62.81) Generalized muscle weakness  Comment:     Plan:          PMH:    Past Medical History:   Diagnosis Date     Anemia      Depression      Disease of thyroid gland      HTN (hypertension)      Osteoporosis      PUD (peptic ulcer disease)         Code Status:  Full Code     Falls Risk: No Band: Not applicable    Current Living Situation/Residence: lives with their son or daughter     Elimination Status: Continent: No     Activity Level: SBA    Patients Preferred Language:  Other: Hmong     Needed: Yes    Vital Signs:  BP (!) 144/65   Pulse 72   Temp 98.5  F (36.9  C) (Oral)   Resp 20   Ht 1.524 m (5')   Wt 49.9 kg (110 lb)   SpO2 100%   BMI 21.48 kg/m       Cardiac Rhythm: NSR    Pain Score: 0/10    Is the Patient Confused:  No    Last Food or Drink: 6/21/22 at     Focused Assessment:  Hmong speaking came to Er with compliant of edema in legs. Hgb at 6.7. will need transfusion. Has had soft stools, sent to lab, negative. IV fluids given, does have pure wik inplace    Tests Performed: Done: Labs and Imaging    Treatments Provided:      Family Dynamics/Concerns: No    Family Updated On Visitor Policy: Yes    Plan of Care Communicated to Family: Yes    Who Was Updated about Plan of Care: family talked to MD on arrival    Belongings Checklist Done and Signed by Patient: No    Medications sent with patient:     Covid: asymptomatic , awaiting results    Additional Information:     RN: Maryjo Espana RN   6/22/2022 11:31 AM

## 2022-06-22 NOTE — ED PROVIDER NOTES
NAME: Chris Bell  AGE: 86 year old female  YOB: 1935  MRN: [id number]  EVALUATION DATE & TIME: 2022 12:34 AM    PCP: Kizzy Villanueva    ED PROVIDER: Juan Waller M.D.      Chief Complaint   Patient presents with     Leg Swelling     FINAL IMPRESSION:  1. Generalized muscle weakness      MEDICAL DECISION MAKIN:46 AM I met with the patient, obtained history, performed an initial exam, and discussed options and plan for diagnostics and treatment here in the ED. PPE worn including N95 mask, surgical gloves, eye protection.  3:19 AM I spoke with admitting for [facility], Dr. Mayo.  And patient was accepted for admission.  3:43 AM patient consented for blood with resident.  Patient was clinically assessed and consented to treatment. After assessment, medical decision making and workup were discussed with the patient. The patient was agreeable to plan for testing, workup, and treatment.  Pertinent Labs & Imaging studies reviewed. (See chart for details)         Chris Bell is a 86 year old female who presents with leg swelling.   Differential diagnosis includes but not limited to CHF exacerbation, DVT, cellulitis, dependent edema, anemia.  Patient presenting for leg swelling and overall general weakness and fatigue.  Patient and daughter report having ongoing weakness and also reports some leg swelling just above the knees.  She does does also endorse some pain in there but there is no signs of cellulitis or erythema or infection.  She does have labs from triage which showed a low hemoglobin of 6.7 which is down from her normal usually in the sevens.  Patient will be typed and screened.  Further labs from triage and the EKG did not show any signs of ischemia cardiac wise.  Lung sounds were clear and unlikely CHF.  Patient's weakness or symptoms may be from the anemia.  Additionally D-dimer was done and was elevated at as well as COVID which were negative.  CT angio was also done  for PE and this was negative.  Patient with multitude of complaints but mainly with weakness which could be related to the anemia.  I discussed with daughter and they were concerned about falls at home since she is not able to stand though there is no unilateral weakness I do not worry about neurologic disorder.  After discussion with patient and daughter who did not feel was safe at home we will plan for admission and patient was discussed with [facility] resident for admission.    0 minutes of critical care time    MEDICATIONS GIVEN IN THE EMERGENCY:  Medications   lidocaine 1 % 0.1-1 mL (has no administration in time range)   lidocaine (LMX4) cream (has no administration in time range)   sodium chloride (PF) 0.9% PF flush 3 mL (3 mLs Intracatheter Not Given 6/23/22 0027)   sodium chloride (PF) 0.9% PF flush 3 mL (has no administration in time range)   melatonin tablet 1 mg (1 mg Oral Given 6/22/22 2017)   ondansetron (ZOFRAN ODT) ODT tab 4 mg (has no administration in time range)     Or   ondansetron (ZOFRAN) injection 4 mg (has no administration in time range)   prochlorperazine (COMPAZINE) injection 5 mg (has no administration in time range)     Or   prochlorperazine (COMPAZINE) tablet 5 mg (has no administration in time range)     Or   prochlorperazine (COMPAZINE) suppository 12.5 mg (has no administration in time range)   aMILoride (MIDAMOR) tablet 5 mg (5 mg Oral Given 6/22/22 1421)   bumetanide (BUMEX) tablet 0.5 mg (0.5 mg Oral Given 6/22/22 1422)   ferrous sulfate (FEROSUL) tablet 325 mg (325 mg Oral Given 6/22/22 2016)   levothyroxine (SYNTHROID/LEVOTHROID) tablet 50 mcg (50 mcg Oral Given 6/22/22 1421)   metoprolol succinate ER (TOPROL XL) 24 hr tablet 50 mg (50 mg Oral Given 6/22/22 2016)   LORazepam (ATIVAN) tablet 0.5 mg (0.5 mg Oral Given 6/22/22 1422)   LORazepam (ATIVAN) tablet 1 mg (1 mg Oral Given 6/22/22 2016)   loperamide (IMODIUM) capsule 2 mg (has no administration in time range)    acetaminophen (TYLENOL) tablet 650 mg (650 mg Oral Given 6/22/22 2033)     Or   acetaminophen (TYLENOL) Suppository 650 mg ( Rectal See Alternative 6/22/22 2033)   iopamidol (ISOVUE-370) solution 75 mL (75 mLs Intravenous Given 6/22/22 0113)   LORazepam (ATIVAN) tablet 1 mg (1 mg Oral Given 6/22/22 0750)   0.9% sodium chloride BOLUS (0 mLs Intravenous Stopped 6/22/22 1119)       NEW PRESCRIPTIONS STARTED AT TODAY'S ER VISIT:  Current Discharge Medication List             =================================================================    HPI    Patient information was obtained from: Patient's daughter, patient    Use of : Yes (Patient's daughter, in person) - Sukhjinder Bell is a 86 year old female with a past medical history of hypertension, cancer of soft palate, HFpEF, CHF, who presents by wheelchair with her daughter for the evaluation of leg swelling, multiple complaints. Patient endorses progressively worsening bilateral leg swelling from the knee up. Patient does endorse leg pain with ambulation which she believes is due to her swelling. Patient's daughter states the patient is currently on diuretics. She also reports chest pain, shortness of breath, diarrhea. Triage note reports the patient was also endorsing intermittent dizziness/lightheadedness, numbness/tingling to her bilateral arms.    Denies hematochezia, visual changes, nausea, vomiting. Denies blood thinner use.    REVIEW OF SYSTEMS   Review of Systems   Respiratory: Positive for shortness of breath.    Cardiovascular: Positive for chest pain and leg swelling (bilateral, from knees up).   Gastrointestinal: Positive for diarrhea (intermittent). Negative for blood in stool, nausea and vomiting.   Neurological: Positive for dizziness (per triage note) and light-headedness (per triage note). Negative for numbness (per triage note, bilateral arms).        Positive for tingling (per triage note, bilateral arms).   All other systems  reviewed and are negative.       PAST MEDICAL HISTORY:  Past Medical History:   Diagnosis Date     Anemia      Depression      Disease of thyroid gland      HTN (hypertension)      Osteoporosis      PUD (peptic ulcer disease)        PAST SURGICAL HISTORY:  Past Surgical History:   Procedure Laterality Date     ESOPHAGOSCOPY, GASTROSCOPY, DUODENOSCOPY (EGD), COMBINED N/A 1/17/2018    Procedure: ESOPHAGOGASTRODUODENOSCOPY (EGD) with h.pylori and gastric ulcer biopsies;  Surgeon: Colin Alvarez MD;  Location: [facility];  Service:      HYSTERECTOMY       US BIOPSY FINE NEEDLE ASPIRATION LYMPH NODE  8/14/2019       CURRENT MEDICATIONS:      Current Facility-Administered Medications:      acetaminophen (TYLENOL) tablet 650 mg, 650 mg, Oral, Q4H PRN, 650 mg at 06/22/22 2033 **OR** acetaminophen (TYLENOL) Suppository 650 mg, 650 mg, Rectal, Q4H PRN, Juan Lay MD     aMILoride (MIDAMOR) tablet 5 mg, 5 mg, Oral, Daily, Sonny Brothers MD, 5 mg at 06/22/22 1421     bumetanide (BUMEX) tablet 0.5 mg, 0.5 mg, Oral, Daily, Sonny Brothers MD, 0.5 mg at 06/22/22 1422     ferrous sulfate (FEROSUL) tablet 325 mg, 325 mg, Oral, BID, Sonny Brothers MD, 325 mg at 06/22/22 2016     levothyroxine (SYNTHROID/LEVOTHROID) tablet 50 mcg, 50 mcg, Oral, QAM AC, Sonny Brothers MD, 50 mcg at 06/22/22 1421     lidocaine (LMX4) cream, , Topical, Q1H PRN, Roge Mayo MD     lidocaine 1 % 0.1-1 mL, 0.1-1 mL, Other, Q1H PRN, Roge Mayo MD     loperamide (IMODIUM) capsule 2 mg, 2 mg, Oral, 4x Daily PRN, Sonny Brothers MD     LORazepam (ATIVAN) tablet 0.5 mg, 0.5 mg, Oral, Daily with lunch, Snony Brothers MD, 0.5 mg at 06/22/22 1422     LORazepam (ATIVAN) tablet 1 mg, 1 mg, Oral, BID, Sonny Brothers MD, 1 mg at 06/22/22 2016     melatonin tablet 1 mg, 1 mg, Oral, At Bedtime PRN, Roge Mayo MD, 1 mg at 06/22/22 2017     metoprolol succinate ER (TOPROL XL) 24 hr tablet 50 mg, 50 mg, Oral, BID, Sonny Brothers MD, 50  mg at 06/22/22 2016     ondansetron (ZOFRAN ODT) ODT tab 4 mg, 4 mg, Oral, Q6H PRN **OR** ondansetron (ZOFRAN) injection 4 mg, 4 mg, Intravenous, Q6H PRN, Roge Mayo MD     prochlorperazine (COMPAZINE) injection 5 mg, 5 mg, Intravenous, Q6H PRN **OR** prochlorperazine (COMPAZINE) tablet 5 mg, 5 mg, Oral, Q6H PRN **OR** prochlorperazine (COMPAZINE) suppository 12.5 mg, 12.5 mg, Rectal, Q12H PRN, Roge Mayo MD     sodium chloride (PF) 0.9% PF flush 3 mL, 3 mL, Intracatheter, Q8H, Roge Mayo MD     sodium chloride (PF) 0.9% PF flush 3 mL, 3 mL, Intracatheter, q1 min prn, Roge Mayo MD    ALLERGIES:  Allergies   Allergen Reactions     Unknown [No Clinical Screening - See Comments]      Pt states she has a medication allergy but does not know what it is       FAMILY HISTORY:  Family History   Problem Relation Age of Onset     Diabetes No family hx of      Cancer No family hx of      Heart Disease No family hx of        SOCIAL HISTORY:   Social History     Socioeconomic History     Marital status:    Tobacco Use     Smoking status: Never Smoker     Smokeless tobacco: Never Used   Substance and Sexual Activity     Alcohol use: No     Drug use: No     Sexual activity: Never       PHYSICAL EXAM:    Vitals: /66 (BP Location: Right arm)   Pulse 62   Temp 97.9  F (36.6  C) (Axillary)   Resp 16   Ht 1.524 m (5')   Wt 49.3 kg (108 lb 9.6 oz)   SpO2 95%   BMI 21.21 kg/m     Physical Exam  Vitals and nursing note reviewed.   Constitutional:       General: She is not in acute distress.     Appearance: Normal appearance. She is normal weight. She is not ill-appearing or toxic-appearing.   HENT:      Head: Normocephalic and atraumatic.   Eyes:      Extraocular Movements: Extraocular movements intact.      Pupils: Pupils are equal, round, and reactive to light.   Cardiovascular:      Rate and Rhythm: Normal rate and regular rhythm.      Pulses: Normal pulses.      Heart sounds: Normal heart sounds.    Pulmonary:      Effort: Pulmonary effort is normal. No respiratory distress.      Breath sounds: Normal breath sounds.   Abdominal:      General: There is no distension.      Palpations: Abdomen is soft.      Tenderness: There is no abdominal tenderness. There is no right CVA tenderness, left CVA tenderness, guarding or rebound.   Musculoskeletal:         General: No deformity or signs of injury. Normal range of motion.      Right lower leg: No edema.      Left lower leg: No edema.   Skin:     General: Skin is warm and dry.      Coloration: Skin is not jaundiced.      Findings: No erythema.   Neurological:      General: No focal deficit present.      Mental Status: She is alert.      Cranial Nerves: No cranial nerve deficit.      Motor: No weakness.   Psychiatric:         Behavior: Behavior normal.           LAB:  All pertinent labs reviewed and interpreted.  Labs Ordered and Resulted from Time of ED Arrival to Time of ED Departure   BASIC METABOLIC PANEL - Abnormal       Result Value    Sodium 129 (*)     Potassium 3.6      Chloride 99      Carbon Dioxide (CO2) 20 (*)     Anion Gap 10      Urea Nitrogen 18      Creatinine 1.10      Calcium 8.6      Glucose 131 (*)     GFR Estimate 49 (*)    D DIMER QUANTITATIVE - Abnormal    D-Dimer Quantitative 2.99 (*)    CBC WITH PLATELETS AND DIFFERENTIAL - Abnormal    WBC Count 10.8      RBC Count 2.22 (*)     Hemoglobin 6.7 (*)     Hematocrit 22.7 (*)      (*)     MCH 30.2      MCHC 29.5 (*)     RDW 19.3 (*)     Platelet Count 284      % Neutrophils 61      % Lymphocytes 18      % Monocytes 18      % Eosinophils 1      % Basophils 0      % Immature Granulocytes 2      NRBCs per 100 WBC 0      Absolute Neutrophils 6.8      Absolute Lymphocytes 1.9      Absolute Monocytes 2.0 (*)     Absolute Eosinophils 0.1      Absolute Basophils 0.0      Absolute Immature Granulocytes 0.2      Absolute NRBCs 0.0     INR - Abnormal    INR 1.20 (*)    HEPATIC FUNCTION PANEL - Abnormal     Bilirubin Total 0.3      Bilirubin Direct 0.1      Protein Total 8.9 (*)     Albumin 2.3 (*)     Alkaline Phosphatase 89      AST 15      ALT <9     MAGNESIUM - Abnormal    Magnesium 1.2 (*)    TROPONIN I - Normal    Troponin I 0.02     B-TYPE NATRIURETIC PEPTIDE ([facility] ONLY) - Normal    BNP 84     PARTIAL THROMBOPLASTIN TIME - Normal    aPTT 32     OCCULT BLOOD STOOL - Normal    Occult Blood Negative     COVID-19 VIRUS (CORONAVIRUS) BY PCR - Normal    SARS CoV2 PCR Negative     SODIUM RANDOM URINE    Sodium Urine mmol/L 51     OSMOLALITY   TYPE AND SCREEN, ADULT    ABO/RH(D) O POS      Antibody Screen Negative      SPECIMEN EXPIRATION DATE [id number]     PREPARE RED BLOOD CELLS (UNIT)    CROSSMATCH Compatible      UNIT ABO/RH O Pos      Unit Number [id number]      Unit Status Issued      Blood Component Type Red Blood Cells      Product Code J6219M02      CODING SYSTEM [id number]      UNIT TYPE ISBT 5100      ISSUE DATE AND TIME [id number]     PREPARE RED BLOOD CELLS (UNIT)   ABO/RH TYPE AND SCREEN       RADIOLOGY:  XR Abdomen 2 Views   Final Result   IMPRESSION: Minimal to moderate amount of stool within the distal colon. No evidence for small bowel dilatation or small bowel air-fluid levels on the upright view. No free intraperitoneal air. Minimal atelectasis in the lung bases. Normal heart size.    Degenerative changes in the spine and hips.      CT Chest Pulmonary Embolism w Contrast   Final Result   IMPRESSION:   1.  No evidence for pulmonary embolism.        EKG:   Performed at: 10:02 PM on June 21, 2022  Impression: Sinus rhythm with nonspecific ST findings, no signs of acute ST elevation or ischemia.  Rate: 85 bpm  Rhythm: Sinus rhythm  QRS Interval: 84 ms  QTc Interval: 442 ms  Comparison: Compared to prior EKG from June 4, 2022 no ectopy now and unchanged morphology.  I have independently reviewed and interpreted the EKG(s) documented above.     PROCEDURES:   Adrian       ICarlo  Lucas Augustine, am serving as a scribe to document services personally performed by Dr. Juan Waller  based on my observation and the provider's statements to me. I, Juan Waller MD attest that Carlo Augustine is acting in a scribe capacity, has observed my performance of the services and has documented them in accordance with my direction.      Juan Waller M.D.  Emergency Medicine  [facility] Emergency Department     Juan Waller MD  06/23/22 7224

## 2022-06-22 NOTE — ED TRIAGE NOTES
Pt here with daughter from home d/t multiple complaints. Pain from neck down to knees. Burning sensation in chest. Swelling in legs. Numbness and tingling to bilateral arms. States she is intermittently dizzy/lightheaded. No changes in vision. Reports small intermittent bouts of diarrhea. Denies N/V.

## 2022-06-22 NOTE — ED NOTES
Unable to find consent that was done earlier, text to provider that is needs to be redone. IV infiltrated, RN with US skills attempting to replace.

## 2022-06-22 NOTE — H&P
Admission History and Physical   Chris Bell,  1935, MRN [id number]    [facility]  Generalized muscle weakness [M62.81]    PCP: Kizzy Villanueva, [phone]   Code status:  Full Code       Extended Emergency Contact Information  Primary Emergency Contact: LONNIE NOLASCO  Mobile Phone: [phone]  Relation: Son  Secondary Emergency Contact: Breezyberna CastilloMay  Address: [address] [place] [address]                      [place] [address] [facility]  Home Phone: [phone]  Mobile Phone: [phone]  Relation: Daughter       Assessment and Plan   Chris Bell is a 86 year old female with PMH significant for PUD, HFpEF, hypothyroidism, and chronic macrocytic anemia who presented to [facility] ED on  for weakness and some shortness of breath. Found to be anemic to 6.7 without evidence of bleed. Requiring blood transfusion and further monitoring with PT/OT to see.    Active Problems:    Generalized muscle weakness     Generalized Weakness  Hyponatremia  Patient presents with subacute generalized weakness and inability to ambulate, worse over the past few days. Very similar to last presentation in May 2022.  Work-up notable for hemoglobin of 6.7, down from her baseline of 7-8, as well as sodium 129.  Family has been working to get patient to assisted living facility but currently is living alone with frequent visits from daughter while waiting for process to be completed.  Unfortunately very difficult to ascertain etiology of hyponatremia and would benefit from trial of bolus given suspicion of poor oral intake and diuretic use. Hyponatremia may also be 2/2 hyperparaproteinemia.  -PT/OT consults  -750cc NS bolus followed by BMP recheck at 1200; if Na improving- likely hypovolemic and would continue fluids with holding of diuretics  -BMP in AM  -Urine studies    Acute on chronic anemia, macrocytic  Hemoglobin of 6.7 upon admission, baseline is 7-8. MCV of 102. Unclear etiology at this time given no  melena, hematochezia, hematuria, or other bleeding source. Does have history of peptic ulcer disease with prior GI bleeds, has needed transfusions.  On iron supplementation at baseline.  Has refused colonoscopy in the past.  Folate and B12 checked a month ago and were normal.   -1 unit pRBC now then recheck Hgb after  -CBC in AM  -After med rec complete- plan to continue PTA iron supplementation  -Given prior hx of refusing colonoscopy combined with small hemoglobin drop- will hold off on consulting GI    Shortness of breath  Endorsing some vague increased SOB over last few days. Satting 100% on RA currently. Suspect most likely 2/2 anemia and some deconditioning. Unlikely HF given BNP <100 and no clinical evidence of HF; trop wnl. CT PE was negative. No evidence of pneumonia or other mass on imaging.  -Management as above     HFpEF, not in acute exacerbation  Complained of some shortness of breath upon arrival, however vitals stable and lungs are clear- satting 100% on RA.  BNP is 84, lowest it has been for her in a couple of years.  Ejection fraction of 55 to 60% in April 2022.  Has been seen in heart failure clinic about once a week for the past few weeks to titrate her diuretics.  She was switched from Lasix to Bumex on 5/20 as she was no longer responding to Lasix.  -Address diuretics once med rec complete in setting of currently suspected fluid depletion   -Once med rec complete, plan to continue metoprolol succinate  -I/Os     Reduced GFR  GFR 49 today. Last checked 6/4 was also 49 however prior to that was in the 70s/80s. Unclear if has protracted JOSE vs newer CKD.  -BMP in AM    Chronic lumbar compression fractures  Patient has TLSO brace for comfort.  No new concerning symptoms.  Her difficulty ambulating appears to be due to generalized weakness rather than acute cord compression.  -TLSO as needed    Gamma Gap: Total protein 8.1, albumin 2.2.  This gamma gap has been noted in previous records (12/2018).   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  No proteinuria on UA.   Hypothyroidism: Once med rec complete, plan to continue PTA levothyroxine 50 mcg daily.  Anxiety: Once med rec complete, continue scheduled Ativan 1 mg in the morning, 0.5 mg at lunch, 1 mg in the evening.  Unclear why she is on this medication, would benefit from exploring this further with patient and family in the morning.    Code Status: Full code per patient during discussion with ; but per last hospitalization patient was no CPR- Do NOT Intubate (confirmed by MD at that time with patient's son, Shiv, over the phone); thus this will need to be readdressed by rounding physician       Medication Reconciliation Status: NOT COMPLETE    FEN: reg  DVT Prophylaxis: SCDs  Code: Full; discussed with patient on admission    Dispo: inpatient status for weakness, anemia, anticipate discharge to home vs directly to [facility]   Barriers to discharge:  work up in progress   Anticipated discharge date:  1-2 days     Chief Complaint: \"Weakness and shortness of breath\"     HPI:    Chris Bell is a 86 year old female with PMH significant for PUD, HFpEF, hypothyroidism, and chronic macrocytic anemia who presented to [facility] ED on 6/21 for weakness and some shortness of breath.    Patient states that for the last couple days she has just been feeling little short of breath and having weakness especially in the lower extremities.  She states that she does not have any other symptom such as fevers/chills, gastrointestinal symptoms, urinary symptoms.  She has not noticed any blood per rectum via either melena or hematochezia or any blood in the urine.    Patient lives on her own in an apartment and daughter comes up and checks on her very regularly.  They have recently completed [facility] paperwork and are awaiting for placement at a [facility].    History is provided by patient    Hmong      Medical History  .  Past Medical History: "   Diagnosis Date     Anemia      Depression      Disease of thyroid gland      HTN (hypertension)      Osteoporosis      PUD (peptic ulcer disease)        Medications  No current facility-administered medications on file prior to encounter.  acetaminophen (TYLENOL) 500 MG tablet, Take 1-2 tablets (500-1,000 mg) by mouth every 8 hours as needed for pain  Alcohol Swabs (B-D SINGLE USE SWABS REGULAR) PADS, USE UTD  aMILoride (MIDAMOR) 5 MG tablet, Take 1 tablet (5 mg) by mouth daily  blood glucose monitoring (ONETOUCH VERIO) meter device kit, 2 times daily  bumetanide (BUMEX) 0.5 MG tablet, Take 1 tablet (0.5 mg) by mouth daily  ferrous sulfate (FEROSUL) 325 (65 Fe) MG tablet, Take 325 mg by mouth 2 times daily  HYDROcodone-acetaminophen (NORCO) 5-325 MG tablet, Take 1/2 tab every 6 hours as needed for leg pain  levothyroxine (SYNTHROID/LEVOTHROID) 50 MCG tablet, Take 50 mcg by mouth every morning   LORazepam (ATIVAN) 0.5 MG tablet, Take 1 mg by mouth 2 times daily In the am and evening  LORazepam (ATIVAN) 0.5 MG tablet, Take 0.5 mg by mouth daily (with lunch)  metoprolol succinate ER (TOPROL-XL) 50 MG 24 hr tablet, Take 50 mg by mouth 2 times daily   multivitamin w/minerals (THERA-VIT-M) tablet, Take 1 tablet by mouth daily  nystatin (MYCOSTATIN) 112723 UNIT/ML suspension, Swish and swallow 5 mLs (500,000 Units) in mouth 4 times daily as needed  ONETOUCH VERIO IQ test strip, 2 times daily  [DISCONTINUED] alendronate (FOSAMAX) 70 MG tablet, Take 70 mg by mouth every 7 days     Surgical History  Reviewed, and She  has a past surgical history that includes Hysterectomy; Esophagoscopy, gastroscopy, duodenoscopy (EGD), combined (N/A, 1/17/2018); and Us Biopsy Fine Needle Aspiration Lymph Node (8/14/2019).   Social History  Reviewed, and she  reports that she has never smoked. She has never used smokeless tobacco. She reports that she does not drink alcohol and does not use drugs.   Allergies  Allergies   Allergen Reactions      Unknown [No Clinical Screening - See Comments]      Pt states she has a medication allergy but does not know what it is    Family History  Reviewed, and family history is not on file.        Prior to Admission Medications   (Not in a hospital admission)       Review of Systems:    12 system review of systems negative except for what's noted in HPI         Physical Exam:  Temp:  [98.5  F (36.9  C)] 98.5  F (36.9  C)  Pulse:  [69-84] 73  Resp:  [18-35] 32  BP: (108-159)/() 140/66  SpO2:  [92 %-100 %] 95 %      General: Lying comfortably in bed. No acute distress.   HEENT: Conjunctivae are clear, nonicteric. EOMI.   Neck: No masses appreciated.  Respiratory: No respiratory distress. Lung sounds are clear without rales, ronchi, or wheezes.   Cardiac: RRR. No m/g/r. Brisk cap refill. Warm and well perfused. JVD difficult to assess given body habitus.  Abdominal: Abdomen is overweight, soft and non-tender without distention.  Extremities: Upper and lower extremities grossly normal. Trace to no pitting edema of bilateral LEs.  Skin: Skin in warm without rashes.  Neurological: Motor function is 4+/5 in all extremities. Normal sensation throughout.  Psychiatric: Fair insight. Normal affect.     Pertinent Labs  Lab Results: personally reviewed.   Results for orders placed or performed during the hospital encounter of 06/22/22 (from the past 24 hour(s))   CBC with platelets + differential    Narrative    The following orders were created for panel order CBC with platelets + differential.  Procedure                               Abnormality         Status                     ---------                               -----------         ------                     CBC with platelets and d...[[id number]]  Abnormal            Final result                 Please view results for these tests on the individual orders.   Basic metabolic panel   Result Value Ref Range    Sodium 129 (L) 136 - 145 mmol/L    Potassium 3.6 3.5 - 5.0  mmol/L    Chloride 99 98 - 107 mmol/L    Carbon Dioxide (CO2) 20 (L) 22 - 31 mmol/L    Anion Gap 10 5 - 18 mmol/L    Urea Nitrogen 18 8 - 28 mg/dL    Creatinine 1.10 0.60 - 1.10 mg/dL    Calcium 8.6 8.5 - 10.5 mg/dL    Glucose 131 (H) 70 - 125 mg/dL    GFR Estimate 49 (L) >60 mL/min/1.73m2   Troponin I (now)   Result Value Ref Range    Troponin I 0.02 0.00 - 0.29 ng/mL   D dimer quantitative   Result Value Ref Range    D-Dimer Quantitative 2.99 (H) 0.00 - 0.50 ug/mL FEU    Narrative    This D-dimer assay is intended for use in conjunction with a clinical pretest probability assessment model to exclude pulmonary embolism (PE) and deep venous thrombosis (DVT) in outpatients suspected of PE or DVT. The cut-off value is 0.50 ug/mL FEU.   CBC with platelets and differential   Result Value Ref Range    WBC Count 10.8 4.0 - 11.0 10e3/uL    RBC Count 2.22 (L) 3.80 - 5.20 10e6/uL    Hemoglobin 6.7 (LL) 11.7 - 15.7 g/dL    Hematocrit 22.7 (L) 35.0 - 47.0 %     (H) 78 - 100 fL    MCH 30.2 26.5 - 33.0 pg    MCHC 29.5 (L) 31.5 - 36.5 g/dL    RDW 19.3 (H) 10.0 - 15.0 %    Platelet Count 284 150 - 450 10e3/uL    % Neutrophils 61 %    % Lymphocytes 18 %    % Monocytes 18 %    % Eosinophils 1 %    % Basophils 0 %    % Immature Granulocytes 2 %    NRBCs per 100 WBC 0 <1 /100    Absolute Neutrophils 6.8 1.6 - 8.3 10e3/uL    Absolute Lymphocytes 1.9 0.8 - 5.3 10e3/uL    Absolute Monocytes 2.0 (H) 0.0 - 1.3 10e3/uL    Absolute Eosinophils 0.1 0.0 - 0.7 10e3/uL    Absolute Basophils 0.0 0.0 - 0.2 10e3/uL    Absolute Immature Granulocytes 0.2 <=0.4 10e3/uL    Absolute NRBCs 0.0 10e3/uL   B-Type Natriuretic Peptide ([facility] Only)   Result Value Ref Range    BNP 84 0 - 167 pg/mL   INR   Result Value Ref Range    INR 1.20 (H) 0.85 - 1.15   Partial thromboplastin time   Result Value Ref Range    aPTT 32 22 - 38 Seconds   Hepatic function panel   Result Value Ref Range    Bilirubin Total 0.3 0.0 - 1.0 mg/dL    Bilirubin Direct 0.1 <=0.5  mg/dL    Protein Total 8.9 (H) 6.0 - 8.0 g/dL    Albumin 2.3 (L) 3.5 - 5.0 g/dL    Alkaline Phosphatase 89 45 - 120 U/L    AST 15 0 - 40 U/L    ALT <9 0 - 45 U/L   ABO/Rh type and screen    Narrative    The following orders were created for panel order ABO/Rh type and screen.  Procedure                               Abnormality         Status                     ---------                               -----------         ------                     Adult Type and Screen[[id number]]                            Edited Result - FINAL        Please view results for these tests on the individual orders.   Adult Type and Screen   Result Value Ref Range    ABO/RH(D) O POS     Antibody Screen Negative Negative    SPECIMEN EXPIRATION DATE [id number]    CT Chest Pulmonary Embolism w Contrast    Narrative    EXAM: CT CHEST PULMONARY EMBOLISM W CONTRAST  LOCATION: [facility]  DATE/TIME: 6/22/2022 1:08 AM    INDICATION: PE suspected, low/intermediate prob, positive D-dimer  COMPARISON: 04/13/2022.  TECHNIQUE: CT chest pulmonary angiogram during arterial phase injection of IV contrast. Multiplanar reformats and MIP reconstructions were performed. Dose reduction techniques were used.   CONTRAST: isovue 370 75ml    FINDINGS:  ANGIOGRAM CHEST: No evidence for pulmonary embolism. Pulmonary arteries normal in caliber. Thoracic aorta normal in caliber. No aortic dissection or other acute abnormality.    HEART: Cardiac chambers within normal limits. No pericardial effusion. Severe coronary artery calcification.    MEDIASTINUM: No adenopathy or mass.    LUNGS AND PLEURA: Lung bases partially obscured by motion. Mild bibasilar fibrosis. No honeycombing. No pulmonary mass, consolidation, or suspicious pulmonary nodule. No pleural effusion or pneumothorax.    LIMITED UPPER ABDOMEN: Negative.    MUSCULOSKELETAL: Severe T12 vertebral compression deformity, chronic.      Impression    IMPRESSION:  1.  No evidence for  pulmonary embolism.       Pertinent Radiology  Radiology Results: Personally reviewed image/s and personally reviewed impression/s  Results for orders placed or performed during the hospital encounter of 06/22/22   CT Chest Pulmonary Embolism w Contrast    Impression    IMPRESSION:  1.  No evidence for pulmonary embolism.       EKG Results: NSR      Precepted patient with Dr. Saman Arias    This note was created with the use of voice recognition dictation technology, and as such there may be unintended typographical or voice recognition errors that were not corrected during proofreading.    Roge Mayo MD  [facility] Medicine Residency Program, PGY-2  Pager # [phone]

## 2022-06-22 NOTE — PLAN OF CARE
Patient AAO. Sukhjinder speaking. Blood started at beginning of shift, tolerating run well. VSS. Will recheck one hour after transfusion is complete. Denies pain or discomfort.

## 2022-06-23 NOTE — PROGRESS NOTES
Occupational Therapy     06/23/22 1000   Quick Adds   Type of Visit Initial Occupational Therapy Evaluation       Present yes  (pt agrees; son preferred to interpret; pt slightly [place])   Language Hmong   Living Environment   People in Home alone   Current Living Arrangements apartment   Living Environment Comments Pt participates in ADLs-does have PCA services & it sounded family is in mult times a day-son reports the longest pt would be home alone was 1-2 hrs @ a time. Amb w/walker.   Self-Care   Usual Activity Tolerance good   Current Activity Tolerance moderate   Equipment Currently Used at Home   (walker)   Activity/Exercise/Self-Care Comment Per chart & son stated plan is for moving pt to AL apt-states walk through this week.   General Information   Onset of Illness/Injury or Date of Surgery 06/22/22   Referring Physician Roge Mayo   Patient/Family Therapy Goal Statement (OT) none stated   Existing Precautions/Restrictions fall   General Observations and Info 86 year old female with PMH significant for PUD, HFpEF, hypothyroidism, and chronic macrocytic anemia who presented to [facility] on 6/21 for weakness and some shortness of breath   Cognitive Status Examination   Follows Commands WNL  (repetition d/t [place])   Pain Assessment   Patient Currently in Pain   (B LE pain; rubbing back of thighs.)   Posture   Posture not impaired   Range of Motion Comprehensive   Comment, General Range of Motion impaired B shldr flex-difficulty reaching top of her head; able to reach her face for eating/grooming.   Strength Comprehensive (MMT)   Comment, General Manual Muscle Testing (MMT) Assessment Pt demo adequate UE strength for basic ADLs.   Coordination   Upper Extremity Coordination No deficits were identified   Transfers   Transfer Comments SBA room trfs.   Balance   Balance Comments initially slight limping requiring CGA using walker; improved with duration of session. No overt unsteadiness/no LOB  but did fatigue. Pt able to demo 1-2 minute standing trials SBA w/FWW.   Lower Body Dressing Assessment/Training   Comment, (Lower Body Dressing) min A w/socks   Toileting   Comment, (Toileting) max A pericare-small loose BM, min A managing adult brief.   Clinical Impression   Criteria for Skilled Therapeutic Interventions Met (OT) Yes, treatment indicated   OT Diagnosis decreased ADLs   OT Problem List-Impairments impacting ADL activity tolerance impaired;mobility;strength   Assessment of Occupational Performance 1-3 Performance Deficits   Identified Performance Deficits toileting, trfs, standing tolerance   Planned Therapy Interventions (OT) ADL retraining   Clinical Decision Making Complexity (OT) low complexity   Risk & Benefits of therapy have been explained evaluation/treatment results reviewed;care plan/treatment goals reviewed;risks/benefits reviewed;current/potential barriers reviewed;participants voiced agreement with care plan;patient;son   OT Discharge Planning   OT Discharge Recommendation (DC Rec) home with assist   OT Rationale for DC Rec @ this time, pt is functioning below her baseline although close to it per report. Rec. 24 hr supervision & assist as needed. Plan is to move into AL soon.   Total Evaluation Time (Minutes)   Total Evaluation Time (Minutes) 12   OT Goals   Therapy Frequency (OT) Daily   OT Predicted Duration/Target Date for Goal Attainment 06/30/22   OT Goals Hygiene/Grooming;Transfers;Toilet Transfer/Toileting;OT Goal 1   OT: Hygiene/Grooming supervision/stand-by assist   OT: Transfer Modified independent   OT: Toilet Transfer/Toileting Supervision/stand-by assist   OT: Goal 1 Pt to demo > 6 minutes standing tolerance w/SBA in prep ADLs.

## 2022-06-23 NOTE — PROGRESS NOTES
[facility]    Progress Note - Hospitalist Service       Date of Admission:  6/22/2022    Assessment & Plan          Chrsi Bell is a 86 year old female with PMH significant for PUD, HFpEF, hypothyroidism, and chronic macrocytic anemia who presented to [facility] ED on 6/21 for weakness and some shortness of breath. Found to be anemic to 6.7 without evidence of bleed. Requiring blood transfusion and further monitoring with PT/OT to see.     Active Problems:    Generalized muscle weakness     Generalized Weakness  Hyponatremia  Diarrhea   Patient presents with subacute generalized weakness and inability to ambulate, worse over the past few days. Very similar to last presentation in May 2022.  Work-up notable for hemoglobin of 6.7, down from her baseline of 7-8, as well as sodium 129.  Family has been working to get patient to assisted living facility but currently is living alone with frequent visits from daughter while waiting for process to be completed.  She does report several months of diarrhea, so most likely seems to be hypovolemia.  She is on oral iron and hydrocodone at home, so also considered encopresis. However, abdominal XR without significant stool burden. Repeat Na after fluid bolus improved to 138.  Etiology of her diarrhea is unclear.  It is nonbloody.  She has refused colonoscopy in the past.  -GI consult, appreciate recs  -Regular diet   -PT/OT   -Stool panel, fecal calprotectin- pending  - imodium        Acute on chronic anemia, macrocytic  Hemoglobin of 6.7 upon admission, baseline is 7-8. MCV of 102. Unclear etiology at this time given no melena, hematochezia, hematuria, or other bleeding source. Do not suspect GI bleed. Does have history of peptic ulcer disease with prior GI bleeds, has needed transfusions. On iron supplementation at baseline.  Has refused colonoscopy in the past.  Folate and B12 checked a month ago and were normal.   TSH WNL.  SP 1 unit PRBC hemoglobin  stable at 8.9.  - Continue PTA iron        Shortness of breath-resolved  Endorsing some vague increased SOB over last few days. Satting 100% on RA currently. Suspect most likely 2/2 anemia and some deconditioning. Unlikely HF given BNP <100 and no clinical evidence of HF; trop wnl. CT PE was negative. No evidence of pneumonia or other mass on imaging.  -Management as above     HFpEF, not in acute exacerbation  Complained of some shortness of breath upon arrival, however vitals stable and lungs are clear- satting 100% on RA.  BNP is 84, lowest it has been for her in a couple of years.  Ejection fraction of 55 to 60% in April 2022.  Has been seen in heart failure clinic about once a week for the past few weeks to titrate her diuretics.  She was switched from Lasix to Bumex on 5/20 as she was no longer responding to Lasix.  -Continue PTA metoprolol succinate, amloride, bumex   -I/Os     Reduced GFR-improved  GFR 49 on admission. Last checked 6/4 was also 49 however prior to that was in the 70s/80s. Unclear if has protracted JOSE vs newer CKD.  GFR 6/23 back to her baseline, 82.  -BMP in AM     Chronic lumbar compression fractures  Patient has TLSO brace for comfort.  No new concerning symptoms.  Her difficulty ambulating appears to be due to generalized weakness rather than acute cord compression.  -TLSO as needed     Gamma Gap: Total protein 8.1, albumin 2.2.  This gamma gap has been noted in previous records (12/2018).  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  No proteinuria on UA.   Hypothyroidism:  continue PTA levothyroxine 50 mcg daily.  Anxiety:  continue scheduled Ativan 1 mg in the morning, 0.5 mg at lunch, 1 mg in the evening.  Unclear why she is on this medication, would benefit from exploring this further with patient and family with her PCP.         Diet: Regular Diet Adult    DVT Prophylaxis: Pneumatic Compression Devices  Javed Catheter: Not present  Fluids: PO  Central  Lines: None  Cardiac Monitoring: None  Code Status: No CPR- Do NOT Intubate      Disposition Plan     Expected Discharge Date: 06/25/2022                The patient's care was discussed with the Attending Physician, Dr. Arias.    Sonny Brothers MD  Hospitalist Service  [facility]        Clinically Significant Risk Factors Present on Admission                      ______________________________________________________________________    Interval History   Reports she is feeling much better today.  Dizziness has completely resolved.  Her breathing feels better.   She does still feel weak.  She thinks her diarrhea has maybe slowed down a little.  But continues to have frequent, soft bowel movements.      Data reviewed today: I reviewed all medications, new labs and imaging results over the last 24 hours. I personally reviewed no images or EKG's today.    Physical Exam   Vital Signs: Temp: 98.1  F (36.7  C) Temp src: Axillary BP: (!) 143/116 Pulse: 68   Resp: 19 SpO2: 96 % O2 Device: None (Room air) Oxygen Delivery: 1 LPM  Weight: 108 lbs 9.6 oz  EXAM  General: Alert, well-appearing, no acute distress  Head: Nontraumatic   Eyes:  EOM intact   Oropharynx: moist oral mucus membranes  Pulm: CTA BL, no tachypnea, speaking in full sentences  CV: RRR, no murmur  Abd: soft, mild generalized tenderness  Ext: Warm, well perfused, no LE edema  Skin: No rash on limited skin exam  Neuro: CN II-XII intact, moves all 4 extremities  Psych: Mood appropriate to visit content, full affect, rational thought content and process      Data   Recent Labs   Lab 06/23/22  0640 06/22/22  2130 06/22/22  1339 06/21/22  2156   WBC 4.8  --   --  10.8   HGB 8.9* 8.7*  --  6.7*   MCV 99  --   --  102*     --   --  284   INR  --   --   --  1.20*     --  138 129*   POTASSIUM 3.5  --  3.6 3.6   CHLORIDE 104  --  107 99   CO2 23  --  21* 20*   BUN 9  --  11 18   CR 0.71  --  0.70 1.10   ANIONGAP 9  --  10 10    MANE 8.5  --  8.5 8.6   GLC 89  --  93 131*   ALBUMIN  --   --   --  2.3*   PROTTOTAL  --   --   --  8.9*   BILITOTAL  --   --   --  0.3   ALKPHOS  --   --   --  89   ALT  --   --   --  <9   AST  --   --   --  15

## 2022-06-23 NOTE — SUMMARY OF CARE
Pt slept most of night. 2 assist due to weakness and language barrier. Purewick overnight per pt request.

## 2022-06-23 NOTE — CONSULTS
CONSULTING PHYSICIAN   Yared Floyd     REASON FOR CONSULTATION   Anemia, diarrhea     IMPRESSION   1) Normocytic/macrocytic anemia- chronic, slight drop from recent baseline. No gross bleeding per patient. Mention of black stool in her chart but she is on iron and her prior stool was reported as brown so the trend is unclear. Last iron studies suggested chronic disease picture. Normal B12/folate. Cannot completely exclude occult GI blood loss, but without clear gross bleeding, there is no urgency for endoscopic evaluation  2) History of gastric ulcers- had healed as of last EGD 11/20. Negative H pylori. Despite med list not mentioning it, the patient reports she is faithfully taking her ulcer medication on a daily basis, but did not bring it with her to the hospital. However, chart review suggests otherwise. Postprandial pain and early satiety raise question of ulcer recurrence.    3) Diarrhea- patient reports issues for several months. No large volume process documented in the hospital. Consider functional process, dietary intolerance, microscopic colitis, less likely IBD. Thyroid testing is normal.   4) Dyspnea- resolved  5) Polyclonal gammopathy  6) Fatigue- likely due to #1  7) Mild hyponatremia- resolved     RECOMMENDATIONS   Discussed potential colonoscopy for evaluation of diarrhea. Patient is not interested at this time.  Discussed potential EGD to evaluate for recurrent ulcers. Patient would like to see how she responds to blood, and prefers not to have another endoscopy if possible.  Start pantoprazole and plan for long term, daily therapy. Sucralfate could be added if she does not respond to pantoprazole alone for her appetite and postprandial symptoms.   Start metamucil twice daily  May use imodium as needed for diarrhea  Trend hgb  Diet as tolerated today  Continue iron supplement  Will follow up tomorrow. Will make NPO at midnight in case there are bleeding  concerns overnight, so endoscopy could be considered. However, if no further signs of gross bleeding and hgb is stable, would not plan for any GI procedures as inpatient.         HISTORY OF PRESENT ILLNESS   Chris Bell is a pleasant 86 year old female with history of gastric ulcers, thyroid disease and chronic anemia who presented with dyspnea and weakness. She reported difficulties with ambulation and was found to have a hgb of 6.7. She received a unit of blood and came up to 8.7, with value of 8.9 this AM. She does have chronic anemia (recent baseline 7-9) and is on iron therapy. She denies gross bleeding. She experiences some early satiety and post-prandial mid-abdominal pain. She denies nausea, vomiting, or heartburn.    B12 was high and folate normal in May. Iron studies in 2/21 showed low iron/transferrin/TIBC with slightly elevated ferritin. She last had an EGD 11/20 at [facility] with gastric erythema, no evidence for ulcers. Biopsies showed reactive gastropathy negative for H Pylori infection. She did have gastric ulcers 1/18 on EGD. She reports taking long term ulcer therapy, but it is not mentioned on her admit med list. She was last seen in consultation by [facility] 4/22 for anemia and dysphagia symptoms. It was recommended to continue her pantoprazole and sucralfate at that time. No EGD was recommended due to multiple prior EGD exams in the past (2006, 2016 in addition to those mentioned above). She declined colonoscopy.    She also reports several months of loose stools. These are typically soft (not liquid) and non-bloody. She reports occasional mid-abdominal post prandial pain as well. She had two loose/soft stools in the hospital this AM, the first reported as brown and the second as black. She denies black stools at home. She has never had a colonoscopy. She states she is not interested in having one done.      PAST HISTORY   Past Medical History:   Diagnosis Date     Anemia      Depression      Disease of  thyroid gland      HTN (hypertension)      Osteoporosis      PUD (peptic ulcer disease)       Past Surgical History:   Procedure Laterality Date     ESOPHAGOSCOPY, GASTROSCOPY, DUODENOSCOPY (EGD), COMBINED N/A 1/17/2018    Procedure: ESOPHAGOGASTRODUODENOSCOPY (EGD) with h.pylori and gastric ulcer biopsies;  Surgeon: Colin Alvarez MD;  Location: [facility];  Service:      HYSTERECTOMY       US BIOPSY FINE NEEDLE ASPIRATION LYMPH NODE  8/14/2019        Family History Social History   Family History   Problem Relation Age of Onset     Diabetes No family hx of      Cancer No family hx of      Heart Disease No family hx of     Social History     Socioeconomic History     Marital status:      Spouse name: None     Number of children: None     Years of education: None     Highest education level: None   Tobacco Use     Smoking status: Never Smoker     Smokeless tobacco: Never Used   Substance and Sexual Activity     Alcohol use: No     Drug use: No     Sexual activity: Never         MEDICATIONS & ALLERGIES   Medications Prior to Admission   Medication Sig Dispense Refill Last Dose     acetaminophen (TYLENOL) 500 MG tablet Take 1-2 tablets (500-1,000 mg) by mouth every 8 hours as needed for pain 90 tablet 1      aMILoride (MIDAMOR) 5 MG tablet Take 1 tablet (5 mg) by mouth daily 30 tablet 1      bumetanide (BUMEX) 0.5 MG tablet Take 1 tablet (0.5 mg) by mouth daily 30 tablet 1      ferrous sulfate (FEROSUL) 325 (65 Fe) MG tablet Take 325 mg by mouth 2 times daily        HYDROcodone-acetaminophen (NORCO) 5-325 MG tablet Take 1/2 tab every 6 hours as needed for leg pain 10 tablet 0      levothyroxine (SYNTHROID/LEVOTHROID) 50 MCG tablet Take 50 mcg by mouth every morning         LORazepam (ATIVAN) 0.5 MG tablet Take 1 mg by mouth 2 times daily In the am and evening        LORazepam (ATIVAN) 0.5 MG tablet Take 0.5 mg by mouth daily (with lunch)  0      metoprolol succinate ER (TOPROL-XL) 50 MG 24 hr tablet Take  50 mg by mouth 2 times daily         multivitamin w/minerals (THERA-VIT-M) tablet Take 1 tablet by mouth daily        nystatin (MYCOSTATIN) 929769 UNIT/ML suspension Swish and swallow 5 mLs (500,000 Units) in mouth 4 times daily as needed        Alcohol Swabs (B-D SINGLE USE SWABS REGULAR) PADS USE UTD  0      blood glucose monitoring (ONETOUCH VERIO) meter device kit 2 times daily  0      ONETOUCH VERIO IQ test strip 2 times daily  2         ALLERGIES   Allergies   Allergen Reactions     Unknown [No Clinical Screening - See Comments]      Pt states she has a medication allergy but does not know what it is         REVIEW OF SYSTEMS     See HPI for pertinent positives and negatives. A comprehensive review of systems was performed and was otherwise noncontributory.     OBJECTIVE   Vitals Blood pressure (!) 143/116, pulse 68, temperature 98.1  F (36.7  C), temperature source Axillary, resp. rate 19, height 1.524 m (5'), weight 49.3 kg (108 lb 9.6 oz), SpO2 96 %.           Physical  Exam   GENERAL: alert and oriented, no acute distress.     HEENT: atraumatic, anicteric, moist mucous membranes, neck soft/supple     PULMONARY: normal resp effort, breath sounds clear to auscultation bilaterally    CARDIOVASCULAR: normal rate and rhythm, no murmurs    ABDOMEN: soft, no tenderness, no distention, bowel sounds normal. No hepatosplenomegaly.     MUSCULOSKELETAL: joints grossly normal    NEUROLOGICAL: appropriate mental status, grossly intact    PSYCHIATRIC: normal mood, affect and insight    SKIN: warm and dry, no rashes    EXT: no edema        LABORATORY    ELECTROLYTE PANEL   Recent Labs   Lab 06/23/22  0640 06/22/22  1339 06/21/22  2156    138 129*   POTASSIUM 3.5 3.6 3.6   CHLORIDE 104 107 99   CO2 23 21* 20*   GLC 89 93 131*   CR 0.71 0.70 1.10   BUN 9 11 18      HEMATOLOGY PANEL   Recent Labs   Lab 06/23/22  0640 06/22/22  2130 06/21/22  2156   HGB 8.9* 8.7* 6.7*   MCV 99  --  102*   WBC 4.8  --  10.8     --   284   INR  --   --  1.20*      LIVER AND PANCREAS PANEL   Recent Labs   Lab 06/21/22  2156   AST 15   ALT <9   ALKPHOS 89   BILITOTAL 0.3     IMAGING STUDIES        I have reviewed the current diagnostic and laboratory tests.             Total time spent providing patient care:65 min with at least 50% spent in reviewing documentation/test results, decision making, and coordination of care.           Ernestina Berg MD  Thank you for the opportunity to participate in the care of this patient.   Please feel free to call me with any questions or concerns.  Phone number [phone].

## 2022-06-23 NOTE — PROGRESS NOTES
06/23/22 0920   Quick Adds   Type of Visit Initial PT Evaluation       Present yes   Language Hmong   Living Environment   People in Home alone   Current Living Arrangements apartment   Home Accessibility no concerns   Transportation Anticipated family or friend will provide   Living Environment Comments Dtr is PCA. Daily checks.   Self-Care   Usual Activity Tolerance good   Current Activity Tolerance good   Equipment Currently Used at Home walker, rolling  (4WW)   General Information   Onset of Illness/Injury or Date of Surgery 06/22/22   Referring Physician Roge Mayo MD   Patient/Family Therapy Goals Statement (PT) return home   Pertinent History of Current Problem (include personal factors and/or comorbidities that impact the POC) 86 year old female with PMH significant for PUD, HFpEF, hypothyroidism, and chronic macrocytic anemia who presented to [facility] ED on 6/21 for weakness and some shortness of breath. Found to be anemic to 6.7 without evidence of bleed.   Strength (Manual Muscle Testing)   Strength (Manual Muscle Testing) Deficits observed during functional mobility   Bed Mobility   Bed Mobility supine-sit   Supine-Sit [place] (Bed Mobility) minimum assist (75% patient effort)   Bed Mobility Limitations impaired ability to control trunk for mobility   Transfers   Transfers sit-stand transfer   Sit-Stand Transfer   Sit-Stand [place] (Transfers) contact guard   Assistive Device (Sit-Stand Transfers) walker, front-wheeled   Gait/Stairs (Locomotion)   Merrick Level (Gait) contact guard   Assistive Device (Gait) walker, front-wheeled   Distance in Feet (Required for LE Total Joints) 75'   Pattern (Gait) step-through   Deviations/Abnormal Patterns (Gait) gait speed decreased   Clinical Impression   Criteria for Skilled Therapeutic Intervention Yes, treatment indicated   PT Diagnosis (PT) Impaired functional mobility   Influenced by the following impairments Weakness,  fatigue   Functional limitations due to impairments Impaired transfers, impaired gait   Clinical Presentation (PT Evaluation Complexity) Stable/Uncomplicated   Clinical Presentation Rationale presents as diagnosed   Clinical Decision Making (Complexity) low complexity   Planned Therapy Interventions (PT) bed mobility training;gait training;strengthening;transfer training   Anticipated Equipment Needs at Discharge (PT) walker, rolling   Risk & Benefits of therapy have been explained patient   PT Discharge Planning   PT Discharge Recommendation (DC Rec) home with assist;home with home care physical therapy   Total Evaluation Time   Total Evaluation Time (Minutes) 10   Physical Therapy Goals   PT Frequency Daily   PT Predicted Duration/Target Date for Goal Attainment 06/30/22   PT Goals Bed Mobility;Transfers;Gait   PT: Bed Mobility Supervision/stand-by assist;Supine to/from sit   PT: Transfers Supervision/stand-by assist;Sit to/from stand;Bed to/from chair;Assistive device   PT: Gait Supervision/stand-by assist;Assistive device;150 feet       Sue Samaniego, PT, DPT  6/23/2022

## 2022-06-23 NOTE — PLAN OF CARE
Problem: Plan of Care - These are the overarching goals to be used throughout the patient stay.    Goal: Plan of Care Review/Shift Note    Outcome: Ongoing, Progressing  Goal Outcome Evaluation:  Alert and oriented to self and place. Denied pain. Had loose stools x 2. PRN imodium given x 1. Ate 100% breakfast. Did not like her lunch. Family will bring food this evening. Assist of 1 and walker to the bathroom or commode. Pt is Hmong speaking and talk all the time. Interpretor line used several times. Also had visitor. Pt will be NPO after midnight for possible procedure tomorrow.     Klarissa Johnson RN

## 2022-06-23 NOTE — PROGRESS NOTES
Care Management Follow Up    Length of Stay (days): 1    Expected Discharge Date: 06/24/2022     Concerns to be Addressed: GI status, medications added, monitoring for bleed        Patient plan of care discussed at interdisciplinary rounds: Yes    Anticipated Discharge Disposition:  home     Anticipated Discharge Services:  none  Anticipated Discharge DME:  none       Education Provided on the Discharge Plan:  Per care team  Patient/Family in Agreement with the Plan:          Additional Information:  GI following for anemia and diarrhea.     Social history per initial CM consult:  Chris lives in an apartment alone. Her daughter May is her PCA and checks on her daily. She helps with most ADLs. She uses a walker for mobility. She is on an Assisted Living waiting list, but should be able to return to her current living situation and continue waiting. Family to transport at discharge.    Patient to return home with support from family.       Leona Coon RN

## 2022-06-24 NOTE — DISCHARGE SUMMARY
[facility]  Discharge Summary - Medicine & Pediatrics       Date of Admission:  6/22/2022  Date of Discharge:  6/24/2022  Discharging Provider: Dr. Harley  Discharge Service: Hospitalist Service    Discharge Diagnoses   Chronic diarrhea  Generalized weakness  Hyponatremia  Acute on chronic anemia  Polyclonal gammopathy      Follow-ups Needed After Discharge   Follow-up with PCP in 1 week.  Recommend hemoglobin check, CRP, ESR.  Could consider referral to rheumatology versus oncology for further inflammatory work-up.  If diarrhea or early satiety continues consider referral to GI.    Unresulted Labs Ordered in the Past 30 Days of this Admission     Date and Time Order Name Status Description    6/23/2022  3:17 PM Anti Nuclear Chela IgG by IFA with Reflex In process     6/22/2022  2:27 PM Calprotectin Feces In process       These results will be followed up by PCP    Discharge Disposition   Discharged to home  Condition at discharge: Stable      Hospital Course   Chris Bell was admitted on 6/22/2022 for weakness, shortness of breath in the setting of chronic diarrhea found to be hyponatremic and anemic without evidence of bleeding.  The following problems were addressed during her hospitalization:    Generalized Weakness  Hyponatremia  Diarrhea   Patient presents with subacute generalized weakness and inability to ambulate, worse over the past few days.  Suspect in the setting of her diarrhea over the past several months. She is on oral iron and hydrocodone at home, so also considered encopresis. However, abdominal XR without significant stool burden. Repeat Na after fluid bolus improved to 138.  Etiology of her diarrhea is unclear.  She has refused colonoscopy in the past and continue to refuse during this hospitalization.  Once her hemoglobin stabilizes GI did not feel there was a role for repeat EGD.  Stool panel without evidence of infection.  Fecal calprotectin pending.  Diarrhea improved  with Imodium.        Acute on chronic anemia, macrocytic  Hemoglobin of 6.7 upon admission, baseline is 7-8. MCV of 102. Unclear etiology at this time given no melena, hematochezia, hematuria, or other bleeding source. Does have history of peptic ulcer disease with prior GI bleeds, has needed transfusions. On iron supplementation at baseline.  Has refused colonoscopy in the past.  Folate and B12 checked a month ago and were normal.   TSH WNL.  SP 1 unit PRBC hemoglobin stable at 8.9.        Polyclonal gammopathy  Total protein 8.1, albumin 2.2.  This gamma gap has been noted in previous records (12/2018).  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  No proteinuria on UA.  4 years ago ESR > 110.  Suspect underlying anemia due to chronic inflammation but unclear what her inflammatory illnesses.  CRP elevated here to 6.5, RF positive.  Recommend further rheumatologic/oncologic work-up outpatient.          Consultations This Hospital Stay   OCCUPATIONAL THERAPY ADULT IP CONSULT  PHYSICAL THERAPY ADULT IP CONSULT  CARE MANAGEMENT / SOCIAL WORK IP CONSULT  GASTROENTEROLOGY IP CONSULT    Code Status   No CPR- Do NOT Intubate       The patient was discussed with Dr. Cricket Brothers MD  [address] [address]  Phone: [phone]  Fax: [phone]  ______________________________________________________________________    Physical Exam   Vital Signs: Temp: 97.9  F (36.6  C) Temp src: Oral BP: 135/78 Pulse: 64   Resp: 19 SpO2: 98 % O2 Device: None (Room air)    Weight: 108 lbs 9.6 oz  GENERAL: healthy, alert and no distress  RESP: speaking in full sentences, normal work of breathing   CV: extremities well perfused  ABDOMEN: soft, nontender  MS: no gross musculoskeletal defects noted, no edema  PSYCH: mentation appears normal, affect normal/bright        Primary Care Physician   INOCENCIA PRINGLE    Discharge Orders        Significant Results and Procedures   Most Recent 3 CBC's:  Recent Labs   Lab Test 06/24/22  0914 06/23/22  0640 06/22/22  2130 06/21/22  2156 06/04/22  1736   WBC  --  4.8  --  10.8 14.3*   HGB 9.7* 8.9* 8.7* 6.7* 7.7*   MCV  --  99  --  102* 101*   PLT  --  263  --  284 397     Most Recent 3 BMP's:  Recent Labs   Lab Test 06/23/22  0640 06/22/22  1339 06/21/22  2156    138 129*   POTASSIUM 3.5 3.6 3.6   CHLORIDE 104 107 99   CO2 23 21* 20*   BUN 9 11 18   CR 0.71 0.70 1.10   ANIONGAP 9 10 10   MANE 8.5 8.5 8.6   GLC 89 93 131*     Most Recent TSH and T4:  Recent Labs   Lab Test 06/21/22 2156   TSH 1.07     Most Recent ESR & CRP:  Recent Labs   Lab Test 06/23/22  0640 10/18/18  0653   SED  --  112*   CRP 6.5*  --    ,   Results for orders placed or performed during the hospital encounter of 06/22/22   CT Chest Pulmonary Embolism w Contrast    Narrative    EXAM: CT CHEST PULMONARY EMBOLISM W CONTRAST  LOCATION: [facility]  DATE/TIME: 6/22/2022 1:08 AM    INDICATION: PE suspected, low/intermediate prob, positive D-dimer  COMPARISON: 04/13/2022.  TECHNIQUE: CT chest pulmonary angiogram during arterial phase injection of IV contrast. Multiplanar reformats and MIP reconstructions were performed. Dose reduction techniques were used.   CONTRAST: isovue 370 75ml    FINDINGS:  ANGIOGRAM CHEST: No evidence for pulmonary embolism. Pulmonary arteries normal in caliber. Thoracic aorta normal in caliber. No aortic dissection or other acute abnormality.    HEART: Cardiac chambers within normal limits. No pericardial effusion. Severe coronary artery calcification.    MEDIASTINUM: No adenopathy or mass.    LUNGS AND PLEURA: Lung bases partially obscured by motion. Mild bibasilar fibrosis. No honeycombing. No pulmonary mass, consolidation, or suspicious pulmonary nodule. No pleural effusion or pneumothorax.    LIMITED UPPER ABDOMEN: Negative.    MUSCULOSKELETAL: Severe T12 vertebral compression  deformity, chronic.      Impression    IMPRESSION:  1.  No evidence for pulmonary embolism.   XR Abdomen 2 Views    Narrative    EXAM: XR ABDOMEN 2VIEWS  LOCATION: [facility]  DATE/TIME: 6/22/2022 12:49 PM    INDICATION: Concern for significant constipation.  COMPARISON: None.      Impression    IMPRESSION: Minimal to moderate amount of stool within the distal colon. No evidence for small bowel dilatation or small bowel air-fluid levels on the upright view. No free intraperitoneal air. Minimal atelectasis in the lung bases. Normal heart size.   Degenerative changes in the spine and hips.       Discharge Medications   Current Discharge Medication List      START taking these medications    Details   loperamide (IMODIUM) 2 MG capsule Take 1 capsule (2 mg) by mouth 4 times daily as needed for diarrhea  Qty: 60 capsule, Refills: 0    Associated Diagnoses: Diarrhea, unspecified type      pantoprazole (PROTONIX) 40 MG EC tablet Take 1 tablet (40 mg) by mouth every morning (before breakfast)  Qty: 30 tablet, Refills: 0    Associated Diagnoses: PUD (peptic ulcer disease)      psyllium (METAMUCIL/KONSYL) capsule Take 1 capsule by mouth 2 times daily for 90 days  Qty: 180 capsule, Refills: 0    Comments: Pharmacy to dispense capsule size that is available.  Associated Diagnoses: PUD (peptic ulcer disease)         CONTINUE these medications which have CHANGED    Details   acetaminophen (TYLENOL) 500 MG tablet Take 1-2 tablets (500-1,000 mg) by mouth every 8 hours as needed for pain  Qty: 90 tablet, Refills: 1    Associated Diagnoses: Right lumbar radiculitis; Chronic right-sided low back pain with right-sided sciatica         CONTINUE these medications which have NOT CHANGED    Details   aMILoride (MIDAMOR) 5 MG tablet Take 1 tablet (5 mg) by mouth daily  Qty: 30 tablet, Refills: 1    Associated Diagnoses: Hypomagnesemia      bumetanide (BUMEX) 0.5 MG tablet Take 1 tablet (0.5 mg) by mouth daily  Qty:  30 tablet, Refills: 1    Associated Diagnoses: Chronic heart failure with preserved ejection fraction (H)      ferrous sulfate (FEROSUL) 325 (65 Fe) MG tablet Take 325 mg by mouth 2 times daily      HYDROcodone-acetaminophen (NORCO) 5-325 MG tablet Take 1/2 tab every 6 hours as needed for leg pain  Qty: 10 tablet, Refills: 0      levothyroxine (SYNTHROID/LEVOTHROID) 50 MCG tablet Take 50 mcg by mouth every morning       !! LORazepam (ATIVAN) 0.5 MG tablet Take 1 mg by mouth 2 times daily In the am and evening      !! LORazepam (ATIVAN) 0.5 MG tablet Take 0.5 mg by mouth daily (with lunch)  Refills: 0      metoprolol succinate ER (TOPROL-XL) 50 MG 24 hr tablet Take 50 mg by mouth 2 times daily       multivitamin w/minerals (THERA-VIT-M) tablet Take 1 tablet by mouth daily      nystatin (MYCOSTATIN) 628917 UNIT/ML suspension Swish and swallow 5 mLs (500,000 Units) in mouth 4 times daily as needed    Associated Diagnoses: Oral ulcer      Alcohol Swabs (B-D SINGLE USE SWABS REGULAR) PADS USE UTD  Refills: 0      blood glucose monitoring (ONETOUCH VERIO) meter device kit 2 times daily  Refills: 0      ONETOUCH VERIO IQ test strip 2 times daily  Refills: 2       !! - Potential duplicate medications found. Please discuss with provider.        Allergies   Allergies   Allergen Reactions     Unknown [No Clinical Screening - See Comments]      Pt states she has a medication allergy but does not know what it is

## 2022-06-24 NOTE — PROGRESS NOTES
Brief GI note:    Follow up hgb today was 9.7. No gross bleeding. No significant diarrhea. Patient declined endoscopic evaluation for anemia/diarrhea. At this point, no further GI workup is needed. Agree with discharge planning. She will be on a ppi for the long term. Can follow up with [facility] as needed if consideration for further endoscopic evaluation or evlauation/treatment of diarrhea.

## 2022-06-24 NOTE — PLAN OF CARE
Goal Outcome Evaluation:  Alert and oriented to self and place. C/O generalized pain. Tylenol given x 1. Pt states a relief. Ambulate on hallways with PT. Ate food from home. Voided x 2. No BM. Discharge instructions explained to son. Camacho Chaney verbalized understanding. Pt discharged home.      Klarissa Johnson RN

## 2022-06-24 NOTE — PLAN OF CARE
Occupational Therapy Discharge Summary    Reason for therapy discharge:    Discharging home.    Progress towards therapy goal(s). See goals on Care Plan in [facility] electronic health record for goal details.  Progressing towards goals.    Therapy recommendation(s):    Pt could benefit from home OT; currently recommend 24 hr supervision & assist.

## 2022-06-24 NOTE — PLAN OF CARE
Problem: Hypertension Comorbidity  Goal: Blood Pressure in Desired Range  Outcome: Ongoing, Progressing  Intervention: Maintain Blood Pressure Management  Recent Flowsheet Documentation  Taken 6/23/2022 1957 by Redd Alvarez, RN  Medication Review/Management: medications reviewed     Problem: Fatigue  Goal: Improved Activity Tolerance  Outcome: Ongoing, Progressing  Intervention: Promote Improved Energy  Recent Flowsheet Documentation  Taken 6/23/2022 1957 by Redd Alavrez, RN  Activity Management: activity adjusted per tolerance     Problem: Anemia  Goal: Anemia Symptom Improvement  Outcome: Ongoing, Progressing  Intervention: Monitor and Manage Anemia  Recent Flowsheet Documentation  Taken 6/23/2022 1957 by Redd Alvarez, RN  Safety Promotion/Fall Prevention:    activity supervised    assistive device/personal items within reach    bed alarm on    clutter free environment maintained    fall prevention program maintained    lighting adjusted    nonskid shoes/slippers when out of bed    patient and family education    room organization consistent    safety round/check completed    room near nurse's station    supervised activity   Goal Outcome Evaluation:      Pt c/o 5/10 generalized aches/pain, repositioned and PRN Tylenol administered which was effective. [employer] speaking, [employer] language line utilized along with [employer] speaking staff member. Pt requesting female caregiver for all toileting, received. Purewick utilized overnight. Urine is clear yellow    Received imodium on day shift 6/23/22, no further subsequent loose stools. Hgb stable. Anxious at times, repetitive talking. A&Ox4. A1 w/ walker transfer to commode/restroom. Generalized weakness.    NPO after midnight for possible endoscopy today if Pt allows.                  No

## 2022-06-24 NOTE — PLAN OF CARE
Physical Therapy Discharge Summary    Reason for therapy discharge:    Discharged to home.    Progress towards therapy goal(s). See goals on Care Plan in [facility] electronic health record for goal details.  Goals not met.  Barriers to achieving goals:   discharge from facility.    Therapy recommendation(s):    Continued therapy is recommended.  Rationale/Recommendations:  Recommend continue with home PT and family assist as pt is progressing towards goals..

## 2022-06-25 NOTE — PROGRESS NOTES
Clinic Care Coordination Contact  [facility]/Voicemail       Clinical Data: Care Coordinator Outreach  Outreach attempted x 1.  Unable to leave a message on patient's voicemail with call back information and requested return call.  Plan: Care Coordinator will try to reach patient again in 1-2 business days.    .Ariana BARRAGAN Community Health Worker  Clinic Care Coordination  [facility]  Phone: [phone]     W[facility]

## 2022-06-26 NOTE — PROGRESS NOTES
Clinic Care Coordination Contact  [facility]/Voicemail       Clinical Data: Care Coordinator Outreach  Outreach attempted x 2.  Unable to leave amessage on patient's voicemail with call back information and requested return call.  Plan:  Care Coordinator will do no further outreaches at this time.    Ariana BARRAGAN Community Health Worker  Clinic Care Coordination  [facility]  Phone: [phone]

## 2022-06-29 PROBLEM — R53.1 WEAKNESS: Status: ACTIVE | Noted: 2022-01-01

## 2022-06-29 NOTE — ED NOTES
Pt becoming increasingly upset with staff, pt stating she is hungry but refuses food provided. Staff provided food she asked for and pt refused to eat. Pt refused to drink water and became upset with staff while administering medications. Language line was used to ensure pt understood, pt uncooperative.

## 2022-06-29 NOTE — PHARMACY-ADMISSION MEDICATION HISTORY
Pharmacy Note - Admission Medication History    Pertinent Provider Information: Med list  based on last admit 6/22-6/24/22 and fill history, which is consistent with list.       ______________________________________________________________________    Prior To Admission (PTA) med list completed and updated in EMR.       PTA Med List   Medication Sig Last Dose     acetaminophen (TYLENOL) 500 MG tablet Take 1-2 tablets (500-1,000 mg) by mouth every 8 hours as needed for pain Unknown at Unknown time     aMILoride (MIDAMOR) 5 MG tablet Take 1 tablet (5 mg) by mouth daily      bumetanide (BUMEX) 0.5 MG tablet Take 1 tablet (0.5 mg) by mouth daily      ferrous sulfate (FEROSUL) 325 (65 Fe) MG tablet Take 325 mg by mouth 2 times daily      HYDROcodone-acetaminophen (NORCO) 5-325 MG tablet Take 1/2 tab every 6 hours as needed for leg pain      levothyroxine (SYNTHROID/LEVOTHROID) 50 MCG tablet Take 50 mcg by mouth every morning       loperamide (IMODIUM) 2 MG capsule Take 1 capsule (2 mg) by mouth 4 times daily as needed for diarrhea      LORazepam (ATIVAN) 0.5 MG tablet Take 1 mg by mouth 2 times daily In the am and evening      LORazepam (ATIVAN) 0.5 MG tablet Take 0.5 mg by mouth daily (with lunch)      metoprolol succinate ER (TOPROL-XL) 50 MG 24 hr tablet Take 50 mg by mouth 2 times daily       multivitamin w/minerals (THERA-VIT-M) tablet Take 1 tablet by mouth daily      nystatin (MYCOSTATIN) 418234 UNIT/ML suspension Swish and swallow 5 mLs (500,000 Units) in mouth 4 times daily as needed      pantoprazole (PROTONIX) 40 MG EC tablet Take 1 tablet (40 mg) by mouth every morning (before breakfast)      psyllium (METAMUCIL/KONSYL) capsule Take 1 capsule by mouth 2 times daily for 90 days        Information source(s): Hospital records and [facility]  Method of interview communication: N/A    Summary of Changes to PTA Med List  New:   Discontinued:   Changed:     Patient was asked about OTC/herbal products  specifically.  PTA med list reflects this.    In the past week, patient estimated taking medication this percent of the time:  greater than 90%.    Allergies were reviewed, assessed, and updated with the patient.      Patient does not use any multi-dose medications prior to admission.    The information provided in this note is only as accurate as the sources available at the time of the update(s).    Thank you for the opportunity to participate in the care of this patient.    Zabrina Jerez [facility]  6/29/2022 7:45 AM

## 2022-06-29 NOTE — H&P
[facility]    History and Physical - Hospitalist Service       Date of Admission:  6/28/2022    Assessment & Plan      Chris Bell is a 86 year old female admitted on 6/28/2022. She has a history of chronic anemia, polyclonal gammopathy, and recent hospitalization for diarrhea and is admitted for constipation and hyponatremia.    Hyponatremia  Hypokalemia  Hypomagnesemia  Patient notes 2-3 days of inability to stool or pass urine, leading to poor PO intake. Had hyponatremia down to 129 during last hospital stay as well, which quickly corrected with IV fluid resuscitation.  Urine studies at that time showing urine osmolality of 304, urine sodium of 51 - indicating possible SIADH, though one would expect her sodium to decrease with an NS challenge if she actually had SIADH.  She may actually just have low effective arterial blood volume from her chronic anemia, which one would expect to improve with an NS challenge as it did.  Currently seems slightly confused, which could be a symptom of hyponatremia, though unclear baseline. Otherwise asymptomatic.  -Potassium replacement protocol  -Magnesium replacement protocol  -mIVF NS at 100mL/hr given she has historically been quite NS responsive and want to be judicious with our rate of rise  -Every 6hr sodium checks  -Repeat urine osm and urine sodium  -Tele    Constipation  Urinary retention  Recent hospitalization for chronic diarrhea, started on imodium. Seems to have sent her in the opposite direction - now constipated.  Loose stools have been an issue for her for some time, with no unifying diagnosis as of yet.  Stool studies negative during last hospital stay, TSH normal, fecal calprotectin elevated (ddx IBD versus microscopic colitis versus colorectal cancer versus other).  Patient refused colonoscopy and endoscopy at that time.  Constipation likely the source of her mildly elevated WBC in the absence of foci of infection.  -Re-visit topic  of colonoscopy when family present to help translate  -Start miralax  -Javed placed    JOSE  Admission creatinine 1.11, up from baseline around 0.7.  Likely obstructive in nature, with patient noting difficulty urinating for the last few days.  -Javed placed  -AM BMP    Spinal compression fractures  Patient endorsing significant lumbar back pain on admission.  Again seen are compression fractures at T12, L2, and L4, which appear stable per radiologist.  Also has severe stenosis at L3-L4 and L4-L5.  No red flag symptoms.  Has TLSO brace at home for comfort.  -Stress importance of consistent TLSO brace for pain at home  -Scheduled Tylenol     Chronic normocytic anemia  Admission hemoglobin 8.7 down from 9.7 when discharged a few days ago.  Required 1 unit of blood on that admission for hemoglobin of 6.7.  Difficult to discern based on interview whether patient noted blood in stool prior to this bout of constipation.  Per chart review has had dark stools in the past, though is on iron supplementation.  Had iron studies in 2021 showing low iron, transferrin, and TIBC, with high normal ferritin and normal folate/B12. 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    -Daily CBC  -Transfuse if hemoglobin less than 7  -Continue oral iron  -Continue PPI in case she is oozing from ulcer (h/o this but was healed as of last EGD 11/2020)    Polyclonal gammopathy  Gamma gap present dating back to 2018.  Had SPEP in May 2022 showing polyclonal gammopathy, potentially due to underlying inflammatory process, per pathologist.  Screening MANJIT and RF done last week in search of underlying inflammatory process.  RF was markedly positive but MANJIT negative.  Unclear significance at this point.  -Consider heme-onc consult while inpatient, potential referral to rheumatology for outpatient work-up      Chronic conditions  HTN - hold PTA amiloride and Bumex with JOSE  Hypothyroid - PTA  Synthroid         Diet: Combination Diet Regular Diet Adult  DVT Prophylaxis: Pneumatic Compression Devices  Javed Catheter: Not present  Fluids: none  Central Lines: None  Cardiac Monitoring: None  Code Status: No CPR- Do NOT Intubate    Clinically Significant Risk Factors Present on Admission         # Hyponatremia: Na = 121 mmol/L (Ref range: 136 - 145 mmol/L) on admission, will monitor as appropriate   # Hypomagnesemia: Mg = 0.9 mg/dL (Ref range: 1.8 - 2.6 mg/dL) on admission, will replace as needed                Disposition Plan      Expected Discharge Date: 06/30/2022                The patient's care was discussed with faculty at morning report    Neftali Mao MD  Hospitalist Service  [facility]  Securely message with the Vocera Web Console (learn more here)  Text page via [email or web address] Paging/Directory       ______________________________________________________________________    Chief Complaint   Leg and back pain, difficulty urinating    History is obtained from the patient    History of Present Illness   Chris Bell is a 86 year old female who presents with abdominal pain, difficulty passing stool and urine.  History limited due to patient's confusion, not answering questions appropriately.  Attempted to call family but no answer.  Patient states her symptoms have been going on for about 2 to 3 days.  Left hospital on 6/24.  Reportedly living independently, though in an assisted living building.  Not eating or drinking much due to pain.    Is frequently perseverating on swelling and extra fluid present in her legs that needs to come out.  Also reports significant back pain.  Denies fevers or chills.  Is not able to tell me whether she had blood in her stool prior to this constipation.    Review of Systems    The 10 point Review of Systems is negative other than noted in the HPI or here.     Past Medical History    I have reviewed this patient's medical history and updated it with  pertinent information if needed.   Past Medical History:   Diagnosis Date     Anemia      Depression      Disease of thyroid gland      HTN (hypertension)      Osteoporosis      PUD (peptic ulcer disease)         Past Surgical History   I have reviewed this patient's surgical history and updated it with pertinent information if needed.  Past Surgical History:   Procedure Laterality Date     ESOPHAGOSCOPY, GASTROSCOPY, DUODENOSCOPY (EGD), COMBINED N/A 2018    Procedure: ESOPHAGOGASTRODUODENOSCOPY (EGD) with h.pylori and gastric ulcer biopsies;  Surgeon: Colin Alvarez MD;  Location: [facility];  Service:      HYSTERECTOMY       US BIOPSY FINE NEEDLE ASPIRATION LYMPH NODE  2019        Social History   I have reviewed this patient's social history and updated it with pertinent information if needed. Chris Bell  reports that she has never smoked. She has never used smokeless tobacco. She reports that she does not drink alcohol and does not use drugs.    Family History         Prior to Admission Medications   Prior to Admission Medications   Prescriptions Last Dose Informant Patient Reported? Taking?   Alcohol Swabs (B-D SINGLE USE SWABS REGULAR) PADS   Yes No   Sig: USE UTD   HYDROcodone-acetaminophen (NORCO) 5-325 MG tablet   No No   Sig: Take 1/2 tab every 6 hours as needed for leg pain   LORazepam (ATIVAN) 0.5 MG tablet   Yes No   Sig: Take 0.5 mg by mouth daily (with lunch)   LORazepam (ATIVAN) 0.5 MG tablet   Yes No   Sig: Take 1 mg by mouth 2 times daily In the am and evening   ONETOUCH VERIO IQ test strip   Yes No   Si times daily   aMILoride (MIDAMOR) 5 MG tablet   No No   Sig: Take 1 tablet (5 mg) by mouth daily   acetaminophen (TYLENOL) 500 MG tablet   No No   Sig: Take 1-2 tablets (500-1,000 mg) by mouth every 8 hours as needed for pain   blood glucose monitoring (ONETOUCH VERIO) meter device kit   Yes No   Si times daily   bumetanide (BUMEX) 0.5 MG tablet   No No   Sig: Take 1 tablet  (0.5 mg) by mouth daily   ferrous sulfate (FEROSUL) 325 (65 Fe) MG tablet   Yes No   Sig: Take 325 mg by mouth 2 times daily   levothyroxine (SYNTHROID/LEVOTHROID) 50 MCG tablet   Yes No   Sig: Take 50 mcg by mouth every morning    loperamide (IMODIUM) 2 MG capsule   No No   Sig: Take 1 capsule (2 mg) by mouth 4 times daily as needed for diarrhea   metoprolol succinate ER (TOPROL-XL) 50 MG 24 hr tablet   Yes No   Sig: Take 50 mg by mouth 2 times daily    multivitamin w/minerals (THERA-VIT-M) tablet   Yes No   Sig: Take 1 tablet by mouth daily   nystatin (MYCOSTATIN) 598056 UNIT/ML suspension   No No   Sig: Swish and swallow 5 mLs (500,000 Units) in mouth 4 times daily as needed   pantoprazole (PROTONIX) 40 MG EC tablet   No No   Sig: Take 1 tablet (40 mg) by mouth every morning (before breakfast)   psyllium (METAMUCIL/KONSYL) capsule   No No   Sig: Take 1 capsule by mouth 2 times daily for 90 days      Facility-Administered Medications: None     Allergies   Allergies   Allergen Reactions     Unknown [No Clinical Screening - See Comments]      Pt states she has a medication allergy but does not know what it is       Physical Exam   Vital Signs: Temp: 98.3  F (36.8  C) Temp src: Oral BP: 112/55 Pulse: 78   Resp: 28 SpO2: 100 % O2 Device: None (Room air)    Weight: 0 lbs 0 oz    Constitutional: fatigued, frail, and pale  Eyes: Lids and lashes normal, pupils equal, round and reactive to light, extra ocular muscles intact, sclera clear, conjunctiva normal  Respiratory: No increased work of breathing, good air exchange, clear to auscultation bilaterally, no crackles or wheezing  Cardiovascular: Normal apical impulse, regular rate and rhythm, normal S1 and S2, no S3 or S4, and no murmur noted.  No peripheral edema in legs  GI: No scars, normal bowel sounds, soft, non-distended, non-tender, no masses palpated, no hepatosplenomegally  Skin: no redness, warmth, or swelling  Musculoskeletal: Moderate TTP midline in L  spine  Neurologic: frequently going on tangents for , seems confused    Data   Data reviewed today: I reviewed all medications, new labs and imaging results over the last 24 hours. I personally reviewed the EKG tracing showing sinus rhythm. T wave inversion in V1/V2 unchanged.    Recent Results (from the past 24 hour(s))   Lumbar spine CT w/o contrast    Narrative    EXAM: CT LUMBAR SPINE W/O CONTRAST  LOCATION: [facility]  DATE/TIME: 6/29/2022 12:11 AM    INDICATION: bilat leg pain, can't wallk  COMPARISON: CT abdomen pelvis 03/20/2022  TECHNIQUE: Routine CT Lumbar Spine without IV contrast. Multiplanar reformats. Dose reduction techniques were used.    FINDINGS:  Nomenclature is based on 5 lumbar type vertebral bodies. There are no new fractures. Chronic compression fracture at T12 with greater than 80% loss of height and anterior wedging, chronic compression fracture at L2 with 50% loss of height centrally   unchanged and chronic compression fracture L4 with 75% loss of height centrally, unchanged. Chronic grade 1 anterolisthesis L4-L5. Kyphotic appearance T11-L1 secondary to the T12 fracture. No extraspinal soft tissue abnormality. Unremarkable visualized   bony pelvis. Osteopenic bones.    T12-L1: Normal disc height. No herniation. Normal facets. No spinal canal or neural foraminal stenosis.    L1-L2: Normal disc height. No herniation. Normal facets. No spinal canal or neural foraminal stenosis.    L2-L3: Normal disc height, mild disc osteophyte complex posteriorly mild bilateral facet arthropathy. Mild to moderate central stenosis. Moderate severe left foraminal stenosis. Mild right foraminal stenosis.    L3-L4: Normal disc height. Generalized disc bulge. Mild bilateral facet hypertrophy. Severe spinal stenosis. Moderate bilateral foraminal stenosis.    L4-L5: Normal disc height. Generalized disc bulge. Moderate bilateral facet hypertrophy. Grade 1 anterolisthesis. Severe  spinal stenosis. Moderate to severe bilateral foraminal stenosis.    L5-S1: Normal disc height. Mild disc bulge. Normal facets. No spinal canal or neural foraminal stenosis.      Impression    IMPRESSION:  1.  Severe spinal stenosis at L3-L4 and L4-L5. Mild to moderate spinal stenosis at L2-L3.  2.  Chronic compression fractures of T12, L2 and L4 is stable.   CT Abdomen Pelvis w Contrast    Narrative    EXAM: CT ABDOMEN PELVIS W CONTRAST  LOCATION: [facility]  DATE/TIME: 6/29/2022 12:13 AM    INDICATION: abd pain and bilateral leg pain.  COMPARISON: 3/20/2022  TECHNIQUE: CT scan of the abdomen and pelvis was performed following injection of IV contrast. Multiplanar reformats were obtained. Dose reduction techniques were used.  CONTRAST: isovue 370 100ml    FINDINGS:   LOWER CHEST: Fibrotic changes at left base stable. Mild cardiomegaly.    HEPATOBILIARY: Normal.    PANCREAS: Normal.    SPLEEN: Normal.    ADRENAL GLANDS: Normal.    KIDNEYS/BLADDER: Normal.    BOWEL: No definite obstruction or inflammatory change. Nonobstructive bowel loops extend into bilateral inguinal hernias, as before.    LYMPH NODES: Normal.    VASCULATURE: Modest atherosclerotic plaque.    PELVIC ORGANS: Hysterectomy. No pelvic mass. No free fluid.    MUSCULOSKELETAL: Small inguinal hernias, left larger than right, each containing nondilated loops of small bowel.  Severe T12 compression and moderately severe L4 compression unchanged. Moderate compression of L2 is slightly worse than the previous study no hip or pelvic fracture evident. Diffuse osteopenia.      Impression    IMPRESSION:   1.  No definite etiology for symptoms. Moderate compression L2 vertebral body has progressed somewhat while more severe compressions of T12 and L4 are unchanged.  2.  Bilateral small inguinal hernias containing loops of nonobstructive small bowel.   XR Chest 1 View    Narrative    EXAM: XR CHEST 1 VIEW  LOCATION: [facility]  DATE/TIME: 6/29/2022 12:23 AM    INDICATION: Shortness of breath.  COMPARISON: 5/24/2022.    FINDINGS: The heart is enlarged. There is no pulmonary edema. The thoracic aorta is calcified. There is a right perihilar infiltrate. Atelectasis or scar at the left lung base. No pneumothorax.      Impression    IMPRESSION: Right perihilar infiltrate.   XR Knee Left 1/2 Views    Narrative    EXAM: XR KNEE LT 1/2 VW  LOCATION: [facility]  DATE/TIME: 6/29/2022 12:23 AM    INDICATION: leg swelling  COMPARISON: None.      Impression    IMPRESSION: Bones are markedly demineralized. Allowing for this, no acute fracture detected. No dislocation. There appears to be some suprapatellar soft tissue swelling and a small knee joint effusion.      there is no prior EKG available for comparison

## 2022-06-29 NOTE — PLAN OF CARE
"Problem: Electrolyte Imbalance  Goal: Electrolyte Balance  Outcome: Ongoing, Progressing   Pt's magnesium has improved. AM recheck was 2.4, up from 0.9. Mag and K protocols ran, rechecks morning of 6/30    Problem: Pain Acute  Goal: Acceptable Pain Control and Functional Ability  Intervention: Prevent or Manage Pain   Pt reports chronic pain in BLE. Scheduled medications given.     Pt particular and uncooperative at times. Pt reapproached multiple times in attempt to get her to take her AM medications, pt finally agreed. [employer]  via [employer] used to do shift assessment and communicate with patient. Patient talking in very long segments to in  and when  tries to interpret to RN, patient would start talking over him.  stated, \"this is a very difficult patient.\" Scheduled ativan given.    KULWANT HDEZ RN  "

## 2022-06-29 NOTE — CONSULTS
"GI CONSULT NOTE    Name: Chris Bell  Medical Record #: [id number]  YOB: 1935  Date of Admission: 6/28/2022  Date/Time: 6/29/2022/3:07 PM     CHIEF COMPLAINT: Diarrhea    HISTORY OF PRESENT ILLNESS: We were asked to see Chris Bell by Dr. Brothers for \"concern for IBD, previously refusing colonoscopy, now willing.\"    Chris Bell is a 86 year old year old female with history of HTN, chronic anemia, PUD, CHF, hypothyroidism, anxiety, Cdiff, chronic pain, spinal compression fractures who presents to the ER with inability to pass stool or urine.     States currently having some generalized abdominal pain and diarrhea. States diarrhea for the past two days but constipation prior to this. She denies any melena or hematochezia. No dysphagia, GERD, nausea, or vomiting. Does have weakness and fatigue.    Was hospitalized 4/16/22 and seen by our group for mouth pain and prior to this 2/15/22 seen by our group for BRBPR and diarrhea. She was offered colonoscopy but decided not to pursue. Has never had a colonoscopy in the past.     Chronic history of anemia, Hgb over the last 4 years has ranged 7.6 to 11.7. On oral Iron therapy. Denies any NSAIDS. Was re-prescribed Protonix on 6/25/22 but chart check does indicate she has been on this in the past but not always compliant.       Procedure History:  EGD 11/4/2020- normal esophagus, gastritis, normal duodenum. Reactive gastropathy and H.pylori negative.   EGD 1/17/2018- 2 large prepyloric ulcers. H.pylori negative, benign ulcer.     REVIEW OF SYSTEMS (ROS): Complete review of systems negative other than listed in HPI.    PAST MEDICAL HISTORY:  Past Medical History:   Diagnosis Date     Anemia      Depression      Disease of thyroid gland      HTN (hypertension)      Osteoporosis      PUD (peptic ulcer disease)       FAMILY HISTORY:  Family History   Problem Relation Age of Onset     Diabetes No family hx of      Cancer No family hx of      Heart Disease No family hx of  "     SOCIAL HISTORY:  Social History     Socioeconomic History     Marital status:      Spouse name: Not on file     Number of children: Not on file     Years of education: Not on file     Highest education level: Not on file   Occupational History     Not on file   Tobacco Use     Smoking status: Never Smoker     Smokeless tobacco: Never Used   Substance and Sexual Activity     Alcohol use: No     Drug use: No     Sexual activity: Never   Other Topics Concern     Not on file   Social History Narrative     Not on file     Social Determinants of Health     Financial Resource Strain: Not on file   Food Insecurity: Not on file   Transportation Needs: Not on file   Physical Activity: Not on file   Stress: Not on file   Social Connections: Not on file   Intimate Partner Violence: Not on file   Housing Stability: Not on file     MEDICATIONS PRIOR TO ADMISSION: (Not in a hospital admission)       ALLERGIES: Unknown [no clinical screening - see comments]    PHYSICAL EXAM:    /59 (BP Location: Right arm)   Pulse 77   Temp 98.4  F (36.9  C) (Oral)   Resp 20   SpO2 99%     GENERAL: No obvious distress  NECK: Supple without adenopathy  EYES: No scleral icterus  LUNGS: Clear to auscultation bilaterally   HEART: Regular rate and rhythm, S1 and S2 present  ABDOMEN: Non-distended. Positive bowel sounds. Soft, non-tender, no guarding/rebound/mass, no obvious organomegaly  MUSKULOSKELETAL:  Warm and well perfused, moves all extremities well  SKIN: No jaundice  NEUROLOGIC: Alert and oriented  PSYCHIATRIC: Normal affect    LAB DATA:  CMP Results:   Recent Labs   Lab Test 06/29/22  1145 06/29/22  0753 06/29/22  0106 06/23/22  0640 06/22/22  1339 06/21/22  2156 05/24/22  2135 05/24/22  1024 05/23/22  1537 05/19/22  2056   * 124* 121* 136   < > 129*   < > 133*   < > 135*   POTASSIUM  --  3.6 3.2* 3.5   < > 3.6   < > 3.4*   < > 3.4*   CHLORIDE  --  98 92* 104   < > 99   < > 101   < > 105   CO2  --  19* 23 23   < > 20*    < > 23   < > 22   ANIONGAP  --  7 6 9   < > 10   < > 9   < > 8   GLC  --  110 121 89   < > 131*   < > 105   < > 128*   BUN  --  18 20 9   < > 18   < > 7*   < > 8   CR  --  0.87 1.11* 0.71   < > 1.10   < > 0.73   < > 0.78   BILITOTAL  --   --   --   --   --  0.3  --  0.3  --  0.2   ALKPHOS  --   --   --   --   --  89  --  78  --  90   ALT  --   --   --   --   --  <9  --  10  --  <9   AST  --   --   --   --   --  15  --  12  --  17    < > = values in this interval not displayed.      CBC  Recent Labs   Lab 06/29/22  0753 06/28/22  2306 06/24/22  0914 06/23/22  0640   WBC 14.2* 12.6*  --  4.8   RBC 2.72* 2.88*  --  2.97*   HGB 8.2* 8.7* 9.7* 8.9*   HCT 25.6* 27.6*  --  29.4*   MCV 94 96  --  99   MCH 30.1 30.2  --  30.0   MCHC 32.0 31.5  --  30.3*   RDW 20.2* 20.3*  --  21.0*    246  --  263     INRNo lab results found in last 7 days.   Lipase   Date Value Ref Range Status   05/24/2022 11 0 - 52 U/L Final   03/20/2022 18 0 - 52 U/L Final   02/15/2022 11 0 - 52 U/L Final       IMAGING:  EXAM: CT ABDOMEN PELVIS W CONTRAST  LOCATION: [facility]  DATE/TIME: 6/29/2022 12:13 AM     INDICATION: abd pain and bilateral leg pain.  COMPARISON: 3/20/2022  TECHNIQUE: CT scan of the abdomen and pelvis was performed following injection of IV contrast. Multiplanar reformats were obtained. Dose reduction techniques were used.  CONTRAST: isovue 370 100ml     FINDINGS:   LOWER CHEST: Fibrotic changes at left base stable. Mild cardiomegaly.     HEPATOBILIARY: Normal.     PANCREAS: Normal.     SPLEEN: Normal.     ADRENAL GLANDS: Normal.     KIDNEYS/BLADDER: Normal.     BOWEL: No definite obstruction or inflammatory change. Nonobstructive bowel loops extend into bilateral inguinal hernias, as before.     LYMPH NODES: Normal.     VASCULATURE: Modest atherosclerotic plaque.     PELVIC ORGANS: Hysterectomy. No pelvic mass. No free fluid.     MUSCULOSKELETAL: Small inguinal hernias, left larger than right, each  containing nondilated loops of small bowel.  Severe T12 compression and moderately severe L4 compression unchanged. Moderate compression of L2 is slightly worse than the previous study no hip or pelvic fracture evident. Diffuse osteopenia.                                                                   IMPRESSION:   1.  No definite etiology for symptoms. Moderate compression L2 vertebral body has progressed somewhat while more severe compressions of T12 and L4 are unchanged.  2.  Bilateral small inguinal hernias containing loops of nonobstructive small bowel.    ASSESSMENT:    1. Diarrhea  86 year old female with  history of HTN, chronic anemia, PUD, CHF, hypothyroidism, anxiety, Cdiff, chronic pain, spinal compression fractures who presents to the ER with inability to pass stool or urine. States alternating pattern of constipation and diarrhea at baseline. Differential includes infectious source, underlying constipation, IBD, and malignancy. MANJIT negative. Chart does not RA factor elevation, CRP 6.5, fecal calprotectin 165.0 taken 6 days ago through outside provider. Enteric bacteria and virus panel by [facility] stool negative one week ago.   - TSH 6/21/22 WNL  - Mild leukocytosis  - Patient was offered a colonoscopy and a flex sig which were both described in depth and patient and family refused.     2. History of PUD  3. Chronic anemia  No overt GI bleeding present. Continues on oral iron therapy and Protonix. H.pylori negative in the past. Hemodynamically stable.   - Hgb 8.7 on admit.     PLAN:    1. Diet as tolerated  2. Flex sig and colonoscopy was offered patient refused  3. CDiff stool testing  4. GI will continue to follow, please call with any questions or concerns.     Discussed with Dr. Patel who will also visit with the patient.     TIME SPENT: 45 min including chart review, patient interview and care coordination.                                                Teresa Rodrigez CNP  Thank you for the  opportunity to participate in the care of this patient.   Please feel free to call me with any questions or concerns.  Phone number [phone].              GI staff addendum  6/29/2022    87 yo F with chronic anemia, constipation/diarrhea, hx PUD, Cdiff hx, chronic pain, CHF, presents with inability to pass stool/urine. Generalized abdominal pain, recent diarrhea. No hematochezia or melena. No emesis. Multiple recurrent admissions.  Hgb 8.2.    /59 (BP Location: Left arm, Patient Position: Semi-Mccullough's, Cuff Size: Adult Regular)   Pulse 87   Temp 98  F (36.7  C) (Oral)   Resp 17   SpO2 96%   Alert. No icterus. Non-toxic appearing. Abdom: Soft, non-distended, no rebound/guarding, mild discomfort in upper abdomen.    Family refusing colonoscopy OR flexible sigmoidoscopy at this time.  Will recheck Cdiff given past hx.    Marty Patel MD on 6/29/2022 at 5:46 PM

## 2022-06-29 NOTE — ED TRIAGE NOTES
Patient arrives to ED via [facility] from her [facility] with chief complaint of bilateral leg pain and difficulty with urination.  Also, reports constipation.  Left knee appears swollen, denies injury.  Per EMS, blood pressure 110/62, heart rate 81 and 97% on RA.  Patient is alert and oriented x3.

## 2022-06-29 NOTE — CONSULTS
Care Management Initial Consult    General Information  Assessment completed with: Patient, Children, pt and dtr May Her Jonathan  Type of CM/SW Visit: Initial Assessment    Primary Care Provider verified and updated as needed: Yes   Readmission within the last 30 days: current reason for admission unrelated to previous admission, previous discharge plan unsuccessful   Return Category: Progression of disease  Reason for Consult: discharge planning  Advance Care Planning: Advance Care Planning Reviewed: no concerns identified, verified with patient     General Information Comments: lives alone and senior housing and dtr May Her Castillo is her PCA    Communication Assessment  Patient's communication style: spoken language (non-English) (hmong)    Hearing Difficulty or Deaf: no   Wear Glasses or Blind: no    Cognitive  Cognitive/Neuro/Behavioral: .WDL except, mood/behavior           Mood/Behavior: anxious          Living Environment:   People in home:       Current living Arrangements: apartment      Able to return to prior arrangements: yes       Family/Social Support:  Care provided by: self, child(chriss)  Provides care for: no one, unable/limited ability to care for self  Marital Status: Single             Description of Support System: Supportive, Involved    Support Assessment: Adequate family and caregiver support, Adequate social supports    Current Resources:   Patient receiving home care services: No     Community Resources: DME, PCA  Equipment currently used at home: walker, rolling, wheelchair, manual  Supplies currently used at home: Incontinence Supplies    Employment/Financial:  Employment Status:          Financial Concerns: No concerns identified   Referral to Financial Worker: No       Lifestyle & Psychosocial Needs:  Social Determinants of Health     Tobacco Use: Low Risk      Smoking Tobacco Use: Never Smoker     Smokeless Tobacco Use: Never Used   Alcohol Use: Not on file   Financial Resource Strain: Not on  file   Food Insecurity: Not on file   Transportation Needs: Not on file   Physical Activity: Not on file   Stress: Not on file   Social Connections: Not on file   Intimate Partner Violence: Not on file   Depression: At risk     PHQ-2 Score: 3   Housing Stability: Not on file       Functional Status:  Prior to admission patient needed assistance:   Dependent ADLs:: Wheelchair-with assist, Transfers, Toileting, Positioning, Incontinence, Grooming, Dressing, Bathing, Ambulation-walker  Dependent IADLs:: Cleaning, Cooking, Laundry, Shopping, Meal Preparation, Medication Management, Transportation, Incontinence  Assesssment of Functional Status: Not at baseline with ADL Functioning, Not at baseline with mobility, Not at  functional baseline    Mental Health Status:  Mental Health Status: No Current Concerns       Chemical Dependency Status:                Values/Beliefs:  Spiritual, Cultural Beliefs, Yarsani Practices, Values that affect care:                 Additional Information:  Assessed, lives alone and senior housing and dtr May Her Castillo is her PCA, per May, she is providing around the clock cares as needed, pt is alert and oriented, speaks Hmong but hard of hearing. May can transport at discharge.       Marilee Covarrubias RN

## 2022-06-29 NOTE — PROGRESS NOTES
Resident/Fellow Attestation   I, Sonny Brothers MD, was present with the medical/LISANDRO student who participated in the service and in the documentation of the note.  I have verified the history and personally performed the physical exam and medical decision making.  I agree with the assessment and plan of care as documented in the note.      Appears somewhat confused, tangential on exam.  Diffuse abdominal and joint pain.  Electrolytes improving with IVF.  Overall unclear etiology of her underlying inflammatory process.    Sonny Brothers MD  PGY1  Date of Service (when I saw the patient): 06/29/22    [facility]    Progress Note - Hospitalist Service       Date of Admission:  6/28/2022    Assessment & Plan            Chris Bell is a 86 year old female admitted on 6/28/2022. She has a history of chronic anemia, polyclonal gammopathy, and recent hospitalization for diarrhea and is admitted for constipation and hyponatremia.     Hyponatremia  Hypokalemia-resolved  Hypomagnesemia-resolved  Patient notes 2-3 days of inability to stool or pass urine, leading to poor PO intake. Had hyponatremia down to 129 during last hospital stay as well, which quickly corrected with IV fluid resuscitation.  Urine studies at that time with possibility of SIADH, however with improvement after IV NS, seems less likely.  Suspect low effective arterial blood volume in the setting of her chronic anemia.  Again, on this admission urine studies and responsiveness to IV fluids points towards dehydration as source of her hyponatremia. Currently seems slightly confused, which could be a symptom of hyponatremia, though unclear baseline. Otherwise asymptomatic. Sodium improved overnight to 124 and Magnesium improved from 0.9 to 2.4 with repletion.  -Potassium replacement protocol  -Magnesium replacement protocol  -mIVF NS at 100mL/hr   -Every 6hr sodium checks     Constipation  Urinary retention  H/o chronic  diarrhea  ?IBD  Recent hospitalization for chronic diarrhea, started on imodium. Seems to have sent her in the opposite direction - now constipated.  Loose stools have been an issue for her for some time, with no unifying diagnosis as of yet.  Stool studies negative during last hospital stay, TSH normal, fecal calprotectin elevated (ddx IBD versus microscopic colitis versus colorectal cancer versus other).  Patient refused colonoscopy and endoscopy at that time, on this admission reports being open to colonoscopy. Constipation likely the source of her mildly elevated WBC in the absence of foci of infection.  -GI consulted, appreciate recs  -Start miralax  -Hernandez placed, getting adequate output  -Consider tamsulosin versus oxybutynin if continues to have retention after hernandez removal     Joint Pain  ?Rhumatoid arthritis   Long history of multiple joint pain, worse in the knees and hands. On exam her thumbs are flexed towards midline with prominent MCP swelling across all five joints bilaterally. Her bilateral knees also appear swollen but not erythematous. Her RF and CRP were elevated last admission, as well as calprotectin. CCP antibodies pending. Ddx includes IBD versus RA versus other inflammatory process. Notably MANJIT was negative. We will trial course of steroid  -CCP antibodies pending   -20 mg prednisone daily-consider increasing to 40 mg for 7-14-day course     JOSE, resolved  Admission creatinine 1.11, up from baseline around 0.7.  Likely obstructive in nature, with patient noting difficulty urinating for the last few days. Morning creatinine improved to 0.87.   -Hernandez placed  -AM BMP     Spinal compression fractures  Patient endorsing significant lumbar back pain on admission.  Again seen are compression fractures at T12, L2, and L4, which appear stable per radiologist.  Also has severe stenosis at L3-L4 and L4-L5.  No red flag symptoms.  Has TLSO brace at home for comfort.  -Stress importance of consistent  TLSO brace for pain at home  -Scheduled Tylenol      Chronic normocytic anemia  Admission hemoglobin 8.7 down from 9.7 when discharged a few days ago.  Required 1 unit of blood on that admission for hemoglobin of 6.7.  Difficult to discern based on interview whether patient noted blood in stool prior to this bout of constipation.  Per chart review has had dark stools in the past, though is on iron supplementation.  Had iron studies in 2021 showing low iron, transferrin, and TIBC, with high normal ferritin and normal folate/B12. 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    -Daily CBC  -Reticulocyte count  -Transfuse if hemoglobin less than 7  -Continue oral iron  -Continue PPI in case she is oozing from ulcer (h/o this but was healed as of last EGD 11/2020)     Polyclonal gammopathy  Gamma gap present dating back to 2018.  Had SPEP in May 2022 showing polyclonal gammopathy, potentially due to underlying inflammatory process, per pathologist.  Screening MANJIT and RF done last week in search of underlying inflammatory process.  RF was markedly positive but MANJIT negative.  Unclear significance at this point.  -Consider referral to rheumatology for outpatient work-up/management of new RA diagnosis        Chronic conditions  HTN - hold PTA amiloride and Bumex with JOSE  Hypothyroid - PTA Synthroid     Diet: Combination Diet Regular Diet Adult    DVT Prophylaxis: Pneumatic Compression Devices  Javed Catheter: Present  Fluids: 100mL/hr  Central Lines: None  Cardiac Monitoring: None  Code Status: No CPR- Do NOT Intubate      Disposition Plan      Expected Discharge Date: 06/30/2022                The patient's care was discussed with the Attending Physician, Dr. Leung.    Juarez Fernánedz, MS4  Medical Student    I was present with the medical student who participated in the service and in the documentation of this note. I have verified the history and personally performed the  physical exam and medical decision making, and have verified the content of the note, which accurately reflects my assessment of the patient and the plan of care.     Sonny Brothers MD, PGY1   [facility]   Pgr: [phone]        Clinically Significant Risk Factors Present on Admission         # Hyponatremia: Na = 124 mmol/L (Ref range: 136 - 145 mmol/L) on admission, will monitor as appropriate                  ______________________________________________________________________    Interval History   Still has some diffuse abdominal tenderness. States both her knees and both hands are achy as well. She is complaining of some urinary retention however her catheter has good output currently.     Data reviewed today: I reviewed all medications, new labs and imaging results over the last 24 hours. I personally reviewed     Physical Exam   Vital Signs: Temp: 98.4  F (36.9  C) Temp src: Oral BP: 125/59 Pulse: 77   Resp: 20 SpO2: 99 % O2 Device: None (Room air)    Weight: 0 lbs 0 oz    Constitutional: fatigued, frail, and pale  Eyes: Lids and lashes normal, pupils equal, round and reactive to light, extra ocular muscles intact, sclera clear, conjunctiva normal  Respiratory: No increased work of breathing, good air exchange, clear to auscultation bilaterally, no crackles or wheezing  Cardiovascular: Normal apical impulse, regular rate and rhythm, normal S1 and S2, no S3 or S4, and no murmur noted.  No peripheral edema in legs  GI: Mildly tender to palpation, normal bowel sounds, soft, non-distended, no masses palpated, no hepatosplenomegally  Skin: no redness, warmth, or swelling  Musculoskeletal: Swollen and tender bilateral knees, L>R. Swollen and tender bilateral MCP joints in all 10 digits.  Neurologic: frequently going on tangents for , seems confused  Data   Recent Labs   Lab 06/29/22  1145 06/29/22  0753 06/29/22  0106 06/28/22  2306 06/24/22  0914 06/23/22  0640   WBC   --  14.2*  --  12.6*  --  4.8   HGB  --  8.2*  --  8.7* 9.7* 8.9*   MCV  --  94  --  96  --  99   PLT  --  264  --  246  --  263   * 124* 121*  --   --  136   POTASSIUM  --  3.6 3.2*  --   --  3.5   CHLORIDE  --  98 92*  --   --  104   CO2  --  19* 23  --   --  23   BUN  --  18 20  --   --  9   CR  --  0.87 1.11*  --   --  0.71   ANIONGAP  --  7 6  --   --  9   MANE  --  8.5 8.4*  --   --  8.5   GLC  --  110 121  --   --  89     Recent Results (from the past 24 hour(s))   Lumbar spine CT w/o contrast    Narrative    EXAM: CT LUMBAR SPINE W/O CONTRAST  LOCATION: [facility]  DATE/TIME: 6/29/2022 12:11 AM    INDICATION: bilat leg pain, can't wallk  COMPARISON: CT abdomen pelvis 03/20/2022  TECHNIQUE: Routine CT Lumbar Spine without IV contrast. Multiplanar reformats. Dose reduction techniques were used.    FINDINGS:  Nomenclature is based on 5 lumbar type vertebral bodies. There are no new fractures. Chronic compression fracture at T12 with greater than 80% loss of height and anterior wedging, chronic compression fracture at L2 with 50% loss of height centrally   unchanged and chronic compression fracture L4 with 75% loss of height centrally, unchanged. Chronic grade 1 anterolisthesis L4-L5. Kyphotic appearance T11-L1 secondary to the T12 fracture. No extraspinal soft tissue abnormality. Unremarkable visualized   bony pelvis. Osteopenic bones.    T12-L1: Normal disc height. No herniation. Normal facets. No spinal canal or neural foraminal stenosis.    L1-L2: Normal disc height. No herniation. Normal facets. No spinal canal or neural foraminal stenosis.    L2-L3: Normal disc height, mild disc osteophyte complex posteriorly mild bilateral facet arthropathy. Mild to moderate central stenosis. Moderate severe left foraminal stenosis. Mild right foraminal stenosis.    L3-L4: Normal disc height. Generalized disc bulge. Mild bilateral facet hypertrophy. Severe spinal stenosis. Moderate bilateral  foraminal stenosis.    L4-L5: Normal disc height. Generalized disc bulge. Moderate bilateral facet hypertrophy. Grade 1 anterolisthesis. Severe spinal stenosis. Moderate to severe bilateral foraminal stenosis.    L5-S1: Normal disc height. Mild disc bulge. Normal facets. No spinal canal or neural foraminal stenosis.      Impression    IMPRESSION:  1.  Severe spinal stenosis at L3-L4 and L4-L5. Mild to moderate spinal stenosis at L2-L3.  2.  Chronic compression fractures of T12, L2 and L4 is stable.   CT Abdomen Pelvis w Contrast    Narrative    EXAM: CT ABDOMEN PELVIS W CONTRAST  LOCATION: [facility]  DATE/TIME: 6/29/2022 12:13 AM    INDICATION: abd pain and bilateral leg pain.  COMPARISON: 3/20/2022  TECHNIQUE: CT scan of the abdomen and pelvis was performed following injection of IV contrast. Multiplanar reformats were obtained. Dose reduction techniques were used.  CONTRAST: isovue 370 100ml    FINDINGS:   LOWER CHEST: Fibrotic changes at left base stable. Mild cardiomegaly.    HEPATOBILIARY: Normal.    PANCREAS: Normal.    SPLEEN: Normal.    ADRENAL GLANDS: Normal.    KIDNEYS/BLADDER: Normal.    BOWEL: No definite obstruction or inflammatory change. Nonobstructive bowel loops extend into bilateral inguinal hernias, as before.    LYMPH NODES: Normal.    VASCULATURE: Modest atherosclerotic plaque.    PELVIC ORGANS: Hysterectomy. No pelvic mass. No free fluid.    MUSCULOSKELETAL: Small inguinal hernias, left larger than right, each containing nondilated loops of small bowel.  Severe T12 compression and moderately severe L4 compression unchanged. Moderate compression of L2 is slightly worse than the previous study no hip or pelvic fracture evident. Diffuse osteopenia.      Impression    IMPRESSION:   1.  No definite etiology for symptoms. Moderate compression L2 vertebral body has progressed somewhat while more severe compressions of T12 and L4 are unchanged.  2.  Bilateral small inguinal  hernias containing loops of nonobstructive small bowel.   XR Chest 1 View    Narrative    EXAM: XR CHEST 1 VIEW  LOCATION: [facility]  DATE/TIME: 6/29/2022 12:23 AM    INDICATION: Shortness of breath.  COMPARISON: 5/24/2022.    FINDINGS: The heart is enlarged. There is no pulmonary edema. The thoracic aorta is calcified. There is a right perihilar infiltrate. Atelectasis or scar at the left lung base. No pneumothorax.      Impression    IMPRESSION: Right perihilar infiltrate.   XR Knee Left 1/2 Views    Narrative    EXAM: XR KNEE LT 1/2 VW  LOCATION: [facility]  DATE/TIME: 6/29/2022 12:23 AM    INDICATION: leg swelling  COMPARISON: None.      Impression    IMPRESSION: Bones are markedly demineralized. Allowing for this, no acute fracture detected. No dislocation. There appears to be some suprapatellar soft tissue swelling and a small knee joint effusion.

## 2022-06-29 NOTE — ED PROVIDER NOTES
EMERGENCY DEPARTMENT ENCOUNTER      NAME: Chris Bell  AGE: 86 year old female  YOB: 1935  MRN: [id number]  EVALUATION DATE & TIME: 6/28/2022 10:08 PM    PCP: Kizzy Villanueva    ED PROVIDER: Nnamdi Williamson MD        Chief Complaint   Patient presents with     Dysuria     Leg Swelling         FINAL IMPRESSION:  1. Low back pain, unspecified back pain laterality, unspecified chronicity, unspecified whether sciatica present    2. Generalized muscle weakness    3. Hypomagnesemia    4. Hyponatremia          ED COURSE & MEDICAL DECISION MAKING:    Pertinent Labs & Imaging studies reviewed. (See chart for details)  86 year old female presents to the Emergency Department for evaluation of dysuria, leg weakness, leg pain, shortness of breath    Recently hospitalized for hyponatremia and anemia refused colonoscopy.  Living independently.    History difficult despite .     Patient appears to have pain to her low back with lifting legs.  Plan for CT abdomen as well as lumbar spine.     Plan for x-ray since patient reports she is short of breath.  ACS, pulmonary emboli unlikely    Recently received blood transfusion due to anemia.  Fortunately hemoglobin seems stable    Imaging shows worsening compression fracture and lumbar spine which is likely contribute to her symptoms.  X-ray left knee negative for fracture.  DVT unlikely    Clinically low suspicion for pneumonia.     Plan for admission.  Discussed with admitting service    At the conclusion of the encounter I discussed the results of all of the tests and the disposition. The questions were answered. The patient or family acknowledged understanding and was agreeable with the care plan.     ED Course as of 06/29/22 0147   Wed Jun 29, 2022   0121 Troponin I: 0.02   0147 IV magnesium ordered   11:30 PM I met with patient for initial encounter.          MEDICATIONS GIVEN IN THE EMERGENCY:  Medications   magnesium sulfate 4 g in 50 mL sterile water  "(premade) (has no administration in time range)   iopamidol (ISOVUE-370) solution 100 mL (100 mLs Intravenous Given 6/29/22 0018)       NEW PRESCRIPTIONS STARTED AT TODAY'S ER VISIT  New Prescriptions    No medications on file          =================================================================    HPI    Triage note  \"Patient arrives to ED via [facility] with chief complaint of bilateral leg pain and difficulty with urination.  Also, reports constipation.  Left knee appears swollen, denies injury.  Per EMS, blood pressure 110/62, heart rate 81 and 97% on RA.  Patient is alert and oriented x3.       \"      Patient information was obtained from: Patient, daughter    Use of : N/A        Chris Bell is a 86 year old female with a pertinent history of CHF, HTN, who presents to this ED via EMS for evaluation of dysuria, leg swelling.    Patient reports ongoing shortness of breath, reduced urination, and bilateral edema from knee to waist beginning yesterday. Patient has been unable to walk due to the pain. Patient reports last bowel movement 2 days ago. Patient lives alone.    Patient denies any recent falls.      REVIEW OF SYSTEMS   Review of Systems   Unable to perform ROS: Dementia   Constitutional: Negative for fever.   HENT: Negative for drooling.    Respiratory: Positive for shortness of breath.    Cardiovascular: Positive for leg swelling (Bilateral, knee to waist).   Gastrointestinal: Positive for abdominal pain.   Genitourinary: Positive for decreased urine volume.   Musculoskeletal: Positive for myalgias.   Skin: Negative for wound.   Neurological: Positive for weakness.   Psychiatric/Behavioral: Positive for confusion.       PAST MEDICAL HISTORY:  Past Medical History:   Diagnosis Date     Anemia      Depression      Disease of thyroid gland      HTN (hypertension)      Osteoporosis      PUD (peptic ulcer disease)        PAST SURGICAL HISTORY:  Past Surgical History: "   Procedure Laterality Date     ESOPHAGOSCOPY, GASTROSCOPY, DUODENOSCOPY (EGD), COMBINED N/A 1/17/2018    Procedure: ESOPHAGOGASTRODUODENOSCOPY (EGD) with h.pylori and gastric ulcer biopsies;  Surgeon: Colin Alvarez MD;  Location: [facility];  Service:      HYSTERECTOMY       US BIOPSY FINE NEEDLE ASPIRATION LYMPH NODE  8/14/2019           CURRENT MEDICATIONS:    acetaminophen (TYLENOL) 500 MG tablet  Alcohol Swabs (B-D SINGLE USE SWABS REGULAR) PADS  aMILoride (MIDAMOR) 5 MG tablet  blood glucose monitoring (ONETOUCH VERIO) meter device kit  bumetanide (BUMEX) 0.5 MG tablet  ferrous sulfate (FEROSUL) 325 (65 Fe) MG tablet  HYDROcodone-acetaminophen (NORCO) 5-325 MG tablet  levothyroxine (SYNTHROID/LEVOTHROID) 50 MCG tablet  loperamide (IMODIUM) 2 MG capsule  LORazepam (ATIVAN) 0.5 MG tablet  LORazepam (ATIVAN) 0.5 MG tablet  metoprolol succinate ER (TOPROL-XL) 50 MG 24 hr tablet  multivitamin w/minerals (THERA-VIT-M) tablet  nystatin (MYCOSTATIN) 133640 UNIT/ML suspension  ONETOUCH VERIO IQ test strip  pantoprazole (PROTONIX) 40 MG EC tablet  psyllium (METAMUCIL/KONSYL) capsule        ALLERGIES:  Allergies   Allergen Reactions     Unknown [No Clinical Screening - See Comments]      Pt states she has a medication allergy but does not know what it is       FAMILY HISTORY:  Family History   Problem Relation Age of Onset     Diabetes No family hx of      Cancer No family hx of      Heart Disease No family hx of        SOCIAL HISTORY:   Social History     Socioeconomic History     Marital status:      Spouse name: None     Number of children: None     Years of education: None     Highest education level: None   Tobacco Use     Smoking status: Never Smoker     Smokeless tobacco: Never Used   Substance and Sexual Activity     Alcohol use: No     Drug use: No     Sexual activity: Never       VITALS:  /56   Pulse 77   Temp 98.3  F (36.8  C) (Oral)   Resp 18   SpO2 94%     PHYSICAL EXAM      Vitals:  /56   Pulse 77   Temp 98.3  F (36.8  C) (Oral)   Resp 18   SpO2 94%   General: Appears in no acute distress, awake, alert, interactive.  Eyes: Conjunctivae non-injected. Sclera anicteric.  HENT: Atraumatic.  Neck: Supple.  Respiratory/Chest: Bilateral breath sounds.  Abdomen: non distended. Soft, non tender.  Musculoskeletal: Normal extremities. Bilateral edema. Bilateral straight leg raise causes pain to proximal thighs.  No guarding or rigidity  Skin: Normal color. No rash or diaphoresis.  Neurologic: Face symmetric, moves all extremities spontaneously. Speech clear.  Psychiatric: Oriented to person, place, and time. Affect appropriate.       LAB:  All pertinent labs reviewed and interpreted.  Results for orders placed or performed during the hospital encounter of 06/28/22   CT Abdomen Pelvis w Contrast    Impression    IMPRESSION:   1.  No definite etiology for symptoms. Moderate compression L2 vertebral body has progressed somewhat while more severe compressions of T12 and L4 are unchanged.  2.  Bilateral small inguinal hernias containing loops of nonobstructive small bowel.   Lumbar spine CT w/o contrast    Impression    IMPRESSION:  1.  Severe spinal stenosis at L3-L4 and L4-L5. Mild to moderate spinal stenosis at L2-L3.  2.  Chronic compression fractures of T12, L2 and L4 is stable.   XR Knee Left 1/2 Views    Impression    IMPRESSION: Bones are markedly demineralized. Allowing for this, no acute fracture detected. No dislocation. There appears to be some suprapatellar soft tissue swelling and a small knee joint effusion.   XR Chest 1 View    Impression    IMPRESSION: Right perihilar infiltrate.   UA with Microscopic reflex to Culture    Specimen: Urine, Clean Catch   Result Value Ref Range    Color Urine Colorless Colorless, Straw, Light Yellow, Yellow    Appearance Urine Clear Clear    Glucose Urine Negative Negative mg/dL    Bilirubin Urine Negative Negative    Ketones Urine Negative Negative  mg/dL    Specific Gravity Urine 1.022 1.001 - 1.030    Blood Urine Negative Negative    pH Urine 5.0 5.0 - 7.0    Protein Albumin Urine Negative Negative mg/dL    Urobilinogen Urine <2.0 <2.0 mg/dL    Nitrite Urine Negative Negative    Leukocyte Esterase Urine Negative Negative    Bacteria Urine Few (A) None Seen /HPF    Mucus Urine Present (A) None Seen /LPF    RBC Urine 0 <=2 /HPF    WBC Urine <1 <=5 /HPF    Squamous Epithelials Urine <1 <=1 /HPF   Extra Red Top Tube   Result Value Ref Range    Hold Specimen JIC    Extra Green Top (Lithium Heparin) Tube   Result Value Ref Range    Hold Specimen JIC    Extra Purple Top Tube   Result Value Ref Range    Hold Specimen JIC    Basic metabolic panel   Result Value Ref Range    Sodium 121 (L) 136 - 145 mmol/L    Potassium 3.2 (L) 3.5 - 5.0 mmol/L    Chloride 92 (L) 98 - 107 mmol/L    Carbon Dioxide (CO2) 23 22 - 31 mmol/L    Anion Gap 6 5 - 18 mmol/L    Urea Nitrogen 20 8 - 28 mg/dL    Creatinine 1.11 (H) 0.60 - 1.10 mg/dL    Calcium 8.4 (L) 8.5 - 10.5 mg/dL    Glucose 121 70 - 125 mg/dL    GFR Estimate 48 (L) >60 mL/min/1.73m2   Troponin I (now)   Result Value Ref Range    Troponin I 0.02 0.00 - 0.29 ng/mL   CBC (+ platelets, no diff)   Result Value Ref Range    WBC Count 12.6 (H) 4.0 - 11.0 10e3/uL    RBC Count 2.88 (L) 3.80 - 5.20 10e6/uL    Hemoglobin 8.7 (L) 11.7 - 15.7 g/dL    Hematocrit 27.6 (L) 35.0 - 47.0 %    MCV 96 78 - 100 fL    MCH 30.2 26.5 - 33.0 pg    MCHC 31.5 31.5 - 36.5 g/dL    RDW 20.3 (H) 10.0 - 15.0 %    Platelet Count 246 150 - 450 10e3/uL   Result Value Ref Range    CK 36 30 - 190 U/L   Result Value Ref Range    Magnesium 0.9 (LL) 1.8 - 2.6 mg/dL   Asymptomatic COVID-19 Virus (Coronavirus) by PCR Nasopharyngeal    Specimen: Nasopharyngeal; Swab   Result Value Ref Range    SARS CoV2 PCR Negative Negative       RADIOLOGY:  Reviewed all pertinent imaging. Please see official radiology report.  XR Knee Left 1/2 Views   Final Result   IMPRESSION: Bones  are markedly demineralized. Allowing for this, no acute fracture detected. No dislocation. There appears to be some suprapatellar soft tissue swelling and a small knee joint effusion.      XR Chest 1 View   Final Result   IMPRESSION: Right perihilar infiltrate.      CT Abdomen Pelvis w Contrast   Final Result   IMPRESSION:    1.  No definite etiology for symptoms. Moderate compression L2 vertebral body has progressed somewhat while more severe compressions of T12 and L4 are unchanged.   2.  Bilateral small inguinal hernias containing loops of nonobstructive small bowel.      Lumbar spine CT w/o contrast   Final Result   IMPRESSION:   1.  Severe spinal stenosis at L3-L4 and L4-L5. Mild to moderate spinal stenosis at L2-L3.   2.  Chronic compression fractures of T12, L2 and L4 is stable.          EKG:    Performed at: 2339    Impression: Sinus rhythm with sinus arrhythmia. Nonspecific ST and T wave abnormality.    Rate: 78 bpm  Rhythm: Sinus rhythm  Axis: 51  PA Interval: 176 ms  QRS Interval: 86 ms  QTc Interval: 456 ms  ST Changes: No ST changes  Comparison: When compared with ECG of 6/21/22 no significant change was found/    I have independently reviewed and interpreted the EKG(s) documented above.      I, Dima Rm, am serving as a scribe to document services personally performed by Danny Williamson MD based on my observation and the provider's statements to me. I, Dr. Danny Williamson, attest that Dima Rm is acting in a scribe capacity, has observed my performance of the services and has documented them in accordance with my direction.    Danny Williamson MD  Emergency Medicine  [facility] EMERGENCY DEPARTMENT  [address] [place] [id number]  [phone]       Danny Williamson MD  06/29/22 0122       Danny Williamson MD  06/29/22 014

## 2022-06-29 NOTE — ED NOTES
Bed: JNED-02  Expected date: 6/28/22  Expected time:   Means of arrival:   Comments:  [place]   Leg pain/UTI

## 2022-06-29 NOTE — ED NOTES
Hospitalist paged due to pt refusing to take potassium replacement, no new orders received at this time.

## 2022-06-30 NOTE — PLAN OF CARE
Problem: Pain Acute  Goal: Acceptable Pain Control and Functional Ability  Outcome: Ongoing, Progressing  Intervention: Prevent or Manage Pain  Recent Flowsheet Documentation  Taken 6/29/2022 1750 by Caro Graves RN  Medication Review/Management: medications reviewed   Goal Outcome Evaluation:        ED admit, Mmong speaking, used staff who are Mmong speaking to interpret, has slight anxiety and complained of generalized pain, had scheduled ativan and tylenol, has purewick, alert and oriented, vitals stable, on C-diff precautions, need stool sample for lab work to rule out C-difficile.

## 2022-06-30 NOTE — PLAN OF CARE
Problem: Plan of Care - These are the overarching goals to be used throughout the patient stay.    Goal: Absence of Hospital-Acquired Illness or Injury  Intervention: Prevent Skin Injury  Recent Flowsheet Documentation  Taken 6/30/2022 0845 by Redd Iqbal RN  Body Position: position changed independently     Problem: Plan of Care - These are the overarching goals to be used throughout the patient stay.    Goal: Absence of Hospital-Acquired Illness or Injury  Intervention: Identify and Manage Fall Risk  Recent Flowsheet Documentation  Taken 6/30/2022 0845 by Redd Iqbal RN  Safety Promotion/Fall Prevention:    activity supervised    bed alarm on    clutter free environment maintained    fall prevention program maintained    lighting adjusted    nonskid shoes/slippers when out of bed    patient and family education     Problem: Pain Acute  Goal: Acceptable Pain Control and Functional Ability  Intervention: Prevent or Manage Pain  Recent Flowsheet Documentation  Taken 6/30/2022 0845 by Redd Iqbal RN  Sensory Stimulation Regulation: care clustered  Medication Review/Management: medications reviewed   Goal Outcome Evaluation:    Patient is alert and oriented x2 to person and place- the patient is confused and it is hard to get clear answers when using the language line. Patient was unable to give number on pain scale through - language line. Non-verbal pain scale used- minimal pain and scheduled Tylenol given per MAR. Patient appeared anxious, scheduled ativan given per MAR. Patient drinks warm water only and requests white rice in warm water before taking any medications. Takes pills whole- one at a time. Fall precautions in place. Patient is able to change position independently, encouraged patient to weight shift and reposition while in bed to prevent skin injury. Patient had one medium BM and has pure wick in place. Stool sample for c-diff r/o was sent this morning to lab  and results came back negative.     Redd Iqbal, RN

## 2022-06-30 NOTE — PROGRESS NOTES
[facility]    Progress Note - Hospitalist Service       Date of Admission:  6/28/2022    Assessment & Plan            Chris Bell is a 86 year old female admitted on 6/28/2022. She has a history of chronic anemia, polyclonal gammopathy, and recent hospitalization for diarrhea and is admitted for constipation and hyponatremia.     Hyponatremia  Hypokalemia-resolved  Hypomagnesemia-resolved  Patient notes 2-3 days of inability to stool or pass urine, leading to poor PO intake. Had hyponatremia down to 129 during last hospital stay as well, which quickly corrected with IV fluid resuscitation.  Urine studies at that time with possibility of SIADH, however with improvement after IV NS, seems less likely.  Suspect low effective arterial blood volume in the setting of her chronic anemia.  Again, on this admission urine studies and responsiveness to IV fluids points towards dehydration as source of her hyponatremia.  electrolyte abnormalities resolved.  Hyponatremia slowly improving  -Potassium replacement protocol  -Magnesium replacement protocol  -mIVF NS at 100mL/hr   -Every 6 hours Na check      Constipation  Urinary retention  H/o chronic diarrhea  ?IBD  Recent hospitalization for chronic diarrhea, started on imodium. Seems to have sent her in the opposite direction - now constipated.  Loose stools have been an issue for her for some time, with no unifying diagnosis as of yet.  Stool studies negative during last hospital stay, TSH normal, fecal calprotectin elevated (ddx IBD versus microscopic colitis versus colorectal cancer versus other).  Patient has previously refused colonoscopy and endoscopy, discussed again 6/30, she is willing to consider but does not want to proceed at this time. Constipation likely the source of her mildly elevated WBC at the time of admission in the absence of foci of infection, this is resolved.  Spoke with GI 6/30 artery treating as IBD flare without biopsy-proven  diagnosis, they have concerns with this given relative leave recent onset of symptoms in an 84-year-old.  However, she has been on a steroid course for her new diagnosis of RA, and we will monitor to see if her GI symptoms improve on this.  -GI consulted, appreciate recs  -Start miralax  -Javed placed, getting adequate output  -If diarrhea returns and C. difficile is negative-can restart loperamide    Joint Pain  Rhumatoid arthritis   Long history of multiple joint pain, worse in the knees and hands. On exam her thumbs are flexed towards midline with prominent MCP swelling across all five joints bilaterally. Her bilateral knees also appear swollen but not erythematous. Her RF and CRP were elevated last admission, as well as calprotectin. CCP positive.  She meets criteria for rheumatoid arthritis diagnosis.  Discussed this with patient and her daughter 6/30.  We will start with low-dose steroid, given her age and concerns for complications.  If improvement not noted after 2-4 days consider increasing to 40 mg daily.  Call placed to rheumatology 6/30, with new diagnosis of rheumatoid arthritis and considering starting methotrexate, looking for their guidance regarding dosing.  Rheumatology does not round on patients at this hospital, so did not place a formal consult.  If do not hear back from them will reconsider placing a consult  -20 mg prednisone daily, 7-14-day course  -Will need rheumatology follow-up outpatient     JOSE, resolved  Admission creatinine 1.11, up from baseline around 0.7.  Likely obstructive in nature, with patient noting difficulty urinating for the last few days. Morning creatinine improved to 0.87.  No need for further monitoring  -Javed placed       Spinal compression fractures  Patient endorsing significant lumbar back pain on admission.  Again seen are compression fractures at T12, L2, and L4, which appear stable per radiologist.  Also has severe stenosis at L3-L4 and L4-L5.  No red flag  symptoms.  Has TLSO brace at home for comfort.  -Stress importance of consistent TLSO brace for pain at home  -Scheduled Tylenol      Chronic normocytic anemia  Admission hemoglobin 8.7 down from 9.7 when discharged a few days ago.  Required 1 unit of blood on that admission for hemoglobin of 6.7.  Difficult to discern based on interview whether patient noted blood in stool prior to this bout of constipation.  Per chart review has had dark stools in the past, though is on iron supplementation.  Had iron studies in 2021 showing low iron, transferrin, and TIBC, with high normal ferritin and normal folate/B12. Pointing towards likely ACD, now in the setting of newly diagnosed rheumatoid arthritis and likely IBD.   -Daily CBC  -Transfuse if hemoglobin less than 7  -Continue oral iron  -Continue PPI in case she is oozing from ulcer (h/o this but was healed as of last EGD 11/2020)     Polyclonal gammopathy  Gamma gap present dating back to 2018.  Had SPEP in May 2022 showing polyclonal gammopathy, potentially due to underlying inflammatory process, per pathologist.  Screening MANJIT and RF done last week in search of underlying inflammatory process.  RF was markedly positive but MANJIT negative.    Now meeting criteria for new rheumatoid arthritis diagnosis.  Likely also with IBD diagnosis, given symptom picture and positive fecal calprotectin.  However, do not see previous work-up for chronic infection.  -HIV  -Hep B  -Hep C        Chronic conditions  HTN - hold PTA amiloride and Bumex with JOSE  Hypothyroid - PTA Synthroid     Diet: Combination Diet Regular Diet Adult    DVT Prophylaxis: Pneumatic Compression Devices  Javed Catheter: Present  Fluids: 100mL/hr  Central Lines: None  Cardiac Monitoring: None  Code Status: No CPR- Do NOT Intubate      Disposition Plan     Expected Discharge Date: 06/30/2022                The patient's care was discussed with the Attending Physician, Dr. Mikhail Brothers MD, PGY1   [facility]   Pgr: [phone]        Clinically Significant Risk Factors Present on Admission                      ______________________________________________________________________    Interval History   Still has some diffuse abdominal tenderness.  Although somewhat improved from yesterday.  Did have a formed stool this morning, somewhat difficult to pass.  Thinks her knee pain is slightly improved from yesterday      Data reviewed today: I reviewed all medications, new labs and imaging results over the last 24 hours. I personally reviewed     Physical Exam   Vital Signs: Temp: 98.6  F (37  C) Temp src: Oral BP: 138/63 Pulse: 76   Resp: 18 SpO2: 99 % O2 Device: None (Room air)    Weight: 0 lbs 0 oz    Constitutional: fatigued, frail, and pale  Eyes: Lids and lashes normal, pupils equal, round and reactive to light, extra ocular muscles intact, sclera clear, conjunctiva normal  Respiratory: No increased work of breathing, good air exchange, clear to auscultation bilaterally, no crackles or wheezing  Cardiovascular: Normal apical impulse, regular rate and rhythm, normal S1 and S2, no S3 or S4, and no murmur noted.  No peripheral edema in legs  GI: Mildly tender to palpation, normal bowel sounds, soft, non-distended, no masses palpated, no hepatosplenomegally  Skin: no redness, warmth, or swelling  Musculoskeletal: Swollen and tender bilateral knees, L>R. Swollen and tender bilateral MCP joints in all 10 digits.  Neurologic: frequently going on tangents for , seems confused  Data   Recent Labs   Lab 06/30/22  0714 06/30/22  0606 06/29/22  2342 06/29/22  1851 06/29/22  1145 06/29/22  0753 06/29/22  0106 06/29/22  0106 06/28/22  2306   WBC 10.7  --   --   --   --  14.2*  --   --  12.6*   HGB 7.5*  --   --   --   --  8.2*  --   --  8.7*   MCV 98  --   --   --   --  94  --   --  96     --   --   --   --  264  --   --  246   NA  --  131* 130* 128*   < > 124*   < > 121*  --     POTASSIUM  --  4.3  --   --   --  3.6  --  3.2*  --    CHLORIDE  --   --   --   --   --  98  --  92*  --    CO2  --   --   --   --   --  19*  --  23  --    BUN  --   --   --   --   --  18  --  20  --    CR  --   --   --   --   --  0.87  --  1.11*  --    ANIONGAP  --   --   --   --   --  7  --  6  --    MANE  --   --   --   --   --  8.5  --  8.4*  --    GLC  --   --   --   --   --  110  --  121  --     < > = values in this interval not displayed.     No results found for this or any previous visit (from the past 24 hour(s)).

## 2022-06-30 NOTE — PROGRESS NOTES
GI PROGRESS NOTE  6/30/2022  Chris Bell  1935  /-76    Subjective:   Patient denies any abdominal pain today, does state some pelvic discomfort. States multiple episodes of diarrhea stool the last two days but a soft formed stool today. Tolerating a regular diet.     Denies any nausea, vomiting, melena, hematochezia, GERD. Does endorse fatigue and weakness as well as urinary retention.     Jibo interpretor used throughout the visit.      Objective:     Blood pressure 138/63, pulse 76, temperature 98.6  F (37  C), temperature source Oral, resp. rate 18, SpO2 99 %.    Gen: NAD  GI: Non-distended, BS positive, soft, non-tender.     Laboratory:  BMP  Recent Labs   Lab 06/30/22  1153 06/30/22  0606 06/29/22  2342 06/29/22  1145 06/29/22  0753 06/29/22  0106   * 131* 130*   < > 124* 121*   POTASSIUM  --  4.3  --   --  3.6 3.2*   CHLORIDE  --   --   --   --  98 92*   MANE  --   --   --   --  8.5 8.4*   CO2  --   --   --   --  19* 23   BUN  --   --   --   --  18 20   CR  --   --   --   --  0.87 1.11*   GLC  --   --   --   --  110 121    < > = values in this interval not displayed.     CBC  Recent Labs   Lab 06/30/22  0714 06/29/22  0753 06/28/22  2306   WBC 10.7 14.2* 12.6*   RBC 2.44* 2.72* 2.88*   HGB 7.5* 8.2* 8.7*   HCT 23.9* 25.6* 27.6*   MCV 98 94 96   MCH 30.7 30.1 30.2   MCHC 31.4* 32.0 31.5   RDW 20.4* 20.2* 20.3*    264 246     INRNo lab results found in last 7 days.   LFTs  Recent Labs   Lab Test 06/29/22  0124 06/21/22  2156 06/04/22  1935/22  1606 05/24/22  1024 05/19/22  2130 05/19/22 2056 03/20/22  1705 03/20/22  1502 02/15/22  1016   ALBUMIN  --  2.3*  --   --  2.2*  --  2.2*   < > 2.8* 2.5*   BILITOTAL  --  0.3  --   --  0.3  --  0.2   < > 0.2 0.3   ALT  --  <9  --   --  10  --  <9   < > <9 <9   AST  --  15  --   --  12  --  17   < > 16 10   PROTEIN Negative  --  20 * Negative  --    < >  --    < >  --   --    LIPASE  --   --   --   --  11  --   --   --  18 11    < > =  values in this interval not displayed.     Imaging:  EXAM: CT ABDOMEN PELVIS W CONTRAST  LOCATION: [facility]  DATE/TIME: 6/29/2022 12:13 AM     INDICATION: abd pain and bilateral leg pain.  COMPARISON: 3/20/2022  TECHNIQUE: CT scan of the abdomen and pelvis was performed following injection of IV contrast. Multiplanar reformats were obtained. Dose reduction techniques were used.  CONTRAST: isovue 370 100ml     FINDINGS:   LOWER CHEST: Fibrotic changes at left base stable. Mild cardiomegaly.     HEPATOBILIARY: Normal.     PANCREAS: Normal.     SPLEEN: Normal.     ADRENAL GLANDS: Normal.     KIDNEYS/BLADDER: Normal.     BOWEL: No definite obstruction or inflammatory change. Nonobstructive bowel loops extend into bilateral inguinal hernias, as before.     LYMPH NODES: Normal.     VASCULATURE: Modest atherosclerotic plaque.     PELVIC ORGANS: Hysterectomy. No pelvic mass. No free fluid.     MUSCULOSKELETAL: Small inguinal hernias, left larger than right, each containing nondilated loops of small bowel.  Severe T12 compression and moderately severe L4 compression unchanged. Moderate compression of L2 is slightly worse than the previous study no hip or pelvic fracture evident. Diffuse osteopenia.                                                                   IMPRESSION:   1.  No definite etiology for symptoms. Moderate compression L2 vertebral body has progressed somewhat while more severe compressions of T12 and L4 are unchanged.  2.  Bilateral small inguinal hernias containing loops of nonobstructive small bowel.     Assessment:      1. Diarrhea/Constipation  86 year old female with  history of HTN, chronic anemia, PUD, CHF, hypothyroidism, anxiety, Cdiff, chronic pain, spinal compression fractures who presents to the ER with inability to pass stool or urine. States alternating pattern of constipation and diarrhea at baseline. Differential includes infectious source, underlying constipation,  IBD, and malignancy. MANJIT negative. Chart does not RA factor elevation, CRP 6.5, fecal calprotectin 165.0 taken 6 days ago through outside provider. Enteric bacteria and virus panel by [facility] stool negative one week ago.   - TSH 6/21/22 WNL  - Mild leukocytosis on admit, today WBC WNL.   - Patient was offered a colonoscopy and a flex sig which were both described in depth and patient and family refused.      2. History of PUD  3. Chronic anemia  No overt GI bleeding present. Continues on oral iron therapy and Protonix. H.pylori negative in the past. Hemodynamically stable.   - Hgb 7.5 today, no over signs of GI bleeding. MCV WNL.     Plan:   1. Patient still refusing Colonoscopy/Flex Sig  2. CDiff negative.   3. Celiac panel ordered.   4. Trend Hgbs   5. GI will continue to follow, please call with any questions and concerns      Discussed with Dr. Amanda Rodrigez, CNP  Thank you for the opportunity to participate in the care of this patient.   Please feel free to call me with any questions or concerns.  Phone number [phone].

## 2022-06-30 NOTE — PLAN OF CARE
Problem: Risk for Delirium  Goal: Optimal Coping  Outcome: Ongoing, Not Progressing  Goal: Improved Behavioral Control  Outcome: Ongoing, Not Progressing  Goal: Improved Attention and Thought Clarity  Outcome: Ongoing, Not Progressing     Problem: Pain Acute  Goal: Acceptable Pain Control and Functional Ability  Outcome: Ongoing, Progressing    Problem: Electrolyte Imbalance  Goal: Electrolyte Balance  Outcome: Ongoing, Progressing       Patient continues to have some pain in legs, does not rate. Remained on Pure Wick through the night with adequate output. BM at 0650, sample collected for Cdiff rule out.     Utilized Vocera multiple times. Each user stated she was very repetitive in her conversation with them. Requested food, toilet use, and AM meds multiple times. Confused and forgetful, alert and oriented to self and place. Sodium up to 130.    IV infiltrated in L arm and reinserted in L wrist, infusing with no issues.     Lactic from 0030 came back at 1.6. VSS. BP soft.    Currently has call light in lap. SCDs are off per request and discomfort.      Angus Da Silva RN

## 2022-07-01 NOTE — PROGRESS NOTES
GI PROGRESS NOTE  7/1/2022  Chris Bell  1935  /-62    Subjective:   Patient denies any abdominal pain today, does state some pelvic discomfort. Soft formed green/brown stool today. No melena or hematochezia. No hematemesis or epigastric pain.     Denies any nausea, vomiting, GERD. Does endorse fatigue and weakness as well as urinary retention, purewick removed yesterday so patient has anxiety regarding urination today.     ong interpretor used throughout the visit.      Objective:     Blood pressure (!) 161/85, pulse 80, temperature 97.6  F (36.4  C), temperature source Oral, resp. rate 20, height 1.524 m (5'), weight 50.8 kg (112 lb), SpO2 96 %.    Gen: NAD  GI: Non-distended, BS positive, soft, non-tender.     Laboratory:  BMP  Recent Labs   Lab 07/01/22  0535 07/01/22  0006 06/30/22  1836 06/30/22  1153 06/30/22  0606 06/29/22  1145 06/29/22  0753 06/29/22  0106   * 133* 132*   < > 131*   < > 124* 121*   POTASSIUM 3.4*  --   --   --  4.3  --  3.6 3.2*   CHLORIDE  --   --   --   --   --   --  98 92*   MANE  --   --   --   --   --   --  8.5 8.4*   CO2  --   --   --   --   --   --  19* 23   BUN  --   --   --   --   --   --  18 20   CR  --   --   --   --   --   --  0.87 1.11*   GLC  --   --   --   --   --   --  110 121    < > = values in this interval not displayed.     CBC  Recent Labs   Lab 07/01/22  0535 06/30/22  0714 06/29/22  0753   WBC 5.4 10.7 14.2*   RBC 2.30* 2.44* 2.72*   HGB 6.9* 7.5* 8.2*   HCT 23.3* 23.9* 25.6*   * 98 94   MCH 30.0 30.7 30.1   MCHC 29.6* 31.4* 32.0   RDW 20.3* 20.4* 20.2*    261 264     INRNo lab results found in last 7 days.   LFTs  Recent Labs   Lab Test 06/29/22  0124 06/21/22  2156 06/04/22  1935/22  1606 05/24/22  1024 05/19/22  2130 05/19/22  2056 03/20/22  1705 03/20/22  1502 02/15/22  1016   ALBUMIN  --  2.3*  --   --  2.2*  --  2.2*   < > 2.8* 2.5*   BILITOTAL  --  0.3  --   --  0.3  --  0.2   < > 0.2 0.3   ALT  --  <9  --   --  10  --   <9   < > <9 <9   AST  --  15  --   --  12  --  17   < > 16 10   PROTEIN Negative  --  20 * Negative  --    < >  --    < >  --   --    LIPASE  --   --   --   --  11  --   --   --  18 11    < > = values in this interval not displayed.     Imaging:  EXAM: CT ABDOMEN PELVIS W CONTRAST  LOCATION: [facility]  DATE/TIME: 6/29/2022 12:13 AM     INDICATION: abd pain and bilateral leg pain.  COMPARISON: 3/20/2022  TECHNIQUE: CT scan of the abdomen and pelvis was performed following injection of IV contrast. Multiplanar reformats were obtained. Dose reduction techniques were used.  CONTRAST: isovue 370 100ml     FINDINGS:   LOWER CHEST: Fibrotic changes at left base stable. Mild cardiomegaly.     HEPATOBILIARY: Normal.     PANCREAS: Normal.     SPLEEN: Normal.     ADRENAL GLANDS: Normal.     KIDNEYS/BLADDER: Normal.     BOWEL: No definite obstruction or inflammatory change. Nonobstructive bowel loops extend into bilateral inguinal hernias, as before.     LYMPH NODES: Normal.     VASCULATURE: Modest atherosclerotic plaque.     PELVIC ORGANS: Hysterectomy. No pelvic mass. No free fluid.     MUSCULOSKELETAL: Small inguinal hernias, left larger than right, each containing nondilated loops of small bowel.  Severe T12 compression and moderately severe L4 compression unchanged. Moderate compression of L2 is slightly worse than the previous study no hip or pelvic fracture evident. Diffuse osteopenia.                                                                   IMPRESSION:   1.  No definite etiology for symptoms. Moderate compression L2 vertebral body has progressed somewhat while more severe compressions of T12 and L4 are unchanged.  2.  Bilateral small inguinal hernias containing loops of nonobstructive small bowel.     Assessment:      1. Diarrhea/Constipation  86 year old female with  history of HTN, chronic anemia, PUD, CHF, hypothyroidism, anxiety, Cdiff, chronic pain, spinal compression fractures  who presents to the ER with inability to pass stool or urine. States alternating pattern of constipation and diarrhea at baseline. Differential includes infectious source, underlying constipation, IBD, and malignancy. MANJIT negative. Chart does not RA factor elevation, CRP 6.5, fecal calprotectin 165.0 taken 6 days ago through outside provider. Enteric bacteria and virus panel by [facility] stool negative one week ago.   - TSH 6/21/22 WNL  - Mild leukocytosis on admit, today WBC WNL.   - Patient was offered a colonoscopy and a flex sig which were both described in depth and patient and family refused on 6/30/22. Discussed with patient again today and she is refusing at this time.      2. History of PUD  3. Chronic anemia  No overt GI bleeding present. Continues on oral iron therapy and Protonix. H.pylori negative in the past. Hemodynamically stable.   - Hgb 6.9 today, no over signs of GI bleeding. .      Plan:   1. Patient still refusing Colonoscopy/Flex Sig but might be open in the future.   2. B12, folate, peripheral smear, haptoglobin, and LDH ordered  4. Trend Hgbs, transfuse per primary   5. Can consider tagged RBC scan if hgb continues to downtrend or signs of overt GI bleeding.   6. Regular diet as tolerated.   7. GI will continue to follow, please call with any questions and concerns      Discussed with Dr. Amanda Rodrigez CNP  Thank you for the opportunity to participate in the care of this patient.   Please feel free to call me with any questions or concerns.  Phone number [phone].

## 2022-07-01 NOTE — SIGNIFICANT EVENT
Significant Event Note    Time of event: 6:52 AM July 1, 2022    Description of event:  Noted hemoglobin result 6.9  Late admit this morning.  Did not have time to consent patient.  Request a team review hemoglobin, review consent with patient and order transfusion if appropriate.    /64 (BP Location: Right arm, Patient Position: Supine)   Pulse 64   Temp 97.7  F (36.5  C) (Oral)   Resp 18   Ht 1.524 m (5')   Wt 50.8 kg (112 lb)   SpO2 99%   BMI 21.87 kg/m       Discussed with: bedside nurse    Heidi Ramirez MD

## 2022-07-01 NOTE — PLAN OF CARE
Problem: Risk for Delirium  Goal: Improved Sleep  Outcome: Ongoing, Progressing   Pt resting on and off between meals, medications, and cares. Patient is Disoriented to time and situation.   Problem: Pain Acute  Goal: Acceptable Pain Control and Functional Ability  Outcome: Ongoing, Progressing  Intervention: Develop Pain Management Plan  Recent Flowsheet Documentation  Taken 6/30/2022 1625 by Macarena Tan, RN  Pain Management Interventions: emotional support  Intervention: Prevent or Manage Pain  Recent Flowsheet Documentation  Taken 6/30/2022 1600 by Macarena Tan, RN  Medication Review/Management: medications reviewed   Pt expresses pain in bilateral knees/legs, unable to give numerical rating, using pt descriptions and nonverbals to determine pain intensity. Controlled with scheduled tylenol.   Problem: Electrolyte Imbalance  Goal: Electrolyte Balance  Outcome: Ongoing, Progressing   Goal Outcome Evaluation:      Pt Na levels trending up, last check 132, up from 131. NS running at 100/hr per orders. Mg and K protocols, both recheck in the AM.    Pt uses call light appropriately, able to communicate needs using  line.   Using purwik for urination and bed ban for bowel movements. Pt appetite is excellent.Taking pills whole,one at a time with warm water.

## 2022-07-01 NOTE — PLAN OF CARE
Goal Outcome Evaluation:  Pt has not had any stools  this shift. Pt denies any pain or discomfort. Na 133. Hgb this am 6.9 resident notified. Uneventful shift.   Laura Martinez RN

## 2022-07-01 NOTE — PROGRESS NOTES
[facility]     Progress Note - Hospitalist Service       Date of Admission:  6/28/2022        Assessment & Plan          Chris Bell is a 86 year old female admitted on 6/28/2022. She has a history of chronic anemia, polyclonal gammopathy, and recent hospitalization for diarrhea and is admitted for constipation and hyponatremia.     Chronic normocytic anemia  Admission hemoglobin 8.7 down from 9.7 when discharged a few days ago. Required 1 unit of blood on that admission for hemoglobin of 6.7. Hemoglobin 7/1 showed a drop from 7.5 -> 6.9. Per chart review has had dark stools in the past, though is on iron supplementation.  Had iron studies in 2021 showing low iron, transferrin, and TIBC, with high normal ferritin and normal folate/B12. Pointing towards likely ACD, now in the setting of newly diagnosed rheumatoid arthritis and likely IBD. Per GI 7/1, it is possible that the patient now has a previously undetected non-emergent GI bleed.   - 1 unit packed RBC transfusion ordered  - GI consulted - Per GI, tagged RBC procedure planned, awaiting B12 and folate results before consenting  -Transfuse if hemoglobin less than 7  - Daily CBC  - Continue oral iron if no longer NPO per GI  - Continue PPI in case she is oozing from ulcer (h/o this but was healed as of last EGD 11/2020)      Hyponatremia  Hypokalemia-resolved  Hypomagnesemia-resolved  Patient notes 2-3 days of inability to stool or pass urine, leading to poor PO intake. Had hyponatremia down to 129 during last hospital stay as well, which quickly corrected with IV fluid resuscitation.  Urine studies at that time with possibility of SIADH, however with improvement after IV NS, seems less likely.  Suspect low effective arterial blood volume in the setting of her chronic anemia.  Again, on this admission urine studies and responsiveness to IV fluids points towards dehydration as source of her hyponatremia.  Electrolytes show slight downward  trend, will restart electrolyte replacement as needed.  Hyponatremia slowly improving  -Potassium replacement protocol  -Magnesium replacement protocol  -mIVF NS at 100mL/hr   -Every 6 hours Na check      Constipation  Urinary retention  H/o chronic diarrhea  ?IBD  Recent hospitalization for chronic diarrhea, started on imodium. Seems to have sent her in the opposite direction - now constipated.  Loose stools have been an issue for her for some time, with no unifying diagnosis as of yet.  Stool studies negative during last hospital stay, TSH normal, fecal calprotectin elevated (ddx IBD versus microscopic colitis versus colorectal cancer versus other).  Patient has previously refused colonoscopy and endoscopy, discussed again 7/1, she is considering the procedure, but would like to consult and clarify with daughter first. Per GI, pt can potentially get a flexible sigmoidoscopy with biopsy vs colonoscopy if pt is agreeable. Will discuss options with patient's daughter when she arrives. Currently NPO per GI. Constipation likely the source of her mildly elevated WBC at the time of admission in the absence of foci of infection, this is resolved.  Spoke with GI 6/30 about treating as IBD flare without biopsy-proven diagnosis, they have concerns with this given relative leave recent onset of symptoms in an 84-year-old.  However, she has been on a steroid course for her new diagnosis of RA, and we will monitor to see if her GI symptoms improve on this.  -GI consulted, appreciate recs  -Start miralax  -Javed placed, getting adequate output  -If diarrhea returns and C. difficile is negative-can restart loperamide     Joint Pain  Rhumatoid arthritis   Long history of multiple joint pain, worse in the knees and hands. On exam her thumbs are flexed towards midline with prominent MCP swelling across all five joints bilaterally. Her bilateral knees also appear swollen but not erythematous. Her RF and CRP were elevated last  admission, as well as calprotectin. CCP positive.  She meets criteria for rheumatoid arthritis diagnosis.  Discussed this with patient and her daughter 6/30.  We will start with low-dose steroid, given her age and concerns for complications.  If improvement not noted after 2-4 days consider increasing to 40 mg daily.  Call placed to rheumatology 6/30, with new diagnosis of rheumatoid arthritis and considering starting methotrexate, looking for their guidance regarding dosing.  Rheumatology does not round on patients at this hospital, so did not place a formal consult.  If do not hear back from them will reconsider placing a consult  -20 mg prednisone daily, 7-14-day course  -Pt seems more agreeable to GI procedures, will hold off on starting methotraxate and consulting rheum until after decision regarding endoscopy/colonoscopy/sigmoidoscopy       JOSE, resolved  Admission creatinine 1.11, up from baseline around 0.7.  Likely obstructive in nature, with patient noting difficulty urinating for the last few days. Morning creatinine improved to 0.87.  No need for further monitoring  -Javed placed        Spinal compression fractures  Patient endorsing significant lumbar back pain on admission.  Again seen are compression fractures at T12, L2, and L4, which appear stable per radiologist.  Also has severe stenosis at L3-L4 and L4-L5.  No red flag symptoms.  Has TLSO brace at home for comfort.  -Stress importance of consistent TLSO brace for pain at home  -Scheduled Tylenol         Polyclonal gammopathy  Gamma gap present dating back to 2018.  Had SPEP in May 2022 showing polyclonal gammopathy, potentially due to underlying inflammatory process, per pathologist.  Screening MANJIT and RF done last week in search of underlying inflammatory process.  RF was markedly positive but MANJIT negative.    Now meeting criteria for new rheumatoid arthritis diagnosis.  Likely also with IBD diagnosis, given symptom picture and positive fecal  calprotectin.  However, do not see previous work-up for chronic infection.  -HIV - nonreactive  -Hep B - core antigen nonreactive, surface antibody present  -Hep C - screening positive, HCV PCR quantiferon test ordered        Chronic conditions  HTN - hold PTA amiloride and Bumex with JOSE  Hypothyroid - PTA Synthroid        Diet: Combination Diet Regular Diet Adult    DVT Prophylaxis: Pneumatic Compression Devices  Javed Catheter: Present  Fluids: 100mL/hr  Central Lines: None  Cardiac Monitoring: None  Code Status: No CPR- Do NOT Intubate          Disposition Plan        Expected Discharge Date:                               Clinically Significant Risk Factors Present on Admission             The patient's care was discussed with the Attending Physician, Dr. Elizondo.       Laura Mohamud MD  Hospitalist Service  [facility]  Securely message with the Vocera Web Console (learn more here)  Text page via [employer] Paging/Directory         ______________________________________________________________________        Interval History     Still has some diffuse abdominal tenderness.  Although somewhat improved from yesterday.  Did have a formed stool this morning that was green and soft per nurse.  Knee pain is improved, though swelling still remains.        Data reviewed today: I reviewed all medications, new labs and imaging results over the last 24 hours.           Physical Exam  BP (!) 159/82   Pulse 81   Temp 97.6  F (36.4  C) (Oral)   Resp 18   Ht 1.524 m (5')   Wt 50.8 kg (112 lb)   SpO2 96%   BMI 21.87 kg/m         Constitutional: fatigued, frail, and pale  Eyes: Lids and lashes normal, pupils equal, round and reactive to light, extra ocular muscles intact, sclera clear, conjunctiva normal  Respiratory: No increased work of breathing, good air exchange, clear to auscultation bilaterally, no crackles or wheezing  Cardiovascular: Normal apical impulse, regular rate and rhythm, normal S1 and S2,  no S3 or S4, and no murmur noted.   GI: Mildly tender to palpation, normal bowel sounds, soft, non-distended, no masses palpated, no hepatosplenomegally  Skin: no redness, warmth, or swelling  Musculoskeletal: Swollen and tender bilateral knees, L>R. Swollen and tender bilateral MCP joints in all 10 digits.  Neurologic: alert and oriented x3 today, more responsive than previous days

## 2022-07-01 NOTE — PLAN OF CARE
Problem: Electrolyte Imbalance  Goal: Electrolyte Balance  Outcome: Ongoing, Progressing   Goal Outcome Evaluation:  Patient on K+ and mag protocols, replaced this shift, recheck in the am.         Problem: Plan of Care - These are the overarching goals to be used throughout the patient stay.    Goal: Optimal Comfort and Wellbeing  Outcome: Ongoing, Progressing   Patient upset this morning, she expressed she felt ignored overnight. Hmong speaking, utilized , patient talking over  at times. NPO this morning for possible procedure, patient refused at this time, diet changed to regular. Ate light lunch. Hgb 6.9 this am, orders to transfuse one unit, started just after 12pm, IV infiltrated. PICC had to place new IV, transfusion started again at 1300, patient tolerating well. Recheck hgb at 1530.

## 2022-07-02 NOTE — PROGRESS NOTES
GASTROENTEROLOGY PROGRESS NOTE     SUBJECTIVE:  Reports diarrhea today, nonbloody, no melena. Ongoing intermittent abdominal pain. Feels ready to move forward with colonoscopy.    XODIS  via phone used for visit.      OBJECTIVE:  BP (!) 178/87 (BP Location: Left arm)   Pulse 73   Temp 97.8  F (36.6  C) (Oral)   Resp 18   Ht 1.524 m (5')   Wt 50.8 kg (112 lb)   SpO2 99%   BMI 21.87 kg/m    Temp (24hrs), Av.1  F (36.7  C), Min:97.8  F (36.6  C), Max:98.3  F (36.8  C)    Patient Vitals for the past 72 hrs:   Weight   22 1335 50.8 kg (112 lb)       Intake/Output Summary (Last 24 hours) at 2022 1345  Last data filed at 2022 0900  Gross per 24 hour   Intake 2793 ml   Output 1425 ml   Net 1368 ml        PHYSICAL EXAM  Gen: alert, oriented, NAD  Abd: soft, Nd/NT, +BS           Additional Comments:  ROS, FH, SH: See initial GI consult for details.     I have reviewed the patient's new clinical lab results:     Recent Labs   Lab Test 22  0637 22  1545 22  0535 22  0714 22  2130 22  2156 22  0658 22  1502 22  0652 02/15/22  1016   WBC 4.0  --  5.4 10.7   < > 10.8   < > 5.4   < > 15.6*   HGB 9.0* 8.8* 6.9* 7.5*   < > 6.7*   < > 8.8*   < > 8.1*   MCV 97  --  101* 98   < > 102*   < > 110*   < > 112*     --  245 261   < > 284   < > 274   < > 196   INR  --   --   --   --   --  1.20*  --  1.10  --  1.03    < > = values in this interval not displayed.     Recent Labs   Lab Test 22  0637 22  1256 22  0535 22  0606 22  0753 22  0106 22  0640   POTASSIUM 3.8 4.2 3.4*   < > 3.6 3.2* 3.5   CHLORIDE  --   --   --   --  98 92* 104   CO2  --   --   --   --  19* 23 23   BUN  --   --   --   --  18 20 9   ANIONGAP  --   --   --   --  7 6 9    < > = values in this interval not displayed.     Recent Labs   Lab Test 22  0124 22  2156 22  1935 22  1606 22  1024 22  2130  05/19/22 2056 03/20/22  1705 03/20/22  1502 02/15/22  1016   ALBUMIN  --  2.3*  --   --  2.2*  --  2.2*   < > 2.8* 2.5*   BILITOTAL  --  0.3  --   --  0.3  --  0.2   < > 0.2 0.3   ALT  --  <9  --   --  10  --  <9   < > <9 <9   AST  --  15  --   --  12  --  17   < > 16 10   PROTEIN Negative  --  20 * Negative  --    < >  --    < >  --   --    LIPASE  --   --   --   --  11  --   --   --  18 11    < > = values in this interval not displayed.     Assessment/Plan:  86 year old female with  history of HTN, chronic anemia, PUD, CHF, hypothyroidism, anxiety, Cdiff, chronic pain, spinal compression fractures admitted 6/28 with inability to pass stool or urine with anemia and hyponatremia with unremarkable for etiology. Since admission developed nonbloody diarrhea.     1. Iron deficiency anemia. Previously on oral iron supplements. No overt GI bleeding since admission. HGB down to 6.9 yesterday. Received one unit of blood with improvement to 9 today. Had refused colonoscopy since admission but now more open.     Discussed procedure in detail along with EGD given no clear source for anemia.     --Clear liquids today. NPO midnight other than mag citrate for prep in am.   --Miralax/Gatorade colon prep today.   --EGD/colonoscopy tomorrow.     2. Alternating diarrhea/constipation. No signs of infection or IBD based on imaging. However, does have elevated fecal calprotectin prior to admission and elevated CRP. TSH normal.   --colonoscopy as above.     Reviewed with Dr. Patel.     Verito Becerra, [facility] ([facility])

## 2022-07-02 NOTE — PROGRESS NOTES
[facility]     Progress Note - Hospitalist Service       Date of Admission:  6/28/2022        Assessment & Plan          Chris Bell is a 86 year old female admitted on 6/28/2022. She has a history of chronic anemia, polyclonal gammopathy, and recent hospitalization for diarrhea and is admitted for constipation and hyponatremia.     Chronic normocytic anemia  Admission hemoglobin 8.7 down from 9.7 when discharged a few days ago. Required 1 unit of blood on that admission for hemoglobin of 6.7. Hemoglobin 7/1 showed a drop from 7.5 -> 6.9. Per chart review has had dark stools in the past, though is on iron supplementation.  Had iron studies in 2021 showing low iron, transferrin, and TIBC, with high normal ferritin and normal folate/B12. Pointing towards likely ACD, now in the setting of newly diagnosed rheumatoid arthritis and likely IBD. Per GI 7/1, it is possible that the patient now has a previously undetected non-emergent GI bleed. Received 1 unit prbc 7/1.  - Hgb after unit of blood yesterday is 9.0 today  - GI consulted - Per GI, tagged RBC procedure planned, patient also now agreeable to colonoscopy  - Transfuse if hemoglobin less than 7  - Daily CBC  - Continue oral iron if no longer NPO per GI  - Continue PPI in case she is oozing from ulcer (h/o this but was healed as of last EGD 11/2020)    Hyponatremia - resolved  Hypokalemia - resolved  Hypomagnesemia - resolved  Patient notes 2-3 days of inability to stool or pass urine, leading to poor PO intake. Had hyponatremia down to 129 during last hospital stay as well, which quickly corrected with IV fluid resuscitation.  Urine studies at that time with possibility of SIADH, however with improvement after IV NS, seems less likely.  Suspect low effective arterial blood volume in the setting of her chronic anemia.  Again, on this admission urine studies and responsiveness to IV fluids points towards dehydration as source of her hyponatremia.   Electrolytes show slight downward trend, will restart electrolyte replacement as needed.  Hyponatremia slowly improving  -Potassium replacement protocol  -Magnesium replacement protocol  -mIVF NS at 100mL/hr   -Daily BMP     Constipation - resolved with miralax  Urinary retention - resolved  H/o chronic diarrhea (now with acute as well)  ?IBD  Recent hospitalization for chronic diarrhea, started on imodium. Seems to have sent her in the opposite direction - now constipated.  Loose stools have been an issue for her for some time, with no unifying diagnosis as of yet.  Stool studies negative during last hospital stay, TSH normal, fecal calprotectin elevated (ddx IBD versus microscopic colitis versus colorectal cancer versus other).  Patient has previously refused colonoscopy and endoscopy, discussed again 7/1, she is considering the procedure, but would like to consult and clarify with daughter first. Per GI, pt can potentially get a flexible sigmoidoscopy with biopsy vs colonoscopy if pt is agreeable. Will discuss options with patient's daughter when she arrives. Currently NPO per GI. Constipation likely the source of her mildly elevated WBC at the time of admission in the absence of foci of infection, this is resolved.  Spoke with GI 6/30 about treating as IBD flare without biopsy-proven diagnosis, they have concerns with this given relatively recent onset of symptoms in an 86-year-old.  However, she has been on a steroid course for her new diagnosis of RA, and we will monitor to see if her GI symptoms improve on this.  -GI consulted, appreciate recs  -Now agreeable to c-scope as above  -Stop miralax  -Now urinating fine w/o hernandez  -Given c diff negative, could try prn imodium if still having loose stool 7/3 after discontinuation of miralax     Joint Pain  Rhumatoid arthritis   Long history of multiple joint pain, worse in the knees and hands. On exam her thumbs are flexed towards midline with prominent MCP  swelling across all five joints bilaterally. Her bilateral knees also appear swollen but not erythematous. Her RF and CRP were elevated last admission, as well as calprotectin. CCP positive.  She meets criteria for rheumatoid arthritis diagnosis.  Discussed this with patient and her daughter 6/30.  We will start with low-dose steroid, given her age and concerns for complications.  If improvement not noted after 2-4 days consider increasing to 40 mg daily.  Call placed to rheumatology 6/30, with new diagnosis of rheumatoid arthritis and considering starting methotrexate, looking for their guidance regarding dosing.  Rheumatology does not round on patients at this hospital, so did not place a formal consult.  If do not hear back from them will reconsider placing a consult  -20 mg prednisone daily, 7-14-day course  -Pt seems more agreeable to GI procedures, will hold off on starting methotraxate and consulting rheum until after decision regarding endoscopy/colonoscopy/sigmoidoscopy     Spinal compression fractures  Patient endorsing significant lumbar back pain on admission.  Again seen are compression fractures at T12, L2, and L4, which appear stable per radiologist.  Also has severe stenosis at L3-L4 and L4-L5.  No red flag symptoms.  Has TLSO brace at home for comfort.  -Stress importance of consistent TLSO brace for pain at home  -Scheduled Tylenol      Polyclonal gammopathy  Gamma gap present dating back to 2018.  Had SPEP in May 2022 showing polyclonal gammopathy, potentially due to underlying inflammatory process, per pathologist.  Screening MANJIT and RF done last week in search of underlying inflammatory process.  RF was markedly positive but MANJIT negative.    Now meeting criteria for new rheumatoid arthritis diagnosis.  Likely also with IBD diagnosis, given symptom picture and positive fecal calprotectin.  However, do not see previous work-up for chronic infection.  -HIV - nonreactive  -Hep B - core antigen  nonreactive, surface antibody present  -Hep C - screening positive, HCV PCR quant test ordered        Chronic conditions  HTN - restart home bumex and amiloride  Hypothyroid - PTA Synthroid        Diet: Combination Diet Regular Diet Adult    DVT Prophylaxis: Pneumatic Compression Devices  Javed Catheter: Present  Fluids: 100mL/hr  Central Lines: None  Cardiac Monitoring: None  Code Status: No CPR- Do NOT Intubate          Disposition Plan        Expected Discharge Date:                               Clinically Significant Risk Factors Present on Admission             The patient's care was discussed with the Attending Physician, Dr. Elizondo.       Neftali Mao MD  Hospitalist Service  [facility]  Securely message with the Vocera Web Console (learn more here)  Text page via [employer] Paging/Directory         ______________________________________________________________________        Interval History     Feeling weak today. Diarrhea has returned. Also had an episode of incontinence into her brief. She is agreeable to colonoscopy now given that diarrhea has returned.        Data reviewed today: I reviewed all medications, new labs and imaging results over the last 24 hours.           Physical Exam  BP (!) 178/87 (BP Location: Left arm)   Pulse 73   Temp 97.8  F (36.6  C) (Oral)   Resp 18   Ht 1.524 m (5')   Wt 50.8 kg (112 lb)   SpO2 99%   BMI 21.87 kg/m         Constitutional: fatigued, frail, and pale  Eyes: Lids and lashes normal, pupils equal, round and reactive to light, extra ocular muscles intact, sclera clear, conjunctiva normal  Respiratory: No increased work of breathing, good air exchange, clear to auscultation bilaterally, no crackles or wheezing  Cardiovascular: Normal apical impulse, regular rate and rhythm, normal S1 and S2, no S3 or S4, and no murmur noted.   GI: Mildly tender to palpation, normal bowel sounds, soft, non-distended, no masses palpated, no  hepatosplenomegally  Skin: no redness, warmth, or swelling  Musculoskeletal: Swollen and tender bilateral knees, L>R. Swollen and tender bilateral MCP joints in all 10 digits.  Neurologic: alert and oriented x3 today, more responsive than previous days

## 2022-07-02 NOTE — PLAN OF CARE
Goal Outcome Evaluation:      [id number].... Post transfusion status is fairly stable, Hgb is now 8.8, was rechecked at 1545, Na is also 135 mmol/L. Pt has had multiple complaints,  line was used for assessment, but they were having trouble understanding what exactly she was asking for. She called her family and writer talked to the son. Pt daughter later came in and was approved to spent the night. She c/o bloating/abdominal discomfort, writer got an order for simethicone, and a dose was given. Will continue to monitor.

## 2022-07-02 NOTE — PLAN OF CARE
Problem: Risk for Delirium  Goal: Optimal Coping  Outcome: Ongoing, Progressing   Goal Outcome Evaluation:  Patient hmong speaking,  required for all interactions. Daughter at bedside this morning. Patient ate a bowl of rice from home for breakfast and lunch. IVF running at 100ml/hr. K+ and Mag protocols, recheck in the am. Purewick in place, patent and draining. Patient has had three semi-formed, loose watery stools this shift using the bed pan. Takes pills whole with warm water, is very particular about the water temp before drinking.     Per GI provider, patient agreeable for EDG and colonoscopy tomorrow. Diet changed to clear liquid for this evening and NPO at midnight. Last hgb check = 9.0.

## 2022-07-02 NOTE — PLAN OF CARE
Problem: Plan of Care - These are the overarching goals to be used throughout the patient stay.    Goal: Optimal Comfort and Wellbeing  Outcome: Ongoing, Progressing     Problem: Risk for Delirium  Goal: Improved Sleep  Outcome: Ongoing, Progressing     Goal Outcome Evaluation: Pt denies any pain but feels bloated and gassy. Pt slept through the night with daughter at bedside. VSS, except BP a little elevated. Purewick in place, patent, with 600ml output. R PIV running NS 100ml/hr. Will continue to monitor.

## 2022-07-03 NOTE — PROGRESS NOTES
Pre-procedure Note    Reason for procedure: Diarrhea, anemia    History and Physical Reviewed: Reviewed, no changes.    Pre-sedation assessment:    General: alert, appears stated age, and cooperative  Airway: normal  Heart: regular rate and rhythm  Lungs: clear to auscultation bilaterally    Sedation Plan based on assessment: MAC    Mallampati score: Class II (visualization of the soft palate, fauces, and uvula)          ASA Classification: ASA 3 - Patient with moderate systemic disease with functional limitations    Impression: Patient deemed adequate candidate for MAC sedation    Risks, benefits and alternatives were discussed with the patient and informed consent was obtained.    Plan: colonoscopy and esophagogastroduodenoscopy      Marty Patel MD 7/3/2022 3:42 PM                                               Marty Patel MD  Thank you for the opportunity to participate in the care of this patient.   Please feel free to call me with any questions or concerns.  Phone number [phone].

## 2022-07-03 NOTE — PLAN OF CARE
Problem: Plan of Care - These are the overarching goals to be used throughout the patient stay.    Goal: Plan of Care Review/Shift Note  Description: The Plan of Care Review/Shift note should be completed every shift.  The Outcome Evaluation is a brief statement about your assessment that the patient is improving, declining, or no change.  This information will be displayed automatically on your shift note.  Outcome: Ongoing, Progressing     Problem: Plan of Care - These are the overarching goals to be used throughout the patient stay.    Goal: Absence of Hospital-Acquired Illness or Injury  Intervention: Identify and Manage Fall Risk  Recent Flowsheet Documentation  Taken 7/3/2022 0049 by Maryjo Callaway, RN  Safety Promotion/Fall Prevention:   nonskid shoes/slippers when out of bed   lighting adjusted   increase visualization of patient   fall prevention program maintained   clutter free environment maintained     Problem: Plan of Care - These are the overarching goals to be used throughout the patient stay.    Goal: Optimal Comfort and Wellbeing  Outcome: Ongoing, Progressing     Problem: Plan of Care - These are the overarching goals to be used throughout the patient stay.    Goal: Readiness for Transition of Care  Outcome: Ongoing, Progressing     Problem: Risk for Delirium  Goal: Improved Sleep  Outcome: Ongoing, Progressing   Goal Outcome Evaluation:        Patient has been having discomfort all shift, julio césar area, rectal reddened, shoulder's and legs. This writer applied peppermint and lavender oil on adbomin with some relief. Bp has been high, prepping for colonoscopy, stooling watery yellow color and bottom very sore and red. Well cont to monitor.

## 2022-07-03 NOTE — ANESTHESIA POSTPROCEDURE EVALUATION
Patient: Chris Bell    Procedure: Procedure(s):  COLONOSCOPY WITH COLON BIOPSIES  ESOPHAGOGASTRODUODENOSCOPY (EGD) WITH DUODENAL & GASTRIC BIOPSIES       Anesthesia Type:  MAC    Note:  Disposition: Inpatient   Postop Pain Control: Uneventful            Sign Out: Well controlled pain   PONV: No   Neuro/Psych: Uneventful            Sign Out: Acceptable/Baseline neuro status   Airway/Respiratory: Uneventful            Sign Out: Acceptable/Baseline resp. status   CV/Hemodynamics: Uneventful            Sign Out: Acceptable CV status; No obvious hypovolemia; No obvious fluid overload   Other NRE: NONE   DID A NON-ROUTINE EVENT OCCUR? No           Last vitals:  Vitals:    07/03/22 0049 07/03/22 0734 07/03/22 1100   BP: (!) 184/87 (!) 165/93 (!) 163/78   Pulse:  84 83   Resp:  20    Temp:  37  C (98.6  F)    SpO2:  95%        Electronically Signed By: Richardson Machado MD  July 3, 2022  5:38 PM

## 2022-07-03 NOTE — PROGRESS NOTES
Care Management Follow Up    Length of Stay (days): 4    Expected Discharge Date: 07/04/2022     Concerns to be Addressed: diagnostic work up, colonoscopy        Patient plan of care discussed at interdisciplinary rounds: Yes    Anticipated Discharge Disposition:  home     Anticipated Discharge Services:  TBD  Anticipated Discharge DME:  No new DME       Additional Information:  GI following for chronic normocytic anemia. Planning colonoscopy.    Social History:  Patient lives alone and senior housing and dtr May Her Castillo is her PCA, per May, she is providing around the clock cares as needed, pt is alert and oriented, speaks Hmong but hard of hearing. May can transport at discharge.     Anticipating return home at discharge with family support.         Leona Coon RN

## 2022-07-03 NOTE — PLAN OF CARE
Problem: Plan of Care - These are the overarching goals to be used throughout the patient stay.    Goal: Plan of Care Review/Shift Note  Description: The Plan of Care Review/Shift note should be completed every shift.  The Outcome Evaluation is a brief statement about your assessment that the patient is improving, declining, or no change.  This information will be displayed automatically on your shift note.  Outcome: Ongoing, Progressing     Problem: Pain Acute  Goal: Acceptable Pain Control and Functional Ability  Outcome: Ongoing, Progressing     Problem: Electrolyte Imbalance  Goal: Electrolyte Balance  Outcome: Ongoing, Progressing   Goal Outcome Evaluation:     Patient denied discomfort except abdominal cramping prior to having bowel movement.  Tolerated bowel prep and having good results.  Declined to get out of bed.  Explained via  that she he too weak to get out of bed.    I.V. Fluids infusing.  Pure wick in place.     Blood pressure elevated.  Scheduled Metoprolol given.  Will continue to monitor.  NPO after midnight for procedure tomorrow.

## 2022-07-03 NOTE — PROGRESS NOTES
[facility]     Progress Note - Hospitalist Service       Date of Admission:  6/28/2022        Assessment & Plan          Chris Bell is a 86 year old female admitted on 6/28/2022. She has a history of chronic anemia, polyclonal gammopathy, and recent hospitalization for diarrhea and is admitted for constipation and hyponatremia.     Chronic normocytic anemia  Admission hemoglobin 8.7 down from 9.7 when discharged a few days ago. Required 1 unit of blood on that admission for hemoglobin of 6.7. Hemoglobin 7/1 showed a drop from 7.5 -> 6.9. Per chart review has had dark stools in the past, though is on iron supplementation.  Had iron studies in 2021 showing low iron, transferrin, and TIBC, with high normal ferritin and normal folate/B12. Pointing towards likely ACD, now in the setting of newly diagnosed rheumatoid arthritis and likely IBD. Per GI 7/1, it is possible that the patient now has a previously undetected non-emergent GI bleed. Received 1 unit prbc 7/1.  - Hgb after unit of blood yesterday is 9.0 today  - GI consulted - plan for colonoscopy today  - Transfuse if hemoglobin less than 7  - Daily CBC  - Continue oral iron if no longer NPO per GI  - Continue PPI in case she is oozing from ulcer (h/o this but was healed as of last EGD 11/2020)    Hyponatremia - resolved  Hypokalemia   Hypomagnesemia   Patient notes 2-3 days of inability to stool or pass urine, leading to poor PO intake. Had hyponatremia down to 129 during last hospital stay as well, which quickly corrected with IV fluid resuscitation.  Urine studies at that time with possibility of SIADH, however with improvement after IV NS, seems less likely.  Suspect low effective arterial blood volume in the setting of her chronic anemia to be contributing, mixed with dehydration.  Again, on this admission urine studies and responsiveness to IV fluids points towards dehydration as source of her hyponatremia.  Electrolytes show slight  downward trend, likely with diarrhea from bowel prep, will restart electrolyte replacement as needed.   -Potassium replacement protocol  -Magnesium replacement protocol  -mIVF NS at 100mL/hr   -Daily BMP     Constipation - resolved with miralax  Urinary retention - resolved  H/o chronic diarrhea (now with acute as well)  ?IBD  Recent hospitalization for chronic diarrhea, started on imodium. Seems to have sent her in the opposite direction - now constipated.  Loose stools have been an issue for her for some time, with no unifying diagnosis as of yet.  Stool studies negative during last hospital stay, TSH normal, fecal calprotectin elevated (ddx IBD versus microscopic colitis versus colorectal cancer versus other).  Patient has previously refused colonoscopy and endoscopy, discussed again 7/1, she is considering the procedure, but would like to consult and clarify with daughter first. Per GI, pt can potentially get a flexible sigmoidoscopy with biopsy vs colonoscopy if pt is agreeable. Will discuss options with patient's daughter when she arrives. Currently NPO per GI. Constipation likely the source of her mildly elevated WBC at the time of admission in the absence of foci of infection, this is resolved.  Spoke with GI 6/30 about treating as IBD flare without biopsy-proven diagnosis, they have concerns with this given relatively recent onset of symptoms in an 86-year-old.  However, she has been on a steroid course for her new diagnosis of RA, and we will monitor to see if her GI symptoms improve on this.  -GI consulted, appreciate recs  -Now agreeable to c-scope as above  -Now urinating fine w/o hernandez  -Given c diff negative, could try prn imodium after colonoscopy     Joint Pain  Rhumatoid arthritis   Long history of multiple joint pain, worse in the knees and hands. On exam her thumbs are flexed towards midline with prominent MCP swelling across all five joints bilaterally. Her bilateral knees also appear swollen  but not erythematous. Her RF and CRP were elevated last admission, as well as calprotectin. CCP positive.  She meets criteria for rheumatoid arthritis diagnosis.  Discussed this with patient and her daughter 6/30.  We will start with low-dose steroid, given her age and concerns for complications.  If improvement not noted after 2-4 days consider increasing to 40 mg daily.  Call placed to rheumatology 6/30, with new diagnosis of rheumatoid arthritis and considering starting methotrexate, looking for their guidance regarding dosing.  Rheumatology does not round on patients at this hospital, so did not place a formal consult.  If do not hear back from them will reconsider placing a consult  -20 mg prednisone daily, 7-14-day course  -Not much improvement in joint pains with prednisone to this point  -Pt seems more agreeable to GI procedures, will hold off on starting methotraxate and consulting rheum until after decision regarding endoscopy/colonoscopy/sigmoidoscopy     Spinal compression fractures  Patient endorsing significant lumbar back pain on admission.  Again seen are compression fractures at T12, L2, and L4, which appear stable per radiologist.  Also has severe stenosis at L3-L4 and L4-L5.  No red flag symptoms.  Has TLSO brace at home for comfort.  -Stress importance of consistent TLSO brace for pain at home  -Scheduled Tylenol      Polyclonal gammopathy  Gamma gap present dating back to 2018.  Had SPEP in May 2022 showing polyclonal gammopathy, potentially due to underlying inflammatory process, per pathologist.  Screening MANJIT and RF done last week in search of underlying inflammatory process.  RF was markedly positive but MANJIT negative.    Now meeting criteria for new rheumatoid arthritis diagnosis.  Likely also with IBD diagnosis, given symptom picture and positive fecal calprotectin.  However, do not see previous work-up for chronic infection.  -HIV - nonreactive  -Hep B - core antigen nonreactive, surface  antibody present  -Hep C - screening positive, HCV PCR quant pending        Chronic conditions  HTN - restart home bumex and amiloride  Hypothyroid - PTA Synthroid        Diet: Combination Diet Regular Diet Adult    DVT Prophylaxis: Pneumatic Compression Devices  Javed Catheter: Present  Fluids: 100mL/hr  Central Lines: None  Cardiac Monitoring: None  Code Status: No CPR- Do NOT Intubate          Disposition Plan        Expected Discharge Date:                               Clinically Significant Risk Factors Present on Admission             The patient's care was discussed with the Attending Physician, Dr. Elizondo.       Neftali Mao MD  Hospitalist Service  [facility]  Securely message with the Vocera Web Console (learn more here)  Text page via [employer] Paging/Directory         ______________________________________________________________________        Interval History     Reports she is not doing so well today.  Had bowel prep overnight and was experiencing some abdominal cramping.  Also reports irritated skin on buttocks from urinating in brief.  Plan for colonoscopy today.        Data reviewed today: I reviewed all medications, new labs and imaging results over the last 24 hours.           Physical Exam  BP (!) 165/93 (BP Location: Right arm)   Pulse 84   Temp 98.6  F (37  C) (Oral)   Resp 20   Ht 1.524 m (5')   Wt 50.8 kg (112 lb)   SpO2 95%   BMI 21.87 kg/m         Constitutional: fatigued, frail, and pale  Eyes: Lids and lashes normal, pupils equal, round and reactive to light, extra ocular muscles intact, sclera clear, conjunctiva normal  Respiratory: No increased work of breathing, good air exchange, clear to auscultation bilaterally, no crackles or wheezing  Cardiovascular: Normal apical impulse, regular rate and rhythm, normal S1 and S2, no S3 or S4, and no murmur noted.   GI: Mildly tender to palpation, normal bowel sounds, soft, non-distended, no masses palpated, no  hepatosplenomegally  Skin: no redness, warmth, or swelling  Musculoskeletal: Swollen and tender bilateral knees, L>R. Swollen and tender bilateral MCP joints in all 10 digits.  Neurologic: alert and oriented x3 today, more responsive than previous days

## 2022-07-03 NOTE — PROGRESS NOTES
Patient NPO since midnight, prepared for procedures this am, labs came back low mag and K+. Updated PACU, ran protocols to replace via IV. Attempted to check lab levels at noon per provider order, sample hemolyzed x2. Able to get results at 1400 with third sample. Mag 3.0 and K+4.4.   Patient took BP meds and ativan at 11am. Then afternoon ativan at 1400. Held all other meds d/t patient refusing because NPO and prefers to take meds with meals.     Patient has purewick in place, incontinent of B&B. Purewick wasn't working properly this afternoon, so did a partial bed change. Bottom is red, applied calmoseptine.    Patient is hmong speaking and requires . Granddaughter at bedside this afternoon. Patient tearful and expressed frustration over the delay with the procedure, provided presence and reassurance. Patient left unit for procedure at 1500.

## 2022-07-03 NOTE — ANESTHESIA CARE TRANSFER NOTE
Patient: Chris Bell    Procedure: Procedure(s):  COLONOSCOPY WITH COLON BIOPSIES  ESOPHAGOGASTRODUODENOSCOPY (EGD) WITH DUODENAL & GASTRIC BIOPSIES       Diagnosis: Anemia, unspecified type [D64.9]  Diarrhea, unspecified type [R19.7]  Diagnosis Additional Information: No value filed.    Anesthesia Type:   MAC     Note:      Level of Consciousness: drowsy  Oxygen Supplementation: nasal cannula  Level of Supplemental Oxygen (L/min / FiO2): 2  Independent Airway: airway patency satisfactory and stable  Dentition: dentition unchanged  Vital Signs Stable: post-procedure vital signs reviewed and stable  Report to RN Given: handoff report given  Patient transferred to: Medical/Surgical Unit  Comments: Pt. Pink and breathing spontaneously.  Vitals stable.  Report given to oncoming nurse.  Transfer of care occurred.   Handoff Report: Identifed the Patient, Identified the Reponsible Provider, Reviewed the pertinent medical history, Discussed the surgical course, Reviewed Intra-OP anesthesia mangement and issues during anesthesia, Set expectations for post-procedure period and Allowed opportunity for questions and acknowledgement of understanding      Vitals:  Vitals Value Taken Time   /70    Temp 97    Pulse 88    Resp 12    SpO2 100        Electronically Signed By: MARYBETH Alvarado CRNA  July 3, 2022  5:31 PM

## 2022-07-03 NOTE — ANESTHESIA PREPROCEDURE EVALUATION
Anesthesia Pre-Procedure Evaluation    Patient: Chris Bell   MRN: [id number] : 1935        Preoperative Diagnosis: Gastrointestinal hemorrhage associated with peptic ulcer [K27.4]  Anemia due to GI blood loss [D50.0]    Procedure : Procedure(s):  COLONOSCOPY          Past Medical History:   Diagnosis Date     Anemia      Depression      Disease of thyroid gland      HTN (hypertension)      Osteoporosis      PUD (peptic ulcer disease)       Past Surgical History:   Procedure Laterality Date     ESOPHAGOSCOPY, GASTROSCOPY, DUODENOSCOPY (EGD), COMBINED N/A 2018    Procedure: ESOPHAGOGASTRODUODENOSCOPY (EGD) with h.pylori and gastric ulcer biopsies;  Surgeon: Colin Alvarez MD;  Location: [facility];  Service:      HYSTERECTOMY       US BIOPSY FINE NEEDLE ASPIRATION LYMPH NODE  2019      Allergies   Allergen Reactions     Unknown [No Clinical Screening - See Comments]      Pt states she has a medication allergy but does not know what it is      Social History     Tobacco Use     Smoking status: Never Smoker     Smokeless tobacco: Never Used   Substance Use Topics     Alcohol use: No      Wt Readings from Last 1 Encounters:   22 50.8 kg (112 lb)        Anesthesia Evaluation   Pt has had prior anesthetic. Type: General.    No history of anesthetic complications       ROS/MED HX  ENT/Pulmonary:  - neg pulmonary ROS     Neurologic:  - neg neurologic ROS     Cardiovascular: Comment: 18: Echo  Impression  No impression found.      Narrative    No previous study for comparison.    Normal left ventricular size.    Left ventricle ejection fraction is normal. The calculated left ventricular ejection fraction is 56%.    Normal right ventricular size and systolic function.    No significant valve abnormality observed.    Small circumferential pericardial effusion most prominent overlying the right ventricle.             (+) hypertension-----CHF orthopnea/PND,  (-) murmur   METS/Exercise Tolerance:      Hematologic:  - neg hematologic  ROS     Musculoskeletal:  - neg musculoskeletal ROS     GI/Hepatic:     (+) GERD, esophageal disease, bowel prep,     Renal/Genitourinary:  - neg Renal ROS   (+) renal disease,     Endo:     (+) thyroid problem,  (-) obesity   Psychiatric/Substance Use:  - neg psychiatric ROS     Infectious Disease:  - neg infectious disease ROS     Malignancy:  - neg malignancy ROS     Other:            Physical Exam    Airway        Mallampati: II   TM distance: > 3 FB   Neck ROM: limited   Mouth opening: > 3 cm    Respiratory Devices and Support         Dental       (+) missing      Cardiovascular          Rhythm and rate: regular and normal (-) no murmur    Pulmonary   pulmonary exam normal        breath sounds clear to auscultation           OUTSIDE LABS:  CBC:   Lab Results   Component Value Date    WBC 4.2 07/03/2022    WBC 4.0 07/02/2022    HGB 10.7 (L) 07/03/2022    HGB 9.0 (L) 07/02/2022    HCT 34.6 (L) 07/03/2022    HCT 29.0 (L) 07/02/2022     07/03/2022     07/02/2022     BMP:   Lab Results   Component Value Date     07/02/2022     07/02/2022    POTASSIUM 3.8 07/02/2022    POTASSIUM 4.2 07/01/2022    CHLORIDE 98 06/29/2022    CHLORIDE 92 (L) 06/29/2022    CO2 19 (L) 06/29/2022    CO2 23 06/29/2022    BUN 18 06/29/2022    BUN 20 06/29/2022    CR 0.87 06/29/2022    CR 1.11 (H) 06/29/2022     06/29/2022     06/29/2022     COAGS:   Lab Results   Component Value Date    PTT 32 06/21/2022    INR 1.20 (H) 06/21/2022     POC: No results found for: BGM, HCG, HCGS  HEPATIC:   Lab Results   Component Value Date    ALBUMIN 2.3 (L) 06/21/2022    PROTTOTAL 8.9 (H) 06/21/2022    ALT <9 06/21/2022    AST 15 06/21/2022    ALKPHOS 89 06/21/2022    BILITOTAL 0.3 06/21/2022     OTHER:   Lab Results   Component Value Date    PH 7.34 (L) 01/18/2018    LACT 1.6 06/30/2022    A1C 5.7 (H) 02/15/2022    MANE 8.5 06/29/2022    PHOS 2.8 01/16/2018    MAG 1.6 (L) 07/02/2022     LIPASE 11 05/24/2022    TSH 1.07 06/21/2022    CRP 6.5 (H) 06/23/2022     (H) 10/18/2018       Anesthesia Plan    ASA Status:  3   NPO Status:  NPO Appropriate    Anesthesia Type: MAC.     - Reason for MAC: chronic cardiopulmonary disease, immobility needed, straight local not clinically adequate              Consents    Anesthesia Plan(s) and associated risks, benefits, and realistic alternatives discussed. Questions answered and patient/representative(s) expressed understanding.     - Discussed: Risks, Benefits and Alternatives for the PROCEDURE were discussed     - Discussed with:  Patient,       - Extended Intubation/Ventilatory Support Discussed: No.      - Patient is DNR/DNI Status: Yes             Suspend during perioperative period? Yes.             Agree to: chemical resuscitation, chest compression/defibrillation.   Use of blood products discussed: No .     Postoperative Care       PONV prophylaxis: Ondansetron (or other 5HT-3)     Comments:    Other Comments: Decadron, Zofran.  Diprivan infusion.                Richardson Machado MD

## 2022-07-04 NOTE — PLAN OF CARE
Problem: Plan of Care - These are the overarching goals to be used throughout the patient stay.    Goal: Absence of Hospital-Acquired Illness or Injury  Intervention: Prevent Skin Injury  Recent Flowsheet Documentation  Taken 7/4/2022 0830 by Angela Covarrubias RN  Body Position: position changed independently     Problem: Plan of Care - These are the overarching goals to be used throughout the patient stay.    Goal: Optimal Comfort and Wellbeing  Outcome: Ongoing, Progressing     Problem: Pain Acute  Goal: Acceptable Pain Control and Functional Ability  Outcome: Ongoing, Progressing  Intervention: Prevent or Manage Pain  Recent Flowsheet Documentation  Taken 7/4/2022 0830 by Angela Covarrubias RN  Medication Review/Management: medications reviewed   Goal Outcome Evaluation:          A/Ox4. Hmong speaking. VSS ex HTN. On RA when awake but when sleeping, needed 1-2L O2 NC d/t oxygen desat when asleep. C/o 3/10 pain to abdomen, has scheduled tylenol. Assist x1. Patient declined pillows for repositioning. Blanchable redness to coccyx and buttocks, Calmoseptine applied. Low fiber diet, tolerating. R PIV infusing NS @100ml/hr. Purewick in place with good output. K 3.9 and Mg 1.9, recheck in AM. Nursing to continue to monitor.

## 2022-07-04 NOTE — PLAN OF CARE
Problem: Pain Acute  Goal: Acceptable Pain Control and Functional Ability  Outcome: Ongoing, Progressing     Problem: Urinary Retention  Goal: Effective Urinary Elimination  Outcome: Ongoing, Progressing   Goal Outcome Evaluation:    Patient reported, via , that she was experiencing bladder pain.  Moaning and groaning.  Bladder scanned for 301ccs.  MD notified.    Patient straight cath for 700ccs clear yellow urine.  Spec obtained.  Patient reported relief.  No further reports of discomfort.    Labs in am.  VSS.  Afebrile.  Continue to monitor.

## 2022-07-04 NOTE — PROGRESS NOTES
[facility] progress Note    Subjective: No current complaints.    Objective:  Vital signs in last 24 hours  Temp:  [97.5  F (36.4  C)-98  F (36.7  C)] 97.8  F (36.6  C)  Pulse:  [68-90] 68  Resp:  [16-18] 18  BP: (122-180)/(59-86) 122/59  SpO2:  [96 %-100 %] 98 %     Gen: Awake, no acute distress  Pulmonary: Lungs clear to ausculation bilaterally  Cardiovascular: Regular  Gastrointestinal: Positive bowel sounds, soft, non-tender, non-distended    Assessment: #1.  Anemia-hemoglobin is stable.  2.  Diarrhea- colonoscopy shows areas of significant irritation, biopsies pending.    Plan: Follow-up with results of the colon biopsies.  PPI no longer needed.    Patient Active Problem List   Diagnosis     Anemia due to GI blood loss     Hypokalemia     PUD (peptic ulcer disease)     T12 compression fracture (H)     Accelerated hypertension     Abnormal finding on imaging     Kyphosis of thoracic region     Electrolyte imbalance     Episode of shaking     Edema     Cancer of soft palate (H)     Gastrointestinal hemorrhage associated with peptic ulcer     Hypomagnesemia     Closed compression fracture of L2 lumbar vertebra, initial encounter (H)     Anemia, unspecified type     Orthopnea     Shortness of breath     Hyponatremia     Acute kidney injury (H)     (HFpEF) heart failure with preserved ejection fraction (H)     CHF (congestive heart failure) (H)     Generalized muscle weakness     Weakness       Labs:  CMP Results:   Recent Labs   Lab Test 07/04/22  0524 06/22/22  1339 06/21/22  2156   *   < > 129*   POTASSIUM 3.9   < > 3.6   CHLORIDE 107   < > 99   CO2 24   < > 20*   ANIONGAP 2*   < > 10   GLC 83   < > 131*   BUN 8   < > 18   CR 0.70   < > 1.10   BILITOTAL  --   --  0.3   ALKPHOS  --   --  89   ALT  --   --  <9   AST  --   --  15    < > = values in this interval not displayed.      CBC  Recent Labs   Lab 07/04/22  0524 07/03/22  0620 07/02/22  0637 07/01/22  1545 07/01/22  0535   WBC 4.5 4.2 4.0  --   5.4   RBC 3.80 3.64* 3.00*  --  2.30*   HGB 11.3* 10.7* 9.0* 8.8* 6.9*   HCT 36.6 34.6* 29.0*  --  23.3*   MCV 96 95 97  --  101*   MCH 29.7 29.4 30.0  --  30.0   MCHC 30.9* 30.9* 31.0*  --  29.6*   RDW 19.2* 19.5* 20.6*  --  20.3*    287 249  --  245     INRNo lab results found in last 7 days.   Lipase   Date Value Ref Range Status   05/24/2022 11 0 - 52 U/L Final   03/20/2022 18 0 - 52 U/L Final   02/15/2022 11 0 - 52 U/L Final     No results for input(s): AST, ALT, ALKPHOS, BILITOTAL, LIPASE in the last 168 hours.     Colonoscopy impression:            - Ileitis. Biopsied.                          - Scattered inflammation was found in the descending                          colon, in the transverse colon, in the ascending colon                          and in the cecum, secondary to possible Crohn's                          disease versus infectious (TB, viral) versus ischemic                          colitis (seems less likely given distribution.)                          Biopsied.     Upper endoscopy Impression:            - Tortuous esophagus.                          - Chronic atrophic gastritis. Biopsied.                          - Duodenal scar.                          - Normal first portion of the duodenum, second portion                          of the duodenum and third portion of the duodenum.                          Biopsied.                                                 Will Francisco MD  Thank you for the opportunity to participate in the care of this patient.   Please feel free to call me with any questions or concerns.  Phone number [phone].

## 2022-07-04 NOTE — PLAN OF CARE
Problem: Plan of Care - These are the overarching goals to be used throughout the patient stay.    Goal: Optimal Comfort and Wellbeing  Outcome: Ongoing, Progressing     Problem: Pain Acute  Goal: Acceptable Pain Control and Functional Ability  Outcome: Ongoing, Progressing  Intervention: Prevent or Manage Pain  Recent Flowsheet Documentation  Taken 7/4/2022 0300 by Lily Andrade RN  Medication Review/Management: medications reviewed     Problem: Electrolyte Imbalance  Goal: Electrolyte Balance  Outcome: Ongoing, Progressing     Problem: Urinary Retention  Goal: Effective Urinary Elimination  Outcome: Ongoing, Progressing   Goal Outcome Evaluation:        Pt slept most of the night. Daughter stayed overnight. Left PIV was removed due to infiltration. Right IV infusing N/S at 100ml/hr. O2 saturation deeping to low 80's but recovering almost right away on 2LPM onb N/c.   Bladder scanned at 0300. Purewick draining clear urine.

## 2022-07-04 NOTE — PROGRESS NOTES
[facility]     Progress Note - Hospitalist Service       Date of Admission:  6/28/2022        Assessment & Plan          Chris Bell is a 86 year old female admitted on 6/28/2022. She has a history of chronic anemia, polyclonal gammopathy, and recent hospitalization for diarrhea and is admitted for constipation and hyponatremia.     Chronic normocytic anemia  Admission hemoglobin 8.7 down from 9.7 when discharged a few days ago. Required 1 unit of blood on that admission. Per chart review has had dark stools in the past, though is on iron supplementation.  Had iron studies in 2021 showing low iron, transferrin, and TIBC, with high normal ferritin and normal folate/B12. Pointing towards likely ACD, now in the setting of newly diagnosed rheumatoid arthritis and likely IBD. Per GI 7/1, it is possible that the patient now has a previously undetected non-emergent GI bleed. Received 1 unit prbc 7/1, Hgb improving, 11.3 on 7/4. Colonoscopy with ileitis, scattered inflammation throughout, DDx: Crohn's, infectious (TB, viral), ischemic colitis and EGD with tortuous esophagus, chronic atrophic gastritis.  - GI consulted - appreciate recs   -Biopsies from colonoscopy and EGD pending  - Transfuse if hemoglobin less than 7  - Daily CBC  - Continue oral iron per GI  - Continue PPI while awaiting results of EGD    Hyponatremia - resolved  Hypokalemia   Hypomagnesemia   Patient notes 2-3 days of inability to stool or pass urine, leading to poor PO intake. Had hyponatremia down to 129 during last hospital stay as well, which quickly corrected with IV fluid resuscitation.  Urine studies at that time with possibility of SIADH, however with improvement after IV NS, seems less likely.  Suspect low effective arterial blood volume in the setting of her chronic anemia to be contributing, mixed with dehydration.  Again, on this admission urine studies and responsiveness to IV fluids points towards dehydration as  source of her hyponatremia.  Electrolytes show slight downward trend, likely with diarrhea from bowel prep, will restart electrolyte replacement as needed.   -Potassium replacement protocol  -Magnesium replacement protocol  -mIVF NS at 100mL/hr   -Daily BMP     Constipation - resolved with miralax  Urinary retention - resolved  H/o chronic diarrhea (now with acute as well)  ?IBD  Recent hospitalization for chronic diarrhea, started on imodium. Seems to have sent her in the opposite direction admitted with constipated.  Loose stools have been an issue for her for some time, with no unifying diagnosis as of yet.  Stool studies negative during last hospital stay, TSH normal, fecal calprotectin elevated (ddx IBD versus microscopic colitis versus colorectal cancer versus other).  Patient agreed to EGD and colonoscopy 7/3 results as above. she has been on a steroid course for her new diagnosis of RA, and we will monitor to see if her GI symptoms improve on this and await further recommendations from GI.  -GI consulted, appreciate recs  -Now urinating fine w/o hernandez  -Given c diff negative, could try prn imodium after colonoscopy     Joint Pain  Rhumatoid arthritis   Long history of multiple joint pain, worse in the knees and hands. On exam her thumbs are flexed towards midline with prominent MCP swelling across all five joints bilaterally. Her bilateral knees also appear swollen but not erythematous. Her RF and CRP were elevated last admission, as well as calprotectin. CCP positive.  She meets criteria for rheumatoid arthritis diagnosis.  Discussed this with patient and her daughter 6/30.  We will start with low-dose steroid, given her age and concerns for complications.  If improvement not noted after 2-4 days consider increasing to 40 mg daily.  Call placed to rheumatology 6/30, with new diagnosis of rheumatoid arthritis and considering starting methotrexate, looking for their guidance regarding dosing.  Rheumatology  does not round on patients at this hospital, so did not place a formal consult.  If do not hear back from them will reconsider placing a consult 7/5  -20 mg prednisone daily, 7-14-day course  -Not much improvement in joint pains with prednisone to this point  -Start methotrexate- 7.5 mg weekly on Mondays        Spinal compression fractures  Patient endorsing significant lumbar back pain on admission.  Again seen are compression fractures at T12, L2, and L4, which appear stable per radiologist.  Also has severe stenosis at L3-L4 and L4-L5.  No red flag symptoms.  Has TLSO brace at home for comfort.  -Stress importance of consistent TLSO brace for pain at home  -Scheduled Tylenol      Polyclonal gammopathy  Gamma gap present dating back to 2018.  Had SPEP in May 2022 showing polyclonal gammopathy, potentially due to underlying inflammatory process, per pathologist.  Screening MANJIT and RF done last week in search of underlying inflammatory process.  RF was markedly positive but MANJIT negative.    Now meeting criteria for new rheumatoid arthritis diagnosis.  Likely also with IBD diagnosis, given symptom picture and positive fecal calprotectin.  However, do not see previous work-up for chronic infection.  -HIV - nonreactive  -Hep B - core antigen nonreactive, surface antibody present  -Hep C - screening positive, HCV PCR quant pending        Chronic conditions  HTN - restart home bumex and amiloride  Hypothyroid - PTA Synthroid        Diet: Combination Diet Regular Diet Adult    DVT Prophylaxis: Pneumatic Compression Devices  Javed Catheter: Present  Fluids: 100mL/hr  Central Lines: None  Cardiac Monitoring: None  Code Status: No CPR- Do NOT Intubate          Disposition Plan        Expected Discharge Date:                            Clinically Significant Risk Factors Present on Admission             The patient's care was discussed with the Attending Physician, Dr. Elizondo.       Sonny Brothers MD, PGY2  [place]  [facility]   Pgr: [phone]       ______________________________________________________________________        Interval History  Reports feeling much better today.  Her abdominal pain is significantly better than yesterday.   She says she has had some rice porridge from home with no nausea or pain after eating.  Understands that we did see some areas of concern on her EGD and colonoscopy yesterday and we are awaiting final results of those tests.  No other complaints or questions today.        Data reviewed today: I reviewed all medications, new labs and imaging results over the last 24 hours.           Physical Exam  BP (!) 180/86 (BP Location: Right arm)   Pulse 72   Temp 97.7  F (36.5  C) (Oral)   Resp 18   Ht 1.524 m (5')   Wt 50.8 kg (112 lb)   SpO2 99%   BMI 21.87 kg/m         Constitutional: fatigued, frail, and pale  Eyes: Lids and lashes normal, pupils equal, round and reactive to light, extra ocular muscles intact, sclera clear, conjunctiva normal  Respiratory: No increased work of breathing, good air exchange, clear to auscultation bilaterally, no crackles or wheezing  Cardiovascular: Normal apical impulse, regular rate and rhythm, normal S1 and S2, no S3 or S4, and no murmur noted.   GI: Mildly tender to palpation, normal bowel sounds, soft, non-distended, no masses palpated, no hepatosplenomegally  Skin: no redness, warmth, or swelling  Musculoskeletal: Swollen and tender bilateral knees, L>R. Swollen and tender bilateral MCP joints in all 10 digits.  Neurologic: alert and oriented x3 today, more responsive than previous days

## 2022-07-05 NOTE — PLAN OF CARE
Problem: Plan of Care - These are the overarching goals to be used throughout the patient stay.    Goal: Optimal Comfort and Wellbeing  Outcome: Ongoing, Progressing  Intervention: Monitor Pain and Promote Comfort  Recent Flowsheet Documentation  Taken 7/5/2022 0030 by Charli Peter RN  Pain Management Interventions:   therapeutic presence   quiet environment facilitated   emotional support   MD notified (comment)     Problem: Urinary Retention  Goal: Effective Urinary Elimination  Outcome: Ongoing, Progressing   Goal Outcome Evaluation:        Chief complaint of 7/10 generalized pain and abdominal pain. Gave PRN Simethicone with some relief. Pt had a bowl of rice porridge and tolerated well. Provided incontinence care and changed pure wick.

## 2022-07-05 NOTE — PROGRESS NOTES
[facility]    Progress Note - Hospitalist Service       Date of Admission:  6/28/2022    Assessment & Plan            Chris Bell is a 86 year old female admitted on 6/28/2022. She has a history of chronic anemia, polyclonal gammopathy, and recent hospitalization for diarrhea and is admitted for constipation and hyponatremia.    Chronic normocytic anemia  Admission hemoglobin 8.7 down from 9.7 when discharged a few days ago. Required 1 unit of blood on that admission. Per chart review has had dark stools in the past, though is on iron supplementation.  Had iron studies in 2021 showing low iron, transferrin, and TIBC, with high normal ferritin and normal folate/B12. Pointing towards likely ACD, now in the setting of newly diagnosed rheumatoid arthritis and likely IBD. Per GI 7/1, it is possible that the patient now has a previously undetected non-emergent GI bleed. Received 1 unit prbc 7/1, Hgb improving, 11.3 on 7/4, but now trending down to 10.3 7/5. Colonoscopy with ileitis, scattered inflammation throughout, DDx: Crohn's, infectious (TB, viral), ischemic colitis and EGD with tortuous esophagus, chronic atrophic gastritis.  - GI consulted - appreciate recs              -Biopsies from colonoscopy and EGD pending  - Transfuse if hemoglobin less than 7  - Daily CBC  - Continue oral iron per GI  - Continue PPI while awaiting results of EGD     Hyponatremia - resolved  Hypokalemia   Hypomagnesemia   Patient notes 2-3 days of inability to stool or pass urine, leading to poor PO intake. Had hyponatremia down to 129 during last hospital stay as well, which quickly corrected with IV fluid resuscitation.  Urine studies at that time with possibility of SIADH, however with improvement after IV NS, seems less likely.  Suspect low effective arterial blood volume in the setting of her chronic anemia to be contributing, mixed with dehydration.  Again, on this admission urine studies and responsiveness  to IV fluids points towards dehydration as source of her hyponatremia.  Electrolytes show stability after cessation of bowel prep, electrolyte replacement should occur as needed.   -Potassium replacement protocol  -Magnesium replacement protocol  -mIVF NS at 100mL/hr   -Daily BMP     Constipation - resolved with miralax  Urinary retention - resolved  H/o chronic diarrhea (now with acute as well)  ?IBD  Recent hospitalization for chronic diarrhea, started on imodium. Seems to have sent her in the opposite direction admitted with constipated.  Loose stools have been an issue for her for some time, with no unifying diagnosis as of yet.  Stool studies negative during last hospital stay, TSH normal, fecal calprotectin elevated (ddx IBD versus microscopic colitis versus colorectal cancer versus other).  Patient agreed to EGD and colonoscopy 7/3 results as above. Has been on a steroid course for her new diagnosis of RA, and we will monitor to see if her GI symptoms improve on this and await further recommendations from GI.   -GI consulted, appreciate recs  -Now urinating fine w/o hernandez  -Given c diff negative, could try prn imodium after colonoscopy     Joint Pain  Rhumatoid arthritis   Long history of multiple joint pain, worse in the knees and hands. On exam her thumbs are flexed towards midline with prominent MCP swelling across all five joints bilaterally. Her bilateral knees also appear swollen but not erythematous. Her RF and CRP were elevated last admission, as well as calprotectin. CCP positive.  She meets criteria for rheumatoid arthritis diagnosis.  Discussed this with patient and her daughter 6/30.  We will start with low-dose steroid, given her age and concerns for complications.  If improvement not noted after 2-4 days consider increasing to 40 mg daily.  Call placed to rheumatology 6/30, with new diagnosis of rheumatoid arthritis and considering starting methotrexate, looking for their guidance regarding  dosing.  Rheumatology does not round on patients at this hospital, so did not place a formal consult.  If do not hear back from them will reconsider placing a consult 7/6  -20 mg prednisone daily, 7-14-day course   -Not much improvement in joint pains with prednisone to this point  -Hold methotrexate until biopsy results return - to start on 7.5 mg weekly on Mondays if cleared to do so        Spinal compression fractures  Patient endorsing significant lumbar back pain on admission.  Again seen are compression fractures at T12, L2, and L4, which appear stable per radiologist.  Also has severe stenosis at L3-L4 and L4-L5.  No red flag symptoms.  Has TLSO brace at home for comfort.  -Stress importance of consistent TLSO brace for pain at home  -Scheduled Tylenol   -Continue to monitor symptoms secondary to prednisone use     Polyclonal gammopathy  Gamma gap present dating back to 2018.  Had SPEP in May 2022 showing polyclonal gammopathy, potentially due to underlying inflammatory process, per pathologist.  Screening MANJIT and RF done last week in search of underlying inflammatory process.  RF was markedly positive but MANJIT negative.    Now meeting criteria for new rheumatoid arthritis diagnosis.  Likely also with IBD diagnosis, given symptom picture and positive fecal calprotectin.  However, do not see previous work-up for chronic infection.  -HIV - nonreactive  -Hep B - core antigen nonreactive, surface antibody present  -Hep C - screening positive, HCV PCR quant not detected      Chronic conditions  HTN   -Blood pressure creeping up, likely secondary to prednisone use, will continue to monitor  -restart home bumex and amiloride    Hypothyroid   - PTA Synthroid       Diet: Low Fiber Diet    DVT Prophylaxis: Pneumatic Compression Devices  Javed Catheter: Not present  Fluids: 100ml/hr  Central Lines: None  Cardiac Monitoring: None  Code Status: No CPR- Do NOT Intubate      Disposition Plan         The patient's care was  discussed with the Attending Physician, Dr. Floyd.    Laura Mohamud MD  Hospitalist Service  [facility]  Securely message with the [employer] Web Console (learn more here)  Text page via [employer] Paging/Directory         Clinically Significant Risk Factors Present on Admission                      ______________________________________________________________________    Interval History   Pt complains of having some shortness of breath overnight secondary to some stomach pain and twitching. Reports eating rice porridge last night before bed. Pain today 2/10 on evaluation. Pt and daughter aware that we are waiting for biopsy results. Resting comfortably.    Data reviewed today: I reviewed all medications, new labs and imaging results over the last 24 hours.    Physical Exam   Vital Signs: Temp: 97.9  F (36.6  C) Temp src: Oral BP: (!) 177/88 Pulse: 88   Resp: 19 SpO2: 96 % O2 Device: None (Room air)    Weight: 112 lbs 0 oz  General Appearance: Appears fatigued and frail, pale appearance  Eyes: Lids and lashes normal, pupils equal, round and reactive to light, extra ocular muscles intact, sclera clear, conjunctiva normal  Respiratory: Clear to auscultation bilaterally, no rales or wheezes  Cardiovascular: RRR, S1 and S2 present, no S3 or S4, no murmurs or gallops  GI: Mildly tender to palpation in epigastric region, normal bowel sounds present x4, soft, non-distended, no masses, no hepatosplenomegaly  Skin: No redness, warmth, or swelling  Musculoskeletal: Swollen and tender knees bilaterally, left greater than right, tender bilateral MCP joints in all 10 digits  Other: Alert and oriented x 3, no apparent distress

## 2022-07-05 NOTE — PROGRESS NOTES
Care Management Follow Up    Length of Stay (days): 6    Expected Discharge Date: 07/06/2022     Concerns to be Addressed:     GI recommendations (trending hemoglobin, awaiting pathologies, awaiting TB labs and if negative start methotrexate per chart review)  Patient plan of care discussed at interdisciplinary rounds: Yes    Anticipated Discharge Disposition: Home     Anticipated Discharge Services:  Resumption of PCA services provided by daughter May  Anticipated Discharge DME:  Wheelchair, walker, incontinence supplies (uses at home)    Education Provided on the Discharge Plan:  Per team  Patient/Family in Agreement with the Plan:  Yes    Referrals Placed by CM/SW:  N/A  Private pay costs discussed: Not applicable    Additional Information:  SW reviewed chart and spoke with interdisciplinary team. Per team, pt continues to require acute care with anticipated discharge within the next few days to home.     Per chart review, pt lives alone in senior housing and uses rolling walker, wheelchair, and incontinence supplies at baseline with her daughter May providing 24/7 cares as needed as her PCA. May plans to transport at discharge.     Please notify SW if discharge needs arise. SW will continue to assess and follow for discharge planning needs.     Jenna VIEYRA [facility]  Phone: [phone]

## 2022-07-05 NOTE — PROGRESS NOTES
GI PROGRESS NOTE  7/5/2022  Chris Bell  1935  /-70    Subjective:   Family member at bedside assisted with interpretation.     Main complaints today are urinating too much. She requested a catheter to decrease the amt of times she needs to get up to use the bathroom. Had a soft brown/green stool this AM. Denies significant abdominal pain. No nausea or vomiting. Tolerated rice porridge from home.      Objective:     Blood pressure (!) 173/82, pulse 74, temperature 98.5  F (36.9  C), temperature source Oral, resp. rate 18, height 1.524 m (5'), weight 50.8 kg (112 lb), SpO2 99 %.    Body mass index is 21.87 kg/m .  Gen: NAD, sitting up comfortably in bed.   Cardio: RRR  GI: Non-distended, BS positive, soft, non-tender  Neuro: Alert and orientated  Musculoskeletal: No edema.     Laboratory:  BMP  Recent Labs   Lab 07/05/22  0658 07/04/22  0524 07/03/22  1405 07/03/22  0620   * 133*  --  135*   POTASSIUM 3.9 3.9 4.4 2.8*   CHLORIDE 108* 107  --  107   MANE 8.2* 8.0*  --  8.2*   CO2 21* 24  --  21*   BUN 9 8  --  8   CR 0.65 0.70  --  0.68   GLC 87 83  --  81     CBC  Recent Labs   Lab 07/05/22  0658 07/04/22  0524 07/03/22  0620   WBC 4.8 4.5 4.2   RBC 3.40* 3.80 3.64*   HGB 10.3* 11.3* 10.7*   HCT 32.3* 36.6 34.6*   MCV 95 96 95   MCH 30.3 29.7 29.4   MCHC 31.9 30.9* 30.9*   RDW 19.1* 19.2* 19.5*    276 287     INRNo lab results found in last 7 days.   LFTs  Recent Labs   Lab Test 07/03/22 2030 06/29/22  0124 06/21/22  2156 06/04/22  1935/22  1606 05/24/22  1024 05/19/22  2130 05/19/22 2056 03/20/22  1705 03/20/22  1502 02/15/22  1016   ALBUMIN  --   --  2.3*  --   --  2.2*  --  2.2*   < > 2.8* 2.5*   BILITOTAL  --   --  0.3  --   --  0.3  --  0.2   < > 0.2 0.3   ALT  --   --  <9  --   --  10  --  <9   < > <9 <9   AST  --   --  15  --   --  12  --  17   < > 16 10   PROTEIN Negative Negative  --  20 *   < >  --    < >  --    < >  --   --    LIPASE  --   --   --   --   --  11  --   --   --   18 11    < > = values in this interval not displayed.     Imaging:  EXAM: CT ABDOMEN PELVIS W CONTRAST  LOCATION: [facility]  DATE/TIME: 6/29/2022 12:13 AM     INDICATION: abd pain and bilateral leg pain.  COMPARISON: 3/20/2022  TECHNIQUE: CT scan of the abdomen and pelvis was performed following injection of IV contrast. Multiplanar reformats were obtained. Dose reduction techniques were used.  CONTRAST: isovue 370 100ml                                                              IMPRESSION:   1.  No definite etiology for symptoms. Moderate compression L2 vertebral body has progressed somewhat while more severe compressions of T12 and L4 are unchanged.  2.  Bilateral small inguinal hernias containing loops of nonobstructive small bowel.    Procedures:  Colonoscopy 7/3/22:  Impression:            - Ileitis. Biopsied.                          - Scattered inflammation was found in the descending                          colon, in the transverse colon, in the ascending colon                          and in the cecum, secondary to possible Crohn's                          disease versus infectious (TB, viral) versus ischemic                          colitis (seems less likely given distribution.)                          Biopsied.   EGD 7/3/22:  Impression:            - Tortuous esophagus.                          - Chronic atrophic gastritis. Biopsied.                          - Duodenal scar.                          - Normal first portion of the duodenum, second portion                          of the duodenum and third portion of the duodenum.                          Biopsied.      Assessment:   1. Diarrhea  87 year-old female with history of alternative constipation and diarrhea. Had elevated fecal calprotectin as outpt with elevated CRP. Colonoscopy 7/3/22 showed ileitis, scattered inflammation in the colon. ? IBD, viral or even TB colitis, less likely ischemic given distribution. We  are awaiting bx. She had a soft stool today.     She has been diagnosed with rheumatoid arthritis and started prednisone this admission. Currently on 20mg daily. Plan to start methotrexate if TB testing negative.     2. AWDE  Previously on oral iron, no overt signs of GI bleeding. EGD showed atrophic gastritis. Anemia may certainly be from inflammation seen on colonoscopy. Await bx.      Plan:   1. Low fiber diet  2. Trend hemoglobin, transfuse per primary team  3. Await pending pathologies from recent procedures  4. Await pending labs inlcuding Quant Gold for TB.                                                                          Moni Nunez PA-C  Thank you for the opportunity to participate in the care of this patient.   Please feel free to call me with any questions or concerns.  Phone number [phone].

## 2022-07-05 NOTE — PLAN OF CARE
Problem: Pain Acute  Goal: Acceptable Pain Control and Functional Ability  Outcome: Ongoing, Progressing  Intervention: Prevent or Manage Pain  Recent Flowsheet Documentation  Taken 7/5/2022 1040 by Opal Chi  Medication Review/Management: medications reviewed   Tylenol given for complaints of generalized aches. Medication is affective.     Problem: Urinary Retention  Goal: Effective Urinary Elimination  Outcome: Ongoing, Progressing   Goal Outcome Evaluation:  Pt voiding freely. Pure wick in place.     Opal Chi

## 2022-07-06 NOTE — PLAN OF CARE
Problem: Urinary Retention  Goal: Effective Urinary Elimination  Outcome: Ongoing, Progressing   Goal Outcome Evaluation:  Pt has had a lot of urine output throughout the shift. Pure wick in place. Pt is eager to go home, waiting on biopsy results.    Opal Chi

## 2022-07-06 NOTE — PLAN OF CARE
Problem: Plan of Care - These are the overarching goals to be used throughout the patient stay.    Goal: Optimal Comfort and Wellbeing  Outcome: Ongoing, Progressing  Intervention: Monitor Pain and Promote Comfort  Recent Flowsheet Documentation  Taken 7/5/2022 2100 by Macarena Tan RN  Pain Management Interventions: emotional support  Taken 7/5/2022 1700 by Macarena Tan RN  Pain Management Interventions: emotional support     Problem: Risk for Delirium  Goal: Improved Behavioral Control  Outcome: Ongoing, Progressing     Problem: Pain Acute  Goal: Acceptable Pain Control and Functional Ability  Outcome: Ongoing, Progressing  Intervention: Develop Pain Management Plan  Recent Flowsheet Documentation  Taken 7/5/2022 2100 by Macarena Tan RN  Pain Management Interventions: emotional support  Taken 7/5/2022 1700 by Macarena Tan, RN  Pain Management Interventions: emotional support  Intervention: Prevent or Manage Pain  Recent Flowsheet Documentation  Taken 7/5/2022 1700 by Macarena Tan RN  Medication Review/Management: medications reviewed     Problem: Urinary Retention  Goal: Effective Urinary Elimination  Outcome: Ongoing, Progressing   Goal Outcome Evaluation:      Patient alert and oriented. Patient has moderate generalized pain in bilateral legs/knees. K and Mg rechecks in AM. 1,300cc of urine output in purwik. Bedpan used for BM's. Pt has been feeling anxious requesting to be checked on more frequently. Pills whole one at a time.  used for all communication. Right lower forearm NS running at 100ml/hr.

## 2022-07-06 NOTE — PROGRESS NOTES
GI PROGRESS NOTE  7/5/2022  Chris Bell  1935  /-43    Subjective:   Family member at bedside assisted with interpretation.     No new complaints today. Had a soft brown/green stool yesterday AM, no bowel movements since. Denies significant abdominal pain. No nausea or vomiting. Tolerating rice porridge/food from home.      Objective:     Blood pressure (!) 182/78, pulse 58, temperature 98.7  F (37.1  C), temperature source Oral, resp. rate 16, height 1.524 m (5'), weight 50.8 kg (112 lb), SpO2 98 %.    Body mass index is 21.87 kg/m .  Gen: NAD, sitting up comfortably in bed.   Cardio: RRR  GI: Non-distended, BS positive, soft, non-tender  Neuro: Alert and orientated  Musculoskeletal: No edema.     Laboratory:  BMP  Recent Labs   Lab 07/06/22  0712 07/05/22  0658 07/04/22  0524   * 133* 133*   POTASSIUM 3.5 3.9 3.9   CHLORIDE 106 108* 107   MANE 8.1* 8.2* 8.0*   CO2 21* 21* 24   BUN 7* 9 8   CR 0.66 0.65 0.70   GLC 84 87 83     CBC  Recent Labs   Lab 07/06/22  0712 07/05/22  0658 07/04/22  0524   WBC 4.3 4.8 4.5   RBC 3.56* 3.40* 3.80   HGB 10.7* 10.3* 11.3*   HCT 33.6* 32.3* 36.6   MCV 94 95 96   MCH 30.1 30.3 29.7   MCHC 31.8 31.9 30.9*   RDW 19.0* 19.1* 19.2*    265 276     INRNo lab results found in last 7 days.   LFTs  Recent Labs   Lab Test 07/03/22 2030 06/29/22  0124 06/21/22 2156 06/04/22  1935/22  1606 05/24/22  1024 05/19/22  2130 05/19/22  2056 03/20/22  1705 03/20/22  1502 02/15/22  1016   ALBUMIN  --   --  2.3*  --   --  2.2*  --  2.2*   < > 2.8* 2.5*   BILITOTAL  --   --  0.3  --   --  0.3  --  0.2   < > 0.2 0.3   ALT  --   --  <9  --   --  10  --  <9   < > <9 <9   AST  --   --  15  --   --  12  --  17   < > 16 10   PROTEIN Negative Negative  --  20 *   < >  --    < >  --    < >  --   --    LIPASE  --   --   --   --   --  11  --   --   --  18 11    < > = values in this interval not displayed.     Imaging:  EXAM: CT ABDOMEN PELVIS W CONTRAST  LOCATION: [facility]  DATE/TIME: 6/29/2022 12:13 AM     INDICATION: abd pain and bilateral leg pain.  COMPARISON: 3/20/2022  TECHNIQUE: CT scan of the abdomen and pelvis was performed following injection of IV contrast. Multiplanar reformats were obtained. Dose reduction techniques were used.  CONTRAST: isovue 370 100ml                                                              IMPRESSION:   1.  No definite etiology for symptoms. Moderate compression L2 vertebral body has progressed somewhat while more severe compressions of T12 and L4 are unchanged.  2.  Bilateral small inguinal hernias containing loops of nonobstructive small bowel.    Procedures:  Colonoscopy 7/3/22:  Impression:            - Ileitis. Biopsied.                          - Scattered inflammation was found in the descending                          colon, in the transverse colon, in the ascending colon                          and in the cecum, secondary to possible Crohn's                          disease versus infectious (TB, viral) versus ischemic                          colitis (seems less likely given distribution.)                          Biopsied.   EGD 7/3/22:  Impression:            - Tortuous esophagus.                          - Chronic atrophic gastritis. Biopsied.                          - Duodenal scar.                          - Normal first portion of the duodenum, second portion                          of the duodenum and third portion of the duodenum.                          Biopsied.      Assessment:   1. Diarrhea  87 year-old female with history of alternative constipation and diarrhea. Had elevated fecal calprotectin as outpt with elevated CRP. Colonoscopy 7/3/22 showed ileitis, scattered inflammation in the colon. ? IBD, viral or even TB colitis, less likely ischemic given distribution. We are awaiting bx. She had a soft stool today and has clinically improved.     She has been diagnosed with rheumatoid arthritis and  started prednisone this admission. Currently on 20mg daily. Plan to start methotrexate if TB testing negative. Quant GOLD was indeterminate. Typically, we repeat testing (consider T spot) and if still indeterminate, refer to ID.     2. WADE  Previously on oral iron, no overt signs of GI bleeding. EGD showed atrophic gastritis. Anemia may certainly be from inflammation seen on colonoscopy. Await bx.      Plan:   1. Low fiber diet  2. Trend hemoglobin, transfuse per primary team  3. Await pending pathologies from recent procedures  4. Ok from our standpoint for discharge and follow-up with us as an outpatient. We will arrange.     Discussed with primary team.                                                                           Moni Nunez PA-C  Thank you for the opportunity to participate in the care of this patient.   Please feel free to call me with any questions or concerns.  Phone number [phone].

## 2022-07-06 NOTE — PLAN OF CARE
Problem: Plan of Care - These are the overarching goals to be used throughout the patient stay.    Goal: Optimal Comfort and Wellbeing  Outcome: Ongoing, Progressing  Intervention: Monitor Pain and Promote Comfort  Recent Flowsheet Documentation  Taken 7/6/2022 0230 by Madonna Bell RN  Pain Management Interventions: emotional support     Problem: Risk for Delirium  Goal: Improved Sleep  Outcome: Ongoing, Progressing     Goal Outcome Evaluation: Pt was pleasant and cooperative. C/o of bilateral foot and knee pain but refused medication and said its tolerable. R PIV running NS 100ml/hr. Purewick in place and patent with adequate urine output. Will continue to monitor.

## 2022-07-06 NOTE — PLAN OF CARE
Goal Outcome Evaluation:                Pt receiving some food from home continue to encourage    Intake since admission not meeting needs, started trial of oral supplement

## 2022-07-06 NOTE — DISCHARGE SUMMARY
[facility]  Discharge Summary - Medicine & Pediatrics       Date of Admission:  6/28/2022  Date of Discharge:  7/6/2022  Discharging Provider: Dr. Floyd, Dr. Mohamud  Discharge Service: Hospitalist Service    Discharge Diagnoses   Chronic normocytic anemia  Hyponatremia  Hypokalemia  Hypomagnesemia  Constipation  Urinary retention  Chronic diarrhea  Rheumatoid arthritis  Compression fracture T12, L2, L4  Polyclonal gammopathy  Hypertension  Hypothyroidism      Follow-ups Needed After Discharge   Will need to follow-up with a gastroenterology (GI) doctor regarding biopsy results. Follow up with primary care in outpatient as well.    Unresulted Labs Ordered in the Past 30 Days of this Admission     Date and Time Order Name Status Description    7/3/2022  5:40 PM Thiopurine Methyltransferase RBC In process     7/3/2022  4:56 PM Surgical Pathology Exam In process       These results will be followed up by gastroenterology (GI).    Discharge Disposition   Discharged to home  Condition at discharge: Stable    Hospital Course   Chris Bell was admitted on 6/28/2022 for constipation and hyponatremia.  The following problems were addressed during her hospitalization:    Chronic normocytic anemia  Hemoglobin was low on admission and trended down during hospital stay, received one unit packed RBCs 7/1. Received colonoscopy 7/6/2022 addressing chronic diarrhea and possible IBD. In setting of confirmed RA and IBD workup this visit, anemia likely pointing towards AOCD. Hb 10.8 as of 7/6/2022.    Hyponatremia  Hypokalemia  Hypomagnesemia  Initial admit for hyponatremia most likely secondary to dehydration, responded well to IV fluids. Electrolyte imbalances also could be attributed to chronic diarrhea. Sodium 135 on 7/6/2022, Mg 1.7 7/6/2022, discharge level probably slightly higher as pt received Mg replacement therapy per hospital protocol. Ordered magnesium oxide 200mg PO daily to try and keep Mg levels up  at home.       Constipation  Urinary retention  Chronic diarrhea  Admitted for constipation. Stool studies normal during stay, fecal calprotectin elevated. Pt agreed to EGD and colonoscopy 7/3, colonoscopy showing ileitis, scattered inflammation throughout, DDx: Crohn's, infectious (TB, viral), ischemic colitis; EGD showing tortuous esophagus, chronic atrophic gastritis. Colonoscopy biopsy results to be followed up with GI ([facility] specialists), to be scheduled by specialists.    Rheumatoid arthritis  Admitted with new onset RA. Per GI, patient being worked up for possible IBD, therefore holding methotrexate treatment until TB status confirmed. Currently on 20mg prednisone to assist with swelling and pain.    Hypertension  History of HTN, continue home meds. Blood pressure may see elevation secondary to prednisone use.    Polyclonal gammopathy  Gamma gap present dating back to 2018.  Had SPEP in May 2022 showing polyclonal gammopathy, potentially due to underlying inflammatory process, per pathologist.  Screening MANJIT and RF done last week in search of underlying inflammatory process.  RF was markedly positive but MANJIT negative.    Now meeting criteria for new rheumatoid arthritis diagnosis.  Likely also with IBD diagnosis, given symptom picture and positive fecal calprotectin.      Spinal compression fractures  Patient endorsing significant lumbar back pain on admission.  Again seen are compression fractures at T12, L2, and L4, which appear stable per radiologist.  Also has severe stenosis at L3-L4 and L4-L5.  No red flag symptoms.  Has TLSO brace at home for comfort.      Consultations This Hospital Stay   GASTROENTEROLOGY IP CONSULT  CARE MANAGEMENT / SOCIAL WORK IP CONSULT    Code Status   No CPR- Do NOT Intubate       The patient was discussed with Dr. Floyd.      Laura Mohamud MD  [address] [address]  Phone: [phone]  Fax:  [phone]  ______________________________________________________________________    Physical Exam   Vital Signs: Temp: 97.8  F (36.6  C) Temp src: Oral BP: 134/66 Pulse: 62   Resp: 17 SpO2: 96 % O2 Device: None (Room air)    Weight: 112 lbs 0 oz     General Appearance:  Appears fatigued and frail, pale appearance  Eyes: Lids and lashes normal, pupils equal, round and reactive to light, extra ocular muscles intact, sclera clear, conjunctiva normal  Respiratory: Clear to auscultation bilaterally, no rales or wheezes  Cardiovascular: RRR, S1 and S2 present, no S3 or S4, no murmurs or gallops  GI: Normal bowel sounds present x4, soft, non-tender, non-distended, no masses, no hepatosplenomegaly  Skin: No redness, warmth, or swelling  Musculoskeletal: Swollen and tender knees bilaterally, left greater than right, tender bilateral MCP joints in all 10 digits  Neuro:  Alert and oriented x 3, no apparent distress        Primary Care Physician   INOCENCIA PRINGLE       Discharge Orders       Review of your medicines      START taking      Dose / Directions   magnesium oxide 400 MG tablet  Commonly known as: MAG-OX  Used for: Hypomagnesemia      Dose: 200 mg  Take 0.5 tablets (200 mg) by mouth daily for 30 days  Quantity: 15 tablet  Refills: 0     predniSONE 20 MG tablet  Commonly known as: DELTASONE  Used for: Rheumatoid arthritis involving multiple sites with positive rheumatoid factor (H)      Dose: 20 mg  Start taking on: July 7, 2022  Take 1 tablet (20 mg) by mouth daily for 10 days  Quantity: 10 tablet  Refills: 0        CONTINUE these medicines which may have CHANGED, or have new prescriptions. If we are uncertain of the size of tablets/capsules you have at home, strength may be listed as something that might have changed.      Dose / Directions   LORazepam 0.5 MG tablet  Commonly known as: ATIVAN  This may have changed: Another medication with the same name was removed. Continue taking this medication, and  follow the directions you see here.      Dose: 1 mg  Take 1 mg by mouth 2 times daily In the am and evening  Refills: 0        CONTINUE these medicines which have NOT CHANGED      Dose / Directions   acetaminophen 500 MG tablet  Commonly known as: TYLENOL  Used for: Right lumbar radiculitis, Chronic right-sided low back pain with right-sided sciatica      Dose: 500-1,000 mg  Take 1-2 tablets (500-1,000 mg) by mouth every 8 hours as needed for pain  Quantity: 90 tablet  Refills: 1     aMILoride 5 MG tablet  Commonly known as: MIDAMOR  Used for: Hypomagnesemia      Dose: 5 mg  Take 1 tablet (5 mg) by mouth daily  Quantity: 30 tablet  Refills: 1     B-D SINGLE USE SWABS REGULAR Pads      USE UTD  Refills: 0     blood glucose monitoring meter device kit      2 times daily  Refills: 0     bumetanide 0.5 MG tablet  Commonly known as: BUMEX  Used for: Chronic heart failure with preserved ejection fraction (H)      Dose: 0.5 mg  Take 1 tablet (0.5 mg) by mouth daily  Quantity: 30 tablet  Refills: 1     ferrous sulfate 325 (65 Fe) MG tablet  Commonly known as: FEROSUL      Dose: 325 mg  Take 325 mg by mouth 2 times daily  Refills: 0     levothyroxine 50 MCG tablet  Commonly known as: SYNTHROID/LEVOTHROID      Dose: 50 mcg  Take 50 mcg by mouth every morning  Refills: 0     loperamide 2 MG capsule  Commonly known as: IMODIUM  Used for: Diarrhea, unspecified type      Dose: 2 mg  Take 1 capsule (2 mg) by mouth 4 times daily as needed for diarrhea  Quantity: 60 capsule  Refills: 0     metoprolol succinate ER 50 MG 24 hr tablet  Commonly known as: TOPROL XL      Dose: 50 mg  Take 50 mg by mouth 2 times daily  Refills: 0     multivitamin w/minerals tablet      Dose: 1 tablet  Take 1 tablet by mouth daily  Refills: 0     nystatin [id number] UNIT/ML suspension  Commonly known as: MYCOSTATIN  Indication: Candidiasis Fungal Infection of the Oropharynx  Used for: Oral ulcer      Dose: 500,000 Units  Swish and swallow 5 mLs (500,000 Units)  in mouth 4 times daily as needed  Refills: 0     ONETOUCH VERIO IQ test strip  Generic drug: blood glucose      2 times daily  Refills: 2     pantoprazole 40 MG EC tablet  Commonly known as: PROTONIX  Used for: PUD (peptic ulcer disease)      Dose: 40 mg  Take 1 tablet (40 mg) by mouth every morning (before breakfast)  Quantity: 30 tablet  Refills: 0     psyllium capsule  Commonly known as: METAMUCIL/KONSYL  Used for: PUD (peptic ulcer disease)      Dose: 1 capsule  Take 1 capsule by mouth 2 times daily for 90 days  Quantity: 180 capsule  Refills: 0        STOP taking    HYDROcodone-acetaminophen 5-325 MG tablet  Commonly known as: NORCO              Where to get your medicines      Some of these will need a paper prescription and others can be bought over the counter. Ask your nurse if you have questions.    Bring a paper prescription for each of these medications    magnesium oxide 400 MG tablet    predniSONE 20 MG tablet           Significant Results and Procedures   Most Recent 3 CBC's:  Recent Labs   Lab Test 07/06/22  0712 07/05/22  0658 07/04/22  0524   WBC 4.3 4.8 4.5   HGB 10.7* 10.3* 11.3*   MCV 94 95 96    265 276     Most Recent 3 BMP's:  Recent Labs   Lab Test 07/06/22  0712 07/05/22  0658 07/04/22  0524   * 133* 133*   POTASSIUM 3.5 3.9 3.9   CHLORIDE 106 108* 107   CO2 21* 21* 24   BUN 7* 9 8   CR 0.66 0.65 0.70   ANIONGAP 8 4* 2*   MANE 8.1* 8.2* 8.0*   GLC 84 87 83   ,   Results for orders placed or performed during the hospital encounter of 06/28/22   CT Abdomen Pelvis w Contrast    Narrative    EXAM: CT ABDOMEN PELVIS W CONTRAST  LOCATION: [facility]  DATE/TIME: 6/29/2022 12:13 AM    INDICATION: abd pain and bilateral leg pain.  COMPARISON: 3/20/2022  TECHNIQUE: CT scan of the abdomen and pelvis was performed following injection of IV contrast. Multiplanar reformats were obtained. Dose reduction techniques were used.  CONTRAST: isovue 370 100ml    FINDINGS:    LOWER CHEST: Fibrotic changes at left base stable. Mild cardiomegaly.    HEPATOBILIARY: Normal.    PANCREAS: Normal.    SPLEEN: Normal.    ADRENAL GLANDS: Normal.    KIDNEYS/BLADDER: Normal.    BOWEL: No definite obstruction or inflammatory change. Nonobstructive bowel loops extend into bilateral inguinal hernias, as before.    LYMPH NODES: Normal.    VASCULATURE: Modest atherosclerotic plaque.    PELVIC ORGANS: Hysterectomy. No pelvic mass. No free fluid.    MUSCULOSKELETAL: Small inguinal hernias, left larger than right, each containing nondilated loops of small bowel.  Severe T12 compression and moderately severe L4 compression unchanged. Moderate compression of L2 is slightly worse than the previous study no hip or pelvic fracture evident. Diffuse osteopenia.      Impression    IMPRESSION:   1.  No definite etiology for symptoms. Moderate compression L2 vertebral body has progressed somewhat while more severe compressions of T12 and L4 are unchanged.  2.  Bilateral small inguinal hernias containing loops of nonobstructive small bowel.   Lumbar spine CT w/o contrast    Narrative    EXAM: CT LUMBAR SPINE W/O CONTRAST  LOCATION: [facility]  DATE/TIME: 6/29/2022 12:11 AM    INDICATION: bilat leg pain, can't wallk  COMPARISON: CT abdomen pelvis 03/20/2022  TECHNIQUE: Routine CT Lumbar Spine without IV contrast. Multiplanar reformats. Dose reduction techniques were used.    FINDINGS:  Nomenclature is based on 5 lumbar type vertebral bodies. There are no new fractures. Chronic compression fracture at T12 with greater than 80% loss of height and anterior wedging, chronic compression fracture at L2 with 50% loss of height centrally   unchanged and chronic compression fracture L4 with 75% loss of height centrally, unchanged. Chronic grade 1 anterolisthesis L4-L5. Kyphotic appearance T11-L1 secondary to the T12 fracture. No extraspinal soft tissue abnormality. Unremarkable visualized   bony  pelvis. Osteopenic bones.    T12-L1: Normal disc height. No herniation. Normal facets. No spinal canal or neural foraminal stenosis.    L1-L2: Normal disc height. No herniation. Normal facets. No spinal canal or neural foraminal stenosis.    L2-L3: Normal disc height, mild disc osteophyte complex posteriorly mild bilateral facet arthropathy. Mild to moderate central stenosis. Moderate severe left foraminal stenosis. Mild right foraminal stenosis.    L3-L4: Normal disc height. Generalized disc bulge. Mild bilateral facet hypertrophy. Severe spinal stenosis. Moderate bilateral foraminal stenosis.    L4-L5: Normal disc height. Generalized disc bulge. Moderate bilateral facet hypertrophy. Grade 1 anterolisthesis. Severe spinal stenosis. Moderate to severe bilateral foraminal stenosis.    L5-S1: Normal disc height. Mild disc bulge. Normal facets. No spinal canal or neural foraminal stenosis.      Impression    IMPRESSION:  1.  Severe spinal stenosis at L3-L4 and L4-L5. Mild to moderate spinal stenosis at L2-L3.  2.  Chronic compression fractures of T12, L2 and L4 is stable.   XR Knee Left 1/2 Views    Narrative    EXAM: XR KNEE LT 1/2 VW  LOCATION: [facility]  DATE/TIME: 6/29/2022 12:23 AM    INDICATION: leg swelling  COMPARISON: None.      Impression    IMPRESSION: Bones are markedly demineralized. Allowing for this, no acute fracture detected. No dislocation. There appears to be some suprapatellar soft tissue swelling and a small knee joint effusion.   XR Chest 1 View    Narrative    EXAM: XR CHEST 1 VIEW  LOCATION: [facility]  DATE/TIME: 6/29/2022 12:23 AM    INDICATION: Shortness of breath.  COMPARISON: 5/24/2022.    FINDINGS: The heart is enlarged. There is no pulmonary edema. The thoracic aorta is calcified. There is a right perihilar infiltrate. Atelectasis or scar at the left lung base. No pneumothorax.      Impression    IMPRESSION: Right perihilar infiltrate.        Discharge Medications         Allergies   Allergies   Allergen Reactions     Unknown [No Clinical Screening - See Comments]      Pt states she has a medication allergy but does not know what it is

## 2022-07-07 NOTE — PROGRESS NOTES
Clinic Care Coordination Contact  [facility]: Post-Discharge Note  SITUATION                                                      Admission:    Admission Date: 06/26/22   Reason for Admission: constipation and hyponatremia  Discharge:   Discharge Date: 07/06/22  Discharge Diagnosis: constipation and hyponatremia    BACKGROUND                                                      Per hospital discharge summary and inpatient provider notes:  Chris Bell was admitted on 6/28/2022 for constipation and hyponatremia.  The following problems were addressed during her hospitalization:     Chronic normocytic anemia  Hemoglobin was low on admission and trended down during hospital stay, received one unit packed RBCs 7/1. Received colonoscopy 7/6/2022 addressing chronic diarrhea and possible IBD. In setting of confirmed RA and IBD workup this visit, anemia likely pointing towards AOCD. Hb 10.8 as of 7/6/2022.     Hyponatremia  Hypokalemia  Hypomagnesemia  Initial admit for hyponatremia most likely secondary to dehydration, responded well to IV fluids. Electrolyte imbalances also could be attributed to chronic diarrhea. Sodium 135 on 7/6/2022, Mg 1.7 7/6/2022, discharge level probably slightly higher as pt received Mg replacement therapy per hospital protocol. Ordered magnesium oxide 200mg PO daily to try and keep Mg levels up at home.         Constipation  Urinary retention  Chronic diarrhea  Admitted for constipation. Stool studies normal during stay, fecal calprotectin elevated. Pt agreed to EGD and colonoscopy 7/3, colonoscopy showing ileitis, scattered inflammation throughout, DDx: Crohn's, infectious (TB, viral), ischemic colitis; EGD showing tortuous esophagus, chronic atrophic gastritis. Colonoscopy biopsy results to be followed up with GI ([facility]), to be scheduled by specialists.     Rheumatoid arthritis  Admitted with new onset RA. Per GI, patient being worked up for possible IBD, therefore holding  "methotrexate treatment until TB status confirmed. Currently on 20mg prednisone to assist with swelling and pain.     Hypertension  History of HTN, continue home meds. Blood pressure may see elevation secondary to prednisone use.     Polyclonal gammopathy  Gamma gap present dating back to 2018.  Had SPEP in May 2022 showing polyclonal gammopathy, potentially due to underlying inflammatory process, per pathologist.  Screening MANJIT and RF done last week in search of underlying inflammatory process.  RF was markedly positive but MANJIT negative.    Now meeting criteria for new rheumatoid arthritis diagnosis.  Likely also with IBD diagnosis, given symptom picture and positive fecal calprotectin.       Spinal compression fractures  Patient endorsing significant lumbar back pain on admission.  Again seen are compression fractures at T12, L2, and L4, which appear stable per radiologist.  Also has severe stenosis at L3-L4 and L4-L5.  No red flag symptoms.  Has TLSO brace at home for comfort.             ASSESSMENT      Enrollment  Primary Care Care Coordination Status: Not a Candidate    Discharge Assessment  How are you doing now that you are home?: \" She is doing better eatting and everything and she will be moving into a nursing home the end of the mth\"  How are your symptoms? (Red Flag symptoms escalate to triage hotline per guidelines): Improved  Do you feel your condition is stable enough to be safe at home until your provider visit?: Yes  Does the patient have their discharge instructions? : Yes  Does the patient have questions regarding their discharge instructions? : No  Were you started on any new medications or were there changes to any of your previous medications? : Yes  Does the patient have all of their medications?: No (see comment) (\" Getting today\")  Do you have questions regarding any of your medications? : No  Do you have all of your needed medical supplies or equipment (DME)?  (i.e. oxygen tank, CPAP, cane, " etc.): Yes  Discharge follow-up appointment scheduled within 14 calendar days? : Yes  Discharge Follow Up Appointment Date: 07/20/22  Discharge Follow Up Appointment Scheduled with?: Specialty Care Provider    Post-op ([facility] Only)  If the patient had a surgery or procedure, do they have any questions for a nurse?: No             PLAN                                                      Outpatient Plan:   follow up with the  gastroenterology (GI) specialists to address this problem  further. Additionally, we found on this visit that you have a  disease called rheumatoid arthritis, which is a chronic disease  affecting your joints. Continue to take your prednisone until  your GI indicates that you are well enough to switch to  another medication for this disease. Lastly, you were also  hospitalized because your sodium and magnesium in your  blood were too low. You will continue to take magnesium  pills to keep that stable, but your sodium is now at a stable  level.    Future Appointments   Date Time Provider Department Center   7/20/2022  1:50 PM Ari Germain MD [facility]   8/17/2022  2:30 PM Jakob Cai MD [facility]         For any urgent concerns, please contact our 24 hour nurse triage line: [phone] ([phone])         Juanis Momin MA

## 2022-07-07 NOTE — PROGRESS NOTES
Clinic Care Coordination Contact  [facility]: Post-Discharge Note  SITUATION                                                      Admission:    Admission Date: 06/28/22   Reason for Admission: Chronic normocytic anemia  Discharge:   Discharge Date: 07/06/22  Discharge Diagnosis: Chronic normocytic anemia    BACKGROUND                                                      Per hospital discharge summary and inpatient provider notes.      ASSESSMENT      Enrollment  Primary Care Care Coordination Status: Declined    Discharge Assessment  How are you doing now that you are home?: Pt is doing ok  How are your symptoms? (Red Flag symptoms escalate to triage hotline per guidelines): Improved  Do you feel your condition is stable enough to be safe at home until your provider visit?: Yes  Does the patient have their discharge instructions? : Yes  Does the patient have questions regarding their discharge instructions? : No  Were you started on any new medications or were there changes to any of your previous medications? : Yes  Does the patient have all of their medications?: Yes  Do you have questions regarding any of your medications? : No  Do you have all of your needed medical supplies or equipment (DME)?  (i.e. oxygen tank, CPAP, cane, etc.): No - What equipment or supplies are needed?  Discharge follow-up appointment scheduled within 14 calendar days? : Yes  Discharge Follow Up Appointment Date: 07/11/22  Discharge Follow Up Appointment Scheduled with?: Primary Care Provider                  PLAN                                                      Outpatient Plan:      Future Appointments   Date Time Provider Department Center   7/11/2022 11:00 AM Eleonora Yu MD [facility] [place]   7/20/2022  1:50 PM Ari Germain MD [facility]   8/17/2022  2:30 PM Jakob Cai MD [facility] SLEEP         For any urgent concerns, please contact our 24 hour clinic line:   [place]  Clinic: [phone]       KATERINE JenkinsW

## 2022-07-15 NOTE — LETTER
"Recommended To-Do List      Prepared on: 7/18/2022     You can get the best results from your medications by completing the items on this \"To-Do List.\"      Bring your To-Do List when you go to your doctor. And, share it with your family or caregivers.    My To-Do List:  What we talked about: What I should do:    What my medicines are for, how to know if my medicines are working, made sure my medicines are safe for me and reviewed how to take my medicines.    Take my medicines every day. Continue to take your BUMETANIDE and AMILORIDE to help reduce fluid on your heart and lungs and also swelling in your legs. Continue to take the new OXYBUTYNIN ER 5 MG every evening medication to help with your over-active bladder that may be causing some of your urination frequency.   Follow-up with Dr. Villanueva if no improvement.                "

## 2022-07-15 NOTE — LETTER
_  Medication List        Prepared on: 7/18/2022     Bring your Medication List when you go to the doctor, hospital, or   emergency room. And, share it with your family or caregivers.     Note any changes to how you take your medications.  Cross out medications when you no longer use them.    Medication How I take it Why I use it Prescriber   acetaminophen (TYLENOL) 500 MG tablet Take 1-2 tablets (500-1,000 mg) by mouth every 8 hours as needed for pain Pain Sonny Brothers MD   aMILoride (MIDAMOR) 5 MG tablet Take 1 tablet (5 mg) by mouth daily Fluid Retention; Heart Failure; Low Magnesium Jennifer Mohr MD   bumetanide (BUMEX) 0.5 MG tablet Take 1 tablet (0.5 mg) by mouth daily Fluid on Heart and Lungs Jennifer Mohr MD   ferrous sulfate (FEROSUL) 325 (65 Fe) MG tablet Take 325 mg by mouth 2 times daily Anemia From Inadequate Iron in the Body Kizzy Villanueva   levothyroxine (SYNTHROID/LEVOTHROID) 50 MCG tablet Take 50 mcg by mouth every morning Underactive Thyroid Kizzy Villanueva   loperamide (IMODIUM) 2 MG capsule Take 1 capsule (2 mg) by mouth 4 times daily as needed for diarrhea Diarrhea Sonny Brothers MD   LORazepam (ATIVAN) 0.5 MG tablet Take 1 mg by mouth 2 times daily (Every morning and evening) Feeling Anxious Kizzy Villanueva   magnesium oxide (MAG-OX) 400 MG tablet Take 0.5 tablets (200 mg) by mouth daily for 30 days Low Magnesium Laura Mohamud MD   metoprolol succinate ER (TOPROL-XL) 50 MG 24 hr tablet Take 50 mg by mouth 2 times daily Heart Failure Kizzy Villanueva   multivitamin w/minerals (THERA-VIT-M) tablet Take 1 tablet by mouth daily General Health Patient Reported   nystatin (MYCOSTATIN) 640095 UNIT/ML suspension Swish and swallow 5 mLs (500,000 Units) in mouth 4 times daily as needed Oral Ulcer Edin Arais MD   ONETOUCH VERIO IQ test strip Use one stript to test your blood sugars twice daily Type 2 Diabetes Patient Reported   oxybutynin ER (DITROPAN XL) 5 MG 24  hr tablet Take 1 tablet by mouth every evening Frequent Urination Kizzy Villanueva   pantoprazole (PROTONIX) 40 MG EC tablet Take 1 tablet (40 mg) by mouth every morning (before breakfast) PUD (Peptic Ulcer Disease) Sonny Brothers MD   predniSONE (DELTASONE) 20 MG tablet Take 1 tablet (20 mg) by mouth daily for 10 days Rheumatoid arthritis  Laura Mohamud MD   psyllium (METAMUCIL/KONSYL) capsule Take 1 capsule by mouth 2 times daily for 90 days PUD (Peptic Ulcer Disease); Bowel Movements Sonny Brothers MD         Add new medications, over-the-counter drugs, herbals, vitamins, or  minerals in the blank rows below.    Medication How I take it Why I use it Prescriber                          Allergies:      unknown [no clinical screening - see comments]        Side effects I have had:               Other Information:              My notes and questions:

## 2022-07-15 NOTE — PROGRESS NOTES
Medication Therapy Management (MTM) Encounter    ASSESSMENT:                            Medication Adherence/Access: No issues identified    HFpEF: Improved per patient.    Urinary incontinence: Somewhat improved per patient - may be too soon to fully assess benefit of bladder medication. Would benefit from close monitoring for anticholinergic side effects.     Pain: Stable per patient.     Anxiety: Stable per patient.      GERD: Stable.     Diarrhea/Constipation: Unclear if addition of magnesium could be contributing - would benefit from use of loperamide as directed.     Anemia/Supplements: Stable.     PLAN:                            1. Continue to take your BUMETANIDE and AMILORIDE to help reduce fluid on your heart and lungs and also swelling in your legs.    2. Continue to take the new OXYBUTYNIN ER 5 MG every evening medication to help with your over-active bladder that may be causing some of your urination frequency.     Follow-up: As Needed    SUBJECTIVE/OBJECTIVE:                          Chris Bell is a 87 year old female called for a transitions of care visit. She was discharged from [facility] on 7/6/2022 for weakness. Joined today on phone by daughter and ong .  Patient's primary care provider is Kizzy Villanueva from [facility].     Reason for visit: Hospital Follow-Up.    Allergies/ADRs: Reviewed in chart  Past Medical History: Reviewed in chart  Tobacco: She reports that she has never smoked. She has never used smokeless tobacco.  Alcohol: none      Medication Adherence/Access: no issues reported - children help with medication set up. Patient denies missing any of her prescribed medications.      HFpEF: Current medications include amiloride 5 mg every morning, bumetanide 0.5 mg every morning, and metopolol XL 50 mg twice daily.  Patient reports the following medication side effects: excessive urination (see 'Urinary incontinence' below).   ECHO:  Date 4/13/22, EF  55-60%  Patient is not complaining of HF symptoms .    Patient is measuring daily weights  and reports stable.   Patient does not self-monitor blood pressure.   Patient is following a low sodium diet, is avoiding EtOH.    Urinary incontinence: Patient is taking oxybutynin XL 5 mg every evening. Just started in the last few days after seeing her doctor from [facility] - thought to have overactive bladder as well as frequency persisted all day. Has helped some with frequency, but ongoing issue. Denies any known side effects.     Pain: Patient is taking acetaminophe 500-1000 mg every 8 hours as needed. Finds helpful when needed. Denies any issues.      Anxiety: Patient is taking lorazepam 1 mg twice daily. Finds to be somewhat helpful. Denies any known side effects.     GERD: Current medications include: Protonix (pantoprazole) 40 mg once daily. Patient reports no current symptoms.  Patient feels that current regimen is effective.    Diarrhea/Constipation: Patient is taking loperamide as needed and psyllium 1 capsule twice daily. More looser stools right now. Unsure if it's related to medications.  Denies any issues.     Anemia/Supplements: Patient is taking ferrous sulfate 325 mg twice daily, magnesium 200 mg daily for 30 days, a daily multivitamin. Denies any issues.     Today's Vitals: There were no vitals taken for this visit.     Wt Readings from Last 5 Encounters:   06/30/22 112 lb (50.8 kg)   06/22/22 108 lb 9.6 oz (49.3 kg)   06/04/22 110 lb (49.9 kg)   05/25/22 111 lb 5.3 oz (50.5 kg)   05/23/22 115 lb (52.2 kg)     ----------------  Post Discharge Medication Reconciliation Status: discharge medications reconciled and changed, per note/orders.    I spent 44 minutes with this patient today (an extra 15 minutes was spent creating the Medication Action Plan). A copy of the visit note was provided to the patient's provider(s).    The patient was mailed a summary of these recommendations.     Anatoliy Christina  PharmD, BCACP  Medication Therapy Management Pharmacist  Pager: [phone]    Telemedicine Visit Details  Type of service:  Telephone visit  Start Time: 10:00 AM  End Time: 10:44 AM  Originating Location (patient location): [place]  Distant Location (provider location):  [facility]     Medication Therapy Recommendations  No medication therapy recommendations to display

## 2022-07-15 NOTE — LETTER
July 18, 2022  Chris Bell  [address]    [place] [address]    Dear PAUL Cuevas [facility]        Thank you for talking with me on Jul 15, 2022 about your health and medications. As a follow-up to our conversation, I have included two documents:      1. Your Recommended To-Do List has steps you should take to get the best results from your medications.  2. Your Medication List will help you keep track of your medications and how to take them.    If you want to talk about these documents, please call Herbert Christina RPH at phone: [phone], Monday-Friday 8-4:30pm.    I look forward to working with you and your doctors to make sure your medications work well for you.    Sincerely,    Herbert Christina RPH  [facility] Pharmacist, [facility]

## 2022-07-18 NOTE — PATIENT INSTRUCTIONS
"Recommendations from today's MTM visit:                                                    MTM (medication therapy management) is a service provided by a clinical pharmacist designed to help you get the most of out of your medicines.   Today we reviewed what your medicines are for, how to know if they are working, that your medicines are safe and how to make your medicine regimen as easy as possible.      1. Continue to take your BUMETANIDE and AMILORIDE to help reduce fluid on your heart and lungs and also swelling in your legs.    2. Continue to take the new OXYBUTYNIN ER 5 MG every evening medication to help with your over-active bladder that may be causing some of your urination frequency.     Follow-up: As Needed    It was great speaking with you today.  I value your experience and would be very thankful for your time in providing feedback in our clinic survey. In the next few days, you may receive an email or text message from [employer] with a link to a survey related to your  clinical pharmacist.\"     To schedule another MTM appointment, please call the clinic directly or you may call the MTM scheduling line at [phone] or toll-free at [phone].     My Clinical Pharmacist's contact information:                                                      Please feel free to contact me with any questions or concerns you have.      Anatoliy Christina, Vanessa, [facility]  Medication Therapy Management Pharmacist  Pager: [phone]    "

## 2022-07-28 PROBLEM — I10 HYPERTENSION, UNSPECIFIED TYPE: Status: ACTIVE | Noted: 2022-01-01

## 2022-07-28 PROBLEM — U07.1 INFECTION DUE TO 2019 NOVEL CORONAVIRUS: Status: ACTIVE | Noted: 2022-01-01

## 2022-07-28 PROBLEM — R09.02 HYPOXIA: Status: ACTIVE | Noted: 2022-01-01

## 2022-07-28 NOTE — PHARMACY-ADMISSION MEDICATION HISTORY
Pharmacy Note - Admission Medication History    Pertinent Provider Information: 7/25/22 Paxlovid shows in filling history but daughter who lives with pt says that she was never given a medication for COVID. Only drinking ginger/lemon tea to treat the COVID sx.     ______________________________________________________________________    Prior To Admission (PTA) med list completed and updated in EMR.       PTA Med List   Medication Sig Last Dose     acetaminophen (TYLENOL) 500 MG tablet Take 1-2 tablets (500-1,000 mg) by mouth every 8 hours as needed for pain (Patient taking differently: Take 500-1,000 mg by mouth every 8 hours as needed for pain) 7/28/2022 at Unknown time     aMILoride (MIDAMOR) 5 MG tablet Take 1 tablet (5 mg) by mouth daily (Patient taking differently: Take 5 mg by mouth daily) 7/27/2022 at Unknown time     bumetanide (BUMEX) 0.5 MG tablet Take 1 tablet (0.5 mg) by mouth daily (Patient taking differently: Take 0.5 mg by mouth daily) 7/27/2022 at Unknown time     cetirizine (ZYRTEC) 5 MG tablet Take 5 mg by mouth daily 7/28/2022 at Unknown time     levothyroxine (SYNTHROID/LEVOTHROID) 50 MCG tablet Take 50 mcg by mouth every morning 7/28/2022 at Unknown time     loperamide (IMODIUM) 2 MG capsule Take 1 capsule (2 mg) by mouth 4 times daily as needed for diarrhea Unknown at Unknown time     LORazepam (ATIVAN) 0.5 MG tablet Take 1 mg by mouth 2 times daily (Every morning and evening) 7/27/2022 at Unknown time     magnesium oxide (MAG-OX) 400 MG tablet Take 0.5 tablets (200 mg) by mouth daily for 30 days 7/27/2022 at Unknown time     metoprolol succinate ER (TOPROL-XL) 50 MG 24 hr tablet Take 50 mg by mouth 2 times daily 7/27/2022 at Unknown time     Multiple Vitamins-Iron (MULTIVITAMIN/IRON PO) Take 1 tablet by mouth daily 7/27/2022 at Unknown time     nystatin (MYCOSTATIN) 051988 UNIT/ML suspension Swish and swallow 5 mLs (500,000 Units) in mouth 4 times daily as needed Unknown at Unknown time      oxybutynin ER (DITROPAN XL) 5 MG 24 hr tablet Take 1 tablet by mouth every evening 7/27/2022 at Unknown time     pantoprazole (PROTONIX) 40 MG EC tablet Take 1 tablet (40 mg) by mouth every morning (before breakfast) 7/28/2022 at Unknown time     psyllium (METAMUCIL/KONSYL) Packet Take 1 packet by mouth 2 times daily as needed for constipation Past Week at Unknown time       Information source(s): Family member and CareEverywhere/SureScripts  Method of interview communication: in-person    Summary of Changes to PTA Med List  New: Zyrtec   Discontinued: iron (only in MVI per daughter)  Changed: fiber to prn    In the past week, patient estimated taking medication this percent of the time:  greater than 90%.    Patient does not use any multi-dose medications prior to admission.    The information provided in this note is only as accurate as the sources available at the time of the update(s).    Thank you for the opportunity to participate in the care of this patient.    Berna Leblanc [facility]  7/28/2022 1:45 PM

## 2022-07-28 NOTE — ED NOTES
Pt very confused, repeating questions frequently. Pt reports mouth pain, states she needs medicine to make it better. Writer gave pt swab stick to moisten tongue, pt reports this is better but still needs medicine.

## 2022-07-28 NOTE — ED NOTES
ED Handoff Report    ED Chief Complaint: SOB    ED Diagnosis:  (U07.1) Infection due to 2019 novel coronavirus  Comment:   Plan:     (R09.02) Hypoxia  Comment:   Plan:     (E87.1) Hyponatremia  Comment:   Plan:     (I10) Hypertension, unspecified type  Comment:   Plan:     (E83.42) Hypomagnesemia  Comment:   Plan:        PMH:    Past Medical History:   Diagnosis Date     Anemia      Depression      Disease of thyroid gland      HTN (hypertension)      Osteoporosis      PUD (peptic ulcer disease)         Code Status:  Prior     Falls Risk: Yes Band: Not applicable    Current Living Situation/Residence: lives alone     Elimination Status: Continent: No     Activity Level: 2 assist    Patients Preferred Language:  English     Needed: No    Vital Signs:  BP (!) 168/76   Pulse 97   Temp 96.8  F (36  C) (Axillary)   Resp (!) 42   SpO2 100%      Cardiac Rhythm:     Pain Score: 8/10    Is the Patient Confused:  Yes    Last Food or Drink: 7/27/22 at     Focused Assessment:  Pt arrived via ambulance for increased SOB. Dx with covid 7/19, home test negative 7/27, PCR positive in ED today. LS expiratory wheeze bilaterally. Tachypneic. Pt perseverating on back pain, inability to breathe. O2 100 on 6L facemask. Pt has 2 IVs, do not draw so need straight sticks for labs. Pt c/o lower back pain since fall out of bed 2 days ago. Given 50 mcg fentanyl with relief. Incontinent. Able to roll for brief changes with Ax1.    Tests Performed: Done: Labs and Imaging    Treatments Provided:  IV fentanyl, IV decadron, IV fluids, IV zosyn.    Family Dynamics/Concerns: No    Family Updated On Visitor Policy: Yes    Plan of Care Communicated to Family: Yes    Who Was Updated about Plan of Care: Son    Belongings Checklist Done and Signed by Patient: No    Medications sent with patient: n/a    Covid: symptomatic, positive    Additional Information:     RN: Janes Garcia RN   7/28/2022 5:52 PM

## 2022-07-28 NOTE — ED NOTES
Writer spoke with son Shiv for additional information. Pt had fall 7/26; fell out of bed and has had back pain ever since then. Pt reportedly alert and oriented, increased confusion since first dose covid vaccine. Original covid positive 7/19, tested negative 7/27.

## 2022-07-28 NOTE — ED PROVIDER NOTES
EMERGENCY DEPARTMENT ENCOUNTER      NAME: Chris Bell  AGE: 87 year old female  YOB: 1935  MRN: [id number]  EVALUATION DATE & TIME: 2022  9:55 AM    PCP: Kizzy Villanueva    ED PROVIDER: Philip Munguia DO      Chief Complaint   Patient presents with     Shortness of Breath         FINAL IMPRESSION:  1. Infection due to 2019 novel coronavirus    2. Hypoxia    3. Hyponatremia    4. Hypertension, unspecified type    5. Hypomagnesemia          ED COURSE & MEDICAL DECISION MAKIN-year-old female with a history of anemia, peripheral vascular disease, hypertension, and CHF who recently tested positive for COVID on 2022 was brought into the ED by EMS for evaluation of shortness of breath and back pain.  The patient's O2 sats were reportedly in the low to mid 80s on room air when EMS arrived.  The patient arrived to the ED on a nonrebreather with O2 sats at 100%.  The patient's initial history and physical was limited secondary to the fact that the mom  cannot understand what the patient was saying and there was no accompanying family members to help interpret.  The patient was tachypneic at the time of her initial evaluation but she did not appear to be in acute respiratory distress at the time of her initial evaluation.  On exam the patient was noted to have diffuse bilateral crackles with slight diminished breath sounds noted bilaterally.  The remainder of her physical exam was unremarkable.    The patient's EKG revealed normal sinus rhythm with new nonspecific ST segment changes noted in the inferior anterior leads.  Chest x-ray shows patchy opacities of the bilateral upper lobes, right greater than left.  Findings are suggestive of pneumonitis.      COVID test was positive.  The patient's sodium was markedly low at 113.  Magnesium was low 1.5.  CRP was elevated 22.5.  The white blood cell count was markedly elevated at 21.4.    Blood cultures were obtained.  The patient was  started on IV Zosyn to treat a possible hospital-acquired pneumonia.  She was also given a dose of IV Decadron for the COVID infection.  The patient was started on normal saline bolus at 100 mL/h for her hyponatremia.     Patient was admitted to the [facility] resident service for further evaluation treatment of the suspected pneumonitis, COVID infection, and hyponatremia.    Pertinent Labs & Imaging studies reviewed. (See chart for details)  10:28 AM I met with the patient to gather history and to perform my initial exam. We discussed plans for the ED course, including diagnostic testing and treatment.       At the conclusion of the encounter I discussed the results of all of the tests and the disposition. The questions were answered. The patient or family acknowledged understanding and was agreeable with the care plan.     Critical Care  Performed by: Philip Munguia DO  Authorized by: Philip Munguia DO     Total critical care time: 40 minutes  Critical care time was exclusive of separately billable procedures and treating other patients.  Critical care was necessary to treat or prevent imminent or life-threatening deterioration of the following conditions: Hypoxia and hyponatremia.  Critical care was time spent personally by me on the following activities: development of treatment plan with patient or surrogate, discussions with consultants, examination of patient, evaluation of patient's response to treatment, obtaining history from patient or surrogate, ordering and performing treatments and interventions, ordering and review of laboratory studies, ordering and review of radiographic studies and re-evaluation of patient's condition, this excludes any separately billable procedures.    PPE worn: n95 mask, goggles    MEDICATIONS GIVEN IN THE EMERGENCY:  Medications   sodium chloride 0.9% infusion (has no administration in time range)   dexamethasone PF (DECADRON) injection 6 mg (has no administration in time range)    piperacillin-tazobactam (ZOSYN) 3.375 g vial to attach to  mL bag (has no administration in time range)   fentaNYL (PF) (SUBLIMAZE) injection 50 mcg (has no administration in time range)       NEW PRESCRIPTIONS STARTED AT TODAY'S ER VISIT  New Prescriptions    No medications on file          =================================================================    HPI - history is limited secondary to patient is here alone and the Sukhjinder  cannot understand her.    Patient information was obtained from: Patient     Use of : YES (Phone) - Language: Sukhjinder Bell is a 87 year old female with a pertinent history of heart failure, HTN, anemia, cancer of soft palate, who presents to this ED via EMS for evaluation of shortness of breath.    Patient is shortness of breath, O2 was 84% on room air.    Per triage, patient arrives with shortness of breath and back pain. Patient recently had Covid, tested negative yesterday. At home, O2 was 84% on room air, increased to 100 on 10L NRB.     REVIEW OF SYSTEMS   Review of Systems   Unable to perform ROS: Other      Limited secondary to patient cannot be understood.    PAST MEDICAL HISTORY:  Past Medical History:   Diagnosis Date     Anemia      Depression      Disease of thyroid gland      HTN (hypertension)      Osteoporosis      PUD (peptic ulcer disease)        PAST SURGICAL HISTORY:  Past Surgical History:   Procedure Laterality Date     COLONOSCOPY N/A 7/3/2022    Procedure: COLONOSCOPY WITH COLON BIOPSIES;  Surgeon: Marty Patel MD;  Location: [facility]     ESOPHAGOSCOPY, GASTROSCOPY, DUODENOSCOPY (EGD), COMBINED N/A 1/17/2018    Procedure: ESOPHAGOGASTRODUODENOSCOPY (EGD) with h.pylori and gastric ulcer biopsies;  Surgeon: Colin Alvarez MD;  Location: [facility];  Service:      ESOPHAGOSCOPY, GASTROSCOPY, DUODENOSCOPY (EGD), COMBINED N/A 7/3/2022    Procedure: ESOPHAGOGASTRODUODENOSCOPY (EGD) WITH DUODENAL & GASTRIC  BIOPSIES;  Surgeon: Marty Patel MD;  Location: [facility] [place] OR     HYSTERECTOMY       US BIOPSY FINE NEEDLE ASPIRATION LYMPH NODE  8/14/2019           CURRENT MEDICATIONS:    acetaminophen (TYLENOL) 500 MG tablet  Alcohol Swabs (B-D SINGLE USE SWABS REGULAR) PADS  aMILoride (MIDAMOR) 5 MG tablet  blood glucose monitoring (ONETOUCH VERIO) meter device kit  bumetanide (BUMEX) 0.5 MG tablet  ferrous sulfate (FEROSUL) 325 (65 Fe) MG tablet  levothyroxine (SYNTHROID/LEVOTHROID) 50 MCG tablet  loperamide (IMODIUM) 2 MG capsule  LORazepam (ATIVAN) 0.5 MG tablet  magnesium oxide (MAG-OX) 400 MG tablet  metoprolol succinate ER (TOPROL-XL) 50 MG 24 hr tablet  multivitamin w/minerals (THERA-VIT-M) tablet  nystatin (MYCOSTATIN) 747133 UNIT/ML suspension  ONETOUCH VERIO IQ test strip  oxybutynin ER (DITROPAN XL) 5 MG 24 hr tablet  pantoprazole (PROTONIX) 40 MG EC tablet  psyllium (METAMUCIL/KONSYL) capsule        ALLERGIES:  Allergies   Allergen Reactions     Unknown [No Clinical Screening - See Comments]      Pt states she has a medication allergy but does not know what it is       FAMILY HISTORY:  Family History   Problem Relation Age of Onset     Diabetes No family hx of      Cancer No family hx of      Heart Disease No family hx of        SOCIAL HISTORY:   Social History     Socioeconomic History     Marital status:    Tobacco Use     Smoking status: Never Smoker     Smokeless tobacco: Never Used   Substance and Sexual Activity     Alcohol use: No     Drug use: No     Sexual activity: Never       VITALS:  BP (!) 150/84   Pulse 90   Temp 96.8  F (36  C) (Axillary)   Resp (!) 38   SpO2 100%     PHYSICAL EXAM    General presentation: Alert, Vital signs reviewed. no apparent distress. Fatigued appearing.  HENT: ENT inspection is normal. Oropharynx is moist and clear.   Eye: Pupils are equal and reactive to light. EOMI  Neck: The neck is supple, with full ROM, with no evidence of meningismus.  Pulmonary:  Tachypneic, diffuse crackles noted bilaterally. Slightly diminished breath sounds. No wheezing or rhonchi.  Circulatory: Regular rate and rhythm. Peripheral pulses are strong and equal. No murmurs, rubs, or gallops.   Abdominal: The abdomen is soft. Nontender. No rigidity, guarding, or rebound. Bowel sounds normal.   Neurologic: Alert, oriented to person, place, and time. No motor deficit. No sensory deficit. Cranial nerves II through XII are intact.  Musculoskeletal: No extremity tenderness. Full range of motion in all extremities. No extremity edema.   Skin: Skin color is normal. No rash. Warm. Dry to touch.      LAB:  All pertinent labs reviewed and interpreted.  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 Specifically, patchy opacities in both upper lobes, right greater than left are noted. New areas of disease are noted in the right lateral costophrenic angle. Retrocardiac opacities on the left   with partial silhouetting the left hemidiaphragm are unchanged. Findings are more suggestive of a pneumonitis. No signs of failure. Heart and pulmonary vascularity are normal.    NOTE: ABNORMAL REPORT    THE DICTATION ABOVE DESCRIBES AN ABNORMALITY FOR WHICH FOLLOW-UP IS NEEDED.    Extra Blood Culture Bottle   Result Value Ref Range    Hold Specimen JIC    Extra Green Top (Lithium Heparin) Tube   Result Value Ref Range    Hold Specimen JIC    Result Value Ref Range    INR 1.08 0.85 - 1.15   Partial thromboplastin time   Result Value Ref Range    aPTT 30 22 - 38 Seconds   Comprehensive metabolic panel   Result Value Ref Range    Sodium 113 (LL) 136 - 145 mmol/L    Potassium 4.8 3.5 - 5.0 mmol/L    Chloride 80 (L) 98 - 107 mmol/L    Carbon Dioxide (CO2) 18 (L) 22 - 31 mmol/L    Anion Gap 15 5 - 18 mmol/L    Urea Nitrogen 50 (H) 8 - 28 mg/dL    Creatinine 1.23 (H) 0.60 - 1.10 mg/dL     Calcium 8.8 8.5 - 10.5 mg/dL    Glucose 124 70 - 125 mg/dL    Alkaline Phosphatase 193 (H) 45 - 120 U/L    AST 18 0 - 40 U/L    ALT <9 0 - 45 U/L    Protein Total 8.7 (H) 6.0 - 8.0 g/dL    Albumin 2.3 (L) 3.5 - 5.0 g/dL    Bilirubin Total 0.5 0.0 - 1.0 mg/dL    GFR Estimate 42 (L) >60 mL/min/1.73m2   Result Value Ref Range    Magnesium 1.5 (L) 1.8 - 2.6 mg/dL   Result Value Ref Range    Troponin I 0.05 0.00 - 0.29 ng/mL   Symptomatic; Unknown Influenza A/B & SARS-CoV2 (COVID-19) Virus PCR Multiplex Nasopharyngeal    Specimen: Nasopharyngeal; Swab   Result Value Ref Range    Influenza A PCR Negative Negative    Influenza B PCR Negative Negative    SARS CoV2 PCR Positive (A) Negative   CRP inflammation   Result Value Ref Range    CRP 22.4 (H) 0.0 - <0.8 mg/dL       RADIOLOGY:  Reviewed all pertinent imaging. Please see official radiology report.  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 Specifically, patchy opacities in both upper lobes, right greater than left are noted. New areas of disease are noted in the right lateral costophrenic angle. Retrocardiac opacities on the left    with partial silhouetting the left hemidiaphragm are unchanged. Findings are more suggestive of a pneumonitis. No signs of failure. Heart and pulmonary vascularity are normal.      NOTE: ABNORMAL REPORT      THE DICTATION ABOVE DESCRIBES AN ABNORMALITY FOR WHICH FOLLOW-UP IS NEEDED.           EKG:    Normal sinus rhythm.  Rate of 98.  Normal QRS.  Normal QT.  Nonspecific ST changes.  Compared EKG on 6/20/2022 the nonspecific ST changes appear more prominent in the inferior and anterior leads.    I have independently reviewed and interpreted the EKG(s) documented above.        I, Martha Tanner , am serving as a scribe to document services personally performed by Philip Munguia, DO based on my observation and the provider's statements to me. I, Philip Munguia, attest that Martha Tanner is acting in a  avinash garrison, has observed my performance of the services and has documented them in accordance with my direction.    Philip Munguia DO  Emergency Medicine  [facility] EMERGENCY DEPARTMENT  [address] [place] [phone] [phone]     Philip Munguia DO  07/28/22 [id number]

## 2022-07-28 NOTE — ED NOTES
Multiple attempts to draw labs from each IV and straight stick unsuccessful. Phlebotomy notified and will come draw.

## 2022-07-28 NOTE — ED NOTES
Bed: JNED-19  Expected date: 7/28/22  Expected time:   Means of arrival: Ambulance  Comments:  SPF  SOB

## 2022-07-28 NOTE — H&P
[facility]    History and Physical - Hospitalist Service       Date of Admission:  7/28/2022    Assessment & Plan      Chris Bell is a 87 year old female with history significant for HFpEF, HTN, normocytic anemia, spinal compression fractures, suspected RA, polyclonal gammopathy, multiple hospitalizations for diarrhea with the most recent on 6/28-7/6, presenting with ten days of progressive shortness of breath in the setting of known COVID positive test prior to symptoms. She was found to have hypoxic respiratory failure secondary to COVID infection/HAP and severe hyponatremia.     Hypoxic respiratory failure  COVID +  HAP?  Patient presented with ten days of progressive shortness of breath. Son states she had a positive COVID test about 2 weeks ago. On day of admission, she was found to by hypoxic in the low-mid 80's and tachypneic in the 30's by EMS. On exam, patient looks dry-euvolemic. Lab work-up notable for a leukocytosis of 21 with a left shift, normal lactic acid, CRP 22, metabolic acidosis with BC of 18, normal Hgb, negative troponin, and BNP of 236. CXR revealed worsening bilateral upper lobe opacities c/w pneumonitis. Of note, last CT of abdomen on 6/29 noted some fibrotic changes in the LL lung. Over all, the picture is most concerning for COVID infection with overlying infectious process-- HAP. Her BNP is elevated; however, in a non-exacerbated state she is around 175. Additionally, she does not have any clinical s/sx of heart failure. Also considered PE, however last CTPE negative; although she is tachycardic and a COVID infection puts her at higher risk. There may be a metabolic component driving her tachypnea as well.  - O2 to maintain saturations>92%   - Remdesivir/Dexamethasone, start date: 7/28  - COVID labs: CBC, BMP, d-dimer, CRP, INR daily x4 days  - Zosyn, start date: 7/28  - Consider CT-PE  - VBG now  - Sputum gram stain and culture  - Blood cultures x2 pending  -  telemetry    Severe hyponatremia, unspecified  Sodium 113 on admission. Last known sodium 135 a few weeks ago. On exam, she appears dry to euvolemic. Additionally, she endorses very low po intake the last week or so. Does not appear to have a change in mentation, no seizures reported. Could be hypovolemic hyponatremia vs tea and toast vs SIADH. She was started on 100ml/hr NS in the ED. Awaiting sodium recheck.  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 Estimated GFR of 42 (usually in 80's). Suspect prerenal given Cr/BUN ratio>20 and dry-euvolemic on exam.  - BMP am    Hypomagnesemia  Magnesium 1.5 on admission.  - on PTA magnesium oxide tablet  - Magnesium replacement protocol     Lower Back Pain  Chronic spinal compression fracture  Patient endorsing lumbar back pain on admission, worse with movement. She endorses a mechanical fall from the bed to the floor landing on her back two days ago. Chronic compression fractures at T12, L2, and L4 on CT lumbar spine on 6/28, which appeared stable at that time.  Also has severe stenosis at L3-L4 and L4-L5.  No red flag symptoms. Has TLSO brace at home for comfort.  - Scheduled tylenol 975mg TID  - Oxycodone 2.5mg q4h prn for severe pain    HFpEF, not in exacerbation  Patient has a history of HFpEF. On exam, does not appear to be hypervolemic, more dry-euvolemic. Crackles on exam more dry sounding. BNP elevated at 236, although during non-exacerbated state is 170's.   - hold PTA bumex, Midamor    Polyclonal gammopathy  Gamma gap present dating back to 2018.  Had SPEP in May 2022 showing polyclonal gammopathy, potentially due to underlying inflammatory process, per pathologist.  Screening MANJIT and RF done last  "week in search of underlying inflammatory process.  RF was markedly positive but MANJIT negative. She was meeting criteria for new RA diagnosis last admission with plans to follow-up with rheumatology as an outpatient (no documentation of this). HIV, Hep B and Hep C testing negative.    Rheumatoid arthritis, recent diagnosis  Per last admission \"Long history of multiple joint pain, worse in the knees and hands. On exam her thumbs are flexed towards midline with prominent MCP swelling across all five joints bilaterally. Her bilateral knees also appear swollen but not erythematous. Her RF and CRP were elevated last admission, as well as calprotectin. CCP positive.  She meets criteria for rheumatoid arthritis diagnosis.  Discussed this with patient and her daughter 6/30.\" Patient is not endorsing joint pain on admission. She had completed a burst of steroids after the last admission.  - consider rheumatology consult to start methotrexate    Other chronic medication conditions  HTN- holding amiloride, Bumex, continue PTA Metoprolol  Hypothyroidism- continue PTA levothyroxine  Urinary frequency- continue PTA oxybutynin  GERD- continue PTA pantoprazole     Diet: NPO for Medical/Clinical Reasons Except for: Meds    DVT Prophylaxis: Enoxaparin (Lovenox) SQ  Javed Catheter: Not present  Fluids: 100ml/hr NS  Central Lines: None  Cardiac Monitoring: None  Code Status:  FULL per today's discussion, recently with DNR/DNI in prior admissions-- continue discussion      Disposition Plan    Multiple days pending oxygen support, resolution of hyponatremia, and IV antibiotic treatment    The patient's care was discussed with the Attending Physician, Dr. Boone.    Rebecca Ring MD  Hospitalist Service  [facility]  Securely message with the Vocera Web Console (learn more here)  Text page via [employer] Paging/Directory       ______________________________________________________________________    Chief Complaint " "  Shortness of breath    History is obtained from the patient and patient's son.    History of Present Illness   Chris Bell is a 87 year old female with history significant for HFpEF, HTN, normocytic anemia, spinal compression fractures, suspected RA, polyclonal gammopathy, multiple hospitalizations for diarrhea with the most recent on 6/28-7/6, presenting with ten days of progressive shortness of breath in the setting of known COVID positive test prior to symptoms.     Patient's son states she had a positive COVID test a little under two weeks ago. 3-4 days after the test, she developed some shortness of breath. In the last few days it has become progressively worse. Patient is unable to answer if it is worse lying down vs. with activity. She has not noticed increased lower leg swelling. Denies fevers/chills or sick contacts. She does not endorse chest pain but does endorse lower back pain. Two days ago, she was trying to reach for her phone when she fell out of bed to the floor on her back. Denies hitting her head or losing consciousness. Denies new urinary retention, fecal incontinence, or saddle anesthesia. No new focal weakness/numbness in lower extremities. She has some urinary incontinence that is chronic, on oxybutynin. She has a history of compression fractures. She has not been eating well as she only eats \"a little porridge every day\". She lives alone in an apartment and her daughter is her PCA. She normally walks with a walker, but she has not been ambulating as much due to back pain.    Otherwise denies nausea, vomiting, diarrhea, constipation, melena, hematochezia,     Review of Systems    The 10 point Review of Systems is negative other than noted in the HPI or here.     Past Medical History    I have reviewed this patient's medical history and updated it with pertinent information if needed.   Past Medical History:   Diagnosis Date     Anemia      Depression      Disease of thyroid gland      HTN " (hypertension)      Osteoporosis      PUD (peptic ulcer disease)         Past Surgical History   I have reviewed this patient's surgical history and updated it with pertinent information if needed.  Past Surgical History:   Procedure Laterality Date     COLONOSCOPY N/A 7/3/2022    Procedure: COLONOSCOPY WITH COLON BIOPSIES;  Surgeon: Marty Patel MD;  Location: [facility] [place]     ESOPHAGOSCOPY, GASTROSCOPY, DUODENOSCOPY (EGD), COMBINED N/A 1/17/2018    Procedure: ESOPHAGOGASTRODUODENOSCOPY (EGD) with h.pylori and gastric ulcer biopsies;  Surgeon: Colin Alvarez MD;  Location: [facility];  Service:      ESOPHAGOSCOPY, GASTROSCOPY, DUODENOSCOPY (EGD), COMBINED N/A 7/3/2022    Procedure: ESOPHAGOGASTRODUODENOSCOPY (EGD) WITH DUODENAL & GASTRIC BIOPSIES;  Surgeon: Marty Patel MD;  Location: [facility] OR     HYSTERECTOMY       US BIOPSY FINE NEEDLE ASPIRATION LYMPH NODE  8/14/2019        Social History   I have reviewed this patient's social history and updated it with pertinent information if needed. Chris Bell  reports that she has never smoked. She has never used smokeless tobacco. She reports that she does not drink alcohol and does not use drugs.    Family History   No significant family history.    Prior to Admission Medications   Prior to Admission Medications   Prescriptions Last Dose Informant Patient Reported? Taking?   Alcohol Swabs (B-D SINGLE USE SWABS REGULAR) PADS   Yes No   Sig: USE UTD   LORazepam (ATIVAN) 0.5 MG tablet 7/27/2022 at Unknown time  Yes Yes   Sig: Take 1 mg by mouth 2 times daily (Every morning and evening)   Multiple Vitamins-Iron (MULTIVITAMIN/IRON PO) 7/27/2022 at Unknown time  Yes Yes   Sig: Take 1 tablet by mouth daily   ONETOUCH VERIO IQ test strip   Yes No   Sig: Use one stript to test your blood sugars twice daily   aMILoride (MIDAMOR) 5 MG tablet 7/27/2022 at Unknown time  No Yes   Sig: Take 1 tablet (5 mg) by mouth daily   Patient taking differently: Take  5 mg by mouth daily   acetaminophen (TYLENOL) 500 MG tablet 2022 at Unknown time  No Yes   Sig: Take 1-2 tablets (500-1,000 mg) by mouth every 8 hours as needed for pain   Patient taking differently: Take 500-1,000 mg by mouth every 8 hours as needed for pain   blood glucose monitoring (ONETOUCH VERIO) meter device kit   Yes No   Si times daily   bumetanide (BUMEX) 0.5 MG tablet 2022 at Unknown time  No Yes   Sig: Take 1 tablet (0.5 mg) by mouth daily   Patient taking differently: Take 0.5 mg by mouth daily   cetirizine (ZYRTEC) 5 MG tablet 2022 at Unknown time  Yes Yes   Sig: Take 5 mg by mouth daily   levothyroxine (SYNTHROID/LEVOTHROID) 50 MCG tablet 2022 at Unknown time  Yes Yes   Sig: Take 50 mcg by mouth every morning   loperamide (IMODIUM) 2 MG capsule Unknown at Unknown time  No Yes   Sig: Take 1 capsule (2 mg) by mouth 4 times daily as needed for diarrhea   magnesium oxide (MAG-OX) 400 MG tablet 2022 at Unknown time  No Yes   Sig: Take 0.5 tablets (200 mg) by mouth daily for 30 days   metoprolol succinate ER (TOPROL-XL) 50 MG 24 hr tablet 2022 at Unknown time  Yes Yes   Sig: Take 50 mg by mouth 2 times daily   nystatin (MYCOSTATIN) 482256 UNIT/ML suspension Unknown at Unknown time  No Yes   Sig: Swish and swallow 5 mLs (500,000 Units) in mouth 4 times daily as needed   oxybutynin ER (DITROPAN XL) 5 MG 24 hr tablet 2022 at Unknown time  Yes Yes   Sig: Take 1 tablet by mouth every evening   pantoprazole (PROTONIX) 40 MG EC tablet 2022 at Unknown time  No Yes   Sig: Take 1 tablet (40 mg) by mouth every morning (before breakfast)   psyllium (METAMUCIL/KONSYL) Packet Past Week at Unknown time  Yes Yes   Sig: Take 1 packet by mouth 2 times daily as needed for constipation      Facility-Administered Medications: None     Allergies   Allergies   Allergen Reactions     Unknown [No Clinical Screening - See Comments]      Pt states she has a medication allergy but does not  know what it is       Physical Exam   Vital Signs: Temp: 96.8  F (36  C) Temp src: Axillary BP: (!) 168/76 Pulse: 97   Resp: (!) 42 SpO2: 100 % O2 Device: Non-rebreather mask Oxygen Delivery: 10 LPM  Weight: 0 lbs 0 oz    Constitutional: appears ill, tachypneic, able to answer questions and follow commands  Eyes: Lids and lashes normal, pupils equal, round and reactive to light, extra ocular muscles intact, sclera clear, conjunctiva normal  ENT: normocepalic, without obvious abnormality; dry MMM, no JVD  Respiratory: tachypneic, on 5L nonrebreather, diminished breath sounds at the bases, dry crackles noted diffusely, no wheezing  Cardiovascular: tachycardic, regular rhythm   GI: soft, not distended or tender  Skin: no lesions and dry skin  Musculoskeletal: no lower extremity pitting edema present  Neurologic: Awake, alert, oriented to name, place and time.  Cranial nerves II-XII are grossly intact.  Moving all extremities.    Data   Data reviewed today: I reviewed all medications, new labs and imaging results over the last 24 hours. I personally reviewed EKG and CXR.  Recent Labs   Lab 07/28/22  1746 07/28/22  1046   WBC  --  21.4*   HGB  --  12.5   MCV  --  90   PLT  --  258   INR  --  1.08   * 113*   POTASSIUM  --  4.8   CHLORIDE  --  80*   CO2  --  18*   BUN  --  50*   CR  --  1.23*   ANIONGAP  --  15   MANE  --  8.8   GLC  --  124   ALBUMIN  --  2.3*   PROTTOTAL  --  8.7*   BILITOTAL  --  0.5   ALKPHOS  --  193*   ALT  --  <9   AST  --  18     Recent Results (from the past 24 hour(s))   XR Chest Port 1 View    Narrative    EXAM: XR CHEST PORT 1 VIEW  LOCATION: [facility]  DATE/TIME: 7/28/2022 10:58 AM    INDICATION: Chest Pain  COMPARISON: 06/29/2022 and older studies, chest CT 06/22/2022      Impression    IMPRESSION: Parenchymal disease has progressed bilaterally. Specifically, patchy opacities in both upper lobes, right greater than left are noted. New areas of disease are noted  in the right lateral costophrenic angle. Retrocardiac opacities on the left   with partial silhouetting the left hemidiaphragm are unchanged. Findings are more suggestive of a pneumonitis. No signs of failure. Heart and pulmonary vascularity are normal.    NOTE: ABNORMAL REPORT    THE DICTATION ABOVE DESCRIBES AN ABNORMALITY FOR WHICH FOLLOW-UP IS NEEDED.

## 2022-07-28 NOTE — ED TRIAGE NOTES
Pt arrives from home via [facility] with c/o SOB and back pain. Per EMS pt recently had covid, tested negative yesterday. At home O2 was 84 on RA, increased to 100 on 10L NRB. Pt recently dx with heart failure.

## 2022-07-29 NOTE — PHARMACY-CONSULT NOTE
Pharmacy consulted to evaluate patient's drug regimen for causes of SIADH or hyponatremia.    Pantoprazole is cited as a potential cause of SIADH per UpToDate.  Amiloride and bumetanide have the potential side effect of hyponatremia, listed as 1-10% frequency.    Please contact pharmacy if further questions.  Emily Lopes, ShivamD

## 2022-07-29 NOTE — PROGRESS NOTES
07/29/22 1400   General Information   Onset of Illness/Injury or Date of Surgery 07/28/22   Referring Physician Dr Boone   Patient/Family Therapy Goal Statement (SLP) to eat/drink   Pertinent History of Current Problem 87 year old female with history significant for HFpEF, HTN, normocytic anemia, spinal compression fractures, suspected RA, polyclonal gammopathy, multiple hospitalizations for diarrhea with the most recent on 6/28-7/6, presenting with ten days of progressive shortness of breath in the setting of known COVID positive test prior to symptoms. She was found to have hypoxic respiratory failure secondary to COVID infection/HAP and severe hyponatremia   General Observations Awake, verbal       Present yes   Language Hmong   Type of Evaluation   Type of Evaluation Swallow Evaluation   Oral Motor   Oral Musculature generally intact   Structural Abnormalities none present   Dentition (Oral Motor)   Dentition (Oral Motor) natural dentition;adequate dentition   Facial Symmetry (Oral Motor)   Facial Symmetry (Oral Motor) WNL   Lip Function (Oral Motor)   Lip Range of Motion (Oral Motor) WNL   Lip Sensitivity (Oral Motor) intact   Lip Strength (Oral Motor) WNL   Tongue Function (Oral Motor)   Tongue Strength (Oral Motor) WNL   Tongue Coordination/Speed (Oral Motor) WNL   Tongue ROM (Oral Motor) WNL   Jaw Function (Oral Motor)   Jaw Function (Oral Motor) WNL   Cough/Swallow/Gag Reflex (Oral Motor)   Volitional Swallow (Oral Motor) WNL   Vocal Quality/Secretion Management (Oral Motor)   Vocal Quality (Oral Motor) WNL   Secretion Management (Oral Motor) WNL   General Swallowing Observations   Respiratory Support (General Swallowing Observations) none   Current Diet/Method of Nutritional Intake (General Swallowing Observations, NIS) NPO   Clinical Swallow Evaluation   Feeding Assistance minimal assistance required   Clinical Swallow Evaluation Textures Trialed thin liquids;minced &  moist;pureed   Clinical Swallow Eval: Thin Liquid Texture Trial   Mode of Presentation, Thin Liquids cup;straw;self-fed   Volume of Liquid or Food Presented 6 oz   Oral Phase of Swallow WFL   Pharyngeal Phase of Swallow intact   Diagnostic Statement good oral control, no overt s/s aspiration   Clinical Swallow Evaluation: Puree Solid Texture Trial   Volume of Puree Presented 1 oz, refused anything more   Oral Phase, Puree WFL   Pharyngeal Phase, Puree intact   Diagnostic Statement good oral control, no overt s/s aspiration   Clinical Swallow Eval: Minced & Moist   Mode of Presentation fed by clinician;spoon   Volume Presented 3 oz rice porridge from home   Oral Phase WFL   Pharyngeal Phase intact;other (see comments)  (cough x1 when taking sips of water along w/food)   Diagnostic Statement Good oral control, minimal diffuse oral stasis that she clear independently with tongue and second swallow. When taking a sip with mild residual rice in her mouth, she had cough x1, No cough when swallowing food fully before taking a sip.   Esophageal Phase of Swallow   Patient reports or presents with symptoms of esophageal dysphagia No   Swallowing Recommendations   Diet Consistency Recommendations thin liquids (level 0);minced & moist (level 5)   Supervision Level for Intake distant supervision needed   Recommended Feeding/Eating Techniques (Swallow Eval) maintain upright sitting position for eating   Medication Administration Recommendations, Swallowing (SLP) per patient preference   General Therapy Interventions   Planned Therapy Interventions Dysphagia Treatment   Dysphagia treatment Modified diet education;Compensatory strategies for swallowing   Clinical Impression   Criteria for Skilled Therapeutic Interventions Met (SLP Eval) Yes, treatment indicated   SLP Diagnosis dysphagia   Risks & Benefits of therapy have been explained evaluation/treatment results reviewed;care plan/treatment goals reviewed;risks/benefits  reviewed;participants included;patient;participants voiced agreement with care plan   Clinical Impression Comments Good oral manipulation of all consistencies. Minimal amount of oral residual w/rice porridge which she is able to clear independently. Suspect this is her baseline with this type of food. Cough x1 when taking a drink of water with min food residual in her mouth. At this time she wants foods such as rice porridge, mashed potatoes, easy to eat foods. Recommend minced and moist solids w/thin liquids at this time to maximize safe nutrition.   SLP Discharge Planning   SLP Discharge Recommendation home with assist   SLP Rationale for DC Rec baseline assist needed/provided   SLP Brief overview of current status  minced and moist diet w/thin liquids    Total Evaluation Time   Total Evaluation Time (Minutes) 20   SLP Goals   Therapy Frequency (SLP Eval) 3 times/wk   SLP Predicted Duration/Target Date for Goal Attainment 08/04/22   SLP Goals Swallow

## 2022-07-29 NOTE — PROGRESS NOTES
07/29/22 1450   Quick Adds   Type of Visit Initial PT Evaluation       Present yes   Language Hmong  (language line)   Living Environment   People in Home child(chriss), adult   Transportation Anticipated family or friend will provide   Living Environment Comments pt is poor historian; history taken from chart as pt is not answering questions with use of  via language line   Self-Care   Equipment Currently Used at Home walker, rolling   Activity/Exercise/Self-Care Comment dtr is PCA and assist with ADL's and ambulation with rw; per chart, pt will be going to adult foster care home   General Information   Onset of Illness/Injury or Date of Surgery 07/28/22   Referring Physician Dr. Boone   Patient/Family Therapy Goals Statement (PT) none stated   Pertinent History of Current Problem (include personal factors and/or comorbidities that impact the POC) COVID pnemonia, hyponatremia; PMH of CHF, HTN, anemia, spinal comp. fxs, RA   Existing Precautions/Restrictions other (see comments)  (TLSO for comfort per MD note in chart but no formal orders; COVID special prec.)   Cognition   Affect/Mental Status (Cognition) unable/difficult to assess  (pt not answering questions from language line ; speaking clearly but not appropriate to questions)   Orientation Status (Cognition) unable/difficult to assess   Follows Commands (Cognition) physical/tactile prompts required;delayed response/completion;repetition of directions required;verbal cues/prompting required   Range of Motion (ROM)   Range of Motion ROM is WFL   Strength (Manual Muscle Testing)   Strength (Manual Muscle Testing) Deficits observed during functional mobility   Bed Mobility   Bed Mobility supine-sit;sit-supine   Supine-Sit [place] (Bed Mobility) maximum assist (25% patient effort);verbal cues   Sit-Supine [place] (Bed Mobility) maximum assist (25% patient effort);verbal cues   Transfers   Transfers sit-stand  transfer   Sit-Stand Transfer   Sit-Stand [place] (Transfers) moderate assist (50% patient effort);verbal cues;nonverbal cues (demo/gesture)   Assistive Device (Sit-Stand Transfers) walker, front-wheeled   Gait/Stairs (Locomotion)   [place] Level (Gait) moderate assist (50% patient effort);nonverbal cues (demo/gesture);verbal cues   Assistive Device (Gait) walker, front-wheeled   Distance in Feet (Required for LE Total Joints) 2'x2   Pattern (Gait) step-to   Deviations/Abnormal Patterns (Gait) anthony decreased;gait speed decreased;other (see comments)  (assist with balance and for maneuvering rw)   Clinical Impression   Criteria for Skilled Therapeutic Intervention Yes, treatment indicated   PT Diagnosis (PT) impaired functional mobility   Influenced by the following impairments decreased cognition, strength, balance, activity tolerance   Functional limitations due to impairments bed mobility, transfers, amb.   Clinical Presentation (PT Evaluation Complexity) Stable/Uncomplicated   Clinical Presentation Rationale pt presents as medically diagnosed   Clinical Decision Making (Complexity) moderate complexity   Planned Therapy Interventions (PT) balance training;bed mobility training;gait training;strengthening;transfer training   Anticipated Equipment Needs at Discharge (PT) walker, rolling;wheelchair;gait belt   Risk & Benefits of therapy have been explained evaluation/treatment results reviewed;patient   PT Discharge Planning   PT Discharge Recommendation (DC Rec) home with assist;home with home care physical therapy;Long term care facility;Transitional Care Facility   PT Rationale for DC Rec pt is assist of 1 for mobility with rw; can go home with family if they can provide assist   Plan of Care Review   Plan of Care Reviewed With patient   Total Evaluation Time   Total Evaluation Time (Minutes) 10   Physical Therapy Goals   PT Frequency 3x/week   PT Predicted Duration/Target Date for Goal Attainment  08/05/22   PT Goals Bed Mobility;Transfers;Gait   PT: Bed Mobility Minimal assist;Supine to/from sit   PT: Transfers Minimal assist;Sit to/from stand;Assistive device   PT: Gait Minimal assist;25 feet;Rolling walker

## 2022-07-29 NOTE — CONSULTS
Care Management Initial Consult    General Information  Assessment completed with:  ,         Primary Care Provider verified and updated as needed:     Readmission within the last 30 days:           Advance Care Planning:            Communication Assessment  Patient's communication style: spoken language (non-English)    Hearing Difficulty or Deaf: no   Wear Glasses or Blind: no    Cognitive  Cognitive/Neuro/Behavioral: WDL                      Living Environment:   People in home: child(chriss), adult     Current living Arrangements:        Able to return to prior arrangements: no       Family/Social Support:  Care provided by: homecare agency  Provides care for: no one, unable/limited ability to care for self  Marital Status:   Children          Description of Support System: Supportive    Support Assessment: Adequate family and caregiver support    Current Resources:   Patient receiving home care services:       Community Resources:    Equipment currently used at home: walker, standard  Supplies currently used at home:      Employment/Financial:  Employment Status:          Financial Concerns:     Referral to Financial Worker: No       Lifestyle & Psychosocial Needs:  Social Determinants of Health     Tobacco Use: Low Risk      Smoking Tobacco Use: Never Smoker     Smokeless Tobacco Use: Never Used   Alcohol Use: Not on file   Financial Resource Strain: Not on file   Food Insecurity: Not on file   Transportation Needs: Not on file   Physical Activity: Not on file   Stress: Not on file   Social Connections: Not on file   Intimate Partner Violence: Not on file   Depression: At risk     PHQ-2 Score: 3   Housing Stability: Not on file       Functional Status:  Prior to admission patient needed assistance:   Dependent ADLs:: Ambulation-walker  Dependent IADLs:: Meal Preparation, Medication Management, Money Management, Transportation, Incontinence, Shopping, Laundry, Cooking, Cleaning       Mental Health  Status:  Mental Health Status: No Current Concerns       Chemical Dependency Status:  Chemical Dependency Status: No Current Concerns             Values/Beliefs:  Spiritual, Cultural Beliefs, Yarsani Practices, Values that affect care:                 Additional Information:  Chart reviewed. SW spoke to patient's son Shiv for assessment. Pt from home with family , daughter is PCA. Per son , plan is to admit to [facility] which is a private adult foster care home. Xong is main contact at the home [phone]. Xong reports pt must be covid recovered to admit.(ER notes indicate pt tested positive for COVID on 7/19/2022 ). May need to verify with infection control. Care management will follow and keep family and Xong updated.    Kristy Holloway, JAQUANSW

## 2022-07-29 NOTE — PROGRESS NOTES
07/29/22 1500   Appointment Canceled   Cancel Comments per chart review, pt. family member is a PCA, is dependent with all adl's and Iadl's/dependent with ambulation.  Recommend retiurn to home with previous family assist. No acute OT needs at this time. will complete the order.   Signing Clinician's Name / Credentials   Signing clinician's name / credentials alfonso HESTER/CLEMENTE

## 2022-07-29 NOTE — SIGNIFICANT EVENT
Significant Event Note    Time of event: 3:44 AM July 29, 2022    Description of event:  Sodium slowly improving, 117 at last check.  Will recheck in 4 hours.  Plan to discontinue 3% normal saline once sodium level reaches 118.    CT PE run completed for elevated D-dimer.  No pulmonary embolism noted.  Did show bilateral groundglass airspace infiltrates consistent with pneumonia or pneumonitis.  Does have an active COVID-19 infection.    Nonspecific edema in the right upper quadrant next to the gallbladder.  Can consider ultrasound.        Juan Lay MD PGY-2  [facility]   Pager# [phone]

## 2022-07-29 NOTE — PLAN OF CARE
Goal Outcome Evaluation:    Plan of Care Reviewed With: patient     Overall Patient Progress: no change    Outcome Evaluation: Patient admitted from ED. Complaints of Back pain. Oxycodone and tyenol given for pain. Patient will ambulate to bathroom with walker and assist. Patient is requires [facility] .

## 2022-07-29 NOTE — PLAN OF CARE
Problem: Plan of Care - These are the overarching goals to be used throughout the patient stay.    Goal: Optimal Comfort and Wellbeing  Outcome: Ongoing, Progressing  Intervention: Monitor Pain and Promote Comfort  Recent Flowsheet Documentation  Taken 7/29/2022 0300 by Jacquie Rosenbaum, RN  Pain Management Interventions: medication (see MAR)  Taken 7/29/2022 0058 by Jacquie Rosenbaum, RN  Pain Management Interventions:   emotional support   repositioned     Problem: Risk for Delirium  Goal: Improved Attention and Thought Clarity  Outcome: Ongoing, Progressing     Problem: Risk for Delirium  Goal: Improved Sleep  Outcome: Ongoing, Progressing   Goal Outcome Evaluation:    Pt slept intermittently over noc. IVF per md order for sodium replacement. Follow q4hr sodium check. IV Abx per md order. O2 sats stable on 2L nasal cannula. Pt went down for Chest CT. Falls precautions in place.

## 2022-07-29 NOTE — PROGRESS NOTES
[facility]    Progress Note - Hospitalist Service       Date of Admission:  7/28/2022    Assessment & Plan      Chris Bell is a 87 year old female with history significant for HFpEF, HTN, normocytic anemia, spinal compression fractures, suspected RA, polyclonal gammopathy, multiple hospitalizations for diarrhea with the most recent on 6/28-7/6, presenting with ten days of progressive shortness of breath in the setting of known COVID positive test prior to symptoms. She was found to have hypoxic respiratory failure secondary to COVID infection/HAP and severe hyponatremia.      Hypoxic respiratory failure- improved  COVID +  HAP?  Patient presented with ten days of progressive shortness of breath. Son states she had a positive COVID test about 2 weeks ago. On day of admission, she was found to by hypoxic in the low-mid 80's and tachypneic in the 30's by EMS. On exam, patient looks dry-euvolemic. Lab work-up notable for a leukocytosis of 21 with a left shift, normal lactic acid, CRP 22, metabolic acidosis with BC of 18, normal Hgb, negative troponin, and BNP of 236 (bl ~175). CXR revealed worsening bilateral upper lobe opacities c/w pneumonitis. Of note, last CT of abdomen on 6/29 noted some fibrotic changes in the LL lung. CT/PE revealed multifocal GGO's c/w pneumonia. Over all, the picture is most concerning for COVID infection with overlying infectious process-- HAP.  - O2 to maintain saturations>92%   - Remdesivir/Dexamethasone, start date: 7/28  - COVID labs: CBC, BMP, d-dimer, CRP, INR daily x4 days  - Zosyn, start date: 7/28  - Sputum gram stain and culture  - Blood cultures x2, NGTD  - SLP to assess for aspiration     Severe hyponatremia- improved  Sodium 113 on admission. Last known sodium 135 a few weeks ago. On exam, she appears dry to euvolemic. Additionally, she endorses very low po intake the last week or so. Does not appear to have a change in mentation, no seizures reported.  "She was started on 3%NS at 20cc/hr overnight 7/28-7/29 with improvement in sodium within goal. Unfortunately, no urine tests have been drawn yet. Still unclear if this is hypovolemic hyponatremia thus we should pursue fluids vs. SIADH/tea and toast and should pursue fluid restriction.  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 Estimated GFR of 42 (usually in 80's). Suspect prerenal given Cr/BUN ratio>20 and dry-euvolemic on exam. Cr improved after one day of minimal fluids/treatment. GFR still reduced at 49.  - BMP am     Hypomagnesemia  Magnesium 1.5 on admission.  - on PTA magnesium oxide tablet  - Magnesium replacement protocol     Elevated alk phos  Nonspecific RUQ edema on CT  Alk phos elevated to 172 on admission. CT of the chest noted some nonspecific edema around the gallbladder. Patient is asymptomatic; however, she is a poor historian and she states she has \"back pain\".  - RUQ US to r/o acute hepatobiliary pathology    Chronic normocytic anemia  Hgb 10.3 w/ MCV of 91. Hgb typically is around 10-11. This has been noted on multiple admissions with a complete work-up. Denies any bleeding signs/symptoms. Thought to be secondary to chronic disease.  - CBC daily     Lower back pain  Chronic spinal compression fracture  Patient endorsing lumbar back pain on admission, worse with movement. She endorses a mechanical fall from the bed to the floor landing on her back two days ago. Chronic compression fractures at T12, L2, and L4 on CT lumbar spine on 6/28, which appeared stable at that time. " " Also has severe stenosis at L3-L4 and L4-L5.  No red flag symptoms. Has TLSO brace at home for comfort.  - Scheduled tylenol 975mg TID  - Oxycodone 2.5mg q4h prn for severe pain     HFpEF, not in exacerbation  Patient has a history of HFpEF. On exam, does not appear to be hypervolemic, more dry-euvolemic. Crackles on exam more dry sounding. BNP elevated at 236, although during non-exacerbated state is 170's.   - hold PTA bumex, Midamor     Rheumatoid arthritis, recent diagnosis  Per last admission \"Long history of multiple joint pain, worse in the knees and hands. On exam her thumbs are flexed towards midline with prominent MCP swelling across all five joints bilaterally. Her bilateral knees also appear swollen but not erythematous. Her RF and CRP were elevated last admission, as well as calprotectin. CCP positive.  She meets criteria for rheumatoid arthritis diagnosis.  Discussed this with patient and her daughter 6/30.\" Patient is not endorsing joint pain on admission. She had completed a ten day course of prednisone 10mg daily after the last admission.  - consider rheumatology consult to start methotrexate    Chronic diarrhea/constipation  Patient has had multiple admissions presenting with diarrhea or constipation. Last admission earlier in July, there was concern for IBD and she underwent EGD/colonoscopy with multiple biopsies. Appears the results are negative for TB colitis or Crohn's. Negative for H.pylori on EGD. Her quant gold was indeterminate. She denies any nausea, emesis, or bowel concerns this admission.  - repeat quant gold    Polyclonal gammopathy  Gamma gap present dating back to 2018.  Had SPEP in May 2022 showing polyclonal gammopathy, potentially due to underlying inflammatory process, per pathologist.  Screening MANJIT and RF done last week in search of underlying inflammatory process.  RF was markedly positive but MANJIT negative. She was meeting criteria for new RA diagnosis last admission with " plans to follow-up with rheumatology as an outpatient (no documentation of this). HIV, Hep B and Hep C testing negative.    Goals of care  Previous admission, patient was DNR/DNI. She is clear on this admission that she wishes to be full code. No AD documentation in the chart. Daughter is PCA at home. She is requiring more help to perform ADLs  - PT/OT  - nutrition consult    Other chronic medication conditions  HTN- holding amiloride, Bumex, continue PTA Metoprolol  Hypothyroidism- continue PTA levothyroxine  Urinary frequency- continue PTA oxybutynin  GERD- continue PTA pantoprazole     Diet: NPO for Medical/Clinical Reasons Except for: Meds    DVT Prophylaxis: Lovenox 25mg BID-- high intensity given COVID+/d=-dimer  Javed Catheter: Not present  Fluids: none  Central Lines: None  Cardiac Monitoring: ACTIVE order. Indication: Electrolyte Imbalance (24 hours)- Magnesium <1.3 mg/ml; Potassium < =2.8 or > 5.5 mg/ml  Code Status: Full Code      Disposition Plan   Multiple days pending resolution of hyponatremia, IV abx for pneumonia, and further work-up.    The patient's care was discussed with the Attending Physician, Dr. Musa.    Rebecca Ring MD PGY2  [facility]   Pager #: [phone]    ______________________________________________________________________    Interval History   Reviewed notes overnight. Patient states she is feeling better. She denies chest pain or worsening shortness of breath. She appears comfortable. She endorses some back pain, her lower back. She would like to eat. She does not have any other concerns.    Data reviewed today: I reviewed all medications, new labs and imaging results over the last 24 hours. I personally reviewed CT/PE.    Physical Exam   Vital Signs: Temp: 97.5  F (36.4  C) Temp src: Axillary BP: (!) 186/75 Pulse: 89   Resp: 22 SpO2: 100 % O2 Device: Nasal cannula Oxygen Delivery: 2 LPM  Weight: 101 lbs 3.2 oz  Constitutional: appears somnolent,  answers appropriately but needs redirection and prompts   Respiratory: improved air movement and dry crackles at bases, no wheezing  Cardiovascular: RRR, no murmurs, cold extremities  GI: soft and not tender  Skin: no lesions  Musculoskeletal: no lower extremity pitting edema present  Neurologic: Awake,answers questions appropriately with redirection and prompts. CNII-XII grossly intact. Moving all extremities.      Data   Recent Labs   Lab 07/29/22  1159 07/29/22  0811 07/29/22  0242 07/28/22  1746 07/28/22  1046   WBC  --  19.5*  --   --  21.4*   HGB  --  10.3*  --   --  12.5   MCV  --  91  --   --  90   PLT  --  214  --   --  258   INR  --   --   --   --  1.08   * 121*  121* 117*   < > 113*   POTASSIUM  --  4.6  --   --  4.8   CHLORIDE  --  92*  --   --  80*   CO2  --  18*  --   --  18*   BUN  --  41*  --   --  50*   CR  --  1.08  --   --  1.23*   ANIONGAP  --  11  --   --  15   MANE  --  8.3*  --   --  8.8   GLC  --  110  --   --  124   ALBUMIN  --  2.1*  --   --  2.3*   PROTTOTAL  --  7.5  --   --  8.7*   BILITOTAL  --  0.3  --   --  0.5   ALKPHOS  --  172*  --   --  193*   ALT  --  <9  --   --  <9   AST  --  13  --   --  18    < > = values in this interval not displayed.     Recent Results (from the past 24 hour(s))   CT Chest Pulmonary Embolism w Contrast    Narrative    EXAM: CT CHEST PULMONARY EMBOLISM W CONTRAST  LOCATION: [facility]  DATE/TIME: 7/29/2022 2:10 AM    INDICATION: Elevated D dimer Hypoxia  COMPARISON: Portable chest radiograph from 07/28/2022, chest CT from 06/22/2022.  TECHNIQUE: CT chest pulmonary angiogram during arterial phase injection of IV contrast. Multiplanar reformats and MIP reconstructions were performed. Dose reduction techniques were used.   CONTRAST: 75ml Isovue 370    FINDINGS:  ANGIOGRAM CHEST: Nondiagnostic evaluation of the segmental and subsegmental pulmonary arteries in the lower lung zones due to motion. There is otherwise no evidence for  pulmonary embolism. Thoracic aorta is negative for dissection. No CT evidence of   right heart strain.    LUNGS AND PLEURA: Multifocal groundglass airspace infiltrates scattered throughout both lungs. No pneumothorax or pleural effusion.    MEDIASTINUM/AXILLAE: Heart size mildly enlarged. No pericardial effusion. Multivessel coronary artery disease.    CORONARY ARTERY CALCIFICATION: Severe.    UPPER ABDOMEN: There is some edema in the right upper quadrant near the gallbladder which is nonspecific. There is also mild wall thickening of the gastric antrum as seen on axial image 323.    MUSCULOSKELETAL: Osteopenia. Severe T12 vertebral body compression deformity. Moderate L2 compression deformity. These are stable.      Impression    IMPRESSION:  1.  No central or lobar pulmonary embolism.    2.  Multifocal bilateral groundglass airspace infiltrates consistent with multifocal pneumonia or pneumonitis.    3.  Severe coronary artery disease.    4.  Nonspecific edema in the right upper quadrant adjacent to the gallbladder. Consider sonographic correlation. There is also mild wall thickening of the gastric antrum which is likely inflammatory though clinical correlation is recommended.

## 2022-07-30 NOTE — PROGRESS NOTES
[facility]    Progress Note - Hospitalist Service       Date of Admission:  7/28/2022    Assessment & Plan      Chris Bell is a 87 year old female with history significant for HFpEF, HTN, normocytic anemia, spinal compression fractures, suspected RA, polyclonal gammopathy, multiple hospitalizations for diarrhea with the most recent on 6/28-7/6, presenting with ten days of progressive shortness of breath in the setting of known COVID positive test prior to symptoms. She was found to have hypoxic respiratory failure secondary to COVID infection/HAP and severe hyponatremia. She remains hospitalized for hyponatremia.     Hypoxic respiratory failure- resolved  COVID +  HAP?  Patient presented with ten days of progressive shortness of breath. Son states she had a positive COVID test about 2 weeks ago. On day of admission, she was found to by hypoxic in the low-mid 80's and tachypneic in the 30's by EMS. On exam, patient looks dry-euvolemic. Lab work-up notable for a leukocytosis of 21 with a left shift, normal lactic acid, CRP 22, metabolic acidosis with BC of 18, normal Hgb, negative troponin, and BNP of 236 (bl ~175). CXR revealed worsening bilateral upper lobe opacities c/w pneumonitis. Of note, last CT of abdomen on 6/29 noted some fibrotic changes in the LL lung. CT/PE revealed multifocal GGO's c/w pneumonia. Over all, the picture is most concerning for COVID infection with overlying infectious process-- HAP.  - O2 to maintain saturations>92%   - Remdesivir/Dexamethasone, start date: 7/28  - COVID labs: CBC, BMP, d-dimer, CRP, INR daily x4 days  - Zosyn, start date: 7/28  - Sputum gram stain and culture  - Blood cultures x2, NGTD  - SLP to assess for aspiration     Severe hyponatremia- improved  SIADH  Sodium 113 on admission. Last known sodium 135 a few weeks ago. On exam, she appears dry to euvolemic. Additionally, she endorses very low po intake the last week or so. Does not appear to  "have a change in mentation, no seizures reported. She was started on 3%NS at 20cc/hr overnight 7/28-7/29 with improvement in sodium within goal. Hyponatremia secondary to SIADH in the setting of infection. Last check on 7/30 at 124, within goal of improvement.  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 Estimated GFR of 42 (usually in 80's). Suspect prerenal given Cr/BUN ratio>20 and dry-euvolemic on exam. Cr improved after one day of minimal fluids/treatment. GFR still reduced at 54.  - BMP am     Hypomagnesemia  Magnesium 1.5 on admission.  - on PTA magnesium oxide tablet  - Magnesium replacement protocol     Elevated alk phos- improving  Nonspecific RUQ edema on CT  Alk phos elevated to 172 on admission. CT of the chest noted some nonspecific edema around the gallbladder. Patient is asymptomatic; however, she is a poor historian and she states she has \"back pain\".  - RUQ US to r/o acute hepatobiliary pathology    Chronic normocytic anemia  Hgb typically is around 10-11, currently down to 8.9. This has been noted on multiple admissions with a complete work-up. Denies any bleeding signs/symptoms. Thought to be secondary to chronic disease.  - CBC daily     Lower back pain  Chronic spinal compression fracture  Patient endorsing lumbar back pain on admission, worse with movement. She endorses a mechanical fall from the bed to the floor landing on her back two days ago. Chronic compression fractures at T12, L2, and L4 on CT lumbar spine on 6/28, which appeared stable at that time.  Also has severe stenosis at L3-L4 and L4-L5.  No red flag symptoms. Has TLSO brace at home for comfort.  - Scheduled tylenol 975mg TID  - Oxycodone 2.5mg q4h prn for severe pain     HFpEF, not in exacerbation  Patient " "has a history of HFpEF. On exam, does not appear to be hypervolemic, more dry-euvolemic. Crackles on exam more dry sounding. BNP elevated at 236, although during non-exacerbated state is 170's.   - hold PTA Midamor  - restarted pta bumex on 7/30     Rheumatoid arthritis, recent diagnosis  Per last admission \"Long history of multiple joint pain, worse in the knees and hands. On exam her thumbs are flexed towards midline with prominent MCP swelling across all five joints bilaterally. Her bilateral knees also appear swollen but not erythematous. Her RF and CRP were elevated last admission, as well as calprotectin. CCP positive.  She meets criteria for rheumatoid arthritis diagnosis.  Discussed this with patient and her daughter 6/30.\" Patient is not endorsing joint pain on admission. She had completed a ten day course of prednisone 10mg daily after the last admission.  - consider rheumatology consult to start methotrexate    Chronic diarrhea/constipation  Patient has had multiple admissions presenting with diarrhea or constipation. Last admission earlier in July, there was concern for IBD and she underwent EGD/colonoscopy with multiple biopsies. Appears the results are negative for TB colitis or Crohn's. Negative for H.pylori on EGD. Her quant gold was indeterminate. She denies any nausea, emesis, or bowel concerns this admission.  - repeat quant gold    Polyclonal gammopathy  Gamma gap present dating back to 2018.  Had SPEP in May 2022 showing polyclonal gammopathy, potentially due to underlying inflammatory process, per pathologist.  Screening MANJIT and RF done last week in search of underlying inflammatory process.  RF was markedly positive but MANJIT negative. She was meeting criteria for new RA diagnosis last admission with plans to follow-up with rheumatology as an outpatient (no documentation of this). HIV, Hep B and Hep C testing negative.    Goals of care  Previous admission, patient was DNR/DNI. She is clear on " this admission that she wishes to be full code. No AD documentation in the chart. Daughter is PCA at home. She is requiring more help to perform ADLs  - PT/OT  - nutrition consult    Other chronic medication conditions  HTN- holding amiloride, Bumex, continue PTA Metoprolol  Hypothyroidism- continue PTA levothyroxine  Urinary frequency- continue PTA oxybutynin  GERD- continue PTA pantoprazole     Diet: Minced & Moist Diet (level 5) Thin Liquids (level 0)    DVT Prophylaxis: Lovenox 25mg BID-- high intensity given COVID+/d=-dimer  Javed Catheter: Not present  Fluids: none  Central Lines: None  Cardiac Monitoring: ACTIVE order. Indication: Electrolyte Imbalance (24 hours)- Magnesium <1.3 mg/ml; Potassium < =2.8 or > 5.5 mg/ml  Code Status: Full Code      Disposition Plan   Multiple days pending resolution of hyponatremia, IV abx for pneumonia, and further work-up.    The patient's care was discussed with the Attending Physician, Dr. Musa.    Rebecca Ring MD PGY2  [facility]   Pager #: [phone]    ______________________________________________________________________    Interval History   Patient feels better today. She would like to go home and perseverates on having clothes. Does not bring up back pain with me today. She denies feeling more short of breath, chest pain, abdominal pain, or blood in her stool.    Data reviewed today: I reviewed all medications, new labs and imaging results over the last 24 hours. I personally reviewed CT/PE.    Physical Exam   Vital Signs: Temp: 97.4  F (36.3  C) Temp src: Axillary BP: 132/66 Pulse: 61   Resp: 20 SpO2: 92 % O2 Device: None (Room air)    Weight: 102 lbs 11.2 oz  Gen: No acute distress. Sometimes does not answer questions but perseverates on one thing.  HEENT: Dry mucous membranes.  Neck: No overt asymmetry.   CV: Cold hands and feet. RRR. No murmur.   Resp: Breathing comfortably on room air. Some improved dry crackles at the base of  lungs.  Abd: Non-distended, non-tender.   Ext: No edema or overt asymmetry/deformity.   Skin: No overt rash on easily visualized skin.   Neuro: Non-focal.        Data   Recent Labs   Lab 07/30/22  1130 07/30/22  0558 07/30/22  0015 07/29/22  1159 07/29/22  0811 07/28/22  1746 07/28/22  1046   WBC  --  20.2*  --   --  19.5*  --  21.4*   HGB  --  8.9*  --   --  10.3*  --  12.5   MCV  --  94  --   --  91  --  90   PLT  --  200  --   --  214  --  258   INR  --   --   --   --   --   --  1.08   * 124*  124* 124*   < > 121*  121*   < > 113*   POTASSIUM  --  4.8  --   --  4.6  --  4.8   CHLORIDE  --  97*  --   --  92*  --  80*   CO2  --  17*  --   --  18*  --  18*   BUN  --  37*  --   --  41*  --  50*   CR  --  1.01  --   --  1.08  --  1.23*   ANIONGAP  --  10  --   --  11  --  15   MANE  --  8.0*  --   --  8.3*  --  8.8   GLC  --  104  --   --  110  --  124   ALBUMIN  --  2.0*  --   --  2.1*  --  2.3*   PROTTOTAL  --  6.8  --   --  7.5  --  8.7*   BILITOTAL  --  0.2  --   --  0.3  --  0.5   ALKPHOS  --  134*  --   --  172*  --  193*   ALT  --  10  --   --  <9  --  <9   AST  --  10  --   --  13  --  18    < > = values in this interval not displayed.     Recent Results (from the past 24 hour(s))   US Abdomen Limited    Narrative    EXAM: US ABDOMEN LIMITED  LOCATION: [facility]  DATE/TIME: 7/29/2022 12:23 PM    INDICATION: Elevated alkaline phosphatase and edema near gallbladder  COMPARISON:Chest CTA 07/29/2022, abdomen CT 06/29/2020    TECHNIQUE: Limited abdominal ultrasound.    FINDINGS:    GALLBLADDER: Normal. No gallstones, wall thickening, or pericholecystic fluid. Negative sonographic Love's sign.    BILE DUCTS: No biliary dilatation. The common duct measures 8 mm. No intraductal stones.    LIVER: Normal parenchyma with smooth contour. No focal mass.    RIGHT KIDNEY: No hydronephrosis.    PANCREAS: The visualized portions are normal.    No ascites.      Impression    IMPRESSION:  1.   No evidence of cholelithiasis or cholecystitis. Common duct is normal for age.

## 2022-07-30 NOTE — PROGRESS NOTES
Pt.'s IV's running late.  Will continue and will give next dose of IV zosyn, when I can.   Used  line for cares.

## 2022-07-30 NOTE — CONSULTS
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     REASON FOR ASSESSMENT  Chris Bell is a 87 year old female assessed by Registered Dietitian for Provider Order - concern for malnutrition    NUTRITION HISTORY  Information obtained from chart.  Pt with active COVID infection and  is unable to understand pt; gives inappropriate answers to questions.  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  She received CHF diet education on 4/14/22.  At that time, son reported pt eats very bland foods, usually rice porridge, vegetables, quail, drinks water or broth.  No salt is added at the table or in cooking.  She lives with family.  On previous hospitalizations the family has brought in food.  She is complaining about mouth pain, presumably from the cancer, presumably this and the COVID 19 have diminished her appetite to the point where she has lost a significant amount of weight in the past month.  Daughter reported pt has taken only a little rice porridge every day for at least the past week prior to admission.  Pt is asking for foods such as rice porridge and mashed potatoes.    CURRENT NUTRITION ORDERS  Diet Order:     Minced and Moist IDDSI level 5       Current Intake/Tolerance:  No intake charted since admission; has been \"very low\" per family.    NUTRITION FOCUSED PHYSICAL ASSESSMENT FOR DIAGNOSING MALNUTRITION)  No:  not completed d/t COVID precautions                Obtained from Chart/Interdisciplinary Team:  Hx of multiple hospitalizations for diarrhea; no BM yet documented this admission.  MD noted no pitting edema " "present.    ANTHROPOMETRICS  Height: 60\"  Weight: 102 lbs 11.2 oz 7/30  Body mass index is 20.06 kg/m .  Weight Status:  Normal BMI  IBW: 100#  % IBW: 103%  Weight History:   112# 6/30/22; loss of 8% body wt in the past month  115# 2/15/22  117# 8/18/21  109# 8/22/19    LABS  Labs reviewed.  CRP 9.0, Na 124, alk phos 134, Mg 2.6 after repletion    MEDICATIONS  Medications reviewed.  Mag Ox     ASSESSED NUTRITION NEEDS PER APPROVED PRACTICE GUIDELINES:    Dosing Weight 46 kg  Estimated Energy Needs: 6566-4085 kcals (25-30 Kcal/Kg)  Justification: maintenance and to avoid refeeding  Estimated Protein Needs: 55-64 grams protein (1.2-1.4)  Justification: cancer  Estimated Fluid Needs: 1150  mL (25 ml/kg)  Justification: maintenance    MALNUTRITION:  % Weight Loss:  > 5% in 1 month (severe malnutrition)  % Intake:  </= 50% for >/= 5 days (severe malnutrition)  Fluid Retention:  None noted per physician    Malnutrition Diagnosis: Severe malnutrition  In Context of:  Acute illness or injury  Chronic illness or disease    NUTRITION DIAGNOSIS:  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    NUTRITION INTERVENTIONS  Will add vanilla Magic Cup with meals.  Nutrition education: Not appropriate at this time due to patient condition    Nutrition Goals  Avoid further wt loss, eat 50% of meals    MONITORING AND EVALUATION:  Food intake, Weight and Biochemical data              "

## 2022-07-30 NOTE — PLAN OF CARE
0700 to 1930  Problem: Plan of Care - These are the overarching goals to be used throughout the patient stay.    Goal: Plan of Care Review/Shift Note  Description: The Plan of Care Review/Shift note should be completed every shift.  The Outcome Evaluation is a brief statement about your assessment that the patient is improving, declining, or no change.  This information will be displayed automatically on your shift note.  Outcome: Ongoing, Progressing   Goal Outcome Evaluation:    Pt had ultrasound of abdomen today -neg. Speech saw pt and started her on a soft pureed diet with thin liquids at about 20% of food tonight. Pt continues to cough with thin liquids but does better with no lid or straw.  Patient pain has been uncomfortable for her. Scheduled tylenol and prn oxycodone. Pt will fall asleep at times.  Pt has dusky finger tips and hands at times. Dr. Alcazar.  Continue IV meds and antibx. Mg replacement and every 6 hour NA+ draws.

## 2022-07-30 NOTE — PLAN OF CARE
[id number]  Problem: Plan of Care - These are the overarching goals to be used throughout the patient stay.    Goal: Optimal Comfort and Wellbeing  Intervention: Monitor Pain and Promote Comfort  Recent Flowsheet Documentation  Taken 7/30/2022 0900 by Deanna Phoenix RN  Pain Management Interventions: (scheduled Tylenol) medication (see MAR)   Goal Outcome Evaluation:        Pt has been restless but states to  that she is not having much pain. Pt is more confused today.  Conversation just repetitive at times. Talks over  at times and gets fixated on one thing. Unable to understand what is being said to her. Pt puts on call light continously when staff is not in room. Very anxious.  Speech tried twice to get patient to eat and drink for them.  Writer notices pt does better with thicker liquids but still coughs occasionally. Pt coughs a lot if she is using a straw.  Pt ate half a bowl of porridge this for breakfast but would cough but only would take sips through out the day. At 1400 tried to have pt try thicken liq per RD request. Pt said she doesn't want to take much because she has been coughing a lot today and feels weaker.

## 2022-07-31 NOTE — PROGRESS NOTES
Care Management Follow Up    Length of Stay (days): 3    Expected Discharge Date: 08/02/2022     Concerns to be Addressed:       Patient plan of care discussed at interdisciplinary rounds: Yes    Anticipated Discharge Disposition: Home Care, Group Home     Anticipated Discharge Services: PCA  Anticipated Discharge DME: Walker    Patient/family educated on Medicare website which has current facility and service quality ratings:    Education Provided on the Discharge Plan:    Patient/Family in Agreement with the Plan: yes    Referrals Placed by CM/SW:    Private pay costs discussed: Not applicable    Additional Information:  Spoke to Dr Ring, asked when patient will be considered covid recovered. She will check with attending. Patient is not medically ready for discharge     Message left for tyrel Chaney requesting call back to confirm plan to discharge to adult foster home when medically ready. Awaiting call back     1:04 PM  Tyrel chaney called back and verified plan is to go to [facility] living upon discharge. He says they can take her when medically ready and covid recovered. Says he is working on moving her belongings today and tomorrow.       Kate Lassiter RN

## 2022-07-31 NOTE — PLAN OF CARE
Problem: Risk for Delirium  Goal: Improved Sleep  Outcome: Ongoing, Progressing     Problem: Fall Injury Risk  Goal: Absence of Fall and Fall-Related Injury  Intervention: Identify and Manage Contributors  Recent Flowsheet Documentation  Taken 7/31/2022 0030 by Deysi Robbins RN  Medication Review/Management: medications reviewed    Problem: Infection (Pneumonia)  Goal: Resolution of Infection Signs and Symptoms  Outcome: Ongoing, Progressing     Problem: Respiratory Compromise (Pneumonia)  Goal: Effective Oxygenation and Ventilation  Intervention: Optimize Oxygenation and Ventilation  Recent Flowsheet Documentation  Taken 7/31/2022 0030 by Deysi Robbins RN  Head of Bed (HOB) Positioning: HOB at 20-30 degrees    Goal Outcome Evaluation:      Pt slept most of the shift. Lung sounds diminished, on RA. HR in the 60's. NSR. Pt was upset when she woke up around 0530 H because she had a BM and was not cleaned up. Informed pt using an  that she was sleeping deeply during the night that the staff let her sleep until around 0530 H since we have to take the VS and give medications as well as blood draw. She was saying that she feels weak because she is not able to eat the food here. At dinner time she did eat porridge from home bout half of the bowl. When offered to add more, she refused because it's too much. She keep asking for hot water during the evening shift, offered her lukewarm but she refused, even let her touch it so she knows but she wanted the hot water. Warned her to be careful. Gave lukewarm water to take her Loperamide with warm apple sauce but she was still not happy about it. Asked the RN to call the son. Explained it to the son what we did during the night and what her mother is complaining about and then let her talk to her. Pt really wants to go home so she can eat and get better at home.

## 2022-07-31 NOTE — PLAN OF CARE
Problem: Infection (Pneumonia)  Goal: Resolution of Infection Signs and Symptoms  Outcome: Ongoing, Progressing     Problem: Respiratory Compromise (Pneumonia)  Goal: Effective Oxygenation and Ventilation  Outcome: Ongoing, Progressing   Goal Outcome Evaluation:      Pt is alert and oriented x 4, denies pain but sometimes she has stomachache. After eating dinner, she felt better. Has occasional non productive cough. HR in the 70's, NSR. Lung sounds diminished, on RA. Purewick in placed, voiding well.

## 2022-07-31 NOTE — PROGRESS NOTES
[facility]    Progress Note - Hospitalist Service       Date of Admission:  7/28/2022    Assessment & Plan      Chris Bell is a 87 year old female with history significant for HFpEF, HTN, normocytic anemia, spinal compression fractures, suspected RA, polyclonal gammopathy, multiple hospitalizations for diarrhea with the most recent on 6/28-7/6, presenting with ten days of progressive shortness of breath in the setting of known COVID positive test prior to symptoms. She was found to have hypoxic respiratory failure secondary to COVID infection/HAP and severe hyponatremia. She remains hospitalized for hyponatremia, likely discharge in 1-2 days.     Hypoxic respiratory failure- resolved  COVID +  HAP  Patient presented with ten days of progressive shortness of breath. Son states she had a positive COVID test about 2 weeks ago. On day of admission, she was found to by hypoxic in the low-mid 80's and tachypneic in the 30's by EMS. On exam, patient looks dry-euvolemic. Lab work-up notable for a leukocytosis of 21 with a left shift, normal lactic acid, CRP 22, metabolic acidosis with BC of 18, normal Hgb, negative troponin, and BNP of 236 (bl ~175). CXR revealed worsening bilateral upper lobe opacities c/w pneumonitis. Of note, last CT of abdomen on 6/29 noted some fibrotic changes in the LL lung. CT/PE revealed multifocal GGO's c/w pneumonia. Over all, the picture is most concerning for COVID infection with overlying infectious process-- HAP.  - O2 to maintain saturations>92%   - Remdesivir/Dexamethasone, start date: 7/28  - COVID labs: CBC, BMP, d-dimer, CRP, INR daily x4 days  - Zosyn, start date: 7/28  - Sputum gram stain and culture  - Blood cultures x2, NGTD  - SLP to assess for aspiration     Severe hyponatremia- improved  SIADH  Sodium 113 on admission. Last known sodium 135 a few weeks ago. On exam, she appears dry to euvolemic. Additionally, she endorses very low po intake the last  "week or so. Does not appear to have a change in mentation, no seizures reported. She was started on 3%NS at 20cc/hr overnight 7/28-7/29 with improvement in sodium within goal. Hyponatremia secondary to SIADH in the setting of infection. Last check on 7/31 at 128, within goal of improvement.  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 Estimated GFR of 42 (usually in 80's). Suspect prerenal given Cr/BUN ratio>20 and dry-euvolemic on exam. Cr improved after one day of minimal fluids/treatment. GFR still reduced at 54.  - BMP am     Hypomagnesemia  Magnesium 1.5 on admission.  - on PTA magnesium oxide tablet  - Magnesium replacement protocol     Elevated alk phos- improving  Nonspecific RUQ edema on CT  Alk phos elevated to 172 on admission. CT of the chest noted some nonspecific edema around the gallbladder. Patient is asymptomatic; however, she is a poor historian and she states she has \"back pain\".  - RUQ US to r/o acute hepatobiliary pathology    Chronic normocytic anemia  Hgb typically is around 10-11, currently down to 8.9. This has been noted on multiple admissions with a complete work-up. Denies any bleeding signs/symptoms. Thought to be secondary to chronic disease.  - CBC daily     Lower back pain  Chronic spinal compression fracture  Patient endorsing lumbar back pain on admission, worse with movement. She endorses a mechanical fall from the bed to the floor landing on her back two days ago. Chronic compression fractures at T12, L2, and L4 on CT lumbar spine on 6/28, which appeared stable at that time.  Also has severe stenosis at L3-L4 and L4-L5.  No red flag symptoms. Has TLSO brace at home for comfort.  - Scheduled tylenol 975mg TID  - Oxycodone 2.5mg q4h prn for severe pain     HFpEF, not in exacerbation  Patient has a history of HFpEF. On " "exam, does not appear to be hypervolemic, more dry-euvolemic. Crackles on exam more dry sounding. BNP elevated at 236, although during non-exacerbated state is 170's.   - restarted PTA Midamor on 7/31  - restarted PTA bumex on 7/30     Rheumatoid arthritis, recent diagnosis  Per last admission \"Long history of multiple joint pain, worse in the knees and hands. On exam her thumbs are flexed towards midline with prominent MCP swelling across all five joints bilaterally. Her bilateral knees also appear swollen but not erythematous. Her RF and CRP were elevated last admission, as well as calprotectin. CCP positive.  She meets criteria for rheumatoid arthritis diagnosis.  Discussed this with patient and her daughter 6/30.\" Patient is not endorsing joint pain on admission. She had completed a ten day course of prednisone 10mg daily after the last admission.  - consider rheumatology consult to start methotrexate    Chronic diarrhea/constipation  Patient has had multiple admissions presenting with diarrhea or constipation. Last admission earlier in July, there was concern for IBD and she underwent EGD/colonoscopy with multiple biopsies. Appears the results are negative for TB colitis or Crohn's. Negative for H.pylori on EGD. Her quant gold was indeterminate. She denies any nausea, emesis, or bowel concerns this admission.  - repeat quant gold    Polyclonal gammopathy  Gamma gap present dating back to 2018.  Had SPEP in May 2022 showing polyclonal gammopathy, potentially due to underlying inflammatory process, per pathologist.  Screening MANJIT and RF done last week in search of underlying inflammatory process.  RF was markedly positive but MANJIT negative. She was meeting criteria for new RA diagnosis last admission with plans to follow-up with rheumatology as an outpatient (no documentation of this). HIV, Hep B and Hep C testing negative.    Goals of care  Previous admission, patient was DNR/DNI. She is clear on this admission " that she wishes to be full code. No AD documentation in the chart. Daughter is PCA at home. She is requiring more help to perform ADLs  - PT/OT  - nutrition consult    Other chronic medication conditions  HTN- holding amiloride, Bumex, continue PTA Metoprolol  Hypothyroidism- continue PTA levothyroxine  Urinary frequency- continue PTA oxybutynin  GERD- continue PTA pantoprazole     Diet: Minced & Moist Diet (level 5) Thin Liquids (level 0)    DVT Prophylaxis: Lovenox 25mg BID-- high intensity given COVID+/d=-dimer  Javed Catheter: Not present  Fluids: none  Central Lines: None  Cardiac Monitoring: ACTIVE order. Indication: Electrolyte Imbalance (24 hours)- Magnesium <1.3 mg/ml; Potassium < =2.8 or > 5.5 mg/ml  Code Status: Full Code      Disposition Plan   Multiple days pending resolution of hyponatremia, IV abx for pneumonia, and further work-up.    The patient's care was discussed with the Attending Physician, Dr. Musa.    Rebecca Ring MD PGY2  [facility]   Pager #: [phone]    ______________________________________________________________________    Interval History   Patient feels better today. She would like to go home and perseverates on having clothes. Does not bring up back pain with me today. She denies feeling more short of breath, chest pain, abdominal pain, or blood in her stool.    Data reviewed today: I reviewed all medications, new labs and imaging results over the last 24 hours. I personally reviewed CT/PE.    Physical Exam   Vital Signs: Temp: 97.6  F (36.4  C) Temp src: Oral BP: 109/71 Pulse: 75   Resp: 22 SpO2: 90 % O2 Device: None (Room air)    Weight: 97 lbs 9.6 oz  Gen: No acute distress. Sometimes does not answer questions but perseverates on one thing.  HEENT: Dry mucous membranes.  Neck: No overt asymmetry.   CV: Cold hands and feet. RRR. No murmur.   Resp: Breathing comfortably on room air. Some improved dry crackles at the base of lungs.  Abd:  Non-distended, non-tender.   Ext: No edema or overt asymmetry/deformity.   Skin: No overt rash on easily visualized skin.   Neuro: Non-focal.        Data   Recent Labs   Lab 07/31/22  0557 07/31/22  0009 07/30/22  1855 07/30/22  1130 07/30/22  0558 07/29/22  1159 07/29/22  0811 07/28/22  1746 07/28/22  1046   WBC 17.2*  --   --   --  20.2*  --  19.5*  --  21.4*   HGB 8.9*  --   --   --  8.9*  --  10.3*  --  12.5   MCV 96  --   --   --  94  --  91  --  90     --   --   --  200  --  214  --  258   INR  --   --   --   --   --   --   --   --  1.08   *  128* 126* 125*   < > 124*  124*   < > 121*  121*   < > 113*   POTASSIUM 3.9  --   --   --  4.8  --  4.6  --  4.8   CHLORIDE 98  --   --   --  97*  --  92*  --  80*   CO2 22  --   --   --  17*  --  18*  --  18*   BUN 22  --   --   --  37*  --  41*  --  50*   CR 0.81  --   --   --  1.01  --  1.08  --  1.23*   ANIONGAP 8  --   --   --  10  --  11  --  15   MANE 8.1*  --   --   --  8.0*  --  8.3*  --  8.8   GLC 85  --   --   --  104  --  110  --  124   ALBUMIN 2.2*  --   --   --  2.0*  --  2.1*  --  2.3*   PROTTOTAL 6.9  --   --   --  6.8  --  7.5  --  8.7*   BILITOTAL 0.3  --   --   --  0.2  --  0.3  --  0.5   ALKPHOS 119  --   --   --  134*  --  172*  --  193*   ALT <9  --   --   --  10  --  <9  --  <9   AST 13  --   --   --  10  --  13  --  18    < > = values in this interval not displayed.     No results found for this or any previous visit (from the past 24 hour(s)).

## 2022-08-01 NOTE — PLAN OF CARE
Problem: Risk for Delirium  Goal: Improved Behavioral Control  Outcome: Ongoing, Progressing     Problem: Fluid Imbalance (Pneumonia)  Goal: Fluid Balance  Outcome: Ongoing, Progressing     Problem: Infection (Pneumonia)  Goal: Resolution of Infection Signs and Symptoms  Outcome: Ongoing, Progressing   Goal Outcome Evaluation:     She denies any SOB and chest pian. Pain is manageable and Prn oxycodone given x 1 and was effective. Pt continue having loose/ diarrhea stool and Prn Imodium, given.aslo skin care done.  She has poor appetite and staff offering  homes meals but she took couple of bite. Pt had video swallow study and see a new order.  She started 1500 fluid restriction this evening due to low Na.

## 2022-08-01 NOTE — PROGRESS NOTES
[facility]    Progress Note - Hospitalist Service       Date of Admission:  7/28/2022    Assessment & Plan      Chris Bell is a 87 year old female with history significant for HFpEF, HTN, normocytic anemia, spinal compression fractures, suspected RA, polyclonal gammopathy, multiple hospitalizations for diarrhea with the most recent on 6/28-7/6, presenting with ten days of progressive shortness of breath in the setting of known COVID positive test prior to symptoms. She was found to have hypoxic respiratory failure secondary to COVID infection/HAP and severe hyponatremia. She remains hospitalized for hyponatremia, likely discharge in 1-2 days.     Hypoxic respiratory failure- resolved  COVID +  HAP  Patient presented with ten days of progressive shortness of breath. Son states she had a positive COVID test about 2 weeks ago. On day of admission, she was found to by hypoxic in the low-mid 80's and tachypneic in the 30's by EMS. On exam, patient looks dry-euvolemic. Lab work-up notable for a leukocytosis of 21 with a left shift, normal lactic acid, CRP 22, metabolic acidosis with BC of 18, normal Hgb, negative troponin, and BNP of 236 (bl ~175). CXR revealed worsening bilateral upper lobe opacities c/w pneumonitis. Of note, last CT of abdomen on 6/29 noted some fibrotic changes in the LL lung. CT/PE revealed multifocal GGO's c/w pneumonia. Over all, the picture is most concerning for COVID infection with overlying infectious process-- HAP.  - O2 to maintain saturations>92%   - Remdesivir/Dexamethasone, start date: 7/28  - COVID labs: CBC, BMP, d-dimer, CRP, INR daily x4 days  - Zosyn, start date: 7/28  - Sputum gram stain and culture  - Blood cultures x2, NGTD  - SLP to assess for aspiration     Severe hyponatremia- improved  SIADH?  Sodium 113 on admission. Last known sodium 135 a few weeks ago. On exam, she appears dry to euvolemic. Additionally, she endorses very low po intake the last  "week or so. Does not appear to have a change in mentation, no seizures reported. She was started on 3%NS at 20cc/hr overnight 7/28-7/29 with improvement in sodium within goal. Hyponatremia secondary to SIADH in the setting of infection. Last check on 8/1 at 127, within goal of improvement. Patient seems to of stalled in past 24 hours, will recheck urine labs and assess if she is now hypovolemic and in need of fluids.  - encourage po intake  - urine osm/ na  - q12h sodium checks  - goal correction is 4-6mEq in 24hrs (max 8mEq)    Acute on chronic normocytic anemia  Hgb typically is around 10-11, currently down to 7.6. This has been noted on multiple admissions with a complete work-up. Denies any bleeding signs/symptoms. Thought to be secondary to chronic disease.   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 Estimated GFR of 62 (usually in 80's). Suspect prerenal given Cr/BUN ratio>20 and dry-euvolemic on exam. Cr improved after one day of minimal fluids/treatment. GFR still reduced at 54.  - BMP am     Hypomagnesemia  Magnesium 1.5 on admission.  - on PTA magnesium oxide tablet  - Magnesium replacement protocol     Elevated alk phos- resolved  Nonspecific RUQ edema on CT  Alk phos elevated to 172 on admission. CT of the chest noted some nonspecific edema around the gallbladder. Patient is asymptomatic; however, she is a poor historian and she states she has \"back pain\".  - RUQ US w/o hepatobiliary pathology     Lower back pain  Chronic spinal compression fracture  Patient endorsing lumbar back pain on admission, worse with movement. She endorses a mechanical fall from the bed to the floor landing on her back two days ago. Chronic compression fractures at T12, L2, and L4 on CT lumbar spine on 6/28, which appeared stable at that time.  Also has severe stenosis at L3-L4 and L4-L5.  No red flag symptoms. Has TLSO brace at home for comfort.  - Scheduled tylenol 975mg TID  - Oxycodone " "2.5mg q4h prn for severe pain     HFpEF, not in exacerbation  Patient has a history of HFpEF. On exam, does not appear to be hypervolemic, more dry-euvolemic. Crackles on exam more dry sounding. BNP elevated at 236, although during non-exacerbated state is 170's.   - restarted PTA Midamor on 7/31  - restarted PTA bumex on 7/30     Rheumatoid arthritis, recent diagnosis  Per last admission \"Long history of multiple joint pain, worse in the knees and hands. On exam her thumbs are flexed towards midline with prominent MCP swelling across all five joints bilaterally. Her bilateral knees also appear swollen but not erythematous. Her RF and CRP were elevated last admission, as well as calprotectin. CCP positive.  She meets criteria for rheumatoid arthritis diagnosis.  Discussed this with patient and her daughter 6/30.\" Patient is not endorsing joint pain on admission. She had completed a ten day course of prednisone 10mg daily after the last admission.  - will talk to rheumatology to see if we can set up an outpatient visit  - will initiate oral prednisone on discharge for transient management until patient can be seen by rheum    Chronic diarrhea/constipation  Patient has had multiple admissions presenting with diarrhea or constipation. Last admission earlier in July, there was concern for IBD and she underwent EGD/colonoscopy with multiple biopsies. Appears the results are negative for TB colitis or Crohn's. Negative for H.pylori on EGD. Her quant gold was indeterminate. She denies any nausea, emesis, or bowel concerns this admission.  - repeat quant gold    Goals of care  Previous admission, patient was DNR/DNI. She is clear on this admission that she wishes to be full code. No AD documentation in the chart. Daughter is PCA at home. She is requiring more help to perform ADLs  - PT/OT  - nutrition consult    Other chronic medication conditions  HTN- holding amiloride, Bumex, continue PTA Metoprolol  Hypothyroidism- " continue PTA levothyroxine  Urinary frequency- continue PTA oxybutynin  GERD- continue PTA pantoprazole     Diet: Minced & Moist Diet (level 5) Mildly Thick (level 2)    DVT Prophylaxis: Lovenox 25mg BID-- high intensity given COVID+/d=-dimer  Javed Catheter: Not present  Fluids: none  Central Lines: None  Cardiac Monitoring: ACTIVE order. Indication: Electrolyte Imbalance (24 hours)- Magnesium <1.3 mg/ml; Potassium < =2.8 or > 5.5 mg/ml  Code Status: Full Code      Disposition Plan   Multiple days pending resolution of hyponatremia, IV abx for pneumonia, and further work-up.    The patient's care was discussed with the Attending Physician, Dr. Musa.    Rebecca Ring MD PGY2  [facility]   Pager #: [phone]    ______________________________________________________________________    Interval History   Patient feeling okay today. She is much more appropriate in conversation. Appears to be eating better, changed to mild thickened liquids yesterday. Still endorses back pain, controlled with regimen. Denies any nausea or blood in her stool.    Data reviewed today: I reviewed all medications, new labs and imaging results over the last 24 hours. I personally reviewed CT/PE.    Physical Exam   Vital Signs: Temp: 97.7  F (36.5  C) Temp src: Oral BP: (!) 155/70 Pulse: 92   Resp: 20 SpO2: 97 % O2 Device: Nasal cannula Oxygen Delivery: 2 LPM  Weight: 103 lbs 0 oz   Gen: Pleasant. No distress.    HEENT: MMM.   Neck: No overt asymmetry.   CV: Appears well-perfused. RRR. Cold hands.  Resp: Breathing comfortably on room air. Lungs clear to auscultation bilaterally.  Abd: Non-distended, non-tender.   Ext: No edema or overt asymmetry/deformity.   Skin: No overt rash on easily visualized skin.   Neuro: Non-focal.   Psych: Calm.       Data   Recent Labs   Lab 08/01/22  0535 08/01/22  0002 07/31/22  1812 07/31/22  1158 07/31/22  0557 07/30/22  1130 07/30/22  0558 07/28/22  1746 07/28/22  1046   WBC 14.7*   --   --   --  17.2*  --  20.2*   < > 21.4*   HGB 7.6*  --   --   --  8.9*  --  8.9*   < > 12.5   MCV 97  --   --   --  96  --  94   < > 90     --   --   --  219  --  200   < > 258   INR  --   --   --   --   --   --   --   --  1.08   *  127* 127* 127*   < > 128*  128*   < > 124*  124*   < > 113*   POTASSIUM 4.0  --   --   --  3.9  --  4.8   < > 4.8   CHLORIDE 97*  --   --   --  98  --  97*   < > 80*   CO2 21*  --   --   --  22  --  17*   < > 18*   BUN 33*  --   --   --  22  --  37*   < > 50*   CR 0.90  --   --   --  0.81  --  1.01   < > 1.23*   ANIONGAP 9  --   --   --  8  --  10   < > 15   MANE 7.8*  --   --   --  8.1*  --  8.0*   < > 8.8   GLC 91  --   --   --  85  --  104   < > 124   ALBUMIN 2.0*  --   --   --  2.2*  --  2.0*   < > 2.3*   PROTTOTAL 6.1  --   --   --  6.9  --  6.8   < > 8.7*   BILITOTAL 0.3  --   --   --  0.3  --  0.2   < > 0.5   ALKPHOS 98  --   --   --  119  --  134*   < > 193*   ALT <9  --   --   --  <9  --  10   < > <9   AST 10  --   --   --  13  --  10   < > 18    < > = values in this interval not displayed.     No results found for this or any previous visit (from the past 24 hour(s)).

## 2022-08-01 NOTE — PLAN OF CARE
Goal Outcome Evaluation:  Pt had a better day.  Had pain this am. Gave PRN oxycodone. Pt Pt ate about 50% of meal and speech came at 1415 to do lunch with Nurse. Pt has been mostly cooperative today. Sleeping on and off. Talking more appropriately and seems less confused today. Increasing liquids to mildy thick. Pt seems to do better with less coughing and tolerating it well. Pt refused supper and refused Tylenol and NA+ tablet.  Will report off to Noc RN

## 2022-08-01 NOTE — PLAN OF CARE
Problem: Plan of Care - These are the overarching goals to be used throughout the patient stay.    Goal: Plan of Care Review/Shift Note  Description: The Plan of Care Review/Shift note should be completed every shift.  The Outcome Evaluation is a brief statement about your assessment that the patient is improving, declining, or no change.  This information will be displayed automatically on your shift note.  Outcome: Ongoing, Progressing     Problem: Fall Injury Risk  Goal: Absence of Fall and Fall-Related Injury  Outcome: Ongoing, Progressing     Problem: Infection (Pneumonia)  Goal: Resolution of Infection Signs and Symptoms  Outcome: Ongoing, Progressing   Goal Outcome Evaluation:      pt is alert and oriented x 4. Had mild pain as per the ,  Had 2.5 mg of oxycodone with some relief. Pt was on the  call light most of the night.  said, said pt complained of hunger. Pt was feed with rice at her request.

## 2022-08-01 NOTE — PROGRESS NOTES
Speech-Language Pathology: Video Swallow Study    No aspiration; poor appetite. Recommend diet of Easy to Chew with thin liquids and strategy of sitting fully upright for all oral intake and no straws.       08/01/22 1400   General Information   Onset of Illness/Injury or Date of Surgery 07/28/22   Pertinent History of Current Problem 87 year old female with history significant for HFpEF, HTN, normocytic anemia, spinal compression fractures, suspected RA, polyclonal gammopathy, multiple hospitalizations for diarrhea with the most recent on 6/28-7/6, presenting with ten days of progressive shortness of breath in the setting of known COVID positive test prior to symptoms. She was found to have hypoxic respiratory failure secondary to COVID infection/HAP and severe hyponatremia   General Observations following directions with use of Jabber lang line. Pt concerned about how much she would have to drink.       Present yes   General Swallowing Observations   Swallowing Evaluation Videofluoroscopic swallow study (VFSS)   VFSS Evaluation   Radiologist Dr. Phalen   Views Taken left lateral   Physical Location of Procedure [facility]   VFSS Textures Trialed thin liquids;pureed   VFSS Eval: Thin Liquid Texture Trial   Mode of Presentation, Thin Liquid cup;self-fed   Order of Presentation 1,2   Preparatory Phase WFL   Oral Phase, Thin Liquid Premature pharyngeal entry   Pharyngeal Phase, Thin Liquid Delayed swallow reflex   Rosenbek's Penetration Aspiration Scale: Thin Liquid Trial Results 2 - contrast enters airway, remains above the vocal cords, no residue remains (penetration)   VFSS Evaluation: Puree Solid Texture Trial   Mode of Presentation, Puree fed by clinician;spoon   Order of Presentation 3   Preparatory Phase WFL   Oral Phase, Puree WFL   Pharyngeal Phase, Puree WFL   Rosenbek's Penetration Aspiration Scale: Puree Food Trial Results 1 - no aspiration, contrast does not enter airway    Swallowing Recommendations   Diet Consistency Recommendations thin liquids (level 0);easy to chew (level 7)   Supervision Level for Intake distant supervision needed   Mode of Delivery Recommendations no straws   Comment, Swallowing Recommendations Recommend easy to chew diet with thin liquids and strategy of sitting fully upright for all oral intake and no straws.   Clinical Impression   Criteria for Skilled Therapeutic Interventions Met (SLP Eval) Evaluation only   Clinical Impression Comments No aspiration observed with pt independently cup drinking thin. She did have flash, transient penetration x2 d/t mildly delayed swallow trigger. No penetration noted with pudding texture. Pt has no oral motor weakness, however she has poor appetite. There appeared to be a prominent cricopharyngeaus that did not appear to negatively impact pharyngeal phase of swallow. Hyolaryngeal elevation and excursion were intact. She had complete epiglottic inversion and no oral or pharyngeal stasis after the swallow. As noted above, recommend Easy to Chew diet with thin liquids and strategy of sitting fully upright for all oral intake and no straws.    Total Evaluation Time   Total Evaluation Time (Minutes) 15

## 2022-08-02 NOTE — SIGNIFICANT EVENT
Significant Event Note    Time of event: 11:00am    Description of event:  Called regarding increasing aggression and agitation. Additionally, a rapid response was called earlier this morning.    For rapid response: Called around 8AM this morning due to an episode of hypotension and decreased responsiveness. By the time I was in the room, the patient was in the trendelenburg position with vitals signs wnl. She was noted to have a critical lab with a Hgb of 6.1, this a large change in the past few days. Patient had been denying abnormal stools, hematochezia/melena, or abdominal pain in the past few days. Per nursing, she had a few dark-colored loose stools overnight and this morning.     She was started on 1unit pRBCs. Her vitals remained stable and patient was more responsive/at baseline.   EKG was with normal sinus rhythm.  GI was consulted and recommended a tagged scan- this order was placed.  Additionally, troponin, lactic acid, and hemolytic labs were placed.  Unfortunately, no blood was able to be obtained after several attempts, an order for PICC was placed.    Later, around 11AM, I was paged regarding increasing aggression and agitation with the patient pulling at her lines when the PICC team was in the room. Upon a long discussion of why we need these interventions patient calmed down. Additionally, she was given 5mg Zyprexa.     Her heart rate remains elevated, either secondary to agitation vs. Ongoing GI bleed?    Will talk with son, Shiv, regarding code status.  GI to see    For now, placed order for 1:1, okay to hold off on immediate intervention    Discussed with: bedside nurse and supervising physician, Dr. Hugo Ring MD    12:15 PM  Spoke with son, Shiv, over the phone. He confirms that they had this discussion with family prior and that patient is DNR/DNI. He states he is okay if we pursue with PICC for lab studies and imaging to assess for bleeding.    1:17 PM  Notified of  blood pressures in the 70's systolic. On repeat check while I was in the room, her systolic improved 90's/60's. Prioritizing PICC placement at this time. Discussed contacting PICC now to that we can get the labs we need and further access. OK for 5mg zyprexa IM in order to get PICC    1:47 PM  Notified by PICC team that patient remains aggressive while they are trying to place. Discussed with Dr. Arias, will give 2mg Haldol and reattempt. MAP is currently 68, pulse of 114. Other vitals wnl.     BP (!) 87/59   Pulse 114   Temp 99.1  F (37.3  C) (Axillary)   Resp 16   Wt 43.2 kg (95 lb 3.2 oz)   SpO2 97%   BMI 18.59 kg/m    On exam, patient appears clinically okay, she is sitting up, will talk with an .   Lungs: mild dry crackles unchanged from prior day  CV: tachycardic with normal rhythm  Ext: warm, pulses faint (unchanged from admission)  Neuro: intermittently answers and listens to the ; fixated on one thing at a time    Current immediate intervention:  - s/p 1 unit pRBCs, ordered additional now  - 500 bolus NS  - PICC pending, giving medication to keep her calm for the procedure  - after, will check labs: CBC, CMP, troponin, lactic acid  - GI consult in, see note from morning, will update  - will continue goals of care conversation with family, depending on that conversation, will have low threshold to contact ICU   - per son-- okay for pressors should she needs them, ok for scope if needed    3:15 PM  Patient now drowsy from antipsychotics. PICC team called. BP stable with systolic in 90's, tachycardia improving now 100-110. Second unit infusing.     4:17 PM  Successful PICC placement. Patients /67, . Second unit still transfusing. Labs pending for draw. Nuclear scan ordered. Family updated. Looking into covid-recovered status.

## 2022-08-02 NOTE — PROCEDURES
"PICC Line Insertion Procedure Note    Pt. Name:   Chris Bell  MRN:          [id number]    Procedure: Insertion of a  TRIPLE Lumen  5 fr  Bard SOLO (valved) Power PICC, Lot number [id number]    Indications: Vascular Access; Medication Drips     Contraindications : FAILED PICC - LUE - Basilic    Procedure Details   Patient identified with 2 identifiers and \"Time Out\" conducted.    Central line insertion bundle followed: hand hygiene performed prior to procedure, site cleansed with cholraprep, hat, mask, sterile gloves, sterile gown worn, patient draped with maximum barrier head to toe drape, sterile field maintained.    The vein was assessed and found to be compressible and of adequate size. 2.5 ml 1% Lidocaine administered SQ to the insertion site. A 5 Fr PICC was inserted into the BASILIC vein of the right arm with ultrasound guidance. THREE attempt(s) required to access vein.   Catheter threaded without difficulty. Good blood return noted.    Modified Seldinger Technique used for insertion.    The 8 sharps that are included in the PICC insertion kit were accounted for and disposed of in the sharps container prior to breakdown of the sterile field.    Catheter secured with Statlock, biopatch and Tegaderm dressing applied.    Findings:  Total catheter length  33 cm, with 0 cm exposed. Mid upper arm circumference is 23 cm. Catheter was flushed with 30 cc NS. Patient  tolerated procedure well.      Tip placement verified in the distal SVC by [employer]:        CLABSI prevention brochure left at bedside.    Patient's primary RN notified PICC is ready for use.    Comments:          Poncho Henning, RN, MSN, [facility]  Vascular Access - [facility]      "

## 2022-08-02 NOTE — PROGRESS NOTES
Care Management Follow Up    Length of Stay (days): 5    Expected Discharge Date: 08/04/2022     Concerns to be Addressed:       Patient plan of care discussed at interdisciplinary rounds: Yes    Anticipated Discharge Disposition: Home Care, Group Home     Anticipated Discharge Services: PCA  Anticipated Discharge DME: Walker    Patient/family educated on Medicare website which has current facility and service quality ratings:  NA  Education Provided on the Discharge Plan:  Per team  Patient/Family in Agreement with the Plan: yes    Referrals Placed by CM/SW:    Private pay costs discussed: Not applicable    Additional Information:  Chart reviewed. Patient is to go to customized living at discharge. [facility] living can take patient once medically ready and COVID recovered. Writer messaged Resident this afternoon at 3:30 pm (8/2), to see if Resident knew a date that patient would be Covid recovered, awaiting response back.     Discharge plan pending, awaiting more follow up, and medical recommendations.    CM will continue to follow up and help with any anticipated discharge needs.       Karen Alanis RN

## 2022-08-02 NOTE — CONSULTS
GI CONSULT NOTE      Name: Chris Bell  Medical Record #: [id number]  YOB: 1935  Date of Admission: 7/28/2022  Date/Time: 8/2/2022/10:42 AM     CHIEF COMPLAINT: anemia, melena     HISTORY OF PRESENT ILLNESS: This is a 87 year old year old  patient seen at the request of Dr. Arias with a history of HTN, chronic anemia, PUD, CHF, hypothyroidism, prior C. Diff, spinal compression fracture, anxiety.  She was seen by GI in the past for abdominal pain, irregular BMs, and anemia, and she underwent EGD/colonoscopy on 7/3/2022 (see below).  There was some patchy colonic inflammation and scarring in the TI, but ultimately it was felt that findings were due to medication related injury likely iron with recommendations to change to liquid iron.  These results were just conveyed to the patient's primary provider and she continued to take oral iron tabs before this admission.    She is currently admitted because of SOB in the setting of Covid 19 infection.  She apparently tested positive at home a couple of weeks ago.  She has low sodium, and CT suggesting covid pneumonia.  She has been treated with remdesivir, Decadron, Lovenox.  She was getting once daily oral PPI.    Nursing staff notes yesterday she had 3 loose black stools and today 1 black stool so far.  She is now being switched to twice daily IV PPI.    Per chart review she has a history of peptic ulcer disease on EGD in 2018.  EGD 2020 noted reactive gastropathy but negative for ulcer.  EGD 1 month ago did not show any focal ulcer on EGD but she did have apparent gastritis.    PAST MEDICAL HISTORY:  Past Medical History:   Diagnosis Date     Anemia      Depression      Disease of thyroid gland      HTN (hypertension)      Osteoporosis      PUD (peptic ulcer disease)        FAMILY HISTORY:  Family History   Problem Relation Age of Onset     Diabetes No family hx of      Cancer No family hx of      Heart Disease No family hx of        SOCIAL  HISTORY:  Social History     Socioeconomic History     Marital status:      Spouse name: Not on file     Number of children: Not on file     Years of education: Not on file     Highest education level: Not on file   Occupational History     Not on file   Tobacco Use     Smoking status: Never Smoker     Smokeless tobacco: Never Used   Substance and Sexual Activity     Alcohol use: No     Drug use: No     Sexual activity: Never   Other Topics Concern     Not on file   Social History Narrative     Not on file     Social Determinants of Health     Financial Resource Strain: Not on file   Food Insecurity: Not on file   Transportation Needs: Not on file   Physical Activity: Not on file   Stress: Not on file   Social Connections: Not on file   Intimate Partner Violence: Not on file   Housing Stability: Not on file       MEDICATIONS PRIOR TO ADMISSION:   Medications Prior to Admission   Medication Sig Dispense Refill Last Dose     acetaminophen (TYLENOL) 500 MG tablet Take 1-2 tablets (500-1,000 mg) by mouth every 8 hours as needed for pain (Patient taking differently: Take 500-1,000 mg by mouth every 8 hours as needed for pain) 90 tablet 1 7/28/2022 at Unknown time     aMILoride (MIDAMOR) 5 MG tablet Take 1 tablet (5 mg) by mouth daily (Patient taking differently: Take 5 mg by mouth daily) 30 tablet 1 7/27/2022 at Unknown time     bumetanide (BUMEX) 0.5 MG tablet Take 1 tablet (0.5 mg) by mouth daily (Patient taking differently: Take 0.5 mg by mouth daily) 30 tablet 1 7/27/2022 at Unknown time     cetirizine (ZYRTEC) 5 MG tablet Take 5 mg by mouth daily   7/28/2022 at Unknown time     levothyroxine (SYNTHROID/LEVOTHROID) 50 MCG tablet Take 50 mcg by mouth every morning   7/28/2022 at Unknown time     loperamide (IMODIUM) 2 MG capsule Take 1 capsule (2 mg) by mouth 4 times daily as needed for diarrhea 60 capsule 0 Unknown at Unknown time     LORazepam (ATIVAN) 0.5 MG tablet Take 1 mg by mouth 2 times daily (Every  morning and evening)   7/27/2022 at Unknown time     magnesium oxide (MAG-OX) 400 MG tablet Take 0.5 tablets (200 mg) by mouth daily for 30 days 15 tablet 0 7/27/2022 at Unknown time     metoprolol succinate ER (TOPROL-XL) 50 MG 24 hr tablet Take 50 mg by mouth 2 times daily   7/27/2022 at Unknown time     Multiple Vitamins-Iron (MULTIVITAMIN/IRON PO) Take 1 tablet by mouth daily   7/27/2022 at Unknown time     nystatin (MYCOSTATIN) 875271 UNIT/ML suspension Swish and swallow 5 mLs (500,000 Units) in mouth 4 times daily as needed   Unknown at Unknown time     oxybutynin ER (DITROPAN XL) 5 MG 24 hr tablet Take 1 tablet by mouth every evening   7/27/2022 at Unknown time     pantoprazole (PROTONIX) 40 MG EC tablet Take 1 tablet (40 mg) by mouth every morning (before breakfast) 30 tablet 0 7/28/2022 at Unknown time     psyllium (METAMUCIL/KONSYL) Packet Take 1 packet by mouth 2 times daily as needed for constipation   Past Week at Unknown time     Alcohol Swabs (B-D SINGLE USE SWABS REGULAR) PADS USE UTD  0      blood glucose monitoring (ONETOUCH VERIO) meter device kit 2 times daily  0      ONETOUCH VERIO IQ test strip Use one stript to test your blood sugars twice daily  2           ALLERGIES: Unknown [no clinical screening - see comments]    REVIEW OF SYSTEMS (ROS): Complete review of systems negative other than listed in HPI.    PHYSICAL EXAM:    BP (!) 135/92   Pulse 94   Temp 98.3  F (36.8  C) (Oral)   Resp 16   Wt 43.2 kg (95 lb 3.2 oz)   SpO2 100%   BMI 18.59 kg/m      GENERAL: Patient was very agitated and would not cooperate with questioning    SKIN: No jaundice    NECK: Supple without adenopathy    EYES: No scleral icterus    LUNGS: Clear to auscultation bilaterally    HEART: Regular rate and rhythm, S1 and S2 present, no lower extremity edema    ABDOMEN: Soft, non-distended, non-tender, no guarding/rebound/mass, bowel sounds normal, no obvious organomegaly    MUSKULOSKELETAL:  Warm and well perfused,  moves all extremities well    NEUROLOGIC: Alert     PSYCHIATRIC: NA    LAB DATA:  Recent Labs   Lab Test 08/02/22  0516 08/01/22  0535 07/31/22  0557 07/29/22  0811 07/28/22  1046 06/22/22  2130 06/21/22  2156 03/21/22  0658 03/20/22  1502   WBC 15.5* 14.7* 17.2*   < > 21.4*   < > 10.8   < > 5.4   HGB 6.1* 7.6* 8.9*   < > 12.5   < > 6.7*   < > 8.8*    97 96   < > 90   < > 102*   < > 110*    195 219   < > 258   < > 284   < > 274   INR  --   --   --   --  1.08  --  1.20*  --  1.10    < > = values in this interval not displayed.     Recent Labs   Lab Test 08/02/22  0755 08/02/22  0516 08/01/22  1805 08/01/22  1146 08/01/22  0535 07/31/22  1158 07/31/22  0557 07/30/22  1130 07/30/22  0558   NA  --  132* 130* 126* 127*  127*   < > 128*  128*   < > 124*  124*   POTASSIUM  --   --   --   --  4.0  --  3.9  --  4.8   CHLORIDE  --   --   --   --  97*  --  98  --  97*   CO2  --   --   --   --  21*  --  22  --  17*   BUN  --   --   --   --  33*  --  22  --  37*   CR  --   --   --   --  0.90  --  0.81  --  1.01   ANIONGAP  --   --   --   --  9  --  8  --  10   MANE  --   --   --   --  7.8*  --  8.1*  --  8.0*   *  --   --   --  91  --  85  --  104    < > = values in this interval not displayed.     Recent Labs   Lab Test 08/01/22  0535 07/31/22  0557 07/30/22  0558 07/28/22  1046 07/03/22  2030 06/29/22  0124 06/21/22  2156 06/04/22  1935/22  1606 05/24/22  1024 03/20/22  1705 03/20/22  1502 02/15/22  1016   ALBUMIN 2.0* 2.2* 2.0*   < >  --   --    < >  --   --  2.2*   < > 2.8* 2.5*   BILITOTAL 0.3 0.3 0.2   < >  --   --    < >  --   --  0.3   < > 0.2 0.3   ALT <9 <9 10   < >  --   --    < >  --   --  10   < > <9 <9   AST 10 13 10   < >  --   --    < >  --   --  12   < > 16 10   ALKPHOS 98 119 134*   < >  --   --    < >  --   --  78   < > 96 63   PROTEIN  --   --   --   --  Negative Negative  --  20 *   < >  --    < >  --   --    LIPASE  --   --   --   --   --   --   --   --   --  11  --  18 11    <  > = values in this interval not displayed.     7/3/2022 EGD  Impression:            - Tortuous esophagus.                          - Chronic atrophic gastritis. Biopsied.                          - Duodenal scar.                          - Normal first portion of the duodenum, second portion                          of the duodenum and third portion of the duodenum.                          Biopsied.   Recommendation:        - Await pathology results.                          - Perform a colonoscopy.   Colonoscopy  Findings:        Scattered inflammation characterized by scarring and shallow ulcerations        was found in the terminal ileum. Erythematous villi noted. Biopsies were        taken with a cold forceps for histology.        Scattered inflammation characterized by scarring and shallow ulcerations        was found in the descending colon, in the transverse colon, in the        ascending colon and in the cecum. Biopsies were taken with a cold        forceps for histology. Intervening colonic mucosa appeared otherwise        normal.                                                                                     Moderate Sedation:        Moderate (conscious) sedation was personally administered by an        anesthesia professional. The following parameters were monitored: oxygen        saturation, heart rate, blood pressure, respiratory rate, EKG, adequacy        of pulmonary ventilation, and response to care.   Impression:            - Ileitis. Biopsied.                          - Scattered inflammation was found in the descending                          colon, in the transverse colon, in the ascending colon                          and in the cecum, secondary to possible Crohn's                          disease versus infectious (TB, viral) versus ischemic                          colitis (seems less likely given distribution.)                          Biopsied.   Recommendation:        - Await  pathology results.                          - Check TB quantiferon.     A) DUODENUM, BIOPSIES:        1.  DUODENAL MUCOSA WITH FOCAL, MILD ACUTE DUODENITIS        2.  NORMAL VILLI HEIGHT, WITHOUT INCREASED NUMBERS OF INTRAEPITHELIAL LYMPHOCYTES (NO EVIDENCE OF SPRUE)        3.  NO EVIDENCE OF ULCERATION, GRANULOMATOUS INFLAMMATION, DYSPLASIA OR MALIGNANCY    B) GASTRIC BIOPSIES:        1.  GASTRIC ANTRAL AND FUNDIC MUCOSA WITH MILD ACTIVE CHRONIC GASTRITIS, SUPERFICIAL EPITHELIAL             EROSION, AND MILD REACTIVE EPITHELIAL CHANGES        2.  PIGMENTED PILL MATERIAL PRESENT        3.  GASTRIC ANTRUM WITH MODERATE ATROPHIC CHANGES        4.  NEGATIVE FOR DYSPLASIA AND MALIGNANCY        5.  H. PYLORI IMMUNOSTAIN: NEGATIVE FOR HELICOBACTER ORGANISMS     C) ILEUM, ILEOCECAL BIOPSY:        1.  SMALL INTESTINAL MUCOSA WITHOUT ACTIVE ENTERITIS, ULCERATION OR GRANULOMATOUS INFLAMMATION        2. PIGMENTED MACROPHAGES CONSISTENT WITH MELANOSIS COLI        3.  NO EVIDENCE OF ULCERATION, DYSPLASIA OR MALIGNANCY    D) COLON, RANDOM BIOPSIES:        1.  MULTIPLE FRAGMENTS OF COLONIC MUCOSA WITH PATCHY ULCERATION, FOREIGN BODY GRANULOMATOUS              INFLAMMATION AND ASSOCIATED BIREFRINGENT PIGMENTED PILL-FILLER TYPE MATERIAL        2.  ADJACENT CRYPT ARCHITECTURAL DISTORTION, WITHOUT ACUTE CRYPTITIS        3.  AFB AND GMS STAINS: NEGATIVE FOR YEAST, FUNGAL HYPHAE AND ACID-FAST BACILLI        4.  ADDITIONAL FRAGMENTS OF UNREMARKABLE COLONIC MUCOSA        5.  NO EVIDENCE OF DYSPLASIA OR MALIGNANCY    SECOND [facility] PATHOLOGY ([facility])  A. Duodenum biopsy  1.  Normal duodenal mucosa  2.  Negative for celiac disease and other enteropathy    B.  Stomach biopsy  1.  Erosive reactive gastropathy associated with pill fragments (suspicious for iron pill fragments)  2.  Negative for chronic gastritis, helical factor and atrophy  3.  See comment    C.  Terminal ileum biopsy  1.  Hemosiderin laden macrophages and tips of some villi,  compatible with GI bleeding  2.  Otherwise normal ileal mucosa  3.  Negative for inflammatory changes    D.  Colon, random biopsy  1.  Mucosal ulceration with embedded crystalline material compatible with pill fragments    a.  Background intact colonic mucosa is normal    b.  Negative for viral inclusions and parasites    c.  Negative for neoplasia  2.  See comment    Comments  B.  The gastric mucosa shows erosive reactive type gastropathy with embedded pill fragments suspicious for iron  D.  These findings in the context of a focal discrete lesion seen on colonoscopy is highly suspicious of medication related injury    IMAGING:  XR Video Swallow with SLP or OT    Result Date: 8/1/2022  EXAM: XR VIDEO SWALLOW WITH SLP OR OT LOCATION: [facility] DATE/TIME: 8/1/2022 2:12 PM INDICATION: Difficulty swallowing. COMPARISON: None. TECHNIQUE: Routine swallow study with speech pathology using multiple barium thicknesses. FINDINGS: FLUOROSCOPIC TIME: .4 minutes NUMBER OF IMAGES: 3 Swallow study with Speech Pathology using multiple barium thicknesses. On thin liquid, transient penetration was identified. Otherwise, no penetration or aspiration with the two consistencies given.     7/29/2022 CT chest  IMPRESSION:  1.  No central or lobar pulmonary embolism.     2.  Multifocal bilateral groundglass airspace infiltrates consistent with multifocal pneumonia or pneumonitis.     3.  Severe coronary artery disease.     4.  Nonspecific edema in the right upper quadrant adjacent to the gallbladder. Consider sonographic correlation. There is also mild wall thickening of the gastric antrum which is likely inflammatory though clinical correlation is recommended.    7/29/2022 limited abdominal ultrasound  FINDINGS:     GALLBLADDER: Normal. No gallstones, wall thickening, or pericholecystic fluid. Negative sonographic Love's sign.     BILE DUCTS: No biliary dilatation. The common duct measures 8 mm. No intraductal  stones.     LIVER: Normal parenchyma with smooth contour. No focal mass.     RIGHT KIDNEY: No hydronephrosis.     PANCREAS: The visualized portions are normal.     No ascites.                                                                      IMPRESSION:  1.  No evidence of cholelithiasis or cholecystitis. Common duct is normal for age.    ASSESSMENT:  1.  Chronic anemia, recently on oral iron and with EGD and colonoscopy findings as noted above.  There is some concern for both gastric and colonic irritation related to oral iron.  She could also have anemia of chronic disease, certainly with some worsening of anemia with recent COVID-19 infection.  2.  COVID-19 infection, testing positive here on July 28 and treated with remdesivir, Decadron, Lovenox.  On antibiotics for covid pneumonia.  3.  History of peptic ulcer disease on EGD in 2018 but with no discrete upper GI ulcer on EGD 1 month ago.  An EGD could be considered if she has significant ongoing GI bleeding but will be deferred at this time since risks likely outweight benefits.  At this time agree with plans to start twice daily IV PPI and monitor patient closely.  Also consider goals of care.    Principal Problem:    Infection due to 2019 novel coronavirus  Active Problems:    T12 compression fracture (H)    Hypomagnesemia    Hyponatremia    Acute kidney injury (H)    Hypoxia    Hypertension, unspecified type    PLAN:  Discussed with Dr. Florian Vo.  Approximately 40 minutes of total time was spent providing patient care, including patient evaluation, reviewing documentation/test results, and .    1.  IV PPI.  2.  Monitor stool output and hemoglobin, transfuse as needed.  3.  Supportive cares.  4.  GI following.      Anuradha Nazario PA-C  [facility]  [phone]    CC: [facility], Kizzy Villanueva

## 2022-08-02 NOTE — PROCEDURES
"PICC Line Insertion Procedure Note  FAILED LUE APPROACH    Pt. Name:   Chris Bell  MRN:          [id number]    Procedure: Insertion ATTEMPT of a  TRIPLE Lumen  5 fr  Bard SOLO (valved) Power PICC    Indications: VASCULAR ACCESS    Contraindications : None    Procedure Details   Patient identified with 2 identifiers and \"Time Out\" conducted.    Central line insertion bundle followed: hand hygiene performed prior to procedure, site cleansed with cholraprep, hat, mask, sterile gloves, sterile gown worn, patient draped with maximum barrier head to toe drape, sterile field maintained.    The vein was assessed and found to be compressible and of adequate size. 2.5 ml 1% Lidocaine administered SQ to the insertion site. A 5 Fr PICC was inserted into the BASILIC vein of the LEFT arm with ultrasound guidance. ONE attempt(s) required to access vein.   Modified Seldinger Technique used for insertion.      Catheter threaded with difficulty; resistance at the level of AXILLARY area.     The 8 sharps that are included in the PICC insertion kit were accounted for and disposed of in the sharps container prior to breakdown of the sterile field.      Findings:    Aborted - LUE basilic approach PICC insertion -  resistance at the level of Axillary area.     Did not leave as midline as unable to aspirate blood.     Did not attempt RUE - patient agitated and restless. Pulling on IV lines, telemetry, and gown.    Primary RN advised of issue.     Comments:      Please advise when patient ready for PICC insert.      Poncho Henning, RN, MSN, [facility]  Vascular Access - [facility]      "

## 2022-08-02 NOTE — PROGRESS NOTES
[facility]    Progress Note - Hospitalist Service       Date of Admission:  7/28/2022    Assessment & Plan      Chris Bell is a 87 year old female with history significant for HFpEF, HTN, normocytic anemia, spinal compression fractures, suspected RA, polyclonal gammopathy, multiple hospitalizations for diarrhea with the most recent on 6/28-7/6, presenting with ten days of progressive shortness of breath in the setting of known COVID positive test prior to symptoms. She was found to have hypoxic respiratory failure secondary to COVID infection/HAP and severe hyponatremia, both of which have improved. Her course has been complicated by a hemoglobin drop, likely secondary to GI bleed given reports of melena over the past day.     Son, Shiv, confirms DNR/DNI status    Acute on chronic normocytic anemia  Suspect GI bleed  Chronic diarrhea/constipation  Patient has had multiple admissions presenting with diarrhea or constipation. Last admission earlier in July, there was concern for IBD and she underwent EGD/colonoscopy with multiple biopsies. Appears the results are negative for TB colitis or Crohn's-- only revealed medication (iron) induced injury. Negative for H.pylori on EGD. Her quant gold was indeterminate. Overnight from 8/1-8/2, there were reports of black, loose stool. Hgb with a significant drop during admission, concerning for a GI bleed.   - GI consult  - hold lovenox, SCDs for DVT ppx  - IV PPI  - s/p 1unit pRBCs, put in for another unit now while awaiting repeat Hgb (cannot draw, need PICC)  - trying to establish access, PICC pending  - nuclear scan to assess for active bleeding    Hypoxic respiratory failure- resolved  COVID +  HAP  Patient presented with ten days of progressive shortness of breath. Son states she had a positive COVID test about 2 weeks ago. On day of admission, she was found to by hypoxic in the low-mid 80's and tachypneic in the 30's by EMS. On exam, patient  "looks dry-euvolemic. Lab work-up notable for a leukocytosis of 21 with a left shift, normal lactic acid, CRP 22, metabolic acidosis with BC of 18, normal Hgb, negative troponin, and BNP of 236 (bl ~175). CXR revealed worsening bilateral upper lobe opacities c/w pneumonitis. Of note, last CT of abdomen on 6/29 noted some fibrotic changes in the LL lung. CT/PE revealed multifocal GGO's c/w pneumonia. Over all, the picture is most concerning for COVID infection with overlying infectious process-- HAP.  - O2 to maintain saturations>92%   - Remdesivir/Dexamethasone, start date: 7/28  - COVID labs: CBC, BMP, d-dimer, CRP, INR daily x4 days  - Zosyn, start date: 7/28  - Sputum gram stain and culture  - Blood cultures x2, NGTD  - SLP to assess for aspiration     Severe hyponatremia- improved  SIADH?  Sodium 113 on admission. Last known sodium 135 a few weeks ago. On exam, she appears dry to euvolemic. Additionally, she endorses very low po intake the last week or so. Does not appear to have a change in mentation, no seizures reported. She was started on 3%NS at 20cc/hr overnight 7/28-7/29 with improvement in sodium within goal. Hyponatremia secondary to SIADH in the setting of infection. Last check on 8/2 at 132, within goal of improvement.   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 Estimated GFR of 62 (usually in 80's). Suspect prerenal given Cr/BUN ratio>20 and dry-euvolemic on exam. Cr improved after one day of minimal fluids/treatment. GFR still reduced at 54.  - BMP am     Hypomagnesemia  Magnesium 1.5 on admission.  - on PTA magnesium oxide tablet  - Magnesium replacement protocol     Elevated alk phos- resolved  Nonspecific RUQ edema on CT  Alk phos elevated to 172 on admission. CT of the chest noted some nonspecific edema around the gallbladder. Patient is asymptomatic; however, she is a poor historian and she states she has \"back pain\".  - RUQ US w/o " "hepatobiliary pathology     Lower back pain  Chronic spinal compression fracture  Patient endorsing lumbar back pain on admission, worse with movement. She endorses a mechanical fall from the bed to the floor landing on her back two days ago. Chronic compression fractures at T12, L2, and L4 on CT lumbar spine on 6/28, which appeared stable at that time.  Also has severe stenosis at L3-L4 and L4-L5.  No red flag symptoms. Has TLSO brace at home for comfort.  - Scheduled tylenol 975mg TID  - Oxycodone 2.5mg q4h prn for severe pain     HFpEF, not in exacerbation  Patient has a history of HFpEF. On exam, does not appear to be hypervolemic, more dry-euvolemic. Crackles on exam more dry sounding. BNP elevated at 236, although during non-exacerbated state is 170's.   - restarted PTA Midamor on 7/31  - restarted PTA bumex on 7/30     Rheumatoid arthritis, recent diagnosis  Per last admission \"Long history of multiple joint pain, worse in the knees and hands. On exam her thumbs are flexed towards midline with prominent MCP swelling across all five joints bilaterally. Her bilateral knees also appear swollen but not erythematous. Her RF and CRP were elevated last admission, as well as calprotectin. CCP positive.  She meets criteria for rheumatoid arthritis diagnosis.  Discussed this with patient and her daughter 6/30.\" Patient is not endorsing joint pain on admission. She had completed a ten day course of prednisone 10mg daily after the last admission.  - will talk to rheumatology to see if we can set up an outpatient visit  - will initiate oral prednisone on discharge for transient management until patient can be seen by rheum    Goals of care  Previous admission, patient was DNR/DNI. Spoke with son, Shiv, who confirms that this had been a discussion prior and that family/patient would like to be DNR and DNI.  - PT/OT  - nutrition consult    Other chronic medication conditions  HTN- holding amiloride, Bumex, continue PTA " Metoprolol  Hypothyroidism- continue PTA levothyroxine  Urinary frequency- continue PTA oxybutynin  GERD- continue PTA pantoprazole     Diet: Fluid restriction 1500 ML FLUID  NPO for Medical/Clinical Reasons Except for: Meds, Ice Chips    DVT Prophylaxis: hold Lovenox, SCDs  Javed Catheter: Not present  Fluids: none  Central Lines: None  Cardiac Monitoring: None  Code Status: No CPR- Do NOT Intubate      Disposition Plan   Multiple days pending resolution of suspected GI bleed.    The patient's care was discussed with Dr. Hugo Ring MD PGY2  [facility]   Pager #: [phone]    ______________________________________________________________________    Interval History   Rapid response in the AM for an episode of hypotension. Per nursing report, patient had multiple loose, dark stools yesterday evening and overnight. Her BP resolved without immediate intervention. Her hgb this morning was 6.1, thus given 1unit pRBCs. Unfortunately, unable to obtain blood for labs multiple times. PICC team attempted one side and stopped due to increased aggression. With redirection, 1:1, zyprexa, patient calmed down. Now with NS bolus going, additional pRBCs ordered. Awaiting reattempt by PICC team (see significant event note for more details). Called patients son, Shiv. He confirmed DNR/DNI status, and wanted to pursue intervention with blood products, PICC, and tagged scan. GI aware of patient.    Data reviewed today: I reviewed all medications, new labs and imaging results over the last 24 hours. I personally reviewed CT/PE.    Physical Exam   Vital Signs: Temp: 99.1  F (37.3  C) Temp src: Axillary BP: (!) 78/50 Pulse: 120   Resp: 16 SpO2: 97 % O2 Device: Nasal cannula Oxygen Delivery: 2 LPM  Weight: 95 lbs 3.2 oz   Gen: Pleasant. No distress.    HEENT: MMM.   Neck: No overt asymmetry.   CV: Appears well-perfused. RRR. Cold hands.  Resp: Breathing comfortably on room air. Lungs clear to  auscultation bilaterally.  Abd: Non-distended, non-tender.   Ext: No edema or overt asymmetry/deformity.   Skin: No overt rash on easily visualized skin.   Neuro: Non-focal.   Psych: Calm.       Data   Recent Labs   Lab 08/02/22  0755 08/02/22  0516 08/01/22  1805 08/01/22  1146 08/01/22  0535 07/31/22  1158 07/31/22  0557 07/30/22  1130 07/30/22  0558 07/28/22  1746 07/28/22  1046   WBC  --  15.5*  --   --  14.7*  --  17.2*  --  20.2*   < > 21.4*   HGB  --  6.1*  --   --  7.6*  --  8.9*  --  8.9*   < > 12.5   MCV  --  100  --   --  97  --  96  --  94   < > 90   PLT  --  214  --   --  195  --  219  --  200   < > 258   INR  --   --   --   --   --   --   --   --   --   --  1.08   NA  --  132* 130* 126* 127*  127*   < > 128*  128*   < > 124*  124*   < > 113*   POTASSIUM  --   --   --   --  4.0  --  3.9  --  4.8   < > 4.8   CHLORIDE  --   --   --   --  97*  --  98  --  97*   < > 80*   CO2  --   --   --   --  21*  --  22  --  17*   < > 18*   BUN  --   --   --   --  33*  --  22  --  37*   < > 50*   CR  --   --   --   --  0.90  --  0.81  --  1.01   < > 1.23*   ANIONGAP  --   --   --   --  9  --  8  --  10   < > 15   MANE  --   --   --   --  7.8*  --  8.1*  --  8.0*   < > 8.8   *  --   --   --  91  --  85  --  104   < > 124   ALBUMIN  --   --   --   --  2.0*  --  2.2*  --  2.0*   < > 2.3*   PROTTOTAL  --   --   --   --  6.1  --  6.9  --  6.8   < > 8.7*   BILITOTAL  --   --   --   --  0.3  --  0.3  --  0.2   < > 0.5   ALKPHOS  --   --   --   --  98  --  119  --  134*   < > 193*   ALT  --   --   --   --  <9  --  <9  --  10   < > <9   AST  --   --   --   --  10  --  13  --  10   < > 18    < > = values in this interval not displayed.     Recent Results (from the past 24 hour(s))   XR Video Swallow with SLP or OT    Narrative    EXAM: XR VIDEO SWALLOW WITH SLP OR OT  LOCATION: [facility]  DATE/TIME: 8/1/2022 2:12 PM    INDICATION: Difficulty swallowing.  COMPARISON: None.    TECHNIQUE: Routine  swallow study with speech pathology using multiple barium thicknesses.    FINDINGS:   FLUOROSCOPIC TIME: .4 minutes  NUMBER OF IMAGES: 3    Swallow study with Speech Pathology using multiple barium thicknesses. On thin liquid, transient penetration was identified. Otherwise, no penetration or aspiration with the two consistencies given.

## 2022-08-02 NOTE — PLAN OF CARE
Problem: Plan of Care - These are the overarching goals to be used throughout the patient stay.    Goal: Absence of Hospital-Acquired Illness or Injury  Outcome: Ongoing, Progressing  Intervention: Identify and Manage Fall Risk  Recent Flowsheet Documentation  Taken 8/2/2022 0022 by Brenden Bauman RN  Safety Promotion/Fall Prevention:   supervised activity   patient and family education   nonskid shoes/slippers when out of bed   fall prevention program maintained   bed alarm on   activity supervised  Taken 8/1/2022 2100 by Brenden Bauman, RN  Safety Promotion/Fall Prevention:   supervised activity   patient and family education   nonskid shoes/slippers when out of bed   fall prevention program maintained   bed alarm on   activity supervised  Goal: Optimal Comfort and Wellbeing  Outcome: Ongoing, Progressing  Intervention: Monitor Pain and Promote Comfort  Recent Flowsheet Documentation  Taken 8/1/2022 2100 by Brenden Bauman RN  Pain Management Interventions: medication (see MAR)     Problem: Risk for Delirium  Goal: Improved Sleep  Outcome: Ongoing, Progressing     Problem: Fall Injury Risk  Goal: Absence of Fall and Fall-Related Injury  Outcome: Ongoing, Progressing  Intervention: Identify and Manage Contributors  Recent Flowsheet Documentation  Taken 8/2/2022 0022 by Brenden Bauman, RN  Medication Review/Management: medications reviewed  Taken 8/1/2022 2100 by Brenden Bauman, RN  Medication Review/Management: medications reviewed  Intervention: Promote Injury-Free Environment  Recent Flowsheet Documentation  Taken 8/2/2022 0022 by Brenden Bauman, RN  Safety Promotion/Fall Prevention:   supervised activity   patient and family education   nonskid shoes/slippers when out of bed   fall prevention program maintained   bed alarm on   activity supervised  Taken 8/1/2022 2100 by Brenden Bauman, RN  Safety Promotion/Fall Prevention:   supervised activity   patient and family education   nonskid shoes/slippers  "when out of bed   fall prevention program maintained   bed alarm on   activity supervised     Problem: Fluid Imbalance (Pneumonia)  Goal: Fluid Balance  Outcome: Ongoing, Progressing     Problem: Respiratory Compromise (Pneumonia)  Goal: Effective Oxygenation and Ventilation  Outcome: Ongoing, Progressing  Intervention: Promote Airway Secretion Clearance  Recent Flowsheet Documentation  Taken 8/2/2022 0022 by Brenden Bauman RN  Cough And Deep Breathing: done with encouragement  Taken 8/1/2022 2100 by Brenden Bauman RN  Cough And Deep Breathing: done with encouragement   Goal Outcome Evaluation:     line used for assessments. Patient appears alert and oriented x 4. Patient does get irritable and demanding at times of staff members. Endorsed pain all over and PRN oxycodone was given which appeared effective. Urinating with purewick in place. Patient called a family member and stated she wasn't \"going to make it through the night.\" Writer approached patient via  line and asked why she would say this. Patient stated she didn't \"know why.\" Reassured patient she is in a hospital with doctors and nurses who are there to help keep her safe. Critical hemoglobin noted this AM. Paged to Dr. Ramirez. Consent for blood signed now awaiting type and screen. Patient now eating rice porridge. MD to bedside to see patient.                        "

## 2022-08-02 NOTE — PLAN OF CARE
Problem: Infection (Pneumonia)  Goal: Resolution of Infection Signs and Symptoms  8/2/2022 1845 by Meredith Burgos RN  Outcome: Ongoing, Progressing  8/2/2022 1845 by Meredith Burgos RN  Outcome: Ongoing, Progressing     Problem: Fluid Imbalance (Pneumonia)  Goal: Fluid Balance  8/2/2022 1845 by Meredith Burgos RN  Outcome: Ongoing, Progressing  8/2/2022 1845 by Meredith Burgos RN  Outcome: Ongoing, Progressing     Problem: Anemia  Goal: Anemia Symptom Improvement  Outcome: Ongoing, Progressing   Goal Outcome   around 7:45 Am  Hypotensive episode noted RR called and  this writer put the  bed  on transcending prosthion and  and BP improved. But pt continue lethargic and sedative. Skin color was pale. Pt had  hgb 6.1 and  1 unit blood transfusion given and tolerated well.  Multiple  attempts done to do blood draw but unable to obtained blood due to had stick   And PICC order placed  and during  PICC placed pt wake and start restless, anxious agitated and  unable to placed. The same time another hypotensive and tachycardia  noted with HR rate 140-150 noted.  500 ml bolus given and  BP improved. Pt continue agitation and  starting pulling  IV line and unable to redirect her. 1:1  sitter initiated.  Another 1 unit blood transfusion given this evening. After 5 mg IM zyprex x 2 and  2 mg haldol IV given pt able to sedated and able to placed PICC. Totally 2 unit RBC  tanfusion given and hgb will recheck at 2000. Pt consulted GI and  She has NM GI bleed order. NPO. At this time pt resting with closing eyes arouse easily.

## 2022-08-02 NOTE — SIGNIFICANT EVENT
"Significant Event Note    Time of event: 6:48 AM August 2, 2022    Description of event:  Called with hemoglobin of 6.1, down from over 7 yesterday  Nurse thought they saw smear of blood with bowel movement this morning  Bili otherwise denies other overt bleeding symptoms    Per prior note: \"Patient has had multiple admissions presenting with diarrhea or constipation. Last admission earlier in July, there was concern for IBD and she underwent EGD/colonoscopy with multiple biopsies. Appears the results are negative for TB colitis or Crohn's. Negative for H.pylori on EGD. Her quant gold was indeterminate\"    /86 (BP Location: Right arm)   Pulse 83   Temp 97.4  F (36.3  C) (Axillary)   Resp 22   Wt 43.2 kg (95 lb 3.2 oz)   SpO2 98%   BMI 18.59 kg/m    Exam: Pale appearing    A:  Acute on chronic anemia, more pronounced -- meeting criteria for transfusion  Concern for GI bleed  Could be related to ongoing blood draws    P:  Consented patient with ong  --ordered 1 unit pRBC  Discussed cannot rule out GI bleed --wondered whether she was open to colonoscopy again.  She will consider, though did not want to commit at this time  Hold Lovenox and other agents that can increase bleeding risk  IV PPI twice daily, hold oral  Keeping NPO for now -- already had breakfast prior to being notified of result     Day team to discuss ongoing plan     Discussed with: bedside nurse    Heidi Ramirez, DO     "

## 2022-08-03 NOTE — PROGRESS NOTES
[facility]    Progress Note - Hospitalist Service       Date of Admission:  7/28/2022    Assessment & Plan      Chris Bell is a 87 year old female with history significant for HFpEF, HTN, normocytic anemia, spinal compression fractures, suspected RA, polyclonal gammopathy, multiple hospitalizations for diarrhea with the most recent on 6/28-7/6, presenting with ten days of progressive shortness of breath in the setting of known COVID positive test prior to symptoms. She was found to have hypoxic respiratory failure secondary to COVID infection/HAP and severe hyponatremia, both of which have improved. Her course has been complicated by acute blood loss anemia secondary to suspected GI bleed. She will undergo a tagged RBC scan today to evaluate the need for further intervention.    Acute on chronic anemia  Suspect GI bleed  Chronic diarrhea/constipation  Patient has had multiple admissions presenting with diarrhea or constipation. Last admission earlier in July, there was concern for IBD and she underwent EGD/colonoscopy with multiple biopsies. Appears the results are negative for TB colitis or Crohn's-- only revealed medication (iron) induced injury. Negative for H.pylori on EGD. Her quant gold was indeterminate. Overnight from 8/1-8/2, there were reports of black, loose stool. Her Hgb dropped significantly to 6.1, and she received 2 units of pRBCs, now with normal hemodynamic status and Hgb of 9.5.  - GI consult  - hold lovenox, SCDs for DVT ppx  - IV PPI  - hold oral Fe  - nuclear scan    Hypoxic respiratory failure- resolved  COVID +  HAP  Patient presented with ten days of progressive shortness of breath. Son states she had a positive COVID test about 2 weeks ago. On day of admission, she was found to by hypoxic in the low-mid 80's and tachypneic in the 30's by EMS. On exam, patient looks dry-euvolemic. Lab work-up notable for a leukocytosis of 21 with a left shift, normal lactic acid,  "CRP 22, metabolic acidosis with BC of 18, normal Hgb, negative troponin, and BNP of 236 (bl ~175). CXR revealed worsening bilateral upper lobe opacities c/w pneumonitis. Of note, last CT of abdomen on 6/29 noted some fibrotic changes in the LL lung. CT/PE revealed multifocal GGO's c/w pneumonia. Over all, the picture is most concerning for COVID infection with overlying infectious process-- HAP.  - O2 to maintain saturations>92%   - Remdesivir/Dexamethasone, start date: 7/28  - COVID labs: CBC, BMP, d-dimer, CRP, INR daily x4 days  - Zosyn, start date: 7/28  - Sputum gram stain and culture  - Blood cultures x2, NGTD  - SLP to assess for aspiration     Severe hyponatremia- resolved  Sodium 113 on admission. Last known sodium 135 a few weeks ago. On exam, she appears dry to euvolemic. Additionally, she endorses very low po intake the last week or so. Does not appear to have a change in mentation, no seizures reported. She was started on 3%NS at 20cc/hr overnight 7/28-7/29 with improvement in sodium within goal. Hyponatremia secondary to SIADH in the setting of infection. Last check on 8/3 at 136.  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 Estimated GFR of 62 (usually in 80's). Suspect prerenal given Cr/BUN ratio>20 and dry-euvolemic on exam. Cr improved after one day of minimal fluids/treatment. GFR still reduced at 62.  - BMP am     Hypomagnesemia- resolved  - on PTA magnesium oxide tablet  - Magnesium replacement protocol     Elevated alk phos- resolved  Nonspecific RUQ edema on CT  Alk phos elevated to 172 on admission. CT of the chest noted some nonspecific edema around the gallbladder. Patient is asymptomatic; however, she is a poor historian and she states she has \"back pain\".  - RUQ US w/o hepatobiliary pathology     Lower back pain  Chronic spinal compression fracture  Patient endorsing lumbar back pain on admission, worse with movement. She endorses a mechanical fall from " "the bed to the floor landing on her back two days ago. Chronic compression fractures at T12, L2, and L4 on CT lumbar spine on 6/28, which appeared stable at that time.  Also has severe stenosis at L3-L4 and L4-L5.  No red flag symptoms. Has TLSO brace at home for comfort.  - Scheduled tylenol 975mg TID  - Oxycodone 2.5mg q4h prn for severe pain     HFpEF, not in exacerbation  Patient has a history of HFpEF. On exam, does not appear to be hypervolemic, more dry-euvolemic. Crackles on exam more dry sounding. BNP elevated at 236, although during non-exacerbated state is 170's.   - restarted PTA Midamor on 7/31  - restarted PTA bumex on 7/30     Rheumatoid arthritis, recent diagnosis  Per last admission \"Long history of multiple joint pain, worse in the knees and hands. On exam her thumbs are flexed towards midline with prominent MCP swelling across all five joints bilaterally. Her bilateral knees also appear swollen but not erythematous. Her RF and CRP were elevated last admission, as well as calprotectin. CCP positive.  She meets criteria for rheumatoid arthritis diagnosis.  Discussed this with patient and her daughter 6/30.\" Patient is not endorsing joint pain on admission. She had completed a ten day course of prednisone 10mg daily after the last admission.  - will talk to rheumatology to see if we can set up an outpatient visit  - will initiate oral prednisone on discharge for transient management until patient can be seen by rheum    Goals of care  Previous admission, patient was DNR/DNI. Spoke with son, Shiv, who confirms that this had been a discussion prior and that family/patient would like to be DNR and DNI.  - PT/OT  - nutrition consult    Other chronic medication conditions  HTN- holding amiloride, Bumex, continue PTA Metoprolol  Hypothyroidism- continue PTA levothyroxine  Urinary frequency- continue PTA oxybutynin  GERD- continue PTA pantoprazole     Diet: NPO for Medical/Clinical Reasons Except for: " Meds, Ice Chips    DVT Prophylaxis: hold Lovenox, SCDs  Javed Catheter: Not present  Fluids: none  Central Lines: PRESENT  PICC Triple Lumen 08/02/22 Right Basilic Vascular Access-Site Assessment: WDL  Cardiac Monitoring: None  Code Status: No CPR- Do NOT Intubate      Disposition Plan   1-2 days pending work-up for suspected GI bleed.    The patient's care was discussed with Dr. Hugo Ring MD PGY2  [facility]   Pager #: [phone]    ______________________________________________________________________    Interval History   NAEO. Hgb at 9.5 this am. Patient only endorses some back pain. She has an appetite. Denies lightheadedness, shortness of breath, nausea, abdominal pain. Nursing reports a few black-colored stools overnight. One this morning. Updated son Shiv.    Data reviewed today: I reviewed all medications, new labs and imaging results over the last 24 hours. I personally reviewed CT/PE.    Physical Exam   Vital Signs: Temp: 97.7  F (36.5  C) Temp src: Oral BP: 116/67 Pulse: 96   Resp: 16 SpO2: 96 % O2 Device: None (Room air)    Weight: 95 lbs 3.2 oz   Gen: Pleasant. No distress.    HEENT: MMM.   Neck: No overt asymmetry.   CV: Appears well-perfused. RRR. Cold hands.  Resp: Breathing comfortably on room air. Soft crackles in RLL.  Abd: Non-distended, non-tender.   Ext: No edema or overt asymmetry/deformity.   Skin: No overt rash on easily visualized skin.   Neuro: Non-focal.   Psych: Calm.     Data   Recent Labs   Lab 08/03/22  0618 08/02/22  2103 08/02/22  1632 08/02/22  0755 08/02/22  0516 07/28/22  1746 07/28/22  1046   WBC 24.0*  --  21.4*  --  15.5*   < > 21.4*   HGB 9.5* 10.5* 9.8*  --  6.1*   < > 12.5   MCV 93  --  94  --  100   < > 90     --  219  --  214   < > 258   INR  --   --   --   --   --   --  1.08     --  133*  --  132*  132*   < > 113*   POTASSIUM 3.6  --  3.6  --  4.3   < > 4.8   CHLORIDE 107  --  103  --  97*   < > 80*   CO2  21*  --  20*  --  20*   < > 18*   BUN 50*  --  61*  --  43.2*   < > 50*   CR 0.89  --  1.01  --  0.96*   < > 1.23*   ANIONGAP 8  --  10  --  15   < > 15   MANE 7.7*  --  7.5*  --  8.0*   < > 8.8     --  167* 156* 104*   < > 124   ALBUMIN  --   --  2.0*  --  2.5*   < > 2.3*   PROTTOTAL  --   --  5.5*  --  5.6*   < > 8.7*   BILITOTAL  --   --  0.3  --  0.2   < > 0.5   ALKPHOS  --   --  77  --  84   < > 193*   ALT  --   --  <9  --  <5*   < > <9   AST  --   --  12  --  20   < > 18    < > = values in this interval not displayed.     No results found for this or any previous visit (from the past 24 hour(s)).

## 2022-08-03 NOTE — PLAN OF CARE
Problem: Fluid Imbalance (Pneumonia)  Goal: Fluid Balance  Outcome: Ongoing, Progressing     Problem: Anemia  Goal: Anemia Symptom Improvement  Outcome: Ongoing, Progressing  Intervention: Monitor and Manage Anemia  Recent Flowsheet Documentation  Taken 8/3/2022 0900 by Radha Benitez RN  Safety Promotion/Fall Prevention: activity supervised   Goal Outcome Evaluation:        Patient alert yet [employer]  used to communicate. Patient said she is hungrey and explained to her to be NPO for GI testing today. Hgb 9.5. On antibiotic zosyn for pneumonia. Patient has a 1 to 1 sitter for safety. VSS. NSR 90 to St 100's with PVC's noted. Ativan given scheduled to relax patient who slept on and off. Patient at GI test for Ultra tag RBC test and went on a cart with 2 assist. She was cooperative to go to testing.

## 2022-08-03 NOTE — PLAN OF CARE
Goal Outcome Evaluation:     line used for assessment. Patient endorses pain and PRN oxycodone was given x 1 as well as scheduled APAP. Patient did appear comfortable post medication administration. Vital signs have been stable with BP maintained. Patient frequently asking for warm water and food. Writer explained multiple times via  line that patient is NPO due to GI bleed and NM GI bleed scan planned for the morning. Lips/mouth moistened as well as small sip provided with medication. A 1:1 sitter remains for safety. Patient has not been agitated thus far this shift. Multiple labs sent. Hgb returned at 10.5 then 9.5 and troponin at 0.04. Patient incontinent of bowel and bladder with a black stool noted. Patient did sleep the majority of the night. Somewhat uncooperative with cares. Bedbath given overnight.   Telemetry: NSR to sinus tachycardia

## 2022-08-03 NOTE — PROGRESS NOTES
Care Management Follow Up    Length of Stay (days): 6    Expected Discharge Date: 08/04/2022     Concerns to be Addressed: diagnostic work up, covid         Patient plan of care discussed at interdisciplinary rounds: Yes    Anticipated Discharge Disposition: Home Care, Group Home     Anticipated Discharge Services: PCA  Anticipated Discharge DME: Walker      Education Provided on the Discharge Plan:  Per care team  Patient/Family in Agreement with the Plan: yes    Referrals Placed by CM/SW:  TBD       Additional Information:  Patient covid positive 7/28. Admitted for anemia, hypoxia, hyponatremia.    Social history per initial CM consult:  Pt from home with family , daughter is PCA. Per son , plan is to admit to [facility] which is a private adult foster care home. Xong is main contact at the home [phone]. Xong reports pt must be covid recovered to admit.(ER notes indicate pt tested positive for COVID on 7/19/2022 ). May need to verify with infection control. Care management will follow and keep family and Xong updated.      Final discharge plan pending progression and recommendations. Possible home prior to foster home if family can manage until able to go to foster home.           Leona Coon RN

## 2022-08-03 NOTE — PROVIDER NOTIFICATION
Tried many times to get hgb lab drawn. Unsuccessful. Line flushed easily yet poor return. Repositioned and unable to get lab drawn. Updated MD.

## 2022-08-03 NOTE — PROGRESS NOTES
Patient returned from GI test on a cart and 2 assist to get patient back to bed. Patient asleep and resting comfortably.NSR 90's. Call light in reach

## 2022-08-03 NOTE — PROGRESS NOTES
GI chart check:  Her hgb improved from 6.1 to 10.5 then 9.5 and she received 2 units of blood yesterday.  Three black stools are charted overnight, and the last was small.  BP is improved today.  She could have GI bleeding, etiology undetermined.  Not clinically significant at this time.    EGD is not planned at this time (risks outweigh benefits), and I don't see orders for tagged RBC scan.  Defer diet to primary team.  If she continues to have frequent black stools, consider tagged RBC to localize bleeding.  However with improved hgb, would recommend continuing IV PPI at this time with close monitoring.  No ulcer on EGD 7/3/22.  Discontinue iron tabs as previously recommended, but could use liquid iron later if needed.    GI will sign off but call if needed.  Discussed with Dr. Vo.    Anuradha Nazario PA-C  [facility]  [phone]

## 2022-08-03 NOTE — PROGRESS NOTES
CLINICAL NUTRITION SERVICES - REASSESSMENT NOTE     Nutrition Prescription    RECOMMENDATIONS FOR MDs/PROVIDERS TO ORDER:  Recommend enteral feeding pending advanced directives/ plans for care    Malnutrition Status:    Previously noted severe    Recommendations already ordered by Registered Dietitian (RD):      Continue oral nutrition supplements if oral diet re advanced    Future/Additional Recommendations:  Feeding plans     EVALUATION OF PROGRESS TOWARD GOALS/NEW FINDINGS   Progress towards goals will be monitored and evaluated per protocol and Practice Guidelines      Diet order: Orders Placed This Encounter      NPO for Medical/Clinical Reasons Except for: Meds, Ice Chips     Supplements: magic cup TID w/ meals was ordered prior to NPO status  Per flow sheet review, 25% intake for documented meals  Supplement intake per flow sheet review not documented    Meeting < estimated needs (based on % intake noted)     Last BM per nursin22. D    Skin: red coccyx per nursing     Saleem nutrition score: 3; total score: 16    I/O last 3 completed shifts:    In: 950     Out: -     Labs:    Electrolytes  Potassium (mmol/L)   Date Value   2022 3.6   2022 3.6   2022 4.3   2022 4.0     Phosphorus (mg/dL)   Date Value   2018 2.8        Blood Glucose  Glucose (mg/dL)   Date Value   2022 121   2022 167 (H)   2022 104 (H)   2022 91   2022 85   2022 104     GLUCOSE BY METER POCT (mg/dL)   Date Value   2022 156 (H)     Hemoglobin A1C (%)   Date Value   02/15/2022 5.7 (H)        Inflammatory Markers  CRP (mg/dL)   Date Value   2022 4.7 (H)   2022 5.1 (H)   2022 9.0 (H)     WBC Count (10e3/uL)   Date Value   2022 24.0 (H)   2022 21.4 (H)   2022 15.5 (H)     Albumin (g/dL)   Date Value   2022 2.0 (L)   2022 2.5 (L)   2022 2.0 (L)   2022 2.2 (L)        Magnesium (mg/dL)   Date Value   2022 1.9  "  08/02/2022 2.2   08/02/2022 1.4 (L)     Sodium (mmol/L)   Date Value   08/03/2022 136   08/02/2022 133 (L)   08/02/2022 132 (L)   08/02/2022 132 (L)        Renal  Urea Nitrogen (mg/dL)   Date Value   08/03/2022 50 (H)   08/02/2022 61 (H)   08/02/2022 43.2 (H)   08/01/2022 33 (H)     Creatinine (mg/dL)   Date Value   08/03/2022 0.89   08/02/2022 1.01   08/02/2022 0.96 (H)         Additional  Ketones Urine (mg/dL)   Date Value   07/03/2022 Negative        Meds:    acetaminophen  975 mg Oral Q8H     aMILoride  5 mg Oral Daily     bumetanide  0.5 mg Oral Daily     cetirizine  5 mg Oral Daily     dexamethasone  6 mg Oral Daily     [Held by provider] enoxaparin ANTICOAGULANT  0.5 mg/kg Subcutaneous BID     levothyroxine  50 mcg Oral QAM     LORazepam  1 mg Oral BID     magnesium oxide  200 mg Oral Daily     metoprolol succinate ER  50 mg Oral BID     oxybutynin ER  5 mg Oral QPM     pantoprazole (PROTONIX) IV  40 mg Intravenous BID     piperacillin-tazobactam  3.375 g Intravenous Q8H     sodium chloride (PF)  3 mL Intracatheter Q8H        - MEDICATION INSTRUCTIONS -       [Held by provider] sodium chloride 3% Stopped (07/29/22 0903)      lidocaine 4%, lidocaine (buffered or not buffered), lidocaine (buffered or not buffered), loperamide, - MEDICATION INSTRUCTIONS -, ondansetron **OR** ondansetron, oxyCODONE, sodium chloride (PF), sodium chloride (PF)          ANTHROPOMETRICS  Height: 60\"  Weight: 43 kg   Body mass index is 18.59 kg/m .  Wt Readings from Last 10 Encounters:   08/02/22 43.2 kg (95 lb 3.2 oz)   06/30/22 50.8 kg (112 lb)   06/22/22 49.3 kg (108 lb 9.6 oz)   06/04/22 49.9 kg (110 lb)   05/25/22 50.5 kg (111 lb 5.3 oz)   05/23/22 52.2 kg (115 lb)   05/20/22 52.6 kg (116 lb)   05/19/22 53.5 kg (118 lb)   05/06/22 53.5 kg (118 lb)   04/16/22 53.3 kg (117 lb 8 oz)     08/02/22 0529 43.2 kg (95 lb 3.2 oz) Bed scale   08/01/22 0333 46.7 kg (103 lb) Bed scale   07/31/22 0500 44.3 kg (97 lb 9.6 oz) Bed scale "   07/30/22 0635 46.6 kg (102 lb 11.2 oz) --   07/29/22 0500 45.9 kg (101 lb 3.2 oz)          Weight Status:  Underweight BMI <18.5  Weight changes since admission : 6% in 5 d significant     NEW FINDINGS     Previous Goals   Avoid further wt loss, eat 50% of meals  Evaluation: Not met    Previous Nutrition Diagnosis  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  Evaluation: Declining

## 2022-08-04 NOTE — PLAN OF CARE
Problem: Anemia  Goal: Anemia Symptom Improvement  Outcome: Ongoing, Progressing  Intervention: Monitor and Manage Anemia  Recent Flowsheet Documentation  Taken 8/4/2022 1608 by Meredith Burgos RN  Safety Promotion/Fall Prevention:    activity supervised    bedside attendant    patient video monitoring  Taken 8/4/2022 0840 by Meredith Burgos, LAKSHMI  Safety Promotion/Fall Prevention:    activity supervised    bedside attendant    patient video monitoring   Goal Outcome Evaluation:    VS stable.  Recent hgb was  8.9 and recheck tomorrow.  No blood stool noted. Her daughter was here to visit her and pt was so happy. And  has been calm most of the shift. Planning to  discharge to group home  tomorrow at 1430.  She still 1: 1 sitter  For restless and impulsive behaviors.

## 2022-08-04 NOTE — DISCHARGE INSTRUCTIONS
Home care referral has been made with [facility] [phone].  They will call you to arrange home visit.

## 2022-08-04 NOTE — PLAN OF CARE
Problem: Fall Injury Risk  Goal: Absence of Fall and Fall-Related Injury  Outcome: Ongoing, Progressing     Problem: Fluid Imbalance (Pneumonia)  Goal: Fluid Balance  Outcome: Ongoing, Progressing   Goal Outcome Evaluation:      Pt having increased anxiety overnight, scheduled lorazepam effective. Demanding of staff at times. Continued 1:1 sitter for picking/pulling lines and impulsivity.  used for communication. X2 small, loose black stools between 1699-3221.

## 2022-08-04 NOTE — PROGRESS NOTES
[facility]    Progress Note - Hospitalist Service       Date of Admission:  7/28/2022    Assessment & Plan      Chris Bell is a 87 year old female with history significant for HFpEF, HTN, normocytic anemia, spinal compression fractures, suspected RA, polyclonal gammopathy, multiple hospitalizations for diarrhea with the most recent on 6/28-7/6, presenting with ten days of progressive shortness of breath in the setting of known COVID positive test prior to symptoms. She was found to have hypoxic respiratory failure secondary to COVID infection/HAP and severe hyponatremia, both of which have improved. Her course has been complicated by acute blood loss anemia secondary to suspected GI bleed, tagged RBC scan negative. Her hemoglobin is trending down, she will remain admitted for further monitoring to ensure stabilization.    Acute on chronic anemia  Suspect GI bleed  Chronic diarrhea/constipation  Patient has had multiple admissions presenting with diarrhea or constipation. Last admission earlier in July, there was concern for IBD and she underwent EGD/colonoscopy with multiple biopsies. Appears the results are negative for TB colitis or Crohn's-- only revealed medication (iron) induced injury. Negative for H.pylori on EGD. Her quant gold was indeterminate. Overnight from 8/1-8/2, there were reports of black, loose stool. Her Hgb dropped significantly to 6.1, and she received 2 units of pRBCs, now with normal hemodynamic status and improved Hgb. Slight downward trend today.  - GI consult, signed off  - hold lovenox, SCDs for DVT ppx  - IV PPI  - hold oral Fe  - CBC tomorrow am    Hypoxic respiratory failure- resolved  COVID +  HAP  Patient presented with ten days of progressive shortness of breath. Son states she had a positive COVID test about 2 weeks ago. On day of admission, she was found to by hypoxic in the low-mid 80's and tachypneic in the 30's by EMS. On exam, patient looks  "dry-euvolemic. Lab work-up notable for a leukocytosis of 21 with a left shift, normal lactic acid, CRP 22, metabolic acidosis with BC of 18, normal Hgb, negative troponin, and BNP of 236 (bl ~175). CXR revealed worsening bilateral upper lobe opacities c/w pneumonitis. Of note, last CT of abdomen on 6/29 noted some fibrotic changes in the LL lung. CT/PE revealed multifocal GGO's c/w pneumonia. Over all, the picture is most concerning for COVID infection with overlying infectious process-- HAP.  - O2 to maintain saturations>92%   - Remdesivir/Dexamethasone, start date: 7/28  - Zosyn, start date: 7/28-8/4  - Blood cultures x2, NGTD     Severe hyponatremia- resolved  Sodium 113 on admission. Last known sodium 135 a few weeks ago. On exam, she appears dry to euvolemic. Additionally, she endorses very low po intake the last week or so. Does not appear to have a change in mentation, no seizures reported. She was started on 3%NS at 20cc/hr overnight 7/28-7/29 with improvement in sodium within goal. Hyponatremia secondary to SIADH in the setting of infection. Last check on 8/3 at 136.  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 Estimated GFR of 62 (usually in 80's).   - BMP am     Hypomagnesemia- resolved  - on PTA magnesium oxide tablet  - Magnesium replacement protocol     Elevated alk phos- resolved  Nonspecific RUQ edema on CT  Alk phos elevated to 172 on admission. CT of the chest noted some nonspecific edema around the gallbladder. Patient is asymptomatic; however, she is a poor historian and she states she has \"back pain\".  - RUQ US w/o hepatobiliary pathology     Lower back pain  Chronic spinal compression fracture  Patient endorsing lumbar back pain on admission, worse with movement. She endorses a mechanical fall from the bed to the floor landing on her back two days ago. Chronic compression fractures at T12, L2, and L4 on CT lumbar spine on 6/28, which appeared stable at that " "time.  Also has severe stenosis at L3-L4 and L4-L5.  No red flag symptoms. Has TLSO brace at home for comfort.  - Scheduled tylenol 975mg TID  - Oxycodone 2.5mg q4h prn for severe pain     HFpEF, not in exacerbation  Patient has a history of HFpEF. On exam, does not appear to be hypervolemic, more dry-euvolemic. Crackles on exam more dry sounding. BNP elevated at 236, although during non-exacerbated state is 170's.   - restarted PTA Midamor on 7/31  - restarted PTA bumex on 7/30     Rheumatoid arthritis, recent diagnosis  Per last admission \"Long history of multiple joint pain, worse in the knees and hands. On exam her thumbs are flexed towards midline with prominent MCP swelling across all five joints bilaterally. Her bilateral knees also appear swollen but not erythematous. Her RF and CRP were elevated last admission, as well as calprotectin. CCP positive.  She meets criteria for rheumatoid arthritis diagnosis.  Discussed this with patient and her daughter 6/30.\" Patient is not endorsing joint pain on admission. She had completed a ten day course of prednisone 10mg daily after the last admission.  - will talk to rheumatology to see if we can set up an outpatient visit  - will initiate oral prednisone on discharge for transient management until patient can be seen by rheum    Severe protein/ calorie malnutrition  RD consulted. Recommending oral nutrition supplements if oral diet re advanced. Also to recommend enteral feeding pending goals of care.   - discuss with family    Goals of care  Previous admission, patient was DNR/DNI. Spoke with son, Shiv, who confirms that this had been a discussion prior and that family/patient would like to be DNR and DNI.  - PT/OT  - nutrition consult    Other chronic medication conditions  HTN- holding amiloride, Bumex, continue PTA Metoprolol  Hypothyroidism- continue PTA levothyroxine  Urinary frequency- continue PTA oxybutynin  GERD- continue PTA pantoprazole     Diet:      DVT " Prophylaxis: hold Lovenox, SCDs  Javed Catheter: Not present  Fluids: none  Central Lines: PRESENT  PICC Triple Lumen 08/02/22 Right Basilic Vascular Access-Site Assessment: WDL  Cardiac Monitoring: None  Code Status: No CPR- Do NOT Intubate      Disposition Plan   1-2 days pending stabilization of hgb and continued discussion of goals of care.    The patient's care was discussed with Dr. Hugo Ring MD PGY2  [facility]   Pager #: [phone]    ______________________________________________________________________    Interval History   NAEO. Some reports of small, black stools. Patient feels good today. She states she is hungry. Denies shortness of breath, chest pain, abdominal pain, nausea.     Data reviewed today: I reviewed all medications, new labs and imaging results over the last 24 hours. I personally reviewed CT/PE.    Physical Exam   Vital Signs: Temp: 97.5  F (36.4  C) Temp src: Oral BP: 129/68 Pulse: 80   Resp: 20 SpO2: 96 % O2 Device: None (Room air)    Weight: 95 lbs 3.2 oz   Gen: Pleasant. No distress.    HEENT: MMM.   Neck: No overt asymmetry.   CV: Appears well-perfused. RRR. Cold hands.  Resp: Breathing comfortably on room air. Soft crackles in RLL.  Abd: Non-distended, non-tender.   Ext: No edema or overt asymmetry/deformity.   Skin: No overt rash on easily visualized skin.   Neuro: Non-focal.   Psych: Calm.     Data   Recent Labs   Lab 08/04/22  1258 08/03/22  1554 08/03/22  0618 08/02/22  2103 08/02/22  1632 08/02/22  0755 08/02/22  0516   WBC 24.4*  --  24.0*  --  21.4*  --  15.5*   HGB 8.9* 9.3* 9.5*   < > 9.8*  --  6.1*   MCV 97  --  93  --  94  --  100     --  202  --  219  --  214   *  --  136  --  133*  --  132*  132*   POTASSIUM 4.2  --  3.6  --  3.6  --  4.3   CHLORIDE 106  --  107  --  103  --  97*   CO2 23  --  21*  --  20*  --  20*   BUN 24  --  50*  --  61*  --  43.2*   CR 0.81  --  0.89  --  1.01  --  0.96*   ANIONGAP 6  --   8  --  10  --  15   MANE 8.0*  --  7.7*  --  7.5*  --  8.0*   *  --  121  --  167*   < > 104*   ALBUMIN  --   --   --   --  2.0*  --  2.5*   PROTTOTAL  --   --   --   --  5.5*  --  5.6*   BILITOTAL  --   --   --   --  0.3  --  0.2   ALKPHOS  --   --   --   --  77  --  84   ALT  --   --   --   --  <9  --  <5*   AST  --   --   --   --  12  --  20    < > = values in this interval not displayed.     No results found for this or any previous visit (from the past 24 hour(s)).

## 2022-08-05 NOTE — PROGRESS NOTES
Care Management Discharge Note    Discharge Date: 08/05/2022       Discharge Disposition:  (foster home)    Discharge Services:  (home care)    Discharge DME: None    Discharge Transportation: family or friend will provide    Education Provided on the Discharge Plan:  yes  Persons Notified of Discharge Plans:  yes      Patient/Family in Agreement with the Plan: yes    Handoff Referral Completed:  yes    Additional Information:    Patient to discharge to foster home. Confirmed discharge plan with son Shiv and foster home-[facility].  Orders emailed to Russ. Notified [facility] and faxed orders. [facility] stretcher ride at 1430. PCS completed.         Leona Coon RN

## 2022-08-06 PROBLEM — J18.9 HAP (HOSPITAL-ACQUIRED PNEUMONIA): Status: RESOLVED | Noted: 2022-01-01 | Resolved: 2022-01-01
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
## 2022-08-06 NOTE — DISCHARGE SUMMARY
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  The following labs/tests are recommended:   CBC, BMP. Will need follow-up regarding new diagnosis of rheumatoid   arthritis.            To Do's:  -Consider treatment for new diagnosis of rheumatoid arthritis and ensure follow-up with rheumatologist.  -Consider Hgb if patient starts endorsing melena  -Continue goals of care discussions with family    Unresulted Labs Ordered in the Past 30 Days of this Admission     No orders found from 6/28/2022 to 7/29/2022.        Discharge Disposition   Discharged to new facility as planned by the family prior to admission, with home physical/occupational therapy  Condition at discharge: Stable    Hospital Course   Chris Bell was admitted on 7/28/2022 for acute hypoxic respiratory failure and severe hyponatremia. She was found to have COVID-19 and evidence for an evolving pneumonia. 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 Her hospitalization was complicated by a sharp drop in her Hgb secondary to a suspected UGIB with multiple episodes of melena which resolved with blood transfusions and  no further intervention. She was discharged to a foster/group home as planned by the family prior to admission with home physical/occupational therapy. Further details are outlined below:    Hypoxic respiratory failure- resolved  COVID +, HAP  Patient presented with ten days of progressive shortness of breath. Her son states that she had a positive COVID test about ten days prior to admission. On the day of admission, she was found to by hypoxic in the low-mid 80's and tachypneic in the 30's by EMS. Her CXR revealed worsening bilateral upper lobe opacities c/w pneumonitis. Of note, the last CT of her abdomen on 6/29 noted some fibrotic changes in the LL lung. CT/PE on this admission revealed multifocal GGO's c/w pneumonia. Over all, the picture is most concerning for COVID infection with overlying infectious process. She was recently hospitalized and thus was treated for a hospital-acquired pneumonia. She also received standard remdesivir/dexamethasone treatment for COVID.    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  On exam, she appeared dry to euvolemic. Additionally, she endorsed very low po intake the last week or so prior to admission. She was started on 3%NS at 20cc/hr overnight 7/28-7/29 with improvement in sodium within goal. Her sodium gradually improved with fluid restriction and increased po intake.      Acute on chronic anemia secondary to suspected UGIB- resolved  Chronic diarrhea/constipation  Patient has had multiple admissions presenting with diarrhea or constipation. During her last admission earlier in July, there was concern for IBD and she underwent EGD/colonoscopy with multiple biopsies. It appears the results were negative for TB colitis or Crohn's, and only revealed medication (iron) induced injury. She was also negative for H.pylori on EGD. Overnight from 8/1-8/2, there were reports of black, loose stool. Her Hgb dropped significantly to 6.1  "with episodes of intermittent hypotension and tachycardia. She received 2 units of pRBCs and remained hemodynamically stable for the remainder of her hospitalization. GI was consulted and recommended a tagged RBC scan which was negative for active bleeding. They signed off after and decided not to undergo an EGD. Her Hgb stabilized to 9 prior to discharge with no more reports of melena. Her oral iron was discontinued.      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 Estimated GFR of 62 (usually in 80's). This improved throughout the admission and was at baseline prior to discharge.     Hypomagnesemia- resolved  Patient was hypomagnesemic on admission, likely secondary to decreased po intake. This resolved with replacement protocol.     Elevated alk phos- resolved  Nonspecific RUQ edema on CT  Alk phos elevated to 172 on admission. CT of the chest noted some nonspecific edema around the gallbladder. Patient was asymptomatic; however, she is a poor historian and she states she has \"back pain\". She underwent a RUQ US that was negative for any hepatobiliary pathology.     Lower back pain  Chronic spinal compression fracture  Patient was endorsing lumbar back pain on admission, worse with movement. She endorsed a mechanical fall from the bed to the floor landing on her back two days ago. Chronic compression fractures at T12, L2, and L4 on CT lumbar spine on 6/28, which appeared stable at that time.  Also has severe stenosis at L3-L4 and L4-L5.  No red flag symptoms. She also has a TLSO brace at home for comfort.     HFpEF, not in exacerbation  Patient has a history of HFpEF. On exam, does not appear to be hypervolemic, more dry-euvolemic. Crackles on exam more dry sounding. BNP elevated at 236, although during non-exacerbated state is 170's.      Rheumatoid arthritis, recent diagnosis  Per last admission \"Long history of multiple joint pain, worse in the knees and hands. On exam her thumbs are flexed " "towards midline with prominent MCP swelling across all five joints bilaterally. Her bilateral knees also appear swollen but not erythematous. Her RF and CRP were elevated last admission, as well as calprotectin. CCP positive.  She meets criteria for rheumatoid arthritis diagnosis.  Discussed this with patient and her daughter 6/30.\" Patient was not endorsing joint pain on admission. She had completed a ten day course of prednisone 10mg daily after the last admission.     Severe protein/ calorie malnutrition  RD was consulted and recommended oral nutrition supplements as well as enteral feeding; however, this was not within the goals of care per family.     Goals of care  Previous admission, patient was DNR/DNI. Spoke with son, Shiv, who confirmed that this had been a discussion prior and that family/patient would like to be DNR and DNI.    Consultations This Hospital Stay   PHARMACY IP CONSULT  CARE MANAGEMENT / SOCIAL WORK IP CONSULT  NUTRITION SERVICES ADULT IP CONSULT  SPEECH LANGUAGE PATH ADULT IP CONSULT  PHYSICAL THERAPY ADULT IP CONSULT  OCCUPATIONAL THERAPY ADULT IP CONSULT  GASTROENTEROLOGY IP CONSULT  VASCULAR ACCESS ADULT IP CONSULT    Code Status    DNR DNI    The patient was discussed with Dr. Paolo Ring MD  [place] [facility]  [address] [place], [place] [id number]  Phone: [phone]  Fax: [phone]  ______________________________________________________________________    Physical Exam   Vital Signs: Temp: 97.4  F (36.3  C) Temp src: Axillary BP: 133/73 Pulse: 75   Resp: 18 SpO2: 92 % O2 Device: None (Room air)    Weight: 95 lbs 3.2 oz  Gen: Pleasant. No distress.    HEENT: MMM.   Neck: No overt asymmetry.   CV: Appears well-perfused. RRR. Cold hands.  Resp: Breathing comfortably on room air.   Abd: Non-distended, non-tender.   Ext: No edema or overt asymmetry/deformity.   Skin: No overt rash on easily visualized skin. "   Neuro: Non-focal.   Psych: Calm.       Primary Care Physician   INOCENCIA PRINGLE    Discharge Orders      Medication Therapy Management Referral      Home Care Referral      Reason for your hospital stay    You were hospitalized due to COVID, pneumonia, and low sodium levels in your blood. While you were admitted, there was some concern for a GI bleed; however, your scan did not reveal active bleeding.     Follow-up and recommended labs and tests     Follow up with primary care provider, INOCENCIA PRINGLE, within 7 days for hospital follow- up.  The following labs/tests are recommended: CBC, BMP. Will need follow-up regarding new diagnosis of rheumatoid arthritis.     Activity    Your activity upon discharge: activity as tolerated     General info for SNF    Length of Stay Estimate: Long Term Care  Condition at Discharge: Stable  Level of care:skilled   Rehabilitation Potential: Fair  Admission H&P remains valid and up-to-date: Yes  Recent Chemotherapy: N/A  Use Nursing Home Standing Orders: Yes     Mantoux instructions    Give two-step Mantoux (PPD) Per Facility Policy Yes     No CPR- Do NOT Intubate     Fall precautions     Diet    Follow this diet upon discharge: Orders Placed This Encounter      Easy to Chew Diet (level 7)       Significant Results and Procedures   Results for orders placed or performed during the hospital encounter of 07/28/22   XR Chest Port 1 View    Narrative    EXAM: XR CHEST PORT 1 VIEW  LOCATION: [facility]  DATE/TIME: 7/28/2022 10:58 AM    INDICATION: Chest Pain  COMPARISON: 06/29/2022 and older studies, chest CT 06/22/2022      Impression    IMPRESSION: Parenchymal disease has progressed bilaterally. Specifically, patchy opacities in both upper lobes, right greater than left are noted. New areas of disease are noted in the right lateral costophrenic angle. Retrocardiac opacities on the left   with partial silhouetting the left hemidiaphragm are  unchanged. Findings are more suggestive of a pneumonitis. No signs of failure. Heart and pulmonary vascularity are normal.    NOTE: ABNORMAL REPORT    THE DICTATION ABOVE DESCRIBES AN ABNORMALITY FOR WHICH FOLLOW-UP IS NEEDED.    CT Chest Pulmonary Embolism w Contrast    Narrative    EXAM: CT CHEST PULMONARY EMBOLISM W CONTRAST  LOCATION: [facility]  DATE/TIME: 7/29/2022 2:10 AM    INDICATION: Elevated D dimer Hypoxia  COMPARISON: Portable chest radiograph from 07/28/2022, chest CT from 06/22/2022.  TECHNIQUE: CT chest pulmonary angiogram during arterial phase injection of IV contrast. Multiplanar reformats and MIP reconstructions were performed. Dose reduction techniques were used.   CONTRAST: 75ml Isovue 370    FINDINGS:  ANGIOGRAM CHEST: Nondiagnostic evaluation of the segmental and subsegmental pulmonary arteries in the lower lung zones due to motion. There is otherwise no evidence for pulmonary embolism. Thoracic aorta is negative for dissection. No CT evidence of   right heart strain.    LUNGS AND PLEURA: Multifocal groundglass airspace infiltrates scattered throughout both lungs. No pneumothorax or pleural effusion.    MEDIASTINUM/AXILLAE: Heart size mildly enlarged. No pericardial effusion. Multivessel coronary artery disease.    CORONARY ARTERY CALCIFICATION: Severe.    UPPER ABDOMEN: There is some edema in the right upper quadrant near the gallbladder which is nonspecific. There is also mild wall thickening of the gastric antrum as seen on axial image 323.    MUSCULOSKELETAL: Osteopenia. Severe T12 vertebral body compression deformity. Moderate L2 compression deformity. These are stable.      Impression    IMPRESSION:  1.  No central or lobar pulmonary embolism.    2.  Multifocal bilateral groundglass airspace infiltrates consistent with multifocal pneumonia or pneumonitis.    3.  Severe coronary artery disease.    4.  Nonspecific edema in the right upper quadrant adjacent to the  gallbladder. Consider sonographic correlation. There is also mild wall thickening of the gastric antrum which is likely inflammatory though clinical correlation is recommended.   US Abdomen Limited    Narrative    EXAM: US ABDOMEN LIMITED  LOCATION: [facility]  DATE/TIME: 7/29/2022 12:23 PM    INDICATION: Elevated alkaline phosphatase and edema near gallbladder  COMPARISON:Chest CTA 07/29/2022, abdomen CT 06/29/2020    TECHNIQUE: Limited abdominal ultrasound.    FINDINGS:    GALLBLADDER: Normal. No gallstones, wall thickening, or pericholecystic fluid. Negative sonographic Love's sign.    BILE DUCTS: No biliary dilatation. The common duct measures 8 mm. No intraductal stones.    LIVER: Normal parenchyma with smooth contour. No focal mass.    RIGHT KIDNEY: No hydronephrosis.    PANCREAS: The visualized portions are normal.    No ascites.      Impression    IMPRESSION:  1.  No evidence of cholelithiasis or cholecystitis. Common duct is normal for age.       XR Video Swallow with SLP or OT    Narrative    EXAM: XR VIDEO SWALLOW WITH SLP OR OT  LOCATION: [facility]  DATE/TIME: 8/1/2022 2:12 PM    INDICATION: Difficulty swallowing.  COMPARISON: None.    TECHNIQUE: Routine swallow study with speech pathology using multiple barium thicknesses.    FINDINGS:   FLUOROSCOPIC TIME: .4 minutes  NUMBER OF IMAGES: 3    Swallow study with Speech Pathology using multiple barium thicknesses. On thin liquid, transient penetration was identified. Otherwise, no penetration or aspiration with the two consistencies given.           NM GI Bleed    Narrative    EXAM: NM GI BLEED  LOCATION: [facility]  DATE/TIME: 8/3/2022 12:09 PM    INDICATION: Melena. Gastrointestinal hemorrhage evaluation.  COMPARISON: Abdominal ultrasound dated 07/29/2022.  TECHNIQUE: 23.1 mCi technetium-99m, in vitro tagged RBCs, IV. Dynamic anterior planar imaging of the abdomen for 60  minutes.    FINDINGS: Normal background activity without scintigraphic evidence of gastrointestinal bleeding during the imaging period.      Impression    IMPRESSION:    Negative for gastrointestinal hemorrhage       Discharge Medications   Discharge Medication List as of 8/5/2022  1:44 PM      CONTINUE these medications which have NOT CHANGED    Details   acetaminophen (TYLENOL) 500 MG tablet Take 1-2 tablets (500-1,000 mg) by mouth every 8 hours as needed for pain, Disp-90 tablet, R-1, E-Prescribe      aMILoride (MIDAMOR) 5 MG tablet Take 1 tablet (5 mg) by mouth daily, Disp-30 tablet, R-1, E-Prescribe      bumetanide (BUMEX) 0.5 MG tablet Take 1 tablet (0.5 mg) by mouth daily, Disp-30 tablet, R-1, E-Prescribe      cetirizine (ZYRTEC) 5 MG tablet Take 5 mg by mouth daily, Historical      levothyroxine (SYNTHROID/LEVOTHROID) 50 MCG tablet Take 50 mcg by mouth every morning, Historical      loperamide (IMODIUM) 2 MG capsule Take 1 capsule (2 mg) by mouth 4 times daily as needed for diarrhea, Disp-60 capsule, R-0, E-Prescribe      LORazepam (ATIVAN) 0.5 MG tablet Take 1 mg by mouth 2 times daily (Every morning and evening), Historical      magnesium oxide (MAG-OX) 400 MG tablet Take 0.5 tablets (200 mg) by mouth daily for 30 days, Disp-15 tablet, R-0, Local PrintFollow-up in clinic      metoprolol succinate ER (TOPROL-XL) 50 MG 24 hr tablet Take 50 mg by mouth 2 times daily, Historical      nystatin (MYCOSTATIN) 549703 UNIT/ML suspension Swish and swallow 5 mLs (500,000 Units) in mouth 4 times daily as neededNo Print Out      oxybutynin ER (DITROPAN XL) 5 MG 24 hr tablet Take 1 tablet by mouth every evening, Historical      pantoprazole (PROTONIX) 40 MG EC tablet Take 1 tablet (40 mg) by mouth every morning (before breakfast), Disp-30 tablet, R-0, E-Prescribe      psyllium (METAMUCIL/KONSYL) Packet Take 1 packet by mouth 2 times daily as needed for constipation, Historical      Alcohol Swabs (B-D SINGLE USE SWABS  REGULAR) PADS USE UTD, R-0, Historical      blood glucose monitoring (ONETOUCH VERIO) meter device kit 2 times dailyR-0Historical      ONETOUCH VERIO IQ test strip Use one stript to test your blood sugars twice daily, R-2, GAYLE, Historical         STOP taking these medications       Multiple Vitamins-Iron (MULTIVITAMIN/IRON PO) Comments:   Reason for Stopping:             Allergies   Allergies   Allergen Reactions     Unknown [No Clinical Screening - See Comments]      Pt states she has a medication allergy but does not know what it is

## 2022-08-06 NOTE — PLAN OF CARE
Physical Therapy Discharge Summary    Reason for therapy discharge:    Discharged to [facility] home.    Progress towards therapy goal(s). See goals on Care Plan in [facility] electronic health record for goal details.  Goals partially met.  Barriers to achieving goals:   discharge from facility.    Therapy recommendation(s):    Further recommended therapy is related to documented deficits, and is necessary to maximize functional independence in order for patient to return to prior level of function.      Catalina Fowler, PORFIRIO 8/6/2022

## 2022-08-17 NOTE — PROGRESS NOTES
Chris is a 87 year old who is being evaluated via a billable telephone visit.      How would you like to obtain your AVS? Mail a copy  Telephone number to call  [phone]  Will anyone else be joining your telephone visit? Yes, Dilcia  caretaker at the foster home Ms. Ha  Telephone visit start time: 2:41 PM  Telephone visit end time: 3:17 PM                                                                                                                        [facility]                                                                   Sleep Consultation Note         Date on this visit: 8/17/2022    Chris Bell is sent by No ref. provider found for a sleep consultation regarding obtaining evaluation for possible sleep apnea    Primary Physician: Kizzy Villanueva     Chief complaint: sleeping a lot, c'ant get up, tired, no energy    Chris Bell is a 87 year old female with medical history significant for recent hospitalization for acute hypoxic respiratory failure secondary to hospital-acquired pneumonia and COVID-19 infection, hypertension , heart failure with preserved ejection fraction, anemia,  depression, osteoporosis, peptic ulcer disease.  Telephone visit was obtained with the help of [employer]  Ms. Watkins and the caregiver at the foster home .    She has not had a previous sleep study.    She is currently living at an adult foster home in [place], [place]  Her caregiver is  Ms. Ha  [phone]    Recent  Hospitalization: She was recently hospitalized at [facility]   Date of Admission:  7/28/2022  Date of Discharge:  8/5/2022        Discharge Diagnoses:  Acute hypoxic respiratory failure secondary to:Hospital acquired pneumonia and infection due to 2019 novel coronavirus  Hyponatremia  Acute on chronic anemia secondary to suspected UGIB  JOSE on CKDII  Lower back pain, chronic spinal compression fracture  Hypomagnesemia  Rheumatoid arthritis  Severe protein/caloric  malnutrition  General weakness     She was hospitalized multiple times for various concerns. In March 2022, she was hospitalized with accute hypoxemic respiratory failure. Patient required 2 L of oxygen on admission. Etiology likely component of pneumonia and CHF. During the hospitalization, outpatient sleep study was recommended since hypoxia could  be due to  undiagnosed JUSTIN.     There were concerns about apneic episodes during the hospitalization in May 2022.    She was on supplemental oxygen while she was in the hospital not was not discharged on home O2.  [place] reports snoring, shallow breathing and that the patient wakes up with panic that she has not enough air and coughs at night.  There are no reports of choking.  Patient reports non-refreshing sleep, daytime sleepiness/fatigue.  She dozes off intermittently during the day.    Her bedtime is 830-9 PM and she spontaneously wakes up between 8-9 AM  She wakes up multiple times throughout the night.  Night time awakenings occurs due to shortness of breath and to use the bathroom   She occasionally takes naps during the day approximately 1 hr naps , and does not feel rested after awakening from the nap     Patient does not drive    Patient reports SOB at rest and with activity.     There are no reports of RLS symptoms.    There are no reports of sleep walking, sleep talking, sleep eating or dream enactment behavior      SCALES:    EPWORTH SLEEPINESS SCALE      [place] Sleepiness Scale ( BLANCHE Pan  [id number]<br>ESS - [place]/English - Final version - 21 Nov 07 - [facility] [place].) 8/17/2022   Sitting and reading Moderate chance of dozing   Watching TV Moderate chance of dozing   Sitting, inactive in a public place (e.g. a theatre or a meeting) High chance of dozing   As a passenger in a car for an hour without a break High chance of dozing   Lying down to rest in the afternoon when circumstances permit High chance of dozing   Sitting and talking to someone  "Would never doze   Sitting quietly after a lunch without alcohol High chance of dozing   In a car, while stopped for a few minutes in traffic High chance of dozing   [place] Score (MC) 19   [place] Score (Sleep) 19         INSOMNIA SEVERITY INDEX (MEDHAT)      Insomnia Severity Index (MEDHAT) 8/17/2022   Difficulty falling asleep 2   Difficulty staying asleep 2   Problems waking up too early 0   How SATISFIED/DISSATISFIED are you with your CURRENT sleep pattern? 2   How NOTICEABLE to others do you think your sleep problem is in terms of impairing the quality of your life? 4   How WORRIED/DISTRESSED are you about your current sleep problem? 4   To what extent do you consider your sleep problem to INTERFERE with your daily functioning (e.g. daytime fatigue, mood, ability to function at work/daily chores, concentration, memory, mood, etc.) CURRENTLY? 3   MEDHAT Total Score 17       Guidelines for Scoring/Interpretation:  Total score categories:  0-7 = No clinically significant insomnia   8-14 = Subthreshold insomnia   15-21 = Clinical insomnia (moderate severity)  22-28 = Clinical insomnia (severe)  Used via courtesy of www.[employer].va.gov with permission from Harpreet Jackson PhD., [facility]      GAD7    No flowsheet data found.      CAGE-AID    No flowsheet data found.    CAGE-AID reprinted with permission from the [place] Medical Journal, RADHA Harley. and VERONIQUE Alegria, \"Conjoint screening questionnaires for alcohol and drug abuse\" [place] Medical Journal 94: 135-140, 1995.      PATIENT HEALTH QUESTIONNAIRE-9 (PHQ - 9)    PHQ-9 ([employer]) 8/27/2019   1.  Little interest or pleasure in doing things 0   2.  Feeling down, depressed, or hopeless 3   3.  Trouble falling or staying asleep, or sleeping too much 2   4.  Feeling tired or having little energy 3   5.  Poor appetite or overeating 3   6.  Feeling bad about yourself 3   7.  Trouble concentrating 3   8.  Moving slowly or restless 3   9.  Suicidal or self-harm thoughts " 0   PHQ-9 Total Score 20   1.  Little interest or pleasure in doing things Not at all   2.  Feeling down, depressed, or hopeless Nearly every day   3.  Trouble falling or staying asleep, or sleeping too much More than half the days   4.  Feeling tired or having little energy Nearly every day   5.  Poor appetite or overeating Nearly every day   6.  Feeling bad about yourself Nearly every day   7.  Trouble concentrating Nearly every day   8.  Moving slowly or restless Nearly every day   9.  Suicidal or self-harm thoughts Not at all   PHQ-9 via [facility] TOTAL SCORE-----> 20 (Severe depression)   Difficulty at work, home, or with people Very difficult       Developed by Hilda King, Janet Kelley, Jerzy Aragon and colleagues, with an educational naima from [employer]. No permission required to reproduce, translate, display or distribute.        Allergies:    Allergies   Allergen Reactions     Unknown [No Clinical Screening - See Comments]      Pt states she has a medication allergy but does not know what it is       Medications:    Current Outpatient Medications   Medication Sig Dispense Refill     acetaminophen (TYLENOL) 500 MG tablet Take 1-2 tablets (500-1,000 mg) by mouth every 8 hours as needed for pain 90 tablet 1     Alcohol Swabs (B-D SINGLE USE SWABS REGULAR) PADS USE UTD  0     aMILoride (MIDAMOR) 5 MG tablet Take 1 tablet (5 mg) by mouth daily 30 tablet 1     blood glucose monitoring (ONETOUCH VERIO) meter device kit 2 times daily  0     bumetanide (BUMEX) 0.5 MG tablet Take 1 tablet (0.5 mg) by mouth daily 30 tablet 1     cetirizine (ZYRTEC) 5 MG tablet Take 5 mg by mouth daily       levothyroxine (SYNTHROID/LEVOTHROID) 50 MCG tablet Take 50 mcg by mouth every morning       loperamide (IMODIUM) 2 MG capsule Take 1 capsule (2 mg) by mouth 4 times daily as needed for diarrhea 60 capsule 0     LORazepam (ATIVAN) 0.5 MG tablet Take 1 mg by mouth 2 times daily (Every morning and evening)        metoprolol succinate ER (TOPROL-XL) 50 MG 24 hr tablet Take 50 mg by mouth 2 times daily       nystatin (MYCOSTATIN) 733546 UNIT/ML suspension Swish and swallow 5 mLs (500,000 Units) in mouth 4 times daily as needed       ONETOUCH VERIO IQ test strip Use one stript to test your blood sugars twice daily  2     oxybutynin ER (DITROPAN XL) 5 MG 24 hr tablet Take 1 tablet by mouth every evening       pantoprazole (PROTONIX) 40 MG EC tablet Take 1 tablet (40 mg) by mouth every morning (before breakfast) 30 tablet 0     psyllium (METAMUCIL/KONSYL) Packet Take 1 packet by mouth 2 times daily as needed for constipation         Problem List:  Patient Active Problem List    Diagnosis Date Noted     Hypoxia 07/28/2022     Priority: Medium     Hypertension, unspecified type 07/28/2022     Priority: Medium     Infection due to 2019 novel coronavirus 07/28/2022     Priority: Medium     Weakness 06/29/2022     Priority: Medium     CHF (congestive heart failure) (H) 05/24/2022     Priority: Medium     Generalized muscle weakness 05/24/2022     Priority: Medium     (HFpEF) heart failure with preserved ejection fraction (H) 05/06/2022     Priority: Medium     Orthopnea 04/13/2022     Priority: Medium     Shortness of breath 04/13/2022     Priority: Medium     Hyponatremia 04/13/2022     Priority: Medium     Acute kidney injury (H) 04/13/2022     Priority: Medium     Hypomagnesemia 03/20/2022     Priority: Medium     Closed compression fracture of L2 lumbar vertebra, initial encounter (H) 03/20/2022     Priority: Medium     Anemia, unspecified type 03/20/2022     Priority: Medium     Gastrointestinal hemorrhage associated with peptic ulcer 02/15/2022     Priority: Medium     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     Priority: Medium     Edema 10/17/2018     Priority: Medium     Episode of shaking 04/24/2018     Priority: Medium     Electrolyte imbalance      Priority: Medium     Kyphosis of thoracic region 01/19/2018     Priority:  Medium     PUD (peptic ulcer disease)      Priority: Medium     Accelerated hypertension      Priority: Medium     Abnormal finding on imaging 01/16/2018     Priority: Medium     Added automatically from request for surgery [id number]         T12 compression fracture (H)      Priority: Medium     Anemia due to GI blood loss 12/24/2017     Priority: Medium     Hypokalemia 12/24/2017     Priority: Medium        Past Medical/Surgical History:  Past Medical History:   Diagnosis Date     Anemia      Depression      Disease of thyroid gland      HTN (hypertension)      Osteoporosis      PUD (peptic ulcer disease)      Past Surgical History:   Procedure Laterality Date     COLONOSCOPY N/A 7/3/2022    Procedure: COLONOSCOPY WITH COLON BIOPSIES;  Surgeon: Marty Patel MD;  Location: [facility] OR     ESOPHAGOSCOPY, GASTROSCOPY, DUODENOSCOPY (EGD), COMBINED N/A 1/17/2018    Procedure: ESOPHAGOGASTRODUODENOSCOPY (EGD) with h.pylori and gastric ulcer biopsies;  Surgeon: Colin Alvarez MD;  Location: [facility];  Service:      ESOPHAGOSCOPY, GASTROSCOPY, DUODENOSCOPY (EGD), COMBINED N/A 7/3/2022    Procedure: ESOPHAGOGASTRODUODENOSCOPY (EGD) WITH DUODENAL & GASTRIC BIOPSIES;  Surgeon: Marty Patel MD;  Location: [facility] OR      STATISTIC PICC LINE INSERTION >5YR, FAILED  8/2/2022          HYSTERECTOMY       PICC TRIPLE LUMEN PLACEMENT  8/2/2022          US BIOPSY FINE NEEDLE ASPIRATION LYMPH NODE  8/14/2019       Social History:  Social History     Socioeconomic History     Marital status:      Spouse name: Not on file     Number of children: Not on file     Years of education: Not on file     Highest education level: Not on file   Occupational History     Not on file   Tobacco Use     Smoking status: Never Smoker     Smokeless tobacco: Never Used   Substance and Sexual Activity     Alcohol use: No     Drug use: No     Sexual activity: Never   Other Topics Concern     Not on file   Social History  Narrative     Not on file     Social Determinants of Health     Financial Resource Strain: Not on file   Food Insecurity: Not on file   Transportation Needs: Not on file   Physical Activity: Not on file   Stress: Not on file   Social Connections: Not on file   Intimate Partner Violence: Not on file   Housing Stability: Not on file       Family History:  Family History   Problem Relation Age of Onset     Diabetes No family hx of      Cancer No family hx of      Heart Disease No family hx of          Physical Examination:  Vitals: Ht 1.524 m (5')   Wt 43.1 kg (95 lb)   BMI 18.55 kg/m    BMI= Body mass index is 18.55 kg/m .    [place] Total Score 8/17/2022   Total score - [place] 19     General: No apparent distress  Chest: No cough, no audible wheezing, able to talk in full sentences  Skin: no rash  Psych: coherent speech, able to articulate logical thoughts, able   to abstract reason, no tangential thoughts, no hallucinations   or delusions  Her affect is normal  Neuro:  Mental status: Alert and  Oriented X 3  Speech: normal             Data: All pertinent previous laboratory data reviewed     Recent Labs   Lab Test 08/05/22  1242 08/04/22  1258   * 135*   POTASSIUM 4.2 4.2   CHLORIDE 101 106   CO2 22 23   ANIONGAP 9 6    131*   BUN 17 24   CR 0.73 0.81   MANE 8.4* 8.0*       Recent Labs   Lab Test 08/05/22  1102   WBC 20.0*   RBC 2.93*   HGB 9.3*   HCT 30.7*   *   MCH 31.7   MCHC 30.3*   RDW 18.6*   *       Recent Labs   Lab Test 08/02/22  1632   PROTTOTAL 5.5*   ALBUMIN 2.0*   BILITOTAL 0.3   ALKPHOS 77   AST 12   ALT <9       TSH (uIU/mL)   Date Value   06/21/2022 1.07   07/20/2018 3.15       No results found for: UAMP, UBARB, BENZODIAZEUR, UCANN, UCOC, OPIT, UPCP    Ferritin   Date/Time Value Ref Range Status   05/25/2022 05:08  (H) 10 - 130 ng/mL Final       pH (no units)   Date Value   01/18/2018 7.34 (L)       Echocardiology: 4/1/322   Interpretation Summary:  The left  ventricle is normal in size.  Left ventricular systolic function is normal.  The visual ejection fraction is 55-60%.  No regional wall motion abnormalities noted.  Diastolic Doppler findings (E/E' ratio and/or other parameters) suggest left  ventricular filling pressures are increased.  Normal right ventricle size and systolic function.  The right atrium is mildly dilated.  The left atrium is mildly dilated.  There is mild to moderate (1-2+) tricuspid regurgitation.Estimate of RV  systolic pressure is 27mmhg plus right atrial pressure  Small pericardial effusion  There are no echocardiographic indications of cardiac tamponade.  Effusion measures approximately 1cm anterior to the right ventricle  Compared to 11/2018 findings are similar.There was a small pericardial  effusion noted on the prior study      CHEST X Ray  06/29/2022    Narrative  EXAM: XR CHEST 1 VIEW  LOCATION: [facility]  DATE/TIME: 6/29/2022 12:23 AM    INDICATION: Shortness of breath.  COMPARISON: 5/24/2022.    FINDINGS: The heart is enlarged. There is no pulmonary edema. The thoracic aorta is calcified. There is a right perihilar infiltrate. Atelectasis or scar at the left lung base. No pneumothorax.    Impression  IMPRESSION: Right perihilar infiltrate.      CT Chest Pulmonary Embolism w Contrast 07/29/2022    Narrative  EXAM: CT CHEST PULMONARY EMBOLISM W CONTRAST  LOCATION: [facility]  DATE/TIME: 7/29/2022 2:10 AM    INDICATION: Elevated D dimer Hypoxia  COMPARISON: Portable chest radiograph from 07/28/2022, chest CT from 06/22/2022.  TECHNIQUE: CT chest pulmonary angiogram during arterial phase injection of IV contrast. Multiplanar reformats and MIP reconstructions were performed. Dose reduction techniques were used.  CONTRAST: 75ml Isovue 370    FINDINGS:  ANGIOGRAM CHEST: Nondiagnostic evaluation of the segmental and subsegmental pulmonary arteries in the lower lung zones due to motion. There  is otherwise no evidence for pulmonary embolism. Thoracic aorta is negative for dissection. No CT evidence of  right heart strain.    LUNGS AND PLEURA: Multifocal groundglass airspace infiltrates scattered throughout both lungs. No pneumothorax or pleural effusion.    MEDIASTINUM/AXILLAE: Heart size mildly enlarged. No pericardial effusion. Multivessel coronary artery disease.    CORONARY ARTERY CALCIFICATION: Severe.    UPPER ABDOMEN: There is some edema in the right upper quadrant near the gallbladder which is nonspecific. There is also mild wall thickening of the gastric antrum as seen on axial image 323.    MUSCULOSKELETAL: Osteopenia. Severe T12 vertebral body compression deformity. Moderate L2 compression deformity. These are stable.    Impression  IMPRESSION:  1.  No central or lobar pulmonary embolism.    2.  Multifocal bilateral groundglass airspace infiltrates consistent with multifocal pneumonia or pneumonitis.    3.  Severe coronary artery disease.    4.  Nonspecific edema in the right upper quadrant adjacent to the gallbladder. Consider sonographic correlation. There is also mild wall thickening of the gastric antrum which is likely inflammatory though clinical correlation is recommended.      Impression/Plan:  Snoring, observed apneas(during previous hospitalization),  non restorative sleep, fatigue, EDS, with recent  hospitalization for acute hypoxic respiratory failure secondary to hospital-acquired pneumonia and COVID-19 infection, in the setting of hypertension , heart failure with preserved ejection fraction, anemia,  depression, osteoporosis, peptic ulcer disease. Possible Obstructive sleep apnea  It is plausible she may likely have other types of sleep-related breathing disorder due to underlying CVD such as  central sleep apnea , Cheyne-Juan respiration or hypoxemia.  STOP BANG score is 5/8 . Patient likely has sleep apnea based on clinical history, age,h/o HTN.   Recommend in-lab sleep study  "to evaluate for sleep-related breathing disorder.   Patient refused the in-lab study, but was amenable to obtaining home sleep study.  Orders generated in [employer] for home sleep study.  I have communicated with Ms. Racquel Mcfadden and Ms. Lyla Gomez to help in coordinating/scheduling  the home sleep study.   The communication will be through Ms.  Dilcia at the [facility] ,  #  [phone].  Plan is to communicate results of sleep study via MY CHART.     Encouraged  healthy diet, and exercise.    Summary Counseling:    Sleep Testing Reviewed  Obstructive Sleep Apnea Reviewed  Complications of Untreated Sleep Apnea Reviewed    Medical Decision-making:   Educational materials provided in instruction.    CC: No ref. provider found    The above note was dictated using voice recognition software. Although reviewed after completion, some word and grammatical error may remain . Please contact the author for any clarifications.    \"I spent a total of 60 minutes with Chris Bell during today's telephone visit. Most of this time was spent counseling the patient and  coordinating care regarding  JUSTIN, HST/PSG,and chart review, including documentation and further activities as noted above.\"    MD PAUL Knowles [facility] [address] [address] [phone]  Dept: [phone]   "

## 2022-08-17 NOTE — Clinical Note
Tommy Clement and Lyla, Orders generated in epic.  Please  help in coordinating and scheduling the home sleep study, through communication with the foster home caregiver Ms. Ha # [phone] Please help mailing a copy of the AVS Plan to communicate the results of the sleep study via telephone visit with [facility]  service in 1 week after the sleep study is completed. So please  help in scheduling the telephone visit as well.  Thanks, Ankita

## 2022-08-18 NOTE — PATIENT INSTRUCTIONS
"      MY TREATMENT INFORMATION FOR SLEEP APNEA-  Chris Bell    DOCTOR : Jakob Cai MD       Am I having a home sleep study?  --->Watch the video for the device you are using:    -/drop off device-   https://www.Bridgest[employer].com/watch?v=yGGFBdELGhk         Frequently asked questions:  1. What is Obstructive Sleep Apnea (JUSTIN)? JUSTIN is the most common type of sleep apnea. Apnea means, \"without breath.\"  Apnea is most often caused by narrowing or collapse of the upper airway as muscles relax during sleep.   Almost everyone has occasional apneas. Most people with sleep apnea have had brief interruptions at night frequently for many years.  The severity of sleep apnea is related to how frequent and severe the events are.   2. What are the consequences of JUSTIN? Symptoms include: feeling sleepy during the day, snoring loudly, gasping or stopping of breathing, trouble sleeping, and occasionally morning headaches or heartburn at night.  Sleepiness can be serious and even increase the risk of falling asleep while driving. Other health consequences may include development of high blood pressure and other cardiovascular disease in persons who are susceptible. Untreated JUSTIN  can contribute to heart disease, stroke and diabetes.   3. What are the treatment options? In most situations, sleep apnea is a lifelong disease that must be managed with daily therapy. Medications are not effective for sleep apnea and surgery is generally not considered until other therapies have been tried. Your treatment is your choice . Continuous Positive Airway (CPAP) works right away and is the therapy that is effective in nearly everyone. An oral device to hold your jaw forward is usually the next most reliable option. Other options include postioning devices (to keep you off your back), weight loss, and surgery including a tongue pacing device. There is more detail about some of these options below.  4. Are my sleep studies " covered by insurance? Although we will request verification of coverage, we advise you also check in advance of the study to ensure there is coverage.    Important tips for those choosing CPAP and similar devices   Know your equipment:  CPAP is continuous positive airway pressure that prevents obstructive sleep apnea by keeping the throat from collapsing while you are sleeping. In most cases, the device is  smart  and can slowly self-adjusts if your throat collapses and keeps a record every day of how well you are treated-this information is available to you and your care team.  BPAP is bilevel positive airway pressure that keeps your throat open and also assists each breath with a pressure boost to maintain adequate breathing.  Special kinds of BPAP are used in patients who have inadequate breathing from lung or heart disease. In most cases, the device is  smart  and can slowly self-adjusts to assist breathing. Like CPAP, the device keeps a record of how well you are treated.  Your mask is your connection to the device. You get to choose what feels most comfortable and the staff will help to make sure if fits. Here: are some examples of the different masks that are available:       Key points to remember on your journey with sleep apnea:  Sleep study.  PAP devices often need to be adjusted during a sleep study to show that they are effective and adjusted right.  Good tips to remember: Try wearing just the mask during a quiet time during the day so your body adapts to wearing it. A humidifier is recommended for comfort in most cases to prevent drying of your nose and throat. Allergy medication from your provider may help you if you are having nasal congestion.  Getting settled-in. It takes more than one night for most of us to get used to wearing a mask. Try wearing just the mask during a quiet time during the day so your body adapts to wearing it. A humidifier is recommended for comfort in most cases. Our team will  work with you carefully on the first day and will be in contact within 4 days and again at 2 and 4 weeks for advice and remote device adjustments. Your therapy is evaluated by the device each day.   Use it every night. The more you are able to sleep naturally for 7-8 hours, the more likely you will have good sleep and to prevent health risks or symptoms from sleep apnea. Even if you use it 4 hours it helps. Occasionally all of us are unable to use a medical therapy, in sleep apnea, it is not dangerous to miss one night.   Communicate. Call our skilled team on the number provided on the first day if your visit for problems that make it difficult to wear the device. Over 2 out of 3 patients can learn to wear the device long-term with help from our team. Remember to call our team or your sleep providers if you are unable to wear the device as we may have other solutions for those who cannot adapt to mask CPAP therapy. It is recommended that you sleep your sleep provider within the first 3 months and yearly after that if you are not having problems.   Use it for your health. We encourage use of CPAP masks during daytime quiet periods to allow your face and brain to adapt to the sensation of CPAP so that it will be a more natural sensation to awaken to at night or during naps. This can be very useful during the first few weeks or months of adapting to CPAP though it does not help medically to wear CPAP during wakefulness and  should not be used as a strategy just to meet guidelines.  Take care of your equipment. Make sure you clean your mask and tubing using directions every day and that your filter and mask are replaced as recommended or if they are not working.     BESIDES CPAP, WHAT OTHER THERAPIES ARE THERE?    Positioning Device  Positioning devices are generally used when sleep apnea is mild and only occurs on your back.This example shows a pillow that straps around the waist. It may be appropriate for those whose  sleep study shows milder sleep apnea that occurs primarily when lying flat on one's back. Preliminary studies have shown benefit but effectiveness at home may need to be verified by a home sleep test. These devices are generally not covered by medical insurance.  Examples of devices that maintain sleeping on the back to prevent snoring and mild sleep apnea.    Belt type body positioner  http://[employer]/    Electronic reminder  http://nightshifttherapy.com/  http://www.[employer]au/      Oral Appliance  What is oral appliance therapy?  An oral appliance device fits on your teeth at night like a retainer used after having braces. The device is made by a specialized dentist and requires several visits over 1-2 months before a manufactured device is made to fit your teeth and is adjusted to prevent your sleep apnea. Once an oral device is working properly, snoring should be improved. A home sleep test may be recommended at that time if to determine whether the sleep apnea is adequately treated.       Some things to remember:  -Oral devices are often, but not always, covered by your medical insurance. Be sure to check with your insurance provider.   -If you are referred for oral therapy, you will be given a list of specialized dentists to consider or you may choose to visit the Web site of the American Academy of Dental Sleep Medicine  -Oral devices are less likely to work if you have severe sleep apnea or are extremely overweight.     More detailed information  An oral appliance is a small acrylic device that fits over the upper and lower teeth  (similar to a retainer or a mouth guard). This device slightly moves jaw forward, which moves the base of the tongue forward, opens the airway, improves breathing for effective treat snoring and obstructive sleep apnea in perhaps 7 out of 10 people .  The best working devices are custom-made by a dental device  after a mold is made of the teeth 1, 2, 3.  When  is an oral appliance indicated?  Oral appliance therapy is recommended as a first-line treatment for patients with primary snoring, mild sleep apnea, and for patients with moderate sleep apnea who prefer appliance therapy to use of CPAP4, 5. Severity of sleep apnea is determined by sleep testing and is based on the number of respiratory events per hour of sleep.   How successful is oral appliance therapy?  The success rate of oral appliance therapy in patients with mild sleep apnea is 75-80% while in patients with moderate sleep apnea it is 50-70%. The chance of success in patients with severe sleep apnea is 40-50%. The research also shows that oral appliances have a beneficial effect on the cardiovascular health of JUSTIN patients at the same magnitude as CPAP therapy7.  Oral appliances should be a second-line treatment in cases of severe sleep apnea, but if not completely successful then a combination therapy utilizing CPAP plus oral appliance therapy may be effective. Oral appliances tend to be effective in a broad range of patients although studies show that the patients who have the highest success are females, younger patients, those with milder disease, and less severe obesity. 3, 6.   Finding a dentist that practices dental sleep medicine  Specific training is available through the American Academy of Dental Sleep Medicine for dentists interested in working in the field of sleep. To find a dentist who is educated in the field of sleep and the use of oral appliances, near you, visit the Web site of the American Academy of Dental Sleep Medicine.    References  1. Pau et al. Objectively measured vs self-reported compliance during oral appliance therapy for sleep-disordered breathing. Chest 2013; 144(5): 1756-8311.  2. Shawn et al. Objective measurement of compliance during oral appliance therapy for sleep-disordered breathing. Thorax 2013; 68(1): 91-96.  3. Mary Ann et al. Mandibular advancement  devices in 620 men and women with JUSTIN and snoring: tolerability and predictors of treatment success. Chest 2004; 125: 1166-8457.  4. Roger et al. Oral appliances for snoring and JUSTIN: a review. Sleep 2006; 29: 244-262.  5. Maria Teresa et al. Oral appliance treatment for JUSTIN: an update. J Clin Sleep Med 2014; 10(2): 215-227.  6. Jess et al. Predictors of OSAH treatment outcome. J Dent Res 2007; 86: 9860-9282.      Weight Loss:    Weight loss is a long-term strategy that may improve sleep apnea in some patients.    Weight management is a personal decision and the decision should be based on your interest and the potential benefits.  If you are interested in exploring weight loss strategies, the following discussion covers the impact on weight loss on sleep apnea and the approaches that may be successful.    Being overweight does not necessarily mean you will have health consequences.  Those who have BMI over 35 or over 27 with existing medical conditions carries greater risk.   Weight loss decreases severity of sleep apnea in most people with obesity. For those with mild obesity who have developed snoring with weight gain, even 15-30 pound weight loss can improve and occasionally eliminate sleep apnea.  Structured and life-long dietary and health habits are necessary to lose weight and keep healthier weight levels.     Though there may be significant health benefits from weight loss, long-term weight loss is very difficult to achieve- studies show success with dietary management in less than 10% of people. In addition, substantial weight loss may require years of dietary control and may be difficult if patients have severe obesity. In these cases, surgical management may be considered.  Finally, older individuals who have tolerated obesity without health complications may be less likely to benefit from weight loss strategies.           Surgery:    Surgery for obstructive sleep apnea is considered generally only  when other therapies fail to work. Surgery may be discussed with you if you are having a difficult time tolerating CPAP and or when there is an abnormal structure that requires surgical correction.  Nose and throat surgeries often enlarge the airway to prevent collapse.  Most of these surgeries create pain for 1-2 weeks and up to half of the most common surgeries are not effective throughout life.  You should carefully discuss the benefits and drawbacks to surgery with your sleep provider and surgeon to determine if it is the best solution for you.   More information  Surgery for JUSTIN is directed at areas that are responsible for narrowing or complete obstruction of the airway during sleep.  There are a wide range of procedures available to enlarge and/or stabilize the airway to prevent blockage of breathing in the three major areas where it can occur: the palate, tongue, and nasal regions.  Successful surgical treatment depends on the accurate identification of the factors responsible for obstructive sleep apnea in each person.  A personalized approach is required because there is no single treatment that works well for everyone.  Because of anatomic variation, consultation with an examination by a sleep surgeon is a critical first step in determining what surgical options are best for each patient.  In some cases, examination during sedation may be recommended in order to guide the selection of procedures.  Patients will be counseled about risks and benefits as well as the typical recovery course after surgery. Surgery is typically not a cure for a person s JUSTIN.  However, surgery will often significantly improve one s JUSTIN severity (termed  success rate ).  Even in the absence of a cure, surgery will decrease the cardiovascular risk associated with OSA7; improve overall quality of life8 (sleepiness, functionality, sleep quality, etc).      Palate Procedures:  Patients with JUSTIN often have narrowing of their airway in  the region of their tonsils and uvula.  The goals of palate procedures are to widen the airway in this region as well as to help the tissues resist collapse.  Modern palate procedure techniques focus on tissue conservation and soft tissue rearrangement, rather than tissue removal.  Often the uvula is preserved in this procedure. Residual sleep apnea is common in patient after pharyngoplasty with an average reduction in sleep apnea events of 33%2.      Tongue Procedures:  ExamWhile patients are awake, the muscles that surround the throat are active and keep this region open for breathing. These muscles relax during sleep, allowing the tongue and other structures to collapse and block breathing.  There are several different tongue procedures available.  Selection of a tongue base procedure depends on characteristics seen on physical exam.  Generally, procedures are aimed at removing bulky tissues in this area or preventing the back of the tongue from falling back during sleep.  Success rates for tongue surgery range from 50-62%3.    Hypoglossal Nerve Stimulation:  Hypoglossal nerve stimulation has recently received approval from the [place] Food and Drug Administration for the treatment of obstructive sleep apnea.  This is based on research showing that the system was safe and effective in treating sleep apnea6.  Results showed that the median AHI score decreased 68%, from 29.3 to 9.0. This therapy uses an implant system that senses breathing patterns and delivers mild stimulation to airway muscles, which keeps the airway open during sleep.  The system consists of three fully implanted components: a small generator (similar in size to a pacemaker), a breathing sensor, and a stimulation lead.  Using a small handheld remote, a patient turns the therapy on before bed and off upon awakening.    Candidates for this device must be greater than 22 years of age, have moderate to severe JUSTIN (AHI between 20-65), BMI less  than 32, have tried CPAP/oral appliance without success, and have appropriate upper airway anatomy (determined by a sleep endoscopy performed by Dr. Cobb).    Hypoglossal Nerve Stimulation Pathway:    The sleep surgeon s office will work with the patient through the insurance prior-authorization process (including communications and appeals).    Nasal Procedures:  Nasal obstruction can interfere with nasal breathing during the day and night.  Studies have shown that relief of nasal obstruction can improve the ability of some patients to tolerate positive airway pressure therapy for obstructive sleep apnea1.  Treatment options include medications such as nasal saline, topical corticosteroid and antihistamine sprays, and oral medications such as antihistamines or decongestants. Non-surgical treatments can include external nasal dilators for selected patients. If these are not successful by themselves, surgery can improve the nasal airway either alone or in combination with these other options.      Combination Procedures:  Combination of surgical procedures and other treatments may be recommended, particularly if patients have more than one area of narrowing or persistent positional disease.  The success rate of combination surgery ranges from 66-80%2,3.    References  Dylon BUENROSTRO. The Role of the Nose in Snoring and Obstructive Sleep Apnoea: An Update.  Eur Arch Otorhinolaryngol. 2011; 268: 1365-73.   Capmedhat SM; Sabrina JA; Pravin JR; Pallanch JF; Ed MB; Alexei SG; Janna ESCOTO. Surgical modifications of the upper airway for obstructive sleep apnea in adults: a systematic review and meta-analysis. SLEEP 2010;33(10):8084-9015. Katie DELACRUZ. Hypopharyngeal surgery in obstructive sleep apnea: an evidence-based medicine review.  Arch Otolaryngol Head Neck Surg. 2006 Feb;132(2):206-13.  Anival YH1, Michael Y, Edis BLACK. The efficacy of anatomically based multilevel surgery for obstructive sleep apnea. Otolaryngol Head Neck  Surg. 2003 Oct;129(4):327-35.  Katie DELACRUZ, Goldberg A. Hypopharyngeal Surgery in Obstructive Sleep Apnea: An Evidence-Based Medicine Review. Arch Otolaryngol Head Neck Surg. 2006 Feb;132(2):206-13.  Elvia RODNEY et al. Upper-Airway Stimulation for Obstructive Sleep Apnea.  N Engl J Med. 2014 Jan 9;370(2):139-49.  Wilda Y et al. Increased Incidence of Cardiovascular Disease in Middle-aged Men with Obstructive Sleep Apnea. Am J Respir Crit Care Med; 2002 166: 159-165  Johnson EM et al. Studying Life Effects and Effectiveness of Palatopharyngoplasty (SLEEP) study: Subjective Outcomes of Isolated Uvulopalatopharyngoplasty. Otolaryngol Head Neck Surg. 2011; 144: 623-631.        WHAT IF I ONLY HAVE SNORING?    Mandibular advancement devices, lateral sleep positioning, long-term weight loss and treatment of nasal allergies have been shown to improve snoring.  Exercising tongue muscles with a game (https://www.ncbi.nlm.nih.gov/pubmed/20536615) or stimulating the tongue during the day with a device (https://doi.org/10.3390/jag51502635) have improved snoring in some individuals.    Remember to Drive Safe... Drive Alive     Sleep health profoundly affects your health, mood, and your safety.  Thirty three percent of the population (one in three of us) is not getting enough sleep and many have a sleep disorder. Not getting enough sleep or having an untreated / undertreated sleep condition may make us sleepy without even knowing it. In fact, our driving could be dramatically impaired due to our sleep health. As your provider, here are some things I would like you to know about driving:     Here are some warning signs for impairment and dangerous drowsy driving:              -Having been awake more than 16 hours               -Looking tired               -Eyelid drooping              -Head nodding (it could be too late at this point)              -Driving for more than 30 minutes     Some things you could do to make the driving safer  if you are experiencing some drowsiness:              -Stop driving and rest              -Call for transportation              -Make sure your sleep disorder is adequately treated     Some things that have been shown NOT to work when experiencing drowsiness while driving:              -Turning on the radio              -Opening windows              -Eating any  distracting  /  entertaining  foods (e.g., sunflower seeds, candy, or any other)              -Talking on the phone      Your decision may not only impact your life, but also the life of others. Please, remember to drive safe for yourself and all of us.

## 2022-08-19 NOTE — TELEPHONE ENCOUNTER
Call placed to Ms. Ha to arrange  and drop off of Home sleep study T3. VM left asking for call back to schedule.     Vandana Mcfadden CMA on 8/19/2022 at 12:05 PM

## 2022-10-05 PROBLEM — D53.9 MACROCYTIC ANEMIA: Status: ACTIVE | Noted: 2022-01-01

## 2022-10-05 PROBLEM — R10.84 ABDOMINAL PAIN, GENERALIZED: Status: ACTIVE | Noted: 2022-01-01

## 2022-10-05 PROBLEM — I50.32 CHRONIC DIASTOLIC HEART FAILURE (H): Status: ACTIVE | Noted: 2022-01-01

## 2022-10-05 PROBLEM — U07.1 INFECTION DUE TO 2019 NOVEL CORONAVIRUS: Status: RESOLVED | Noted: 2022-01-01 | Resolved: 2022-01-01

## 2022-10-05 NOTE — H&P
Admission History and Physical   Chris Bell    1935,   [id number]    [facility]   Abdominal pain, generalized [R10.84]    PCP: Kizzy Villanueva,    Code status:  Full Code       Extended Emergency Contact Information  Primary Emergency Contact: LONNIE NOLASCO  Mobile Phone: [phone]  Relation: Son  Secondary Emergency Contact: Breezy CastilloMay  Address: [address] [place] [address] [address]           [place] [address] [facility]  Home Phone: [phone]  Mobile Phone: [phone]  Relation: Daughter       Principal Problem:    Hyponatremia  Active Problems:    PUD (peptic ulcer disease)    Macrocytic anemia with Normal B12     Benign essential hypertension    Abdominal pain, generalized    Chronic diastolic heart failure (H)       ASSESSMENT AND PLAN:  Abdominal pain.  CT showing features suggestive of gastritis and partial obstruction.  N.p.o. for now.  IV PPI, consult GI for evaluation, as needed morphine as tolerated    Acute on chronic hyponatremia, no episodes of confusion other symptoms documented.  Nephrology consult to see, hyponatremia work-up including urine studies sent, check sodium every 4 hours for now    Chronic anemia with stable hemoglobin, follow serial hemoglobins closely, continue IV PPI    History of CHF compensated currently, continue home meds parameters placed    Hypertension stable continue home meds parameters placed    Osteoporosis, CT abdomen with skeletal windows showing chronic thoracic/lumbar compression fractures.  Continue current analgesia.    Positive COVID test, did have COVID few months ago as well so likely COVID recovered.  Continue isolation for now, infection control nurse to see    Full code    DVT PPX:  Mechanical    Barriers to discharge abdominal pain    Anticipated Discharge date multiple days inpatient          Chief Complaint:  Abdominal pain few days duration    HPI:  Chris Bell is a 87 year old old female presented with abdominal  pain a few days duration.  Reports mid abdominal pain, radiated to right lower quadrant, unable to further describe pain features.  No nausea vomiting, no diarrhea no breathing difficulties or chest pain reported.  Work-up showing possible gastritis.  Low sodium levels noted.  He is admitted for further work-up.  History limited by language difficulties      Medical History  Past Medical History:   Diagnosis Date     Anemia      Depression      Disease of thyroid gland      HTN (hypertension)      Osteoporosis      PUD (peptic ulcer disease)           Surgical History  She  has a past surgical history that includes Hysterectomy; Esophagoscopy, gastroscopy, duodenoscopy (EGD), combined (N/A, 1/17/2018); Us Biopsy Fine Needle Aspiration Lymph Node (8/14/2019); Colonoscopy (N/A, 7/3/2022); Esophagoscopy, gastroscopy, duodenoscopy (EGD), combined (N/A, 7/3/2022); h statistic picc line insertion >5yr, failed (8/2/2022); and PICC/Midline Placement (8/2/2022).      SOCIAL HISTORY:  Social History     Socioeconomic History     Marital status:      Spouse name: Not on file     Number of children: Not on file     Years of education: Not on file     Highest education level: Not on file   Occupational History     Not on file   Tobacco Use     Smoking status: Never Smoker     Smokeless tobacco: Never Used   Substance and Sexual Activity     Alcohol use: No     Drug use: No     Sexual activity: Never   Other Topics Concern     Not on file   Social History Narrative     Not on file     Social Determinants of Health     Financial Resource Strain: Not on file   Food Insecurity: Not on file   Transportation Needs: Not on file   Physical Activity: Not on file   Stress: Not on file   Social Connections: Not on file   Intimate Partner Violence: Not on file   Housing Stability: Not on file       FAMILY HISTORY:  Family History   Problem Relation Age of Onset     Diabetes No family hx of      Cancer No family hx of      Heart  Disease No family hx of          ALLERGIES:  Allergies   Allergen Reactions     Unknown [No Clinical Screening - See Comments]      Pt states she has a medication allergy but does not know what it is       MEDICATIONS:  Per pharmacy note      ROS:  12 point review of systems reviewed and is negative except as stated above      PHYSICAL EXAM:  BP (!) 141/68   Pulse 82   Temp 97.9  F (36.6  C) (Oral)   Resp 18   Wt 43.1 kg (95 lb)   SpO2 99%   BMI 18.55 kg/m      Patient on isolation for COVID  See ED physical exam findings below  GENERAL: Awake, alert.  In no acute distress.   HEENT: Normocephalic, atraumatic.  Pupils equal, round and reactive.  Conjunctiva normal.  EOMI.  NECK: No stridor or apparent deformity.  PULMONARY: Symmetrical breath sounds without distress.  Lungs clear to auscultation bilaterally without wheezes, rhonchi or rales.  CARDIO: Regular rate and rhythm.  No significant murmur, rub or gallop.  Radial pulses strong and symmetrical.  ABDOMINAL: Diffused mid abdominal pain and RLQ tenderness. Abdomen soft, non-distended.   No CVAT, no palpable hepatosplenomegaly.  EXTREMITIES: No lower extremity swelling or edema.    NEURO: Alert and oriented to person, place and time.  Cranial nerves grossly intact.  No focal motor deficit.  PSYCH: Normal mood and affect  SKIN: No rashes        DIAGNOSTIC DATA:    EKG Results: Personally reviewed        Advanced Care Planning       Bill Burdick MD

## 2022-10-05 NOTE — ED NOTES
Pt. On call light again. Answered and she is Trying to tell me something. I called the language line again and they say she is asking for food again. Let her know again through the  that she is not to have any food or drink at this time.

## 2022-10-05 NOTE — ED NOTES
Pt. Requested to get repositioned in bed, order food for breakfast and pain med for her back pain.

## 2022-10-05 NOTE — ANESTHESIA PREPROCEDURE EVALUATION
Anesthesia Pre-Procedure Evaluation    Patient: Chris Bell   MRN: [id number] : 1935        Procedure : Procedure(s):  ESOPHAGOGASTRODUODENOSCOPY (EGD)          Past Medical History:   Diagnosis Date     Anemia      Depression      Disease of thyroid gland      HTN (hypertension)      Osteoporosis      PUD (peptic ulcer disease)       Past Surgical History:   Procedure Laterality Date     COLONOSCOPY N/A 7/3/2022    Procedure: COLONOSCOPY WITH COLON BIOPSIES;  Surgeon: Marty Patel MD;  [facility] [facility]     ESOPHAGOSCOPY, GASTROSCOPY, DUODENOSCOPY (EGD), COMBINED N/A 2018    Procedure: ESOPHAGOGASTRODUODENOSCOPY (EGD) with h.pylori and gastric ulcer biopsies;  Surgeon: Colin Alvarez MD;  Location: [facility];  Service:      ESOPHAGOSCOPY, GASTROSCOPY, DUODENOSCOPY (EGD), COMBINED N/A 7/3/2022    Procedure: ESOPHAGOGASTRODUODENOSCOPY (EGD) WITH DUODENAL & GASTRIC BIOPSIES;  Surgeon: Marty Patel MD;  Location: [facility] STATISTIC PICC LINE INSERTION >5YR, FAILED  2022          HYSTERECTOMY       PICC TRIPLE LUMEN PLACEMENT  2022          US BIOPSY FINE NEEDLE ASPIRATION LYMPH NODE  2019      Allergies   Allergen Reactions     Unknown [No Clinical Screening - See Comments]      Pt states she has a medication allergy but does not know what it is      Social History     Tobacco Use     Smoking status: Never Smoker     Smokeless tobacco: Never Used   Substance Use Topics     Alcohol use: No      Wt Readings from Last 1 Encounters:   10/04/22 43.1 kg (95 lb)        Anesthesia Evaluation   Pt has had prior anesthetic.         ROS/MED HX  ENT/Pulmonary:  - neg pulmonary ROS     Neurologic:     (+) delerium,     Cardiovascular: Comment: EKG 22:  Sinus rhythm   Nonspecific T wave abnormality   Abnormal ECG   When compared with ECG of 2022 19:15,   Premature atrial complexes are no longer Present   Confirmed by SEE ED PROVIDER NOTE FOR, ECG  "INTERPRETATION ([id number]),  MARITZA QUINTANILLA ([id number]) on 6/22/2022 7:51:37 AM    Echo  Interpretation Summary    The left ventricle is normal in size.  Left ventricular systolic function is normal.  The visual ejection fraction is 55-60%.  No regional wall motion abnormalities noted.  Diastolic Doppler findings (E/E' ratio and/or other parameters) suggest left  ventricular filling pressures are increased.  Normal right ventricle size and systolic function.  The right atrium is mildly dilated.  The left atrium is mildly dilated.  There is mild to moderate (1-2+) tricuspid regurgitation.Estimate of RV  systolic pressure is 27mmhg plus right atrial pressure  Small pericardial effusion  There are no echocardiographic indications of cardiac tamponade.  Effusion measures approximately 1cm anterior to the right ventricle  Compared to 11/2018 findings are similar.There was a small pericardial  effusion noted on the prior study  The myocardium appears \"brightâ  if clinically indicated consider amyloid      (+) hypertension-----CHF orthopnea/PND,  (-) murmur and wheezes   METS/Exercise Tolerance: >4 METS    Hematologic:     (+) anemia,     Musculoskeletal:  - neg musculoskeletal ROS     GI/Hepatic: Comment: UGIB      Renal/Genitourinary:     (+) renal disease,     Endo:     (+) thyroid problem,     Psychiatric/Substance Use:  - neg psychiatric ROS     Infectious Disease:  - neg infectious disease ROS     Malignancy:  - neg malignancy ROS     Other:  - neg other ROS          Physical Exam    Airway  airway exam normal      Mallampati: III   TM distance: > 3 FB   Neck ROM: limited   Mouth opening: > 3 cm    Respiratory Devices and Support         Dental  no notable dental history         Cardiovascular          Rhythm and rate: regular and normal (-) no murmur    Pulmonary           breath sounds clear to auscultation   (-) no wheezes        OUTSIDE LABS:  CBC:   Lab Results   Component Value Date    WBC 9.1 10/04/2022    WBC " 20.0 (H) 08/05/2022    HGB 8.5 (L) 10/05/2022    HGB 8.8 (L) 10/04/2022    HCT 25.6 (L) 10/04/2022    HCT 30.7 (L) 08/05/2022     10/04/2022     (L) 08/05/2022     BMP:   Lab Results   Component Value Date     (LL) 10/05/2022     (LL) 10/05/2022    POTASSIUM 4.4 10/05/2022    POTASSIUM 4.4 10/04/2022    CHLORIDE 87 (L) 10/05/2022    CHLORIDE 83 (L) 10/04/2022    CO2 25 10/05/2022    CO2 29 10/04/2022    BUN 7.4 (L) 10/05/2022    BUN 7.8 (L) 10/04/2022    CR 0.53 10/05/2022    CR 0.55 10/04/2022     (H) 10/05/2022     (H) 10/04/2022     COAGS:   Lab Results   Component Value Date    PTT 30 07/28/2022    INR 1.08 07/28/2022     POC: No results found for: BGM, HCG, HCGS  HEPATIC:   Lab Results   Component Value Date    ALBUMIN 2.0 (L) 08/02/2022    PROTTOTAL 5.5 (L) 08/02/2022    ALT <9 08/02/2022    AST 12 08/02/2022    ALKPHOS 77 08/02/2022    BILITOTAL 0.3 08/02/2022     OTHER:   Lab Results   Component Value Date    PH 7.34 (L) 01/18/2018    LACT 1.8 08/02/2022    A1C 5.7 (H) 02/15/2022    MANE 9.2 10/05/2022    PHOS 2.8 01/16/2018    MAG 1.5 (L) 08/05/2022    LIPASE 11 05/24/2022    TSH 1.07 06/21/2022    CRP 4.7 (H) 08/01/2022     (H) 10/18/2018       Anesthesia Plan    ASA Status:  3   NPO Status:  NPO Appropriate    Anesthesia Type: MAC.   Induction: Intravenous, Propofol.   Maintenance: TIVA.        Consents    Anesthesia Plan(s) and associated risks, benefits, and realistic alternatives discussed. Questions answered and patient/representative(s) expressed understanding.     - Discussed: Risks, Benefits and Alternatives for BOTH SEDATION and the PROCEDURE were discussed     - Discussed with:  Patient, , Other (See Comment) (Son)      - Patient is DNR/DNI Status: Yes             Suspend during perioperative period? No.    Use of blood products discussed: Yes.     - Discussed with: Patient.     - Consented: consented to blood products            Reason for  refusal: other.     Postoperative Care    Pain management: IV analgesics, Oral pain medications, Multi-modal analgesia.   PONV prophylaxis: Ondansetron (or other 5HT-3), Dexamethasone or Solumedrol     Comments:    Other Comments: TIVA with Propofol  Zofran for PONV    Spoke in detail about DNR status with son Shiv who wishes to maintain DNR during procedure.            Bruno Lopez MD

## 2022-10-05 NOTE — ED NOTES
Pt. On call light. Wanted to use commode. Assisted pt. Onto commode and she will call when she is all done.

## 2022-10-05 NOTE — CONSULTS
Care Management Initial Consult    General Information  Assessment completed with: Patient, VM-chart review, Chris  Type of CM/SW Visit: Initial Assessment    Primary Care Provider verified and updated as needed: Yes   Readmission within the last 30 days: no previous admission in last 30 days      Reason for Consult: discharge planning  Advance Care Planning: Advance Care Planning Reviewed: no concerns identified          Communication Assessment  Patient's communication style: spoken language (non-English) (speaks Hmong)                      Living Environment:   People in home: facility resident ([facility])     Current living Arrangements: other (see comments), foster care ([facility])  [facility]   Able to return to prior arrangements: yes       Family/Social Support:  Care provided by: other (see comments) (Foster Care staff)  Provides care for: no one, unable/limited ability to care for self  Marital Status:   Facility resident(s)/Staff, Children          Description of Support System: Supportive, Involved    Support Assessment: Adequate family and caregiver support, Adequate social supports    Current Resources:   Patient receiving home care services:       Community Resources: DME, PCA (Staff at [facility] are her PCAs)  Equipment currently used at home: walker, rolling, wheelchair, manual  Supplies currently used at home: Incontinence Supplies    Employment/Financial:  Employment Status: retired        Financial Concerns:     Referral to Financial Worker: No       Lifestyle & Psychosocial Needs:  Social Determinants of Health     Tobacco Use: Low Risk      Smoking Tobacco Use: Never Smoker     Smokeless Tobacco Use: Never Used   Alcohol Use: Not on file   Financial Resource Strain: Not on file   Food Insecurity: Not on file   Transportation Needs: Not on file   Physical Activity: Not on file   Stress: Not on  file   Social Connections: Not on file   Intimate Partner Violence: Not on file   Depression: Not on file   Housing Stability: Not on file       Functional Status:  Prior to admission patient needed assistance:   Dependent ADLs:: Ambulation-walker, Wheelchair-with assist, Bathing, Dressing, Grooming, Incontinence, Toileting  Dependent IADLs:: Meal Preparation, Medication Management, Money Management, Transportation, Incontinence, Shopping, Laundry, Cooking, Cleaning       Mental Health Status:          Chemical Dependency Status:                Values/Beliefs:  Spiritual, Cultural Beliefs, Zoroastrianism Practices, Values that affect care:                 Additional Information:  Chris lives at [facility]. Contact at the [facility]: Russ [phone].    She is total cares at baseline. Uses a walker or wheelchair for mobility.    Unknown discharge needs at this time, but should be able to return to her foster care home.    [facility] transport at discharge.    Anuradha Dior RN

## 2022-10-05 NOTE — ED NOTES
Pt. On call light about every 10 min. Asking for food. It has been explained to her several time through an  that she is NPO so she is not to have anything by mouth. Pt. Is not happy about this and continues to put call light on for the same thing.

## 2022-10-05 NOTE — ED NOTES
Assisted pt. From commode back to the bed. Adjusted pt. In bed and set her up to eat her breakfast.

## 2022-10-05 NOTE — ED NOTES
DATE:  10/5/2022   TIME OF RECEIPT FROM LAB:  1412  LAB TEST:  Sodium  LAB VALUE:  119  RESULTS GIVEN WITH READ-BACK TO (PROVIDER):  Bill Burdick MD  TIME LAB VALUE REPORTED TO PROVIDER:   [id number]

## 2022-10-05 NOTE — CONSULTS
RENAL CONSULTATION:     Date of Consultation:  10/5/2022    Requesting Physician: Dr. Burdick    Reason for Consult:  Hyponatremia, SIADH    Assessment/ Recommendations:  1. Hyponatremia: History of SIADH and chronic diarrhea. Varying degrees of hyponatremia since at least Feb 2022. Hospitalized in July with severe hyponatremia at which time she was diagnosed with SIADH and started on bumex + 2g NaCL tabs TID. Presented to the hospital with a Na of 120 last night which trended down to 118 today after getting NS at 75mg/hr over night which is consistent with SIADH. Serum osoms 253, urin osoms 292, Meli 87 also consistent with SIADH. Reports ongoing intermittent loose stools, but currently with obstruction. No nausea or emesis. Reports she has taken bumex and NaCl as prescribed since discharge in July.      - Agree with halting NS   - Is NPO, will wait for the next sodium check to make further changes as this alone may correct the sodium   - If Sodium stalls out or continues to drop, we will plan to add a loop diuretic. Was on Bumex 0.5mg daily + 2g NaCL TID since July. Addendum: Na recheck 119. Add lasix 10mg IV daily. Continue to monitor Na q4. Please page nephrology if Na drops further or trends above 125.    - Continue sodium checks every 4 hours for trend.   - Cymbalta may be causing SIADH, would halt if able. Ativan likely okay.          - Amiloride is also know to contribute to hyponatremia, but usually when in combination with a thiazide. Patient has not been on a thiazide.     This document is created with the help of Dragon dictation system. All grammatical errors are unintentional.     Enedina Morgan, CNP  [facility]  [phone] (off)       History of present illness: Chris Bell is a 87 year old female with a PMH significant for HFpEF, HTN, suspected RA, polyclonal gammopahty, chronic diarrhea who presented to the ED on 10/4/22 for the evaluation of abdominal pain and  noted to have gastritis with partial obstruction. No associated n/v/d.     Hyponatremia was noted on initial lab evaluation. Patient has a PMH of hyponatremia from chronic loose stools, but more recently diagnosed with SIAHD causing profound hyponatremia. She is chronically maintained on bumex and NaCl tabs. Her sodium dropped from 120 to 118 overnight with NS IVF.     Upon visit, Shiva reports she is feeling well overall.  Has the urge to have a bowel movement, but cannot produce one.  Reports she has been taking all medications as prescribed including her Bumex and sodium chloride.  Tells me she has been having loose stools at home, but is here with obstruction and unable to have a bowel movement presently.  She has no numbness or tingling around her lips, no no confusion or seizures.  She does complain of some tingling in her arms and lower extremities, she attributes this to anxiety for which she usually uses duloxetine and Ativan.  Both of these have been on hold since admission to the hospital.  Reports this symptom will not resolve until she Ativan is reinstated. No recent NSAID use, no new medications.      Denies headache, chest pain, nausea, vomiting, dizziness, weakness.    Past Medical History:   Diagnosis Date     Anemia      Depression      Disease of thyroid gland      HTN (hypertension)      Osteoporosis      PUD (peptic ulcer disease)          Current Facility-Administered Medications:      acetaminophen (TYLENOL) tablet 650 mg, 650 mg, Oral, Q6H PRN, 650 mg at 10/05/22 0237 **OR** acetaminophen (TYLENOL) Suppository 650 mg, 650 mg, Rectal, Q6H PRN, Herbert Chiu MD     bisacodyl (DULCOLAX) suppository 10 mg, 10 mg, Rectal, Daily PRN, Herbert Chiu MD     lidocaine (LMX4) cream, , Topical, Q1H PRN, Herbert Chiu MD     lidocaine 1 % 0.1-1 mL, 0.1-1 mL, Other, Q1H PRN, Herbert Chiu MD     melatonin tablet 1 mg, 1 mg, Oral, At Bedtime PRN, Herbert Chiu  MD Harpreet     morphine (PF) injection 0.5 mg, 0.5 mg, Intravenous, Q4H PRN, Bill Burdick MD     ondansetron (ZOFRAN ODT) ODT tab 4 mg, 4 mg, Oral, Q6H PRN **OR** ondansetron (ZOFRAN) injection 4 mg, 4 mg, Intravenous, Q6H PRN, Herbert Chiu MD     oxyCODONE (ROXICODONE) tablet 5 mg, 5 mg, Oral, Q4H PRN, Bill Burdick MD     pantoprazole (PROTONIX) IV push injection 40 mg, 40 mg, Intravenous, Daily with breakfast, Bill Burdick MD, 40 mg at 10/05/22 0909     polyethylene glycol (MIRALAX) Packet 17 g, 17 g, Oral, Daily PRN, Herbert Chiu MD     senna-docusate (SENOKOT-S/PERICOLACE) 8.6-50 MG per tablet 1 tablet, 1 tablet, Oral, BID PRN **OR** senna-docusate (SENOKOT-S/PERICOLACE) 8.6-50 MG per tablet 2 tablet, 2 tablet, Oral, BID PRN, Herbert Chiu MD     sodium chloride (PF) 0.9% PF flush 3 mL, 3 mL, Intracatheter, Q8H, Herbert Chiu MD, 3 mL at 10/05/22 0910     sodium chloride (PF) 0.9% PF flush 3 mL, 3 mL, Intracatheter, q1 min prn, Herbert Chiu MD    Current Outpatient Medications:      acetaminophen (TYLENOL) 500 MG tablet, Take 500 mg by mouth every 6 hours as needed for mild pain, Disp: , Rfl:      alendronate (FOSAMAX) 70 MG tablet, Take 70 mg by mouth every 7 days, Disp: , Rfl:      aMILoride (MIDAMOR) 5 MG tablet, Take 1 tablet (5 mg) by mouth daily, Disp: 30 tablet, Rfl: 1     bumetanide (BUMEX) 0.5 MG tablet, Take 1 tablet (0.5 mg) by mouth daily, Disp: 30 tablet, Rfl: 1     calcium carbonate (TUMS) 500 MG chewable tablet, Take 1 chew tab by mouth 4 times daily as needed for heartburn, Disp: , Rfl:      calcium carbonate-vitamin D (OSCAL W/D) 500-200 MG-UNIT tablet, Take 1 tablet by mouth 2 times daily, Disp: , Rfl:      cetirizine (ZYRTEC) 5 MG tablet, Take 5 mg by mouth daily, Disp: , Rfl:      chlorhexidine (CHLORHEXIDINE) 0.12 % solution, Swish and spit 15 mLs in mouth 2 times daily Twice a day after brushing teeth, Disp: , Rfl:       DULoxetine (CYMBALTA) 20 MG capsule, Take 20 mg by mouth daily, Disp: , Rfl:      ferrous sulfate 220 (44 Fe) MG/5ML ELIX, Take 330 mg by mouth 2 times daily 7.5ml bid, Disp: , Rfl:      guaiFENesin (ROBITUSSIN) 100 MG/5ML SYRP, Take 5-10 mLs by mouth every 6 hours as needed for cough, Disp: , Rfl:      levothyroxine (SYNTHROID/LEVOTHROID) 50 MCG tablet, Take 50 mcg by mouth every morning, Disp: , Rfl:      loperamide (IMODIUM) 2 MG capsule, Take 1 capsule (2 mg) by mouth 4 times daily as needed for diarrhea, Disp: 60 capsule, Rfl: 0     LORazepam (ATIVAN) 0.5 MG tablet, Take 0.5-1 mg by mouth 3 times daily 2 tablets qam and 1 tablet qafternoon and 2 tablets qpm, Disp: , Rfl:      melatonin 5 MG tablet, Take 5 mg by mouth At Bedtime, Disp: , Rfl:      metoprolol succinate ER (TOPROL-XL) 50 MG 24 hr tablet, Take 100 mg by mouth daily, Disp: , Rfl:      multivitamin, therapeutic (THERA-VIT) TABS tablet, Take 1 tablet by mouth daily, Disp: , Rfl:      nitroGLYcerin (NITROSTAT) 0.4 MG sublingual tablet, Place 0.4 mg under the tongue every 5 minutes as needed for chest pain For chest pain place 1 tablet under the tongue every 5 minutes for 3 doses. If symptoms persist 5 minutes after 1st dose call 911., Disp: , Rfl:      nystatin (MYCOSTATIN) 134028 UNIT/ML suspension, Swish and swallow 5 mLs (500,000 Units) in mouth 4 times daily as needed, Disp: , Rfl:      oxybutynin ER (DITROPAN XL) 5 MG 24 hr tablet, Take 1 tablet by mouth daily, Disp: , Rfl:      pantoprazole (PROTONIX) 40 MG EC tablet, Take 1 tablet (40 mg) by mouth every morning (before breakfast), Disp: 30 tablet, Rfl: 0     potassium chloride ER (K-TAB/KLOR-CON) 10 MEQ CR tablet, Take 10 mEq by mouth 2 times daily, Disp: , Rfl:      psyllium (METAMUCIL/KONSYL) Packet, Take 1 packet by mouth 2 times daily as needed for constipation, Disp: , Rfl:      sodium chloride 1 GM tablet, Take 2 g by mouth 3 times daily, Disp: , Rfl:      sucralfate (CARAFATE) 1 GM  tablet, Take 1 g by mouth 4 times daily, Disp: , Rfl:      vitamin B-12 (CYANOCOBALAMIN) 250 MCG tablet, Take 250 mcg by mouth daily, Disp: , Rfl:      Alcohol Swabs (B-D SINGLE USE SWABS REGULAR) PADS, USE UTD, Disp: , Rfl: 0     blood glucose monitoring (ONETOUCH VERIO) meter device kit, 2 times daily, Disp: , Rfl: 0     ONETOUCH VERIO IQ test strip, Use one stript to test your blood sugars twice daily, Disp: , Rfl: 2    Allergies   Allergen Reactions     Unknown [No Clinical Screening - See Comments]      Pt states she has a medication allergy but does not know what it is       Social History     Socioeconomic History     Marital status:      Spouse name: None     Number of children: None     Years of education: None     Highest education level: None   Tobacco Use     Smoking status: Never Smoker     Smokeless tobacco: Never Used   Substance and Sexual Activity     Alcohol use: No     Drug use: No     Sexual activity: Never       Family History   Problem Relation Age of Onset     Diabetes No family hx of      Cancer No family hx of      Heart Disease No family hx of        Review of Systems: The remainder of 10 point review of systems is negative except as noted in HPI above.     BP (!) 141/68   Pulse 82   Temp 97.9  F (36.6  C) (Oral)   Resp 18   Wt 43.1 kg (95 lb)   SpO2 99%   BMI 18.55 kg/m    No intake or output data in the 24 hours ending 10/05/22 1152  Physical Exam:   GENERAL: calm and comfortable, alert, sitting up on the commode  EYES: No scleral icterus, conjunctiva clear  ENT: Hearing normal, Oral mucosa moist  RESP: Clear to auscultation bilaterally with no respiratory distress, normal effort.  CV: RRR, no murmurs. No leg edema.    GI: Active BS, Soft, NT/ND  Musculoskeletal: Normal muscle bulk/ tone; No gross joint abnormalities  SKIN: No rash, warm/ dry  PSYCH:  Appropriate mood and affect  Visit completed with the assist of a professional phone     LABS:  Sodium (mmol/L)    Date Value   10/05/2022 118 (LL)   10/04/2022 120 (L)   08/05/2022 132 (L)   08/04/2022 135 (L)     Potassium (mmol/L)   Date Value   10/05/2022 4.4   10/04/2022 4.4   08/05/2022 4.2   08/04/2022 4.2   08/03/2022 3.6   08/02/2022 3.6     Chloride (mmol/L)   Date Value   10/05/2022 87 (L)   10/04/2022 83 (L)   08/05/2022 101   08/04/2022 106   08/03/2022 107   08/02/2022 103     Carbon Dioxide (CO2) (mmol/L)   Date Value   10/05/2022 25   10/04/2022 29   08/05/2022 22   08/04/2022 23   08/03/2022 21 (L)   08/02/2022 20 (L)     Urea Nitrogen (mg/dL)   Date Value   10/05/2022 7.4 (L)   10/04/2022 7.8 (L)   08/05/2022 17   08/04/2022 24   08/03/2022 50 (H)   08/02/2022 61 (H)     Creatinine (mg/dL)   Date Value   10/05/2022 0.53   10/04/2022 0.55   08/05/2022 0.73   08/04/2022 0.81     Phosphorus (mg/dL)   Date Value   01/16/2018 2.8     Hemoglobin A1C (%)   Date Value   02/15/2022 5.7 (H)     Hemoglobin (g/dL)   Date Value   10/05/2022 8.5 (L)   10/04/2022 8.8 (L)   08/05/2022 9.3 (L)   08/04/2022 8.9 (L)          All lab data was reviewed at 11:52 AM

## 2022-10-05 NOTE — ED PROVIDER NOTES
EMERGENCY DEPARTMENT ENCOUNTER      NAME: Chris Bell  AGE: 87 year old female  YOB: 1935  MRN: [id number]  EVALUATION DATE & TIME: 10/4/2022 11:00 PM    PCP: Kizzy Villanueva    ED PROVIDER: Diamond Roblero M.D.      Chief Complaint   Patient presents with     Abdominal Pain         FINAL IMPRESSION:  1. Hyponatremia    2. Abdominal pain, generalized          ED COURSE & MEDICAL DECISION MAKING:    ED Course as of 10/05/22 0318   Tue Oct 04, 2022   2329 Pt with atyipcal new 2 days RLQ pain, has chronic anemia with hemoglobin 8.8 and normal around that range, but new onset hyponatremia thus urine osm and urine sodium, serum osm and UA pending with CT abdomen, patient given morphine and amenable to plan   Wed Oct 05, 2022   0019 Patient with thickening of stomach to suggest gastritis and with h/o gastritis with midabdominal pain. However, given hyponatremia, will admit for SIADH workup, hospitalist paged for admission and IVF held pending hositalist recommendation   0030 Pt endorsed to hospitalist to med surg full admit       11:19 PM I met with the patient, obtained history, performed an initial exam, and discussed options and plan for diagnostics and treatment here in the ED.  12:28 AM Consulted with Hospitalist, Dr. Hatfield     Critical Care time was 35 minutes for this patient excluding procedures.      Pertinent Labs & Imaging studies reviewed. (See chart for details)    N95 worn  A face shield was worn also  COVID PPE      At the conclusion of the encounter I discussed the results of all of the tests and the disposition. The questions were answered. The patient or family acknowledged understanding and was agreeable with the care plan.       MEDICATIONS GIVEN IN THE EMERGENCY:  Medications   sodium chloride 0.9% infusion ( Intravenous New Bag 10/5/22 0152)   lidocaine 1 % 0.1-1 mL (has no administration in time range)   lidocaine (LMX4) cream (has no administration in time range)   sodium  chloride (PF) 0.9% PF flush 3 mL (3 mLs Intracatheter Given 10/5/22 0152)   sodium chloride (PF) 0.9% PF flush 3 mL (has no administration in time range)   acetaminophen (TYLENOL) tablet 650 mg (650 mg Oral Given 10/5/22 0237)     Or   acetaminophen (TYLENOL) Suppository 650 mg ( Rectal See Alternative 10/5/22 0237)   melatonin tablet 1 mg (has no administration in time range)   senna-docusate (SENOKOT-S/PERICOLACE) 8.6-50 MG per tablet 1 tablet (has no administration in time range)     Or   senna-docusate (SENOKOT-S/PERICOLACE) 8.6-50 MG per tablet 2 tablet (has no administration in time range)   polyethylene glycol (MIRALAX) Packet 17 g (has no administration in time range)   bisacodyl (DULCOLAX) suppository 10 mg (has no administration in time range)   ondansetron (ZOFRAN ODT) ODT tab 4 mg (has no administration in time range)     Or   ondansetron (ZOFRAN) injection 4 mg (has no administration in time range)   pantoprazole (PROTONIX) EC tablet 40 mg (40 mg Oral Given 10/5/22 0237)   morphine (PF) injection 2 mg (2 mg Intravenous Given 10/4/22 2339)   iopamidol (ISOVUE-370) solution 95 mL (95 mLs Intravenous Given 10/4/22 2345)       NEW PRESCRIPTIONS STARTED AT TODAY'S ER VISIT  New Prescriptions    No medications on file          =================================================================    HPI      Chris Bell is a 87 year old female with PMHx of hypertension and CHF who presents to the ED today via EMS with abdominal pain.      Used: Language Hmong    Patient endorses 2 days of RLQ abdominal pain. Patient cannot describe or quantify the pain, just stating its severe. Patient denies fever, vomiting, nausea, diarrhea, hematuria, or any other complaints at this time.       REVIEW OF SYSTEMS   All other systems reviewed and are negative except as noted above in HPI.    PAST MEDICAL HISTORY:  Past Medical History:   Diagnosis Date     Anemia      Depression      Disease of thyroid gland      HTN  (hypertension)      Osteoporosis      PUD (peptic ulcer disease)        PAST SURGICAL HISTORY:  Past Surgical History:   Procedure Laterality Date     COLONOSCOPY N/A 7/3/2022    Procedure: COLONOSCOPY WITH COLON BIOPSIES;  Surgeon: Marty Patel MD;  Location: [facility] OR     ESOPHAGOSCOPY, GASTROSCOPY, DUODENOSCOPY (EGD), COMBINED N/A 1/17/2018    Procedure: ESOPHAGOGASTRODUODENOSCOPY (EGD) with h.pylori and gastric ulcer biopsies;  Surgeon: Colin Alvarez MD;  Location: [facility];  Service:      ESOPHAGOSCOPY, GASTROSCOPY, DUODENOSCOPY (EGD), COMBINED N/A 7/3/2022    Procedure: ESOPHAGOGASTRODUODENOSCOPY (EGD) WITH DUODENAL & GASTRIC BIOPSIES;  Surgeon: Marty Patel MD;  Location: [facility] STATISTIC PICC LINE INSERTION >5YR, FAILED  8/2/2022          HYSTERECTOMY       PICC TRIPLE LUMEN PLACEMENT  8/2/2022          US BIOPSY FINE NEEDLE ASPIRATION LYMPH NODE  8/14/2019       CURRENT MEDICATIONS:    acetaminophen (TYLENOL) 500 MG tablet  Alcohol Swabs (B-D SINGLE USE SWABS REGULAR) PADS  aMILoride (MIDAMOR) 5 MG tablet  blood glucose monitoring (ONETOUCH VERIO) meter device kit  bumetanide (BUMEX) 0.5 MG tablet  cetirizine (ZYRTEC) 5 MG tablet  levothyroxine (SYNTHROID/LEVOTHROID) 50 MCG tablet  loperamide (IMODIUM) 2 MG capsule  LORazepam (ATIVAN) 0.5 MG tablet  metoprolol succinate ER (TOPROL-XL) 50 MG 24 hr tablet  nystatin (MYCOSTATIN) 734544 UNIT/ML suspension  ONETOUCH VERIO IQ test strip  oxybutynin ER (DITROPAN XL) 5 MG 24 hr tablet  pantoprazole (PROTONIX) 40 MG EC tablet  psyllium (METAMUCIL/KONSYL) Packet        ALLERGIES:  Allergies   Allergen Reactions     Unknown [No Clinical Screening - See Comments]      Pt states she has a medication allergy but does not know what it is       FAMILY HISTORY:  Family History   Problem Relation Age of Onset     Diabetes No family hx of      Cancer No family hx of      Heart Disease No family hx of        SOCIAL HISTORY:    Social History     Socioeconomic History     Marital status:      Spouse name: None     Number of children: None     Years of education: None     Highest education level: None   Tobacco Use     Smoking status: Never Smoker     Smokeless tobacco: Never Used   Substance and Sexual Activity     Alcohol use: No     Drug use: No     Sexual activity: Never       VITALS:  Patient Vitals for the past 24 hrs:   BP Temp Temp src Pulse Resp SpO2 Weight   10/05/22 0300 (!) 147/68 -- -- -- -- 100 % --   10/05/22 0200 (!) 148/62 -- -- 68 -- 96 % --   10/04/22 2308 (!) 171/70 97.9  F (36.6  C) Oral 70 20 100 % --   10/04/22 2039 (!) 152/88 97.6  F (36.4  C) Temporal 78 18 98 % 43.1 kg (95 lb)       PHYSICAL EXAM    GENERAL: Awake, alert.  In no acute distress.   HEENT: Normocephalic, atraumatic.  Pupils equal, round and reactive.  Conjunctiva normal.  EOMI.  NECK: No stridor or apparent deformity.  PULMONARY: Symmetrical breath sounds without distress.  Lungs clear to auscultation bilaterally without wheezes, rhonchi or rales.  CARDIO: Regular rate and rhythm.  No significant murmur, rub or gallop.  Radial pulses strong and symmetrical.  ABDOMINAL: Diffused mid abdominal pain and RLQ tenderness. Abdomen soft, non-distended.   No CVAT, no palpable hepatosplenomegaly.  EXTREMITIES: No lower extremity swelling or edema.    NEURO: Alert and oriented to person, place and time.  Cranial nerves grossly intact.  No focal motor deficit.  PSYCH: Normal mood and affect  SKIN: No rashes      LAB:  All pertinent labs reviewed and interpreted.  Results for orders placed or performed during the hospital encounter of 10/04/22   CT Abdomen Pelvis w Contrast    Impression    IMPRESSION:   1.  Severe wall thickening and mucosal hyperenhancement involving the gastric pylorus and antrum, suspicious for gastritis or underlying peptic ulcer disease. No evidence for perforation.    2.  Mild luminal narrowing of the first portion of the duodenum  with mild gastric distention, possibly suggesting partial gastric outlet obstruction.   Basic metabolic panel   Result Value Ref Range    Sodium 120 (L) 136 - 145 mmol/L    Potassium 4.4 3.4 - 5.3 mmol/L    Chloride 83 (L) 98 - 107 mmol/L    Carbon Dioxide (CO2) 29 22 - 29 mmol/L    Anion Gap 8 7 - 15 mmol/L    Urea Nitrogen 7.8 (L) 8.0 - 23.0 mg/dL    Creatinine 0.55 0.51 - 0.95 mg/dL    Calcium 9.3 8.8 - 10.2 mg/dL    Glucose 107 (H) 70 - 99 mg/dL    GFR Estimate 88 >60 mL/min/1.73m2   CBC with platelets and differential   Result Value Ref Range    WBC Count 9.1 4.0 - 11.0 10e3/uL    RBC Count 2.47 (L) 3.80 - 5.20 10e6/uL    Hemoglobin 8.8 (L) 11.7 - 15.7 g/dL    Hematocrit 25.6 (L) 35.0 - 47.0 %     (H) 78 - 100 fL    MCH 35.6 (H) 26.5 - 33.0 pg    MCHC 34.4 31.5 - 36.5 g/dL    RDW 18.5 (H) 10.0 - 15.0 %    Platelet Count 273 150 - 450 10e3/uL    % Neutrophils 61 %    % Lymphocytes 18 %    % Monocytes 17 %    % Eosinophils 2 %    % Basophils 0 %    % Immature Granulocytes 2 %    NRBCs per 100 WBC 0 <1 /100    Absolute Neutrophils 5.7 1.6 - 8.3 10e3/uL    Absolute Lymphocytes 1.6 0.8 - 5.3 10e3/uL    Absolute Monocytes 1.6 (H) 0.0 - 1.3 10e3/uL    Absolute Eosinophils 0.2 0.0 - 0.7 10e3/uL    Absolute Basophils 0.0 0.0 - 0.2 10e3/uL    Absolute Immature Granulocytes 0.2 <=0.4 10e3/uL    Absolute NRBCs 0.0 10e3/uL   Extra Blue Top Tube   Result Value Ref Range    Hold Specimen JIC    Extra Red Top Tube   Result Value Ref Range    Hold Specimen JIC    UA with Microscopic reflex to Culture    Specimen: Urine, Straight Catheter   Result Value Ref Range    Color Urine Light Yellow Colorless, Straw, Light Yellow, Yellow    Appearance Urine Clear Clear    Glucose Urine Negative Negative mg/dL    Bilirubin Urine Negative Negative    Ketones Urine Negative Negative mg/dL    Specific Gravity Urine 1.021 1.001 - 1.030    Blood Urine Negative Negative    pH Urine 7.5 (H) 5.0 - 7.0    Protein Albumin Urine Negative  Negative mg/dL    Urobilinogen Urine <2.0 <2.0 mg/dL    Nitrite Urine Negative Negative    Leukocyte Esterase Urine Negative Negative    RBC Urine 0 <=2 /HPF    WBC Urine <1 <=5 /HPF    Hyaline Casts Urine 1 <=2 /LPF   Sodium random urine   Result Value Ref Range    Sodium Urine mmol/L 87 mmol/L   Asymptomatic COVID-19 Virus (Coronavirus) by PCR Nasopharyngeal    Specimen: Nasopharyngeal; Swab   Result Value Ref Range    SARS CoV2 PCR Positive (A) Negative       RADIOLOGY:  Reviewed all pertinent imaging. Please see official radiology report.  CT Abdomen Pelvis w Contrast   Final Result   IMPRESSION:    1.  Severe wall thickening and mucosal hyperenhancement involving the gastric pylorus and antrum, suspicious for gastritis or underlying peptic ulcer disease. No evidence for perforation.      2.  Mild luminal narrowing of the first portion of the duodenum with mild gastric distention, possibly suggesting partial gastric outlet obstruction.                I, Diamante Adair, am serving as a scribe to document services personally performed by Dr. Diamond Roblero based on my observation and the provider's statements to me. I, Diamond Roblero MD attest that Diamante Adair is acting in a scribe capacity, has observed my performance of the services and has documented them in accordance with my direction.     Diamond Roblero MD  10/05/22 1933

## 2022-10-05 NOTE — ED NOTES
Assisted pt. Up to the commode and then back into bed and adjusted her in bed per her request and took her socks off for her per her request and also adjusted her pillow per her request. Using  line to communicate.

## 2022-10-05 NOTE — CONSULTS
[facility] DIGESTIVE HEALTH CONSULTATION    Chris Bell   [place] [address]  [address] [facility] [place] [address]  87 year old female  Admission Date/Time: 10/4/2022 11:00 PM    Primary Care Provider:  Kizzy Villanueva    Requesting Physician: Bill Burdick MD      CHIEF COMPLAINT:   Abd pain    REASON FOR THE CONSULT:  Abd pain, abnormal CT    HPI:     This is an 86 yo woman.recently seen by [facility]  EGD and colon in 7/22  Bx normal  Now admitted with more pain.  Hyponatremia, ongoing anemia.  CT now with more thickening of stomach and ? GOO    Pt tells me that she has heartburn.  She had noted more early satiety.  Some nausea.  She claims weight loss, unclear how much?  Her stomach hurts in the mid abdomen, but can be in many Sites.  No fevers or chills.  She denies use of NSAIDs     Currently she denies chest pain, sob, cough.  She had covid in Aug.  No new urinary or joint complaints.  No skin rashes.       REVIEW OF SYSTEMS: 13 point review of systems is negative except that noted above.    PAST MEDICAL HISTORY:   Past Medical History:   Diagnosis Date     Anemia      Depression      Disease of thyroid gland      HTN (hypertension)      Osteoporosis      PUD (peptic ulcer disease)        PAST SURGICAL HISTORY:   Past Surgical History:   Procedure Laterality Date     COLONOSCOPY N/A 7/3/2022    Procedure: COLONOSCOPY WITH COLON BIOPSIES;  Surgeon: Marty Patel MD;  Location: [facility] [place] OR     ESOPHAGOSCOPY, GASTROSCOPY, DUODENOSCOPY (EGD), COMBINED N/A 1/17/2018    Procedure: ESOPHAGOGASTRODUODENOSCOPY (EGD) with h.pylori and gastric ulcer biopsies;  Surgeon: Colin Alvarez MD;  Location: [facility];  Service:      ESOPHAGOSCOPY, GASTROSCOPY, DUODENOSCOPY (EGD), COMBINED N/A 7/3/2022    Procedure: ESOPHAGOGASTRODUODENOSCOPY (EGD) WITH DUODENAL & GASTRIC BIOPSIES;  Surgeon: Marty Patel MD;  Location: [facility] [place] OR      STATISTIC PICC LINE INSERTION >5YR, FAILED  8/2/2022           HYSTERECTOMY       PICC TRIPLE LUMEN PLACEMENT  8/2/2022           BIOPSY FINE NEEDLE ASPIRATION LYMPH NODE  8/14/2019       FAMILY HISTORY:   Family History   Problem Relation Age of Onset     Diabetes No family hx of      Cancer No family hx of      Heart Disease No family hx of        SOCIAL HISTORY:   Social History     Tobacco Use     Smoking status: Never Smoker     Smokeless tobacco: Never Used   Substance Use Topics     Alcohol use: No        MEDS:  (Not in a hospital admission)      ALLERGIES/SENSITIVITIES: Unknown [no clinical screening - see comments]    MEDICATIONS:  Current Outpatient Medications   Medication Sig Dispense Refill     acetaminophen (TYLENOL) 500 MG tablet Take 500 mg by mouth every 6 hours as needed for mild pain       alendronate (FOSAMAX) 70 MG tablet Take 70 mg by mouth every 7 days       aMILoride (MIDAMOR) 5 MG tablet Take 1 tablet (5 mg) by mouth daily 30 tablet 1     bumetanide (BUMEX) 0.5 MG tablet Take 1 tablet (0.5 mg) by mouth daily 30 tablet 1     calcium carbonate (TUMS) 500 MG chewable tablet Take 1 chew tab by mouth 4 times daily as needed for heartburn       calcium carbonate-vitamin D (OSCAL W/D) 500-200 MG-UNIT tablet Take 1 tablet by mouth 2 times daily       cetirizine (ZYRTEC) 5 MG tablet Take 5 mg by mouth daily       chlorhexidine (CHLORHEXIDINE) 0.12 % solution Swish and spit 15 mLs in mouth 2 times daily Twice a day after brushing teeth       DULoxetine (CYMBALTA) 20 MG capsule Take 20 mg by mouth daily       ferrous sulfate 220 (44 Fe) MG/5ML ELIX Take 330 mg by mouth 2 times daily 7.5ml bid       guaiFENesin (ROBITUSSIN) 100 MG/5ML SYRP Take 5-10 mLs by mouth every 6 hours as needed for cough       levothyroxine (SYNTHROID/LEVOTHROID) 50 MCG tablet Take 50 mcg by mouth every morning       loperamide (IMODIUM) 2 MG capsule Take 1 capsule (2 mg) by mouth 4 times daily as needed for diarrhea 60 capsule 0     LORazepam (ATIVAN) 0.5 MG tablet Take 0.5-1 mg by mouth  3 times daily 2 tablets qam and 1 tablet qafternoon and 2 tablets qpm       melatonin 5 MG tablet Take 5 mg by mouth At Bedtime       metoprolol succinate ER (TOPROL-XL) 50 MG 24 hr tablet Take 100 mg by mouth daily       multivitamin, therapeutic (THERA-VIT) TABS tablet Take 1 tablet by mouth daily       nitroGLYcerin (NITROSTAT) 0.4 MG sublingual tablet Place 0.4 mg under the tongue every 5 minutes as needed for chest pain For chest pain place 1 tablet under the tongue every 5 minutes for 3 doses. If symptoms persist 5 minutes after 1st dose call 911.       nystatin (MYCOSTATIN) 552428 UNIT/ML suspension Swish and swallow 5 mLs (500,000 Units) in mouth 4 times daily as needed       oxybutynin ER (DITROPAN XL) 5 MG 24 hr tablet Take 1 tablet by mouth daily       pantoprazole (PROTONIX) 40 MG EC tablet Take 1 tablet (40 mg) by mouth every morning (before breakfast) 30 tablet 0     potassium chloride ER (K-TAB/KLOR-CON) 10 MEQ CR tablet Take 10 mEq by mouth 2 times daily       psyllium (METAMUCIL/KONSYL) Packet Take 1 packet by mouth 2 times daily as needed for constipation       sodium chloride 1 GM tablet Take 2 g by mouth 3 times daily       sucralfate (CARAFATE) 1 GM tablet Take 1 g by mouth 4 times daily       vitamin B-12 (CYANOCOBALAMIN) 250 MCG tablet Take 250 mcg by mouth daily       Alcohol Swabs (B-D SINGLE USE SWABS REGULAR) PADS USE UTD  0     blood glucose monitoring (ONETOUCH VERIO) meter device kit 2 times daily  0     ONETOUCH VERIO IQ test strip Use one stript to test your blood sugars twice daily  2       PHYSICAL EXAM:  Temp:  [97.6  F (36.4  C)-97.9  F (36.6  C)] 97.9  F (36.6  C)  Pulse:  [68-82] 82  Resp:  [18-20] 18  BP: (141-171)/(62-88) 141/68  SpO2:  [96 %-100 %] 99 %  Body mass index is 18.55 kg/m .  GEN: Well developed, well nourished 87 year old in no acute distress.  HEENT: sclera anicteric, moist mucous membranes.   LYMPH: No cervical lymphadenopathy  PULM: Nonlabored breathing. Breath  sounds equal.   CARDIO: Regular rate  GI: Non-distended. Soft.  Non-tender to palpation.  No guarding. No rebound tenderness.  EXT: warm, no lower extremity edema  NEURO: Alert. No focal defects.   PSYCH: Mental status appropriate, mood and affect normal.    SKIN: No rashes  MSK: No joint abnormalities    LABORATORY DATA:  CBC RESULTS:   Recent Labs   Lab Test 10/05/22  0831 10/04/22  2148   WBC  --  9.1   RBC  --  2.47*   HGB 8.5* 8.8*   HCT  --  25.6*   MCV  --  104*   MCH  --  35.6*   MCHC  --  34.4   RDW  --  18.5*   PLT  --  273        CMP Results:   Recent Labs   Lab Test 10/05/22  0831 08/03/22  0618 08/02/22  1632   *   < > 133*   POTASSIUM 4.4   < > 3.6   CHLORIDE 87*   < > 103   CO2 25   < > 20*   ANIONGAP 6*   < > 10   *   < > 167*   BUN 7.4*   < > 61*   CR 0.53   < > 1.01   BILITOTAL  --   --  0.3   ALKPHOS  --   --  77   ALT  --   --  <9   AST  --   --  12    < > = values in this interval not displayed.        INR Results:   Recent Labs   Lab Test 07/28/22  1046   INR 1.08          RELEVANT IMAGING:      CT  Thickened distal stomach, ? Duodenal narrowing    ASSESSMENT:     1.  Abd pain  2.  Abnormal CT  3.  Anemia    PLAN:    Will plan egd with bx and eval as able. Sodium level very low but chronically low ? Pud, malignancy etc. Unusual that EGD was normal 3 months ago.    PPI and supportive cares at present    Will follow    Approximately 38 minutes of total time was spent providing patient care, including patient evaluation, reviewing documentation/test results and .           Ximena Ho MD  Thank you for the opportunity to participate in the care of this patient.   Please feel free to call me with any questions or concerns.  Phone number [phone].            CC: [facility] Kathleen

## 2022-10-05 NOTE — SIGNIFICANT EVENT
Significant Event Note    Time of event: 9:33 AM October 5, 2022    Description of event:  Updated by nurse to repeat serum this morning is 118.  Stop IV hydration  Nursing to update nephrology as soon as possible    Plan:  Above    Discussed with: bedside nurse    Bill Burdick MD

## 2022-10-05 NOTE — PROGRESS NOTES
VS stable. Pt was unhappy due to Npo and staff   Explained her with  but she has been requesting food. Pt refused medication due to pt  Stated that I am not taking any medication with empty stomach.  Unable to redirect her. Pt went to Gi lab. She had BM.

## 2022-10-05 NOTE — ED TRIAGE NOTES
Pt here with abdominal pain that she has had intermittently for months. Pt is attributing it to tylenol that she has been taking for a long time. She takes about 750 mg two times daily. The pain has not gotten worse and different today but is worried that something more is wrong and wants to see if we have some medicine for it.      Triage Assessment     Row Name 10/04/22 2041       Triage Assessment (Adult)    Airway WDL WDL       Respiratory WDL    Respiratory WDL WDL       Skin Circulation/Temperature WDL    Skin Circulation/Temperature WDL WDL       Cardiac WDL    Cardiac WDL WDL       Peripheral/Neurovascular WDL    Peripheral Neurovascular WDL WDL       Cognitive/Neuro/Behavioral WDL    Cognitive/Neuro/Behavioral WDL WDL

## 2022-10-05 NOTE — PROGRESS NOTES
PRE PROCEDURE NOTE      Chief Complaint/Reason for Procedure:             Abnormal CT      History and Physical Reviewed:   Reviewed no changes               Pre-sedation assessment:            General appearance: alert, appears stated age and cooperative  Neck: no adenopathy, no carotid bruit, no JVD, supple, symmetrical, trachea midline and thyroid not enlarged, symmetric, no tenderness/mass/nodules  Lungs: clear to auscultation bilaterally  Heart: S1, S2 normal, no murmur, click, rub or gallop, regular rate and rhythm, chest is clear without rales or wheezing, no pedal edema, no JVD, no hepatosplenomegaly  Abdomen: soft, non-tender; bowel sounds normal; no masses,  no organomegaly      Previous reaction to anesthesia/sedation:  [unfilled]      Sedation Plan based on assessment: Moderate      Comments:       ASA Classification: 3      Impression:  Patient deemed adequate candidate for conscious sedation         Plan:  EGD      Ximena Ho MD, MD  10/5/2022 12:53 PM  [facility]

## 2022-10-05 NOTE — ED NOTES
Assisted pt. Up out of recliner and back into bed. She then requested food again and through  was told she still will not be able to eat for awhile.

## 2022-10-05 NOTE — PHARMACY-ADMISSION MEDICATION HISTORY
Pharmacy Note - Admission Medication History    Pertinent Provider Information: stays to foster home, who handles her meds. ([phone])     ______________________________________________________________________    Prior To Admission (PTA) med list completed and updated in EMR.       PTA Med List   Medication Sig Note Last Dose     acetaminophen (TYLENOL) 500 MG tablet Take 500 mg by mouth every 6 hours as needed for mild pain  10/4/2022     alendronate (FOSAMAX) 70 MG tablet Take 70 mg by mouth every 7 days 10/5/2022: 10/5/22 has been refusing- says does not need it at age refusing     aMILoride (MIDAMOR) 5 MG tablet Take 1 tablet (5 mg) by mouth daily  10/4/2022 at am     bumetanide (BUMEX) 0.5 MG tablet Take 1 tablet (0.5 mg) by mouth daily  10/4/2022 at am     calcium carbonate (TUMS) 500 MG chewable tablet Take 1 chew tab by mouth 4 times daily as needed for heartburn  Unknown at Unknown time     calcium carbonate-vitamin D (OSCAL W/D) 500-200 MG-UNIT tablet Take 1 tablet by mouth 2 times daily  10/4/2022 at pm     cetirizine (ZYRTEC) 5 MG tablet Take 5 mg by mouth daily  10/4/2022 at am     chlorhexidine (CHLORHEXIDINE) 0.12 % solution Swish and spit 15 mLs in mouth 2 times daily Twice a day after brushing teeth  10/4/2022 at pm     DULoxetine (CYMBALTA) 20 MG capsule Take 20 mg by mouth daily  10/4/2022 at am     ferrous sulfate 220 (44 Fe) MG/5ML ELIX Take 330 mg by mouth 2 times daily 7.5ml bid  10/4/2022 at pm     guaiFENesin (ROBITUSSIN) 100 MG/5ML SYRP Take 5-10 mLs by mouth every 6 hours as needed for cough  not recently     levothyroxine (SYNTHROID/LEVOTHROID) 50 MCG tablet Take 50 mcg by mouth every morning  10/4/2022 at am     loperamide (IMODIUM) 2 MG capsule Take 1 capsule (2 mg) by mouth 4 times daily as needed for diarrhea  not recently     LORazepam (ATIVAN) 0.5 MG tablet Take 0.5-1 mg by mouth 3 times daily 2 tablets qam and 1 tablet qafternoon and 2 tablets qpm  10/4/2022 at pm     melatonin  5 MG tablet Take 5 mg by mouth At Bedtime  10/3/2022 at pm     metoprolol succinate ER (TOPROL-XL) 50 MG 24 hr tablet Take 100 mg by mouth daily  10/4/2022 at am     multivitamin, therapeutic (THERA-VIT) TABS tablet Take 1 tablet by mouth daily  10/4/2022 at am     nitroGLYcerin (NITROSTAT) 0.4 MG sublingual tablet Place 0.4 mg under the tongue every 5 minutes as needed for chest pain For chest pain place 1 tablet under the tongue every 5 minutes for 3 doses. If symptoms persist 5 minutes after 1st dose call 911.  not recently     nystatin (MYCOSTATIN) 396138 UNIT/ML suspension Swish and swallow 5 mLs (500,000 Units) in mouth 4 times daily as needed 10/5/2022: Usually takes a dose at beditme 10/4/2022 at pm     oxybutynin ER (DITROPAN XL) 5 MG 24 hr tablet Take 1 tablet by mouth daily  10/4/2022 at am     pantoprazole (PROTONIX) 40 MG EC tablet Take 1 tablet (40 mg) by mouth every morning (before breakfast)  10/4/2022 at am     potassium chloride ER (K-TAB/KLOR-CON) 10 MEQ CR tablet Take 10 mEq by mouth 2 times daily  10/4/2022 at pm     psyllium (METAMUCIL/KONSYL) Packet Take 1 packet by mouth 2 times daily as needed for constipation  not recently     sodium chloride 1 GM tablet Take 2 g by mouth 3 times daily  10/4/2022 at pm     sucralfate (CARAFATE) 1 GM tablet Take 1 g by mouth 4 times daily  10/4/2022 at x2     vitamin B-12 (CYANOCOBALAMIN) 250 MCG tablet Take 250 mcg by mouth daily  10/4/2022 at am       Information source(s): Patient, Caregiver, Clinic records, Facility ([facility]/) medication list/MAR and CareEverywhere/SureScripts  Method of interview communication: in-person and phone    Summary of Changes to PTA Med List  New: alendronate, calcium with vit d, prn tums, chlorhexidine rinse, vit b12, melatonin, duloxetine, iron, mvi, kcl, Nacl tabs, carafate, prn guaifenesin, NTG sl,   Discontinued: n/a  Changed: n/a    Patient was asked about OTC/herbal products specifically.  PTA med list reflects  this.    In the past week, patient estimated taking medication this percent of the time:  greater than 90%.    Allergies were reviewed, assessed, and updated with the patient.      Patient does not use any multi-dose medications prior to admission.    The information provided in this note is only as accurate as the sources available at the time of the update(s).    Thank you for the opportunity to participate in the care of this patient.    Ministerio Miner [facility]  10/5/2022 10:49 AM

## 2022-10-05 NOTE — ANESTHESIA CARE TRANSFER NOTE
Patient: Chris Bell    Procedure: Procedure(s):  ESOPHAGOGASTRODUODENOSCOPY (EGD) WITH BIOPSIES       Diagnosis: Abdominal pain, generalized [R10.84]  Anemia due to GI blood loss [D50.0]  Diagnosis Additional Information: No value filed.    Anesthesia Type:   MAC     Note:      Level of Consciousness: awake  Oxygen Supplementation: room air    Independent Airway: airway patency satisfactory and stable  Dentition: dentition unchanged  Vital Signs Stable: post-procedure vital signs reviewed and stable  Report to RN Given: handoff report given  Patient transferred to: Medical/Surgical Unit    Handoff Report: Identifed the Patient, Identified the Reponsible Provider, Reviewed the pertinent medical history, Discussed the surgical course, Reviewed Intra-OP anesthesia mangement and issues during anesthesia, Set expectations for post-procedure period and Allowed opportunity for questions and acknowledgement of understanding      Vitals:  Vitals Value Taken Time   /72    Temp 36.8    Pulse 64    Resp 16    SpO2 100        Electronically Signed By: MARYBETH Singh CRNA  October 5, 2022  4:31 PM

## 2022-10-06 NOTE — PLAN OF CARE
Problem: Risk for Delirium  Goal: Optimal Coping  Outcome: Ongoing, Progressing  Goal: Improved Behavioral Control  Outcome: Ongoing, Progressing  Goal: Improved Attention and Thought Clarity  Outcome: Ongoing, Progressing  Goal: Improved Sleep  Outcome: Ongoing, Progressing   Pt has been alert but disoriented to time, place and situation.  Problem: Hypertension Comorbidity  Goal: Blood Pressure in Desired Range  Outcome: Ongoing, Progressing  Intervention: Maintain Blood Pressure Management  Recent Flowsheet Documentation  Taken 10/5/2022 1800 by Chhaya Sánchez RN  Medication Review/Management: medications reviewed   Purewick intact with good urine output.  Problem: Electrolyte Imbalance  Goal: Electrolyte Balance  Outcome: Ongoing, Not Progressing  On Na monitoring. Informed MD regarding critical lab.  Problem: Pain Chronic (Persistent) (Comorbidity Management)  Goal: Acceptable Pain Control and Functional Ability  Outcome: Ongoing, Progressing  Intervention: Develop Pain Management Plan  Recent Flowsheet Documentation  Taken 10/5/2022 1857 by Chhaya Sánchez RN  Pain Management Interventions: medication (see MAR)  Intervention: Manage Persistent Pain  Recent Flowsheet Documentation  Taken 10/5/2022 1800 by Chhaya Sánchez RN  Medication Review/Management: medications reviewed   Back pain managed with TY prn with good relief.

## 2022-10-06 NOTE — PLAN OF CARE
Problem: Electrolyte Imbalance  Goal: Electrolyte Balance  Outcome: Ongoing, Progressing     Na improved from yesterday; maintaining fluid restriction and administering NaCl tabs as ordered.  Diuresing from IV furosemide given this morning.      Problem: Pain Chronic (Persistent) (Comorbidity Management)  Goal: Acceptable Pain Control and Functional Ability  Outcome: Ongoing, Progressing  Intervention: Develop Pain Management Plan  Recent Flowsheet Documentation  Taken 10/6/2022 0800 by Susie Aldridge RN  Pain Management Interventions:   emotional support   food  Intervention: Manage Persistent Pain  Recent Flowsheet Documentation  Taken 10/6/2022 0800 by Susie Aldridge RN  Medication Review/Management: medications reviewed  Intervention: Optimize Psychosocial Wellbeing  Recent Flowsheet Documentation  Taken 10/6/2022 0800 by Susie Aldridge RN  Supportive Measures:   active listening utilized   problem-solving facilitated   verbalization of feelings encouraged   Per [facility]  this morning, Pt was rating epigastric discomfort 9/10.  After eating 100% of breakfast and taking carafate, she fell asleep and did not complain further of discomfort at lunch time.

## 2022-10-06 NOTE — PROGRESS NOTES
Progress Note    Date of admission: 10/04/2022    Assessment/Plan  Chris Bell is a 87 year old old female presented with abdominal pain a few days duration.  Work-up showing possible gastritis.  Low sodium levels noted.     Abdominal pain  - CT showing features suggestive of gastritis and partial obstruction.    - S/p upper EGD on 10/05 .  Biopsy taken.  - Continue oral PPI.  - Follow-up biopsy results  - Full liquid diet. 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   - Sodium today is 123 up from 119 yesterday.  Patient has a history of SIADH.  - Nephrology consult appreciated.  -Neurology recommending to stop Cymbalta if possible.  -Fluid restriction to 1200 mm in 24 hours.  -Decrease frequency of Cymbalta administration every 48 hours.  Monitor for withdrawal symptoms.    Chronic anemia   - Stable hemoglobin, follow serial hemoglobins closely     History of CHF   - Compensated currently, continue home meds parameters placed     Hypertension   - Stable continue home meds parameters placed     Osteoporosis  -CT abdomen with skeletal windows showing chronic thoracic/lumbar compression fractures.  Continue current analgesia.     Positive COVID test  -Did have COVID few months ago as well so likely COVID recovered.       DVT PPX : SCD    Barriers to discharge: hyponatremia    Anticipated Discharge date  : 2-3 days    Subjective  Professional discoapiong  was used via telephone.  Denies having any pain.  Management plan discussed with the patient and her son over the phone. both expressed understanding.    Objective  Vital signs in last 24 hours  Temp:  [97.6  F (36.4  C)-98.7  F (37.1  C)] 98.1  F (36.7  C)  Pulse:  [] 106  Resp:  [16-28] 18  BP: (129-173)/(63-90) 129/63  SpO2:  [94 %-100 %] 95 %    Input and Output in 24 hrs     Intake/Output Summary (Last 24 hours) at 10/6/2022 1340  Last data filed at 10/6/2022  0900  Gross per 24 hour   Intake 710 ml   Output 1453 ml   Net -743 ml       Physical Exam:  GEN: Alert and oriented. Not in acute distress  HEENT: Mucous membrane- moist and pink  CHEST: Clear to auscultation bilaterally  HEART: S1S2   ABDOMEN: Soft. Non-tender, non-distended. No organomegaly. No guarding or rigidity. Bowel sounds- active  Extremities: No pedal oedema  CNS: No focal neurological deficit. No involuntary movements  SKIN: No skin rash, no cyanosis or clubbing      Pertinent Labs   Lab Results: Personally Reviewed    Recent Results (from the past 24 hour(s))   UPPER GI ENDOSCOPY    Collection Time: 10/05/22  3:35 PM   Result Value Ref Range    Upper GI Endoscopy       [facility]  [address] [place] [address]  _______________________________________________________________________________  Patient Name: Chris Bell               Procedure Date: 10/5/2022 3:35 PM  MRN: [id number]                       Account Number: [id number]  YOB: 1935               Admit Type: Inpatient  Age: 87                               Room: [place] [address]  Note Status: Finalized                Attending MD: CODEY ADAMS ,   Instrument Name: EGD Scope 0211         _______________________________________________________________________________     Procedure:             Upper GI endoscopy  Indications:           Abnormal CT of the GI tract  Providers:             CODEY ADAMS  Referring MD:            Medicines:             See the Anesthesia note for documentation of the                          administered medications  Complications:         No immediate complications.  ______________________________________________ _________________________________  Procedure:             Pre-Anesthesia Assessment:                         - After reviewing the risks and benefits, the patient                          was deemed in satisfactory condition to undergo the                           procedure.                         After obtaining informed consent, the endoscope was                          passed under direct vision. Throughout the procedure,                          the patient's blood pressure, pulse, and oxygen                          saturations were monitored continuously. The endoscope                          was introduced through the mouth, and advanced to the                          second part of duodenum. The upper GI endoscopy was                          accomplished without difficulty. The patient tolerated                          the procedure well.                                                                                   Findings:       The Z-line was regular and was found  40 cm from the incisors.       Diffuse mildly erythematous mucosa without bleeding was found in the        stomach. Estimated blood loss was minimal.       One oozing cratered duodenal ulcer with no stigmata of bleeding was        found in the duodenal bulb. The lesion was 20 mm in largest dimension.        Biopsies were taken with a cold forceps for histology.       A moderate post-ulcer deformity was found in the second portion of the        duodenum.                                                                                   Moderate Sedation:       See the other procedure note for documentation of moderate sedation with        intraservice time.  Impression:            - Z-line regular, 40 cm from the incisors.                         - Erythematous mucosa in the stomach.                         - Oozing duodenal ulcer with no stigmata of bleeding.                          Biopsied.                         - Duodenal deformity.  Recommendation:        - Await pathology result s.                         - [facility] care.                         - Full liquid diet.                         - Use Protonix (pantoprazole) 40 mg PO BID.                          This ulcer was not present 7/22 Must consider                          malignancy NO NSAIDs                                                                                     Codey Ho MD  ________________________  CODEY HO,   10/5/2022 4:38:20 PM  I was physically present for the entire viewing portion of the exam.  __________________________  Signature of teaching physician  [id number] HO  Number of Addenda: 0    Note Initiated On: 10/5/2022 3:35 PM  Scope In: 4:16:16 PM  Scope Out: 4:23:24 PM     Sodium    Collection Time: 10/05/22  6:07 PM   Result Value Ref Range    Sodium 119 (LL) 136 - 145 mmol/L   Sodium    Collection Time: 10/05/22  9:49 PM   Result Value Ref Range    Sodium 119 (LL) 136 - 145 mmol/L   Sodium    Collection Time: 10/06/22  1:59 AM   Result Value Ref Range    Sodium 123 (L) 136 - 145 mmol/L   CBC with platelets    Collection Time: 10/06/22  6:04 AM   Result Value Ref Range    WBC Count 8.7 4.0 - 11.0 10e3/uL    RBC Count 2.27 (L) 3.80 - 5.20 10e6/uL    Hemoglobin 7.9 (L) 11.7 - 15.7 g/dL    Hematocrit 23.8 (L) 35.0 - 47.0 %     (H) 78 - 100 fL    MCH 34.8 (H) 26.5 - 33.0 pg    MCHC 33.2 31.5 - 36.5 g/dL    RDW 18.6 (H) 10.0 - 15.0 %    Platelet Count 241 150 - 450 10e3/uL   Sodium    Collection Time: 10/06/22  6:04 AM   Result Value Ref Range    Sodium 123 (L) 136 - 145 mmol/L   Sodium    Collection Time: 10/06/22 10:19 AM   Result Value Ref Range    Sodium 123 (L) 136 - 145 mmol/L       Pertinent Radiology   Radiology Results reviewed      EKG Results reviewed       Advanced Care Planning      Claudia Kumar MD   [facility]

## 2022-10-06 NOTE — PROGRESS NOTES
RENAL PROGRESS NOTE  CC: Hynonatremia    S: Since last seen, patient sodium slowly improving.  Patient denies chest pain andfever.  On Bumex 0.5 mg daily, and lasix 10 mg daily.  Urine output for the last 24 hours was 700 ml, seen with 1000 ml in the canister on the wall. Had EGD done yesterday, GI onboard.  Assessment completed with professional .  Patient is hyper-verbal.  Case discussed with nursing.          Assessment/ Recommendations:  1. Hyponatremia: History of SIADH and chronic diarrhea. Varying degrees of hyponatremia since at least Feb 2022. Hospitalized in July with severe hyponatremia at which time she was diagnosed with SIADH and started on bumex + 2g NaCL tabs TID. Presented to the hospital with a Na of 120, which trended down to 118 initially after getting NS at 75mg/hr over night, this is consistent with SIADH. Serum osoms 253, urin osoms 292, Meli 87 also consistent with SIADH also. Reports ongoing intermittent loose stools, but currently with obstruction. No nausea or emesis. Reports she has taken bumex and NaCl as prescribed since discharge in July.                 - Is NPO, will wait for the next sodium check to make further changes as this alone may correct the sodium              -Was on Bumex 0.5mg daily + 2g NaCL TID since July.    -With initiation of lasix 10 mg IV daily on 10/6, her Na is currently at 123 and trending up. Goal for the next 24 hours will be to increase not more than 8, please update oncall nephrology if na up more than this or if trending down.                     - Continue sodium checks every 4 hours for trend.              - Cymbalta(SSRI) may be causing SIADH, would halt if able. Ativan likely okay.                     - Amiloride is also know to contribute to hyponatremia, but usually when in combination with a thiazide, not currently on thiazide.   -I/O via [place]    2. Hypokalemia: on sodium chloride 10 mEq tab bid.     3. Gastritis: with partial  obstruction. GI onboard, has been having diarrhea.  EGD done yesterday.   -per GI.       MARYBETH Randall CNP  [facility]  Pager: [phone]      Physical Exam  /63 (BP Location: Right arm)   Pulse 106   Temp 98.1  F (36.7  C) (Oral)   Resp 18   Wt 43.1 kg (95 lb)   SpO2 95%   BMI 18.55 kg/m     Patient Vitals for the past 96 hrs:   Weight   10/04/22 2039 43.1 kg (95 lb)       Intake/Output Summary (Last 24 hours) at 10/6/2022 1212  Last data filed at 10/6/2022 0900  Gross per 24 hour   Intake 710 ml   Output 1453 ml   Net -743 ml       Physical Exam:   /63 (BP Location: Right arm)   Pulse 106   Temp 98.1  F (36.7  C) (Oral)   Resp 18   Wt 43.1 kg (95 lb)   SpO2 95%   BMI 18.55 kg/m    In general this is a 87 year old female in no acute distress.   General: irritable, hyper-verbal today.    Eyes: Pupils equal, sclerae not icteric   ENT: Mucus membranes moist, no lesions noted   Resp: CTA bilaterally, no distress, normal effort   CV: RRR without murmur, no hip/leg edema   GI: Soft NT NG   Psych: A&Ox3, not depressed   Neuro: Moves all extremities.     Skin: No rash noted       Recent Labs   Lab Test 10/05/22  0831 10/04/22  2148 08/05/22  1242   POTASSIUM 4.4 4.4 4.2   CHLORIDE 87* 83* 101   ANIONGAP 6* 8 9     Recent Labs   Lab Test 10/06/22  0604 10/04/22  2148 08/05/22  1102   WBC 8.7 9.1 20.0*   RBC 2.27* 2.47* 2.93*   * 104* 105*   MCH 34.8* 35.6* 31.7   RDW 18.6* 18.5* 18.6*         I personally reviewed these labs today

## 2022-10-06 NOTE — PLAN OF CARE
Problem: Plan of Care - These are the overarching goals to be used throughout the patient stay.    Goal: Optimal Comfort and Wellbeing  Outcome: Ongoing, Progressing  Intervention: Monitor Pain and Promote Comfort  Recent Flowsheet Documentation  Taken 10/6/2022 0010 by Madonna Bell RN  Pain Management Interventions:   emotional support   medication offered but refused     Problem: Electrolyte Imbalance  Goal: Electrolyte Balance  Outcome: Ongoing, Progressing   Problem: Risk for Delirium  Goal: Optimal Coping  Outcome: Ongoing, Progressing  Goal: Improved Behavioral Control  Outcome: Ongoing, Progressing  Goal: Improved Attention and Thought Clarity  Outcome: Ongoing, Progressing  Goal: Improved Sleep  Outcome: Ongoing, Progressing     Goal Outcome Evaluation: Pt slept through the night. Denies any pain. Purewick in place and patent with adequate urine output.

## 2022-10-06 NOTE — ANESTHESIA POSTPROCEDURE EVALUATION
Patient: Chris Bell    Procedure: Procedure(s):  ESOPHAGOGASTRODUODENOSCOPY (EGD) WITH BIOPSIES       Anesthesia Type:  MAC    Note:     Postop Pain Control: Uneventful            Sign Out: Well controlled pain   PONV: No   Neuro/Psych: Uneventful            Sign Out: Acceptable/Baseline neuro status   Airway/Respiratory: Uneventful            Sign Out: Acceptable/Baseline resp. status   CV/Hemodynamics: Uneventful            Sign Out: Acceptable CV status; No obvious hypovolemia; No obvious fluid overload   Other NRE: NONE   DID A NON-ROUTINE EVENT OCCUR?            Last vitals:  Vitals:    10/05/22 1412 10/05/22 1454 10/05/22 1732   BP: (!) 134/90 (!) 173/86 (!) 140/81   Pulse:  79 83   Resp:  18 28   Temp:  37.1  C (98.7  F) 36.8  C (98.2  F)   SpO2:  99% 100%       Electronically Signed By: Linda Montalvo MD  October 5, 2022  7:36 PM

## 2022-10-06 NOTE — PROGRESS NOTES
[facility] DIGESTIVE HEALTH PROGRESS NOTE      Subjective:              Pt sleepy    Objective:                Body mass index is 18.55 kg/m .  Vital signs in last 24 hrs;  Temp:  [97.6  F (36.4  C)-98.7  F (37.1  C)] 98.1  F (36.7  C)  Pulse:  [] 106  Resp:  [16-28] 18  BP: (129-173)/(63-86) 129/63  SpO2:  [94 %-100 %] 95 %    Physical Exam:   General: Comfortable appearing. Awake.   Cardiovascular: Regular rate. No edema  Chest: Non-labored breathing. Symmetric chest rise.   Abdomen: soft, non-tender, non-distended  Neurologic: Alert, no focal defects.     Current Labs:  CMP Results: Recent Labs   Lab 10/06/22  1406 10/06/22  1019 10/06/22  0604 10/06/22  0159 10/05/22  1340 10/05/22  0831 10/04/22  2148   * 123* 123* 123*   < > 118* 120*   POTASSIUM  --   --   --   --   --  4.4 4.4   CHLORIDE  --   --   --   --   --  87* 83*   CO2  --   --   --   --   --  25 29   GLC  --   --   --   --   --  105* 107*   BUN  --   --   --   --   --  7.4* 7.8*   CR  --   --   --   --   --  0.53 0.55    < > = values in this interval not displayed.      CBC  Recent Labs   Lab 10/06/22  0604 10/05/22  0831 10/04/22  2148   WBC 8.7  --  9.1   RBC 2.27*  --  2.47*   HGB 7.9* 8.5* 8.8*   HCT 23.8*  --  25.6*   *  --  104*   RDW 18.6*  --  18.5*     --  273     INRNo lab results found in last 7 days.   LIPASENo lab results found in last 7 days.    Relevant Imaging:      Assessment:       Abnormal CT  EGD with large DU         Plan:     PPI bid  Will probably only tolerate Full liquid diet given obstruction  Must consider malignancy    Approximately 15 minutes of total time was spent providing patient care, including patient evaluation, reviewing documentation/test results and .           Josr Hopkins, DO  Thank you for the opportunity to participate in the care of this patient.   Please feel free to call me with any questions or concerns.  Phone number [phone].

## 2022-10-07 NOTE — PLAN OF CARE
"  Problem: Plan of Care - These are the overarching goals to be used throughout the patient stay.    Goal: Optimal Comfort and Wellbeing  Outcome: Ongoing, Progressing     Problem: Risk for Delirium  Goal: Improved Sleep  Outcome: Ongoing, Progressing     Problem: Risk for Delirium  Goal: Improved Behavioral Control  Outcome: Ongoing, Progressing     Problem: Electrolyte Imbalance  Goal: Electrolyte Balance  Outcome: Ongoing, Progressing   Goal Outcome Evaluation:    C/O \"stomach pain\" this evening. Scheduled IVP protonix given early per pt request.                 "

## 2022-10-07 NOTE — PROGRESS NOTES
RENAL PROGRESS NOTE  CC: Hynonatremia    S: Since last seen by our team, sodium down from to 121, urine output 1.7 L in the last 24 hours.  Seen in bed resting, no acute change in status.  Alert and interactive.  No seizure.  Alert and interactive.           Assessment/ Recommendations:  1. Hyponatremia: History of SIADH and chronic diarrhea. Varying degrees of hyponatremia since at least Feb 2022. Hospitalized in July with severe hyponatremia at which time she was diagnosed with SIADH and started on bumex + 2g NaCL tabs TID. Presented to the hospital with a Na of 120, which trended down to 118 initially after getting NS at 75mg/hr over night, this is consistent with SIADH. Serum osoms 253, urin osoms 292, Meli 87 also consistent with SIADH also. Reports ongoing intermittent loose stools, but currently with obstruction. No nausea or emesis. Reports she has taken bumex and NaCl as prescribed since discharge in July.                 -Was on Bumex 0.5mg daily + 2g NaCL TID since July.    -With initiation of lasix 10 mg IV daily on 10/6 and fluid restriction of 1.2L daily, her Na increased to 123, but unfortunately decreased to 121 this morning, not sure if patient is following fluid restriction.    -Will dose with 10 mg of IV lasix now, change lasix to 20 mg oral bid, tighten fluid restriction from 1.2L to 1L and continue 2g NaCl tid.  Goal for the next 24 hours will be to increase NA not more than 8, please call oncall nephrology if na up more than this or if trending down.                     - Continue sodium checks every 4 hours for trend.              - Cymbalta could be contributory to SIADH, would halt if able. Ativan likely okay.                     - Amiloride is also know to contribute to hyponatremia, but usually when in combination with a thiazide, not currently on thiazide.   -I/O via [place]    2. Hypokalemia: on sodium chloride 10 mEq tab bid.   -K stable.     3. Gastritis: with partial obstruction.  GI onboard, has been having diarrhea.  EGD done yesterday.   -per GI.       MARYBETH Randall CNP  [facility]  Pager: [phone]      Physical Exam  /63 (BP Location: Right arm)   Pulse 92   Temp 98.2  F (36.8  C) (Oral)   Resp 20   Wt 43.1 kg (95 lb)   SpO2 92%   BMI 18.55 kg/m     Patient Vitals for the past 96 hrs:   Weight   10/04/22 2039 43.1 kg (95 lb)       Intake/Output Summary (Last 24 hours) at 10/6/2022 1212  Last data filed at 10/6/2022 0900  Gross per 24 hour   Intake 710 ml   Output 1453 ml   Net -743 ml       Physical Exam:   /63 (BP Location: Right arm)   Pulse 92   Temp 98.2  F (36.8  C) (Oral)   Resp 20   Wt 43.1 kg (95 lb)   SpO2 92%   BMI 18.55 kg/m    In general this is a 87 year old female in no acute distress.   General: irritable, hyper-verbal today.    Eyes: Pupils equal, sclerae not icteric   ENT: Mucus membranes moist, no lesions noted   Resp: CTA bilaterally, no distress, normal effort   CV: RRR without murmur, no hip/leg edema   GI: Soft NT NG   Psych: A&Ox3, not depressed   Neuro: Moves all extremities.     Skin: No rash noted       Recent Labs   Lab Test 10/05/22  0831 10/04/22  2148 08/05/22  1242   POTASSIUM 4.4 4.4 4.2   CHLORIDE 87* 83* 101   ANIONGAP 6* 8 9     Recent Labs   Lab Test 10/06/22  0604 10/04/22  2148 08/05/22  1102   WBC 8.7 9.1 20.0*   RBC 2.27* 2.47* 2.93*   * 104* 105*   MCH 34.8* 35.6* 31.7   RDW 18.6* 18.5* 18.6*         I personally reviewed these labs today

## 2022-10-07 NOTE — PLAN OF CARE
"  Problem: Risk for Delirium  Goal: Improved Behavioral Control  Outcome: Ongoing, Not Progressing     Problem: Pain Chronic (Persistent) (Comorbidity Management)  Goal: Acceptable Pain Control and Functional Ability  Outcome: Ongoing, Progressing  Intervention: Develop Pain Management Plan  Recent Flowsheet Documentation  Taken 10/7/2022 0850 by Veronica Mullins RN  Pain Management Interventions:   medication offered but refused   pillow support provided   repositioned  Intervention: Manage Persistent Pain  Recent Flowsheet Documentation  Taken 10/7/2022 0850 by Veronica Mullins RN  Medication Review/Management: medications reviewed     Problem: Electrolyte Imbalance  Goal: Electrolyte Balance  Outcome: Unable to Meet, Plan Revised     Patient VSS this AM, endorses abdominal pain but refuses offer for prn APAP, denies nausea, alert and oriented to self and place, orientation to time/situation fluctuates. Patient's sodium level down this AM, fluid restriction tightened and IV lasix ordered by nephrology. Writer unable to admin IV lasix on this shift as IV access lost this AM, both SWAT and PICC nurses attempted x 3 each to place new IV without success on this shift. Patient demanding of staff on this shift and pressing call light often in quick succession; patient also refused medications scheduled around noon, per  patient said her \"nurse already gave medications\" but then later in the afternoon  reports patient is saying writer never gave her medications. Nephrology following, continuing to monitor sodium levels.     Veronica Mullins RN 10/7/2022   [phone]     "

## 2022-10-07 NOTE — PROGRESS NOTES
[facility] DIGESTIVE HEALTH PROGRESS NOTE      Subjective:              Pt complains of dry lips.  No pain    Objective:                Body mass index is 18.55 kg/m .  Vital signs in last 24 hrs;  Temp:  [97.7  F (36.5  C)-98.2  F (36.8  C)] 98.2  F (36.8  C)  Pulse:  [75-92] 92  Resp:  [16-20] 20  BP: (107-127)/(56-67) 127/67  SpO2:  [92 %-95 %] 92 %    Physical Exam:   General: Comfortable appearing. Awake.   Cardiovascular: Regular rate. No edema  Chest: Non-labored breathing. Symmetric chest rise.   Abdomen: soft, non-tender, non-distended  Neurologic: Alert, no focal defects.     Current Labs:  CMP Results: Recent Labs   Lab 10/07/22  0937 10/07/22  0523 10/07/22  0209 10/06/22  2217 10/05/22  1340 10/05/22  0831 10/04/22  2148   * 125*  125* 124* 124*   < > 118* 120*   POTASSIUM  --  4.5  --   --   --  4.4 4.4   CHLORIDE  --  89*  --   --   --  87* 83*   CO2  --  26  --   --   --  25 29   GLC  --  80  --   --   --  105* 107*   BUN  --  7.6*  --   --   --  7.4* 7.8*   CR  --  0.94  --   --   --  0.53 0.55    < > = values in this interval not displayed.      CBC  Recent Labs   Lab 10/07/22  0523 10/06/22  0604 10/05/22  0831 10/04/22  2148   WBC 8.0 8.7  --  9.1   RBC 2.26* 2.27*  --  2.47*   HGB 7.9* 7.9* 8.5* 8.8*   HCT 24.0* 23.8*  --  25.6*   * 105*  --  104*   RDW 18.6* 18.6*  --  18.5*    241  --  273     INRNo lab results found in last 7 days.   LIPASENo lab results found in last 7 days.    Relevant Imaging:      Assessment:       1.  Anemia  2  PUD    Still awaiting biopsies.  PPI bid          Plan:     Await path    Approximately 13  minutes of total time was spent providing patient care, including patient evaluation, reviewing documentation/test results and .           Ximena Ho MD  Thank you for the opportunity to participate in the care of this patient.   Please feel free to call me with any questions or concerns.  Phone number [phone].

## 2022-10-07 NOTE — PROGRESS NOTES
[facility] Vascular access  Re:Midline placement    Midline order cancelled, writer was able to insert PIV in right arm without US guidance.    Primary RN, aware.

## 2022-10-07 NOTE — PROGRESS NOTES
PICC was requested to place PIV.  Two attempts on right arm and one attempt on left.  All unsuccessful.  Patient does have a good basilic on the right arm.  Have recommended to RN patient have midline or PICC.  Cynthia Geller RN, PICC Team

## 2022-10-07 NOTE — PROGRESS NOTES
Progress Note    Date of admission: 10/04/2022    Assessment/Plan  Chris Bell is a 87 year old old female presented with abdominal pain a few days duration.  Work-up showing possible gastritis.  Low sodium levels noted.     Abdominal pain  - CT showing features suggestive of gastritis and partial obstruction.    - S/p upper EGD on 10/05 .    - Continue oral PPI.  -- Biopsies from stomach is consistent with mild active chronic gastritis.  H. pylori immunostain is negative.  Duodenal ulcer biopsy is consistent with edema and acute duodenitis.  Negative for dysplasia and malignancy.   - Full liquid diet. 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   - Sodium today is 124 up [id number] .  Patient has a history of SIADH.  -plan is not to increase sodium level more than 8 points in 24 hours  - Nephrology consult appreciated.  - nephrology  recommending to stop Cymbalta if possible.   -Fluid restriction to 1000 mm in 24 hours.  -Decrease frequency of Cymbalta administration every 48 hours.  Monitor for withdrawal symptoms.    Chronic anemia   - Stable hemoglobin, follow serial hemoglobins closely     History of CHF   - Compensated currently, continue home meds parameters placed     Hypertension   - Stable continue home meds parameters placed     Osteoporosis  -CT abdomen with skeletal windows showing chronic thoracic/lumbar compression fractures.  Continue current analgesia.     Positive COVID test  -Did have COVID few months ago as well so likely COVID recovered.       DVT PPX : SCD    Barriers to discharge: hyponatremia    Anticipated Discharge date  : 2-3 days    Subjective  Denies having any pain.  Management plan discussed with the patient and her son over the phone.     Objective  Vital signs in last 24 hours  Temp:  [97.7  F (36.5  C)-98.2  F (36.8  C)] 98.2  F (36.8  C)  Pulse:  [75-92] 92  Resp:  [16-20] 20  BP: (107-127)/(56-67) 127/67  SpO2:   [92 %-95 %] 92 %    Input and Output in 24 hrs     Intake/Output Summary (Last 24 hours) at 10/6/2022 1340  Last data filed at 10/6/2022 0900  Gross per 24 hour   Intake 710 ml   Output 1453 ml   Net -743 ml       Physical Exam:  GEN: Alert and oriented. Not in acute distress  HEENT: Mucous membrane- moist and pink  CHEST: Clear to auscultation bilaterally  HEART: S1S2   ABDOMEN: Soft. Non-tender, non-distended. No organomegaly. No guarding or rigidity. Bowel sounds- active  Extremities: No pedal oedema  CNS: No focal neurological deficit. No involuntary movements  SKIN: No skin rash, no cyanosis or clubbing      Pertinent Labs   Lab Results: Personally Reviewed    Recent Results (from the past 24 hour(s))   Sodium    Collection Time: 10/06/22  6:46 PM   Result Value Ref Range    Sodium 123 (L) 136 - 145 mmol/L   Sodium    Collection Time: 10/06/22 10:17 PM   Result Value Ref Range    Sodium 124 (L) 136 - 145 mmol/L   Sodium    Collection Time: 10/07/22  2:09 AM   Result Value Ref Range    Sodium 124 (L) 136 - 145 mmol/L   Sodium    Collection Time: 10/07/22  5:23 AM   Result Value Ref Range    Sodium 125 (L) 136 - 145 mmol/L   CBC with platelets    Collection Time: 10/07/22  5:23 AM   Result Value Ref Range    WBC Count 8.0 4.0 - 11.0 10e3/uL    RBC Count 2.26 (L) 3.80 - 5.20 10e6/uL    Hemoglobin 7.9 (L) 11.7 - 15.7 g/dL    Hematocrit 24.0 (L) 35.0 - 47.0 %     (H) 78 - 100 fL    MCH 35.0 (H) 26.5 - 33.0 pg    MCHC 32.9 31.5 - 36.5 g/dL    RDW 18.6 (H) 10.0 - 15.0 %    Platelet Count 254 150 - 450 10e3/uL   Basic metabolic panel    Collection Time: 10/07/22  5:23 AM   Result Value Ref Range    Sodium 125 (L) 136 - 145 mmol/L    Potassium 4.5 3.4 - 5.3 mmol/L    Chloride 89 (L) 98 - 107 mmol/L    Carbon Dioxide (CO2) 26 22 - 29 mmol/L    Anion Gap 10 7 - 15 mmol/L    Urea Nitrogen 7.6 (L) 8.0 - 23.0 mg/dL    Creatinine 0.94 0.51 - 0.95 mg/dL    Calcium 8.1 (L) 8.8 - 10.2 mg/dL    Glucose 80 70 - 99 mg/dL     GFR Estimate 58 (L) >60 mL/min/1.73m2   Sodium    Collection Time: 10/07/22  9:37 AM   Result Value Ref Range    Sodium 121 (L) 136 - 145 mmol/L   Extra Purple Top Tube    Collection Time: 10/07/22  9:43 AM   Result Value Ref Range    Hold Specimen JIC    Sodium    Collection Time: 10/07/22  1:57 PM   Result Value Ref Range    Sodium 124 (L) 136 - 145 mmol/L       Pertinent Radiology   Radiology Results reviewed      EKG Results reviewed       Advanced Care Planning      Claudia Kumar MD   [facility]

## 2022-10-08 NOTE — PLAN OF CARE
Problem: Plan of Care - These are the overarching goals to be used throughout the patient stay.    Goal: Plan of Care Review/Shift Note  Description: The Plan of Care Review/Shift note should be completed every shift.  The Outcome Evaluation is a brief statement about your assessment that the patient is improving, declining, or no change.  This information will be displayed automatically on your shift note.  Outcome: Ongoing, Progressing   Goal Outcome Evaluation:  Pt refusing to take most of her medications in a.m.  Spent a lot of time with her trying to get her to take them 2 different times during shift.  Told hospitalist about difficulty and she was aware of meds that were held due to pt refusing.  Gave ativan later but had been scheduled earlier. Pt able to rest then for a while.  Will continue to monitor.

## 2022-10-08 NOTE — PROGRESS NOTES
Progress Note    Date of admission: 10/04/2022    Assessment/Plan  Chris Bell is a 87 year old old female presented with abdominal pain a few days duration.  Work-up showing possible gastritis.  Low sodium levels noted.     Abdominal pain secondary to gastritis   - CT showing features suggestive of gastritis and partial obstruction.    - S/p upper EGD on 10/05 .  Result is  is unremarkable  - Continue oral PPI.  -- Biopsies from stomach is consistent with mild active chronic gastritis.  H. pylori immunostain is negative.  Duodenal ulcer biopsy is consistent with edema and acute duodenitis.  Negative for dysplasia and malignancy.   - Full liquid diet. GI on board  -Patient had bowel movement which was very dark as per nursing assistant.       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   - Sodium today is around 127.  Patient has a history of SIADH.  - plan is not to increase sodium level more than 8 points in 24 hours  - Nephrology consult appreciated.  - nephrology  recommending to stop Cymbalta if possible.   -Fluid restriction to 1000 mm in 24 hours.  -Decrease frequency of Cymbalta administration every 48 hours.  Monitor for withdrawal symptoms.    Chronic anemia   - Stable hemoglobin, follow serial hemoglobins closely     History of CHF   - Compensated currently, continue home meds parameters placed     Hypertension   - Stable continue home meds parameters placed     Osteoporosis  -CT abdomen with skeletal windows showing chronic thoracic/lumbar compression fractures.  Continue current analgesia.     Positive COVID test  -Did have COVID few months ago as well so likely COVID recovered.       DVT PPX : SCD    Barriers to discharge: hyponatremia    Anticipated Discharge date  : 2-3 days    Subjective  Denies having any pain.  Management plan discussed with the patient and her son over the phone.     Objective  Vital signs in last 24 hours  Temp:  [98.2  F  (36.8  C)-98.6  F (37  C)] 98.2  F (36.8  C)  Pulse:  [] 85  Resp:  [18-20] 18  BP: (105-139)/(58-72) 139/58  SpO2:  [95 %-98 %] 95 %    Input and Output in 24 hrs     Intake/Output Summary (Last 24 hours) at 10/6/2022 1340  Last data filed at 10/6/2022 0900  Gross per 24 hour   Intake 710 ml   Output 1453 ml   Net -743 ml       Physical Exam:  GEN: Alert and oriented. Not in acute distress  HEENT: Mucous membrane- moist and pink  CHEST: Clear to auscultation bilaterally  HEART: S1S2   ABDOMEN: Soft. Non-tender, non-distended. No organomegaly. No guarding or rigidity. Bowel sounds- active  Extremities: No pedal oedema  CNS: No focal neurological deficit. No involuntary movements  SKIN: No skin rash, no cyanosis or clubbing      Pertinent Labs   Lab Results: Personally Reviewed    Recent Results (from the past 24 hour(s))   Sodium    Collection Time: 10/07/22  1:57 PM   Result Value Ref Range    Sodium 124 (L) 136 - 145 mmol/L   Sodium    Collection Time: 10/07/22  6:01 PM   Result Value Ref Range    Sodium 123 (L) 136 - 145 mmol/L   Sodium    Collection Time: 10/07/22  9:44 PM   Result Value Ref Range    Sodium 123 (L) 136 - 145 mmol/L   Sodium    Collection Time: 10/08/22  1:58 AM   Result Value Ref Range    Sodium 124 (L) 136 - 145 mmol/L   Sodium    Collection Time: 10/08/22  6:05 AM   Result Value Ref Range    Sodium 125 (L) 136 - 145 mmol/L   CBC with platelets    Collection Time: 10/08/22  6:05 AM   Result Value Ref Range    WBC Count 8.1 4.0 - 11.0 10e3/uL    RBC Count 2.55 (L) 3.80 - 5.20 10e6/uL    Hemoglobin 8.8 (L) 11.7 - 15.7 g/dL    Hematocrit 27.1 (L) 35.0 - 47.0 %     (H) 78 - 100 fL    MCH 34.5 (H) 26.5 - 33.0 pg    MCHC 32.5 31.5 - 36.5 g/dL    RDW 18.5 (H) 10.0 - 15.0 %    Platelet Count 280 150 - 450 10e3/uL   Basic metabolic panel    Collection Time: 10/08/22  6:05 AM   Result Value Ref Range    Sodium 125 (L) 136 - 145 mmol/L    Potassium 4.4 3.4 - 5.3 mmol/L    Chloride 90 (L) 98 -  107 mmol/L    Carbon Dioxide (CO2) 27 22 - 29 mmol/L    Anion Gap 8 7 - 15 mmol/L    Urea Nitrogen 10.7 8.0 - 23.0 mg/dL    Creatinine 1.02 (H) 0.51 - 0.95 mg/dL    Calcium 7.9 (L) 8.8 - 10.2 mg/dL    Glucose 86 70 - 99 mg/dL    GFR Estimate 53 (L) >60 mL/min/1.73m2   Sodium    Collection Time: 10/08/22 10:05 AM   Result Value Ref Range    Sodium 127 (L) 136 - 145 mmol/L   Sodium    Collection Time: 10/08/22 11:51 AM   Result Value Ref Range    Sodium 124 (L) 136 - 145 mmol/L       Pertinent Radiology   Radiology Results reviewed      EKG Results reviewed       Advanced Care Planning      Claudia Kumar MD   [facility]

## 2022-10-08 NOTE — PLAN OF CARE
"  Problem: Plan of Care - These are the overarching goals to be used throughout the patient stay.    Goal: Plan of Care Review/Shift Note  Description: The Plan of Care Review/Shift note should be completed every shift.  The Outcome Evaluation is a brief statement about your assessment that the patient is improving, declining, or no change.  This information will be displayed automatically on your shift note.  Outcome: Ongoing, Progressing  Goal: Patient-Specific Goal (Individualized)  Description: You can add care plan individualizations to a care plan. Examples of Individualization might be:  \"Parent requests to be called daily at 9am for status\", \"I have a hard time hearing out of my right ear\", or \"Do not touch me to wake me up as it startles me\".  Outcome: Ongoing, Progressing  Goal: Absence of Hospital-Acquired Illness or Injury  Outcome: Ongoing, Progressing  Intervention: Identify and Manage Fall Risk  Recent Flowsheet Documentation  Taken 10/8/2022 0114 by Lily Andrade, RN  Safety Promotion/Fall Prevention:    activity supervised    assistive device/personal items within reach    bed alarm on    clutter free environment maintained    fall prevention program maintained    nonskid shoes/slippers when out of bed    patient and family education    room near nurse's station    room organization consistent    safety round/check completed  Goal: Optimal Comfort and Wellbeing  Outcome: Ongoing, Progressing  Intervention: Monitor Pain and Promote Comfort  Recent Flowsheet Documentation  Taken 10/8/2022 0114 by Lily Andrade, RN  Pain Management Interventions: medication offered but refused  Goal: Readiness for Transition of Care  Outcome: Ongoing, Progressing     Problem: Risk for Delirium  Goal: Optimal Coping  Outcome: Ongoing, Progressing  Goal: Improved Behavioral Control  Outcome: Ongoing, Progressing  Goal: Improved Attention and Thought Clarity  Outcome: Ongoing, Progressing  Goal: Improved " Sleep  Outcome: Ongoing, Progressing     Problem: Hypertension Comorbidity  Goal: Blood Pressure in Desired Range  Outcome: Ongoing, Progressing  Intervention: Maintain Blood Pressure Management  Recent Flowsheet Documentation  Taken 10/8/2022 0114 by Lily Andrade, RN  Medication Review/Management: medications reviewed     Problem: Pain Chronic (Persistent) (Comorbidity Management)  Goal: Acceptable Pain Control and Functional Ability  Outcome: Ongoing, Progressing  Intervention: Develop Pain Management Plan  Recent Flowsheet Documentation  Taken 10/8/2022 0114 by Lily Andrade, RN  Pain Management Interventions: medication offered but refused  Intervention: Manage Persistent Pain  Recent Flowsheet Documentation  Taken 10/8/2022 0114 by Lily Andrade, RN  Medication Review/Management: medications reviewed     Problem: Electrolyte Imbalance  Goal: Electrolyte Balance  Outcome: Ongoing, Progressing   Goal Outcome Evaluation:     Pt was somewhat restless at beginning of shift. But she was sleeping for the second part of shift well. She had some rice cereal overnight. Call appropriately, pure wick on place.  Last Na lab was 125.

## 2022-10-08 NOTE — PROGRESS NOTES
RENAL PROGRESS NOTE  CC: Hynonatremia    S: On PPI from GI.  EGD from 10/05 biopsy result pending.  Titration of Cymbalta down ongoing.  Sodium trending up, from 124 to 127 today, will change oral lasix to nightly only since patient gets AM bumex, fluid restriction continue for now.  Difficult patient, yelling and screaming at time, needs a lot of encouragement and redirecting to take her medications.  At times refuses medication--per nursing.                     Assessment/ Recommendations:  1. Hyponatremia: History of SIADH and chronic diarrhea. Varying degrees of hyponatremia since at least Feb 2022. Hospitalized in July with severe hyponatremia at which time she was diagnosed with SIADH and started on bumex + 2g NaCL tabs TID. Presented to the hospital with a Na of 120, which trended down to 118 initially after getting NS at 75mg/hr over night, this is consistent with SIADH. Serum osoms 253, urin osoms 292, Meli 87 also consistent with SIADH also. Reports ongoing intermittent loose stools, but currently with obstruction. No nausea or emesis. Reports she has taken bumex and NaCl as prescribed since discharge in July.                 -Was on Bumex 0.5mg daily + 2g NaCL TID since July.    -With initiation of lasix 10 mg IV daily on 10/6 and fluid restriction of 1.2L daily, her Na increased to 123, but unfortunately decreased to 121, her sodium as trended up again with fluid restriction tightened.  -Will change lasix to 10 mg at night time bedtime only, tighten fluid restriction from reduce fluid restriction to 1.2L and continue 2g NaCl tid.  Goal for the next 24 hours will be to increase NA not more than 6, please call oncall nephrology if na up more than this or if trending down.                     - Continue sodium checks every 6 hours for trend.              - Cymbalta could be contributory to SIADH, would halt if able. Ativan likely okay.                     - Amiloride is also know to contribute to  hyponatremia, but usually when in combination with a thiazide, not currently on thiazide.   -I/O via purewick    2. Hypokalemia: on sodium chloride 10 mEq tab bid.   -K stable.     3. Gastritis: with partial obstruction. GI onboard, has been having diarrhea.  EGD done yesterday.   -per GI.       MARYBETH Randall CNP  [facility]  Pager: [phone]      Physical Exam  /58 (BP Location: Right arm)   Pulse 85   Temp 98.2  F (36.8  C) (Axillary)   Resp 18   Wt 43.1 kg (95 lb)   SpO2 95%   BMI 18.55 kg/m     Patient Vitals for the past 96 hrs:   Weight   10/04/22 2039 43.1 kg (95 lb)       Intake/Output Summary (Last 24 hours) at 10/6/2022 1212  Last data filed at 10/6/2022 0900  Gross per 24 hour   Intake 710 ml   Output 1453 ml   Net -743 ml       Physical Exam:   /58 (BP Location: Right arm)   Pulse 85   Temp 98.2  F (36.8  C) (Axillary)   Resp 18   Wt 43.1 kg (95 lb)   SpO2 95%   BMI 18.55 kg/m    In general this is a 87 year old female in no acute distress.   General: irritable, hyper-verbal today.    Eyes: Pupils equal, sclerae not icteric   ENT: Mucus membranes moist, no lesions noted   Resp: CTA bilaterally, no distress, normal effort   CV: RRR without murmur, no hip/leg edema   GI: Soft NT NG   Psych: A&Ox3, not depressed   Neuro: Moves all extremities.     Skin: No rash noted       Recent Labs   Lab Test 10/05/22  0831 10/04/22  2148 08/05/22  1242   POTASSIUM 4.4 4.4 4.2   CHLORIDE 87* 83* 101   ANIONGAP 6* 8 9     Recent Labs   Lab Test 10/06/22  0604 10/04/22  2148 08/05/22  1102   WBC 8.7 9.1 20.0*   RBC 2.27* 2.47* 2.93*   * 104* 105*   MCH 34.8* 35.6* 31.7   RDW 18.6* 18.5* 18.6*         I personally reviewed these labs today

## 2022-10-08 NOTE — PROGRESS NOTES
[facility] DIGESTIVE HEALTH PROGRESS NOTE      Subjective:                Patient refused to take most of her medication today.  Denies of abdominal pain.  ? dark stool by nursing aid.  no rectal bleeding.    Hb 7.9 to 8.8. Na 124 to 127.     Objective:                Body mass index is 18.55 kg/m .  Vital signs in last 24 hrs;  Temp:  [98.2  F (36.8  C)-98.4  F (36.9  C)] 98.4  F (36.9  C)  Pulse:  [78-86] 86  Resp:  [18-20] 18  BP: (117-139)/(58-72) 117/69  SpO2:  [95 %-98 %] 97 %    Physical Exam:   General: Comfortable appearing. Awake. Unable to answer question due to lauguage barrier.   Cardiovascular: Regular rate. No edema  Chest: Non-labored breathing. Symmetric chest rise.   Abdomen: soft, non-tender, non-distended  .     Current Labs:  CMP Results: Recent Labs   Lab 10/08/22  1151 10/08/22  1005 10/08/22  0605 10/08/22  0158 10/07/22  0937 10/07/22  0523 10/05/22  1340 10/05/22  0831 10/04/22  2148   * 127* 125*  125* 124*   < > 125*  125*   < > 118* 120*   POTASSIUM  --   --  4.4  --   --  4.5  --  4.4 4.4   CHLORIDE  --   --  90*  --   --  89*  --  87* 83*   CO2  --   --  27  --   --  26  --  25 29   GLC  --   --  86  --   --  80  --  105* 107*   BUN  --   --  10.7  --   --  7.6*  --  7.4* 7.8*   CR  --   --  1.02*  --   --  0.94  --  0.53 0.55    < > = values in this interval not displayed.      CBC  Recent Labs   Lab 10/08/22  0605 10/07/22  0523 10/06/22  0604 10/05/22  0831 10/04/22  2148   WBC 8.1 8.0 8.7  --  9.1   RBC 2.55* 2.26* 2.27*  --  2.47*   HGB 8.8* 7.9* 7.9* 8.5* 8.8*   HCT 27.1* 24.0* 23.8*  --  25.6*   * 106* 105*  --  104*   RDW 18.5* 18.6* 18.6*  --  18.5*    254 241  --  273     INRNo lab results found in last 7 days.   LIPASENo lab results found in last 7 days.    Relevant Imaging:    Assessment:       Abd pain, anemia, abnormal CT findings. S/p upper EGD on 10/05 with DU oozing blood. Path consistent with mild active chronic gastritis.  H. pylori immunostain is  negative.  Duodenal ulcer biopsy is consistent with edema and acute duodenitis.  Negative for dysplasia and malignancy.       Plan:     -Continue PPI bid.   - Continue carafate 1g tid.   - Monitor H/H closely/transfuse as needed.   - Check iron study, ferrtin level. Consider iron infusion.       [id number] minutes of total time was spent providing patient care, including patient evaluation, reviewing documentation/test results and .           Ivonne Dorsey MD  Thank you for the opportunity to participate in the care of this patient.   Please feel free to call me with any questions or concerns.  Phone number [phone].

## 2022-10-09 NOTE — PROGRESS NOTES
[facility] DIGESTIVE HEALTH PROGRESS NOTE      Subjective:             Persistent hyponatremia sodium is 123 today.  Hemoglobin 8.8 10/8.  Denies of dark stool no rectal bleeding.    Patient refused medication yesterday.  I discussed in detail with her daughter at bedside and emphasized the importance of taking medications.     Objective:                Body mass index is 18.55 kg/m .  Vital signs in last 24 hrs;  Temp:  [97.4  F (36.3  C)-98.4  F (36.9  C)] 97.4  F (36.3  C)  Pulse:  [73-86] 78  Resp:  [16-18] 16  BP: (111-117)/(61-69) 111/61  SpO2:  [97 %-99 %] 98 %    Physical Exam:   General: Frail old lady female, not in acute distress. .   Cardiovascular: Regular rate. No edema  Chest: Non-labored breathing. Symmetric chest rise.   Abdomen: soft, non-tender, non-distended        Current Labs:  CMP Results: Recent Labs   Lab 10/09/22  1203 10/09/22  0643 10/08/22  2350 10/08/22  1838 10/08/22  1005 10/08/22  0605 10/07/22  0937 10/07/22  0523 10/05/22  1340 10/05/22  0831   * 125* 123* 128*   < > 125*  125*   < > 125*  125*   < > 118*   POTASSIUM  --  4.3  --   --   --  4.4  --  4.5  --  4.4   CHLORIDE  --  91*  --   --   --  90*  --  89*  --  87*   CO2  --  25  --   --   --  27  --  26  --  25   GLC  --  92  --   --   --  86  --  80  --  105*   BUN  --  10.3  --   --   --  10.7  --  7.6*  --  7.4*   CR  --  1.03*  --   --   --  1.02*  --  0.94  --  0.53    < > = values in this interval not displayed.      CBC  Recent Labs   Lab 10/08/22  0605 10/07/22  0523 10/06/22  0604 10/05/22  0831 10/04/22  2148   WBC 8.1 8.0 8.7  --  9.1   RBC 2.55* 2.26* 2.27*  --  2.47*   HGB 8.8* 7.9* 7.9* 8.5* 8.8*   HCT 27.1* 24.0* 23.8*  --  25.6*   * 106* 105*  --  104*   RDW 18.5* 18.6* 18.6*  --  18.5*    254 241  --  273     INRNo lab results found in last 7 days.   LIPASENo lab results found in last 7 days.    Relevant Imaging:    Assessment:       Abd pain, anemia, abnormal CT findings. S/p upper EGD on 10/05  with DU oozing blood. Path consistent with mild active chronic gastritis.  H. pylori negative.  Duodenal ulcer biopsy is consistent with edema and acute duodenitis.  Negative for dysplasia and malignancy.   No evidence of continued GI bleed.  Hemoglobin 8.8.      Plan:     - Continue PPI bid.   - Continue carafate 1g tid.   - Monitor H/H closely/transfuse as needed.   - Consider iron supplement.  - Rec recommend to repeat EGD in 2 months to double check the healing.   This can be scheduled with [facility] prior to discharge.         Approximately 20 minutes of total time was spent providing patient care, including patient evaluation, reviewing documentation/test results and .                 Ivonne Dorsey MD  Thank you for the opportunity to participate in the care of this patient.   Please feel free to call me with any questions or concerns.  Phone number [phone].

## 2022-10-09 NOTE — PROGRESS NOTES
Progress Note    Date of admission: 10/04/2022    Assessment/Plan  Chris Bell is a 87 year old old female presented with abdominal pain a few days duration.  Work-up showing possible gastritis.  Low sodium levels noted.     Abdominal pain secondary to gastritis   - CT showing features suggestive of gastritis and partial obstruction.    - S/p upper EGD on 10/05 .  Result is  is unremarkable  - Continue oral PPI.  -- Biopsies from stomach is consistent with mild active chronic gastritis.  H. pylori immunostain is negative.  Duodenal ulcer biopsy is consistent with edema and acute duodenitis.  Negative for dysplasia and malignancy.   - Full liquid diet. GI on board  - Patient had bowel movement which was very dark as per nursing assistant.       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   - Sodium today is around 123.  Patient has a history of SIADH.  - plan  not to increase sodium level more than 8 points in 24 hours  - Nephrology consult appreciated.  - nephrology  recommending to stop Cymbalta if possible.   -Fluid restriction to 1000 mm in 24 hours.  -Decrease frequency of Cymbalta administration every 48 hours.  Monitor for withdrawal symptoms.    Chronic anemia   - Stable hemoglobin, follow serial hemoglobins closely  -Iron studies is indicative of borderline iron deficiency.  His iron saturation index of 20% and serum iron of 26.   -Venofer infusion for 3 days    History of CHF   - Compensated currently, continue home meds parameters placed     Hypertension   - Stable continue home meds parameters placed     Osteoporosis  -CT abdomen with skeletal windows showing chronic thoracic/lumbar compression fractures.  Continue current analgesia.     Positive COVID test  -Did have COVID few months ago as well so likely COVID recovered.       DVT PPX : SCD    Barriers to discharge: hyponatremia    Anticipated Discharge date  : 2-3 days    Subjective  Denies  having any pain.  Management plan discussed with the patient and RN.  Closely monitoring sodium    Objective  Vital signs in last 24 hours  Temp:  [97.4  F (36.3  C)-98.4  F (36.9  C)] 97.4  F (36.3  C)  Pulse:  [73-86] 78  Resp:  [16-18] 16  BP: (111-117)/(61-69) 111/61  SpO2:  [97 %-99 %] 98 %    Input and Output in 24 hrs     Intake/Output Summary (Last 24 hours) at 10/6/2022 1340  Last data filed at 10/6/2022 0900  Gross per 24 hour   Intake 710 ml   Output 1453 ml   Net -743 ml       Physical Exam:  GEN: Alert and oriented. Not in acute distress  HEENT: Mucous membrane- moist and pink  CHEST: Clear to auscultation bilaterally  HEART: S1S2   ABDOMEN: Soft. Non-tender, non-distended. No organomegaly. No guarding or rigidity. Bowel sounds- active  Extremities: No pedal oedema  CNS: No focal neurological deficit. No involuntary movements  SKIN: No skin rash, no cyanosis or clubbing      Pertinent Labs   Lab Results: Personally Reviewed    Recent Results (from the past 24 hour(s))   Sodium    Collection Time: 10/08/22  6:38 PM   Result Value Ref Range    Sodium 128 (L) 136 - 145 mmol/L   Sodium    Collection Time: 10/08/22 11:50 PM   Result Value Ref Range    Sodium 123 (L) 136 - 145 mmol/L   Extra Purple Top Tube    Collection Time: 10/09/22  6:42 AM   Result Value Ref Range    Hold Specimen [facility]    Basic metabolic panel    Collection Time: 10/09/22  6:43 AM   Result Value Ref Range    Sodium 125 (L) 136 - 145 mmol/L    Potassium 4.3 3.4 - 5.3 mmol/L    Chloride 91 (L) 98 - 107 mmol/L    Carbon Dioxide (CO2) 25 22 - 29 mmol/L    Anion Gap 9 7 - 15 mmol/L    Urea Nitrogen 10.3 8.0 - 23.0 mg/dL    Creatinine 1.03 (H) 0.51 - 0.95 mg/dL    Calcium 7.6 (L) 8.8 - 10.2 mg/dL    Glucose 92 70 - 99 mg/dL    GFR Estimate 52 (L) >60 mL/min/1.73m2   Iron and iron binding capacity    Collection Time: 10/09/22  6:43 AM   Result Value Ref Range    Iron 26 (L) 37 - 145 ug/dL    Iron Sat Index 20 15 - 46 %    Iron Binding Capacity  132 (L) 240 - 430 ug/dL   Ferritin    Collection Time: 10/09/22  6:43 AM   Result Value Ref Range    Ferritin 311 11 - 328 ng/mL   Vitamin B12    Collection Time: 10/09/22  6:43 AM   Result Value Ref Range    Vitamin B12 1,227 232 - 1,245 pg/mL   Sodium    Collection Time: 10/09/22 12:03 PM   Result Value Ref Range    Sodium 123 (L) 136 - 145 mmol/L       Pertinent Radiology   Radiology Results reviewed      EKG Results reviewed       Advanced Care Planning      Claudia Kumar MD   [facility]

## 2022-10-09 NOTE — PLAN OF CARE
"  Problem: Plan of Care - These are the overarching goals to be used throughout the patient stay.    Goal: Plan of Care Review/Shift Note  Description: The Plan of Care Review/Shift note should be completed every shift.  The Outcome Evaluation is a brief statement about your assessment that the patient is improving, declining, or no change.  This information will be displayed automatically on your shift note.  10/9/2022 0843 by Lily Andrade RN  Outcome: Progressing  10/9/2022 0440 by Lily Andrade RN  Outcome: Progressing  Goal: Patient-Specific Goal (Individualized)  Description: You can add care plan individualizations to a care plan. Examples of Individualization might be:  \"Parent requests to be called daily at 9am for status\", \"I have a hard time hearing out of my right ear\", or \"Do not touch me to wake me up as it startles me\".  10/9/2022 0843 by Lily Andrade RN  Outcome: Progressing  10/9/2022 0440 by Lily Andrade RN  Outcome: Progressing  Goal: Absence of Hospital-Acquired Illness or Injury  10/9/2022 0843 by Lily Andrade RN  Outcome: Progressing  10/9/2022 0440 by Lily Andrade RN  Outcome: Progressing  Intervention: Identify and Manage Fall Risk  Recent Flowsheet Documentation  Taken 10/9/2022 0100 by Lily Andrade RN  Safety Promotion/Fall Prevention:   activity supervised   assistive device/personal items within reach   bed alarm on   clutter free environment maintained   fall prevention program maintained   nonskid shoes/slippers when out of bed   patient and family education   room near nurse's station   room organization consistent   safety round/check completed  Intervention: Prevent Skin Injury  Recent Flowsheet Documentation  Taken 10/9/2022 0100 by Lily Andrade RN  Body Position: position changed independently  Goal: Optimal Comfort and Wellbeing  10/9/2022 0843 by Lily Andrade RN  Outcome: " Progressing  10/9/2022 0440 by Lily Andrade RN  Outcome: Progressing  Intervention: Monitor Pain and Promote Comfort  Recent Flowsheet Documentation  Taken 10/9/2022 0113 by Lily Andrade RN  Pain Management Interventions: (pt stated meds make her constipated) medication offered but refused  Goal: Readiness for Transition of Care  10/9/2022 0843 by Lily Andrade RN  Outcome: Progressing  10/9/2022 0440 by Lily Andrade RN  Outcome: Progressing     Problem: Risk for Delirium  Goal: Optimal Coping  10/9/2022 0843 by Lily Andrade RN  Outcome: Progressing  10/9/2022 0440 by Lily Andrade RN  Outcome: Progressing  Goal: Improved Behavioral Control  10/9/2022 0843 by Lily Andrade RN  Outcome: Progressing  10/9/2022 0440 by Lily Andrade RN  Outcome: Progressing  Goal: Improved Attention and Thought Clarity  10/9/2022 0843 by Lily Andrade RN  Outcome: Progressing  10/9/2022 0440 by Lily Andrade RN  Outcome: Progressing  Goal: Improved Sleep  10/9/2022 0843 by Lily Andrade RN  Outcome: Progressing  10/9/2022 0440 by Lily Andrade RN  Outcome: Progressing     Problem: Hypertension Comorbidity  Goal: Blood Pressure in Desired Range  10/9/2022 0843 by Lily Andrade RN  Outcome: Progressing  10/9/2022 0440 by Lily Andrade RN  Outcome: Progressing  Intervention: Maintain Blood Pressure Management  Recent Flowsheet Documentation  Taken 10/9/2022 0100 by Lily Andrade RN  Medication Review/Management: medications reviewed     Problem: Pain Chronic (Persistent) (Comorbidity Management)  Goal: Acceptable Pain Control and Functional Ability  10/9/2022 0843 by Lily Andrade RN  Outcome: Progressing  10/9/2022 0440 by Lily Andrade RN  Outcome: Progressing  Intervention: Develop Pain Management Plan  Recent Flowsheet Documentation  Taken 10/9/2022 0113 by Lily Andrade  RN  Pain Management Interventions: (pt stated meds make her constipated) medication offered but refused  Intervention: Manage Persistent Pain  Recent Flowsheet Documentation  Taken 10/9/2022 0100 by Lily Andrade RN  Medication Review/Management: medications reviewed     Problem: Electrolyte Imbalance  Goal: Electrolyte Balance  10/9/2022 0843 by Lily Andrade RN  Outcome: Progressing  10/9/2022 0440 by Lily Andrade RN  Outcome: Progressing   Goal Outcome Evaluation:       Pt was somewhat restless at beginning of shift. Fixated on bowel movement stated that we are making her constipated with all the meds we give her. C/o  . She had some rice cereal overnight. Call appropriately, pure wick on place for strict 1/0.  Last Na lab was 125.

## 2022-10-09 NOTE — PLAN OF CARE
Problem: Plan of Care - These are the overarching goals to be used throughout the patient stay.    Goal: Plan of Care Review/Shift Note  Description: The Plan of Care Review/Shift note should be completed every shift.  The Outcome Evaluation is a brief statement about your assessment that the patient is improving, declining, or no change.  This information will be displayed automatically on your shift note.  Outcome: Progressing  Flowsheets (Taken 10/9/2022 1417)  Plan of Care Reviewed With: patient     Problem: Pain Chronic (Persistent) (Comorbidity Management)  Goal: Acceptable Pain Control and Functional Ability  Outcome: Progressing     Problem: Electrolyte Imbalance  Goal: Electrolyte Balance  Outcome: Progressing   Goal Outcome Evaluation:      Plan of Care Reviewed With: patient     Patient is alert with confusion.  Denied pain.  Denied nausea/vomiting.  Patient has difficulty swallowing pills.  Denied SOB.  Completed ADS with assist.  /61 (BP Location: Left arm)   Pulse 78   Temp 97.4  F (36.3  C) (Oral)   Resp 16   Wt 43.1 kg (95 lb)   SpO2 98%   BMI 18.55 kg/m

## 2022-10-09 NOTE — PROGRESS NOTES
RENAL PROGRESS NOTE  CC: Niravatremia    S: Patient sodium at 125 this morning, Scr around 1.03, monitor closely.  No chest pain, dizziness, weakness, abd pain, fever and chills.  Difficult patient, will refuse medications at time.  Seen today with his daughter by the bedside, she participated in ROS.  Patient had mild, water and soup on his bedside table, I educated patient and her daughter that fluid restriction is needed for us to correct her sodium.  I spoke to charge nurse also about limiting her fluid intake.  Seen in bed resting, no edema, breathing issue and has about 300 ml of urine in canister on wall from pure-wick.                       Assessment/ Recommendations:  1. Hyponatremia: History of SIADH and chronic diarrhea. Varying degrees of hyponatremia since at least Feb 2022. Hospitalized in July with severe hyponatremia at which time she was diagnosed with SIADH and started on bumex + 2g NaCL tabs TID. Presented to the hospital with a Na of 120, which trended down to 118 initially after getting NS at 75mg/hr over night, this is consistent with SIADH. Serum osoms 253, urin osoms 292, Meli 87 also consistent with SIADH also. Reports ongoing intermittent loose stools, but currently with obstruction. No nausea or emesis. Reports she has taken bumex and NaCl as prescribed since discharge in July.                 -Was on Bumex 0.5mg daily + 2g NaCL TID since July.    -With initiation of lasix 10 mg IV daily on 10/6 and fluid restriction of 1.2L daily, her Na increased to 123, but unfortunately decreased to 121, her sodium as trended up again with fluid restriction tightened.  -Continue lasix at  20 mg at every evening, tighten fluid restriction to 1 L, continue AM bumex and 2g NaCl tid.  Goal for the next 24 hours will be to increase NA not more than 6, hopefully in the 130's by morning.                    - Continue sodium checks every 6 hours for trend.              - Cymbalta could be contributory to  SIADH, would halt if able. Ativan likely okay.                     - Amiloride is also know to contribute to hyponatremia, but usually when in combination with a thiazide, not currently on thiazide.   -I/O via purewick   -Cymbalta is been titrated down.   -I educated her about fluid restriction.      2. Hypokalemia: on sodium chloride 10 mEq tab bid.   -K stable today.    3. Gastritis: with partial obstruction. GI onboard, has been having diarrhea.   -per GI.       MARYBETH Randall [facility]  [facility]  Pager: [phone]      Physical Exam  /61 (BP Location: Left arm)   Pulse 78   Temp 97.4  F (36.3  C) (Oral)   Resp 16   Wt 43.1 kg (95 lb)   SpO2 98%   BMI 18.55 kg/m     No data found.    Intake/Output Summary (Last 24 hours) at 10/6/2022 1212  Last data filed at 10/6/2022 0900  Gross per 24 hour   Intake 710 ml   Output 1453 ml   Net -743 ml       Physical Exam:   /61 (BP Location: Left arm)   Pulse 78   Temp 97.4  F (36.3  C) (Oral)   Resp 16   Wt 43.1 kg (95 lb)   SpO2 98%   BMI 18.55 kg/m    In general this is a 87 year old female in no acute distress.   General: much calmer today.  Daughter by the bedside.  Eyes: Pupils equal, sclerae not icteric   ENT: Mucus membranes moist, no lesions noted   Resp: CTA bilaterally, no distress, normal effort   CV: RRR without murmur, no hip/leg edema   GI: Soft NT NG   Psych: A&Ox3, not depressed   Neuro: Moves all extremities.     Skin: No rash noted       Recent Labs   Lab Test 10/05/22  0831 10/04/22  2148 08/05/22  1242   POTASSIUM 4.4 4.4 4.2   CHLORIDE 87* 83* 101   ANIONGAP 6* 8 9     Recent Labs   Lab Test 10/06/22  0604 10/04/22  2148 08/05/22  1102   WBC 8.7 9.1 20.0*   RBC 2.27* 2.47* 2.93*   * 104* 105*   MCH 34.8* 35.6* 31.7   RDW 18.6* 18.5* 18.6*         I personally reviewed these labs today

## 2022-10-10 NOTE — PROGRESS NOTES
RENAL PROGRESS NOTE  CC: Hynonatremia    S: sleeping soundly and does not wake up to voice.  Ros not obtainable. nursing reports difficulty swallowing pills.  Assessment/ Recommendations:  1. Hyponatremia: History of SIADH and chronic diarrhea. Varying degrees of hyponatremia since at least Feb 2022. Hospitalized in July with severe hyponatremia at which time she was diagnosed with SIADH and started on bumex + 2g NaCL tabs TID. Presented to the hospital with a Na of 120, which trended down to 118 initially after getting NS at 75mg/hr over night, this is consistent with SIADH. Serum osoms 253, urin osoms 292, Meli 87 also consistent with SIADH also. Reports ongoing intermittent loose stools, but currently with obstruction. No nausea or emesis. Reports she has taken bumex and NaCl as prescribed since discharge in July.                 -Was on Bumex 0.5mg daily + 2g NaCL TID since July.    - continue daily  bumex and 2g NaCl tid.   -limit po fluid to 1 liter/day   -po intake remains poor, this will contribute to ongoing hyponatremia   -will give dose tolvaptan x 1 today   -goal sodium 133 by tomorrow   -repeat sodium at 1900 today   -appears to have goiter on exam, check tsh    2. Hypokalemia: on sodium chloride 10 mEq tab bid and amiloride   -K stable today.    3. Gastritis: with partial obstruction. GI following, has been having diarrhea, po intake remains poor.   -per GI.       4. Anemia - due to gastritis, iron stores low  -getting venofer, can stop po iron as will be iron replete after venofer, po iron may be contrib to poor po intake/GI symptoms      Yesenia Martin MD  [facility]  [phone]  Physical Exam  /68 (BP Location: Right arm, Patient Position: Supine)   Pulse 65   Temp 98  F (36.7  C) (Oral)   Resp 16   Wt 43.1 kg (95 lb)   SpO2 96%   BMI 18.55 kg/m     No data found.    Intake/Output Summary (Last 24 hours) at 10/6/2022 1212  Last data filed at 10/6/2022 0900  Gross  per 24 hour   Intake 710 ml   Output 1453 ml   Net -743 ml       Physical Exam:   /68 (BP Location: Right arm, Patient Position: Supine)   Pulse 65   Temp 98  F (36.7  C) (Oral)   Resp 16   Wt 43.1 kg (95 lb)   SpO2 96%   BMI 18.55 kg/m    In general this is a 87 year old female in no acute distress.   General: asleep. Does not open eyes to voice  Eyes: Pupils equal, sclerae not icteric   ENT: no icterus, no lesions noted, goiter  Resp: CTA bilaterally, no distress, normal effort   CV: RRR without murmur, no hip/leg edema   GI: Soft NT NG   Psych: limited by asleep  Skin: No rash noted       Recent Labs   Lab Test 10/05/22  0831 10/04/22  2148 08/05/22  1242   POTASSIUM 4.4 4.4 4.2   CHLORIDE 87* 83* 101   ANIONGAP 6* 8 9     Recent Labs   Lab Test 10/06/22  0604 10/04/22  2148 08/05/22  1102   WBC 8.7 9.1 20.0*   RBC 2.27* 2.47* 2.93*   * 104* 105*   MCH 34.8* 35.6* 31.7   RDW 18.6* 18.5* 18.6*         I personally reviewed these labs today

## 2022-10-10 NOTE — PROGRESS NOTES
Progress Note    Date of admission: 10/04/2022    Assessment/Plan  Chris Bell is a 87 year old old female presented with abdominal pain a few days duration.  Work-up showing possible gastritis.  Low sodium levels noted.     Abdominal pain secondary to gastritis   - CT showing features suggestive of gastritis and partial obstruction.    - S/p upper EGD on 10/05 .  Result is  is unremarkable  - Continue oral PPI.  -- Biopsies from stomach is consistent with mild active chronic gastritis.  H. pylori immunostain is negative.  Duodenal ulcer biopsy is consistent with edema and acute duodenitis.  Negative for dysplasia and malignancy.   - Diet advanced to regular.  GI on board  - Patient had bowel movement which was very dark as per nursing assistant.       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   - Sodium today is around 125.  Patient has a history of SIADH.  - plan not to increase sodium level more than 8 points in 24 hours  - Nephrology consult appreciated.  - nephrology  recommending to stop Cymbalta if possible.   -Fluid restriction to 1000 mm in 24 hours.  -Decrease frequency of Cymbalta administration every 72 hours.  Monitor for withdrawal symptoms.  -Plan to stop Cymbalta in 2 weeks period    Chronic anemia   - Stable hemoglobin, follow serial hemoglobins closely  -Iron studies is indicative of borderline iron deficiency.  His iron saturation index of 20% and serum iron of 26.   -Venofer infusion for 3 days    History of CHF   - Compensated currently, continue home meds parameters placed     Hypertension   - Stable continue home meds parameters placed     Osteoporosis  -CT abdomen with skeletal windows showing chronic thoracic/lumbar compression fractures.  Continue current analgesia.     Positive COVID test  -Did have COVID few months ago as well so likely COVID recovered.      Elevated TSH  -TSH is 6.04.  Total T4, T3 requested  -Ultrasound of  thyroid gland     DVT PPX : SCD    Barriers to discharge: hyponatremia    Anticipated Discharge date  : 2-3 days    Subjective  Denies having any pain.  Management plan discussed with the patient and RN.  Closely monitoring sodium.    Objective  Vital signs in last 24 hours  Temp:  [98  F (36.7  C)-98.3  F (36.8  C)] 98  F (36.7  C)  Pulse:  [62-72] 65  Resp:  [16] 16  BP: (119-121)/(53-68) 121/68  SpO2:  [96 %-98 %] 96 %    Input and Output in 24 hrs     Intake/Output Summary (Last 24 hours) at 10/6/2022 1340  Last data filed at 10/6/2022 0900  Gross per 24 hour   Intake 710 ml   Output 1453 ml   Net -743 ml       Physical Exam:  GEN: Alert and oriented. Not in acute distress  HEENT: Mucous membrane- moist and pink  CHEST: Clear to auscultation bilaterally  HEART: S1S2   ABDOMEN: Soft. Non-tender, No guarding or rigidity. Bowel sounds- active  Extremities: No pedal oedema  CNS: No focal neurological deficit. No involuntary movements  SKIN: No skin rash, no cyanosis or clubbing      Pertinent Labs   Lab Results: Personally Reviewed    Recent Results (from the past 24 hour(s))   Sodium    Collection Time: 10/09/22  6:51 PM   Result Value Ref Range    Sodium 125 (L) 136 - 145 mmol/L   Sodium    Collection Time: 10/10/22 12:17 AM   Result Value Ref Range    Sodium 124 (L) 136 - 145 mmol/L   Extra Purple Top Tube    Collection Time: 10/10/22  7:40 AM   Result Value Ref Range    Hold Specimen [facility]    Hemoglobin    Collection Time: 10/10/22  7:40 AM   Result Value Ref Range    Hemoglobin 9.1 (L) 11.7 - 15.7 g/dL   Sodium    Collection Time: 10/10/22  7:41 AM   Result Value Ref Range    Sodium 125 (L) 136 - 145 mmol/L   Basic metabolic panel    Collection Time: 10/10/22  7:41 AM   Result Value Ref Range    Sodium 125 (L) 136 - 145 mmol/L    Potassium 4.4 3.4 - 5.3 mmol/L    Chloride 90 (L) 98 - 107 mmol/L    Carbon Dioxide (CO2) 23 22 - 29 mmol/L    Anion Gap 12 7 - 15 mmol/L    Urea Nitrogen 9.1 8.0 - 23.0 mg/dL     Creatinine 0.96 (H) 0.51 - 0.95 mg/dL    Calcium 7.8 (L) 8.8 - 10.2 mg/dL    Glucose 85 70 - 99 mg/dL    GFR Estimate 57 (L) >60 mL/min/1.73m2   TSH    Collection Time: 10/10/22  7:41 AM   Result Value Ref Range    TSH 6.04 (H) 0.30 - 4.20 uIU/mL       Pertinent Radiology   Radiology Results reviewed      EKG Results reviewed       Advanced Care Planning      Claudia Kumar MD   [facility]

## 2022-10-10 NOTE — PROGRESS NOTES
Care Management Follow Up    Length of Stay (days): 5    Expected Discharge Date: 10/11/2022     Concerns to be Addressed:    Hyponatremia   Patient plan of care discussed at interdisciplinary rounds: Yes    Anticipated Discharge Disposition:  Back to foster care home     Anticipated Discharge Services:    Anticipated Discharge DME:      Patient/family educated on Medicare website which has current facility and service quality ratings:    Education Provided on the Discharge Plan:    Patient/Family in Agreement with the Plan:      Referrals Placed by CM/SW:    Private pay costs discussed: Not applicable    Additional Information:  Pt being treated and monitored for Hyponatremia. Likely go back to Foster Care at discharge. RNCM to follow for medical progression, recommendations, and final discharge plan.        Juanis Nzaario RN

## 2022-10-10 NOTE — PROGRESS NOTES
[facility] DIGESTIVE HEALTH PROGRESS NOTE      Subjective:              Spoke with son over the phone.   He reports dark stool but Nursing Aide unable to confirm and reports she has not had a BM today.    On oral iron BID.  Hgb has not been checked since 10/08.  Persistent hyponatremia - sodium is 125 today.   Tolerating full liquid.  Taking her PPI BID.  C/O dry lips and mouth.    Objective:                Body mass index is 18.55 kg/m .  Vital signs in last 24 hrs;  Temp:  [98  F (36.7  C)-98.3  F (36.8  C)] 98  F (36.7  C)  Pulse:  [62-72] 65  Resp:  [16] 16  BP: (119-121)/(53-68) 121/68  SpO2:  [96 %-98 %] 96 %    Physical Exam:   General: Frail old lady female, not in acute distress. .   Cardiovascular: Regular rate. No edema  Chest: Non-labored breathing. Symmetric chest rise.   Abdomen: Non-distended.  BS positive.  Soft.  Non-tender to palpation.     Current Labs:  CMP Results:   Recent Labs   Lab 10/10/22  0741 10/10/22  0017 10/09/22  1851 10/09/22  1203 10/09/22  0643 10/08/22  1005 10/08/22  0605 10/07/22  0937 10/07/22  0523   *  125* 124* 125* 123* 125*   < > 125*  125*   < > 125*  125*   POTASSIUM 4.4  --   --   --  4.3  --  4.4  --  4.5   CHLORIDE 90*  --   --   --  91*  --  90*  --  89*   CO2 23  --   --   --  25  --  27  --  26   GLC 85  --   --   --  92  --  86  --  80   BUN 9.1  --   --   --  10.3  --  10.7  --  7.6*   CR 0.96*  --   --   --  1.03*  --  1.02*  --  0.94    < > = values in this interval not displayed.      CBC  Recent Labs   Lab 10/08/22  0605 10/07/22  0523 10/06/22  0604 10/05/22  0831 10/04/22  6768   WBC 8.1 8.0 8.7  --  9.1   RBC 2.55* 2.26* 2.27*  --  2.47*   HGB 8.8* 7.9* 7.9* 8.5* 8.8*   HCT 27.1* 24.0* 23.8*  --  25.6*   * 106* 105*  --  104*   RDW 18.5* 18.6* 18.6*  --  18.5*    254 241  --  273     INRNo lab results found in last 7 days.   LIPASENo lab results found in last 7 days.    Relevant Imaging:    Assessment:       Abd pain, anemia, abnormal CT  findings. S/p upper EGD on 10/05 with DU oozing blood. Path consistent with mild active chronic gastritis.  H. pylori negative.  Duodenal ulcer biopsy is consistent with edema and acute duodenitis.  Negative for dysplasia and malignancy.  Hemoglobin 8.8 two days ago -- will recheck today.  BUN/Cr not elevated today.       Plan:     - Continue oral PPI bid.   - Continue carafate 1g tid.   - Check Hgb.    - On both oral and IV iron.   - Rec repeat EGD in 2 months to double check the healing.  [facility] will get in touch with pt to help arrange.                Emil Wiggins PA-C  Thank you for the opportunity to participate in the care of this patient.   Please feel free to call me with any questions or concerns.  Phone number [phone].

## 2022-10-10 NOTE — PLAN OF CARE
Problem: Plan of Care - These are the overarching goals to be used throughout the patient stay.    Goal: Plan of Care Review/Shift Note  Description: The Plan of Care Review/Shift note should be completed every shift.  The Outcome Evaluation is a brief statement about your assessment that the patient is improving, declining, or no change.  This information will be displayed automatically on your shift note.  Outcome: Progressing  Flowsheets (Taken 10/10/2022 9396)  Plan of Care Reviewed With: patient   Goal Outcome Evaluation:      Plan of Care Reviewed With: patient      Patient has generalized complaints that change from person to person.  Consistently reported constipation and abdominal discomfort.  Scheduled medications given patient declined further interventions.  Senna given for constipation.    SBA when up.  Steady gait.  Serial sodium checks.  Slowly improving.  Last check 125.      Patient needs frequent education related to fluid restrictions.

## 2022-10-10 NOTE — PLAN OF CARE
Problem: Plan of Care - These are the overarching goals to be used throughout the patient stay.    Goal: Optimal Comfort and Wellbeing  Outcome: Progressing    Problem: Risk for Delirium  Goal: Optimal Coping  Outcome: Progressing  Goal: Improved Behavioral Control  Outcome: Progressing  Goal: Improved Attention and Thought Clarity  Intervention: Maximize Cognitive Function  Recent Flowsheet Documentation  Taken 10/9/2022 1700 by Hanna Grant RN  Reorientation Measures:   clock in view   familiar social contact encouraged   calendar in view

## 2022-10-10 NOTE — PLAN OF CARE
Problem: Hypertension Comorbidity  Goal: Blood Pressure in Desired Range  Outcome: Progressing  Intervention: Maintain Blood Pressure Management  Recent Flowsheet Documentation  Taken 10/9/2022 6464 by Lily Andrade, RN  Medication Review/Management: medications reviewed     Problem: Electrolyte Imbalance  Goal: Electrolyte Balance  Outcome: Progressing   Goal Outcome Evaluation:   Pt continous to c/o abdominal pain however refusing to take tylenol . She had some rice cereal overnight. Calls appropriately, pure wick on place for strict 1/0.  Last Na lab was 124

## 2022-10-11 NOTE — PLAN OF CARE
Problem: Plan of Care - These are the overarching goals to be used throughout the patient stay.    Goal: Plan of Care Review/Shift Note  Description: The Plan of Care Review/Shift note should be completed every shift.  The Outcome Evaluation is a brief statement about your assessment that the patient is improving, declining, or no change.  This information will be displayed automatically on your shift note.  10/11/2022 1543 by Jennifer Crowe RN  Outcome: Progressing  Flowsheets (Taken 10/11/2022 1543)  Plan of Care Reviewed With: patient  10/11/2022 1542 by Jennifer Crowe RN  Outcome: Progressing  Flowsheets (Taken 10/11/2022 1502)  Plan of Care Reviewed With: patient  Goal: Absence of Hospital-Acquired Illness or Injury  Intervention: Identify and Manage Fall Risk  Recent Flowsheet Documentation  Taken 10/11/2022 0900 by Jennifer Crowe RN  Safety Promotion/Fall Prevention:   activity supervised   nonskid shoes/slippers when out of bed   mobility aid in reach   patient and family education  Intervention: Prevent Infection  Recent Flowsheet Documentation  Taken 10/11/2022 0900 by Jennifer Crowe RN  Infection Prevention: hand hygiene promoted  Goal: Optimal Comfort and Wellbeing  Intervention: Monitor Pain and Promote Comfort  Recent Flowsheet Documentation  Taken 10/11/2022 1015 by Jennifer Crowe RN  Pain Management Interventions: medication (see MAR)   Goal Outcome Evaluation:      Plan of Care Reviewed With: patient    Patient improving.  Sodium check in am 130.  Per nephrology note goal is 133.      Patient reporting some constipation.  Suppository and senna given.  Patient had some results reported improved.  Tolerating regular diet.      IV infiltrated.  MD okay to leave out.  Warm pack to site to help with comfort.  Continue to monitor.    Patient anxious frequently on call light with multiple complaints and needs.  Addressed concerns and listened to patient.

## 2022-10-11 NOTE — PROGRESS NOTES
[facility] DIGESTIVE HEALTH PROGRESS NOTE      Subjective:              Sister present during today's visit. Sister speaks English.   Reviewed duodenal ulcer - 2cm in size.  Recommend EGD for follow-up in 2 months.  This was communicated to them.   On oral iron BID. Was on this prior to admission. Now getting IV iron infusions.   Hgb improving.  Hyponatremia improving- sodium is 130 today.   Tolerating diet in small amounts.   Taking her PPI BID.    Objective:                Body mass index is 18.55 kg/m .  Vital signs in last 24 hrs;  Temp:  [97.9  F (36.6  C)-98  F (36.7  C)] 97.9  F (36.6  C)  Pulse:  [59-97] 97  Resp:  [14-18] 18  BP: (111-118)/(57-67) 111/67  SpO2:  [95 %-99 %] 96 %    Physical Exam:   General: Frail old lady female, not in acute distress. .   Cardiovascular: Regular rate. No edema  Chest: Non-labored breathing. Symmetric chest rise.   Abdomen: Non-distended.  BS positive.  Soft.  Non-tender to palpation.     Current Labs:  CMP Results:   Recent Labs   Lab 10/11/22  0619 10/10/22  1919 10/10/22  0741 10/10/22  0017 10/09/22  1203 10/09/22  0643 10/08/22  1005 10/08/22  0605   * 126* 125*  125* 124*   < > 125*   < > 125*  125*   POTASSIUM 4.0  --  4.4  --   --  4.3  --  4.4   CHLORIDE 96*  --  90*  --   --  91*  --  90*   CO2 25  --  23  --   --  25  --  27   GLC 95  --  85  --   --  92  --  86   BUN 8.3  --  9.1  --   --  10.3  --  10.7   CR 0.85  --  0.96*  --   --  1.03*  --  1.02*    < > = values in this interval not displayed.      CBC  Recent Labs   Lab 10/11/22  0619 10/10/22  0740 10/08/22  0605 10/07/22  0523 10/06/22  0604 10/05/22  0831 10/04/22  2148   WBC  --   --  8.1 8.0 8.7  --  9.1   RBC  --   --  2.55* 2.26* 2.27*  --  2.47*   HGB 9.9* 9.1* 8.8* 7.9* 7.9*   < > 8.8*   HCT  --   --  27.1* 24.0* 23.8*  --  25.6*   MCV  --   --  106* 106* 105*  --  104*   RDW  --   --  18.5* 18.6* 18.6*  --  18.5*   PLT  --   --  280 254 241  --  273    < > = values in this interval not  displayed.     INRNo lab results found in last 7 days.   LIPASENo lab results found in last 7 days.      Assessment:       Abd pain, anemia, abnormal CT findings.  1) Duodenal ulcer - S/p upper EGD on 10/05 with DU oozing blood. Path consistent with mild active chronic gastritis.  H. pylori negative.  Negative for dysplasia and malignancy.    2) Anemia - due to #1 above. Hemoglobin gradually increasing.  Is on oral iron prior to admission.  Now getting IV iron infusions here.     Plan:     1) Continue oral PPI bid.  Take first dose 30 minutes prior to first meal of the day.  Continue on this regimen until follow-up EGD in 2 months.  2) Continue carafate 1g tid.   3) Receiving IV iron here.  Would be reasonable to stop oral iron for 2 months and see if she is holding her iron levels.  Discussed with sister, but she feels that patient is okay to stay on oral iron.  Ultimately, either option should be OK, though would still make sure she is holding her iron levels in 2 months and then check iron levels again 2 months after that.         Nothing further from GI.  We will remain available for questions or concerns prior to discharge.          Emil Wiggins PA-C  Thank you for the opportunity to participate in the care of this patient.   Please feel free to call me with any questions or concerns.  Phone number [phone].

## 2022-10-11 NOTE — PROGRESS NOTES
Progress Note    Date of admission: 10/04/2022    Assessment/Plan  Chris Bell is a 87 year old old female presented with abdominal pain of few days duration.  Work-up showing possible gastritis.  Low sodium levels noted.     Abdominal pain secondary to gastritis   -CT showing features suggestive of gastritis and partial obstruction.    -S/p upper EGD on 10/05 which showed duodenal ulcer 2cm.Repeat EGD in 2 months.  -Continue oral PPI BID  -Biopsies from stomach is consistent with mild active chronic gastritis.  H. pylori immunostain is negative.  Duodenal ulcer biopsy is consistent with edema and acute duodenitis.  Negative for dysplasia and malignancy.  -Diet advanced to regular.  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   -Sodium today is around 130.  Patient has a history of SIADH.  -On PTA NaCl TID  -Nephrology consult appreciated.  -Nephrology  recommending to stop Cymbalta if possible,decrease frequency of Cymbalta administration every 72 hours.  Monitor for withdrawal symptoms.Plan to stop Cymbalta in 2 weeks period  -Fluid restriction to 1000 mm in 24 hours.  -S/p Tolvaptan X1 on 10/10    Chronic anemia   -Stable hemoglobin, follow serial hemoglobins closely  -Iron studies is indicative of borderline iron deficiency.  His iron saturation index of 20% and serum iron of 26.  -S/p venofer infusion for 3 days, was on PO iron prior.    History of CHF -Compensated currently  -Continue home meds, parameters placed  -On bumex,metoprolol     Hypertension-Stable   -Continue home meds parameters placed     Osteoporosis  -CT abdomen with skeletal windows showing chronic thoracic/lumbar compression fractures.  Continue current analgesia.     Positive COVID test  -Did have COVID few months ago as well so likely COVID recovered.      Elevated TSH  -TSH is 6.04.  Total T4, T3 requested  -Ultrasound of thyroid revealed Right thyroid 0.5 cm  TI-RADS 5 nodule. Needs follow-up ultrasound annually for 5 years.  -Started on Levothyroxine 50mcg    HypoMg - replace per protocol     DVT PPX : SCD    Barriers to discharge: Hyponatremia    Anticipated Discharge date  : 1-2 days    Subjective  Patient is new to me. Chart reviewed and events noted.  Patient seen and examined.   Sitting up in chair.States abd pain , same since 1 week. Last BM last night. No N/V.       Objective  Vital signs in last 24 hours  Temp:  [97.9  F (36.6  C)-98  F (36.7  C)] 97.9  F (36.6  C)  Pulse:  [59-97] 97  Resp:  [14-18] 18  BP: (111-118)/(57-67) 111/67  SpO2:  [95 %-99 %] 96 %    Input and Output in 24 hrs     Intake/Output Summary (Last 24 hours) at 10/6/2022 1340  Last data filed at 10/6/2022 0900  Gross per 24 hour   Intake 710 ml   Output 1453 ml   Net -743 ml       Physical Exam:  GEN:  Not in acute distress  HEENT: AT, NC  CHEST: Clear to auscultation bilaterally  HEART: S1S2   ABDOMEN: Soft. Tenderness epigastric, non distended. BS+  Extremities: No pedal edema  CNS: No focal neurological deficit. No involuntary movements. Awake, alert, oriented.   SKIN: No skin rash, no cyanosis or clubbing      Pertinent Labs   Lab Results: Personally Reviewed    Recent Results (from the past 24 hour(s))   Sodium    Collection Time: 10/10/22  7:19 PM   Result Value Ref Range    Sodium 126 (L) 136 - 145 mmol/L   Hemoglobin    Collection Time: 10/11/22  6:19 AM   Result Value Ref Range    Hemoglobin 9.9 (L) 11.7 - 15.7 g/dL   Renal panel    Collection Time: 10/11/22  6:19 AM   Result Value Ref Range    Sodium 130 (L) 136 - 145 mmol/L    Potassium 4.0 3.4 - 5.3 mmol/L    Chloride 96 (L) 98 - 107 mmol/L    Carbon Dioxide (CO2) 25 22 - 29 mmol/L    Anion Gap 9 7 - 15 mmol/L    Glucose 95 70 - 99 mg/dL    Urea Nitrogen 8.3 8.0 - 23.0 mg/dL    Creatinine 0.85 0.51 - 0.95 mg/dL    GFR Estimate 66 >60 mL/min/1.73m2    Calcium 8.3 (L) 8.8 - 10.2 mg/dL    Albumin 2.6 (L) 3.5 - 5.2 g/dL    Phosphorus  4.0 2.5 - 4.5 mg/dL   Magnesium    Collection Time: 10/11/22  6:19 AM   Result Value Ref Range    Magnesium 1.1 (L) 1.7 - 2.3 mg/dL       Pertinent Radiology   Reviewed thyroid US.    Advanced Care Planning  Case d/w patient, bedside RN, SW/CM    Mary Barnes MD  Hospitalist

## 2022-10-11 NOTE — PROGRESS NOTES
RENAL PROGRESS NOTE  CC: Hynonatremia    S: sleeping soundly and does not wake up to voice.  Ros not obtainable. nursing reports difficulty swallowing pills.  Assessment/ Recommendations:  1. Hyponatremia: History of SIADH and chronic diarrhea. Varying degrees of hyponatremia since at least Feb 2022. Hospitalized in July with severe hyponatremia at which time she was diagnosed with SIADH and started on bumex + 2g NaCL tabs TID. Presented to the hospital with a Na of 120, which trended down to 118 initially after getting NS at 75mg/hr over night, this is consistent with SIADH. Serum osoms 253, urin osoms 292, Meli 87 also consistent with SIADH also. Reports ongoing intermittent loose stools, but currently with obstruction. No nausea or emesis. Reports she has taken bumex and NaCl as prescribed since discharge in July.                 -Continue Bumex 0.5mg daily + 2g NaCL TID    -limit po fluid to 1 liter/day   -po intake remains poor, this will contribute to ongoing hyponatremia   -S/P  tolvaptan 15 mg on 10/10/22   -Trend daily sodium levels at present    2. Hypokalemia: on sodium chloride 10 mEq tab bid and amiloride   -K stable today.    3. Abdominal pain secondary to gastritis   -CT showing features suggestive of gastritis and partial obstruction.    -S/p upper EGD on 10/05 which showed duodenal ulcer 2cm.Repeat EGD in 2 months.  -Continue oral PPI BID  -Biopsies from stomach is consistent with mild active chronic gastritis.  H. pylori immunostain is negative.  Duodenal ulcer biopsy is consistent with edema and acute duodenitis.  Negative for dysplasia and malignancy.    4. Anemia - due to gastritis, iron stores low  -getting venofer, can stop po iron as will be iron replete after venofer, po iron may be contrib to poor po intake/GI symptoms    5. Thyroid nodule. TSH 6. Defer mgmt to Hosp med.      Gray Reynoso MD  [facility]  [phone] (off)         Physical Exam  /67 (BP  Location: Right arm)   Pulse 97   Temp 97.9  F (36.6  C) (Oral)   Resp 18   Wt 43.1 kg (95 lb)   SpO2 96%   BMI 18.55 kg/m     No data found.    Intake/Output Summary (Last 24 hours) at 10/6/2022 1212  Last data filed at 10/6/2022 0900  Gross per 24 hour   Intake 710 ml   Output 1453 ml   Net -743 ml       Physical Exam:   /67 (BP Location: Right arm)   Pulse 97   Temp 97.9  F (36.6  C) (Oral)   Resp 18   Wt 43.1 kg (95 lb)   SpO2 96%   BMI 18.55 kg/m       General: No acute distress, alert,  patient seen with use of telephone .    Eyes: Pupils equal, sclerae not icteric   ENT: no icterus, no lesions noted, goiter  Resp: CTA bilaterally, no distress, normal effort   CV: RRR without murmur, no hip/leg edema   GI: Soft NT NG   Psych: Appropriate mood and affect  Skin: No rash noted       Recent Labs   Lab 10/11/22  0619 10/10/22  1919 10/10/22  0741 10/10/22  0017 10/09/22  1851 10/09/22  1203 10/09/22  0643 10/08/22  1005 10/08/22  0605 10/07/22  0937 10/07/22  0523 10/05/22  1340 10/05/22  0831 10/04/22  2148   * 126* 125*  125* 124* 125* 123* 125*   < > 125*  125*   < > 125*  125*   < > 118* 120*   POTASSIUM 4.0  --  4.4  --   --   --  4.3  --  4.4  --  4.5  --  4.4 4.4   CHLORIDE 96*  --  90*  --   --   --  91*  --  90*  --  89*  --  87* 83*   CO2 25  --  23  --   --   --  25  --  27  --  26  --  25 29   BUN 8.3  --  9.1  --   --   --  10.3  --  10.7  --  7.6*  --  7.4* 7.8*   CR 0.85  --  0.96*  --   --   --  1.03*  --  1.02*  --  0.94  --  0.53 0.55   GFRESTIMATED 66  --  57*  --   --   --  52*  --  53*  --  58*  --  89 88   MANE 8.3*  --  7.8*  --   --   --  7.6*  --  7.9*  --  8.1*  --  9.2 9.3   MAG 1.1*  --   --   --   --   --   --   --   --   --   --   --   --   --    ALBUMIN 2.6*  --   --   --   --   --   --   --   --   --   --   --   --   --     < > = values in this interval not displayed.       Recent Labs   Lab 10/11/22  0619 10/10/22  0740 10/08/22  0605  10/07/22  0523 10/06/22  0604 10/05/22  0831 10/04/22  4987   WBC  --   --  8.1 8.0 8.7  --  9.1   HGB 9.9* 9.1* 8.8* 7.9* 7.9* 8.5* 8.8*   HCT  --   --  27.1* 24.0* 23.8*  --  25.6*   MCV  --   --  106* 106* 105*  --  104*   PLT  --   --  280 254 241  --  273             I personally reviewed these labs today

## 2022-10-11 NOTE — PLAN OF CARE
"  Problem: Hypertension Comorbidity  Goal: Blood Pressure in Desired Range  Outcome: Adequate for Care Transition   Goal Outcome Evaluation:  Her BP tonight was 118/57.  Problem: Pain Chronic (Persistent) (Comorbidity Management)  Goal: Acceptable Pain Control and Functional Ability  Outcome: Progressing  Intervention: Optimize Psychosocial Wellbeing  Recent Flowsheet Documentation  Taken 10/11/2022 0300 by Diamante Jones RN  Supportive Measures: active listening utilized   She had abdominal pain at 03 but declined tylenol stating \"I take too much of that.\"  At 0630 she took tylenol 500 for \"bottom pain\" and abdominal pain.                      "

## 2022-10-12 NOTE — PLAN OF CARE
Goal Outcome Evaluation:    Pt Hmong speaking and will need Language Line to interpret. Pt is on the Magnesium Replacement and writer administered one 2g bag of Magnesium . Pts IV infiltrated. MD brooks with pt having no IV , ice pack applied to area.  Pt now has orders for PO Magnesium depending on levels after re-draw. Pt has a pure wick in place had two BM's on this shift. Pt takes her medications crushed with a warm water slurry. Bed alarm on.     /62 (BP Location: Left arm)   Pulse 69   Temp 98  F (36.7  C) (Oral)   Resp 17   Wt 43.1 kg (95 lb)   SpO2 98%   BMI 18.55 kg/m        Plan of Care Reviewed With: patient    Overall Patient Progress: improvingOverall Patient Progress: improving        Problem: Plan of Care - These are the overarching goals to be used throughout the patient stay.    Goal: Absence of Hospital-Acquired Illness or Injury  Intervention: Identify and Manage Fall Risk  Recent Flowsheet Documentation  Taken 10/11/2022 1555 by Jessica Alvarez, RN  Safety Promotion/Fall Prevention:    activity supervised    nonskid shoes/slippers when out of bed    mobility aid in reach    patient and family education     Problem: Plan of Care - These are the overarching goals to be used throughout the patient stay.    Goal: Absence of Hospital-Acquired Illness or Injury  Intervention: Prevent Skin Injury  Recent Flowsheet Documentation  Taken 10/11/2022 1555 by Jessica Alvarez RN  Body Position: position changed independently     Problem: Plan of Care - These are the overarching goals to be used throughout the patient stay.    Goal: Readiness for Transition of Care  10/11/2022 2308 by Jessica Alvarez, RN  Outcome: Progressing     Problem: Risk for Delirium  Goal: Improved Behavioral Control  10/11/2022 2327 by Jessica Alvarez, RN  Outcome: Progressing     Problem: Risk for Delirium  Goal: Improved Sleep  10/11/2022 2308 by Jessica Alvarez, RN  Outcome: Progressing     Problem: Pain Chronic  (Persistent) (Comorbidity Management)  Goal: Acceptable Pain Control and Functional Ability  Intervention: Manage Persistent Pain  Recent Flowsheet Documentation  Taken 10/11/2022 1555 by Jessica Alvarez, RN  Bowel Elimination Promotion: adequate fluid intake promoted  Medication Review/Management: medications reviewed     Problem: Pain Chronic (Persistent) (Comorbidity Management)  Goal: Acceptable Pain Control and Functional Ability  Intervention: Optimize Psychosocial Wellbeing  Recent Flowsheet Documentation  Taken 10/11/2022 1555 by Jessica Alvarez, RN  Supportive Measures: active listening utilized     Problem: Electrolyte Imbalance  Goal: Electrolyte Balance  10/11/2022 2308 by Jessica Alvarez, RN  Outcome: Progressing

## 2022-10-12 NOTE — PROGRESS NOTES
RENAL PROGRESS NOTE  CC: Hynonatremia    S: No  available. Stable day per RN. Na remains stable at 130. No ROS obtainable.       Assessment/ Recommendations:  1. Acute on chronic Hyponatremia: (STABLE) History of SIADH and chronic diarrhea. Varying degrees of hyponatremia since at least Feb 2022. Hospitalized in July with severe hyponatremia at which time she was diagnosed with SIADH and started on bumex + 2g NaCL tabs TID. Presented to the hospital with a Na of 120, which trended down to 118 initially after getting NS at 75mg/hr over night, this is consistent with SIADH. Serum osoms 253, urin osoms 292, Meli 87 also consistent with SIADH also.                  -Continue Bumex 0.5mg daily + 2g NaCL TID as previous   -limit po fluid to 1 liter/day   - Encourage improved solute intake    -S/P  tolvaptan 15 mg on 10/10/22 - no further dosing needed   -Trend daily sodium levels at present    2. Hypokalemia: on sodium chloride 10 mEq tab bid and amiloride   -K stable today.    3. Abdominal pain secondary to gastritis   -CT showing features suggestive of gastritis and partial obstruction.    -S/p upper EGD on 10/05 which showed duodenal ulcer 2cm.Repeat EGD in 2 months.  -Continue oral PPI BID  -Biopsies from stomach is consistent with mild active chronic gastritis.  H. pylori immunostain is negative.  Duodenal ulcer biopsy is consistent with edema and acute duodenitis.  Negative for dysplasia and malignancy.    4. Anemia - due to gastritis, iron stores low  -getting venofer, can stop po iron as will be iron replete after venofer, po iron may be contrib to poor po intake/GI symptoms    5. Thyroid nodule. TSH 6. Defer mgmt to Hosp med.    OK to discharge from renal standpoint       Gray Reynoso MD  [facility]  [phone] (off)         Physical Exam  /67 (BP Location: Right arm)   Pulse 78   Temp 97.7  F (36.5  C) (Oral)   Resp 16   Wt 43.1 kg (95 lb)   SpO2 98%   BMI  18.55 kg/m     Patient Vitals for the past 96 hrs:   Weight   10/12/22 0441 43.1 kg (95 lb)       Intake/Output Summary (Last 24 hours) at 10/6/2022 1212  Last data filed at 10/6/2022 0900  Gross per 24 hour   Intake 710 ml   Output 1453 ml   Net -743 ml       Physical Exam:   /67 (BP Location: Right arm)   Pulse 78   Temp 97.7  F (36.5  C) (Oral)   Resp 16   Wt 43.1 kg (95 lb)   SpO2 98%   BMI 18.55 kg/m       General: No acute distress, alert   Eyes: Pupils equal, sclerae not icteric   ENT: no icterus, no lesions noted, goiter  Resp: CTA bilaterally, no distress, normal effort   CV: RRR without murmur, no hip/leg edema   GI: Soft NT NG   Psych: Appropriate mood and affect  Skin: No rash noted       Recent Labs   Lab 10/12/22  0607 10/12/22  0026 10/11/22  1802 10/11/22  0619 10/10/22  1919 10/10/22  0741 10/10/22  0017 10/09/22  1851 10/09/22  1203 10/09/22  0643 10/08/22  1005 10/08/22  0605 10/07/22  0937 10/07/22  0523   *  --   --  130* 126* 125*  125* 124* 125* 123* 125*   < > 125*  125*   < > 125*  125*   POTASSIUM 3.7  --   --  4.0  --  4.4  --   --   --  4.3  --  4.4  --  4.5   CHLORIDE 98  --   --  96*  --  90*  --   --   --  91*  --  90*  --  89*   CO2 23  --   --  25  --  23  --   --   --  25  --  27  --  26   BUN 10.7  --   --  8.3  --  9.1  --   --   --  10.3  --  10.7  --  7.6*   CR 0.98*  --   --  0.85  --  0.96*  --   --   --  1.03*  --  1.02*  --  0.94   GFRESTIMATED 56*  --   --  66  --  57*  --   --   --  52*  --  53*  --  58*   MANE 8.3*  --   --  8.3*  --  7.8*  --   --   --  7.6*  --  7.9*  --  8.1*   MAG 2.2 2.5* 1.5* 1.1*  --   --   --   --   --   --   --   --   --   --    ALBUMIN  --   --   --  2.6*  --   --   --   --   --   --   --   --   --   --     < > = values in this interval not displayed.       Recent Labs   Lab 10/11/22  0619 10/10/22  0740 10/08/22  0605 10/07/22  0523 10/06/22  0604   WBC  --   --  8.1 8.0 8.7   HGB 9.9* 9.1* 8.8* 7.9* 7.9*   HCT  --   --   27.1* 24.0* 23.8*   MCV  --   --  106* 106* 105*   PLT  --   --  [phone]             I personally reviewed these labs today

## 2022-10-12 NOTE — PROGRESS NOTES
Progress Note    Date of admission: 10/04/2022    Assessment/Plan  Chris Bell is a 87 year old old female presented with abdominal pain of few days duration.  Work-up showing possible gastritis.  Low sodium levels noted.     Abdominal pain secondary to gastritis   -CT showing features suggestive of gastritis and partial obstruction.    -S/p upper EGD on 10/05 which showed duodenal ulcer 2cm.Repeat EGD in 2 months.  -Continue oral PPI BID, carafate 1gm TID.  -Biopsies from stomach is consistent with mild active chronic gastritis.  H. pylori immunostain is negative.  Duodenal ulcer biopsy is consistent with edema and acute duodenitis.  Negative for dysplasia and malignancy.  -Diet advanced to regular. GI following.          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   -Sodium today is around 130.  Patient has a history of SIADH.  -On PTA NaCl TID and bumex 0.5 mg daily.  -Nephrology consult appreciated.  -Nephrology  recommending to stop Cymbalta if possible,decrease frequency of Cymbalta administration every 72 hours.  Monitor for withdrawal symptoms.Plan to stop Cymbalta in 2 weeks period  -Fluid restriction to 1000 mm in 24 hours.  -S/p Tolvaptan X1 on 10/10    Chronic anemia   -Stable hemoglobin, follow serial hemoglobins closely  -Iron studies is indicative of borderline iron deficiency.  His iron saturation index of 20% and serum iron of 26.  -S/p venofer infusion for 3 days, was on PO iron prior. Resuming PO iron per family.    History of CHF -Compensated currently  -Continue home meds, parameters placed  -On bumex,metoprolol     Hypertension-Stable   -Continue home meds parameters placed     Osteoporosis  -CT abdomen with skeletal windows showing chronic thoracic/lumbar compression fractures.  Continue current analgesia.     Positive COVID test  -Did have COVID few months ago as well so likely COVID recovered.      Elevated TSH  -TSH is 6.04.  Total  T4, T3 requested  -Ultrasound of thyroid revealed Right thyroid 0.5 cm TI-RADS 5 nodule. Needs follow-up ultrasound annually for 5 years.  -Started on Levothyroxine 50mcg    HypoMg - replace per protocol     DVT PPX : SCD    Barriers to discharge: Hyponatremia    Anticipated Discharge date  : 1-2 days    Subjective  Chart reviewed and events noted.  Patient seen and examined.   Sitting up in chair. States feels the same as yest. No N/V.No acute events overnight.      Objective  Vital signs in last 24 hours  Temp:  [97.7  F (36.5  C)-98  F (36.7  C)] 97.7  F (36.5  C)  Pulse:  [69-78] 78  Resp:  [16-18] 16  BP: (101-128)/(61-67) 128/67  SpO2:  [98 %] 98 %    Input and Output in 24 hrs     Intake/Output Summary (Last 24 hours) at 10/6/2022 1340  Last data filed at 10/6/2022 0900  Gross per 24 hour   Intake 710 ml   Output 1453 ml   Net -743 ml       Physical Exam:  GEN:  Not in acute distress  HEENT: AT, NC  CHEST: Clear to auscultation bilaterally  HEART: S1S2   ABDOMEN: Soft. NT, ND.  Extremities: No pedal edema  CNS: No focal neurological deficit. No involuntary movements. Awake, alert, oriented.   SKIN: No skin rash, no cyanosis or clubbing      Pertinent Labs   Lab Results: Personally Reviewed    Recent Results (from the past 24 hour(s))   Magnesium    Collection Time: 10/11/22  6:02 PM   Result Value Ref Range    Magnesium 1.5 (L) 1.7 - 2.3 mg/dL   Magnesium    Collection Time: 10/12/22 12:26 AM   Result Value Ref Range    Magnesium 2.5 (H) 1.7 - 2.3 mg/dL   Basic metabolic panel    Collection Time: 10/12/22  6:07 AM   Result Value Ref Range    Sodium 130 (L) 136 - 145 mmol/L    Potassium 3.7 3.4 - 5.3 mmol/L    Chloride 98 98 - 107 mmol/L    Carbon Dioxide (CO2) 23 22 - 29 mmol/L    Anion Gap 9 7 - 15 mmol/L    Urea Nitrogen 10.7 8.0 - 23.0 mg/dL    Creatinine 0.98 (H) 0.51 - 0.95 mg/dL    Calcium 8.3 (L) 8.8 - 10.2 mg/dL    Glucose 91 70 - 99 mg/dL    GFR Estimate 56 (L) >60 mL/min/1.73m2   Magnesium     Collection Time: 10/12/22  6:07 AM   Result Value Ref Range    Magnesium 2.2 1.7 - 2.3 mg/dL       Pertinent Radiology   Reviewed thyroid US.    Advanced Care Planning  Case d/w patient, SW/CM    Mary Barnes MD  Hospitalist

## 2022-10-12 NOTE — PLAN OF CARE
Problem: Plan of Care - These are the overarching goals to be used throughout the patient stay.    Goal: Optimal Comfort and Wellbeing  Outcome: Progressing     Problem: Risk for Delirium  Goal: Improved Behavioral Control  Outcome: Progressing     Problem: Pain Chronic (Persistent) (Comorbidity Management)  Goal: Acceptable Pain Control and Functional Ability  Outcome: Progressing   Pt pleasant throughout shift.  She took medications crushed with warm water.  Up to chair for breakfast.  Family brought in lunch for patient

## 2022-10-12 NOTE — PROGRESS NOTES
Alert. Hmong primary language, use of language line . Purewick in place. Pills crushed with warm water. Mag protocol. No PIV. Hourly rounding completed, continuing to monitor.    Blood pressure 123/61, pulse 74, temperature 98  F (36.7  C), temperature source Oral, resp. rate 18, weight 43.1 kg (95 lb), SpO2 98 %.    Ines Alanis RN

## 2022-10-12 NOTE — PROGRESS NOTES
Care Management Follow Up    Length of Stay (days): 7    Expected Discharge Date: 10/13/2022     Concerns to be Addressed:    Monitor Na+    Patient plan of care discussed at interdisciplinary rounds: Yes    Anticipated Discharge Disposition: Home back to foster care home     Anticipated Discharge Services:  TBD  Anticipated Discharge DME:  TBD    Patient/family educated on Medicare website which has current facility and service quality ratings:    Education Provided on the Discharge Plan:    Patient/Family in Agreement with the Plan:      Referrals Placed by CM/SW: none   Private pay costs discussed: Not applicable    Additional Information:  CM spoke to Russ from the foster home and they can take pt back anytime just let them know a few hours in advance. Pt will need medical transport set up at time of discharge.    Russ [phone]      Juanis Nazario RN

## 2022-10-13 NOTE — PROGRESS NOTES
Progress Note    Date of admission: 10/04/2022    Assessment/Plan  Chris Bell is a 87 year old old female presented with abdominal pain of few days duration.  Work-up showing possible gastritis.  Low sodium levels noted.     Abdominal pain secondary to gastritis   -CT showing features suggestive of gastritis and partial obstruction.    -S/p upper EGD on 10/05 which showed duodenal ulcer 2cm.Repeat EGD in 2 months.  -Continue oral PPI BID, carafate 1gm TID.  -Biopsies from stomach is consistent with mild active chronic gastritis.  H. pylori immunostain is negative.  Duodenal ulcer biopsy is consistent with edema and acute duodenitis.  Negative for dysplasia and malignancy.  -Diet advanced to regular. GI following.          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   -Sodium dropped from 130-->129 today. Patient has a history of SIADH.  -On PTA NaCl TID and bumex 0.5 mg daily.  -Nephrology consult appreciated.  -Nephrology  recommending to stop Cymbalta if possible,decrease frequency of Cymbalta administration every 72 hours.  Monitor for withdrawal symptoms.Plan to stop Cymbalta in 2 weeks period  -Fluid restriction to 1000 mm in 24 hours.  -S/p Tolvaptan X1 on 10/10    Chronic anemia   -Stable hemoglobin, follow serial hemoglobins closely  -Iron studies is indicative of borderline iron deficiency.  His iron saturation index of 20% and serum iron of 26.  -S/p venofer infusion for 3 days, was on PO iron prior. Resuming PO iron per family.    History of CHF -Compensated currently  -Continue home meds, parameters placed  -On bumex,metoprolol     Hypertension-Stable   -Continue home meds parameters placed     Osteoporosis  -CT abdomen with skeletal windows showing chronic thoracic/lumbar compression fractures.  Continue current analgesia.     Positive COVID test  -Did have COVID few months ago as well so likely COVID recovered.      Elevated TSH  -TSH is  6.04.  Total T4, T3 requested  -Ultrasound of thyroid revealed Right thyroid 0.5 cm TI-RADS 5 nodule. Needs follow-up ultrasound annually for 5 years.  -Started on Levothyroxine 50mcg    HypoMg - replace per protocol     DVT PPX : SCD    Barriers to discharge: Hyponatremia    Anticipated Discharge date  : 1-2 days    Subjective  Chart reviewed and events noted.  Patient seen and examined.   Sitting up in chair. Son by bedside. States pain at site of her IV site placed prior. No abd pain, N/V.       Objective  Vital signs in last 24 hours  Temp:  [97.7  F (36.5  C)-98.7  F (37.1  C)] 97.9  F (36.6  C)  Pulse:  [70-80] 77  Resp:  [16] 16  BP: (107-120)/(56-67) 107/56  SpO2:  [94 %-100 %] 96 %    Input and Output in 24 hrs     Intake/Output Summary (Last 24 hours) at 10/6/2022 1340  Last data filed at 10/6/2022 0900  Gross per 24 hour   Intake 710 ml   Output 1453 ml   Net -743 ml       Physical Exam:  GEN:  Not in acute distress  HEENT: AT, NC  CHEST: Clear to auscultation bilaterally  HEART: S1S2   ABDOMEN: Soft. NT, ND.  Extremities: No pedal edema  CNS: No focal neurological deficit. No involuntary movements. Awake, alert, oriented.   SKIN: No skin rash, no cyanosis or clubbing      Pertinent Labs   Lab Results: Personally Reviewed    Recent Results (from the past 24 hour(s))   Basic metabolic panel    Collection Time: 10/13/22  6:11 AM   Result Value Ref Range    Sodium 129 (L) 136 - 145 mmol/L    Potassium 3.9 3.4 - 5.3 mmol/L    Chloride 96 (L) 98 - 107 mmol/L    Carbon Dioxide (CO2) 23 22 - 29 mmol/L    Anion Gap 10 7 - 15 mmol/L    Urea Nitrogen 14.4 8.0 - 23.0 mg/dL    Creatinine 0.91 0.51 - 0.95 mg/dL    Calcium 8.2 (L) 8.8 - 10.2 mg/dL    Glucose 89 70 - 99 mg/dL    GFR Estimate 61 >60 mL/min/1.73m2   Magnesium    Collection Time: 10/13/22  6:11 AM   Result Value Ref Range    Magnesium 1.7 1.7 - 2.3 mg/dL       Pertinent Radiology   Reviewed thyroid US.    Advanced Care Planning  Case d/w patient, her son  Shiv by bedside, SW/CM    Mary Barnes MD  Hospitalist

## 2022-10-13 NOTE — PLAN OF CARE
Problem: Pain Chronic (Persistent) (Comorbidity Management)  Goal: Acceptable Pain Control and Functional Ability  Outcome: Progressing  Intervention: Develop Pain Management Plan  Recent Flowsheet Documentation  Taken 10/13/2022 1034 by Sarah Gonzales, RN  Pain Management Interventions:    medication (see MAR)    emotional support  Intervention: Manage Persistent Pain  Recent Flowsheet Documentation  Taken 10/13/2022 1000 by Sarah Gonzales, RN  Medication Review/Management: medications reviewed    PRN oxycodone given for lower back and left arm pain; per pt helpful. Slept afterwards.  T-pump in use for left upper arm pain; helpful. Pt had no further complaint.    Pt's son visited; Writer re-instructed pt and son regarding fluid restriction and sodium levels per MD; Pt continues to need teaching reinforcement.       Goal Outcome Evaluation:

## 2022-10-13 NOTE — PROGRESS NOTES
RENAL PROGRESS NOTE  CC: Hynonatremia    S: No acute distress. Seen with . No sob. No pain.     Assessment/ Recommendations:  1. Acute on chronic Hyponatremia:  History of SIADH and chronic diarrhea. Varying degrees of hyponatremia since at least Feb 2022. Hospitalized in July with severe hyponatremia at which time she was diagnosed with SIADH and started on bumex + 2g NaCL tabs TID. Presented to the hospital with a Na of 120, which trended down to 118 initially after getting NS at 75mg/hr over night, this is consistent with SIADH. Serum osoms 253, urin osoms 292, Meli 87 also consistent with SIADH also.       -129 today, noted changes below. Will repeat this afternoon to trend.               -Continue Bumex 0.5mg daily(change to BID on 10/13) + 2g NaCL TID as previous   -limit po fluid to 1 liter/day   - Encourage improved solute intake    -S/P  tolvaptan 15 mg on 10/10/22 - no further dosing needed   -Trend daily sodium levels at present   -Noted cymbalta taper.     2. Hypokalemia: on sodium chloride 10 mEq tab bid and amiloride   -K stable today.    3. Abdominal pain secondary to gastritis   -CT showing features suggestive of gastritis and partial obstruction.    -S/p upper EGD on 10/05 which showed duodenal ulcer 2cm.Repeat EGD in 2 months.  -Continue oral PPI BID  -Biopsies from stomach is consistent with mild active chronic gastritis.  H. pylori immunostain is negative.  Duodenal ulcer biopsy is consistent with edema and acute duodenitis.  Negative for dysplasia and malignancy.    4. Anemia - due to gastritis, iron stores low  -getting venofer, can stop po iron as will be iron replete after venofer, po iron may be contrib to poor po intake/GI symptoms    5. Thyroid nodule. TSH 6. Defer mgmt to Hosp med.    Roge López DO  [facility]  [phone] (off)         Physical Exam  /60 (BP Location: Right arm)   Pulse 88   Temp 98.2  F (36.8  C) (Oral)   Resp 18   Wt  43.1 kg (95 lb)   SpO2 97%   BMI 18.55 kg/m     Patient Vitals for the past 96 hrs:   Weight   10/12/22 0441 43.1 kg (95 lb)       Intake/Output Summary (Last 24 hours) at 10/6/2022 1212  Last data filed at 10/6/2022 0900  Gross per 24 hour   Intake 710 ml   Output 1453 ml   Net -743 ml       Physical Exam:   /60 (BP Location: Right arm)   Pulse 88   Temp 98.2  F (36.8  C) (Oral)   Resp 18   Wt 43.1 kg (95 lb)   SpO2 97%   BMI 18.55 kg/m       General: No acute distress, alert   Eyes: Pupils equal, sclerae not icteric   ENT: no icterus, no lesions noted, goiter  Resp: CTA bilaterally, no distress, normal effort   CV: RRR without murmur, no hip/leg edema   GI: Soft NT NG   Psych: Appropriate mood and affect  Skin: No rash noted       Recent Labs   Lab 10/13/22  0611 10/12/22  0607 10/12/22  0026 10/11/22  1802 10/11/22  0619 10/10/22  1919 10/10/22  0741 10/10/22  0017 10/09/22  1851 10/09/22  1203 10/09/22  0643 10/08/22  1005 10/08/22  0605 10/07/22  0937 10/07/22  0523   * 130*  --   --  130* 126* 125*  125* 124* 125*   < > 125*   < > 125*  125*   < > 125*  125*   POTASSIUM 3.9 3.7  --   --  4.0  --  4.4  --   --   --  4.3  --  4.4  --  4.5   CHLORIDE 96* 98  --   --  96*  --  90*  --   --   --  91*  --  90*  --  89*   CO2 23 23  --   --  25  --  23  --   --   --  25  --  27  --  26   BUN 14.4 10.7  --   --  8.3  --  9.1  --   --   --  10.3  --  10.7  --  7.6*   CR 0.91 0.98*  --   --  0.85  --  0.96*  --   --   --  1.03*  --  1.02*  --  0.94   GFRESTIMATED 61 56*  --   --  66  --  57*  --   --   --  52*  --  53*  --  58*   MANE 8.2* 8.3*  --   --  8.3*  --  7.8*  --   --   --  7.6*  --  7.9*  --  8.1*   MAG 1.7 2.2 2.5* 1.5* 1.1*  --   --   --   --   --   --   --   --   --   --    ALBUMIN  --   --   --   --  2.6*  --   --   --   --   --   --   --   --   --   --     < > = values in this interval not displayed.       Recent Labs   Lab 10/11/22  0619 10/10/22  0740 10/08/22  0605 10/07/22  0523    WBC  --   --  8.1 8.0   HGB 9.9* 9.1* 8.8* 7.9*   HCT  --   --  27.1* 24.0*   MCV  --   --  106* 106*   PLT  --   --  280 254             I personally reviewed these labs today

## 2022-10-13 NOTE — PROGRESS NOTES
CLINICAL NUTRITION SERVICES    Reviewed nutrition risk factors due to LOS. Pt is tolerating diet, eating well per nursing documentation, stable weight. Renal/MD managing SIADH. No nutrition issues identified at this time. RD will follow peripherally at this time, unless consulted.

## 2022-10-14 NOTE — PLAN OF CARE
Problem: Plan of Care - These are the overarching goals to be used throughout the patient stay.    Goal: Optimal Comfort and Wellbeing  Intervention: Monitor Pain and Promote Comfort  Recent Flowsheet Documentation  Taken 10/13/2022 2355 by Charli Peter RN  Pain Management Interventions:   quiet environment facilitated   repositioned     Problem: Pain Chronic (Persistent) (Comorbidity Management)  Goal: Acceptable Pain Control and Functional Ability  Intervention: Manage Persistent Pain  Recent Flowsheet Documentation  Taken 10/13/2022 2355 by Charli Peter RN  Medication Review/Management: medications reviewed     Problem: Electrolyte Imbalance  Goal: Electrolyte Balance  Outcome: Progressing     Goal Outcome Evaluation:         Pt alert & able to make needs known. C/o 5/10 pain. PRN Tylenol given.

## 2022-10-14 NOTE — PROGRESS NOTES
Care Management Follow Up    Length of Stay (days): 9    Expected Discharge Date: 10/15/2022     Concerns to be Addressed:    Hyponatremia   Patient plan of care discussed at interdisciplinary rounds: Yes    Anticipated Discharge Disposition: Back to  Her foster home     Anticipated Discharge Services:  TBD  Anticipated Discharge DME:  TBD    Patient/family educated on Medicare website which has current facility and service quality ratings:    Education Provided on the Discharge Plan:    Patient/Family in Agreement with the Plan:      Referrals Placed by CM/SW:  None at this time  Private pay costs discussed: Not applicable    Additional Information:  CM spoke to Russ (from the foster home) on 10/12/22 about the availability to take pt back and she said they can take back anytime just would like a few hours notice. Will also need transport set up. RNCM to follow for medical progression, recommendations, and final discharge plan.        Juanis Nazario RN

## 2022-10-14 NOTE — PROGRESS NOTES
Progress Note    Date of admission: 10/04/2022    Assessment/Plan  Chris Bell is a 87 year old old female presented with abdominal pain of few days duration.  Work-up showing possible gastritis.  Low sodium levels noted.     Abdominal pain secondary to gastritis   -CT showing features suggestive of gastritis and partial obstruction.    -S/p upper EGD on 10/05 which showed duodenal ulcer 2cm.Repeat EGD in 2 months.  -Continue oral PPI BID, carafate 1gm TID.  -Biopsies from stomach is consistent with mild active chronic gastritis.  H. pylori immunostain is negative.  Duodenal ulcer biopsy is consistent with edema and acute duodenitis.  Negative for dysplasia and malignancy.  -Diet advanced to regular. GI following.          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   -Sodium dropped from 129--?124 today. Patient has a history of SIADH.  -On PTA NaCl TID and bumex changed from 0.5 mg daily to BID.  -Nephrology consult appreciated.  -Nephrology  recommending to stop Cymbalta if possible,decrease frequency of Cymbalta administration every 72 hours.  Monitor for withdrawal symptoms.Plan to stop Cymbalta in 2 weeks period, last date in 10/17  -Fluid restriction to 1000 mm in 24 hours.  -S/p Tolvaptan X1 on 10/10    Chronic anemia   -Stable hemoglobin, follow hemoglobins prn  -Iron studies is indicative of borderline iron deficiency.  His iron saturation index of 20% and serum iron of 26.  -S/p venofer infusion for 3 days, was on PO iron prior.     History of CHF -Compensated currently  -Continue home meds, parameters placed  -On bumex,metoprolol     Hypertension-Stable   -Continue home meds parameters placed     Osteoporosis  -CT abdomen with skeletal windows showing chronic thoracic/lumbar compression fractures.  Continue current analgesia.     Positive COVID test  -Did have COVID few months ago as well so likely COVID recovered.      Elevated TSH  -TSH is  6.04.  Total T4, T3 requested  -Ultrasound of thyroid revealed Right thyroid 0.5 cm TI-RADS 5 nodule. Needs follow-up ultrasound annually for 5 years.  -Started on Levothyroxine 50mcg    HypoMg - replace per protocol    Constipation  -Changed bowel regimen from prn to scheduled today     DVT PPX : SCD    Barriers to discharge: Hyponatremia    Anticipated Discharge date  : 1-2 days    Subjective  Chart reviewed and events noted.  Patient seen and examined using . .   Sitting up in chair. States back still hurts. Also, reports constipation and last BM 4 days ago. No chest pain, SOB, abd pain, N/V, cough.       Objective  Vital signs in last 24 hours  Temp:  [97.9  F (36.6  C)-98.3  F (36.8  C)] 97.9  F (36.6  C)  Pulse:  [79-92] 88  Resp:  [16] 16  BP: ()/(53-65) 120/65  SpO2:  [96 %-100 %] 97 %    Input and Output in 24 hrs     Intake/Output Summary (Last 24 hours) at 10/6/2022 1340  Last data filed at 10/6/2022 0900  Gross per 24 hour   Intake 710 ml   Output 1453 ml   Net -743 ml       Physical Exam:  GEN:  Not in acute distress  HEENT: AT, NC  CHEST: Clear to auscultation bilaterally  HEART: S1S2   ABDOMEN: Soft. NT, ND.  Extremities: No pedal edema  CNS: No focal neurological deficit. No involuntary movements. Awake, alert, oriented.   SKIN: No skin rash, no cyanosis or clubbing      Pertinent Labs   Lab Results: Personally Reviewed    Recent Results (from the past 24 hour(s))   Sodium    Collection Time: 10/13/22  3:03 PM   Result Value Ref Range    Sodium 128 (L) 136 - 145 mmol/L   Basic metabolic panel    Collection Time: 10/14/22  6:32 AM   Result Value Ref Range    Sodium 124 (L) 136 - 145 mmol/L    Potassium 3.5 3.4 - 5.3 mmol/L    Chloride 96 (L) 98 - 107 mmol/L    Carbon Dioxide (CO2) 22 22 - 29 mmol/L    Anion Gap 6 (L) 7 - 15 mmol/L    Urea Nitrogen 13.8 8.0 - 23.0 mg/dL    Creatinine 0.92 0.51 - 0.95 mg/dL    Calcium 8.0 (L) 8.8 - 10.2 mg/dL    Glucose 88 70 - 99 mg/dL    GFR Estimate  60 (L) >60 mL/min/1.73m2   Magnesium    Collection Time: 10/14/22  6:32 AM   Result Value Ref Range    Magnesium 1.3 (L) 1.7 - 2.3 mg/dL       Pertinent Radiology   Reviewed thyroid US.    Advanced Care Planning  Case d/w patient, bedside RN, SW/CM    Mary Barnes MD  Hospitalist

## 2022-10-14 NOTE — PROVIDER NOTIFICATION
MD Notification    Person notified:MD    Person Name:Dr Barnes    Date/Time:10/14/22@3:22PM    Interaction:vocera text    Purpose of Notification:223 BV, Mg 1.3, no IV access, Mg replacement protocol will not allow me to order oral replacement, do you want me to contact pharmacist or do you want to place order for oral mag?       Orders Received:     Comments: MD will order oral replacement

## 2022-10-14 NOTE — PLAN OF CARE
"  Problem: Plan of Care - These are the overarching goals to be used throughout the patient stay.    Goal: Plan of Care Review/Shift Note  Description: The Plan of Care Review/Shift note should be completed every shift.  The Outcome Evaluation is a brief statement about your assessment that the patient is improving, declining, or no change.  This information will be displayed automatically on your shift note.  Outcome: Progressing  Goal: Patient-Specific Goal (Individualized)  Description: You can add care plan individualizations to a care plan. Examples of Individualization might be:  \"Parent requests to be called daily at 9am for status\", \"I have a hard time hearing out of my right ear\", or \"Do not touch me to wake me up as it startles me\".  Outcome: Progressing  Goal: Absence of Hospital-Acquired Illness or Injury  Outcome: Progressing  Intervention: Identify and Manage Fall Risk  Recent Flowsheet Documentation  Taken 10/14/2022 0800 by Judi Hollis RN  Safety Promotion/Fall Prevention:   activity supervised   bed alarm on   clutter free environment maintained   fall prevention program maintained   lighting adjusted   mobility aid in reach   nonskid shoes/slippers when out of bed   room door open   safety round/check completed  Intervention: Prevent Infection  Recent Flowsheet Documentation  Taken 10/14/2022 0800 by Judi Hollis RN  Infection Prevention:   hand hygiene promoted   rest/sleep promoted   single patient room provided  Goal: Optimal Comfort and Wellbeing  Outcome: Progressing  Intervention: Monitor Pain and Promote Comfort  Recent Flowsheet Documentation  Taken 10/14/2022 1440 by Judi Hollis RN  Pain Management Interventions:   distraction   emotional support   repositioned  Taken 10/14/2022 1344 by Judi Hollis RN  Pain Management Interventions:   medication (see MAR)   distraction   emotional support   heat applied  Taken 10/14/2022 1135 by Judi Hollis RN  Pain Management Interventions:   " distraction   emotional support   repositioned  Taken 10/14/2022 1038 by Judi Hollis RN  Pain Management Interventions:   medication (see MAR)   distraction   emotional support   repositioned  Goal: Readiness for Transition of Care  Outcome: Progressing     Problem: Risk for Delirium  Goal: Optimal Coping  Outcome: Progressing  Goal: Improved Behavioral Control  Outcome: Progressing  Goal: Improved Attention and Thought Clarity  Outcome: Progressing  Goal: Improved Sleep  Outcome: Progressing     Problem: Pain Chronic (Persistent) (Comorbidity Management)  Goal: Acceptable Pain Control and Functional Ability  Outcome: Progressing  Intervention: Develop Pain Management Plan  Recent Flowsheet Documentation  Taken 10/14/2022 1440 by Judi Hollis RN  Pain Management Interventions:   distraction   emotional support   repositioned  Taken 10/14/2022 1344 by Judi Hollis RN  Pain Management Interventions:   medication (see MAR)   distraction   emotional support   heat applied  Taken 10/14/2022 1135 by Judi Hollis RN  Pain Management Interventions:   distraction   emotional support   repositioned  Taken 10/14/2022 1038 by Judi Hlolis RN  Pain Management Interventions:   medication (see MAR)   distraction   emotional support   repositioned  Intervention: Manage Persistent Pain  Recent Flowsheet Documentation  Taken 10/14/2022 0800 by Judi Hollis RN  Medication Review/Management: medications reviewed     Problem: Electrolyte Imbalance  Goal: Electrolyte Balance  Outcome: Progressing   Goal Outcome Evaluation:

## 2022-10-14 NOTE — PLAN OF CARE
Problem: Plan of Care - These are the overarching goals to be used throughout the patient stay.    Goal: Absence of Hospital-Acquired Illness or Injury  Intervention: Identify and Manage Fall Risk  Recent Flowsheet Documentation  Taken 10/13/2022 1800 by Hanna Grant RN  Safety Promotion/Fall Prevention: activity supervised     Problem: Risk for Delirium  Goal: Optimal Coping  Outcome: Progressing  Intervention: Optimize Psychosocial Adjustment to Delirium  Recent Flowsheet Documentation  Taken 10/13/2022 1800 by Hanna Grant RN  Supportive Measures: active listening utilized  Goal: Improved Behavioral Control  Outcome: Progressing  Goal: Improved Attention and Thought Clarity  Outcome: Progressing  Intervention: Maximize Cognitive Function  Recent Flowsheet Documentation  Taken 10/13/2022 1800 by Hanna Grant RN  Reorientation Measures:   clock in view   familiar social contact encouraged   calendar in view  Goal: Improved Sleep  Outcome: Progressing

## 2022-10-14 NOTE — PROGRESS NOTES
RENAL PROGRESS NOTE  CC: Hynonatremia    S: Seen with . Denies sob, nausea or pain. No large urine output noted. Unclear how much patient understands with fluid restriction.     Assessment/ Recommendations:  1. Acute on chronic Hyponatremia:  History of SIADH and chronic diarrhea. Varying degrees of hyponatremia since at least Feb 2022. Hospitalized in July with severe hyponatremia at which time she was diagnosed with SIADH and started on bumex + 2g NaCL tabs TID. Presented to the hospital with a Na of 120, which trended down to 118 initially after getting NS at 75mg/hr over night, this is consistent with SIADH. Serum osoms 253, urin osoms 292, Meli 87 also consistent with SIADH also.       -129 today, noted changes below. Will repeat this afternoon to trend.               -Continue Bumex 0.5mg daily(change to BID on 10/13) + 2g NaCL TID as previous   -limit po fluid to 1 liter/day   - Encourage improved solute intake    -S/P  tolvaptan 15 mg on 10/10/22 -    -With drop to 124 am of 10/14 give dose of tolvaptan(on repeat sodium of 130 and concern 124 accurate?- no large output recorded(has purewick) improved only one hour after tolvaptan) Will need to monitor today with tolvaptan. q6 hour sodiums.  Possible discharge tomorrow.    -Noted cymbalta taper.     2. Hypokalemia: on sodium chloride 10 mEq tab bid and amiloride(started previously for hypomagnesemia?)   -K stable today.    3. Abdominal pain secondary to gastritis   -CT showing features suggestive of gastritis and partial obstruction.    -S/p upper EGD on 10/05 which showed duodenal ulcer 2cm.Repeat EGD in 2 months.  -Continue oral PPI BID  -Biopsies from stomach is consistent with mild active chronic gastritis.  H. pylori immunostain is negative.  Duodenal ulcer biopsy is consistent with edema and acute duodenitis.  Negative for dysplasia and malignancy.    4. Anemia - due to gastritis, iron stores low  -getting venofer, can stop po iron as will  be iron replete after venofer, po iron may be contrib to poor po intake/GI symptoms    5. Thyroid nodule. TSH 6. Defer mgmt to Hosp med.    Roge López DO  [facility]  [phone] (off)         Physical Exam  /69 (BP Location: Right arm)   Pulse 82   Temp 98.3  F (36.8  C) (Axillary)   Resp 18   Wt 43.1 kg (95 lb)   SpO2 96%   BMI 18.55 kg/m     Patient Vitals for the past 96 hrs:   Weight   10/12/22 0441 43.1 kg (95 lb)       Intake/Output Summary (Last 24 hours) at 10/6/2022 1212  Last data filed at 10/6/2022 0900  Gross per 24 hour   Intake 710 ml   Output 1453 ml   Net -743 ml       Physical Exam:   /69 (BP Location: Right arm)   Pulse 82   Temp 98.3  F (36.8  C) (Axillary)   Resp 18   Wt 43.1 kg (95 lb)   SpO2 96%   BMI 18.55 kg/m       General: No acute distress, alert   Eyes: Pupils equal, sclerae not icteric   ENT: no icterus, no lesions noted, goiter  Resp: CTA bilaterally, no distress, normal effort   CV: RRR without murmur, no hip/leg edema   GI: Soft NT NG   Psych: Appropriate mood and affect  Skin: No rash noted       Recent Labs   Lab 10/14/22  1151 10/14/22  0632 10/13/22  1503 10/13/22  0611 10/12/22  0607 10/12/22  0026 10/11/22  1802 10/11/22  0619 10/10/22  1919 10/10/22  0741 10/09/22  1203 10/09/22  0643 10/08/22  1005 10/08/22  0605   * 124* 128* 129* 130*  --   --  130* 126* 125*  125*   < > 125*   < > 125*  125*   POTASSIUM  --  3.5  --  3.9 3.7  --   --  4.0  --  4.4  --  4.3  --  4.4   CHLORIDE  --  96*  --  96* 98  --   --  96*  --  90*  --  91*  --  90*   CO2  --  22  --  23 23  --   --  25  --  23  --  25  --  27   BUN  --  13.8  --  14.4 10.7  --   --  8.3  --  9.1  --  10.3  --  10.7   CR  --  0.92  --  0.91 0.98*  --   --  0.85  --  0.96*  --  1.03*  --  1.02*   GFRESTIMATED  --  60*  --  61 56*  --   --  66  --  57*  --  52*  --  53*   MANE  --  8.0*  --  8.2* 8.3*  --   --  8.3*  --  7.8*  --  7.6*  --  7.9*   MAG  --  1.3*   --  1.7 2.2 2.5* 1.5* 1.1*  --   --   --   --   --   --    ALBUMIN  --   --   --   --   --   --   --  2.6*  --   --   --   --   --   --     < > = values in this interval not displayed.       Recent Labs   Lab 10/11/22  0619 10/10/22  0740 10/08/22  0605   WBC  --   --  8.1   HGB 9.9* 9.1* 8.8*   HCT  --   --  27.1*   MCV  --   --  106*   PLT  --   --  280             I personally reviewed these labs today

## 2022-10-15 NOTE — PROGRESS NOTES
Progress Note    Date of admission: 10/04/2022    Assessment/Plan  Chris Bell is a 87 year old old female presented with abdominal pain of few days duration.  Work-up showing possible gastritis.  Low sodium levels noted.     Abdominal pain secondary to gastritis - resolved  -CT showing features suggestive of gastritis and partial obstruction.    -S/p upper EGD on 10/05 which showed duodenal ulcer 2cm.Repeat EGD in 2 months.  -Continue oral PPI BID, carafate 1gm QID.  -Biopsies from stomach is consistent with mild active chronic gastritis.  H. pylori immunostain is negative.  Duodenal ulcer biopsy is consistent with edema and acute duodenitis.  Negative for dysplasia and malignancy.  -Diet advanced to regular. GI following.          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   -Sodium dropped from 128-->126 today. Patient has a history of SIADH.  -On PTA NaCl TID and bumex changed from 0.5 mg daily to BID on 10/13  -Nephrology  recommending to stop Cymbalta if possible,decrease frequency of Cymbalta administration every 72 hours.  Monitor for withdrawal symptoms.Plan to stop Cymbalta in 2 weeks period, last date in 10/17  -Fluid restriction to 1000 mm in 24 hours.  -S/p Tolvaptan X1 on 10/10 and 10/14  -Nephrology consult appreciated.    Chronic anemia   -Stable hemoglobin, follow hemoglobins prn  -Iron studies is indicative of borderline iron deficiency.  His iron saturation index of 20% and serum iron of 26.  -S/p venofer infusion for 3 days, was on PO iron prior.     History of CHF -Compensated currently  -Continue home meds, parameters placed  -On bumex,metoprolol     Hypertension-Stable   -Continue home meds parameters placed     Osteoporosis  -CT abdomen with skeletal windows showing chronic thoracic/lumbar compression fractures.  Continue current analgesia.     Positive COVID test  -Did have COVID few months ago as well so likely COVID recovered.       Elevated TSH  -TSH is 6.04.  Total T4, T3 requested  -Ultrasound of thyroid revealed Right thyroid 0.5 cm TI-RADS 5 nodule. Needs follow-up ultrasound annually for 5 years.  -Started on Levothyroxine 50mcg    HypoMg - replace per protocol    Constipation - resolved  -On scheduled bowel regimen     DVT PPX : SCD    Barriers to discharge: Hyponatremia    Anticipated Discharge date  : 1-2 days    Subjective  Chart reviewed and events noted.  Patient seen and examined using . .   In bed. States had BM yest and now feels better. Back pain unchanged. Tells drinks water only given by the staff and no additional fluid intake reported. Some pain at prior IV site.      Objective  Vital signs in last 24 hours  Temp:  [97.8  F (36.6  C)-98.4  F (36.9  C)] (P) 98.4  F (36.9  C)  Pulse:  [69-82] (P) 76  Resp:  [16-18] (P) 16  BP: (101-131)/(57-69) (P) 124/58  SpO2:  [93 %-100 %] (P) 98 %    Input and Output in 24 hrs     Intake/Output Summary (Last 24 hours) at 10/6/2022 1340  Last data filed at 10/6/2022 0900  Gross per 24 hour   Intake 710 ml   Output 1453 ml   Net -743 ml       Physical Exam:  GEN:  Not in acute distress  HEENT: AT, NC  CHEST: Clear to auscultation bilaterally  HEART: S1S2   ABDOMEN: Soft. NT, ND.  Extremities: No pedal edema  CNS: No focal neurological deficit. No involuntary movements. Awake, alert, oriented.   SKIN: No skin rash, no cyanosis or clubbing      Pertinent Labs   Lab Results: Personally Reviewed    Recent Results (from the past 24 hour(s))   Sodium    Collection Time: 10/14/22 11:51 AM   Result Value Ref Range    Sodium 130 (L) 136 - 145 mmol/L   Sodium    Collection Time: 10/14/22  3:18 PM   Result Value Ref Range    Sodium 128 (L) 136 - 145 mmol/L   Sodium    Collection Time: 10/14/22  5:50 PM   Result Value Ref Range    Sodium 128 (L) 136 - 145 mmol/L   Sodium    Collection Time: 10/15/22 12:05 AM   Result Value Ref Range    Sodium 128 (L) 136 - 145 mmol/L   Extra Purple Top  Tube    Collection Time: 10/15/22  7:46 AM   Result Value Ref Range    Hold Specimen [facility]    Basic metabolic panel    Collection Time: 10/15/22  7:53 AM   Result Value Ref Range    Sodium 126 (L) 136 - 145 mmol/L    Potassium 4.6 3.4 - 5.3 mmol/L    Chloride 99 98 - 107 mmol/L    Carbon Dioxide (CO2) 15 (L) 22 - 29 mmol/L    Anion Gap 12 7 - 15 mmol/L    Urea Nitrogen 13.6 8.0 - 23.0 mg/dL    Creatinine 0.84 0.51 - 0.95 mg/dL    Calcium 8.0 (L) 8.8 - 10.2 mg/dL    Glucose 80 70 - 99 mg/dL    GFR Estimate 67 >60 mL/min/1.73m2   Magnesium    Collection Time: 10/15/22  7:53 AM   Result Value Ref Range    Magnesium 1.3 (L) 1.7 - 2.3 mg/dL       Pertinent Radiology   Reviewed thyroid US.    Advanced Care Planning  Case d/w patient, bedside RN, SW/CM    Mary Barnes MD  Hospitalist

## 2022-10-15 NOTE — PROGRESS NOTES
RENAL PROGRESS NOTE  CC: Hynonatremia    S: Seen with . Denies sob, nausea or pain. No large urine output noted. Unclear how much patient understands with fluid restriction.     Assessment/ Recommendations:  1. Acute on chronic Hyponatremia:  History of SIADH and chronic diarrhea. Varying degrees of hyponatremia since at least Feb 2022. Hospitalized in July with severe hyponatremia at which time she was diagnosed with SIADH and started on bumex + 2g NaCL tabs TID. Presented to the hospital with a Na of 120, which trended down to 118 initially after getting NS at 75mg/hr over night, this is consistent with SIADH. Serum osoms 253, urin osoms 292, Meli 87 also consistent with SIADH also.     Recent serum sodium oscillating 126-130.    Chronic confusion present.now no acute mental status changes.    Continue bumetanide 0.5 mg daily    Sodium chloride 2 g 3 times daily    Oral fluid restriction 1000 mL daily    Encourage oral fluid intake.    Status post tolvaptan 15 mg on 10/10/2022.    Continue to monitor serum sodium    If sodium drops below 124 with administer tolvaptan 50 mg p.o. x1.      No indication to administer hypertonic saline.  2. Hypokalemia: on sodium chloride 10 mEq tab bid and amiloride(started previously for hypomagnesemia?)   -K stable today.    3. Abdominal pain secondary to gastritis   -CT showing features suggestive of gastritis and partial obstruction.    -S/p upper EGD on 10/05 which showed duodenal ulcer 2cm.Repeat EGD in 2 months.  -Continue oral PPI BID  -Biopsies from stomach is consistent with mild active chronic gastritis.  H. pylori immunostain is negative.  Duodenal ulcer biopsy is consistent with edema and acute duodenitis.  Negative for dysplasia and malignancy.    4. Anemia - due to gastritis, iron stores low  -getting venofer, can stop po iron as will be iron replete after venofer, po iron may be contrib to poor po intake/GI symptoms    5. Thyroid nodule. TSH 6. Defer mgmt  to [facility] [place].    Pantera Meneses MD  [facility]  [phone] (off)         Physical Exam  BP (P) 124/58 (BP Location: Right arm)   Pulse (P) 76   Temp (P) 98.4  F (36.9  C) (Axillary)   Resp (P) 16   Wt 43.1 kg (95 lb)   SpO2 (P) 98%   BMI 18.55 kg/m     Patient Vitals for the past 96 hrs:   Weight   10/12/22 0441 43.1 kg (95 lb)       Intake/Output Summary (Last 24 hours) at 10/6/2022 1212  Last data filed at 10/6/2022 0900  Gross per 24 hour   Intake 710 ml   Output 1453 ml   Net -743 ml       Physical Exam:   BP (P) 124/58 (BP Location: Right arm)   Pulse (P) 76   Temp (P) 98.4  F (36.9  C) (Axillary)   Resp (P) 16   Wt 43.1 kg (95 lb)   SpO2 (P) 98%   BMI 18.55 kg/m       General: No acute distress, alert   Eyes: Pupils equal, sclerae not icteric   ENT: no icterus, no lesions noted, goiter  Resp: CTA bilaterally, no distress, normal effort   CV: RRR without murmur, no hip/leg edema   GI: Soft NT NG   Psych: Appropriate mood and affect  Skin: No rash noted       Recent Labs   Lab 10/15/22  0753 10/15/22  0005 10/14/22  1750 10/14/22  1518 10/14/22  1151 10/14/22  0632 10/13/22  1503 10/13/22  0611 10/12/22  0607 10/12/22  0026 10/11/22  1802 10/11/22  0619 10/10/22  1919 10/10/22  0741 10/09/22  1203 10/09/22  0643   * 128* 128* 128* 130* 124* 128* 129* 130*  --   --  130*   < > 125*  125*   < > 125*   POTASSIUM 4.6  --   --   --   --  3.5  --  3.9 3.7  --   --  4.0  --  4.4  --  4.3   CHLORIDE 99  --   --   --   --  96*  --  96* 98  --   --  96*  --  90*  --  91*   CO2 15*  --   --   --   --  22  --  23 23  --   --  25  --  23  --  25   BUN 13.6  --   --   --   --  13.8  --  14.4 10.7  --   --  8.3  --  9.1  --  10.3   CR 0.84  --   --   --   --  0.92  --  0.91 0.98*  --   --  0.85  --  0.96*  --  1.03*   GFRESTIMATED 67  --   --   --   --  60*  --  61 56*  --   --  66  --  57*  --  52*   MANE 8.0*  --   --   --   --  8.0*  --  8.2* 8.3*  --   --  8.3*  --  7.8*  --   7.6*   MAG  --   --   --   --   --  1.3*  --  1.7 2.2 2.5* 1.5* 1.1*  --   --   --   --    ALBUMIN  --   --   --   --   --   --   --   --   --   --   --  2.6*  --   --   --   --     < > = values in this interval not displayed.       Recent Labs   Lab 10/11/22  0619 10/10/22  0740   HGB 9.9* 9.1*             I personally reviewed these labs today

## 2022-10-15 NOTE — PLAN OF CARE
Problem: Risk for Delirium  Goal: Improved Behavioral Control  Outcome: Progressing     Problem: Pain Chronic (Persistent) (Comorbidity Management)  Goal: Acceptable Pain Control and Functional Ability  Outcome: Progressing  Intervention: Manage Persistent Pain  Recent Flowsheet Documentation  Taken 10/15/2022 0006 by Lily Andrade, RN  Medication Review/Management: medications reviewed     Problem: Hypertension Comorbidity  Goal: Blood Pressure in Desired Range  Intervention: Maintain Blood Pressure Management  Recent Flowsheet Documentation  Taken 10/15/2022 0006 by Lily Andrade, RN  Medication Review/Management: medications reviewed   Goal Outcome Evaluation:         Per PM shift report, Mg 1.3, MD was notified and, MD ordered oral replacement. However there are not any orders to recheck Mg level. Nursing to continue to monitor.   Pt on  1000 ml fluid restriction. Pt rated she has pain on her L arm, but refused any medicine or ice.

## 2022-10-15 NOTE — PLAN OF CARE
Problem: Plan of Care - These are the overarching goals to be used throughout the patient stay.    Goal: Optimal Comfort and Wellbeing  Outcome: Progressing     Problem: Plan of Care - These are the overarching goals to be used throughout the patient stay.    Goal: Absence of Hospital-Acquired Illness or Injury  Intervention: Prevent Skin Injury  Recent Flowsheet Documentation  Taken 10/14/2022 1710 by Angela Covarrubias, RN  Body Position: position changed independently     Problem: Pain Chronic (Persistent) (Comorbidity Management)  Goal: Acceptable Pain Control and Functional Ability  Outcome: Progressing  Intervention: Manage Persistent Pain  Recent Flowsheet Documentation  Taken 10/14/2022 1710 by Angela Covarrubias, RN  Medication Review/Management: medications reviewed   Goal Outcome Evaluation:         A/Ox3. VSS on RA. C/o arm pain d/t IV infiltration from a few days ago, applied warm pack and elevated for patient comfort and gave PRN tylenol per patient request. Assist x1. Regular diet with 1000 ml fluid restriction. Purewick in place with good output. Mg 1.3, MD notified because patient did not have IV access, MD ordered oral replacement. Nursing to continue to monitor.

## 2022-10-16 NOTE — PLAN OF CARE
Problem: Risk for Delirium  Goal: Optimal Coping  Outcome: Adequate for Care Transition  Intervention: Optimize Psychosocial Adjustment to Delirium  Recent Flowsheet Documentation  Taken 10/16/2022 0016 by Sintia Vaughan RN  Supportive Measures: active listening utilized  Taken 10/15/2022 2004 by Sintia Vaughan RN  Supportive Measures: active listening utilized  Goal: Improved Behavioral Control  Outcome: Adequate for Care Transition  Intervention: Prevent and Manage Agitation  Recent Flowsheet Documentation  Taken 10/16/2022 0016 by Sintia Vaughan RN  Environment Familiarity/Consistency:   daily routine followed   familiar objects from home provided  Taken 10/15/2022 2004 by Sintia Vaughan RN  Environment Familiarity/Consistency:   daily routine followed   familiar objects from home provided  Goal: Improved Attention and Thought Clarity  Outcome: Adequate for Care Transition  Intervention: Maximize Cognitive Function  Recent Flowsheet Documentation  Taken 10/16/2022 0016 by Sintia Vaughan RN  Sensory Stimulation Regulation: television on  Reorientation Measures:   calendar in view   clock in view   reorientation provided  Taken 10/15/2022 2004 by Sintia Vaughan RN  Sensory Stimulation Regulation: television on  Reorientation Measures:   calendar in view   clock in view   reorientation provided  Goal: Improved Sleep  Outcome: Adequate for Care Transition  Intervention: Promote Sleep  Recent Flowsheet Documentation  Taken 10/16/2022 0016 by Sintia Vaughan RN  Sleep/Rest Enhancement:   comfort measures   noise level reduced   music provided   room darkened   regular sleep/rest pattern promoted  Taken 10/15/2022 2004 by Sintia Vaughan RN  Sleep/Rest Enhancement:   comfort measures   noise level reduced   music provided   room darkened   regular sleep/rest pattern promoted   Goal Outcome Evaluation:    Pt alert/oriented and able to make wishes/ needs known with [facility]  services.

## 2022-10-16 NOTE — DISCHARGE SUMMARY
Patient was alert with some confusion today. VSS. Patient discharged to foster home from which they admitted, per patient request. Two discharge medications given to [facility].

## 2022-10-16 NOTE — PROGRESS NOTES
1915: Handoff report received from LAKSHMI Leija. Bedside safety and skin checks completed at this time. Pt is currently sleeping in bed. No other concerns at this time.     0650: PRN tylenol given at this time per pt request. No other concerns throughout shift. Will report off to oncoming RN.

## 2022-10-16 NOTE — PLAN OF CARE
Problem: Plan of Care - These are the overarching goals to be used throughout the patient stay.    Goal: Absence of Hospital-Acquired Illness or Injury  Intervention: Identify and Manage Fall Risk  Recent Flowsheet Documentation  Taken 10/15/2022 1657 by Anand Pineda RN  Safety Promotion/Fall Prevention: activity supervised  Taken 10/15/2022 0900 by Anand Pineda RN  Safety Promotion/Fall Prevention: activity supervised  Intervention: Prevent Skin Injury  Recent Flowsheet Documentation  Taken 10/15/2022 1657 by Anand Pineda RN  Body Position: position changed independently  Taken 10/15/2022 0900 by Anand Pineda RN  Body Position: position changed independently     Problem: Risk for Delirium  Goal: Improved Attention and Thought Clarity  Intervention: Maximize Cognitive Function  Recent Flowsheet Documentation  Taken 10/15/2022 1657 by Anand Pineda RN  Sensory Stimulation Regulation: television on     Problem: Hypertension Comorbidity  Goal: Blood Pressure in Desired Range  Intervention: Maintain Blood Pressure Management  Recent Flowsheet Documentation  Taken 10/15/2022 1657 by Anand Pineda RN  Medication Review/Management: medications reviewed  Taken 10/15/2022 0900 by Anand Pineda RN  Medication Review/Management: medications reviewed     Problem: Pain Chronic (Persistent) (Comorbidity Management)  Goal: Acceptable Pain Control and Functional Ability  Intervention: Manage Persistent Pain  Recent Flowsheet Documentation  Taken 10/15/2022 1657 by Anand Pineda RN  Medication Review/Management: medications reviewed  Taken 10/15/2022 0900 by Anand Pineda RN  Medication Review/Management: medications reviewed   Pt was given prn tylenol for arm pain. She is alert and oriented,  service was use to communicate. Up with assist x 1.

## 2022-10-16 NOTE — PROGRESS NOTES
Care Management Discharge Note    Discharge Date: 10/16/2022       Discharge Disposition: Home    Discharge Services:  None    Discharge DME:  none    Discharge Transportation:  [facility] at 1430    Private pay costs discussed: Not applicable    PAS Confirmation Code:    Patient/family educated on Medicare website which has current facility and service quality ratings:      Education Provided on the Discharge Plan:    Persons Notified of Discharge Plans: yes  Patient/Family in Agreement with the Plan:      Handoff Referral Completed: Yes    Additional Information:  Pt adequate for discharge. [facility] transport set up for today at 1430. Pt in agreement with plan. [facility] home manager Russ contacted and in agreement with plan. No further CM needs at this time.        Juanis Nazario RN

## 2022-10-16 NOTE — PROGRESS NOTES
RENAL PROGRESS NOTE  CC: Hynonatremia    S: Resting in bed, speaking on the phone  No distress  Very alert      Assessment/ Recommendations:    1. Acute on chronic Hyponatremia:  History of SIADH and chronic diarrhea. Varying degrees of hyponatremia since at least Feb 2022. Hospitalized in July with severe hyponatremia at which time she was diagnosed with SIADH and started on bumex + 2g NaCL tabs TID. Presented to the hospital with a Na of 120, which trended down to 118 initially after getting NS at 75mg/hr over night, this is consistent with SIADH. Serum osoms 253, urin osoms 292, Meli 87 also consistent with SIADH also.     Recent serum sodium oscillating 126-131.    Chronic confusion seems stable    Hyponatremia improving    Continue same therapies.     Continue bumetanide 0.5 mg daily    Sodium chloride 2 g 3 times daily    Oral fluid restriction 1000 mL daily    Encourage oral fluid intake.    Status post tolvaptan 15 mg on 10/10/2022.    Continue to monitor serum sodium    If sodium drops below 124 with administer tolvaptan 50 mg p.o. x1.      Not indicated today.    No indication to administer hypertonic saline.  2. Hypokalemia: on sodium chloride 10 mEq tab bid and amiloride(started previously for hypomagnesemia?)   -K stable today.    3. Abdominal pain secondary to gastritis   -CT showing features suggestive of gastritis and partial obstruction.    -S/p upper EGD on 10/05 which showed duodenal ulcer 2cm.Repeat EGD in 2 months.  -Continue oral PPI BID  -Biopsies from stomach is consistent with mild active chronic gastritis.  H. pylori immunostain is negative.  Duodenal ulcer biopsy is consistent with edema and acute duodenitis.  Negative for dysplasia and malignancy.    4. Anemia - due to gastritis, iron stores low  -getting venofer, can stop po iron as will be iron replete after venofer, po iron may be contrib to poor po intake/GI symptoms    5. Thyroid nodule. TSH 6. Defer mgmt to [facility]  MD Gideon  [facility]  [phone] (off)         Physical Exam  /73 (BP Location: Right arm)   Pulse 81   Temp 97.6  F (36.4  C) (Oral)   Resp 20   Wt 43.1 kg (95 lb)   SpO2 96%   BMI 18.55 kg/m     No data found.    Intake/Output Summary (Last 24 hours) at 10/6/2022 1212  Last data filed at 10/6/2022 0900  Gross per 24 hour   Intake 710 ml   Output 1453 ml   Net -743 ml       Physical Exam:   /73 (BP Location: Right arm)   Pulse 81   Temp 97.6  F (36.4  C) (Oral)   Resp 20   Wt 43.1 kg (95 lb)   SpO2 96%   BMI 18.55 kg/m       General: No acute distress, alert   Eyes: Pupils equal, sclerae not icteric   ENT: no icterus, no lesions noted, goiter  Resp: CTA bilaterally, no distress, normal effort   CV: RRR without murmur, no hip/leg edema   GI: Soft NT NG   Psych: Appropriate mood and affect  Skin: No rash noted       Recent Labs   Lab 10/16/22  0640 10/15/22  0753 10/15/22  0005 10/14/22  1750 10/14/22  1518 10/14/22  1151 10/14/22  0632 10/13/22  1503 10/13/22  0611 10/12/22  0607 10/12/22  0026 10/11/22  1802 10/11/22  0619 10/10/22  1919 10/10/22  0741   * 126* 128* 128* 128* 130* 124*   < > 129* 130*  --   --  130*   < > 125*  125*   POTASSIUM 3.8 4.6  --   --   --   --  3.5  --  3.9 3.7  --   --  4.0  --  4.4   CHLORIDE 98 99  --   --   --   --  96*  --  96* 98  --   --  96*  --  90*   CO2 24 15*  --   --   --   --  22  --  23 23  --   --  25  --  23   BUN 14.9 13.6  --   --   --   --  13.8  --  14.4 10.7  --   --  8.3  --  9.1   CR 0.83 0.84  --   --   --   --  0.92  --  0.91 0.98*  --   --  0.85  --  0.96*   GFRESTIMATED 68 67  --   --   --   --  60*  --  61 56*  --   --  66  --  57*   MANE 8.4* 8.0*  --   --   --   --  8.0*  --  8.2* 8.3*  --   --  8.3*  --  7.8*   MAG  --  1.3*  --   --   --   --  1.3*  --  1.7 2.2 2.5* 1.5* 1.1*  --   --    ALBUMIN  --   --   --   --   --   --   --   --   --   --   --   --  2.6*  --   --     < > = values in this  interval not displayed.       Recent Labs   Lab 10/11/22  0619 10/10/22  0740   HGB 9.9* 9.1*             I personally reviewed these labs today

## 2022-10-16 NOTE — PROGRESS NOTES
Progress Note    Date of admission: 10/04/2022    Assessment/Plan  Chris Bell is a 87 year old old female presented with abdominal pain of few days duration.  Work-up showing possible gastritis.  Low sodium levels noted.     Abdominal pain secondary to gastritis - resolved  -CT showing features suggestive of gastritis and partial obstruction.    -S/p upper EGD on 10/05 which showed duodenal ulcer 2cm.Repeat EGD in 2 months.  -Continue oral PPI BID, carafate 1gm QID.  -Biopsies from stomach is consistent with mild active chronic gastritis.  H. pylori immunostain is negative.  Duodenal ulcer biopsy is consistent with edema and acute duodenitis.  Negative for dysplasia and malignancy.  -Diet advanced to regular.         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   -Sodium improved today from 126-->131. Patient has a history of SIADH.  -On PTA NaCl TID and bumex changed from 0.5 mg daily to BID on 10/13  -Nephrology  recommending to stop Cymbalta if possible,decrease frequency of Cymbalta administration every 72 hours.  Monitor for withdrawal symptoms.Plan to stop Cymbalta in 2 weeks period, last date in 10/17  -Fluid restriction to 1000 mm in 24 hours.  -S/p Tolvaptan X1 on 10/10 and 10/14  -Nephrology consult appreciated.    Chronic anemia   -Stable hemoglobin, follow hemoglobins prn  -Iron studies is indicative of borderline iron deficiency.  His iron saturation index of 20% and serum iron of 26.  -S/p venofer infusion for 3 days, was on PO iron prior.     History of CHF -Compensated currently  -Continue home meds, parameters placed  -On bumex,metoprolol     Hypertension-Stable   -Continue home meds parameters placed     Osteoporosis  -CT abdomen with skeletal windows showing chronic thoracic/lumbar compression fractures.  Continue current analgesia.     Positive COVID test  -Did have COVID few months ago as well so likely COVID recovered.      Elevated  TSH  -TSH is 6.04.  Total T4, T3 requested  -Ultrasound of thyroid revealed Right thyroid 0.5 cm TI-RADS 5 nodule. Needs follow-up ultrasound annually for 5 years.  -Started on Levothyroxine 50mcg    HypoMg - replace per protocol    Constipation - resolved  -On scheduled bowel regimen     DVT PPX : SCD    Barriers to discharge: Hyponatremia    Anticipated Discharge date  : 1-2 days    Subjective  Chart reviewed and events noted.  Patient seen and examined using . .   Sitting in the chair.  Denies any new complaints today.  States he is eager to go home.      Objective  Vital signs in last 24 hours  Temp:  [97.4  F (36.3  C)-97.6  F (36.4  C)] 97.6  F (36.4  C)  Pulse:  [64-81] 81  Resp:  [16-20] 20  BP: ()/(56-73) 119/73  SpO2:  [95 %-97 %] 96 %    Input and Output in 24 hrs     Intake/Output Summary (Last 24 hours) at 10/6/2022 1340  Last data filed at 10/6/2022 0900  Gross per 24 hour   Intake 710 ml   Output 1453 ml   Net -743 ml       Physical Exam:  GEN:  Not in acute distress  HEENT: AT, NC  CHEST: Clear to auscultation bilaterally  HEART: S1S2   ABDOMEN: Soft. NT, ND.  Extremities: No pedal edema  CNS: No focal neurological deficit. No involuntary movements. Awake, alert, oriented.   SKIN: No skin rash, no cyanosis or clubbing      Pertinent Labs   Lab Results: Personally Reviewed    Recent Results (from the past 24 hour(s))   Basic metabolic panel    Collection Time: 10/16/22  6:40 AM   Result Value Ref Range    Sodium 131 (L) 136 - 145 mmol/L    Potassium 3.8 3.4 - 5.3 mmol/L    Chloride 98 98 - 107 mmol/L    Carbon Dioxide (CO2) 24 22 - 29 mmol/L    Anion Gap 9 7 - 15 mmol/L    Urea Nitrogen 14.9 8.0 - 23.0 mg/dL    Creatinine 0.83 0.51 - 0.95 mg/dL    Calcium 8.4 (L) 8.8 - 10.2 mg/dL    Glucose 90 70 - 99 mg/dL    GFR Estimate 68 >60 mL/min/1.73m2       Pertinent Radiology   No new imaging in the last 24 hours.    Advanced Care Planning  Case d/w patient,  bedside RN, EREN/AUTUMN Hernandez  MD Molly  Hospitalist

## 2022-10-16 NOTE — DISCHARGE SUMMARY
[facility] MEDICINE  DISCHARGE SUMMARY     Primary Care Physician: Kizzy Villanueva  Admission Date: 10/4/2022   Discharge Provider: Mary Barnes MD Discharge Date: 10/16/2022   Diet:   Active Diet and Nourishment Order   Procedures     Fluid restriction 1000 ML FLUID     Fluid restriction 1000 ML FLUID     Regular Diet Adult     Diet       Code Status: No CPR- Do NOT Intubate   Activity: DCACTIVITY: Activity as tolerated        Condition at Discharge: Stable     REASON FOR PRESENTATION(See Admission Note for Details)     Hyponatremia    PRINCIPAL & ACTIVE DISCHARGE DIAGNOSES     Principal Problem:    Hyponatremia  Active Problems:    PUD (peptic ulcer disease)    Macrocytic anemia with Normal B12     Benign essential hypertension    Abdominal pain, generalized    Chronic diastolic heart failure (H)      PENDING LABS     Unresulted Labs Ordered in the Past 30 Days of this Admission     No orders found from 9/4/2022 to 10/5/2022.            PROCEDURES ( this hospitalization only)      Procedure(s):  ESOPHAGOGASTRODUODENOSCOPY (EGD) WITH BIOPSIES    RECOMMENDATIONS TO OUTPATIENT PROVIDER FOR F/U VISIT     Follow-up Appointments     Follow-up and recommended labs and tests       Follow up with primary care provider, KIZZY VILLANUEVA, within 3-4   days for hospital follow- up.  The following labs/tests are recommended:   CBC, BMP.    Follow up with [facility] in 2 months for repeat EGD             If Na noted to be dropping, please change bumex 0.5 daily to BID per renal recommendations on the day of discharge. Discharging Na 131.    DISPOSITION     Home    SUMMARY OF HOSPITAL COURSE:      Chris Bell is a 87 year old old female presented with abdominal pain of few days duration.  Work-up showing possible gastritis.  Low sodium levels noted.      Abdominal pain secondary to gastritis - resolved  -CT showing features suggestive of gastritis and partial obstruction.    -S/p  upper EGD on 10/05 which showed duodenal ulcer 2cm.Repeat EGD in 2 months.  -Continue oral PPI BID, carafate 1gm QID.  -Biopsies from stomach is consistent with mild active chronic gastritis.  H. pylori immunostain is negative.  Duodenal ulcer biopsy is consistent with edema and acute duodenitis.  Negative for dysplasia and malignancy.  -Diet advanced to regular.  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   -Sodium improved today from 126-->131. Patient has a history of SIADH.  -On PTA NaCl TID and bumex changed back to 0.5 mg daily on day of discharge per my discussion with .   -Nephrology  recommending to stop Cymbalta, tapered and stopped on discharge.  -Fluid restriction to 1000 mm in 24 hours.  -S/p Tolvaptan X1 on 10/10 and 10/14  -Nephrology consult appreciated.     Chronic anemia   -Stable hemoglobin, follow hemoglobins prn  -Iron studies is indicative of borderline iron deficiency.  His iron saturation index of 20% and serum iron of 26.  -S/p venofer infusion for 3 days, was on PO iron prior.      History of CHF -Compensated currently  -Continue home meds bumex,metoprolol     Hypertension-Stable   -Continue home meds parameters placed     Osteoporosis  -CT abdomen with skeletal windows showing chronic thoracic/lumbar compression fractures.  Continue current analgesia.     Positive COVID test  -Did have COVID few months ago as well so likely COVID recovered.       Elevated TSH  -TSH is 6.04.    -Ultrasound of thyroid revealed Right thyroid 0.5 cm TI-RADS 5 nodule. Needs follow-up ultrasound annually for 5 years.  -Continue PTA Levothyroxine 50mcg     HypoMg - replace per protocol     Constipation - resolved  -On  bowel regimen    Patient stable to be discharged to her adult foster home today. Please refer to discharge medications and instructions for more details.         Discharge Medications  with Med changes:     Current Discharge Medication List      CONTINUE these medications which have CHANGED    Details   pantoprazole (PROTONIX) 40 MG EC tablet Take 1 tablet (40 mg) by mouth 2 times daily  Qty: 30 tablet, Refills: 0    Associated Diagnoses: PUD (peptic ulcer disease)      sucralfate (CARAFATE) 1 GM tablet Take 1 tablet (1 g) by mouth 4 times daily  Qty: 30 tablet, Refills: 0    Associated Diagnoses: PUD (peptic ulcer disease)         CONTINUE these medications which have NOT CHANGED    Details   acetaminophen (TYLENOL) 500 MG tablet Take 500 mg by mouth every 6 hours as needed for mild pain      alendronate (FOSAMAX) 70 MG tablet Take 70 mg by mouth every 7 days      aMILoride (MIDAMOR) 5 MG tablet Take 1 tablet (5 mg) by mouth daily  Qty: 30 tablet, Refills: 1    Associated Diagnoses: Hypomagnesemia      bumetanide (BUMEX) 0.5 MG tablet Take 1 tablet (0.5 mg) by mouth daily  Qty: 30 tablet, Refills: 1    Associated Diagnoses: Chronic heart failure with preserved ejection fraction (H)      calcium carbonate (TUMS) 500 MG chewable tablet Take 1 chew tab by mouth 4 times daily as needed for heartburn      calcium carbonate-vitamin D (OSCAL W/D) 500-200 MG-UNIT tablet Take 1 tablet by mouth 2 times daily      cetirizine (ZYRTEC) 5 MG tablet Take 5 mg by mouth daily      chlorhexidine (CHLORHEXIDINE) 0.12 % solution Swish and spit 15 mLs in mouth 2 times daily Twice a day after brushing teeth      guaiFENesin (ROBITUSSIN) 100 MG/5ML SYRP Take 5-10 mLs by mouth every 6 hours as needed for cough      levothyroxine (SYNTHROID/LEVOTHROID) 50 MCG tablet Take 50 mcg by mouth every morning      loperamide (IMODIUM) 2 MG capsule Take 1 capsule (2 mg) by mouth 4 times daily as needed for diarrhea  Qty: 60 capsule, Refills: 0    Associated Diagnoses: Diarrhea, unspecified type      LORazepam (ATIVAN) 0.5 MG tablet Take 0.5-1 mg by mouth 3 times daily 2 tablets qam and 1 tablet qafternoon and 2 tablets qpm       melatonin 5 MG tablet Take 5 mg by mouth At Bedtime      metoprolol succinate ER (TOPROL-XL) 50 MG 24 hr tablet Take 100 mg by mouth daily      multivitamin, therapeutic (THERA-VIT) TABS tablet Take 1 tablet by mouth daily      nitroGLYcerin (NITROSTAT) 0.4 MG sublingual tablet Place 0.4 mg under the tongue every 5 minutes as needed for chest pain For chest pain place 1 tablet under the tongue every 5 minutes for 3 doses. If symptoms persist 5 minutes after 1st dose call 911.      nystatin (MYCOSTATIN) 151972 UNIT/ML suspension Swish and swallow 5 mLs (500,000 Units) in mouth 4 times daily as needed    Associated Diagnoses: Oral ulcer      oxybutynin ER (DITROPAN XL) 5 MG 24 hr tablet Take 1 tablet by mouth daily      potassium chloride ER (K-TAB/KLOR-CON) 10 MEQ CR tablet Take 10 mEq by mouth 2 times daily      psyllium (METAMUCIL/KONSYL) Packet Take 1 packet by mouth 2 times daily as needed for constipation      sodium chloride 1 GM tablet Take 2 g by mouth 3 times daily      vitamin B-12 (CYANOCOBALAMIN) 250 MCG tablet Take 250 mcg by mouth daily      Alcohol Swabs (B-D SINGLE USE SWABS REGULAR) PADS USE UTD  Refills: 0      blood glucose monitoring (ONETOUCH VERIO) meter device kit 2 times daily  Refills: 0      ONETOUCH VERIO IQ test strip Use one stript to test your blood sugars twice daily  Refills: 2         STOP taking these medications       DULoxetine (CYMBALTA) 20 MG capsule Comments:   Reason for Stopping:         ferrous sulfate 220 (44 Fe) MG/5ML ELIX Comments:   Reason for Stopping:                     Rationale for medication changes:      See med rec        Consults     GASTROENTEROLOGY IP CONSULT  NEPHROLOGY IP CONSULT  CARE MANAGEMENT / SOCIAL WORK IP CONSULT  PHARMACY IP CONSULT  VASCULAR ACCESS ADULT IP CONSULT    Immunizations given this encounter     Most Recent Immunizations   Administered Date(s) Administered     COVID-19,PF,Moderna 01/26/2021     FLUAD(HD)65+ QUAD 09/10/2021     Flu 65+  Years 09/11/2020     Flu, Unspecified 10/01/2018     Influenza (H1N1) 10/06/2021     Influenza (High Dose) 3 valent vaccine 09/29/2011     Influenza (IIV3) PF 09/07/2012     Influenza Vaccine IM > 6 months Valent IIV4 (Alfuria,Fluzone) 09/28/2022     Influenza Vaccine, 6+MO IM (QUADRIVALENT W/PRESERVATIVES) 09/14/2018     Pneumo Conj 13-V (2010&after) 09/14/2016     Pneumococcal 20 valent Conjugate (Prevnar 20) 09/28/2022     Pneumococcal 23 valent 11/04/2008     TD (ADULT, 7+) 06/17/2014     Zoster vaccine, live 12/21/2016           Anticoagulation Information      Recent INR results: No results for input(s): INR in the last 168 hours.  Warfarin doses (if applicable) or name of other anticoagulant: N/A      SIGNIFICANT IMAGING FINDINGS     Results for orders placed or performed during the hospital encounter of 10/04/22   CT Abdomen Pelvis w Contrast    Impression    IMPRESSION:   1.  Severe wall thickening and mucosal hyperenhancement involving the gastric pylorus and antrum, suspicious for gastritis or underlying peptic ulcer disease. No evidence for perforation.    2.  Mild luminal narrowing of the first portion of the duodenum with mild gastric distention, possibly suggesting partial gastric outlet obstruction.   XR Chest Port 1 View    Impression    IMPRESSION: Overall, opacities from July 2022 have improved. However, minimal mid to lower lung bilateral opacities remain. These could be sequela of prior history of COVID or incomplete clearing. Acute infectious / inflammatory process or superimposed   edema unable to be excluded. Mild airway thickening. Possible small pleural effusions. Mildly enlarged cardiac silhouette.       US Thyroid    Impression    IMPRESSION:  1.  Right thyroid 0.5 cm TI-RADS 5 nodule. Consider follow-up per TI-RADS guidelines: follow-up ultrasound annually for 5 years.      Nodules are characterized per  ACR Thyroid Imaging, Reporting and Data System (TI-RADS): White Paper of the ACR  TI-RADS Committee  Beni Del Toro et al. Journal of the American College of Radiology 2017. Volume 14 (2017), Issue 5, 587595.          SIGNIFICANT LABORATORY FINDINGS       Discharge Orders        Medication Therapy Management Referral      Reason for your hospital stay    Hyponatremia     Follow-up and recommended labs and tests     Follow up with primary care provider, KIZZY VILLANUEVA, within 3-4 days for hospital follow- up.  The following labs/tests are recommended: CBC, BMP.    Follow up with [facility] in 2 months for repeat EGD     Activity    Your activity upon discharge: activity as tolerated     Diet    Follow this diet upon discharge: Orders Placed This Encounter      Fluid restriction 1000 ML FLUID      Fluid restriction 1000 ML FLUID      Regular Diet Adult       Examination   Physical Exam   Temp:  [97.4  F (36.3  C)-97.6  F (36.4  C)] 97.6  F (36.4  C)  Pulse:  [64-81] 81  Resp:  [16-20] 20  BP: ()/(56-73) 119/73  SpO2:  [95 %-97 %] 96 %  Wt Readings from Last 1 Encounters:   10/12/22 43.1 kg (95 lb)         GEN:  Not in acute distress  HEENT: AT, NC  CHEST: Clear to auscultation bilaterally  HEART: S1S2   ABDOMEN: Soft. NT, ND.  Extremities: No pedal edema  CNS: No focal neurological deficit. No involuntary movements. Awake, alert, oriented.   SKIN: No skin rash, no cyanosis or clubbing    Please see EMR for more detailed significant labs, imaging, consultant notes etc.    IMary MD, personally saw the patient today and spent greater than 30 minutes discharging this patient.    Mary Barnes MD  [facility]    CC:Kizzy Villanueva

## 2022-11-12 NOTE — ED TRIAGE NOTES
Reports to ER via EMS from [facility]. C/o abdominal pain that radiates to her back, started yesterday and worse today. Stating she can't pee.     VSS per EMS. ; history DM.

## 2022-11-12 NOTE — ED NOTES
Bed: JNEDP-10  Expected date: 11/12/22  Expected time:   Means of arrival: Ambulance  Comments:  [facility]  Abd pain  87 F

## 2022-11-12 NOTE — ED NOTES
used to explain purewick;  stated that patient seemed confused about many things. Pt accepted the purewick but overall seems confused

## 2022-11-12 NOTE — DISCHARGE INSTRUCTIONS
No signs of urine infection.  The rest of your imaging looks good.  I recommend you follow-up with family doctor in 2-3 days for reevaluation.    Return emergency department if you develop worsening cough, worsening shortness of breath, fever, worsening abdominal pain, or any other concerns.    Thank you for choosing [facility] Emergency Department.  It has been my pleasure caring for you today.     ~Dr. Anuj MD

## 2022-11-12 NOTE — ED PROVIDER NOTES
EMERGENCY DEPARTMENT ENCOUNTER      NAME: Chris Bell  AGE: 87 year old female  YOB: 1935  MRN: [id number]  EVALUATION DATE & TIME: No admission date for patient encounter.    PCP: Kizzy Villanueva    ED PROVIDER: Becki Leslie M.D.        Chief Complaint   Patient presents with     Abdominal Pain         FINAL IMPRESSION:    1. Lower abdominal pain    2. Chronic anemia            MEDICAL DECISION MAKIN year old female who presents emergency department with lower abdominal pain and concerns for UTI.  CT unremarkable.  Urinalysis unremarkable.  Physical exam unremarkable.  Laboratories at her baseline.  At this time I feel she is safe for discharge home.      ED COURSE:  11:55 AM   I met with the patient to gather history and perform my exam. ED course and treatment discussed.    12:47 PM  Patient with chronic anemia and hemoglobin at her baseline.    1:53 PM  Sodium is 130 which also appears to be her baseline.  Rest of her laboratories overall nonspecific.    3:31 PM  Patient was updated using the .  All of her imaging and laboratories overall unremarkable and stable for her baseline.  No signs of UTI.  She complains of some exercise protrusions on her lower abdomen though nothing seen on CT scan and nothing palpated on exam.  She described these as her groin but when she pointed them are actually in her mid abdomen.  I also evaluated the groin and no significant lymphadenopathy or abnormalities appreciated.  At this time I feel she can be discharged home to follow-up with primary care doctor in a few days for reevaluation.  She is reassured with these findings and agree with the plan for discharge.  Scan shows some possible opacities in the lower lung bases but she is not having any increased cough, significant shortness of breath, hypoxia, fever, etc.  At this time I think these are just improvements of her prior known infections or inflammatory changes and I do not  think she requires emergent antibiotics at this time.    I do not think that this represents ACS, PE, ruptured AAA, aortic dissection, bowel obstruction, bowel ischemia, cholecystitis, pancreatitis, appendicitis, diverticulitis, kidney stone, pyelonephritis, incarcerated or strangulated hernia, ovarian torsion, viscus perforation, perforated GI ulcer, allergic reaction, COPD exacerbation, asthma exacerbation, acute bacterial pneumonia, CHF, myocarditis, pericarditis, endocarditis, ACS, PE, ruptured AAA, pneumothorax, aortic dissection, bowel obstruction, or other such etiologies at this time.      COVID-19 PPE worn during patient evaluation:  Mask: n95 and homemade masks   Eye Protection: goggles   Gown: none   Hair cover: yes  Face shield: none   Patient wearing a mask: yes     At the conclusion of the encounter I discussed the results of all of the tests and the disposition. Their questions were answered. The patient (and any family present) acknowledged understanding and were agreeable with the care plan.      CONSULTANTS:  none      MEDICATIONS GIVEN IN THE EMERGENCY:  Medications   acetaminophen (TYLENOL) tablet 975 mg (has no administration in time range)   0.9% sodium chloride BOLUS (0 mLs Intravenous Stopped 11/12/22 1508)   iopamidol (ISOVUE-370) solution 75 mL (75 mLs Intravenous Given 11/12/22 1427)           NEW PRESCRIPTIONS STARTED AT TODAY'S ER VISIT     Medication List      There are no discharge medications for this visit.             CONDITION:  stable        DISPOSITION:  discharge back to assisted living         =================================================================  =================================================================    HPI    Patient information was obtained from: patient and EMS    Use of Intrepreter: Yes (Language line) - Language Sukhjinder Bell is a 87 year old female with history of gastritis, anemia, HTN, PUD who presents to the ER with complaints of abd  "pain.    Patient arrives via EMS from [facility].  She reports she is having dysuria, urinary frequency, lower abdominal pain.  She also reports \"lumps in my groin\".  She also reports feeling a little shortness of breath and occasional cough for the last 1 week.  Otherwise denies any fevers, chest pain, vomiting.  She has had a few looser stools but this is not new for her.      REVIEW OF SYSTEMS  Review of Systems   Constitutional: Negative for fever.   Respiratory: Positive for cough and shortness of breath.    Cardiovascular: Negative for chest pain.   Gastrointestinal: Positive for diarrhea. Negative for abdominal pain, nausea and vomiting.   Genitourinary: Positive for dysuria and frequency.   Allergic/Immunologic: Negative for immunocompromised state.   All other systems reviewed and are negative.          PAST MEDICAL HISTORY:  Past Medical History:   Diagnosis Date     Anemia      Depression      Disease of thyroid gland      HTN (hypertension)      Infection due to 2019 novel coronavirus 7/28/2022     Osteoporosis      PUD (peptic ulcer disease)          PAST SURGICAL HISTORY:  Past Surgical History:   Procedure Laterality Date     COLONOSCOPY N/A 7/3/2022    Procedure: COLONOSCOPY WITH COLON BIOPSIES;  Surgeon: Marty Patel MD;  Location: [facility] [place]     ESOPHAGOSCOPY, GASTROSCOPY, DUODENOSCOPY (EGD), COMBINED N/A 1/17/2018    Procedure: ESOPHAGOGASTRODUODENOSCOPY (EGD) with h.pylori and gastric ulcer biopsies;  Surgeon: Colin Alvarez MD;  Location: [facility];  Service:      ESOPHAGOSCOPY, GASTROSCOPY, DUODENOSCOPY (EGD), COMBINED N/A 7/3/2022    Procedure: ESOPHAGOGASTRODUODENOSCOPY (EGD) WITH DUODENAL & GASTRIC BIOPSIES;  Surgeon: Marty Patel MD;  Location: [facility] [place]     ESOPHAGOSCOPY, GASTROSCOPY, DUODENOSCOPY (EGD), COMBINED N/A 10/5/2022    Procedure: ESOPHAGOGASTRODUODENOSCOPY (EGD) WITH BIOPSIES;  Surgeon: Ximena Ho MD;  Location: [facility] OR "     H STATISTIC PICC LINE INSERTION >5YR, FAILED  8/2/2022          HYSTERECTOMY       PICC TRIPLE LUMEN PLACEMENT  8/2/2022          US BIOPSY FINE NEEDLE ASPIRATION LYMPH NODE  8/14/2019         CURRENT MEDICATIONS:    Prior to Admission medications    Medication Sig Start Date End Date Taking? Authorizing Provider   acetaminophen (TYLENOL) 500 MG tablet Take 500 mg by mouth every 6 hours as needed for mild pain    Unknown, Entered By History   Alcohol Swabs (B-D SINGLE USE SWABS REGULAR) PADS USE UTD 9/27/18   Reported, Patient   alendronate (FOSAMAX) 70 MG tablet Take 70 mg by mouth every 7 days    Unknown, Entered By History   aMILoride (MIDAMOR) 5 MG tablet Take 1 tablet (5 mg) by mouth daily 5/26/22   Jennifer Mohr MD   blood glucose monitoring (ONETOUCH VERIO) meter device kit 2 times daily 9/27/18   Reported, Patient   bumetanide (BUMEX) 0.5 MG tablet Take 1 tablet (0.5 mg) by mouth daily 5/26/22   Jennifer Mohr MD   calcium carbonate (TUMS) 500 MG chewable tablet Take 1 chew tab by mouth 4 times daily as needed for heartburn    Unknown, Entered By History   calcium carbonate-vitamin D (OSCAL W/D) 500-200 MG-UNIT tablet Take 1 tablet by mouth 2 times daily    Unknown, Entered By History   cetirizine (ZYRTEC) 5 MG tablet Take 5 mg by mouth daily    Unknown, Entered By History   chlorhexidine (CHLORHEXIDINE) 0.12 % solution Swish and spit 15 mLs in mouth 2 times daily Twice a day after brushing teeth    Unknown, Entered By History   guaiFENesin (ROBITUSSIN) 100 MG/5ML SYRP Take 5-10 mLs by mouth every 6 hours as needed for cough    Unknown, Entered By History   levothyroxine (SYNTHROID/LEVOTHROID) 50 MCG tablet Take 50 mcg by mouth every morning 3/29/19   Kizzy Villanueva   loperamide (IMODIUM) 2 MG capsule Take 1 capsule (2 mg) by mouth 4 times daily as needed for diarrhea 6/24/22   Sonny Brothers MD   LORazepam (ATIVAN) 0.5 MG tablet Take 0.5-1 mg by mouth 3 times daily 2 tablets qam and 1  tablet qafternoon and 2 tablets qpm    Kizzy Villanueva   melatonin 5 MG tablet Take 5 mg by mouth At Bedtime    Unknown, Entered By History   metoprolol succinate ER (TOPROL-XL) 50 MG 24 hr tablet Take 100 mg by mouth daily    Kizzy Villanueva   multivitamin, therapeutic (THERA-VIT) TABS tablet Take 1 tablet by mouth daily    Unknown, Entered By History   nitroGLYcerin (NITROSTAT) 0.4 MG sublingual tablet Place 0.4 mg under the tongue every 5 minutes as needed for chest pain For chest pain place 1 tablet under the tongue every 5 minutes for 3 doses. If symptoms persist 5 minutes after 1st dose call 911.    Unknown, Entered By History   nystatin (MYCOSTATIN) 950584 UNIT/ML suspension Swish and swallow 5 mLs (500,000 Units) in mouth 4 times daily as needed 5/26/22   Edin Arias MD   ONETOUCH VERIO IQ test strip Use one stript to test your blood sugars twice daily 9/27/18   Reported, Patient   oxybutynin ER (DITROPAN XL) 5 MG 24 hr tablet Take 1 tablet by mouth daily 6/29/22   Kizzy Villanueva   pantoprazole (PROTONIX) 40 MG EC tablet Take 1 tablet (40 mg) by mouth 2 times daily 10/16/22   Mary Barnes MD   potassium chloride ER (K-TAB/KLOR-CON) 10 MEQ CR tablet Take 10 mEq by mouth 2 times daily    Unknown, Entered By History   psyllium (METAMUCIL/KONSYL) Packet Take 1 packet by mouth 2 times daily as needed for constipation    Unknown, Entered By History   sodium chloride 1 GM tablet Take 2 g by mouth 3 times daily    Unknown, Entered By History   sucralfate (CARAFATE) 1 GM tablet Take 1 tablet (1 g) by mouth 4 times daily 10/16/22   Mary Barnes MD   vitamin B-12 (CYANOCOBALAMIN) 250 MCG tablet Take 250 mcg by mouth daily    Unknown, Entered By History         ALLERGIES:  Allergies   Allergen Reactions     Unknown [No Clinical Screening - See Comments]      Pt states she has a medication allergy but does not know what it is         FAMILY HISTORY:  Family History   Problem Relation  Age of Onset     Diabetes No family hx of      Cancer No family hx of      Heart Disease No family hx of          SOCIAL HISTORY:  Social History     Socioeconomic History     Marital status:    Tobacco Use     Smoking status: Never     Smokeless tobacco: Never   Substance and Sexual Activity     Alcohol use: No     Drug use: No     Sexual activity: Never         VITALS:  Patient Vitals for the past 24 hrs:   BP Temp Pulse Resp SpO2   11/12/22 1430 (!) 143/63 -- -- -- --   11/12/22 1345 100/57 -- 70 27 95 %   11/12/22 1330 128/73 -- 85 -- 98 %   11/12/22 1315 106/60 -- 79 27 100 %   11/12/22 1300 98/60 -- 70 19 100 %   11/12/22 1156 (!) 141/71 98.2  F (36.8  C) 82 20 100 %       Wt Readings from Last 3 Encounters:   10/12/22 43.1 kg (95 lb)   08/17/22 43.1 kg (95 lb)   08/02/22 43.2 kg (95 lb 3.2 oz)       Estimated Creatinine Clearance: 39.1 mL/min (based on SCr of 0.69 mg/dL).    PHYSICAL EXAM    Constitutional:  Well developed, Well nourished, NAD, GCS 15  HENT:  Normocephalic, Atraumatic, Bilateral external ears normal, Nose normal. Neck- Supple, No stridor.   Eyes:  PERRL, EOMI, Conjunctiva normal, No discharge.  Respiratory:  Normal breath sounds, No respiratory distress, No wheezing, Speaks full sentences easily. No cough.   Cardiovascular:  Normal heart rate, Regular rhythm, No rubs, No gallops. Chest wall nontender.   GI:  No excessive obesity.  Bowel sounds normal, Soft, mild lower abd tenderness, No masses, No flank tenderness. No rebound or guarding.   : deferred  Musculoskeletal: 2+ DP pulses. No cyanosis, No clubbing. Good range of motion in all major joints. No major deformities noted.   Integument:  Warm, Dry, No erythema, No rash.  No petechiae.   Neurologic:  Alert & interactive,  No focal deficits noted.   Psychiatric:  Affect normal,  Cooperative          LAB:  All pertinent labs reviewed and interpreted.  Recent Results (from the past 24 hour(s))   UA with Microscopic reflex to Culture     Collection Time: 11/12/22 12:09 PM    Specimen: Urine, Midstream   Result Value Ref Range    Color Urine Colorless Colorless, Straw, Light Yellow, Yellow    Appearance Urine Clear Clear    Glucose Urine Negative Negative mg/dL    Bilirubin Urine Negative Negative    Ketones Urine Negative Negative mg/dL    Specific Gravity Urine 1.007 1.001 - 1.030    Blood Urine Negative Negative    pH Urine 5.0 5.0 - 7.0    Protein Albumin Urine Negative Negative mg/dL    Urobilinogen Urine <2.0 <2.0 mg/dL    Nitrite Urine Negative Negative    Leukocyte Esterase Urine Negative Negative    Bacteria Urine Few (A) None Seen /HPF    Mucus Urine Present (A) None Seen /LPF    RBC Urine 1 <=2 /HPF    WBC Urine 1 <=5 /HPF    Hyaline Casts Urine 5 (H) <=2 /LPF   Symptomatic; Unknown Influenza A/B & SARS-CoV2 (COVID-19) Virus PCR Multiplex Nasopharyngeal    Collection Time: 11/12/22 12:14 PM    Specimen: Nasopharyngeal; Swab   Result Value Ref Range    Influenza A PCR Negative Negative    Influenza B PCR Negative Negative    RSV PCR Negative Negative    SARS CoV2 PCR Negative Negative   CBC (+ platelets, no diff)    Collection Time: 11/12/22 12:29 PM   Result Value Ref Range    WBC Count 7.0 4.0 - 11.0 10e3/uL    RBC Count 2.75 (L) 3.80 - 5.20 10e6/uL    Hemoglobin 9.8 (L) 11.7 - 15.7 g/dL    Hematocrit 30.0 (L) 35.0 - 47.0 %     (H) 78 - 100 fL    MCH 35.6 (H) 26.5 - 33.0 pg    MCHC 32.7 31.5 - 36.5 g/dL    RDW 16.5 (H) 10.0 - 15.0 %    Platelet Count 284 150 - 450 10e3/uL   Basic metabolic panel    Collection Time: 11/12/22 12:29 PM   Result Value Ref Range    Sodium 130 (L) 136 - 145 mmol/L    Potassium 4.7 3.4 - 5.3 mmol/L    Chloride 98 98 - 107 mmol/L    Carbon Dioxide (CO2) 23 22 - 29 mmol/L    Anion Gap 9 7 - 15 mmol/L    Urea Nitrogen 10.0 8.0 - 23.0 mg/dL    Creatinine 0.69 0.51 - 0.95 mg/dL    Calcium 8.0 (L) 8.8 - 10.2 mg/dL    Glucose 113 (H) 70 - 99 mg/dL    GFR Estimate 84 >60 mL/min/1.73m2   Hepatic function panel     Collection Time: 11/12/22 12:29 PM   Result Value Ref Range    Protein Total 8.9 (H) 6.4 - 8.3 g/dL    Albumin 2.6 (L) 3.5 - 5.2 g/dL    Bilirubin Total 0.3 <=1.2 mg/dL    Alkaline Phosphatase 97 35 - 104 U/L    AST 39 (H) 10 - 35 U/L    ALT 7 (L) 10 - 35 U/L    Bilirubin Direct <0.20 0.00 - 0.30 mg/dL   Lipase    Collection Time: 11/12/22 12:29 PM   Result Value Ref Range    Lipase 14 13 - 60 U/L   Extra Blue Top Tube    Collection Time: 11/12/22 12:29 PM   Result Value Ref Range    Hold Specimen JIC    Extra Red Top Tube    Collection Time: 11/12/22 12:29 PM   Result Value Ref Range    Hold Specimen JIC    Extra Green Top (Lithium Heparin) Tube    Collection Time: 11/12/22 12:29 PM   Result Value Ref Range    Hold Specimen JIC    Extra Purple Top Tube    Collection Time: 11/12/22 12:29 PM   Result Value Ref Range    Hold Specimen JIC    Extra Blood Bank Purple Top Tube    Collection Time: 11/12/22 12:29 PM   Result Value Ref Range    Hold Specimen JIC        Lab Results   Component Value Date    ABORH O POS 08/02/2022           RADIOLOGY:  Reviewed all pertinent imaging. Please see official radiology report.    CT Chest/Abdomen/Pelvis w Contrast   Final Result   IMPRESSION:   1.  There are mild to moderate opacities in both lungs, most marked in the lower lungs. These represent stents round glass opacities and interstitial opacities. Overall, they are significantly improved from 07/29/2022. These are most consistent with an    infectious or inflammatory process which may be resolving or recurrent.   2.  No acute pathology identified in abdomen or pelvis.   3.  Cardiomegaly.   4.  Marked demineralization of the bones. Unchanged compression deformities in the lower thoracic and lumbar spine.            EKG:    Indication: SOB    Performed at: 12:16p  Impression: Normal sinus rhythm at 81 bpm.  Flipped T waves noted in lead aVR, aVL and V1.  Motion artifact in V2 though no definite flipped T waves.  SD of 156 ms,  QRS 70 ms,  ms.  Nonspecific ST changes compared to August 2, 2022.      I have independently reviewed and interpreted the EKG(s) documented above.        PROCEDURES:  none      Becki Leslie M.D. [facility]  Emergency Medicine and Medical Toxicology  [facility] EMERGENCY DEPARTMENT  [facility] [facility] [address]  [phone]  Dept: [phone]           Becki Leslie MD  11/12/22 3159

## 2022-12-14 PROBLEM — N17.9 ACUTE RENAL FAILURE, UNSPECIFIED ACUTE RENAL FAILURE TYPE (H): Status: ACTIVE | Noted: 2022-01-01

## 2022-12-14 PROBLEM — I50.33 ACUTE ON CHRONIC DIASTOLIC CHF (CONGESTIVE HEART FAILURE) (H): Status: ACTIVE | Noted: 2022-01-01

## 2022-12-14 PROBLEM — E87.5 HYPERKALEMIA: Status: ACTIVE | Noted: 2022-01-01

## 2022-12-14 NOTE — ED NOTES
Bed: JNED-H  Expected date:   Expected time:   Means of arrival:   Comments:  [facility]- 88yo F SOB,CP

## 2022-12-14 NOTE — ED PROVIDER NOTES
EMERGENCY DEPARTMENT ENCOUNTER      NAME: Chris Bell  AGE: 87 year old female  YOB: 1935  MRN: [id number]  EVALUATION DATE & TIME: 2022 12:05 PM    PCP: Kizzy Villanueva    ED PROVIDER: Tito Kilpatrick M.D.      Chief Complaint   Patient presents with     Shortness of Breath         FINAL IMPRESSION:  1. Acute on chronic diastolic CHF (congestive heart failure) (H)    2. Acute renal failure, unspecified acute renal failure type (H)    3. Hyperkalemia          ED COURSE & MEDICAL DECISION MAKIN:26 PM I met with patient for initial interview and exam.   1:30 PM I spoke to the patient's caregiver, Russ.  5:46 PM I spoke to [facility] about the patient. They accept her for admission.     87 year old female presents to the Emergency Department for evaluation of edema and dyspnea.  She has a history of cognitive impairment and lives in an adult foster care.  She is a very limited historian.  Apparently has developed some lower extremity edema and dyspnea over the last couple of days.  She has a history of diastolic heart failure and multiple admissions for this over the last couple of years.  Is on Bumex 0.5 mg daily per chart review.  Today her BNP is elevated greater than 20,000, chest x-ray with opacities I think in this setting representative of asymmetric pulmonary edema.  She was eventually placed on a couple liters of oxygen via nasal cannula by RN after oxygen saturations dropping to around 90%.  She is not in any severe respiratory distress.  Labs are also concerning for a acute kidney injury with a creatinine doubled 1.1 today and some associated hyperkalemia.  She was given IV insulin and dextrose to start replacing this and I think this is probably more of a congestive problem as she certainly appears to be hypervolemic on exam labs and imaging today.  Diuresis was initiated in the ED with 1 mg of IV Bumex and will monitor for response.  Patient will be admitted to the  telemetry floor for continued management.  Discussed case with resident service    Critical Care  Performed by: Tito Kilpatrick MD  Authorized by: Tito Kilpatrick MD     Total critical care time: 50 minutes  Critical care time was exclusive of separately billable procedures and treating other patients.  Critical care was necessary to treat or prevent imminent or life-threatening deterioration of the following conditions: Hyperkalemia requiring shifting with insulin dextrose secondary to acute kidney injury and diastolic CHF  Critical care was time spent personally by me on the following activities: development of treatment plan with patient or surrogate, discussions with consultants, examination of patient, evaluation of patient's response to treatment, obtaining history from patient or surrogate, ordering and performing treatments and interventions, ordering and review of laboratory studies, ordering and review of radiographic studies and re-evaluation of patient's condition, this excludes any separately billable procedures.    MEDICATIONS GIVEN IN THE EMERGENCY:  Medications   glucose gel 15-30 g (has no administration in time range)     Or   dextrose 50 % injection 25-50 mL (has no administration in time range)     Or   glucagon injection 1 mg (has no administration in time range)   lidocaine 1 % 0.1-1 mL (has no administration in time range)   lidocaine (LMX4) cream (has no administration in time range)   sodium chloride (PF) 0.9% PF flush 3 mL (3 mLs Intracatheter Not Given 12/14/22 2341)   sodium chloride (PF) 0.9% PF flush 3 mL (has no administration in time range)   melatonin tablet 1 mg (has no administration in time range)   senna-docusate (SENOKOT-S/PERICOLACE) 8.6-50 MG per tablet 1 tablet (has no administration in time range)     Or   senna-docusate (SENOKOT-S/PERICOLACE) 8.6-50 MG per tablet 2 tablet (has no administration in time range)   ondansetron (ZOFRAN ODT) ODT tab 4 mg (has no administration  in time range)     Or   ondansetron (ZOFRAN) injection 4 mg (has no administration in time range)   prochlorperazine (COMPAZINE) injection 5 mg (has no administration in time range)     Or   prochlorperazine (COMPAZINE) tablet 5 mg (has no administration in time range)     Or   prochlorperazine (COMPAZINE) suppository 12.5 mg (has no administration in time range)   bumetanide (BUMEX) injection 1 mg (has no administration in time range)   alum & mag hydroxide-simethicone (MAALOX) suspension 15 mL (has no administration in time range)   aMILoride (MIDAMOR) tablet 5 mg ( Oral Automatically Held 12/18/22 0800)   bumetanide (BUMEX) tablet 0.5 mg ( Oral Automatically Held 12/18/22 0800)   cetirizine (zyrTEC) tablet 5 mg (has no administration in time range)   levothyroxine (SYNTHROID/LEVOTHROID) tablet 50 mcg (has no administration in time range)   LORazepam (ATIVAN) tablet 0.5-1 mg ( Oral Automatically Held 12/18/22 2000)   metoprolol succinate ER (TOPROL XL) 24 hr tablet 100 mg (has no administration in time range)   nitroGLYcerin (NITROSTAT) sublingual tablet 0.4 mg (has no administration in time range)   nystatin (MYCOSTATIN) suspension 500,000 Units (has no administration in time range)   pantoprazole (PROTONIX) EC tablet 40 mg (has no administration in time range)   sodium chloride tablet 2 g (has no administration in time range)   sucralfate (CARAFATE) tablet 1 g (has no administration in time range)   vitamin B-12 (CYANOCOBALAMIN) tablet 250 mcg (has no administration in time range)   calcium carbonate (TUMS) chewable tablet 500 mg (has no administration in time range)   dextrose 10% BOLUS (0 mLs Intravenous Stopped 12/14/22 2150)   dextrose 50 % injection 25 g (25 g Intravenous Given 12/14/22 1725)   insulin regular 1 unit/mL injection 5 Units (5 Units Intravenous Given 12/14/22 1728)   magic mouthwash suspension (diphenhydramine, lidocaine, aluminum-magnesium & simethicone) (10 mLs Swish & Swallow Given 12/14/22  2021)   bumetanide (BUMEX) injection 1 mg (1 mg Intravenous Given 12/14/22 1919)       NEW PRESCRIPTIONS STARTED AT TODAY'S ER VISIT  New Prescriptions    No medications on file          =================================================================    HPI    Limited due to patient being a poor historian.    Patient information was obtained from: Caregiver    Use of : N/A      Chris Bell is a 87 year old female with a pertinent history of mouth cancer, CHF, and hypertension who presents to this ED by EMS for evaluation of shortness of breath.    Per Russ (caregiver), they have noticed increased bilateral leg swelling yesterday that worsened today. The patient developed sores in the back of her mouth. The patient also reported increased shortness of breath and chest tightness. Denies fever, cough, and any other complaints.      REVIEW OF SYSTEMS   Unable to obtain: Limited due to patient being a poor historian.    PAST MEDICAL HISTORY:  Past Medical History:   Diagnosis Date     Anemia      Depression      Disease of thyroid gland      HTN (hypertension)      Infection due to 2019 novel coronavirus 7/28/2022     Osteoporosis      PUD (peptic ulcer disease)        PAST SURGICAL HISTORY:  Past Surgical History:   Procedure Laterality Date     COLONOSCOPY N/A 7/3/2022    Procedure: COLONOSCOPY WITH COLON BIOPSIES;  Surgeon: Marty Patel MD;  Location: [facility] OR     ESOPHAGOSCOPY, GASTROSCOPY, DUODENOSCOPY (EGD), COMBINED N/A 1/17/2018    Procedure: ESOPHAGOGASTRODUODENOSCOPY (EGD) with h.pylori and gastric ulcer biopsies;  Surgeon: Colin Alvarez MD;  Location: [facility];  Service:      ESOPHAGOSCOPY, GASTROSCOPY, DUODENOSCOPY (EGD), COMBINED N/A 7/3/2022    Procedure: ESOPHAGOGASTRODUODENOSCOPY (EGD) WITH DUODENAL & GASTRIC BIOPSIES;  Surgeon: Marty Patel MD;  Location: [facility] OR     ESOPHAGOSCOPY, GASTROSCOPY, DUODENOSCOPY (EGD), COMBINED N/A 10/5/2022    Procedure:  ESOPHAGOGASTRODUODENOSCOPY (EGD) WITH BIOPSIES;  Surgeon: Ximena Ho MD;  Location: [facility] OR     [facility] PICC LINE INSERTION >5YR, FAILED  8/2/2022          HYSTERECTOMY       PICC TRIPLE LUMEN PLACEMENT  8/2/2022          US BIOPSY FINE NEEDLE ASPIRATION LYMPH NODE  8/14/2019           CURRENT MEDICATIONS:    Current Facility-Administered Medications   Medication     alum & mag hydroxide-simethicone (MAALOX) suspension 15 mL     [Held by provider] aMILoride (MIDAMOR) tablet 5 mg     [START ON 12/15/2022] bumetanide (BUMEX) injection 1 mg     [Held by provider] bumetanide (BUMEX) tablet 0.5 mg     calcium carbonate (TUMS) chewable tablet 500 mg     cetirizine (zyrTEC) tablet 5 mg     glucose gel 15-30 g    Or     dextrose 50 % injection 25-50 mL    Or     glucagon injection 1 mg     [START ON 12/15/2022] levothyroxine (SYNTHROID/LEVOTHROID) tablet 50 mcg     lidocaine (LMX4) cream     lidocaine 1 % 0.1-1 mL     [Held by provider] LORazepam (ATIVAN) tablet 0.5-1 mg     melatonin tablet 1 mg     [START ON 12/15/2022] metoprolol succinate ER (TOPROL XL) 24 hr tablet 100 mg     nitroGLYcerin (NITROSTAT) sublingual tablet 0.4 mg     nystatin (MYCOSTATIN) suspension 500,000 Units     ondansetron (ZOFRAN ODT) ODT tab 4 mg    Or     ondansetron (ZOFRAN) injection 4 mg     [START ON 12/15/2022] pantoprazole (PROTONIX) EC tablet 40 mg     prochlorperazine (COMPAZINE) injection 5 mg    Or     prochlorperazine (COMPAZINE) tablet 5 mg    Or     prochlorperazine (COMPAZINE) suppository 12.5 mg     senna-docusate (SENOKOT-S/PERICOLACE) 8.6-50 MG per tablet 1 tablet    Or     senna-docusate (SENOKOT-S/PERICOLACE) 8.6-50 MG per tablet 2 tablet     sodium chloride (PF) 0.9% PF flush 3 mL     sodium chloride (PF) 0.9% PF flush 3 mL     [START ON 12/15/2022] sodium chloride tablet 2 g     [START ON 12/15/2022] sucralfate (CARAFATE) tablet 1 g     [START ON 12/15/2022] vitamin B-12 (CYANOCOBALAMIN) tablet 250  mcg     Current Outpatient Medications   Medication     acetaminophen (TYLENOL) 500 MG tablet     alum & mag hydroxide-simethicone (MAALOX) 200-200-20 MG/5ML SUSP suspension     aMILoride (MIDAMOR) 5 MG tablet     bumetanide (BUMEX) 0.5 MG tablet     calcium carbonate (TUMS) 500 MG chewable tablet     calcium carbonate-vitamin D (OSCAL W/D) 500-200 MG-UNIT tablet     cetirizine (ZYRTEC) 5 MG tablet     chlorhexidine (PERIDEX) 0.12 % solution     guaiFENesin (ROBITUSSIN) 100 MG/5ML SYRP     levothyroxine (SYNTHROID/LEVOTHROID) 50 MCG tablet     loperamide (IMODIUM A-D) 2 MG tablet     loperamide (IMODIUM) 2 MG capsule     LORazepam (ATIVAN) 0.5 MG tablet     melatonin 5 MG tablet     metoprolol succinate ER (TOPROL-XL) 50 MG 24 hr tablet     nitroGLYcerin (NITROSTAT) 0.4 MG sublingual tablet     nystatin (MYCOSTATIN) 539662 UNIT/ML suspension     pantoprazole (PROTONIX) 40 MG EC tablet     potassium chloride ER (K-TAB/KLOR-CON) 10 MEQ CR tablet     sodium chloride 1 GM tablet     sucralfate (CARAFATE) 1 GM tablet     vitamin B-12 (CYANOCOBALAMIN) 250 MCG tablet         ALLERGIES:  Allergies   Allergen Reactions     Unknown [No Clinical Screening - See Comments]      Pt states she has a medication allergy but does not know what it is       FAMILY HISTORY:  Family History   Problem Relation Age of Onset     Diabetes No family hx of      Cancer No family hx of      Heart Disease No family hx of        SOCIAL HISTORY:   Social History     Socioeconomic History     Marital status:    Tobacco Use     Smoking status: Never     Smokeless tobacco: Never   Substance and Sexual Activity     Alcohol use: No     Drug use: No     Sexual activity: Never       VITALS:  BP (!) 125/90   Pulse 72   Temp 97.5  F (36.4  C) (Oral)   Resp 26   Wt 45.4 kg (100 lb)   SpO2 91%   BMI 19.53 kg/m      PHYSICAL EXAM    Constitutional: Chronically ill-appearing elderly female patient, laying in bed, no acute distress  HENT:  Normocephalic, Atraumatic. Neck Supple.  Eyes: EOMI, Conjunctiva normal.  Respiratory: Breathing comfortably on room air.  There are crackles in the bilateral lungs more pronounced on the left.  Cardiovascular: Normal heart rate, Regular rhythm. No peripheral edema.  Abdomen: Soft, nontender  Musculoskeletal: Good range of motion in all major joints. No major deformities noted.  Integument: Warm, Dry.  Neurologic: Alert & awake, Normal motor function, Normal sensory function, No focal deficits noted.   Psychiatric: Cooperative. Affect appropriate.     LAB:  All pertinent labs reviewed and interpreted.  Labs Ordered and Resulted from Time of ED Arrival to Time of ED Departure   BASIC METABOLIC PANEL - Abnormal       Result Value    Sodium 126 (*)     Potassium 6.4 (*)     Chloride 102      Carbon Dioxide (CO2) 15 (*)     Anion Gap 9      Urea Nitrogen 31.5 (*)     Creatinine 1.11 (*)     Calcium 8.8      Glucose 84      GFR Estimate 48 (*)    TROPONIN T, HIGH SENSITIVITY - Abnormal    Troponin T, High Sensitivity 37 (*)    NT PROBNP INPATIENT - Abnormal    N terminal Pro BNP Inpatient 20,590 (*)    CBC WITH PLATELETS AND DIFFERENTIAL - Abnormal    WBC Count 13.9 (*)     RBC Count 2.09 (*)     Hemoglobin 7.4 (*)     Hematocrit 24.3 (*)      (*)     MCH 35.4 (*)     MCHC 30.5 (*)     RDW 16.3 (*)     Platelet Count 242     TROPONIN T, HIGH SENSITIVITY - Abnormal    Troponin T, High Sensitivity 40 (*)    POTASSIUM - Abnormal    Potassium 5.9 (*)    POTASSIUM - Abnormal    Potassium 6.1 (*)    ROUTINE UA WITH MICROSCOPIC REFLEX TO CULTURE - Abnormal    Color Urine Light Yellow      Appearance Urine Clear      Glucose Urine Negative      Bilirubin Urine Negative      Ketones Urine Negative      Specific Gravity Urine 1.024      Blood Urine Negative      pH Urine 5.0      Protein Albumin Urine 20 (*)     Urobilinogen Urine <2.0      Nitrite Urine Negative      Leukocyte Esterase Urine 75 Joanna/uL (*)     Bacteria  Urine Few (*)     Mucus Urine Present (*)     RBC Urine 1      WBC Urine 3      Squamous Epithelials Urine 2 (*)     Hyaline Casts Urine 4 (*)    GLUCOSE BY METER - Abnormal    GLUCOSE BY METER POCT 154 (*)    GLUCOSE BY METER - Abnormal    GLUCOSE BY METER POCT 125 (*)    DIFFERENTIAL - Abnormal    % Neutrophils 85      % Lymphocytes 6      % Monocytes 8      % Eosinophils 1      % Basophils 0      Absolute Neutrophils 11.8 (*)     Absolute Lymphocytes 0.8      Absolute Monocytes 1.1      Absolute Eosinophils 0.1      Absolute Basophils 0.0      RBC Morphology Confirmed RBC Indices      Platelet Assessment        Value: Automated Count Confirmed. Platelet morphology is normal.   GLUCOSE BY METER - Abnormal    GLUCOSE BY METER POCT 113 (*)    GLUCOSE BY METER - Abnormal    GLUCOSE BY METER POCT 110 (*)    GLUCOSE BY METER - Abnormal    GLUCOSE BY METER POCT 103 (*)    GLUCOSE BY METER - Abnormal    GLUCOSE BY METER POCT 104 (*)    GLUCOSE BY METER - Abnormal    GLUCOSE BY METER POCT 122 (*)    PROCALCITONIN - Abnormal    Procalcitonin 1.29 (*)    INFLUENZA A/B & SARS-COV2 PCR MULTIPLEX - Normal    Influenza A PCR Negative      Influenza B PCR Negative      RSV PCR Negative      SARS CoV2 PCR Negative     TSH WITH FREE T4 REFLEX - Normal    TSH 2.93     GLUCOSE BY METER - Normal    GLUCOSE BY METER POCT 70     GLUCOSE BY METER - Normal    GLUCOSE BY METER POCT 98     GLUCOSE MONITOR NURSING POCT   GLUCOSE MONITOR NURSING POCT   GLUCOSE MONITOR NURSING POCT   GLUCOSE MONITOR NURSING POCT       RADIOLOGY:  Reviewed all pertinent imaging. Please see official radiology report.  Chest XR,  PA & LAT   Final Result   IMPRESSION: Patchy and confluent airspace opacities bilaterally, which could represent asymmetric pulmonary edema or multifocal pneumonia. Probable small bilateral pleural effusions as well. No pneumothorax.      Unchanged enlarged cardiomediastinal silhouette.          EKG:    Performed at:  13:27    Impression: Sinus rhythm with premature atrial complexes. Low voltage QRS. Borderline ECG.    Rate: 70 bpm  Rhythm: Sinus  Axis: 87  ID Interval: 194 ms  QRS Interval: 80 ms  QTc Interval: 434 ms  ST Changes: NA  Comparison: NA    I have independently reviewed and interpreted the EKG(s) documented above.      I, Wilma David, am serving as a scribe to document services personally performed by Dr. Tito Kilpatrick, based on my observation and the provider's statements to me. I, Tito Kilpatrick MD attest that Wilma David is acting in a scribe capacity, has observed my performance of the services and has documented them in accordance with my direction.    Tito Kilpatrick M.D.  Emergency Medicine  [facility] EMERGENCY DEPARTMENT  [address] [place] [id number]  [phone]  Dept: [phone]       Tito Kilpatrick MD  12/14/22 5260

## 2022-12-14 NOTE — ED TRIAGE NOTES
Pt arrives per medics from her adult foster care facility, where staff noted that she was sob this morning and had increased bilat pedal edema. HX CHF and anxiety. Staff gave her a NTG and a dose of ativan prior to medic arrival. Upon arrival to ED she is alert, RR reg with sat 95% RA. ED staff having difficulty finding [employer] .

## 2022-12-15 NOTE — ED NOTES
A third IV was placed in the patient's right forearm. She immediately began pulling at it. Hospitalist was contated and orders for soft restraints were placed. Soft restraints have been placed on both upper extremities. Pulses assessed and are present. There is no discoloration.

## 2022-12-15 NOTE — PROGRESS NOTES
RESPIRATORY CARE NOTE   Patient is on 2 lpm N/C, BS coarse in bases, gave 10 mg Albuterol treatment x1, BS post treatment no change, patient perceives no improvement, patient tolerated well. Neb was from high Potassium.     Nnamdi Lopez, RT

## 2022-12-15 NOTE — ED NOTES
Pt continues to shout. When using  pt continues to talk and  has difficulty time understanding pt. When given oral medications pt coughs frequently with liquids; able to swallow pills. Hospitalist at bedside when administering meds, anticipating a speech eval. Pt placed on bedpan, unable to have BM. Urine returned in purewick.

## 2022-12-15 NOTE — PROCEDURES
"PICC Line Insertion Procedure Note  Pt. Name: Chris Bell  MRN:        [id number]    Procedure: Insertion of a  Triple Lumen  5 fr  Bard SOLO (valved) Power PICC, Lot number [id number]    Indications: antibiotics    Contraindications : past PICC unable to thread on left so used right    Procedure Details     Patient identified with 2 identifiers and \"Time Out\" conducted.  .     Central line insertion bundle followed: hand hygiene performed prior to procedure, site cleansed with cholraprep, hat, mask, sterile gloves, sterile gown worn, patient draped with maximum barrier head to toe drape, sterile field maintained.    The vein was assessed and found to be compressible and of adequate size.     Lidocaine 1% 1 ml administered sq to the insertion site. A 5 Fr PICC was inserted into the basilic vein of the right arm with ultrasound guidance. 1 attempt(s) required to access vein.   Catheter threaded without difficulty. Good blood return noted.    Modified Seldinger Technique used for insertion.    The 8 sharps that are included in the PICC insertion kit were accounted for and disposed of in the sharps container prior to breakdown of the sterile field.    Catheter secured with Statlock, biopatch and Tegaderm dressing applied.    Findings:    Total catheter length  42 cm, with 1 cm exposed. Mid upper arm circumference is 29 cm. Catheter was flushed with 30 cc NS. Patient  tolerated procedure well.    Tip placement verified by 3CG . Tip placement in the SVC.    CLABSI prevention brochure left at bedside.    Patient's primary RN notified PICC is ready for use.      Comments:      Thanks  [facility] - [facility]          "

## 2022-12-15 NOTE — ED NOTES
The patient is still visibly anxious. A  was used and the patient was only able to state that she wanted to urinate. She was told that she has a purewich in and could urinate. This did not ease her anxiety. She is also still pulling at the restraints and would likely pull her iv if she was taken out of the soft restraints.

## 2022-12-15 NOTE — ED NOTES
Pt laying in bed, continuously shouting. Pt requesting restraints to come off. Pt pulling at gown and at restraints. Pt mucous membranes dry, moist swab used for moisture.

## 2022-12-15 NOTE — PHARMACY-ADMISSION MEDICATION HISTORY
Pharmacy Note - Admission Medication History    Pertinent Provider Information: None   ______________________________________________________________________    Prior To Admission (PTA) med list completed and updated in EMR.       PTA Med List   Medication Sig Last Dose     acetaminophen (TYLENOL) 500 MG tablet Take 500 mg by mouth 2 times daily 12/14/2022 at x1     alum & mag hydroxide-simethicone (MAALOX) 200-200-20 MG/5ML SUSP suspension Take 15 mLs by mouth every 4 hours as needed for indigestion or heartburn Unknown     aMILoride (MIDAMOR) 5 MG tablet Take 1 tablet (5 mg) by mouth daily 12/14/2022     bumetanide (BUMEX) 0.5 MG tablet Take 1 tablet (0.5 mg) by mouth daily 12/14/2022     calcium carbonate (TUMS) 500 MG chewable tablet Take 1 chew tab by mouth every 4 hours as needed for heartburn Unknown     calcium carbonate-vitamin D (OSCAL W/D) 500-200 MG-UNIT tablet Take 1 tablet by mouth 2 times daily 12/14/2022 at x1     cetirizine (ZYRTEC) 5 MG tablet Take 5 mg by mouth every evening 12/13/2022     chlorhexidine (PERIDEX) 0.12 % solution Swish and spit 15 mLs in mouth 2 times daily Twice a day after brushing teeth 12/14/2022 at x1     guaiFENesin (ROBITUSSIN) 100 MG/5ML SYRP Take 5-10 mLs by mouth every 6 hours as needed for cough Unknown     levothyroxine (SYNTHROID/LEVOTHROID) 50 MCG tablet Take 50 mcg by mouth every morning 12/14/2022     loperamide (IMODIUM A-D) 2 MG tablet Take 2 mg by mouth 2 times daily 12/14/2022 at x1     loperamide (IMODIUM) 2 MG capsule Take 1 capsule (2 mg) by mouth 4 times daily as needed for diarrhea Unknown     LORazepam (ATIVAN) 0.5 MG tablet Take 0.5-1 mg by mouth 3 times daily 2 tablets qam and 1 tablet qafternoon and 2 tablets qpm 12/14/2022 at x1     melatonin 5 MG tablet Take 5 mg by mouth At Bedtime 12/13/2022     metoprolol succinate ER (TOPROL-XL) 50 MG 24 hr tablet Take 100 mg by mouth daily 12/14/2022     nitroGLYcerin (NITROSTAT) 0.4 MG sublingual tablet Place  0.4 mg under the tongue every 5 minutes as needed for chest pain For chest pain place 1 tablet under the tongue every 5 minutes for 3 doses. If symptoms persist 5 minutes after 1st dose call 911. Unknown     nystatin (MYCOSTATIN) 421045 UNIT/ML suspension Swish and swallow 5 mLs (500,000 Units) in mouth 4 times daily as needed (Patient taking differently: Swish and swallow 500,000 Units in mouth 4 times daily) 12/14/2022 at x1     pantoprazole (PROTONIX) 40 MG EC tablet Take 1 tablet (40 mg) by mouth 2 times daily (Patient taking differently: Take 40 mg by mouth daily) 12/14/2022     potassium chloride ER (K-TAB/KLOR-CON) 10 MEQ CR tablet Take 20 mEq by mouth daily 12/14/2022     sodium chloride 1 GM tablet Take 2 g by mouth 3 times daily 12/14/2022 at x1     sucralfate (CARAFATE) 1 GM tablet Take 1 tablet (1 g) by mouth 4 times daily 12/14/2022 at x1     vitamin B-12 (CYANOCOBALAMIN) 250 MCG tablet Take 250 mcg by mouth daily 12/14/2022       Information source(s): Facility ([facility] medication list/MAR    Method of interview communication: phone    Patient was asked about OTC/herbal products specifically.  PTA med list reflects this.    Based on the pharmacist's assessment, the PTA med list information appears reliable    Patient does not use any multi-dose medications prior to admission.     Thank you for the opportunity to participate in the care of this patient.      Sue Erazo [facility]     12/14/2022     7:17 PM

## 2022-12-15 NOTE — ED NOTES
The patient will not take oral medications. Hospitalist will be contacted with regards to placing the patient in soft restraints and getting another IV placed.

## 2022-12-15 NOTE — ED NOTES
Pt how appears calm. She tracked movement but did not call out and is not fighting her restraints.

## 2022-12-15 NOTE — H&P
[facility]    History and Physical - Hospitalist Service       Date of Admission:  12/14/2022    Assessment & Plan      Chris Bell is a 87 year old female admitted on 12/14/2022. She past medical history significant for HFpEF, hypothyroidism, SIADH, hypertension, CKD, chronic anemia and history of GI bleed presents with acute dyspnea and pulmonary edema in the setting of significantly elevated proBNP, elevated white count and chest x-ray findings suspicious for pulmonary edema secondary to CHF exacerbation versus pneumonia.      Hx of HFpEF  Acute CHF exacerbation  Orthopnea  Patient has history of HFpEF, She was on Bumex 0.5 mg and amiloride 5 mg daily.  Patient follows with cardiology, daughter reports that patient takes her home meds regularly with the help of caregivers daily.  Patient is frustrated with diuretics and complains about urinary incontinence at night; therefore not sure of med compliance.  Patient developed SOB worse when lying down over the past 4 days and bilateral lower limb edema.  Denies chest pain, fever, or cough.  Last echo in 4/22: Showed left ventricle normal size, EF 55-60, no regional wall abnormalities. Normal right ventricle size and function, right and left atrium slightly dilated, mild tricuspid regurg and small pericardial effusion.  Today patient is hemodynamically stable, satting appropriately on 2 L NC, positive JVD normal S1-S2 no murmur, respiratory exam notable for crackles at bilateral bases. B/L 3+ pitting lower limb edema.  Labs: Elevated proBNP 20,000, creatinine elevated 1.1 sodium mildly lower than baseline 126.  CXR: Patchy patchy airspace opacities bilaterally that could be pulmonary edema versus multifocal pneumonia.  Probable small bilateral pleural effusions, unchanged enlarged cardiac silhouette.  Clinical picture is most likely due to acute CHF exacerbation that could be secondary to diuretic noncompliance, demand ischemia, anemia, or  electrolyte imbalance however given the elevated white blood count CXR findings, pneumonia could be another factor in the SOB, will decide after Pro-Eddie level.  -IV Bumex 1 mg x2.  -Strict I/os  -Daily weights  -Echo: in the AM.   -Trend troponin x3  - continue telemetry      Hyperkalemia  Potassium level on admission 6.4, differential broad at this point could be related to decline in kidney function although creatinine 1.1 from 0.6 baseline and GFR not significantly reduced, this could be related to patient being on p.o. potassium and not compliant with diuretics which could contribute to elevation in potassium.  Other possible factors could be reduced renal perfusion from CHF exacerbation or significant GI bleed resorption given reduction in Hgb.  Today asymptomatic EKG with no potassium changes or ischemic findings.   -Dextrose 10% and 5 units of regular insulin  -Potassium every 4 hours.  -Bumex as above  -Consider 1g IV calcium gluconate with hyperkalemia EKG changes.  -Nephrology consulted appreciate recs      SOB  Leukocytosis  Elevated Pro-Eddie  Likely CAP   Patient denies fever cough, endorses dyspnea mostly orthopnea, elevated white count of 13.9, elevated Pro-Eddie of 1.29, CXR with patchy opacities that could be pneumonia versus pulmonary edema.  At this point given patient's above findings, elevated Pro-Eddie and comorbidities, will consider empiric treatment of CAP.   -Start empiric ceftriaxone and azithromycin.  -CBC with differential in the a.m.  -Repeat Pro-Eddie 24-48 hours.        history of chronic hyponatremia secondary to SIADH  Asymptomatic, sodium today 126 close to baseline. We will continue to monitor closely.  - continue PTA NaCl TID.  -Consider restarting fluid restriction to 1000 mL when stable.  -Nephrology consult appreciated.          JOSE on CKD  Creat 1.11, baseline 0.8-0.6, the setting of decreased p.o. intake.  We will continue to monitor daily BMP.      Acute on chronic anemia  History  of UGI bleed.   emoglobin 7.4 on admission, baseline 9.4-8.8. Most recent EGD in October showed a duodenal nonbleeding ulcer, biopsy consistent with edema and acute duodenitis, negative for dysplasia and malignancy.    we will continue to monitor hemoglobin.  Patient has an upcoming GI appointment in 01/24/23 for EGD.   -Repeat hemoglobin  -consider pRBCs transfusion Hgb <7       Elevated troponin   37 on admission, elevated to 40 2 hours, no chest pain, EKG with no ischemic changes.could be related demand ischemia   -Continue to trend troponin.      Chronic condition:   Anxiety: pta ativan 0.5-1.0 prn   Osteoporosis: continue PTA calcium in the am.  Hypothyroidism: continue PTA levothyroxine.   Hypertension: PTA metoprolol     Diet: Combination Diet Low Saturated Fat Na <2400mg Diet, No Caffeine DietCardiac diet  DVT Prophylaxis: Pneumatic Compression Devices  Javed Catheter: Not present  Fluids: None  Central Lines: None  Cardiac Monitoring: ACTIVE order. Indication: Electrolyte Imbalance (24 hours)- Magnesium <1.3 mg/ml; Potassium < =2.8 or > 5.5 mg/ml  Code Status: No CPR- Do NOT Intubate    Clinically Significant Risk Factors Present on Admission        # Hyperkalemia: Highest K = 6.4 mmol/L in last 2 days, will monitor as appropriate  # Hyponatremia: Lowest Na = 126 mmol/L in last 2 days, will monitor as appropriate                       Disposition Plan      Expected Discharge Date: 12/16/2022                The patient's care was discussed with the Attending Physician, Dr. Boone.      CARY Staton  [facility] PGY1  Hospitalist Service  [facility]  Securely message with the Vocera Web Console (learn more here)  Text page via [facility] Paging/Directory       ______________________________________________________________________    Chief Complaint   SOB  Lower limb edema  Increased urination    History is obtained from the patient and daughter  Over the  phone in [employer]  was used during this visit.    History of Present Illness   Chris Bell is a 87 year old female who has past medical history significant for HFpEF, hypothyroidism, SIADH, hypertension, CKD, chronic anemia and history of GI bleed.   Patient presents with 4 days of shortness of breath worse when laying down and sometimes with sitting no chest pain no fever cough.  Also endorses swelling of both legs up to thigh  for the past few days, she has been having difficulty with mobility due to the increase in swelling and feeling that her lower limbs are tight.  She endorses normal appetite no abdominal pain.  She also complains of increased urination and volume especially at night, along with episodes of incontinence no suprapubic pain no burning with urination no changes in urine color.   she lives in a senior living facility and has full care including medications, feeding and daily care.      Review of Systems    The 10 point Review of Systems is negative other than noted in the HPI or here.   Mouth sores for the past year on and off.    Past Medical History    I have reviewed this patient's medical history and updated it with pertinent information if needed.   Past Medical History:   Diagnosis Date     Anemia      Depression      Disease of thyroid gland      HTN (hypertension)      Infection due to 2019 novel coronavirus 7/28/2022     Osteoporosis      PUD (peptic ulcer disease)       Past Surgical History   I have reviewed this patient's surgical history and updated it with pertinent information if needed.  Past Surgical History:   Procedure Laterality Date     COLONOSCOPY N/A 7/3/2022    Procedure: COLONOSCOPY WITH COLON BIOPSIES;  Surgeon: Marty Patel MD;  Location: [facility] OR     ESOPHAGOSCOPY, GASTROSCOPY, DUODENOSCOPY (EGD), COMBINED N/A 1/17/2018    Procedure: ESOPHAGOGASTRODUODENOSCOPY (EGD) with h.pylori and gastric ulcer biopsies;  Surgeon: Colin Alvarez MD;   Location: [facility] GI;  Service:      ESOPHAGOSCOPY, GASTROSCOPY, DUODENOSCOPY (EGD), COMBINED N/A 7/3/2022    Procedure: ESOPHAGOGASTRODUODENOSCOPY (EGD) WITH DUODENAL & GASTRIC BIOPSIES;  Surgeon: Marty Patel MD;  Location: [facility] OR     ESOPHAGOSCOPY, GASTROSCOPY, DUODENOSCOPY (EGD), COMBINED N/A 10/5/2022    Procedure: ESOPHAGOGASTRODUODENOSCOPY (EGD) WITH BIOPSIES;  Surgeon: Ximena Ho MD;  Location: [facility] OR     H STATISTIC PICC LINE INSERTION >5YR, FAILED  8/2/2022          HYSTERECTOMY       PICC TRIPLE LUMEN PLACEMENT  8/2/2022          US BIOPSY FINE NEEDLE ASPIRATION LYMPH NODE  8/14/2019        Social History   I have reviewed this patient's social history and updated it with pertinent information if needed. Chris Bell  reports that she has never smoked. She has never used smokeless tobacco. She reports that she does not drink alcohol and does not use drugs.    Family History     Unable to obtain due to: Memory issues    Prior to Admission Medications   Prior to Admission Medications   Prescriptions Last Dose Informant Patient Reported? Taking?   LORazepam (ATIVAN) 0.5 MG tablet 12/14/2022 at x1  Yes Yes   Sig: Take 0.5-1 mg by mouth 3 times daily 2 tablets qam and 1 tablet qafternoon and 2 tablets qpm   aMILoride (MIDAMOR) 5 MG tablet 12/14/2022  No Yes   Sig: Take 1 tablet (5 mg) by mouth daily   acetaminophen (TYLENOL) 500 MG tablet 12/14/2022 at x1  Yes Yes   Sig: Take 500 mg by mouth 2 times daily   alum & mag hydroxide-simethicone (MAALOX) 200-200-20 MG/5ML SUSP suspension Unknown  Yes Yes   Sig: Take 15 mLs by mouth every 4 hours as needed for indigestion or heartburn   bumetanide (BUMEX) 0.5 MG tablet 12/14/2022  No Yes   Sig: Take 1 tablet (0.5 mg) by mouth daily   calcium carbonate (TUMS) 500 MG chewable tablet Unknown  Yes Yes   Sig: Take 1 chew tab by mouth every 4 hours as needed for heartburn   calcium carbonate-vitamin D (OSCAL W/D) 500-200 MG-UNIT  tablet 12/14/2022 at x1  Yes Yes   Sig: Take 1 tablet by mouth 2 times daily   cetirizine (ZYRTEC) 5 MG tablet 12/13/2022  Yes Yes   Sig: Take 5 mg by mouth every evening   chlorhexidine (PERIDEX) 0.12 % solution 12/14/2022 at x1  Yes Yes   Sig: Swish and spit 15 mLs in mouth 2 times daily Twice a day after brushing teeth   guaiFENesin (ROBITUSSIN) 100 MG/5ML SYRP Unknown  Yes Yes   Sig: Take 5-10 mLs by mouth every 6 hours as needed for cough   levothyroxine (SYNTHROID/LEVOTHROID) 50 MCG tablet 12/14/2022  Yes Yes   Sig: Take 50 mcg by mouth every morning   loperamide (IMODIUM A-D) 2 MG tablet 12/14/2022 at x1  Yes Yes   Sig: Take 2 mg by mouth 2 times daily   loperamide (IMODIUM) 2 MG capsule Unknown  No Yes   Sig: Take 1 capsule (2 mg) by mouth 4 times daily as needed for diarrhea   melatonin 5 MG tablet 12/13/2022  Yes Yes   Sig: Take 5 mg by mouth At Bedtime   metoprolol succinate ER (TOPROL-XL) 50 MG 24 hr tablet 12/14/2022  Yes Yes   Sig: Take 100 mg by mouth daily   nitroGLYcerin (NITROSTAT) 0.4 MG sublingual tablet Unknown  Yes Yes   Sig: Place 0.4 mg under the tongue every 5 minutes as needed for chest pain For chest pain place 1 tablet under the tongue every 5 minutes for 3 doses. If symptoms persist 5 minutes after 1st dose call 911.   nystatin (MYCOSTATIN) 936960 UNIT/ML suspension 12/14/2022 at x1  No Yes   Sig: Swish and swallow 5 mLs (500,000 Units) in mouth 4 times daily as needed   Patient taking differently: Swish and swallow 500,000 Units in mouth 4 times daily   pantoprazole (PROTONIX) 40 MG EC tablet 12/14/2022  No Yes   Sig: Take 1 tablet (40 mg) by mouth 2 times daily   Patient taking differently: Take 40 mg by mouth daily   potassium chloride ER (K-TAB/KLOR-CON) 10 MEQ CR tablet 12/14/2022  Yes Yes   Sig: Take 20 mEq by mouth daily   sodium chloride 1 GM tablet 12/14/2022 at x1  Yes Yes   Sig: Take 2 g by mouth 3 times daily   sucralfate (CARAFATE) 1 GM tablet 12/14/2022 at x1  No Yes   Sig:  Take 1 tablet (1 g) by mouth 4 times daily   vitamin B-12 (CYANOCOBALAMIN) 250 MCG tablet 12/14/2022  Yes Yes   Sig: Take 250 mcg by mouth daily      Facility-Administered Medications: None     Allergies   Allergies   Allergen Reactions     Unknown [No Clinical Screening - See Comments]      Pt states she has a medication allergy but does not know what it is       Physical Exam   Vital Signs: Temp: 97.5  F (36.4  C) Temp src: Oral BP: (!) 123/90 Pulse: 85   Resp: 21 SpO2: 98 % O2 Device: Nasal cannula Oxygen Delivery: 2 LPM  Weight: 100 lbs 0 oz    Constitutional: awake, alert, cooperative, no apparent distress, and appears stated age  HEENT: normocephalic, atraumatic, Lids and lashes normal, pupils equal, sclera clear, conjunctiva normal. No cervical lymphadenopathy.  Respiratory: good air exchange, crackles b/l at the bases.   Cardiovascular: regular rate and rhythm, normal S1 and S2, and no murmur noted  GI: No scars, soft, non-distended, non-tender, no masses palpated.  Skin: no bruising or bleeding and normal skin color, texture, turgor  Neurologic: Awake, alert, oriented to name, place and time. No focal neurological deficits.   Data   Data reviewed today: I reviewed all medications, new labs and imaging results over the last 24 hours. I personally reviewed the chest x-ray image(s) showing Patchy opacities likely pulmonary edema versus pneumonia, increased cardiac silhouette, small bilateral pleural effusions..    Recent Labs   Lab 12/15/22  0149 12/15/22  0022 12/14/22  2352 12/14/22  2213 12/14/22  2031 12/14/22 2011 12/14/22  1835 12/14/22  1808 12/14/22  1724 12/14/22  1556   WBC  --   --   --   --   --   --   --   --   --  13.9*   HGB  --   --   --   --   --   --   --   --   --  7.4*   MCV  --   --   --   --   --   --   --   --   --  116*   PLT  --   --   --   --   --   --   --   --   --  242   NA  --   --   --   --   --   --   --   --   --  126*   POTASSIUM  --  5.9*  --   --   --  6.1*  --  5.9*  --   6.4*   CHLORIDE  --   --   --   --   --   --   --   --   --  102   CO2  --   --   --   --   --   --   --   --   --  15*   BUN  --   --   --   --   --   --   --   --   --  31.5*   CR  --   --   --   --   --   --   --   --   --  1.11*   ANIONGAP  --   --   --   --   --   --   --   --   --  9   MANE  --   --   --   --   --   --   --   --   --  8.8   *  --  101* 122*   < >  --    < >  --    < > 84    < > = values in this interval not displayed.     13.9 (H)    \    7.4 (L)    /    242   N 85    L N/A    126 (L)    102    31.5 (H) /   ------------------------------------ 206 (H)   ALT N/A   AST N/A   AP N/A   ALB N/A   Ca 8.8  5.9 (H)    15 (L)    1.11 (H) \    % RETIC N/A    LDH N/A  Troponin N/A    BNP N/A    CK N/A  INR N/A   PTT N/A    D-dimer N/A    Fibrinogen N/A    Antithrombin N/A  Ferritin N/A  CRP N/A    IL-6 N/A  Recent Results (from the past 24 hour(s))   Chest XR,  PA & LAT    Narrative    EXAM: XR CHEST 2 VIEWS  LOCATION: [facility]  DATE/TIME: 12/14/2022 2:18 PM    INDICATION: Dyspnea  COMPARISON: CT 11/12/2022      Impression    IMPRESSION: Patchy and confluent airspace opacities bilaterally, which could represent asymmetric pulmonary edema or multifocal pneumonia. Probable small bilateral pleural effusions as well. No pneumothorax.    Unchanged enlarged cardiomediastinal silhouette.

## 2022-12-15 NOTE — CONSULTS
NEPHROLOGY CONSULTATION    REASON FOR CONSULTATION:      Acute kidney injury, worsening hyponatremia, hyperkalemia.    HISTORY OF PRESENT ILLNESS:    Patient is an 87-year-old female with known history of hypertension, depression, osteoporosis, peptic ulcer disease, she also has chronic hyponatremia, hypomagnesemia, hypokalemia.  Patient has history of frequent admissions to the hospital in the year 2022 with hyponatremia, during the admission in February 2022 she was found to have SIADH which was very severe, she was initiated on diuretics, sodium tablets 3 times daily.  During recent admission to the hospital she presented with serum sodium 120, evaluation at that time also demonstrated urine studies consistent with ADH excess.    Her baseline serum sodium has been oscillating 126-131 in the past.  Her chronic hyponatremia has been difficult to treat in the past, she has required administration of diuretics, tolvaptan, salt tablets.    This time patient was admitted to the hospital with decompensation of heart failure.  Chest x-ray on admission demonstrated pulmonary vascular congestion, brain atretic peptide was elevated.    Patient is not able to provide minimal information because she has history of chronic confusion.    Laboratories in the emergency department at the time of admission demonstrated sodium 126, potassium 6.4, chloride 102, venous CO2 15, creatinine 1.11.  Baseline creatinine is 0.84 (10/15/2022)    In the emergency department patient received IV bumetanide, potassium shifting medications.  Most recent potassium down to 5.8.    REVIEW OF SYSTEMS:   Chronically confused, patient is not able to participate in a review of systems, [facility]  is not able to understand what the patient says.    Past Medical History:   Diagnosis Date     Anemia      Depression      Disease of thyroid gland      HTN (hypertension)      Infection due to 2019 novel coronavirus 7/28/2022     Osteoporosis      PUD  (peptic ulcer disease)        Social History     Socioeconomic History     Marital status:      Spouse name: Not on file     Number of children: Not on file     Years of education: Not on file     Highest education level: Not on file   Occupational History     Not on file   Tobacco Use     Smoking status: Never     Smokeless tobacco: Never   Substance and Sexual Activity     Alcohol use: No     Drug use: No     Sexual activity: Never   Other Topics Concern     Not on file   Social History Narrative     Not on file     Social Determinants of Health     Financial Resource Strain: Not on file   Food Insecurity: Not on file   Transportation Needs: Not on file   Physical Activity: Not on file   Stress: Not on file   Social Connections: Not on file   Intimate Partner Violence: Not on file   Housing Stability: Not on file       Family History   Problem Relation Age of Onset     Diabetes No family hx of      Cancer No family hx of      Heart Disease No family hx of        Allergies   Allergen Reactions     Unknown [No Clinical Screening - See Comments]      Pt states she has a medication allergy but does not know what it is       MEDICATIONS:    [Held by provider] aMILoride  5 mg Oral Daily     azithromycin  500 mg Intravenous Q24H     [Held by provider] bumetanide  0.5 mg Oral Daily     cefTRIAXone  1 g Intravenous Q24H     cetirizine  5 mg Oral QPM     levothyroxine  50 mcg Oral QAM     [Held by provider] LORazepam  0.5-1 mg Oral TID     metoprolol succinate ER  100 mg Oral Daily     pantoprazole  40 mg Oral Daily     sodium chloride (PF)  3 mL Intracatheter Q8H     sodium chloride  2 g Oral TID     sucralfate  1 g Oral 4x Daily     vitamin B-12  250 mcg Oral Daily         PHYSICAL EXAM    BP (!) 185/79   Pulse 92   Temp 97.5  F (36.4  C) (Oral)   Resp 29   Wt 45.4 kg (100 lb)   SpO2 93%   BMI 19.53 kg/m        Intake/Output Summary (Last 24 hours) at 12/15/2022 1244  Last data filed at 12/15/2022 1230  Gross  per 24 hour   Intake --   Output 1000 ml   Net -1000 ml     Resp: 29      GENERAL: Chronically ill and debilitated  HEAD: Normal  HEENT: Equal pupils. Normal hearing. No eyelid edema.  CARDIOVASCULAR: Unable to assess jugular vein pressure, trace of bilateral pretibial edema, cardiac monitor normal sinus rhythm.    PULMONARY: No respiratory distress. No cyanosis  GASTROINTESTINAL: No ascites present  MSK: Diffuse muscle wasting.   NEURO: Alert, diffuse weakness  PSYCHIATRIC: Very confused, [place]  is not able to understand what patient says  SKIN: Pale, no jaundice, no rash.    LABORATORIES    Recent Labs   Lab 12/15/22  0346 12/14/22  1556   WBC 19.4* 13.9*   HGB 7.7* 7.4*   HCT 24.5* 24.3*    242     Recent Labs   Lab 12/15/22  0346 12/14/22  1556   * 126*   CO2 13* 15*   BUN 29.8* 31.5*     No results for input(s): INR, PTT in the last 168 hours.    Invalid input(s): APTT  Invalid input(s): FERRITIN  No results for input(s): IRON in the last 168 hours.    Invalid input(s): TIBC    RADIOLOGY  EXAM: XR CHEST 2 VIEWS  LOCATION: [facility]  DATE/TIME: 12/14/2022 2:18 PM     INDICATION: Dyspnea  COMPARISON: CT 11/12/2022                                                                      IMPRESSION: Patchy and confluent airspace opacities bilaterally, which could represent asymmetric pulmonary edema or multifocal pneumonia. Probable small bilateral pleural effusions as well. No pneumothorax.     Unchanged enlarged cardiomediastinal silhouette.    ECHOCARDIOGRAM 4/13/2022  Interpretation Summary     The left ventricle is normal in size.  Left ventricular systolic function is normal.  The visual ejection fraction is 55-60%.  No regional wall motion abnormalities noted.  Diastolic Doppler findings (E/E' ratio and/or other parameters) suggest left  ventricular filling pressures are increased.  Normal right ventricle size and systolic function.  The right atrium is mildly  "dilated.  The left atrium is mildly dilated.  There is mild to moderate (1-2+) tricuspid regurgitation.Estimate of RV  systolic pressure is 27mmhg plus right atrial pressure  Small pericardial effusion  There are no echocardiographic indications of cardiac tamponade.  Effusion measures approximately 1cm anterior to the right ventricle  Compared to 11/2018 findings are similar.There was a small pericardial  effusion noted on the prior study  The myocardium appears \"brightâ   if clinically indicated consider amyloid    ASSESSMENT/PLAN:    1. Chronic hyponatremia.  History of previous episodes of acute on chronic hyponatremia.  Previous evaluation has demonstrated SIADH which typically has been very difficult to treat.  During the last admission to the hospital she was discharged on salt tablets and diuretics, unclear if patient has been compliant with this medication regimen, she was also discharged on fluid restriction    Serum sodium 123-126 this admission, baseline serum sodium is in the best has been 126-131.    Check serum cortisol today    Continue fluid restriction 1000 mL daily.    Agree with diuretics for heart failure as per primary service.    Monitor serum sodium daily  2. Acute kidney injury.  Prerenal.  Likely from decompensation of heart failure.  Baseline creatinine 0.89 2 months ago, creatinine is now up to 1.2 which is a significant change.  We will continue to closely monitor.  3. Hyperkalemia.  Severe.  Presented with potassium 6.4.  This is secondary to a combination of acute kidney injury, oral potassium supplementation, amiloride, metabolic acidosis.    Hyperkalemia is improving with ongoing therapies.    Status post potassium shifting medications    Discontinue amiloride    Begin sodium bicarbonate IV to treat metabolic acidosis  4. Non-anion gap metabolic acidosis.  Secondary to history of chronic diarrhea.  Gentle sodium bicarbonate drip administration due to decompensation of heart " failure.  5. Heart failure present preserved ejection fraction.  Known left ventricular diastolic dysfunction with associated tricuspid regurgitation.   6. Chronic confusion.  Likely due to dementia.  Supportive care.      Pantera Meneses MD  Nephrology

## 2022-12-15 NOTE — CONSULTS
Care Management Initial Consult    General Information  Assessment completed with: Caregiver, CALVINsingh-   Type of CM/SW Visit: Initial Assessment    Primary Care Provider verified and updated as needed: Yes   Readmission within the last 30 days:        Reason for Consult: discharge planning  Advance Care Planning:            Communication Assessment  Patient's communication style: spoken language (non-English)             Cognitive  Cognitive/Neuro/Behavioral: orientation     Arousal Level: opens eyes spontaneously  Orientation: disoriented to, place, time, situation  Mood/Behavior: cooperative, other (see comments) (very busy, getting things out of her purse and putting on vaseline type gel to lips/mouth)  Best Language: 0 - No aphasia  Speech: clear    Living Environment:   People in home: facility resident     Current living Arrangements: group home      Able to return to prior arrangements: yes       Family/Social Support:  Care provided by:  (staff)  Provides care for: no one, unable/limited ability to care for self  Marital Status:   Facility resident(s)/Staff          Description of Support System: Supportive    Support Assessment: Adequate family and caregiver support    Current Resources:   Patient receiving home care services: No     Community Resources: None  Equipment currently used at home: walker, standard, wheelchair, manual  Supplies currently used at home: Incontinence Supplies    Employment/Financial:  Employment Status:          Financial Concerns:     Referral to Financial Worker: No       Lifestyle & Psychosocial Needs:  Social Determinants of Health     Tobacco Use: Low Risk      Smoking Tobacco Use: Never     Smokeless Tobacco Use: Never     Passive Exposure: Not on file   Alcohol Use: Not on file   Financial Resource Strain: Not on file   Food Insecurity: Not on file   Transportation Needs: Not on file   Physical Activity: Not on file   Stress: Not on file   Social  Connections: Not on file   Intimate Partner Violence: Not on file   Depression: Not on file   Housing Stability: Not on file       Functional Status:  Prior to admission patient needed assistance:   Dependent ADLs:: Bathing, Ambulation-walker, Grooming, Transfers, Toileting, Wheelchair-with assist, Dressing, Incontinence, Positioning  Dependent IADLs:: Cleaning, Laundry, Cooking, Shopping, Meal Preparation, Medication Management, Money Management, Transportation, Incontinence       Mental Health Status:  Mental Health Status: No Current Concerns       Chemical Dependency Status:  Chemical Dependency Status: No Current Concerns             Values/Beliefs:  Spiritual, Cultural Beliefs, Holiness Practices, Values that affect care: no               Additional Information:  Chart reviewed. SW spoke to Russ [phone] - patient's .   Patient lives in an adult foster home/group home and staff provides all cares. She uses a walker or wheelchair for mobility. Staff does endorse pt having more difficulty swallowing and they do need to use applesauce or oatmeals for medication administration.   Russ reports patient's son Shiv is primary family contact.  The group home uses [facility] which is listed in patient's list of pharmacies should there be any new medications at discharge.    Kristy Holloway, LGSW

## 2022-12-15 NOTE — PROGRESS NOTES
[facility]    Progress Note - Hospitalist Service       Date of Admission:  12/14/2022    Chris Bell is a 87 year old female admitted on 12/14/2022. She past medical history significant for HFpEF, hypothyroidism, SIADH, hypertension, CKD, chronic anemia and history of GI bleed presents with acute dyspnea and pulmonary edema in the setting of significantly elevated proBNP, elevated white count and chest x-ray findings suspicious for pulmonary edema secondary to CHF exacerbation versus pneumonia.        Hx of HFpEF  Acute CHF exacerbation  Orthopnea  Patient has history of HFpEF, She was on Bumex 0.5 mg and amiloride 5 mg daily.  Patient follows with cardiology, daughter reports that patient takes her home meds regularly with the help of caregivers daily.  Patient is frustrated with diuretics and complains about urinary incontinence at night; therefore not sure of med compliance.  Patient developed SOB worse when lying down over the past 4 days and bilateral lower limb edema.  Denies chest pain, fever, or cough.  Last echo in 4/22: Showed left ventricle normal size, EF 55-60, no regional wall abnormalities. Normal right ventricle size and function, right and left atrium slightly dilated, mild tricuspid regurg and small pericardial effusion.  Today patient is hemodynamically stable, satting appropriately on 2 L NC, positive JVD normal S1-S2 no murmur, respiratory exam notable for crackles at bilateral bases. B/L 3+ pitting lower limb edema.  Labs: Elevated proBNP 20,000, creatinine elevated 1.1 sodium mildly lower than baseline 126.  CXR: Patchy patchy airspace opacities bilaterally that could be pulmonary edema versus multifocal pneumonia.  Probable small bilateral pleural effusions, unchanged enlarged cardiac silhouette.  Clinical picture is most likely due to acute CHF exacerbation that could be secondary to diuretic noncompliance, demand ischemia, anemia, or electrolyte imbalance however  given the elevated white blood count CXR findings, pneumonia could be another factor in the SOB, will decide after Pro-Eddie level.  -IV Bumex 1 mg BID  -Strict I/os  -Daily weights  -Echo ordered  -Trend troponin x3  -continue telemetry     Hyperkalemia  NAGMA  Potassium level on admission 6.4, differential broad at this point could be related to decline in kidney function although creatinine 1.1 from 0.6 baseline and GFR not significantly reduced, this could be related to patient being on p.o. potassium and not compliant with diuretics which could contribute to elevation in potassium.  Other possible factors could be reduced renal perfusion from CHF exacerbation or significant GI bleed resorption given reduction in Hgb.  Today asymptomatic, EKG without peaked T waves or widened QRS. Was given insulin and dextrose in the ER overnight, though K still 6.1 this morning. Shifted again with albuterol. Awaiting nephrology consult.  May have some adrenal insufficiency driving hypoNa, hyperK, met acidosis.  -Potassium every 4 hours.  -Bumex as above  -Consider 1g IV calcium gluconate with if EKG changes.  -Nephrology consulted, appreciate recs     SOB  Leukocytosis  Elevated Pro-Eddie  Likely CAP   Patient denies fever cough, endorses dyspnea mostly orthopnea, elevated white count of 13.9, elevated Pro-Eddie of 1.29, CXR with patchy opacities that could be pneumonia versus pulmonary edema.  At this point given patient's above findings, elevated Pro-Eddie and comorbidities, will start empiric treatment of CAP. Was choking on water with pills this morning at bedside, could be aspirating. However, did have video swallow in august of this year that was normal. Will consider covering for aspiration PNA if no improvement on current regimen.  -Start empiric ceftriaxone and azithromycin.  -CBC with differential in the a.m.  -Repeat Pro-Eddie 24-48 hours.     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 We will continue to monitor closely.  -Continue PTA NaCl TID.  -Consider restarting fluid restriction to 1000 mL when stable.  -Nephrology consult appreciated.     JOSE on CKD  Creat 1.11, baseline 0.8-0.6, the setting of decreased p.o. intake.  We will continue to monitor daily BMP.     Acute on chronic anemia  History of UGI bleed.   emoglobin 7.4 on admission, baseline 9.4-8.8. Most recent EGD in October showed a duodenal nonbleeding ulcer, biopsy consistent with edema and acute duodenitis, negative for dysplasia and malignancy. We will continue to monitor hemoglobin.  Patient has an upcoming GI appointment in 01/24/23 for EGD.   -Repeat hemoglobin  -consider pRBCs transfusion Hgb <7      Elevated troponin   37 on admission, elevated to 40 at 2 hours, no chest pain, EKG with no ischemic changes. Demand ischemia in the setting of respiratory illness.  -Continue to trend troponin.        Chronic condition:   Anxiety: pta ativan 0.5-1.0 prn   Osteoporosis: continue PTA calcium in the am.  Hypothyroidism: continue PTA levothyroxine.   Hypertension: PTA metoprolol        Diet: Combination Diet Low Saturated Fat Na <2400mg Diet, No Caffeine DietCardiac diet  DVT Prophylaxis: Pneumatic Compression Devices  Javed Catheter: Not present  Fluids: None  Central Lines: None  Cardiac Monitoring: ACTIVE order. Indication: Electrolyte Imbalance (24 hours)- Magnesium <1.3 mg/ml; Potassium < =2.8 or > 5.5 mg/ml  Code Status: No CPR- Do NOT Intubate    Disposition Plan     Expected Discharge Date: 12/16/2022      Destination: group home          The patient's care was discussed with the Attending Physician, Dr. Boone.    Neftali Mao MD  Hospitalist Service  [facility] [place]  Securely message with the Vocera Web Console (learn more here)  Text page via hipix Paging/Directory         Clinically Significant Risk Factors Present on Admission        # Hyperkalemia: Highest K = 6.4  mmol/L in last 2 days, will monitor as appropriate  # Hyponatremia: Lowest Na = 123 mmol/L in last 2 days, will monitor as appropriate         # Acute Kidney Injury, unspecified: based on a >150% or 0.3 mg/dL increase in last creatinine compared to past 90 day average, will monitor renal function               ______________________________________________________________________    Interval History   Agitated this morning. Wants to go to the bathroom but states no one is helping her. Doesn't know why she's in the hospital. Is sick of taking her water pill at home since it makes her pee too much.    Data reviewed today: I reviewed all medications, new labs and imaging results over the last 24 hours. I personally reviewed no images or EKG's today.    Physical Exam   Vital Signs:   Temp src: Oral BP: (!) 185/79 Pulse: 92   Resp: 29 SpO2: 93 % O2 Device: Nasal cannula Oxygen Delivery: 1 LPM  Weight: 100 lbs 0 oz  Constitutional: awake, alert, appears stated age  Eyes: Lids and lashes normal, extra ocular muscles intact, sclera clear, conjunctiva normal  Respiratory: mildly increased WOB, bibasilar crackles   Cardiovascular: Normal apical impulse, regular rate and rhythm, normal S1 and S2, no S3 or S4, and no murmur noted  Skin: no bruising or bleeding and normal skin color, texture, turgor    Data   Recent Results (from the past 24 hour(s))   Chest XR,  PA & LAT    Narrative    EXAM: XR CHEST 2 VIEWS  LOCATION: [facility]  DATE/TIME: 12/14/2022 2:18 PM    INDICATION: Dyspnea  COMPARISON: CT 11/12/2022      Impression    IMPRESSION: Patchy and confluent airspace opacities bilaterally, which could represent asymmetric pulmonary edema or multifocal pneumonia. Probable small bilateral pleural effusions as well. No pneumothorax.    Unchanged enlarged cardiomediastinal silhouette.

## 2022-12-16 NOTE — PLAN OF CARE
Problem: Plan of Care - These are the overarching goals to be used throughout the patient stay.    Goal: Plan of Care Review  Description: The Plan of Care Review/Shift note should be completed every shift.  The Outcome Evaluation is a brief statement about your assessment that the patient is improving, declining, or no change.  This information will be displayed automatically on your shift note.  Outcome: Progressing     Problem: Risk for Delirium  Goal: Optimal Coping  Outcome: Progressing     Problem: Restraint, Nonviolent  Goal: Absence of Harm or Injury  Outcome: Progressing     Problem: Plan of Care - These are the overarching goals to be used throughout the patient stay.    Goal: Absence of Hospital-Acquired Illness or Injury  Intervention: Identify and Manage Fall Risk  Recent Flowsheet Documentation  Taken 12/16/2022 1254 by Zabrina Horne RN  Safety Promotion/Fall Prevention:   activity supervised   nonskid shoes/slippers when out of bed   bed alarm on  Taken 12/16/2022 0915 by Zabrina Horne RN  Safety Promotion/Fall Prevention:   activity supervised   nonskid shoes/slippers when out of bed   bed alarm on     Problem: Risk for Delirium  Goal: Improved Behavioral Control  Intervention: Minimize Safety Risk  Recent Flowsheet Documentation  Taken 12/16/2022 1254 by Zabrina Horne RN  Enhanced Safety Measures: bed alarm set  Taken 12/16/2022 0915 by Zabrina Horne RN  Enhanced Safety Measures: bed alarm set  Goal: Improved Attention and Thought Clarity  Intervention: Maximize Cognitive Function  Recent Flowsheet Documentation  Taken 12/16/2022 1254 by Zabrina Horne RN  Sensory Stimulation Regulation: care clustered  Reorientation Measures: calendar in view  Taken 12/16/2022 0915 by Zabrina Horne RN  Sensory Stimulation Regulation: care clustered  Reorientation Measures: calendar in view     Problem: Fall Injury Risk  Goal: Absence of Fall and Fall-Related  Injury  Intervention: Identify and Manage Contributors  Recent Flowsheet Documentation  Taken 12/16/2022 1254 by Zabrina Horne RN  Medication Review/Management: medications reviewed  Taken 12/16/2022 0915 by Zabrina Horne RN  Medication Review/Management: medications reviewed  Intervention: Promote Injury-Free Environment  Recent Flowsheet Documentation  Taken 12/16/2022 1254 by Zabrina Horne, RN  Safety Promotion/Fall Prevention:   activity supervised   nonskid shoes/slippers when out of bed   bed alarm on  Taken 12/16/2022 0915 by Zabrina Horne RN  Safety Promotion/Fall Prevention:   activity supervised   nonskid shoes/slippers when out of bed   bed alarm on   Goal Outcome Evaluation:       Patient is alert but confused and disoriented to situation and place. Soft limb restraints in place for pulling at lines. NSR on the monitor and VSS. Patient was refusing to take medications this morning. Writer was able to redirect and give medications this afternoon with lunch and speech therapy at bedside. Moderately thicken liquids recommended due to cough. Medications taken with warm applesauce per patient request. Purwick in place with good urine output. Cardiology and nephrology following.

## 2022-12-16 NOTE — PLAN OF CARE
Heart Failure Care Map  GOALS TO BE MET BEFORE DISCHARGE:    1. Decrease congestion and/or edema with diuretic therapy to achieve near optimal volume status.     Dyspnea improved: No, further care required to meet this goal. Please explain dyspnea with exertion noted.   Edema improved: No, further care required to meet this goal. Please explain Bilateral LE edema        Last 24 hour I/O:   Intake/Output Summary (Last 24 hours) at 12/16/2022 0513  Last data filed at 12/16/2022 0402  Gross per 24 hour   Intake 60 ml   Output 2850 ml   Net -2790 ml           Net I/O and Weights since admission:   11/16 0700 - 12/16 0659  In: 60 [P.O.:60]  Out: 3000 [Urine:3000]  Net: -2940     Vitals:    12/14/22 1209   Weight: 45.4 kg (100 lb)       2.  O2 sats > 90% on room air, or at prior home O2 therapy level.      Able to wean O2 this shift to keep sats above 90%?: No, further care required to meet this goal. Please explain requiring 1 LPM O2   Does patient use Home O2? No          Current oxygenation status:   SpO2: 97 %     O2 Device: Nasal cannula, Oxygen Delivery: 1 LPM    3.  Tolerates ambulation and mobility near baseline.     Ambulation: No, further care required to meet this goal. Please explain rest/sleep, patient on non violent soft restraints to karen wrists   Times patient ambulated with staff this shift: 0    Patient's mentation fluctuating. Continues to pull at PICC line when being repositioned. Bilateral wrist restraints on. Patient denied pain. Coughing noted with drinking water, pills crushed with applesauce ok.   Sodium bicarb drip infusing at 50ml/hour.    Please review the Heart Failure Care Map for additional HF goal outcomes.    Long Bell RN  12/16/2022

## 2022-12-16 NOTE — PROGRESS NOTES
RENAL PROGRESS NOTE     CC:     ROS: No new issues present. No nausea. No vomiting. No headache. No fevers. No angina. No shortness of breath. No itching. No uremic symptoms.     Assessment and Plan:     1. Acute on chronic hyponatremia.  History of previous episodes of acute on chronic hyponatremia.  Patient is known to have SIADH, in the past was difficult to treat, she required therapy with diuretics and salt tablets, this time she developed acute hyponatremia associated to decompensation of heart failure.  Likely salt tablets for SIADH contribute to decompensation of heart failure.      Serum sodium in the past 126-132.    Normal cortisol (12/15/2022)    Continue fluid restriction 1000 mL daily.    Agree with diuretics for heart failure as per primary service.    Monitor serum sodium daily    Do not resume salt tablets.  2. Acute kidney injury - Resolved.    Due to decompensation of heart failure.  Serum creatinine is now back to normal after diuresis of heart failure continue to monitor.  Baseline creatinine 0.89.  3. Hyperkalemia - Resolved.  Severe.  Presented with potassium 6.4.  This is secondary to a combination of acute kidney injury, oral potassium supplementation, amiloride, metabolic acidosis.    Monitor potassium  4. Non-anion gap metabolic acidosis - Resolved.  Secondary to history of chronic diarrhea.    Discontinue sodium bicarbonate drip.  5. Heart failure present preserved ejection fraction.  Known left ventricular diastolic dysfunction with associated tricuspid regurgitation.     On bumetanide 1 mg IV twice daily    Likely needs adjustment on 12/17    Clinically compensated at present.  6. Chronic confusion.  Discussed with daughter at the bedside.  Patient remains confused which is baseline.  Patient is reporting home resident.  She is able to answer simple questions with the help of her daughter who is translating during the visit.      Discussed with daughter at the bedside.       Pantera  MD Gideon  Nephrology    PHYSICAL EXAM  /77 (BP Location: Left arm, Patient Position: Semi-Mccullough's, Cuff Size: Adult Regular)   Pulse 80   Temp 98.9  F (37.2  C) (Oral)   Resp 20   Wt 43.9 kg (96 lb 11.2 oz)   SpO2 94%   BMI 18.89 kg/m        Intake/Output Summary (Last 24 hours) at 12/16/2022 1506  Last data filed at 12/16/2022 1416  Gross per 24 hour   Intake 848 ml   Output 4000 ml   Net -3152 ml     Resp: 20    Wt Readings from Last 3 Encounters:   12/16/22 43.9 kg (96 lb 11.2 oz)   10/12/22 43.1 kg (95 lb)   08/17/22 43.1 kg (95 lb)       GENERAL: Chronically ill and debilitated.  HEAD: Normal  HEENT: Equal pupils. Normal hearing. No eyelid edema.  CARDIOVASCULAR: No JVD present.  No peripheral edema  PULMONARY: No respiratory distress. No cyanosis  GASTROINTESTINAL: No ascites present  MSK: Diffuse muscle wasting, no joint deformities  NEURO: Alert, no gross focal findings  PSYCHIATRIC: Tries to answer direct questions with the help of family.  SKIN: Pale, no jaundice, no rash.    LABORATORIES  Recent Labs   Lab 12/16/22  1020 12/15/22  0346 12/14/22  1556   WBC 8.0 19.4* 13.9*   HGB 7.4* 7.7* 7.4*   HCT 22.8* 24.5* 24.3*    258 242     Recent Labs   Lab 12/16/22  0606 12/15/22  0346 12/14/22  1556    123* 126*   CO2 27 13* 15*   BUN 16.5 29.8* 31.5*     No results for input(s): INR, PTT in the last 168 hours.    Invalid input(s): APTT  No results for input(s): ABORH in the last 168 hours.  Invalid input(s): MG    RADIOLOGY REPORTS    ECHOCARDIOGRAM    MEDICATIONS    azithromycin  500 mg Intravenous Q24H     bumetanide  1 mg Intravenous Q12H     [Held by provider] bumetanide  0.5 mg Oral Daily     cefTRIAXone  1 g Intravenous Q24H     cetirizine  5 mg Oral QPM     levothyroxine  50 mcg Oral QAM     [Held by provider] LORazepam  0.5-1 mg Oral TID     metoprolol succinate ER  100 mg Oral Daily     pantoprazole  40 mg Oral Daily     sodium chloride (PF)  3 mL Intracatheter Q8H      sodium chloride  2 g Oral TID     sucralfate  1 g Oral 4x Daily     vitamin B-12  250 mcg Oral Daily     alum & mag hydroxide-simethicone, calcium carbonate, glucose **OR** dextrose **OR** glucagon, haloperidol lactate, hydrOXYzine, lidocaine 4%, lidocaine 4%, lidocaine (buffered or not buffered), lidocaine (buffered or not buffered), loperamide, melatonin, nitroGLYcerin, nystatin, ondansetron **OR** ondansetron, prochlorperazine **OR** prochlorperazine **OR** prochlorperazine, senna-docusate **OR** senna-docusate, sodium chloride (PF), sodium chloride (PF)    RADIOLOGY  EXAM: XR CHEST 2 VIEWS  LOCATION: [facility]  DATE/TIME: 12/14/2022 2:18 PM     INDICATION: Dyspnea  COMPARISON: CT 11/12/2022                                                                      IMPRESSION: Patchy and confluent airspace opacities bilaterally, which could represent asymmetric pulmonary edema or multifocal pneumonia. Probable small bilateral pleural effusions as well. No pneumothorax.     Unchanged enlarged cardiomediastinal silhouette.     ECHOCARDIOGRAM 4/13/2022  Interpretation Summary     The left ventricle is normal in size.  Left ventricular systolic function is normal.  The visual ejection fraction is 55-60%.  No regional wall motion abnormalities noted.  Diastolic Doppler findings (E/E' ratio and/or other parameters) suggest left  ventricular filling pressures are increased.  Normal right ventricle size and systolic function.  The right atrium is mildly dilated.  The left atrium is mildly dilated.  There is mild to moderate (1-2+) tricuspid regurgitation.Estimate of RV  systolic pressure is 27mmhg plus right atrial pressure  Small pericardial effusion  There are no echocardiographic indications of cardiac tamponade.  Effusion measures approximately 1cm anterior to the right ventricle  Compared to 11/2018 findings are similar.There was a small pericardial  effusion noted on the prior study  The  "myocardium appears \"brightâ   if clinically indicated consider amyloid               "

## 2022-12-16 NOTE — PROGRESS NOTES
Speech-Language Pathology: Clinical Swallow Evaluation     12/16/22 1300   Appointment Info   Signing Clinician's Name / Credentials (SLP) Jessica De Oliveira MA CCC-SLP   General Information   Onset of Illness/Injury or Date of Surgery 12/14/22   Referring Physician Dr. Arias   Pertinent History of Current Problem Per MD note: 87 year old female admitted on 12/14/2022. She past medical history significant for HFpEF, hypothyroidism, SIADH, hypertension, CKD, chronic anemia and history of GI bleed presents with acute dyspnea and pulmonary edema in the setting of significantly elevated proBNP, elevated white count and chest x-ray findings suspicious for pulmonary edema secondary to CHF exacerbation; also with pneumonia. CAP  Swallowing difficulties  Patient denies fever cough, endorses dyspnea mostly orthopnea, elevated white count of 13.9, elevated Pro-Eddie of 1.29, CXR with patchy opacities that could be pneumonia versus pulmonary edema; empiric abx for CAP started. Was choking on water with pills here at bedside, could be aspirating. However, did have video swallow in august of this year that was normal.   Procal 0.87 today, down from 1.29 on admission. Inflammatory markers improving. Likely not aspiration, thusly. Will still consult SLP for home swallowing difficulties, though.   General Observations Alert, pleasant and laughing at times; Highly verbose and perseverative, minimally redirectable; did participate with max cues and managing environment       Present yes   Language   (Hmong)   Type of Evaluation   Type of Evaluation Swallow Evaluation   Oral Motor   Oral Musculature generally intact;unable to assess due to poor participation/comprehension  (indirect assessment only; WFL)   Dentition (Oral Motor)   Dentition (Oral Motor) natural dentition   General Swallowing Observations   Past History of Dysphagia VFSS in August 2022 showed transient penetration with thin liquids and no  aspiration. A prominent cricopharyngeus was noted but did not appear to have a negative functional impact   Comment, General Swallowing Observations RN and CNA report some coughing with thin liquids and significant difficulty accepting pills. Unclear if confusion or refusal as patient is highly verbose and difficult to redirect with .   Current Diet/Method of Nutritional Intake (General Swallowing Observations, NIS) thin liquids (level 0);regular diet   Swallowing Evaluation Clinical swallow evaluation   Clinical Swallow Evaluation   Clinical Swallow Evaluation Textures Trialed thin liquids;solid foods;moderately thick liquids/liquidized   Clinical Swallow Eval: Thin Liquid Texture Trial   Mode of Presentation, Thin Liquids cup   Volume of Liquid or Food Presented 1 ounce, small sips   Oral Phase of Swallow WFL   Pharyngeal Phase of Swallow coughing/choking   Clinical Swallow Eval: Moderately Thick Liquids   Mode of Presentation spoon;cup;self-fed   Volume Presented 8 sips   Oral Phase WFL   Pharyngeal Phase intact   Clinical Swallow Evaluation: Solid Food Texture Trial   Mode of Presentation self-fed   Volume Presented one piece of chicken breast   Oral Phase WFL   Pharyngeal Phase intact   Esophageal Phase of Swallow   Patient reports or presents with symptoms of esophageal dysphagia Yes  (vague, intermittent; 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)   Swallowing Recommendations   Diet Consistency Recommendations moderately thick liquids/liquidized (level 3);regular diet   Supervision Level for Intake close supervision needed   Mode of Delivery Recommendations bolus size, small;slow rate of intake  (patient appeared to like thickened liquids via spoon the best, but not required for safety)   Monitoring/Assistance Required (Eating/Swallowing) monitor for cough or change in vocal quality with intake   Recommended Feeding/Eating Techniques (Swallow Eval) provide assist with  feeding;minimize distractions during oral intake;set-up and prepare tray   Medication Administration Recommendations, Swallowing (SLP) patient requests pills in warmed applesauce or oatmeal   Instrumental Assessment Recommendations instrumental evaluation not recommended at this time  (VFSS would be beneficial to determine cause of coughing with liquids, however, patient not appropriate given current cognitive status and inability to accept intake for diagnostic purposes)   Comment, Swallowing Recommendations BSS completed but highly limited due to patient's current cognitive status and highly specific requests for types of food and food temperature. Trialed pills in water, pills in warmed applesauce, one bite of chicken, sips of water via cup and moderately thick liquids via cup and spoon. Ultimately, patient does show significant coughing with thin liquids and no consistent s/s aspiration with moderately thick liquids. Suspect mastication is adequate for foods she would typically consume. Patient is highly verbose which limited intake this session and may impact acceptance of medications. Patient was able to accept gentle instruction to stop speaking and able to follow this briefly to tolerate one medication at a time. Okay for regular diet and moderately thick liquids as tolerated. SLP to follow for further adjustments as appropriate. Recommend medications in warmed applesauce or oatmeal. SLP to follow for further feeding/eating strategies. VFSS will be ordered if deemed appropriate/able to participate.   General Therapy Interventions   Planned Therapy Interventions Dysphagia Treatment   Dysphagia treatment Modified diet education;Compensatory strategies for swallowing;Instruction of safe swallow strategies   Intervention Comments Cognitive behavioral strategies for oral intake   Clinical Impression   Criteria for Skilled Therapeutic Interventions Met (SLP Eval) Yes, treatment indicated   SLP Diagnosis Dysphagia    Risks & Benefits of therapy have been explained evaluation/treatment results reviewed;participants included;patient   Clinical Impression Comments Concern for aspiration with thin liquids   SLP Total Evaluation Time   Eval: oral/pharyngeal swallow function, clinical swallow Minutes (98214) 25   SLP Goals   Therapy Frequency (SLP Eval) 5 times/wk   SLP Predicted Duration/Target Date for Goal Attainment 12/22/22   SLP Goals Swallow;SLP Goal 1   SLP: Safely tolerate diet without signs/symptoms of aspiration Regular diet;Mildly thick liquids   SLP: Goal 1 Patient will follow meal time and medication intake strategies with min cues once established   Interventions   Interventions Quick Adds Swallowing Dysfunction   Swallowing Dysfunction &/or Oral Function for Feeding   Treatment of Swallowing Dysfunction &/or Oral Function for Feeding Minutes (51149) 10   Treatment Detail/Skilled Intervention Attempted numerous strategies for safe and effective intake of medications with RN present and providing medications. Patient accepted meds when placed in warm applesauce. She agrees to oatmeal as well. Trialed placing medication near her lips, sips of liquids, placing pils in patient's hand, etc, all without success. Patient did need cues to stop speaking to allow for medication intake.   SLP Discharge Planning   SLP Plan diet f/u, trial thin again, VFSS if cognition improves to allow for participation   SLP Discharge Recommendation home with assist  (group home)   SLP Rationale for DC Rec suspect patient will need altered diet at d/c   SLP Brief overview of current status  Regular diet and Moderately thick liquids. Meds in warm applesauce.   Total Session Time   Total Session Time (sum of timed and untimed services) 35

## 2022-12-16 NOTE — PROGRESS NOTES
[facility]    Progress Note - Hospitalist Service       Date of Admission:  12/14/2022    Chris Bell is a 87 year old female admitted on 12/14/2022. She past medical history significant for HFpEF, hypothyroidism, SIADH, hypertension, CKD, chronic anemia and history of GI bleed presents with acute dyspnea and pulmonary edema in the setting of significantly elevated proBNP, elevated white count and chest x-ray findings suspicious for pulmonary edema secondary to CHF exacerbation; also with pneumonia.    Hypoxia and shortness of breath improving today, likely needs one more day in the hospital for further diuresis and IV abx.     Hx of HFpEF  Acute CHF exacerbation  Orthopnea  Patient has history of HFpEF, She was on Bumex 0.5 mg and amiloride 5 mg daily.  Patient follows with cardiology, daughter reports that patient takes her home meds regularly with the help of caregivers daily.  Patient is frustrated with diuretics and complains about urinary incontinence at night; therefore not sure of med compliance.  Patient developed SOB worse when lying down over the past 4 days and bilateral lower limb edema.  Denies chest pain, fever, or cough.  Last echo in 4/22: Showed left ventricle normal size, EF 55-60, no regional wall abnormalities. Normal right ventricle size and function, right and left atrium slightly dilated, mild tricuspid regurg and small pericardial effusion.  Today patient is hemodynamically stable, satting appropriately on 2 L NC, positive JVD normal S1-S2 no murmur, respiratory exam notable for crackles at bilateral bases. B/L 3+ pitting lower limb edema.  Labs: Elevated proBNP 20,000, creatinine elevated 1.1 sodium mildly lower than baseline 126.  CXR: Patchy patchy airspace opacities bilaterally that could be pulmonary edema versus multifocal pneumonia.  Probable small bilateral pleural effusions, unchanged enlarged cardiac silhouette.  Clinical picture is most likely due to  acute CHF exacerbation that could be secondary to diuretic noncompliance, demand ischemia, anemia, or electrolyte imbalance however given the elevated white blood count CXR findings, pneumonia also likely driving SOB, see below.  Repeat echo 12/15 showing EF 55-60%, severe RV, LA, RA dilation, mod-severe tricuspid regurg, mild-mod mitral regurg, non-collapsing IVC.  -IV Bumex 1 mg BID  -Strict I/os  -Daily weights  -continue telemetry     Hyperkalemia - resolved  NAGMA - resolved  Potassium level on admission 6.4 - thought by nephro to be d/t JOSE, oral potassium supplement, amiloride (which was start last hospital stay for HTN and hypoMg), metabolic acidosis.  Was given insulin and dextrose in the ER on admission, though K subsequently still 6.1. Shifted again with albuterol. Nephrology then started a bicarb drip. K today down to 3.8.  -Daily BMP.  -Bumex as above  -Nephrology consulted, appreciate recs  -Stop amiloride     CAP  Swallowing difficulties  Patient denies fever cough, endorses dyspnea mostly orthopnea, elevated white count of 13.9, elevated Pro-Eddie of 1.29, CXR with patchy opacities that could be pneumonia versus pulmonary edema; empiric abx for CAP started. Was choking on water with pills here at bedside, could be aspirating. However, did have video swallow in august of this year that was normal.   Procal 0.87 today, down from 1.29 on admission. Inflammatory markers improving. Likely not aspiration, thusly. Will still consult SLP for home swallowing difficulties, though.  -Continue ceftriaxone and azithromycin.  -CBC with differential in the a.m.  -Consult SLP     History of chronic hyponatremia secondary to SIADH  Asymptomatic, sodium recovered to 136 today. Nephrology following. Worked up for adrenal insuff yesterday with cortisol, which was normal.  -Continue PTA NaCl TID.  -Consider restarting fluid restriction to 1000 mL when stable.  -Nephrology consult appreciated.     JOSE on CKD -  resolved  Creat 1.11 on admission, baseline 0.8-0.6, the setting of decreased p.o. intake.  We will continue to monitor daily BMP.     Acute on chronic anemia  History of UGI bleed.   emoglobin 7.4 on admission, baseline 9.4-8.8. Most recent EGD in October showed a duodenal nonbleeding ulcer, biopsy consistent with edema and acute duodenitis, negative for dysplasia and malignancy. We will continue to monitor hemoglobin.  Patient has an upcoming GI appointment in 01/24/23 for EGD.   -Repeat hemoglobin  -consider pRBCs transfusion Hgb <7      Elevated troponin   37 on admission, elevated to 40 at 2 hours, no chest pain, EKG with no ischemic changes. Demand ischemia in the setting of respiratory illness.        Chronic condition:   Anxiety: pta ativan 0.5-1.0 prn   Osteoporosis: continue PTA calcium in the am.  Hypothyroidism: continue PTA levothyroxine.   Hypertension: PTA metoprolol        Diet: Combination Diet Low Saturated Fat Na <2400mg Diet, No Caffeine DietCardiac diet  DVT Prophylaxis: Pneumatic Compression Devices  Javed Catheter: Not present  Fluids: None  Central Lines: None  Cardiac Monitoring: ACTIVE order. Indication: Electrolyte Imbalance (24 hours)- Magnesium <1.3 mg/ml; Potassium < =2.8 or > 5.5 mg/ml  Code Status: No CPR- Do NOT Intubate    Disposition Plan      Expected Discharge Date: 12/17/2022      Destination: group home  Discharge Comments: 12/16:  Restraints        The patient's care was discussed with Dr. Mikhail Mao MD  Hospitalist Service  [facility]  Securely message with the Vocera Web Console (learn more here)  Text page via [employer] Paging/Directory         Clinically Significant Risk Factors        # Hyperkalemia: Highest K = 6.4 mmol/L in last 2 days, will monitor as appropriate  # Hyponatremia: Lowest Na = 123 mmol/L in last 2 days, will monitor as appropriate                      "    ______________________________________________________________________    Interval History   Lives in a [place] foster home since september  Per son, Shiv: Some cognitive decline over past year - more agitated, irritable/impatient, speech is slurred, increased hearing loss  Per SW note yesterday: \"Staff does endorse pt having more difficulty swallowing and they do need to use applesauce or oatmeals for medication administration\"  Toyin doesn't know where she is - thinks she's at Daughter's group home? Doesn't know year but knows it's December. Reports her breathing is better today but still labored. Is more worries about diarrhea.    Data reviewed today: I reviewed all medications, new labs and imaging results over the last 24 hours. I personally reviewed no images or EKG's today.    Physical Exam   Vital Signs: Temp: 98.6  F (37  C) Temp src: Oral BP: (!) 152/68 Pulse: 92   Resp: 20 SpO2: 94 % O2 Device: Nasal cannula Oxygen Delivery: 1 LPM  Weight: 96 lbs 11.2 oz  Constitutional: awake, alert, appears stated age  Eyes: Lids and lashes normal, extra ocular muscles intact, sclera clear, conjunctiva normal  Respiratory: mildly increased WOB, bibasilar crackles. Trace edema to mid shins  Cardiovascular: Normal apical impulse, regular rate and rhythm, normal S1 and S2, no S3 or S4, and no murmur noted  Skin: no bruising or bleeding and normal skin color, texture, turgor    Data   Recent Results (from the past 24 hour(s))   Echocardiogram Complete   Result Value    LVEF  55-60%    Narrative    [id number]  [id number]  [id number]  [id number]^[facility] [address]     Name: TOYIN CARPENTER  MRN: [id number]  : 1935  Study Date: 12/15/2022 01:47 PM  Age: 87 yrs  Gender: Female  Patient Location: [facility]  Reason For Study: CHF  Ordering Physician: BRYON COTTER  Performed By: BP     BSA: 1.4 m2  Height: 60 in  Weight: 100 lb  HR: 93  BP: 206/89 " mmHg  ______________________________________________________________________________  Procedure  Complete Portable Echo Adult. Definity (NDC #25148-305) given intravenously.  ______________________________________________________________________________  Interpretation Summary     Left ventricular size, wall motion and function are normal. The ejection  fraction is 55-60%.  The right ventricle is mildly dilated.  The right ventricular systolic function is normal.  The left atrium is severely dilated.  The right atrium is severely dilated.  There is mild to moderate (1-2+) mitral regurgitation.  There is moderately severe (3+) tricuspid regurgitation.  Severe (>55mmHg) pulmonary hypertension is present.  IVC diameter >2.1 cm collapsing <50% with sniff suggests a high RA pressure  estimated at 15 mmHg or greater.  ______________________________________________________________________________  Left Ventricle  Left ventricular size, wall motion and function are normal. The ejection  fraction is 55-60%. There is normal left ventricular wall thickness. Left  ventricular diastolic function is abnormal. No regional wall motion  abnormalities noted.     Right Ventricle  The right ventricle is mildly dilated. The right ventricular systolic function  is normal.     Atria  The left atrium is severely dilated. The right atrium is severely dilated.  There is no color Doppler evidence of an atrial shunt.     Mitral Valve  Mitral valve leaflets appear normal. There is mild to moderate (1-2+) mitral  regurgitation.     Tricuspid Valve  Tricuspid valve leaflets appear normal. There is dilated tricuspid annulus.  There is moderately severe (3+) tricuspid regurgitation. The right ventricular  systolic pressure is approximated at 49.4 mmHg plus the right atrial pressure.  Severe (>55mmHg) pulmonary hypertension is present.     Aortic Valve  Aortic valve leaflets appear normal. There is no evidence of aortic stenosis  or clinically  significant aortic regurgitation.     Pulmonic Valve  Normal pulmonic valve. There is mild (1+) pulmonic valvular regurgitation.     Vessels  IVC diameter >2.1 cm collapsing <50% with sniff suggests a high RA pressure  estimated at 15 mmHg or greater.     Pericardium  There is no pericardial effusion.     ______________________________________________________________________________  MMode/2D Measurements & Calculations  IVSd: 0.87 cm  LVIDd: 4.1 cm  LVIDs: 3.0 cm  LVPWd: 0.78 cm  FS: 25.2 %     LV mass(C)d: 100.8 grams  LV mass(C)dI: 72.5 grams/m2  Ao root diam: 3.0 cm  LA dimension: 3.4 cm  LA/Ao: 1.2  LVOT diam: 2.0 cm  LVOT area: 3.1 cm2     EF(MOD-bp): 51.9 %  LA Volume Indexed (AL/bp): 61.1 ml/m2  RWT: 0.38     Time Measurements  MM HR: 93.0 BPM     Doppler Measurements & Calculations  MV E max hector: 99.0 cm/sec  MV A max hector: 119.8 cm/sec  MV E/A: 0.83  MV dec time: 0.14 sec  LV V1 max PG: 3.0 mmHg  LV V1 max: 86.8 cm/sec  LV V1 VTI: 16.4 cm  SV(LVOT): 51.7 ml  SI(LVOT): 37.2 ml/m2  PA acc time: 0.09 sec  PI end-d hector: 145.9 cm/sec  TR max hector: 351.4 cm/sec  TR max P.4 mmHg  E/E': 11.5  E/E' avg: 15.1  Lateral E/e': 11.5  Medial E/e': 18.7  Peak E' Hector: 8.6 cm/sec     ______________________________________________________________________________  Report approved by: Tera Galvin 12/15/2022 03:38 PM

## 2022-12-16 NOTE — PROVIDER NOTIFICATION
Patient's third ultrasound IV has infiltrated. Patient extremely difficult IV start. Provider notified and PICC line requested.     Provider also notified that patient is continuously coughing/choking when attempting to swallow food and/or drink.   Will keep patient NPO at this time.

## 2022-12-16 NOTE — PROGRESS NOTES
PROVIDER RESTRAINT FOR VIOLENT BEHAVIOR EVALUATION     Patient's Immediate Situation:  Patient demonstrated the following behaviors: Impulsive behavior, poor safety judgment, with other less restrictive interventions/devices ineffective. Pulled out PICC x 3 in ED.      Patient's Reaction to the intervention:  Does patient understand the reason for restraint/seclusion? No     Medical Condition:  Is there any evidence of compromise of Skin integrity, Respiratory, Cardiovascular, Musculoskeletal, Hydration? No     Behavioral Condition:  In consultation with the RN, is there a need to continue this restraint or seclusion? Yes     See Restraint Flowsheet for complete restraint documentation andassessment.     Sue Lainez MD, PGY-1  [facility]   Pager #: [phone]

## 2022-12-17 NOTE — PROGRESS NOTES
[facility]    Progress Note - Hospitalist Service       Date of Admission:  12/14/2022    Chris Bell is a 87 year old female admitted on 12/14/2022. She past medical history significant for HFpEF, hypothyroidism, SIADH, hypertension, CKD, chronic anemia and history of GI bleed presents with acute dyspnea and pulmonary edema in the setting of significantly elevated proBNP, elevated white count and chest x-ray findings suspicious for pulmonary edema secondary to CHF exacerbation; also with pneumonia.    Hypoxia and shortness of breath improving today, likely needs one more day in the hospital for IV abx and potassium repletion.     Hx of HFpEF  Acute CHF exacerbation  Orthopnea  Patient has history of HFpEF, She was on Bumex 0.5 mg and amiloride 5 mg daily.  Patient follows with cardiology, daughter reports that patient takes her home meds regularly with the help of caregivers daily.  Patient is frustrated with diuretics and complains about urinary incontinence at night; therefore not sure of med compliance.  Patient developed SOB worse when lying down over the past 4 days and bilateral lower limb edema.  Denies chest pain, fever, or cough.  Last echo in 4/22: Showed left ventricle normal size, EF 55-60, no regional wall abnormalities. Normal right ventricle size and function, right and left atrium slightly dilated, mild tricuspid regurg and small pericardial effusion.  Today patient is hemodynamically stable, satting appropriately on 2 L NC, positive JVD normal S1-S2 no murmur, respiratory exam notable for crackles at bilateral bases. B/L 3+ pitting lower limb edema.  Labs: Elevated proBNP 20,000, creatinine elevated 1.1 sodium mildly lower than baseline 126.  CXR: Patchy patchy airspace opacities bilaterally that could be pulmonary edema versus multifocal pneumonia.  Probable small bilateral pleural effusions, unchanged enlarged cardiac silhouette.  Clinical picture is most likely due to  acute CHF exacerbation that could be secondary to diuretic noncompliance (rajan lives at group home that manages meds), demand ischemia, anemia, or salt tabs for SIADH. However, given the elevated white blood count CXR findings, pneumonia also likely driving SOB, see below.  Repeat echo 12/15 showing EF 55-60%, severe RV, LA, RA dilation, mod-severe tricuspid regurg, mild-mod mitral regurg, non-collapsing IVC.  -Transition back to home diuretic regimen - 0.5mg bumex daily  -Strict I/os  -Wean O2 as able - currently on 0.5L  -PT to get moving  -Daily weights  -continue telemetry     Hyperkalemia -> hypokalemia  NAGMA - resolved  Potassium level on admission 6.4 - thought by nephro to be d/t JOSE, oral potassium supplement, amiloride (which was start last hospital stay for HTN and hypoMg), metabolic acidosis.  Was given insulin and dextrose in the ER on admission, though K subsequently still 6.1. Shifted again with albuterol. Nephrology then started a bicarb drip that remained on overnight. K this morning 2.8. Given 40 mEq KCl x 2 this AM by nephrology.  -Daily BMP.  -Potassium rep protocol  -Nephrology consulted, appreciate recs  -Stop amiloride     CAP  Swallowing difficulties  Patient denies fever cough, endorses dyspnea mostly orthopnea, elevated white count of 13.9, elevated Pro-Eddie of 1.29, CXR with patchy opacities that could be pneumonia versus pulmonary edema; empiric abx for CAP started. Was choking on water with pills here at bedside, could be aspirating. However, did have video swallow in august of this year that was normal.   Procal 0.87 on 12/16, down from 1.29 on admission. Inflammatory markers improving. Likely not aspiration, thusly. Speech saw on 12/16 and observed difficulty with thin liquids, recommend thickened with meds. Solids okay.  -Continue ceftriaxone and azithromycin.  -CBC with differential in the a.m.  -Consulted SLP - recommend thickened liquids with meds moving forward. Solid foods  okay    Cognitive decline  Likely dementia  Patient with increased behaviors, forgetfulness, confusion over the past year. Communicated to son Shiv that this is likely dementia. Will discuss goals of care moving forward.  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 Nephrology following. Worked up for adrenal insuff with cortisol, which was normal.  -Continue fluid restriction to 1000 mL   -Nephrology consult appreciated.  -Stop salt tabs for now, per nephrology     JOSE on CKD - resolved  Creat 1.11 on admission, baseline 0.8-0.6, the setting of decreased p.o. intake.  We will continue to monitor daily BMP.     Acute on chronic anemia  History of UGI bleed.   emoglobin 7.4 on admission, baseline 9.4-8.8. Most recent EGD in October showed a duodenal nonbleeding ulcer, biopsy consistent with edema and acute duodenitis, negative for dysplasia and malignancy. We will continue to monitor hemoglobin.  Patient has an upcoming GI appointment in 01/24/23 for EGD.   -Repeat hemoglobin  -consider pRBCs transfusion Hgb <7      Elevated troponin   37 on admission, elevated to 40 at 2 hours, no chest pain, EKG with no ischemic changes. Demand ischemia in the setting of respiratory illness.        Chronic condition:   Anxiety: pta ativan 0.5-1.0 prn   Osteoporosis: continue PTA calcium in the am.  Hypothyroidism: continue PTA levothyroxine.   Hypertension: PTA metoprolol        Diet: Combination Diet Low Saturated Fat Na <2400mg Diet, No Caffeine DietCardiac diet  DVT Prophylaxis: Pneumatic Compression Devices  Javed Catheter: Not present  Fluids: None  Central Lines: None  Cardiac Monitoring: ACTIVE order. Indication: Electrolyte Imbalance (24 hours)- Magnesium <1.3 mg/ml; Potassium < =2.8 or > 5.5 mg/ml  Code Status: No CPR- Do NOT Intubate    Disposition Plan     Expected Discharge Date: 12/17/2022      Destination: group home  Discharge  Comments: 12/16:  Restraints        The patient's care was discussed with Dr. Hugo Mao MD  Hospitalist Service  [facility]  Securely message with the [employer] Web Console (learn more here)  Text page via [employer] Paging/Directory         Clinically Significant Risk Factors        # Hypokalemia: Lowest K = 2.8 mmol/L in last 2 days, will replace as needed                          ______________________________________________________________________    Interval History   Less verbose today. Still awake and alert though not giving verbal response to questions, which is different for Blia. Possibly related to low potassium but unlikely. Sounds like she slept quite poorly overnight, which is more likely the culprit.    Data reviewed today: I reviewed all medications, new labs and imaging results over the last 24 hours. I personally reviewed no images or EKG's today.    Physical Exam   Vital Signs: Temp: 97.5  F (36.4  C) Temp src: Oral BP: (!) 149/74 Pulse: 72   Resp: 18 SpO2: 98 % O2 Device: Nasal cannula Oxygen Delivery: 1 LPM  Weight: 95 lbs 9.6 oz  Constitutional: awake, alert, appears stated age  Eyes: Lids and lashes normal, extra ocular muscles intact, sclera clear, conjunctiva normal  Respiratory: mildly increased WOB, bibasilar crackles. Trace edema to mid shins  Cardiovascular: Normal apical impulse, regular rate and rhythm, normal S1 and S2, no S3 or S4, and no murmur noted  Skin: no bruising or bleeding and normal skin color, texture, turgor    Data   No results found for this or any previous visit (from the past 24 hour(s)).

## 2022-12-17 NOTE — PLAN OF CARE
Goal Outcome Evaluation:    Heart Failure Care Map  GOALS TO BE MET BEFORE DISCHARGE:    1. Decrease congestion and/or edema with diuretic therapy to achieve near optimal volume status.     Dyspnea improved: Yes, satisfactory for discharge.   Edema improved: Yes, satisfactory for discharge.        Last 24 hour I/O:   Intake/Output Summary (Last 24 hours) at 12/17/2022 1553  Last data filed at 12/17/2022 1549  Gross per 24 hour   Intake 1029 ml   Output 1850 ml   Net -821 ml           Net I/O and Weights since admission:   11/17 2300 - 12/17 2259  In: 1877 [P.O.:450; I.V.:1427]  Out: 6850 [Urine:6850]  Net: -4973     Vitals:    12/14/22 1209 12/16/22 0559 12/17/22 0633   Weight: 45.4 kg (100 lb) 43.9 kg (96 lb 11.2 oz) 43.4 kg (95 lb 9.6 oz)       2.  O2 sats > 90% on room air, or at prior home O2 therapy level.      Able to wean O2 this shift to keep sats above 90%?: No, further care required to meet this goal. Please explain Patient requiring 1L O2   Does patient use Home O2? No          Current oxygenation status:   SpO2: 92 %     O2 Device: Nasal cannula, Oxygen Delivery: 1 LPM    3.  Tolerates ambulation and mobility near baseline.     Ambulation: Yes, satisfactory for discharge.   Times patient ambulated with staff this shift: 2    Please review the Heart Failure Care Map for additional HF goal outcomes.    Vital signs stable. Remains on 1L O2. Attempted to wean to room air but O2 sats dropped to 79%. Patient was very lethargic at start of shift and not answering questions with . Patient was assisted to chair and lights turned on to promote wakefulness. Patient became much more alert and talkative. Meds given with applesauce. Minimal PO intake. Up assist x 2 with gait belt to bathroom.     Initiated on K+ protocol. Recheck at 1630.     Kate Deras RN  12/17/2022

## 2022-12-17 NOTE — PLAN OF CARE
Problem: Plan of Care - These are the overarching goals to be used throughout the patient stay.    Goal: Absence of Hospital-Acquired Illness or Injury  Outcome: Progressing  Intervention: Identify and Manage Fall Risk  Intervention: Prevent Skin Injury     Problem: Restraint, Nonviolent  Goal: Absence of Harm or Injury  Outcome: Progressing  Intervention: Protect Skin and Joint Integrity     Goal Outcome Evaluation:  Awake, Oriented to self only disoriented to place, time and situation. Speech non stop with rambling words.  used for communication. Unsuccessful in weaning off 02 this shift, pt desaturate to 87% at Room Air. Kept at 1L NC with Sp02 >95%. Normal Sinus Rhythm on the monitor. Noted impulsive, with attempts to pull tubings during care release, continued on bilateral wrist restraints for safety. No BM. PRN Hydroxyzine for Anxiety . Continued on IV Sodium Bicarbonate at 50ml/hr.

## 2022-12-17 NOTE — PROGRESS NOTES
Speech Therapy Consult       12/17/22 1600   Appointment Info   Signing Clinician's Name / Credentials (SLP) Jessica De Oliveira MA CCC-SLP   Interventions   Interventions Quick Adds Swallowing Dysfunction   Swallowing Dysfunction &/or Oral Function for Feeding   Treatment of Swallowing Dysfunction &/or Oral Function for Feeding Minutes (64884) 20   Treatment Detail/Skilled Intervention Attempted further swallow assessment and advancement of liquids. Patient continues to request highly specific temperature requests with foods and liquids, and often vacillates between subtle changes when her requests are provided. She did have 3 sips of moderately thick lukewarm water and x5 small sips of thin warm water, all with no s/s aspiration. She had extensive coughing with pills crushed in puree provided by RN; unclear if coughing related to swallowing or aspiration versus a cognitive behavioral component versus possible esophageal dysphagia. RN reports patient refused medications in this manner earlier.  Assessment and treatment remains largely limited by patient's cognitive behavioral status, though she is highly pleasant and jocular at times. Recommend Regular diet and Moderately thick liquids with addition of gill water protocol allowing sips of thin water between meals only.  Recommend pills in warmed applesauce or oatmeal or any manner patient will accept and appears to tolerate. Patient would benefit from a VFSS for diagnostic purposes but unlikely to participate in exam at this time. Consider VFSS as an OP with family or staff present to increase potential to gain meaningful and accurate diagnostic information.

## 2022-12-17 NOTE — PROGRESS NOTES
Patient placed on 1:1 during day shift for line pulling. Soft limb restraints removed and order discontinued.

## 2022-12-17 NOTE — PROGRESS NOTES
Re restraints loose rt now pt. Sleeping has a few small red marks on left  Forearm.  Charge nurse informed pt. May benefit from a one to one and then take off when does not  need the PICC any more.

## 2022-12-17 NOTE — PROGRESS NOTES
Patient remains hospitalized for hyperkalemia. Per nursing notes, she had a restless night. Patient resides in a group home and plan is for her to return there upon discharge. Son may be able to transport pt. upon discharge. CM to follow should needs arise.

## 2022-12-17 NOTE — PROGRESS NOTES
General - I have had several interactions with pt. Jackie.  In he beginning  of the shift she  Was at [facility] but then later states she is at [facility].   I have used Hmong speaking staff and the  line.  Pt. Had melatonin for sleep but it did not help. She agree to this at first check on her.   Pt. Has been wide awake most of shift.   I have warmed up her thicken water and warmed the applesauce. the first time through. This was her request.    But she did not want the thickened water.  Again I offered the thickened water but pt. Really wants thin water and is again requesitng regular water after agreeing to thickened.  She also agree to try more applesauce through the  ( at 3am) and then changed mind and stated no.  ( this is her usual behavior she will agree to something and before you get out of room to get it she  Will change her mind ) Each interaction is about 10 to 20 minutes.    She continues to be in soft restraints due to pulling out her PICC line. Has an external cath and diaper on and continuing to check for leaking urine.   Also restraints  loosened per schedule, but have to be fast and have second person in the room to distract a little,  Continues on IV antibiotics through PICC line.

## 2022-12-18 NOTE — SIGNIFICANT EVENT
Significant Event Note    Time of event: 1:02 AM December 18, 2022    Description of event:  Paged by RN regarding patient having hallucinations. Patient admitted for CAP and heart failure with worsening dementia. Per nurse, patient has been having hallucination about seeing snowflakes and ice on her arms, is constantly wiping her arms off. Upon further discussion with patient, appears that she is only oriented to person, not to place or situation. Per chart review, patient appears to have history of hallucinations and delirium, previously on seroquel. Has PRN medications ordered. On exam, patient pleasantly demented (sees snow and ice on her arms, believes she is at a group home and that her son is in the next room), but not in any acute distress at this time. Vital signs stable. No notable neurologic deficits, appears to move limbs, CN II-XII grossly intact.     Plan:  Likely consistent with worsening dementia picture. OK to give PRN medications if patient unable to follow directions. Patient currently redirectable.  - continue 1:1 monitoring  - PRN haldol if needed    Discussed with: bedside nurse    Laura Mohamud MD

## 2022-12-18 NOTE — PROGRESS NOTES
[facility]    Progress Note - Hospitalist Service       Date of Admission:  12/14/2022    Chris Bell is a 87 year old female admitted on 12/14/2022. She past medical history significant for HFpEF, hypothyroidism, SIADH, hypertension, CKD, chronic anemia and history of GI bleed presents with acute dyspnea and pulmonary edema in the setting of significantly elevated proBNP, elevated white count and chest x-ray findings suspicious for pulmonary edema secondary to CHF exacerbation; also with pneumonia.    Hypoxia and shortness of breath improving today, likely needs one more day in the hospital to wean O2 needs.     Hx of HFpEF  Acute CHF exacerbation  Orthopnea  Patient has history of HFpEF, She was on Bumex 0.5 mg and amiloride 5 mg daily.  Patient follows with cardiology, daughter reports that patient takes her home meds regularly with the help of caregivers daily.    Last echo in 4/22: Showed left ventricle normal size, EF 55-60, no regional wall abnormalities. Normal right ventricle size and function, right and left atrium slightly dilated, mild tricuspid regurg and small pericardial effusion.  Clinical picture is most likely due to acute CHF exacerbation that could be secondary to diuretic noncompliance (thouogh lives at group home that manages meds), demand ischemia, anemia, or salt tabs for SIADH. However, given the elevated white blood count CXR findings, pneumonia also likely driving SOB, see below.  Repeat echo 12/15 showing EF 55-60%, severe RV, LA, RA dilation, mod-severe tricuspid regurg, mild-mod mitral regurg, non-collapsing IVC.  -Transition back to home diuretic regimen on 12/17 - 0.5mg bumex daily  -Strict I/os  -Wean O2 as able - currently on 0.5L  -Consider home oxygen tomorrow if unable to wean  -OOB with nursing  -IS use with nursing  -Daily weights  -continue telemetry     Hyperkalemia -> hypokalemia - resolved  NAGMA - resolved  -Daily BMP  -Potassium rep  protocol  -Nephrology consulted, appreciate recs  -Stop amiloride     CAP  Swallowing difficulties  Patient denies fever cough, endorses dyspnea mostly orthopnea, elevated white count of 13.9, elevated Pro-Eddie of 1.29, CXR with patchy opacities that could be pneumonia versus pulmonary edema; empiric abx for CAP started. Was choking on water with pills here at bedside, could be aspirating. However, did have video swallow in august of this year that was normal.   Procal and inflammatory markers continuing to improve. Likely not aspiration, thusly. Speech saw on 12/16 and observed difficulty with thin liquids, recommend thickened with meds. Solids okay.  -Continue ceftriaxone and azithromycin.  -CBC with differential in the a.m.  -Consulted SLP - recommend thickened liquids with meds moving forward. Solid foods okay    Cognitive decline  Likely dementia  ?Hospital acquired delirium  Patient with increased behaviors, forgetfulness, confusion over the past year. Communicated to son Shiv that this is likely dementia. Will discuss goals of care moving forward. Possibly with some delirium here as well - hallucinating snowflakes on her skin.  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 Nephrology following. Worked up for adrenal insuff with cortisol, which was normal. Sodium now normal off salt tabs.  -Continue fluid restriction to 1000 mL   -Nephrology consult appreciated.  -Stop salt tabs for now, per nephrology     JOSE on CKD - resolved  Creat 1.11 on admission, baseline 0.8-0.6, the setting of decreased p.o. intake.  We will continue to monitor daily BMP.     Chronic anemia - stable  History of UGI bleed   emoglobin 7.4 on admission, baseline 9.4-8.8. Most recent EGD in October showed a duodenal nonbleeding ulcer, biopsy consistent with edema and acute duodenitis, negative for dysplasia and malignancy. We will continue to monitor  hemoglobin.  Patient has an upcoming GI appointment in 01/24/23 for EGD.   -Repeat hemoglobin  -consider pRBCs transfusion Hgb <7         Chronic condition:   Anxiety: pta ativan 0.5-1.0 prn   Osteoporosis: continue PTA calcium in the am.  Hypothyroidism: continue PTA levothyroxine.   Hypertension: PTA metoprolol        Diet: Combination Diet Low Saturated Fat Na <2400mg Diet, No Caffeine DietCardiac diet  DVT Prophylaxis: Pneumatic Compression Devices  Javed Catheter: Not present  Fluids: None  Central Lines: None  Cardiac Monitoring: ACTIVE order. Indication: Electrolyte Imbalance (24 hours)- Magnesium <1.3 mg/ml; Potassium < =2.8 or > 5.5 mg/ml  Code Status: No CPR- Do NOT Intubate    Disposition Plan     Expected Discharge Date: 12/18/2022      Destination: group home  Discharge Comments: 12/16:  Restraints  12/17:  Off restraints but started 1:1        The patient's care was discussed with Dr. Hugo Mao MD  Hospitalist Service  [facility]  Securely message with the [employer] Web Console (learn more here)  Text page via [employer] Paging/Directory         Clinically Significant Risk Factors        # Hypokalemia: Lowest K = 2.8 mmol/L in last 2 days, will replace as needed                          ______________________________________________________________________    Interval History   Back to talkative today.  Per nurse,  was unable to understand her this morning.  I was not able to get an  on the line to speak with Chris.  Attempted to call both her son and daughter to help interpret, but no answer.  Sounds like she was hallucinating overnight, reports of seeing snow and ice in the room and after skin.  Not a big deviation from her baseline.    Data reviewed today: I reviewed all medications, new labs and imaging results over the last 24 hours. I personally reviewed no images or EKG's today.    Physical Exam   Vital Signs: Temp: 97.5  F (36.4  C)  Temp src: Axillary BP: 134/70 Pulse: 91   Resp: 20 SpO2: 90 % O2 Device: None (Room air) Oxygen Delivery: 1 LPM  Weight: 95 lbs 0 oz  Constitutional: awake, alert, appears stated age. Speaking a mile a minute  Eyes: Lids and lashes normal, extra ocular muscles intact, sclera clear, conjunctiva normal  Respiratory: No increased work of breathing.  Lungs clear to auscultation bilaterally.  Cardiovascular: Normal apical impulse, regular rate and rhythm, normal S1 and S2, no S3 or S4, and no murmur noted.  No longer any peripheral edema  Skin: no bruising or bleeding and normal skin color, texture, turgor    Data   No results found for this or any previous visit (from the past 24 hour(s)).

## 2022-12-18 NOTE — PLAN OF CARE
Physical Therapy Discharge Summary    Reason for therapy discharge:    All goals and outcomes met, no further needs identified.    Progress towards therapy goal(s). See goals on Care Plan in Epic electronic health record for goal details.  Goals met    Therapy recommendation(s):    recommend OOB and amb with nursing staff while remains in hospital

## 2022-12-18 NOTE — PLAN OF CARE
Problem: Plan of Care - These are the overarching goals to be used throughout the patient stay.    Goal: Absence of Hospital-Acquired Illness or Injury  Intervention: Identify and Manage Fall Risk  Recent Flowsheet Documentation  Taken 12/17/2022 2045 by Trino Bell RN  Safety Promotion/Fall Prevention:    bed alarm on    clutter free environment maintained    fall prevention program maintained    mobility aid in reach    nonskid shoes/slippers when out of bed    patient and family education    room near nurse's station    room organization consistent    safety round/check completed    sitter at bedside    toileting scheduled    supervised activity  Taken 12/17/2022 1630 by Trino Bell RN  Safety Promotion/Fall Prevention:    bed alarm on    clutter free environment maintained    fall prevention program maintained    mobility aid in reach    nonskid shoes/slippers when out of bed    patient and family education    room near nurse's station    room organization consistent    safety round/check completed    sitter at bedside    toileting scheduled    supervised activity     Problem: Plan of Care - These are the overarching goals to be used throughout the patient stay.    Goal: Optimal Comfort and Wellbeing  Intervention: Monitor Pain and Promote Comfort  Recent Flowsheet Documentation  Taken 12/17/2022 2045 by Trino Bell RN  Pain Management Interventions: medication (see MAR)     Problem: Risk for Delirium  Goal: Improved Behavioral Control  Outcome: Progressing  Intervention: Minimize Safety Risk  Recent Flowsheet Documentation  Taken 12/17/2022 2045 by Trino Bell RN  Enhanced Safety Measures:    bed alarm set     at bedside  Taken 12/17/2022 1630 by Trino Bell RN  Enhanced Safety Measures:    bed alarm set     at bedside     Problem: Risk for Delirium  Goal: Improved Attention and Thought Clarity  Outcome: Progressing   Goal Outcome Evaluation:    Pt is disoriented  "to situation and time. NSR, VSS, on 1 L NC. Has 1:1 sitter at bedside. Restless and reports wanting to go home. Speech came and did an evaluation; pt now on thickened liquid diet with sips ok in between meals. Pt took meds crushed in warm, thickened water. Dr. Reynoso had written and verbal orders to hold her sodium chloride PO med. Potassium came back at 4.2, AM recheck. Pt having hallucinations, report seeing snow in the room and feeling snowflakes on her arms. She keeps wiping her face and arms with paper towels. Hallucinations may be new onset. Will notify House officer. Pt having anxiety and also stated that she was \"concerned about the snow storm burying her.\" Hydroxyzine administered.   "

## 2022-12-18 NOTE — PROGRESS NOTES
RENAL PROGRESS NOTE     CC: Acute on chronic hyponatremia    ROS: Patient speaking fast to RN in room, waving arms.  by phone unable to communicate with her yesterday as pt could not hear her. No ROS obtainable. Labs improved.        Assessment and Plan:     1. Acute on chronic hyponatremia.  (RESOLVED) History of previous episodes of acute on chronic hyponatremia.  Patient is known to have SIADH, in the past was difficult to treat, she required therapy with diuretics and salt tablets, this time she developed acute hyponatremia associated to decompensation of heart failure.  Likely salt tablets for SIADH contribute to decompensation of heart failure.      Serum sodium in the past 126-132.    Normal cortisol (12/15/2022)    Continue fluid restriction 1000 mL daily.    Agree with diuretics for heart failure as per primary service.    Monitor serum sodium daily    Do not resume salt tablets for now  2. Acute kidney injury - Resolved.    Due to decompensation of heart failure.  Serum creatinine is now back to normal after diuresis of heart failure continue to monitor.  Baseline creatinine 0.89.  3. Hyperkalemia ---> hypokalemia   S/P bicarb gtt.  Severe.  Presented with potassium 6.4.  This is secondary to a combination of acute kidney injury, oral potassium supplementation, amiloride, metabolic acidosis.    Replace KCl 40 mEq po x 2 doses this AM and K+ protocol.   4. Non-anion gap metabolic acidosis - Resolved.  Secondary to history of chronic diarrhea.    Discontinue sodium bicarbonate drip.(Stopped 12/17 AM)  5. Heart failure present preserved ejection fraction.  Known left ventricular diastolic dysfunction with associated tricuspid regurgitation.     On bumetanide 1 mg IV twice daily -- Suspect we can change back to PO diuretics as currently same weight as discharge from 0/2022 admission.  (Bumex 0.5 mg BID instead of qAM?)    Clinically compensated at present.  6. Chronic confusion. Attempted to  speak with pt using phone  but she did not respond to her questions.   Patient remains confused which is baseline.  Patient is reporting home resident.  She is able to answer simple questions with the help of her daughter who is translating during the visit.  7. Anemia: Transfuse PRN Hgb < 7 g/dl.       Gray Reynoso MD  [facility]  [phone] (off)           PHYSICAL EXAM  BP (!) 144/100 (BP Location: Left arm)   Pulse 88   Temp 98  F (36.7  C) (Oral)   Resp 20   Wt 43.1 kg (95 lb)   SpO2 100%   BMI 18.55 kg/m        Intake/Output Summary (Last 24 hours) at 12/16/2022 1506  Last data filed at 12/16/2022 1416  Gross per 24 hour   Intake 848 ml   Output 4000 ml   Net -3152 ml     Resp: 20    Wt Readings from Last 3 Encounters:   12/18/22 43.1 kg (95 lb)   10/12/22 43.1 kg (95 lb)   08/17/22 43.1 kg (95 lb)       GENERAL: Chronically ill and debilitated. Does not respond to phone   HEAD: Normal  HEENT: Equal pupils. Normal hearing. No eyelid edema.  CARDIOVASCULAR: RRR  No peripheral edema  PULMONARY: No respiratory distress. No cyanosis  GASTROINTESTINAL: soft, NT/ND  MSK: Diffuse muscle wasting, no joint deformities  NEURO: Alert, no gross focal findings  PSYCHIATRIC: confused, [place]?  SKIN:   no rash.    LABORATORIES   Recent Labs   Lab 12/18/22  0447 12/17/22  1624 12/17/22  0607 12/16/22  0606 12/15/22  1011 12/15/22  0733 12/15/22  0346 12/14/22  1808 12/14/22  1556     --  138 136  --   --  123*  --  126*   POTASSIUM 4.2 4.2 2.8* 3.8 5.8* 6.1* 5.6*   < > 6.4*   CHLORIDE 97*  --  91* 100  --   --  98  --  102   CO2 36*  --  39* 27  --   --  13*  --  15*   BUN 9.9  --  10.6 16.5  --   --  29.8*  --  31.5*   CR 0.88  --  0.82 0.89  --   --  1.22*  --  1.11*   GFRESTIMATED 63  --  69 62  --   --  43*  --  48*   MANE 7.2*  --  7.3* 8.1*  --   --  8.6*  --  8.8    < > = values in this interval not displayed.       Recent Labs   Lab 12/18/22  0445  12/17/22  0607 12/16/22  1020 12/15/22  0346 12/14/22  1556   WBC 7.0 8.4 8.0 19.4* 13.9*   HGB 7.4* 7.6* 7.4* 7.7* 7.4*   HCT 23.8* 23.9* 22.8* 24.5* 24.3*   * 111* 109* 113* 116*    243 248 258 242          MEDICATIONS    azithromycin  500 mg Intravenous Q24H     bumetanide  0.5 mg Oral Daily     cefTRIAXone  1 g Intravenous Q24H     cetirizine  5 mg Oral QPM     levothyroxine  50 mcg Oral QAM     [Held by provider] LORazepam  0.5-1 mg Oral TID     metoprolol succinate ER  100 mg Oral Daily     pantoprazole  40 mg Oral Daily     sodium chloride (PF)  3 mL Intracatheter Q8H     sodium chloride  2 g Oral TID     sucralfate  1 g Oral 4x Daily     vitamin B-12  250 mcg Oral Daily     alum & mag hydroxide-simethicone, calcium carbonate, glucose **OR** dextrose **OR** glucagon, haloperidol lactate, hydrOXYzine, lidocaine 4%, lidocaine 4%, lidocaine (buffered or not buffered), lidocaine (buffered or not buffered), loperamide, melatonin, nitroGLYcerin, nystatin, ondansetron **OR** ondansetron, prochlorperazine **OR** prochlorperazine **OR** prochlorperazine, senna-docusate **OR** senna-docusate, sodium chloride (PF), sodium chloride (PF)    RADIOLOGY  EXAM: XR CHEST 2 VIEWS  LOCATION: [facility]  DATE/TIME: 12/14/2022 2:18 PM     INDICATION: Dyspnea  COMPARISON: CT 11/12/2022                                                                      IMPRESSION: Patchy and confluent airspace opacities bilaterally, which could represent asymmetric pulmonary edema or multifocal pneumonia. Probable small bilateral pleural effusions as well. No pneumothorax.     Unchanged enlarged cardiomediastinal silhouette.     ECHOCARDIOGRAM 4/13/2022  Interpretation Summary     The left ventricle is normal in size.  Left ventricular systolic function is normal.  The visual ejection fraction is 55-60%.  No regional wall motion abnormalities noted.  Diastolic Doppler findings (E/E' ratio and/or other  "parameters) suggest left  ventricular filling pressures are increased.  Normal right ventricle size and systolic function.  The right atrium is mildly dilated.  The left atrium is mildly dilated.  There is mild to moderate (1-2+) tricuspid regurgitation.Estimate of RV  systolic pressure is 27mmhg plus right atrial pressure  Small pericardial effusion  There are no echocardiographic indications of cardiac tamponade.  Effusion measures approximately 1cm anterior to the right ventricle  Compared to 11/2018 findings are similar.There was a small pericardial  effusion noted on the prior study  The myocardium appears \"brightâ   if clinically indicated consider amyloid               "

## 2022-12-18 NOTE — PROGRESS NOTES
12/18/22 0835   Appointment Info   Signing Clinician's Name / Credentials (PT) Brit Bradley PT   Rehab Comments (PT) pt in bed ,Speaks Hmong.Talking at all times during PT session..Difficult to re-direct in Hmong via language line       Present yes  (via language line)   Language Hmong  (pt unable to anderstand  and give answers)   Living Environment   People in Home other (see comments)   Current Living Arrangements group home   Transportation Anticipated health plan transportation   Self-Care   Current Activity Tolerance moderate   Equipment Currently Used at Home wheelchair, manual;walker, rolling   Activity/Exercise/Self-Care Comment per note gets assist with all ADL and mobility,patient unable to give info   General Information   Onset of Illness/Injury or Date of Surgery 12/14/22   Referring Physician Edin Arias   Patient/Family Therapy Goals Statement (PT) unable to state   Pertinent History of Current Problem (include personal factors and/or comorbidities that impact the POC) admitted with hyperkalemia   Existing Precautions/Restrictions fall  (with 1 : 1 sitter)   Weight-Bearing Status - LLE full weight-bearing   Weight-Bearing Status - RLE full weight-bearing   Cognition   Affect/Mental Status (Cognition) confused   Orientation Status (Cognition) unable/difficult to assess   Follows Commands (Cognition) physical/tactile prompts required;repetition of directions required;verbal cues/prompting required   Pain Assessment   Patient Currently in Pain No   Range of Motion (ROM)   Range of Motion ROM is WFL   Bed Mobility   Supine-Sit [place] (Bed Mobility) minimum assist (75% patient effort)   Bed Mobility Limitations cognitive deficits   Impairments Contributing to Impaired Bed Mobility decreased strength   Assistive Device (Bed Mobility) bed rails   Transfers   Maintains Weight-bearing Status (Transfers) able to maintain   Bed-Chair Transfer   Bed-Chair  [place] (Transfers) contact guard   Assistive Device (Bed-Chair Transfers) walker, front-wheeled   Sit-Stand Transfer   Sit-Stand [place] (Transfers) verbal cues;contact guard   Assistive Device (Sit-Stand Transfers) walker, front-wheeled   Comment, (Sit-Stand Transfer) cues and demo for safe transfers with FWW /hands placement -pt tends to push from FWW   Stand-Sit Transfer   Stand-Sit [place] (Transfers) verbal cues;contact guard   Assistive Device (Stand-Sit Transfers) walker, front-wheeled   Gait/Stairs (Locomotion)   [place] Level (Gait) contact guard   Assistive Device (Gait) walker, front-wheeled   Distance in Feet 3 feet   Distance in Feet (Gait) 100 feet   Pattern (Gait) swing-through   Deviations/Abnormal Patterns (Gait) base of support, narrow;festinating/shuffling   Maintains Weight-bearing Status (Gait) able to maintain   Clinical Impression   Criteria for Skilled Therapeutic Intervention Evaluation only   Clinical Presentation (PT Evaluation Complexity) Stable/Uncomplicated   Clinical Presentation Rationale pt presents as med diagnosed   Clinical Decision Making (Complexity) low complexity   Planned Therapy Interventions (PT) gait training   Anticipated Equipment Needs at Discharge (PT) walker, rolling   Risk & Benefits of therapy have been explained evaluation/treatment results reviewed  (no as pt did not understand /follow instructions in Hmong)   Clinical Impression Comments Patient is a 86 yo Hmong speaking female admitted from group home with hyperkalemia.Gets assist with ADL and mobility.Unable to foloow instructiona and answer questions in Hmong.Apperas at her baseline/close to her functional mobility.Has no needs for acute ,skilled PT.   PT Total Evaluation Time   PT Eval, Low Complexity Minutes (53168) 15   Physical Therapy Goals   PT Frequency One time eval and treatment only   PT Predicted Duration/Target Date for Goal Attainment 12/18/22   PT Goals Bed  Mobility;Transfers;Gait   PT: Bed Mobility Minimal assist;Supine to/from sit;Goal Met   PT: Transfers Supervision/stand-by assist;Sit to/from stand;Assistive device   PT: Gait Supervision/stand-by assist;Assistive device;Rolling walker;100 feet;Goal Met   Gait Training   Gait Training Minutes (07589) 10   Symptoms Noted During/After Treatment (Gait Training) none   Treatment Detail/Skilled Intervention amb out on hallway   [place] Level (Gait Training) contact guard   Physical Assistance Level (Gait Training) nonverbal cues (demo/gestures);1 person assist  (for attention to task and directions)   Weight Bearing (Gait Training) full weight-bearing   Assistive Device (Gait Training) rolling walker   Gait Analysis Deviations decreased step length;decreased toe-to-floor clearance;decreased stride length   PT Discharge Planning   PT Plan d/c PT -recommend OOb and amb with nursing staff while remains in hospital   PT Discharge Recommendation (DC Rec) home with assist   PT Rationale for DC Rec assist of 1 for mobility and amb with FWW.Anticipate return to Group Home with previous services   PT Brief overview of current status Patient has a hx of dementia,pneumonia,recovered COVID,CKI   Total Session Time   Timed Code Treatment Minutes 10   Total Session Time (sum of timed and untimed services) 25

## 2022-12-18 NOTE — PLAN OF CARE
Patient denied pain and was able to sleep for very little of the night; would put on call light often and then ask the same questions or ramble on; asking about having family come to sleep in the room with her. Tele NSR. This morning patient reported seeing snow again; and reported being cold; also said she is ready to go home today.     Vitals:    12/17/22 1531 12/17/22 1934 12/18/22 0010 12/18/22 0304   BP: 112/58 (!) 149/70 136/69 (!) 160/82   BP Location: Left arm Left arm Left arm Left arm   Pulse: 70 80 102 77   Resp: 18 18 18 18   Temp: 98.2  F (36.8  C) 98.3  F (36.8  C) 97.7  F (36.5  C) 97.7  F (36.5  C)   TempSrc: Oral Oral Oral Oral   SpO2: 100% 99% 97% 98%   Weight:    43.1 kg (95 lb)        Problem: Risk for Delirium  Goal: Improved Attention and Thought Clarity  Outcome: Not Progressing  Intervention: Maximize Cognitive Function  Recent Flowsheet Documentation  Taken 12/18/2022 0045 by Becki Gomez, RN  Sensory Stimulation Regulation:    care clustered    quiet environment promoted  Reorientation Measures: reorientation provided     Problem: Plan of Care - These are the overarching goals to be used throughout the patient stay.    Goal: Optimal Comfort and Wellbeing  Outcome: Progressing     Problem: Risk for Delirium  Goal: Improved Sleep  Outcome: Progressing   Goal Outcome Evaluation:

## 2022-12-18 NOTE — PLAN OF CARE
Problem: Heart Failure  Goal: Improved Oral Intake  Outcome: Progressing     Problem: Heart Failure  Goal: Effective Oxygenation and Ventilation  Outcome: Progressing     Goal Outcome Evaluation:    Vital signs stable. On 1L NC. When on room air, O2 sats drop to 79%. IS encouraged but patient not always compliant. Patient disoriented to place, time, and situation. Per , patient is rambling and what she is saying does not really make sense. Up assist x 1 with walker. Not pulling at lines, 1:1 removed and alarms in place.

## 2022-12-19 NOTE — DISCHARGE SUMMARY
[facility]  Discharge Summary - Medicine & Pediatrics       Date of Admission:  12/14/2022  Date of Discharge:  12/19/2022  Discharging Provider: Dr. Mao  Discharge Service: Hospitalist Service    Discharge Diagnoses   1. Congestive heart failure, unspecified HF chronicity, unspecified heart failure type (H)    2. Acute on chronic diastolic CHF (congestive heart failure) (H)    3. Acute renal failure, unspecified acute renal failure type (H)    4. Hyperkalemia    5. PUD (peptic ulcer disease)    6. Oral ulcer    7. SIADH (syndrome of inappropriate ADH production) (H)    8. Acute on chronic heart failure with preserved ejection fraction (H)          Follow-ups Needed After Discharge   Follow-up Appointments     Follow-up and recommended labs and tests       Follow up with primary care provider, INOCENCIA PRINGLE, within 7   days for hospital follow- up.  The following labs/tests are recommended:   CBC, BMP.             Discharge Disposition   Discharged to long-term care facility - group home  Condition at discharge: Stable    Hospital Course   Chris Bell is a 87 year old female admitted on 12/14/2022. She past medical history significant for HFpEF, hypothyroidism, SIADH, hypertension, CKD, chronic anemia and history of GI bleed presents with acute dyspnea due to CHF exacerbation and pneumonia.     Acute on Chronic HFpEF  Patient has history of HFpEF, was on Bumex 0.5 mg and amiloride 5 mg daily PTA.  Patient follows with cardiology, daughter reports that patient takes her home meds regularly with group home surveillance.    Last echo in 4/22: Showed left ventricle normal size, EF 55-60, no regional wall abnormalities. Normal right ventricle size and function, right and left atrium slightly dilated, mild tricuspid regurg and small pericardial effusion.  Clinical picture consistent with CHF exacerbation that could be secondary to demand ischemia, anemia, or salt tabs for SIADH.  Pneumonia also likely driving SOB, see below.  Repeat echo 12/15 showing EF 55-60%, severe RV, LA, RA dilation, mod-severe tricuspid regurg, mild-mod mitral regurg, non-collapsing IVC.  -Transitioned back to home diuretic regimen on 12/17 - 0.5mg bumex daily. Increased to 1mg daily on discharge by nephrology, given salt tabs will continue      CAP  Swallowing difficulties  Patient denies fever cough, endorses dyspnea mostly orthopnea, elevated white count of 13.9, elevated Pro-Eddie of 1.29, CXR with patchy opacities that could be pneumonia versus pulmonary edema; empiric abx for CAP started. Was choking on water with pills here at bedside, could be aspirating. However, did have video swallow in august of this year that was normal.   Procal and inflammatory markers continuing to improve. Likely not aspiration, thusly. Speech saw on 12/16 and observed difficulty with thin liquids, recommend thickened with meds. Solids okay.  -Finished 5 day course of ceftriaxone/azithromycin  -SLP recommends thickened liquids with meds    Hyperkalemia -> hypokalemia - resolved  NAGMA - resolved  D/t amiloride, acidosis on admission.  -Stop amiloride per nephrology  -BMP at PCP f/u     Cognitive decline  Likely dementia  ?Hospital acquired delirium  Patient with increased behaviors, forgetfulness, confusion over the past year. Communicated to son Shiv that this is likely dementia. Possibly with some delirium here as well - hallucinating snowflakes on her skin. Recommended to son that they start talking as a family about goals of care moving forward given Blia doesn't like being hospitalized but will continue to decline both mentally and physically over the coming months.     History of chronic hyponatremia secondary to SIADH - stable  Asymptomatic. Seen by nephrology. Worked up for adrenal insuff with cortisol, which was normal. Na 132 on DOD.  -Continue fluid restriction to 1000 mL   -Discharge on salt tabs - 1g BID per  nephrology     JOSE on CKD - resolved  Creat 1.11 on admission, baseline 0.8-0.6, the setting of CHF exacerbation.    -BMP at PCP f/u     Chronic anemia - stable  History of UGI bleed   emoglobin 7.4 on admission, baseline 9.4-8.8. Most recent EGD in October showed a duodenal nonbleeding ulcer, biopsy consistent with edema and acute duodenitis, negative for dysplasia and malignancy. We will continue to monitor hemoglobin.  Patient has an upcoming GI appointment in 01/24/23 for EGD.   -CBC at PCP f/u      Consultations This Hospital Stay   NEPHROLOGY IP CONSULT  CARE MANAGEMENT / SOCIAL WORK IP CONSULT  VASCULAR ACCESS ADULT IP CONSULT  VASCULAR ACCESS ADULT IP CONSULT  SPEECH LANGUAGE PATH ADULT IP CONSULT  PHYSICAL THERAPY ADULT IP CONSULT  PHYSICAL THERAPY ADULT IP CONSULT    Code Status   No CPR- Do NOT Intubate       The patient was discussed with Dr. Hugo Mao MD  Resident Service  [facility] HEART CARE  [address] [place] [id number]  Phone: [phone]  Fax: [phone]  ______________________________________________________________________    Physical Exam   Vital Signs: Temp: 98.2  F (36.8  C) Temp src: Oral BP: (!) 144/67 Pulse: 63   Resp: 20 SpO2: 91 % O2 Device: Nasal cannula Oxygen Delivery: 1 LPM  Weight: 94 lbs 0 oz  Constitutional: awake, alert, appears stated age. Speaking rapidly,  unable to understand  Eyes: Lids and lashes normal, extra ocular muscles intact, sclera clear, conjunctiva normal  Respiratory: No increased work of breathing.  Lungs clear to auscultation bilaterally.  Cardiovascular: Normal apical impulse, regular rate and rhythm, normal S1 and S2, no S3 or S4, and no murmur noted.  No longer any peripheral edema  Skin: no bruising or bleeding and normal skin color, texture, turgor        Primary Care Physician   INOCENCIA PRINGLE    Discharge Orders      Medication Therapy Management Referral      Reason for  your hospital stay    Pneumonia and heart failure exacerbation, causing fluid overload on the lungs, leading to difficulty breathing.     Follow-up and recommended labs and tests     Follow up with primary care provider, INOCENCIA PRINGLE, within 7 days for hospital follow- up.  The following labs/tests are recommended: CBC, BMP.     Activity    Your activity upon discharge: activity as tolerated     Diet    Follow this diet upon discharge: Orders Placed This Encounter      Fluid restriction 1000 ML FLUID daily      Combination Regular Diet; Liquidized/Moderately Thick (level 3) with meds; Low Saturated Fat Na <2400mg Diet, No Caffeine Diet       Significant Results and Procedures   Results for orders placed or performed during the hospital encounter of 22   Chest XR,  PA & LAT    Narrative    EXAM: XR CHEST 2 VIEWS  LOCATION: [facility]  DATE/TIME: 2022 2:18 PM    INDICATION: Dyspnea  COMPARISON: CT 2022      Impression    IMPRESSION: Patchy and confluent airspace opacities bilaterally, which could represent asymmetric pulmonary edema or multifocal pneumonia. Probable small bilateral pleural effusions as well. No pneumothorax.    Unchanged enlarged cardiomediastinal silhouette.   Echocardiogram Complete     Value    LVEF  55-60%    Narrative    [id number]  [id number]  [id number]  [id number]^VAHE^BRY[place] [address]     Name: TOYIN CARPENTER  MRN: [id number]  : 1935  Study Date: 12/15/2022 01:47 PM  Age: 87 yrs  Gender: Female  Patient Location: [facility]  Reason For Study: CHF  Ordering Physician: BRYON COTTER  Performed By: BP     BSA: 1.4 m2  Height: 60 in  Weight: 100 lb  HR: 93  BP: 206/89 mmHg  ______________________________________________________________________________  Procedure  Complete Portable Echo Adult. Definity (NDC #41268-047) given  intravenously.  ______________________________________________________________________________  Interpretation Summary     Left ventricular size, wall motion and function are normal. The ejection  fraction is 55-60%.  The right ventricle is mildly dilated.  The right ventricular systolic function is normal.  The left atrium is severely dilated.  The right atrium is severely dilated.  There is mild to moderate (1-2+) mitral regurgitation.  There is moderately severe (3+) tricuspid regurgitation.  Severe (>55mmHg) pulmonary hypertension is present.  IVC diameter >2.1 cm collapsing <50% with sniff suggests a high RA pressure  estimated at 15 mmHg or greater.  ______________________________________________________________________________  Left Ventricle  Left ventricular size, wall motion and function are normal. The ejection  fraction is 55-60%. There is normal left ventricular wall thickness. Left  ventricular diastolic function is abnormal. No regional wall motion  abnormalities noted.     Right Ventricle  The right ventricle is mildly dilated. The right ventricular systolic function  is normal.     Atria  The left atrium is severely dilated. The right atrium is severely dilated.  There is no color Doppler evidence of an atrial shunt.     Mitral Valve  Mitral valve leaflets appear normal. There is mild to moderate (1-2+) mitral  regurgitation.     Tricuspid Valve  Tricuspid valve leaflets appear normal. There is dilated tricuspid annulus.  There is moderately severe (3+) tricuspid regurgitation. The right ventricular  systolic pressure is approximated at 49.4 mmHg plus the right atrial pressure.  Severe (>55mmHg) pulmonary hypertension is present.     Aortic Valve  Aortic valve leaflets appear normal. There is no evidence of aortic stenosis  or clinically significant aortic regurgitation.     Pulmonic Valve  Normal pulmonic valve. There is mild (1+) pulmonic valvular regurgitation.     Vessels  IVC diameter >2.1 cm  collapsing <50% with sniff suggests a high RA pressure  estimated at 15 mmHg or greater.     Pericardium  There is no pericardial effusion.     ______________________________________________________________________________  MMode/2D Measurements & Calculations  IVSd: 0.87 cm  LVIDd: 4.1 cm  LVIDs: 3.0 cm  LVPWd: 0.78 cm  FS: 25.2 %     LV mass(C)d: 100.8 grams  LV mass(C)dI: 72.5 grams/m2  Ao root diam: 3.0 cm  LA dimension: 3.4 cm  LA/Ao: 1.2  LVOT diam: 2.0 cm  LVOT area: 3.1 cm2     EF(MOD-bp): 51.9 %  LA Volume Indexed (AL/bp): 61.1 ml/m2  RWT: 0.38     Time Measurements  MM HR: 93.0 BPM     Doppler Measurements & Calculations  MV E max hector: 99.0 cm/sec  MV A max hector: 119.8 cm/sec  MV E/A: 0.83  MV dec time: 0.14 sec  LV V1 max PG: 3.0 mmHg  LV V1 max: 86.8 cm/sec  LV V1 VTI: 16.4 cm  SV(LVOT): 51.7 ml  SI(LVOT): 37.2 ml/m2  PA acc time: 0.09 sec  PI end-d hector: 145.9 cm/sec  TR max hector: 351.4 cm/sec  TR max P.4 mmHg  E/E': 11.5  E/E' avg: 15.1  Lateral E/e': 11.5  Medial E/e': 18.7  Peak E' Hector: 8.6 cm/sec     ______________________________________________________________________________  Report approved by: Tera Galvin 12/15/2022 03:38 PM               Discharge Medications   Current Discharge Medication List      CONTINUE these medications which have CHANGED    Details   bumetanide (BUMEX) 1 MG tablet Take 1 tablet (1 mg) by mouth daily  Qty: 90 tablet, Refills: 1    Associated Diagnoses: Acute on chronic heart failure with preserved ejection fraction (H)      nystatin (MYCOSTATIN) 539068 UNIT/ML suspension Swish and swallow 5 mLs (500,000 Units) in mouth 4 times daily    Associated Diagnoses: Oral ulcer      pantoprazole (PROTONIX) 40 MG EC tablet Take 1 tablet (40 mg) by mouth daily    Associated Diagnoses: PUD (peptic ulcer disease)      sodium chloride 1 GM tablet Take 1 tablet (1 g) by mouth 2 times daily (with meals)  Qty: 60 tablet, Refills: 3    Associated Diagnoses: SIADH (syndrome of  inappropriate ADH production) (H)         CONTINUE these medications which have NOT CHANGED    Details   acetaminophen (TYLENOL) 500 MG tablet Take 500 mg by mouth 2 times daily      alum & mag hydroxide-simethicone (MAALOX) 200-200-20 MG/5ML SUSP suspension Take 15 mLs by mouth every 4 hours as needed for indigestion or heartburn      calcium carbonate (TUMS) 500 MG chewable tablet Take 1 chew tab by mouth every 4 hours as needed for heartburn      calcium carbonate-vitamin D (OSCAL W/D) 500-200 MG-UNIT tablet Take 1 tablet by mouth 2 times daily      cetirizine (ZYRTEC) 5 MG tablet Take 5 mg by mouth every evening      chlorhexidine (PERIDEX) 0.12 % solution Swish and spit 15 mLs in mouth 2 times daily Twice a day after brushing teeth      guaiFENesin (ROBITUSSIN) 100 MG/5ML SYRP Take 5-10 mLs by mouth every 6 hours as needed for cough      levothyroxine (SYNTHROID/LEVOTHROID) 50 MCG tablet Take 50 mcg by mouth every morning      loperamide (IMODIUM A-D) 2 MG tablet Take 2 mg by mouth 2 times daily      loperamide (IMODIUM) 2 MG capsule Take 1 capsule (2 mg) by mouth 4 times daily as needed for diarrhea  Qty: 60 capsule, Refills: 0    Associated Diagnoses: Diarrhea, unspecified type      LORazepam (ATIVAN) 0.5 MG tablet Take 0.5-1 mg by mouth 3 times daily 2 tablets qam and 1 tablet qafternoon and 2 tablets qpm      melatonin 5 MG tablet Take 5 mg by mouth At Bedtime      metoprolol succinate ER (TOPROL-XL) 50 MG 24 hr tablet Take 100 mg by mouth daily      nitroGLYcerin (NITROSTAT) 0.4 MG sublingual tablet Place 0.4 mg under the tongue every 5 minutes as needed for chest pain For chest pain place 1 tablet under the tongue every 5 minutes for 3 doses. If symptoms persist 5 minutes after 1st dose call 911.      potassium chloride ER (K-TAB/KLOR-CON) 10 MEQ CR tablet Take 20 mEq by mouth daily      sucralfate (CARAFATE) 1 GM tablet Take 1 tablet (1 g) by mouth 4 times daily  Qty: 30 tablet, Refills: 0    Associated  Diagnoses: PUD (peptic ulcer disease)      vitamin B-12 (CYANOCOBALAMIN) 250 MCG tablet Take 250 mcg by mouth daily         STOP taking these medications       aMILoride (MIDAMOR) 5 MG tablet Comments:   Reason for Stopping:             Allergies   Allergies   Allergen Reactions     Unknown [No Clinical Screening - See Comments]      Pt states she has a medication allergy but does not know what it is

## 2022-12-19 NOTE — PLAN OF CARE
Problem: Fall Injury Risk  Goal: Absence of Fall and Fall-Related Injury  Intervention: Promote Injury-Free Environment  Recent Flowsheet Documentation  Taken 12/18/2022 3475 by Deysi Robbins RN  Safety Promotion/Fall Prevention:    activity supervised    assistive device/personal items within reach    chair alarm on    bed alarm on    clutter free environment maintained    fall prevention program maintained    nonskid shoes/slippers when out of bed    patient and family education     Problem: Heart Failure  Goal: Optimal Coping  Outcome: Progressing  Goal: Optimal Cardiac Output  Outcome: Progressing  Goal: Stable Heart Rate and Rhythm  Outcome: Progressing  Goal: Optimal Functional Ability  Outcome: Progressing  Goal: Fluid and Electrolyte Balance  Outcome: Progressing  Goal: Improved Oral Intake  Outcome: Progressing  Goal: Effective Oxygenation and Ventilation  Outcome: Progressing  Goal: Effective Breathing Pattern During Sleep  Outcome: Progressing   Goal Outcome Evaluation:       Pt is alert and oriented x 3, knows the month but not the year. She also knows she is at [facility] in [place]. Pt is more confused at HS, sometimes she answers appropriately then later she just repeats what the NA ( Hmong speaking) was saying. Lung sounds diminished, has occasional non productive cough. On O2 at 1 LPM per nasal cannula, SpO2 ranges 97 to 99%. Tried to wean off the O2, SpO2 94% on RA but after an hour, her SpO2 dropped to 70's, placed back on O2 at 1LPM per nasal cannula. HR in the 70's at rest, low 100's with activity. Tele is NSR. Per NA, pt is anxious at HS, and c/o pain in the tailbone but refused to reposition. Melatonin given at HS. Will continue to monitor.

## 2022-12-19 NOTE — PROGRESS NOTES
Heart Failure Care Map  GOALS TO BE MET BEFORE DISCHARGE:    1. Decrease congestion and/or edema with diuretic therapy to achieve near optimal volume status.     Dyspnea improved: Yes, satisfactory for discharge.   Edema improved: Yes, satisfactory for discharge.        Last 24 hour I/O:   Intake/Output Summary (Last 24 hours) at 12/18/2022 2251  Last data filed at 12/18/2022 2205  Gross per 24 hour   Intake 804 ml   Output 700 ml   Net 104 ml           Net I/O and Weights since admission:   11/18 2300 - 12/18 2259  In: 2881 [P.O.:1020; I.V.:1861]  Out: 7600 [Urine:7600]  Net: -4719     Vitals:    12/14/22 1209 12/16/22 0559 12/17/22 0633 12/18/22 0304   Weight: 45.4 kg (100 lb) 43.9 kg (96 lb 11.2 oz) 43.4 kg (95 lb 9.6 oz) 43.1 kg (95 lb)       2.  O2 sats > 90% on room air, or at prior home O2 therapy level.      Able to wean O2 this shift to keep sats above 90%?: No, further care required to meet this goal. Please explain O2 saturation dropped to 70's on RA   Does patient use Home O2? No          Current oxygenation status:   SpO2: 94 %     O2 Device: None (Room air), Oxygen Delivery: 1 LPM    3.  Tolerates ambulation and mobility near baseline.     Ambulation: No, further care required to meet this goal. Please explain pt refused   Times patient ambulated with staff this shift: 0    Please review the Heart Failure Care Map for additional HF goal outcomes.    Deysi Robbins RN  12/18/2022

## 2022-12-19 NOTE — PLAN OF CARE
Problem: Fall Injury Risk  Goal: Absence of Fall and Fall-Related Injury  Outcome: Adequate for Care Transition     Problem: Plan of Care - These are the overarching goals to be used throughout the patient stay.    Goal: Optimal Comfort and Wellbeing  Outcome: Adequate for Care Transition   Goal Outcome Evaluation:             Pt was discharged to [facility] with  ems service.    Writer removed picc line before discharge.  PT had home O2 assessment that indicated no oxygen needed.       When pt left discharge medication arrived on unit.    Writer left message with diane at [facility]. [phone]  Writer lives by [facility] and called son.  Writer will deliver meds to [facility] at [address] [facility] [address]

## 2022-12-19 NOTE — PLAN OF CARE
Speech Language Therapy Discharge Summary    Reason for therapy discharge:    Discharged to home.(group home)    Progress towards therapy goal(s). See goals on Care Plan in Epic electronic health record for goal details.  Goals partially met.  Barriers to achieving goals:   discharge from facility.    Therapy recommendation(s):    Continued therapy is recommended.  Rationale/Recommendations:  outpatient video swallow study with family assistance to increase participation. At the time of discharge: Patient continues to request highly specific temperature requests with foods and liquids, and often vacillates between subtle changes when her requests are provided. She did have 3 sips of moderately thick lukewarm water and x5 small sips of thin warm water, all with no s/s aspiration. She had extensive coughing with pills crushed in puree provided by RN; unclear if coughing related to swallowing or aspiration versus a cognitive behavioral component versus possible esophageal dysphagia. RN reports patient refused medications in this manner earlier.  Assessment and treatment remains largely limited by patient's cognitive behavioral status, though she is highly pleasant and jocular at times. Recommend Regular diet and Moderately thick liquids with addition of gill water protocol allowing sips of thin water between meals only.  Recommend pills in warmed applesauce or oatmeal or any manner patient will accept and appears to tolerate. Patient would benefit from a VFSS for diagnostic purposes but unlikely to participate in exam at this time. Consider VFSS as an OP with family or staff present to increase potential to gain meaningful and accurate diagnostic information.

## 2022-12-19 NOTE — PROGRESS NOTES
Care Management Discharge Note    Discharge Date: 12/19/2022       Discharge Disposition: Home, Other (Comments) (Group home)    Discharge Services:      Discharge DME:      Discharge Transportation: health plan transportation    Private pay costs discussed: transportation costs    PAS Confirmation Code:    Patient/family educated on Medicare website which has current facility and service quality ratings:      Education Provided on the Discharge Plan:    Persons Notified of Discharge Plans: Group home/ [facility] - Shiv/ RN  Patient/Family in Agreement with the Plan: yes    Handoff Referral Completed: Yes    Additional Information:  RNCM placed call to Russ [phone] at [facility], able to take patient back today.     RNCM placed call to Shiv sarah, to discuss transportation, discussed out of pocket costs that could be sent to patient for transportation, accepted.     Wheelchair transport set for 2pm.     [facility], Son and RN notified of transport time.     Jennifer Paz RN

## 2022-12-19 NOTE — PROGRESS NOTES
RENAL PROGRESS NOTE     CC: Acute on chronic hyponatremia    ROS: in chair with breakfast, some confusion overnight per nursing.        Assessment and Plan:       Acute on chronic hyponatremia.  (RESOLVED) History of previous episodes of acute on chronic hyponatremia.  Patient is known to have SIADH, in the past was difficult to treat, she required therapy with diuretics and salt tablets, this time she developed acute hyponatremia associated to decompensation of heart failure.  Serum sodium in the past 126-132.    Normal cortisol (12/15/2022)    Continue fluid restriction 1000 mL daily.    Agree with diuretics for heart failure as per primary service.    Monitor serum sodium daily    Suspect poor po solid intake contributing to hyponatremia, will resume salt tabs at 1 gm bid (bumex should help prevent fluid retention with low dose salt tabs)    Ok for discharge with near normal sodium now, would repeat sodium within a week of discharge   2. Acute kidney injury - Resolved.    Due to decompensation of heart failure.  Serum creatinine is now back to normal after diuresis  3. Hyperkalemia - noted on admit, due to JOSE, amiloride   -would not resume amiloride on discharge    4. Heart failure present preserved ejection fraction.  Known left ventricular diastolic dysfunction with associated tricuspid regurgitation.     cont bumetanide - will increase to 1 mg/day    5. HTN - a little high, increase bumex to 1 mg/d    Not much to add, will sign off, call with questions    Yesenia Martin MD  [facility]  [phone]         PHYSICAL EXAM  BP (!) 148/97 (BP Location: Left arm)   Pulse 76   Temp 98  F (36.7  C) (Oral)   Resp 20   Wt 42.6 kg (94 lb)   SpO2 98%   BMI 18.36 kg/m        Intake/Output Summary (Last 24 hours) at 12/16/2022 1506  Last data filed at 12/16/2022 1416  Gross per 24 hour   Intake 848 ml   Output 4000 ml   Net -3152 ml     Resp: 20    Wt Readings from Last 3 Encounters:    12/19/22 42.6 kg (94 lb)   10/12/22 43.1 kg (95 lb)   08/17/22 43.1 kg (95 lb)       GENERAL: Chronically ill and debilitated.   HEAD: Normal  HEENT: Equal pupils. Normal hearing. No eyelid edema.  CARDIOVASCULAR: RRR  No peripheral edema  PULMONARY: No respiratory distress. No cyanosis  GASTROINTESTINAL: soft, NT/ND  MSK: Diffuse muscle wasting, no joint deformities  NEURO: Alert, no gross focal findings  PSYCHIATRIC: confused  SKIN:   no rash.    LABORATORIES   Recent Labs   Lab 12/19/22  0620 12/18/22  0447 12/17/22  1624 12/17/22  0607 12/16/22  0606 12/15/22  1011 12/15/22  0733 12/15/22  0346 12/14/22  1808 12/14/22  1556   * 140  --  138 136  --   --  123*  --  126*   POTASSIUM 4.0 4.2 4.2 2.8* 3.8 5.8* 6.1* 5.6*   < > 6.4*   CHLORIDE 95* 97*  --  91* 100  --   --  98  --  102   CO2 31* 36*  --  39* 27  --   --  13*  --  15*   BUN 10.2 9.9  --  10.6 16.5  --   --  29.8*  --  31.5*   CR 0.80 0.88  --  0.82 0.89  --   --  1.22*  --  1.11*   GFRESTIMATED 71 63  --  69 62  --   --  43*  --  48*   MANE 7.2* 7.2*  --  7.3* 8.1*  --   --  8.6*  --  8.8    < > = values in this interval not displayed.       Recent Labs   Lab 12/19/22  0620 12/18/22  0447 12/17/22  0607 12/16/22  1020 12/15/22  0346 12/14/22  1556   WBC 7.3 7.0 8.4 8.0 19.4* 13.9*   HGB 7.3* 7.4* 7.6* 7.4* 7.7* 7.4*   HCT 23.6* 23.8* 23.9* 22.8* 24.5* 24.3*   * 114* 111* 109* 113* 116*    212 243 248 258 242          MEDICATIONS    bumetanide  0.5 mg Oral Daily     cetirizine  5 mg Oral QPM     levothyroxine  50 mcg Oral QAM     [Held by provider] LORazepam  0.5-1 mg Oral TID     metoprolol succinate ER  100 mg Oral Daily     pantoprazole  40 mg Oral Daily     sodium chloride (PF)  3 mL Intracatheter Q8H     sucralfate  1 g Oral 4x Daily     vitamin B-12  250 mcg Oral Daily     alum & mag hydroxide-simethicone, calcium carbonate, glucose **OR** dextrose **OR** glucagon, haloperidol lactate, hydrOXYzine, lidocaine 4%, lidocaine  "(buffered or not buffered), loperamide, melatonin, nitroGLYcerin, nystatin, ondansetron **OR** ondansetron, prochlorperazine **OR** prochlorperazine **OR** prochlorperazine, senna-docusate **OR** senna-docusate, sodium chloride (PF)    RADIOLOGY  EXAM: XR CHEST 2 VIEWS  LOCATION: [facility]  DATE/TIME: 12/14/2022 2:18 PM     INDICATION: Dyspnea  COMPARISON: CT 11/12/2022                                                                      IMPRESSION: Patchy and confluent airspace opacities bilaterally, which could represent asymmetric pulmonary edema or multifocal pneumonia. Probable small bilateral pleural effusions as well. No pneumothorax.     Unchanged enlarged cardiomediastinal silhouette.     ECHOCARDIOGRAM 4/13/2022  Interpretation Summary     The left ventricle is normal in size.  Left ventricular systolic function is normal.  The visual ejection fraction is 55-60%.  No regional wall motion abnormalities noted.  Diastolic Doppler findings (E/E' ratio and/or other parameters) suggest left  ventricular filling pressures are increased.  Normal right ventricle size and systolic function.  The right atrium is mildly dilated.  The left atrium is mildly dilated.  There is mild to moderate (1-2+) tricuspid regurgitation.Estimate of RV  systolic pressure is 27mmhg plus right atrial pressure  Small pericardial effusion  There are no echocardiographic indications of cardiac tamponade.  Effusion measures approximately 1cm anterior to the right ventricle  Compared to 11/2018 findings are similar.There was a small pericardial  effusion noted on the prior study  The myocardium appears \"brightâ   if clinically indicated consider amyloid               "

## 2022-12-19 NOTE — CARE PLAN
Heart Failure Care Map  GOALS TO BE MET BEFORE DISCHARGE:    1. Decrease congestion and/or edema with diuretic therapy to achieve near optimal volume status.     Dyspnea improved: Yes, satisfactory for discharge.   Edema improved: Yes, satisfactory for discharge.        Last 24 hour I/O:   Intake/Output Summary (Last 24 hours) at 12/19/2022 0741  Last data filed at 12/19/2022 0650  Gross per 24 hour   Intake 480 ml   Output 800 ml   Net -320 ml           Net I/O and Weights since admission:   11/19 1500 - 12/19 1459  In: 2971 [P.O.:1080; I.V.:1891]  Out: 7700 [Urine:7700]  Net: -4729     Vitals:    12/14/22 1209 12/16/22 0559 12/17/22 0633 12/18/22 0304   Weight: 45.4 kg (100 lb) 43.9 kg (96 lb 11.2 oz) 43.4 kg (95 lb 9.6 oz) 43.1 kg (95 lb)    12/19/22 0344   Weight: 42.6 kg (94 lb)       2.  O2 sats > 90% on room air, or at prior home O2 therapy level.      Able to wean O2 this shift to keep sats above 90%?: No, further care required to meet this goal. Please explain desaturates on RA   Does patient use Home O2? No          Current oxygenation status:   SpO2: 98 %     O2 Device: Nasal cannula, Oxygen Delivery: 1 LPM    3.  Tolerates ambulation and mobility near baseline.     Ambulation: No, further care required to meet this goal. Please explain NA   Times patient ambulated with staff this shift: 0    Please review the Heart Failure Care Map for additional HF goal outcomes.    Deysi Robbins, RN  12/19/2022

## 2022-12-19 NOTE — PLAN OF CARE
Problem: Fall Injury Risk  Goal: Absence of Fall and Fall-Related Injury  Intervention: Promote Injury-Free Environment  Recent Flowsheet Documentation  Taken 12/19/2022 0307 by Deysi Robbins RN  Safety Promotion/Fall Prevention:   activity supervised   assistive device/personal items within reach   chair alarm on   bed alarm on   clutter free environment maintained   fall prevention program maintained   nonskid shoes/slippers when out of bed   patient and family education  Taken 12/19/2022 0000 by Deysi Robbins RN  Safety Promotion/Fall Prevention:   activity supervised   assistive device/personal items within reach   chair alarm on   bed alarm on   clutter free environment maintained   fall prevention program maintained   nonskid shoes/slippers when out of bed   patient and family education  Taken 12/18/2022 2130 by Deysi Robbins RN  Safety Promotion/Fall Prevention:   activity supervised   assistive device/personal items within reach   chair alarm on   bed alarm on   clutter free environment maintained   fall prevention program maintained   nonskid shoes/slippers when out of bed   patient and family education     Problem: Heart Failure  Goal: Optimal Coping  Outcome: Progressing  Goal: Optimal Cardiac Output  Outcome: Progressing  Goal: Stable Heart Rate and Rhythm  Outcome: Progressing  Goal: Optimal Functional Ability  Outcome: Progressing  Intervention: Optimize Functional Ability  Recent Flowsheet Documentation  Taken 12/19/2022 0307 by Deysi Robbins RN  Activity Management:   activity adjusted per tolerance   ambulated to bathroom  Taken 12/19/2022 0000 by Deysi Robbins RN  Activity Management:   activity adjusted per tolerance   ambulated to bathroom  Goal: Fluid and Electrolyte Balance  Outcome: Progressing  Goal: Improved Oral Intake  Outcome: Progressing  Goal: Effective Oxygenation and Ventilation  Outcome: Progressing  Intervention: Promote Airway  Secretion Clearance  Recent Flowsheet Documentation  Taken 12/19/2022 0307 by Deysi Robbins RN  Activity Management:   activity adjusted per tolerance   ambulated to bathroom  Taken 12/19/2022 0000 by Deysi Robbins RN  Activity Management:   activity adjusted per tolerance   ambulated to bathroom  Intervention: Optimize Oxygenation and Ventilation  Recent Flowsheet Documentation  Taken 12/19/2022 0307 by Deyis Robbins RN  Head of Bed (HOB) Positioning: HOB flat  Taken 12/19/2022 0000 by Deysi Robbins RN  Head of Bed (HOB) Positioning: HOB flat  Goal: Effective Breathing Pattern During Sleep  Outcome: Progressing     Problem: Anxiety Signs/Symptoms  Goal: Optimized Energy Level (Anxiety Signs/Symptoms)  Outcome: Progressing  Goal: Improved Mood Symptoms (Anxiety Signs/Symptoms)  Outcome: Progressing  Goal: Improved Sleep (Anxiety Signs/Symptoms)  Outcome: Progressing   Goal Outcome Evaluation:       Pt is alert and oriented to self and place, knows the month but not the year. She is more confused at HS. Melatonin given but pt only slept for an hour then been awake and kept calling using the call light. She c/o stomach upset, calcium tums given at 0232 H with relief. At 0317 H, Atarax given for anxiety, fed her with the remaining porridge  from home, consumed half a cup , then she slept until 0630 H. When she woke she feels SOB, her O2 saturation was 98% at 1 LPM per nasal cannula. Pt verbalized fear of death per JASPREET's report. Pacified ptJASPREET who is Hmong speaking stayed with the pt for a few minutes to calm her down. HR in the 70-'s 80's at rest, 110's to 120's with activity. NSR. Lung sounds clear, diminished. Will continue to monitor.

## 2022-12-19 NOTE — PROGRESS NOTES
Patient has been assessed for Home Oxygen needs.     Pulse oximetry (SpO2) and Oxygen flow readings:    SpO2 = 93% on room air at rest while awake.    SpO2 improved to 100% on 1 liters/minute at rest.    SpO2 = 92% on room air during activity/with exercise.    *SpO2 improved to  (JUSTINO)% on JUSTION (JUSTINO) liters/minute during activity/with exercise.  Pt was not cooperative with this portion of assessment due to baseline confusion.      Pt does not qualify for home O2 based on writers assessment.

## 2022-12-23 PROBLEM — M79.604 BILATERAL LEG PAIN: Status: ACTIVE | Noted: 2022-01-01

## 2022-12-23 PROBLEM — M79.605 BILATERAL LEG PAIN: Status: ACTIVE | Noted: 2022-01-01

## 2022-12-23 PROBLEM — D72.829 LEUKOCYTOSIS, UNSPECIFIED TYPE: Status: ACTIVE | Noted: 2022-01-01

## 2022-12-23 NOTE — ED NOTES
Bed: [place] [address]  Expected date:   Expected time:   Means of arrival: Ambulance  Comments:  [facility]:: Shortness of breath

## 2022-12-23 NOTE — ED NOTES
Pt incont. Of large amount of urine.  Bettie care and linen changed.  Pt straight cathed for urine using  line to instruct/inform pt.  Pt tolerated procedure.  Pure wick then placed along with attends.

## 2022-12-23 NOTE — PHARMACY-ADMISSION MEDICATION HISTORY
Pharmacy Note - Admission Medication History    Pertinent Provider Information:     -Patient was recently discharged from [facility] on 12/19/22. Amiloride was discontinued at discharge. However, per MAR, it has been administered daily since discharge. Please evaluate if it should be continued or stopped.  -Bumetanide was increased from 0.5mg to 1mg daily during last hospitalization, but MAR indicates that patient has been receiving 0.5mg daily since discharge  -NaCl was changed to 1gm BID upon hospital discharge, but MAR indicates patient was receiving 2g TID. Please evaluate which dose is appropriate for patient.   ______________________________________________________________________    Prior To Admission (PTA) med list completed and updated in EMR.       PTA Med List   Medication Sig Last Dose     acetaminophen (TYLENOL) 500 MG tablet Take 500 mg by mouth 2 times daily 12/23/2022 at am     alum & mag hydroxide-simethicone (MAALOX) 200-200-20 MG/5ML SUSP suspension Take 15 mLs by mouth every 4 hours as needed for indigestion or heartburn      aMILoride (MIDAMOR) 5 MG tablet Take 5 mg by mouth daily 12/23/2022 at am     bumetanide (BUMEX) 1 MG tablet Take 1 tablet (1 mg) by mouth daily (Patient taking differently: Take 0.5 mg by mouth daily) 12/23/2022 at am     calcium carbonate (TUMS) 500 MG chewable tablet Take 1 chew tab by mouth every 4 hours as needed for heartburn      calcium carbonate-vitamin D (OSCAL W/D) 500-200 MG-UNIT tablet Take 1 tablet by mouth 2 times daily 12/23/2022 at am     cetirizine (ZYRTEC) 5 MG tablet Take 5 mg by mouth every evening 12/20/2022 at pm     chlorhexidine (PERIDEX) 0.12 % solution Swish and spit 15 mLs in mouth 2 times daily Twice a day after brushing teeth 12/23/2022 at am     guaiFENesin (ROBITUSSIN) 100 MG/5ML SYRP Take 5-10 mLs by mouth every 6 hours as needed for cough      levothyroxine (SYNTHROID/LEVOTHROID) 50 MCG tablet Take 50 mcg by mouth every morning 12/23/2022 at  am     loperamide (IMODIUM A-D) 2 MG tablet Take 2 mg by mouth 2 times daily 12/23/2022 at am     loperamide (IMODIUM) 2 MG capsule Take 1 capsule (2 mg) by mouth 4 times daily as needed for diarrhea      LORazepam (ATIVAN) 0.5 MG tablet Take 0.5-1 mg by mouth 3 times daily 2 tablets qam and 1 tablet qafternoon and 2 tablets qpm 12/23/2022 at am     melatonin 5 MG tablet Take 5 mg by mouth At Bedtime 12/22/2022 at hs     metoprolol succinate ER (TOPROL-XL) 50 MG 24 hr tablet Take 100 mg by mouth daily 12/23/2022 at am     nitroGLYcerin (NITROSTAT) 0.4 MG sublingual tablet Place 0.4 mg under the tongue every 5 minutes as needed for chest pain For chest pain place 1 tablet under the tongue every 5 minutes for 3 doses. If symptoms persist 5 minutes after 1st dose call 911.      nystatin (MYCOSTATIN) 601578 UNIT/ML suspension Swish and swallow 5 mLs (500,000 Units) in mouth 4 times daily 12/23/2022 at am     oxybutynin ER (DITROPAN XL) 5 MG 24 hr tablet Take 5 mg by mouth daily 12/23/2022 at am     pantoprazole (PROTONIX) 40 MG EC tablet Take 1 tablet (40 mg) by mouth daily 12/23/2022     potassium chloride ER (K-TAB/KLOR-CON) 10 MEQ CR tablet Take 20 mEq by mouth daily 12/23/2022 at am     sodium chloride 1 GM tablet Take 1 tablet (1 g) by mouth 2 times daily (with meals) (Patient taking differently: Take 2 g by mouth 3 times daily) 12/23/2022 at am     sucralfate (CARAFATE) 1 GM tablet Take 1 tablet (1 g) by mouth 4 times daily 12/23/2022 at am     vitamin B-12 (CYANOCOBALAMIN) 250 MCG tablet Take 250 mcg by mouth daily 12/23/2022 at am       Information source(s): Facility (U/[facility]/) medication list/MAR and CareEverywhere/SureScripts  Method of interview communication: N/A    Summary of Changes to PTA Med List  New: Amiloride  Discontinued: N/A  Changed: Bumetanide, sodium chloride    Patient was asked about OTC/herbal products specifically.  PTA med list reflects this.    In the past week, patient estimated taking  medication this percent of the time:  greater than 90%.    Patient does not use any multi-dose medications prior to admission.    The information provided in this note is only as accurate as the sources available at the time of the update(s).    Thank you for the opportunity to participate in the care of this patient.    Danielle Cardoso [facility]  12/23/2022 1:18 PM

## 2022-12-23 NOTE — ED TRIAGE NOTES
Pt coming in via [facility] with c/o sob.  EMS reports pt pt has hx of chf and anxiety.  VS wnl and 12 lead NSR per EMS.  Pt placed on 2L/nc for comfort.  Pt reports she is here lower extremity pain that started a week ago but yesterday got worse and unable to walk.  Pt describes pain as a tingling nerve pain that starts at her thighs and goes down her legs.  Pt states here because of all the pain .  When asked if she was having sob pt replied yes via .  Pt denies any fever /chills. States depressed secondary to pain .

## 2022-12-23 NOTE — ED PROVIDER NOTES
EMERGENCY DEPARTMENT ENCOUNTER      NAME: Chris Bell  AGE: 87 year old female  YOB: 1935  MRN: [id number]  EVALUATION DATE & TIME: 12/23/2022 11:18 AM    PCP: Kizzy Villanueva    ED PROVIDER: Toby Taveras M.D.      Chief Complaint   Patient presents with     Shortness of Breath         FINAL IMPRESSION:  1. Bilateral leg pain    2. Shortness of breath          ED COURSE & MEDICAL DECISION MAKING:    Pertinent Labs & Imaging studies reviewed. (See chart for details)  87 year old female presents to the Emergency Department for evaluation of bilateral leg pain. Patient appears non toxic with stable vitals signs, patient is afebrile with no tachycardia or hypoxia.  Patient denies any falls or trauma, states that she has had ongoing lower extremity pain for many years though it is gotten worse over the past several days.  She is neurovascularly intact with good distal sensation and pulses, she has guarded range of motion of her lower extremity secondary to pain.  Took Tylenol with no improvement.  Did not report any new chest pain, vomiting, fevers.  Appreciate triage note with reports of shortness of breath however on my history taking the patient only endorses ongoing bilateral lower extremity pain.  With no trauma, no gross deformities, again patient is neurovascular intact with nothing to suggest fracture dislocation, septic joint, cellulitis, dictating fasciitis, compartment syndrome, vascular compromise, no unilateral leg swelling to suggest DVT.  We will obtain screening labs, ECG and a chest x-ray.  Patient has medications here.    Reassessment: Labs significant for markedly elevated BNP, both initial and repeat troponin negative, ECG nonischemic.  Did note sodium of 129, elevated white blood cell count at 43.9.  COVID, influenza and RSV negative.  Patient is afebrile with no elevated lactic acid with nothing to suggest sepsis.  She had no tachycardia or hypoxia.  At time of this dictation  urine and chest x-ray pending.  I suspect patient will require admission.  Patient was signed out to the [facility] provider.  Final disposition will be dependent upon the pending studies and the patient's clinical trajectory.    12:34 AM I introduced myself to the patient, obtained patient history, performed a physical exam, and discussed plan for ED workup including potential diagnostic laboratory/imaging studies and interventions.    Medical Decision Making    History:    Supplemental history from: Documented in HPI, if applicable    External Record(s) reviewed: Documented in HPI, if applicable. (Details documented in chart).    Work Up:    Chart documentation includes differential considered and any EKGs or imaging interpreted by provider.    In additional to work up documented, I considered the following work up: See chart documentation, if applicable.    External consultation:    Discussion of management with another provider: See chart documentation, if applicable    Discussed with radiology regarding test interpretation: N/A    Complicating factors:    Care impacted by chronic illness: Chronic Pain and None    Care affected by social determinants of health: N/A    Disposition considerations: Admission considered. [facility]        At the conclusion of the encounter I discussed the results of all of the tests and the disposition. The questions were answered and return precautions provided. The patient or family acknowledged understanding and was agreeable with the care plan.         MEDICATIONS GIVEN IN THE EMERGENCY:  Medications   morphine (PF) injection 4 mg (4 mg Intravenous Given 12/23/22 0619)       NEW PRESCRIPTIONS STARTED AT TODAY'S ER VISIT  New Prescriptions    No medications on file            =================================================================    [facility]    Patient information was obtained from: Patient    Use of  Intrepreter: N/A       Chris Bell is a 87 year old female who presents via EMS for evaluation of shortness of breath.    Per chart review, the patient was admitted to [facility] from 12/14/22 to 12/19/22 (5 days). The patient was admitted for CHF exacerbation and pneumonia. Patient started on IV Bumex (1 mg), transitioned to home oral regimen (0.5 mg) on 12/17. Echo on 12/15 showing EF 55-60%, severe RV, LA, RA dilation, mod-severe tricuspid regurg, mild-mod mitral regurg, non-collapsing IVC. At time of discharge patient had the following medication changes: Bumex (increase from 0.5 mg to 1 mg) and start Protonix. Patient was discharged to home with plan for outpatient follow-up with PCP. Scheduled for EGD 1/24/23.    The patient is presenting from assisted living. She reports she has chronic bilateral lower extremity pain that became worse yesterday. She was given Tylenol for pain at her assisted living facility, last dose this morning, but did not have any improvement of her acute on chronic bilateral lower extremity pain. She has not had any recent falls. At baseline she uses a wheelchair to ambulate and requires full assistance from staff to get her out of bed and into the wheelchair. She also reports forest fatigue.    She denies any other complaints at this time.    REVIEW OF SYSTEMS   Constitutional:  Denies fever, chills. Endorses fatigue.  Respiratory:  Denies productive cough or increased work of breathing  Cardiovascular:  Denies chest pain, palpitations  GI:  Denies abdominal pain, nausea, vomiting, or change in bowel or bladder habits   Musculoskeletal:  Denies any joint swelling. Endorses bilateral leg pain.  Skin:  Denies rash   Neurologic:  Denies focal weakness  All systems negative except as marked.     PAST MEDICAL HISTORY:  Past Medical History:   Diagnosis Date     Anemia      Depression      Disease of thyroid gland      HTN (hypertension)      Infection due to 2019 novel coronavirus  7/28/2022     Osteoporosis      PUD (peptic ulcer disease)        PAST SURGICAL HISTORY:  Past Surgical History:   Procedure Laterality Date     COLONOSCOPY N/A 7/3/2022    Procedure: COLONOSCOPY WITH COLON BIOPSIES;  Surgeon: Marty Patel MD;  [place] [facility] [place]     ESOPHAGOSCOPY, GASTROSCOPY, DUODENOSCOPY (EGD), COMBINED N/A 1/17/2018    Procedure: ESOPHAGOGASTRODUODENOSCOPY (EGD) with h.pylori and gastric ulcer biopsies;  Surgeon: Colin Alvarez MD;  Location: [facility];  Service:      ESOPHAGOSCOPY, GASTROSCOPY, DUODENOSCOPY (EGD), COMBINED N/A 7/3/2022    Procedure: ESOPHAGOGASTRODUODENOSCOPY (EGD) WITH DUODENAL & GASTRIC BIOPSIES;  Surgeon: Marty Patel MD;  Location: [facility]     ESOPHAGOSCOPY, GASTROSCOPY, DUODENOSCOPY (EGD), COMBINED N/A 10/5/2022    Procedure: ESOPHAGOGASTRODUODENOSCOPY (EGD) WITH BIOPSIES;  Surgeon: Ximena Ho MD;  Location: [facility] STATISTIC PICC LINE INSERTION >5YR, FAILED  8/2/2022          HYSTERECTOMY       PICC TRIPLE LUMEN PLACEMENT  8/2/2022          PICC TRIPLE LUMEN PLACEMENT  12/15/2022          US BIOPSY FINE NEEDLE ASPIRATION LYMPH NODE  8/14/2019         CURRENT MEDICATIONS:    Prior to Admission medications    Medication Sig Start Date End Date Taking? Authorizing Provider   acetaminophen (TYLENOL) 500 MG tablet Take 500 mg by mouth 2 times daily    Unknown, Entered By History   alum & mag hydroxide-simethicone (MAALOX) 200-200-20 MG/5ML SUSP suspension Take 15 mLs by mouth every 4 hours as needed for indigestion or heartburn    Unknown, Entered By History   bumetanide (BUMEX) 1 MG tablet Take 1 tablet (1 mg) by mouth daily 12/20/22   Neftali Mao MD   calcium carbonate (TUMS) 500 MG chewable tablet Take 1 chew tab by mouth every 4 hours as needed for heartburn    Unknown, Entered By History   calcium carbonate-vitamin D (OSCAL W/D) 500-200 MG-UNIT tablet Take 1 tablet by mouth 2 times daily     Unknown, Entered By History   cetirizine (ZYRTEC) 5 MG tablet Take 5 mg by mouth every evening    Unknown, Entered By History   chlorhexidine (PERIDEX) 0.12 % solution Swish and spit 15 mLs in mouth 2 times daily Twice a day after brushing teeth    Unknown, Entered By History   guaiFENesin (ROBITUSSIN) 100 MG/5ML SYRP Take 5-10 mLs by mouth every 6 hours as needed for cough    Unknown, Entered By History   levothyroxine (SYNTHROID/LEVOTHROID) 50 MCG tablet Take 50 mcg by mouth every morning 3/29/19   Kizzy Villanueva   loperamide (IMODIUM A-D) 2 MG tablet Take 2 mg by mouth 2 times daily    Unknown, Entered By History   loperamide (IMODIUM) 2 MG capsule Take 1 capsule (2 mg) by mouth 4 times daily as needed for diarrhea 6/24/22   Sonny Brothers MD   LORazepam (ATIVAN) 0.5 MG tablet Take 0.5-1 mg by mouth 3 times daily 2 tablets qam and 1 tablet qafternoon and 2 tablets qpm    Kizzy Villanueva   melatonin 5 MG tablet Take 5 mg by mouth At Bedtime    Unknown, Entered By History   metoprolol succinate ER (TOPROL-XL) 50 MG 24 hr tablet Take 100 mg by mouth daily    Kizzy Villanueva   nitroGLYcerin (NITROSTAT) 0.4 MG sublingual tablet Place 0.4 mg under the tongue every 5 minutes as needed for chest pain For chest pain place 1 tablet under the tongue every 5 minutes for 3 doses. If symptoms persist 5 minutes after 1st dose call 911.    Unknown, Entered By History   nystatin (MYCOSTATIN) 665655 UNIT/ML suspension Swish and swallow 5 mLs (500,000 Units) in mouth 4 times daily 12/19/22   Neftali Mao MD   pantoprazole (PROTONIX) 40 MG EC tablet Take 1 tablet (40 mg) by mouth daily 12/19/22   Neftali Mao MD   potassium chloride ER (K-TAB/KLOR-CON) 10 MEQ CR tablet Take 20 mEq by mouth daily    Unknown, Entered By History   sodium chloride 1 GM tablet Take 1 tablet (1 g) by mouth 2 times daily (with meals) 12/19/22   Neftali Mao MD   sucralfate (CARAFATE) 1 GM tablet Take 1 tablet  (1 g) by mouth 4 times daily 10/16/22   Mary Barnes MD   vitamin B-12 (CYANOCOBALAMIN) 250 MCG tablet Take 250 mcg by mouth daily    Unknown, Entered By History        ALLERGIES:  Allergies   Allergen Reactions     Unknown [No Clinical Screening - See Comments]      Pt states she has a medication allergy but does not know what it is       FAMILY HISTORY:  Family History   Problem Relation Age of Onset     Diabetes No family hx of      Cancer No family hx of      Heart Disease No family hx of        SOCIAL HISTORY:   Social History     Socioeconomic History     Marital status:    Tobacco Use     Smoking status: Never     Smokeless tobacco: Never   Substance and Sexual Activity     Alcohol use: No     Drug use: No     Sexual activity: Never       VITALS:  Patient Vitals for the past 24 hrs:   BP Temp Temp src Pulse SpO2   12/23/22 1506 121/66 -- -- (!) 127 98 %   12/23/22 1436 96/53 -- -- 93 92 %   12/23/22 1425 95/54 -- -- 92 93 %   12/23/22 1357 96/54 -- -- 91 92 %   12/23/22 1327 97/51 -- -- 90 94 %   12/23/22 1307 99/55 -- -- 97 96 %   12/23/22 1224 116/58 -- -- 92 98 %   12/23/22 1154 111/58 98.4  F (36.9  C) Oral 97 97 %   12/23/22 1124 114/62 -- -- -- --        PHYSICAL EXAM    Constitutional:  Awake, alert, in no apparent distress  HENT:  Normocephalic, Atraumatic. Bilateral external ears normal. Oropharynx moist. Nose normal. Neck- Normal range of motion with no guarding, No midline cervical tenderness and moves neck freely with no signs of meningeal irritation, Supple, No stridor.   Eyes:  PERRL, EOMI with no signs of entrapment, Conjunctiva normal, No discharge.   Respiratory:  Normal breath sounds, No respiratory distress, No wheezing.    Cardiovascular:  Normal heart rate, Normal rhythm, No appreciable rubs or gallops.   GI:  Soft, No tenderness, No distension, No palpable masses  Musculoskeletal:  Intact distal pulses, no unilateral leg swelling or calf tenderness.  No gross deformities but  guarded range of motion bilateral lower extremity secondary to pain.  No joint swelling, erythema or warmth.  Integument:  Warm, Dry, No erythema, No rash.   Neurologic:  Alert & oriented, Normal motor function, Normal sensory function, No focal deficits noted.   Psychiatric:  Affect normal, Judgment normal, Mood normal.     LAB:  All pertinent labs reviewed and interpreted.  Results for orders placed or performed during the hospital encounter of 12/23/22   XR Chest 2 Views    Impression    IMPRESSION: There has been partial clearing of the bilateral airspace opacities. There is persistent mild cardiac enlargement and mild pulmonary vascular congestion. No pneumothorax or definite pleural effusion. Findings are consistent with improving   pneumonia or improving asymmetric pulmonary edema.   Basic metabolic panel   Result Value Ref Range    Sodium 129 (L) 136 - 145 mmol/L    Potassium 3.9 3.4 - 5.3 mmol/L    Chloride 95 (L) 98 - 107 mmol/L    Carbon Dioxide (CO2) 23 22 - 29 mmol/L    Anion Gap 11 7 - 15 mmol/L    Urea Nitrogen 33.4 (H) 8.0 - 23.0 mg/dL    Creatinine 1.49 (H) 0.51 - 0.95 mg/dL    Calcium 7.4 (L) 8.8 - 10.2 mg/dL    Glucose 99 70 - 99 mg/dL    GFR Estimate 34 (L) >60 mL/min/1.73m2   Result Value Ref Range    Troponin T, High Sensitivity 47 (H) <=14 ng/L   Nt probnp inpatient (BNP)   Result Value Ref Range    N terminal Pro BNP Inpatient 21,281 (H) 0 - 1,800 pg/mL   CBC with platelets and differential   Result Value Ref Range    WBC Count 43.9 (H) 4.0 - 11.0 10e3/uL    RBC Count 2.25 (L) 3.80 - 5.20 10e6/uL    Hemoglobin 8.0 (L) 11.7 - 15.7 g/dL    Hematocrit 25.1 (L) 35.0 - 47.0 %     (H) 78 - 100 fL    MCH 35.6 (H) 26.5 - 33.0 pg    MCHC 31.9 31.5 - 36.5 g/dL    RDW 16.4 (H) 10.0 - 15.0 %    Platelet Count 220 150 - 450 10e3/uL   Lactic Acid STAT   Result Value Ref Range    Lactic Acid 1.8 0.7 - 2.0 mmol/L   Symptomatic Influenza A/B & SARS-CoV2 (COVID-19) Virus PCR Multiplex Nasopharyngeal     Specimen: Nasopharyngeal; Swab   Result Value Ref Range    Influenza A PCR Negative Negative    Influenza B PCR Negative Negative    RSV PCR Negative Negative    SARS CoV2 PCR Negative Negative   Result Value Ref Range    Troponin T, High Sensitivity 44 (H) <=14 ng/L       RADIOLOGY:  XR Chest 2 Views   Final Result   IMPRESSION: There has been partial clearing of the bilateral airspace opacities. There is persistent mild cardiac enlargement and mild pulmonary vascular congestion. No pneumothorax or definite pleural effusion. Findings are consistent with improving    pneumonia or improving asymmetric pulmonary edema.             EKG:    Normal sinus rhythm, no specific ST acute ischemic changes, no concerning dysrhythmias or interval prolongation, when compared to ECG of December 14, 2022, no specific ST acute ischemic change appreciated  I have independently reviewed and interpreted the EKG(s) documented above.    PROCEDURES:          I, Kiel Joy, am serving as a scribe to document services personally performed by Toby Taveras MD, based on my observation and the provider's statements to me. I, Toby Taveras MD attest that Kiel Joy is acting in a scribe capacity, has observed my performance of the services and has documented them in accordance with my direction.    Toby Taveras M.D.  Emergency Medicine  [facility] EMERGENCY DEPARTMENT  [facility] [facility] [address] [phone]  Dept: [phone]       Toby Taveras MD  12/23/22 7532

## 2022-12-23 NOTE — ED NOTES
EMERGENCY DEPARTMENT SIGN OUT NOTE        ED COURSE AND MEDICAL DECISION MAKING  Patient was signed out to me by Dr Toby Taveras at 3:20 PM  6:29 PM I updated patient with plan for admission.   6:40 PM I spoke to hospitalist [place] resident Dr. Pelayo regarding plan for admission.     In brief, Chris Bell is a 87 year old female who initially presented for evaluation of shortness of breath.     Per chart review, the patient was admitted to [facility] from 12/14/22 to 12/19/22 (5 days). The patient was admitted for CHF exacerbation and pneumonia. Patient started on IV Bumex (1 mg), transitioned to home oral regimen (0.5 mg) on 12/17. Echo on 12/15 showing EF 55-60%, severe RV, LA, RA dilation, mod-severe tricuspid regurg, mild-mod mitral regurg, non-collapsing IVC. At time of discharge patient had the following medication changes: Bumex (increase from 0.5 mg to 1 mg) and start Protonix. Patient was discharged to home with plan for outpatient follow-up with PCP. Scheduled for EGD 1/24/23.     The patient is presenting from assisted living. She reports she has chronic bilateral lower extremity pain that became worse yesterday. She was given Tylenol for pain at her assisted living facility, last dose this morning, but did not have any improvement of her acute on chronic bilateral lower extremity pain. She has not had any recent falls. At baseline she uses a wheelchair to ambulate and requires full assistance from staff to get her out of bed and into the wheelchair.    At time of sign out, disposition was pending UA, COVID-19 test, admission.  UA was negative, COVID test was also negative, chest x-ray was consistent with potentially resolving pneumonia.  However with her severely elevated white count, the borderline sinus tachycardia, and concern for infection, I have started her on antibiotics, did order blood cultures (patient is currently refusing to have blood draws for the cultures) and will admit  for further management.    FINAL IMPRESSION    1. Bilateral leg pain    2. Shortness of breath    3. Leukocytosis, unspecified type        ED MEDS  Medications   cefTRIAXone (ROCEPHIN) 1 g vial to attach to  mL bag for ADULTS or NS 50 mL bag for PEDS (has no administration in time range)   morphine (PF) injection 4 mg (4 mg Intravenous Given 12/23/22 1307)       LAB  Labs Ordered and Resulted from Time of ED Arrival to Time of ED Departure   BASIC METABOLIC PANEL - Abnormal       Result Value    Sodium 129 (*)     Potassium 3.9      Chloride 95 (*)     Carbon Dioxide (CO2) 23      Anion Gap 11      Urea Nitrogen 33.4 (*)     Creatinine 1.49 (*)     Calcium 7.4 (*)     Glucose 99      GFR Estimate 34 (*)    TROPONIN T, HIGH SENSITIVITY - Abnormal    Troponin T, High Sensitivity 47 (*)    NT PROBNP INPATIENT - Abnormal    N terminal Pro BNP Inpatient 21,281 (*)    CBC WITH PLATELETS AND DIFFERENTIAL - Abnormal    WBC Count 43.9 (*)     RBC Count 2.25 (*)     Hemoglobin 8.0 (*)     Hematocrit 25.1 (*)      (*)     MCH 35.6 (*)     MCHC 31.9      RDW 16.4 (*)     Platelet Count 220     ROUTINE UA WITH MICROSCOPIC REFLEX TO CULTURE - Abnormal    Color Urine Light Yellow      Appearance Urine Turbid (*)     Glucose Urine Negative      Bilirubin Urine Negative      Ketones Urine Negative      Specific Gravity Urine 1.011      Blood Urine 0.03 mg/dL (*)     pH Urine 5.0      Protein Albumin Urine 20 (*)     Urobilinogen Urine <2.0      Nitrite Urine Negative      Leukocyte Esterase Urine Negative      Mucus Urine Present (*)     Amorphous Crystals Urine Few (*)     RBC Urine 2      WBC Urine 2      Squamous Epithelials Urine <1      Hyaline Casts Urine 10 (*)    TROPONIN T, HIGH SENSITIVITY - Abnormal    Troponin T, High Sensitivity 44 (*)    DIFFERENTIAL - Abnormal    % Neutrophils 96      % Lymphocytes 3      % Monocytes 1      % Eosinophils 0      % Basophils 0      Absolute Neutrophils 42.1 (*)     Absolute  Lymphocytes 1.3      Absolute Monocytes 0.4      Absolute Eosinophils 0.0      Absolute Basophils 0.0      RBC Morphology Confirmed RBC Indices      Platelet Assessment        Value: Automated Count Confirmed. Platelet morphology is normal.   LACTIC ACID WHOLE BLOOD - Normal    Lactic Acid 1.8     INFLUENZA A/B & SARS-COV2 PCR MULTIPLEX - Normal    Influenza A PCR Negative      Influenza B PCR Negative      RSV PCR Negative      SARS CoV2 PCR Negative     BLOOD CULTURE   BLOOD CULTURE         RADIOLOGY    XR Chest 2 Views   Final Result   IMPRESSION: There has been partial clearing of the bilateral airspace opacities. There is persistent mild cardiac enlargement and mild pulmonary vascular congestion. No pneumothorax or definite pleural effusion. Findings are consistent with improving    pneumonia or improving asymmetric pulmonary edema.          DISCHARGE MEDS  New Prescriptions    No medications on file       I, Rory Mayo, am serving as a scribe to document services personally performed by Yared Paulino DO, based on my observations and the provider's statements to me.  I, Yared Paulino DO, attest that Rory Mayo is acting in a scribe capacity, has observed my performance of the services and has documented them in accordance with my direction.      Yared Paulino DO  [facility] EMERGENCY DEPARTMENT  [address] [place] [id number]  [phone]     Yared Paulino DO  12/23/22 7980

## 2022-12-23 NOTE — CONSULTS
Care Management Initial Consult    General Information  Assessment completed with: Caregiver, Russ via phone  Type of CM/SW Visit: Initial Assessment    Primary Care Provider verified and updated as needed: Yes   Readmission within the last 30 days: previous discharge plan unsuccessful (12/14/22 - 12/19/22)   Return Category: Exacerbation of disease  Reason for Consult: discharge planning  Advance Care Planning: Advance Care Planning Reviewed: no concerns identified          Communication Assessment  Patient's communication style: spoken language (non-English) (speaks Hmong)             Cognitive  Cognitive/Neuro/Behavioral:                        Living Environment:   People in home: facility resident     Current living Arrangements: foster care (Chris lives at [facility]. Contact at the [facility]: Russ [phone].)      Able to return to prior arrangements: yes       Family/Social Support:  Care provided by: other (see comments), self (Foster Care staff/PCA)  Provides care for: no one, unable/limited ability to care for self  Marital Status:   Facility resident(s)/Staff, PCA, Children          Description of Support System: Supportive, Involved    Support Assessment: Adequate family and caregiver support, Adequate social supports    Current Resources:   Patient receiving home care services: No     Community Resources: PCA, Group Home, DME (Staff at [facility] are her PCAs. Chris lives at [facility]. Contact at the [facility]: [employer] [phone].)  Equipment currently used at home: wheelchair, manual, walker, rolling  Supplies currently used at home: Incontinence Supplies    Employment/Financial:  Employment Status: retired        Financial Concerns:     Referral to Financial Worker: No       Lifestyle & Psychosocial Needs:  Social Determinants of Health     Tobacco Use: Low Risk      Smoking Tobacco Use: Never      Smokeless Tobacco Use: Never     Passive Exposure: Not on file   Alcohol Use: Not on file   Financial Resource Strain: Not on file   Food Insecurity: Not on file   Transportation Needs: Not on file   Physical Activity: Not on file   Stress: Not on file   Social Connections: Not on file   Intimate Partner Violence: Not on file   Depression: Not on file   Housing Stability: Not on file       Functional Status:  Prior to admission patient needed assistance:   Dependent ADLs:: Bathing, Ambulation-walker, Grooming, Transfers, Toileting, Wheelchair-with assist, Dressing, Incontinence, Positioning  Dependent IADLs:: Cleaning, Laundry, Cooking, Shopping, Meal Preparation, Medication Management, Money Management, Transportation, Incontinence  Assesssment of Functional Status: Not at baseline with ADL Functioning, Not at baseline with mobility, Not at  functional baseline    Mental Health Status:  Mental Health Status: Past Concern  Mental Health Management: Medication    Chemical Dependency Status:                Values/Beliefs:  Spiritual, Cultural Beliefs, Catholic Practices, Values that affect care:                 Additional Information:  Chris lives at [facility]. Contact at the [facility]: Russ [phone].     She is total cares at baseline. Foster Care staff are her PCAs. Uses a walker or wheelchair for mobility.     Unknown discharge needs at this time, but should be able to return to her foster care home.    Foster Care Home uses: [facility] if there are any new medications at discharge.     [facility] transport at discharge.    Shiv son: [phone].    Anuradha Dior RN

## 2022-12-24 PROBLEM — E22.2 SIADH (SYNDROME OF INAPPROPRIATE ADH PRODUCTION) (H): Status: ACTIVE | Noted: 2022-01-01

## 2022-12-24 NOTE — ED NOTES
Pt to be admitted to room 106, report in chart for floor rn, p1 informed.   Secondary Defect Width (In Cm): 1.5

## 2022-12-24 NOTE — PHARMACY-VANCOMYCIN DOSING SERVICE
Pharmacy Vancomycin Initial Note  Date of Service 2022  Patient's  1935  87 year old, female    Indication: Sepsis    Current estimated CrCl = Estimated Creatinine Clearance: 26.1 mL/min (A) (based on SCr of 1.03 mg/dL (H)).    Creatinine for last 3 days  2022:  1:08 PM Creatinine 1.49 mg/dL  2022:  7:25 AM Creatinine 1.03 mg/dL    Recent Vancomycin Level(s) for last 3 days  No results found for requested labs within last 72 hours.      Vancomycin IV Administrations (past 72 hours)                   vancomycin (VANCOCIN) 1000 mg in dextrose 5% 200 mL PREMIX (mg) 1,000 mg New Bag 22 2312                Nephrotoxins and other renal medications (From now, onward)    Start     Dose/Rate Route Frequency Ordered Stop    22 2300  vancomycin (VANCOCIN) 750 mg in 0.9% NaCl 250 mL intermittent infusion         750 mg  over 60 Minutes Intravenous EVERY 24 HOURS 22 0937      22 1236  piperacillin-tazobactam (ZOSYN) 3.375 g vial to attach to  mL bag        Note to Pharmacy: Extended infusion dosing to start 6 hours after initial infusion.   See Hyperspace for full Linked Orders Report.    3.375 g  over 240 Minutes Intravenous EVERY 8 HOURS 22 0926            Contrast Orders - past 72 hours (72h ago, onward)    None          InsightRX Prediction of Planned Initial Vancomycin Regimen  Regimen: 750 mg IV every 24 hours.  Start time: 10:27 on 2022  Exposure target: AUC24 (range)400-600 mg/L.hr   AUC24,ss: 538 mg/L.hr  Probability of AUC24 > 400: 82 %  Ctrough,ss: 17.3 mg/L  Probability of Ctrough,ss > 20: 35 %  Probability of nephrotoxicity (Lodise HIPOLITO ): 13 %          Plan:  1. Start vancomycin  750 mg IV q24h. Empirically changed from 500 mg every 24 hours due to improving renal function.  2. Vancomycin monitoring method: AUC  3. Vancomycin therapeutic monitoring goal: 400-600 mg*h/L  4. Pharmacy will check vancomycin levels as appropriate in 1-3 Days.       Sue Erazo, [facility]

## 2022-12-24 NOTE — PLAN OF CARE
Goal Outcome Evaluation:         Problem: Plan of Care - These are the overarching goals to be used throughout the patient stay.    Goal: Plan of Care Review  Description: The Plan of Care Review/Shift note should be completed every shift.  The Outcome Evaluation is a brief statement about your assessment that the patient is improving, declining, or no change.  This information will be displayed automatically on your shift note.  Outcome: Progressing     Problem: Plan of Care - These are the overarching goals to be used throughout the patient stay.    Goal: Optimal Comfort and Wellbeing  Intervention: Monitor Pain and Promote Comfort  Recent Flowsheet Documentation  Taken 12/23/2022 2142 by Becki Kapoor RN  Pain Management Interventions: emotional support     Problem: Infection  Goal: Absence of Infection Signs and Symptoms  Outcome: Progressing         Pt arrived from ER just before 2200.  Pt was talking non stop [place] [facility]  The  on [place] said she keeps repeating herself saying she needs to go back downstairs, her family is downstairs waiting for her, and she will take her medications downstairs.  I told her Shiv will come tomorrow and she repeated back tomorrow.  She told the ER she wanted rice.  I told her I would get her water, waiting on MD order.  Hard to assess pt, she wouldn't answer any questions for me, not even pain.  Pt has purewick in place and is voiding.  Her sacrum is reddened so we started turning her.  It's hard for me to tell if her fingers/knuckles are swollen, feet are not.  I gave the bolus ordered in ED and started her antibiotics.  Labs were recently drawn including blood cultures.  Pt quieted down after I left the room.  Becki Kapoor, RN

## 2022-12-24 NOTE — H&P
[facility]    History and Physical - Hospitalist Service       Date of Admission:  12/23/2022    Assessment & Plan      Chris Bell is a 87 year old female admitted on 12/23/2022. Previously admitted to our service from 12/14/22-12/19/22 for HFpEF exacerbation and community acquired pneumonia. She presented today with vague symptoms (ongoing bilateral leg pain, ongoing shortness of breath) but ultimately admitted for leukocytosis with WBC of 42.1.     Chris has notable cognitive decline and discussion is limited even with [facility] . Family is not present at time of admission and not reachable by phone. Per chart review, it appears goals of care discussions were initiated during last admission.     Leukocytosis   Fever   Infectious vs Hematologic Etiology vs Leukemoid Reaction   Leukocytosis noted on admission with WBC level of 43.9. Neutrophil count of 42.1. Procal 5.92. Lactic acid non-elevated. Patient febrile up to 100.7 degrees. Infectious work up has included a urinalysis which was negative for evidence of UTI, viral panel negative for influenza, covid, or RSV. Chest XR interestingly shows improving pneumonia and pulmonary edema when compared to imaging on last admission. Patient is satting well on room air and has no increased work of breathing on physical exam. No GI complaints or concerning findings on abdominal exam, however C diff remains on differential given significantly elevated WBC. Best working diagnosis at this time is ongoing community acquired pneumonia despite apparent improvement to respiratory status. As noted above, Chris is quite difficult to communicate with and has intermittently declined lab draws. Family is aware of this and agrees with her decision.    - Blood culture pending    - Repeat CBC, repeat WBC remains elevated to 36.7   - Daily CBC to trend WBC    - Repeat Procal    - Consider c diff testing   - Received ceftriaxone in ED, will broaden  antibiotics to vanc/zosyn given unknown source of infection at this time and high risk of progression to sepsis given elevated procal    - Tylenol PRN for fever    - Given patient's resistance to work up, recommend continued goals of care discussion once family is available    - Consider abdominal imaging for further work up, although physical exam of abdomen unremarkable     JOSE on CKD   Pre-Renal   Creatinine of 1.49, BUN of 33.4. Patient does have elevated pro-BNP of 21,000, but this appears to be at baseline. Appears dry to euvolemic at baseline. Pulmonary edema improved on imaging.    - 500cc fluid bolus   - Repeat BMP in AM trend creatinine     HFpEF (EF 55-60%)   Patient with history of HFpEF with most recent ECHO on 12/15/22 showing EF 55-60%, severe RV, LA, RA dilation, moderate-severe tricuspid regurgitation, mild-mod mitral regurg, non-collapsing IVC. Patient follows with cardiology and is compliant with her medications at her group home. Appropriately diuresed on last admission and discharged with the following regimen: Bumex 1 mg daily. Appears the patient may not have received increased dose as intended following discharge, however she appears euvolemic to dry on physical exam with stable BUN level.    - Continue PTA 1mg bumex daily   - Consider cardiology consult      Cognitive decline  Likely dementia  Patient with increased behaviors, forgetfulness, confusion over the past year. At last admission, it was communicated to son Shiv that this is likely dementia/ Recommended to son that they start talking as a family about goals of care moving forward given Chris doesn't like being hospitalized but will continue to decline both mentally and physically over the coming months.   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 Asymptomatic. Work up on prior admissions  negative for adrenal insufficiency. Nephrology previously consulted and recommended salt tabs, but unclear if patient was taking appropriately.    - Resume salt tabs NaCl 1mg BID    - Daily BMPs to trend Na    - Consider nephrology consult if no improvement or difficult to control     Chronic Macrocytic Anemia - Stable  History of Upper GI Bleed   Hemoglobin 8.0 on admission, appears improved since last admission. Per discussion with patient no history to support recurrent GI bleed. Most recent EGD in October showed a duodenal nonbleeding ulcer, biopsy consistent with edema and acute duodenitis, negative for dysplasia and malignancy. We will continue to monitor hemoglobin.  Patient has an upcoming GI appointment in 01/24/23 for EGD.    - Daily CBCs to trend hemoglobin     Hypothyroidism   Noted in chart. PTA medications include levothyroxine. Most recent TSH on 12/14/22 was 2.93.    - Continue PTA levothyroxine    - TSH pending      Diet: Combination Diet Regular Diet - with additional recommendation to provide thickened liquid with medications   DVT Prophylaxis: Pneumatic Compression Devices  Javed Catheter: Not present  Fluids: 500cc bolus given JOSE on CKD   Central Lines: None  Cardiac Monitoring: None  Code Status: No CPR- Do NOT Intubate    Clinically Significant Risk Factors Present on Admission         # Hyponatremia: Lowest Na = 129 mmol/L in last 2 days, will monitor as appropriate         # Acute Kidney Injury, unspecified: based on a >150% or 0.3 mg/dL increase in last creatinine compared to past 90 day average, will monitor renal function               Disposition Plan      Expected Discharge Date: 12/26/2022                The patient's care was discussed with the Attending Physician, Dr. Boone .    Kate Pelayo MD  Hospitalist Service  [facility]  Securely message with the Vocera Web Console (learn more here)  Text page via gopogo Paging/Directory    ______________________________________________________________    Chief Complaint    Bilateral LE Pain, Chronic   Found to have significant leukocytosis requiring admission     History of Present Illness   Chris Bell is a 87 year old female who presents via EMS for evaluation of shortness of breath.     Per chart review, the patient was admitted to [facility] from 12/14/22 to 12/19/22 (5 days). The patient was admitted for CHF exacerbation and pneumonia. Patient started on IV Bumex (1 mg), transitioned to home oral regimen (0.5 mg) on 12/17. Echo on 12/15 showing EF 55-60%, severe RV, LA, RA dilation, mod-severe tricuspid regurg, mild-mod mitral regurg, non-collapsing IVC. At time of discharge patient had the following medication changes: Bumex (increase from 0.5 mg to 1 mg) and start Protonix. Patient was discharged to home with plan for outpatient follow-up with PCP. Scheduled for EGD 1/24/23.     The patient is presenting from assisted living. She reports she has chronic bilateral lower extremity pain that became worse yesterday. She was given Tylenol for pain at her assisted living facility, last dose this morning, but did not have any improvement of her acute on chronic bilateral lower extremity pain. She has not had any recent falls. At baseline she uses a wheelchair to ambulate and requires full assistance from staff to get her out of bed and into the wheelchair. She also reports forest fatigue.     She denies any other complaints at this time. Unable to contact family, recommend further history gathering from them once available.     Review of Systems    12 point ROS completed and negative aside from those concerns documented above.     Past Medical History    I have reviewed this patient's medical history and updated it with pertinent information if needed.   Past Medical History:   Diagnosis Date     Anemia      Depression      Disease of thyroid gland      HTN (hypertension)      Infection due to  2019 novel coronavirus 7/28/2022     Osteoporosis      PUD (peptic ulcer disease)       Past Surgical History   I have reviewed this patient's surgical history and updated it with pertinent information if needed.  Past Surgical History:   Procedure Laterality Date     COLONOSCOPY N/A 7/3/2022    Procedure: COLONOSCOPY WITH COLON BIOPSIES;  Surgeon: Marty Patel MD;  Location: [facility]     ESOPHAGOSCOPY, GASTROSCOPY, DUODENOSCOPY (EGD), COMBINED N/A 1/17/2018    Procedure: ESOPHAGOGASTRODUODENOSCOPY (EGD) with h.pylori and gastric ulcer biopsies;  Surgeon: Colin Alvarez MD;  Location: [facility];  Service:      ESOPHAGOSCOPY, GASTROSCOPY, DUODENOSCOPY (EGD), COMBINED N/A 7/3/2022    Procedure: ESOPHAGOGASTRODUODENOSCOPY (EGD) WITH DUODENAL & GASTRIC BIOPSIES;  Surgeon: Marty Patel MD;  Location: [facility] OR     ESOPHAGOSCOPY, GASTROSCOPY, DUODENOSCOPY (EGD), COMBINED N/A 10/5/2022    Procedure: ESOPHAGOGASTRODUODENOSCOPY (EGD) WITH BIOPSIES;  Surgeon: Ximena Ho MD;  Location: [facility] STATISTIC PICC LINE INSERTION >5YR, FAILED  8/2/2022          HYSTERECTOMY       PICC TRIPLE LUMEN PLACEMENT  8/2/2022          PICC TRIPLE LUMEN PLACEMENT  12/15/2022          US BIOPSY FINE NEEDLE ASPIRATION LYMPH NODE  8/14/2019      Social History   I have reviewed this patient's social history and updated it with pertinent information if needed. Chris Bell  reports that she has never smoked. She has never used smokeless tobacco. She reports that she does not drink alcohol and does not use drugs.    Family History   No significant family history on file.     Prior to Admission Medications   Prior to Admission Medications   Prescriptions Last Dose Informant Patient Reported? Taking?   LORazepam (ATIVAN) 0.5 MG tablet 12/23/2022 at am  Yes Yes   Sig: Take 0.5-1 mg by mouth 3 times daily 2 tablets qam and 1 tablet qafternoon and 2 tablets qpm   aMILoride (MIDAMOR) 5 MG  tablet 12/23/2022 at am  Yes Yes   Sig: Take 5 mg by mouth daily   acetaminophen (TYLENOL) 500 MG tablet 12/23/2022 at am  Yes Yes   Sig: Take 500 mg by mouth 2 times daily   alum & mag hydroxide-simethicone (MAALOX) 200-200-20 MG/5ML SUSP suspension   Yes Yes   Sig: Take 15 mLs by mouth every 4 hours as needed for indigestion or heartburn   bumetanide (BUMEX) 1 MG tablet 12/23/2022 at am  No Yes   Sig: Take 1 tablet (1 mg) by mouth daily   Patient taking differently: Take 0.5 mg by mouth daily   calcium carbonate (TUMS) 500 MG chewable tablet   Yes Yes   Sig: Take 1 chew tab by mouth every 4 hours as needed for heartburn   calcium carbonate-vitamin D (OSCAL W/D) 500-200 MG-UNIT tablet 12/23/2022 at am  Yes Yes   Sig: Take 1 tablet by mouth 2 times daily   cetirizine (ZYRTEC) 5 MG tablet 12/20/2022 at pm  Yes Yes   Sig: Take 5 mg by mouth every evening   chlorhexidine (PERIDEX) 0.12 % solution 12/23/2022 at am  Yes Yes   Sig: Swish and spit 15 mLs in mouth 2 times daily Twice a day after brushing teeth   guaiFENesin (ROBITUSSIN) 100 MG/5ML SYRP   Yes Yes   Sig: Take 5-10 mLs by mouth every 6 hours as needed for cough   levothyroxine (SYNTHROID/LEVOTHROID) 50 MCG tablet 12/23/2022 at am  Yes Yes   Sig: Take 50 mcg by mouth every morning   loperamide (IMODIUM A-D) 2 MG tablet 12/23/2022 at am  Yes Yes   Sig: Take 2 mg by mouth 2 times daily   loperamide (IMODIUM) 2 MG capsule   No Yes   Sig: Take 1 capsule (2 mg) by mouth 4 times daily as needed for diarrhea   melatonin 5 MG tablet 12/22/2022 at hs  Yes Yes   Sig: Take 5 mg by mouth At Bedtime   metoprolol succinate ER (TOPROL-XL) 50 MG 24 hr tablet 12/23/2022 at am  Yes Yes   Sig: Take 100 mg by mouth daily   nitroGLYcerin (NITROSTAT) 0.4 MG sublingual tablet   Yes Yes   Sig: Place 0.4 mg under the tongue every 5 minutes as needed for chest pain For chest pain place 1 tablet under the tongue every 5 minutes for 3 doses. If symptoms persist 5 minutes after 1st dose  call 911.   nystatin (MYCOSTATIN) 153364 UNIT/ML suspension 12/23/2022 at am  Yes Yes   Sig: Swish and swallow 5 mLs (500,000 Units) in mouth 4 times daily   oxybutynin ER (DITROPAN XL) 5 MG 24 hr tablet 12/23/2022 at am  Yes Yes   Sig: Take 5 mg by mouth daily   pantoprazole (PROTONIX) 40 MG EC tablet 12/23/2022  Yes Yes   Sig: Take 1 tablet (40 mg) by mouth daily   potassium chloride ER (K-TAB/KLOR-CON) 10 MEQ CR tablet 12/23/2022 at am  Yes Yes   Sig: Take 20 mEq by mouth daily   sodium chloride 1 GM tablet 12/23/2022 at am  No Yes   Sig: Take 1 tablet (1 g) by mouth 2 times daily (with meals)   Patient taking differently: Take 2 g by mouth 3 times daily   sucralfate (CARAFATE) 1 GM tablet 12/23/2022 at am  No Yes   Sig: Take 1 tablet (1 g) by mouth 4 times daily   vitamin B-12 (CYANOCOBALAMIN) 250 MCG tablet 12/23/2022 at am  Yes Yes   Sig: Take 250 mcg by mouth daily      Facility-Administered Medications: None     Allergies   Allergies   Allergen Reactions     Unknown [No Clinical Screening - See Comments]      Pt states she has a medication allergy but does not know what it is     Physical Exam   Vital Signs: Temp: 98  F (36.7  C) Temp src: Oral BP: 136/60 Pulse: 97   Resp: 20 SpO2: 95 % O2 Device: None (Room air)    Weight: 94 lbs 8 oz  Constitutional:  Awake, alert, in no apparent distress. Tangential speech per telephone .   HENT:  Normocephalic, Atraumatic. Bilateral external ears normal. Oropharynx moist. Nose normal. Neck- Normal range of motion with no guarding, No midline cervical tenderness and moves neck freely with no signs of meningeal irritation, Supple, No stridor.   Eyes:  PERRL, EOMI with no signs of entrapment, Conjunctiva normal, No discharge.   Respiratory:  Normal breath sounds, No respiratory distress, No wheezing.    Cardiovascular:  Normal heart rate, Normal rhythm, No appreciable rubs or gallops.   GI:  Soft, No tenderness, No distension, No palpable masses  Musculoskeletal:   Intact distal pulses, no unilateral leg swelling or calf tenderness.  No gross deformities but guarded range of motion bilateral lower extremity secondary to pain.  No joint swelling, erythema or warmth.  Integument:  Warm, Dry, No erythema, No rash.   Neurologic:  Normal motor function, Normal sensory function, No focal deficits noted.   Psychiatric:  Affect normal. Difficult historian.     Data   Data reviewed today: I reviewed all medications, new labs and imaging results over the last 24 hours.    Recent Labs   Lab 12/24/22  0725 12/23/22  2303 12/23/22  1308 12/19/22  0620 12/18/22  0447   WBC 28.7* 36.7* 43.9* 7.3 7.0   HGB 7.2* 7.2* 8.0* 7.3* 7.4*   * 113* 112* 114* 114*    195 220 198 212   NA  --   --  129* 132* 140   POTASSIUM  --   --  3.9 4.0 4.2   CHLORIDE  --   --  95* 95* 97*   CO2  --   --  23 31* 36*   BUN  --   --  33.4* 10.2 9.9   CR  --   --  1.49* 0.80 0.88   ANIONGAP  --   --  11 6* 7   MANE  --   --  7.4* 7.2* 7.2*   GLC  --   --  99 85 88     Recent Results (from the past 24 hour(s))   XR Chest 2 Views    Narrative    EXAM: XR CHEST 2 VIEWS  LOCATION: [facility]  DATE/TIME: 12/23/2022 3:30 PM    INDICATION: fatigue  COMPARISON: 12/14/2022.      Impression    IMPRESSION: There has been partial clearing of the bilateral airspace opacities. There is persistent mild cardiac enlargement and mild pulmonary vascular congestion. No pneumothorax or definite pleural effusion. Findings are consistent with improving   pneumonia or improving asymmetric pulmonary edema.

## 2022-12-24 NOTE — ED NOTES
On vocera with  services for long amount of time attempting to inform pt of need to draw blood for blood cultures and cbc.  Pt states she wants us to call her son as she has no blood to give and she will not allow staff to poke her.  Pt talking non-stop and not  answering questions for  keeps requesting her son be called.  Informed pt I would call her son as soon as soon as I got done talking to her.   informed of pt's refusal to allow blood draw and states just chart pt's refusal.

## 2022-12-24 NOTE — ED NOTES
Call received from patient's son Shiv, phone # [phone], he is returning a call from [facility] ED regarding his mom. Update given, son states that pt lives in a group home, ambulates short distances with a walker and is mostly coherent. He states that he will be in to visit pt tomorrow.

## 2022-12-24 NOTE — PLAN OF CARE
Patient is afebrile. Patient reports generalized pain. Stool sample sent to check for C-diff. Patient are well at lunch.  Monitor.   Problem: Infection  Goal: Absence of Infection Signs and Symptoms  Outcome: Progressing  Intervention: Prevent or Manage Infection  Isolation Precautions: enteric precautions maintained   Problem: Risk for Delirium  Goal: Improved Behavioral Control  Outcome: Progressing  Intervention: Minimize Safety Risk  Enhanced Safety Measures: bed alarm set   Problem: Plan of Care - These are the overarching goals to be used throughout the patient stay.    Goal: Optimal Comfort and Wellbeing  Outcome: Progressing   Goal Outcome Evaluation:

## 2022-12-24 NOTE — PROGRESS NOTES
[facility]    Progress Note - Hospitalist Service       Date of Admission:  12/23/2022    Assessment & Plan   Chris Bell is a 87 year old female admitted on 12/23/2022. Previously admitted to our service from 12/14/22-12/19/22 for HFpEF exacerbation and community acquired pneumonia. She presented today with vague symptoms (ongoing bilateral leg pain, ongoing shortness of breath) but ultimately admitted for leukocytosis with WBC of 42.1.          Leukocytosis, improving   Fever   Infectious vs Hematologic Etiology vs Leukemoid Reaction   Leukocytosis noted on admission with WBC level of 43.9. Neutrophil count of 42.1. Procal 5.92. Lactic acid non-elevated. Patient febrile up to 100.7 degrees. Infectious work up has included a urinalysis which was negative for evidence of UTI, viral panel negative for influenza, covid, or RSV. Chest XR interestingly shows improving pneumonia and pulmonary edema when compared to imaging on last admission. Patient is satting well on room air and has no increased work of breathing on physical exam. No GI complaints or concerning findings on abdominal exam, however C diff remains on differential given significantly elevated WBC.  We will collect stool sample when able.  - Blood culture pending    -CBC in the a.m.   - c diff testing ordered  -Continue vancomycin Zosyn for now   - Tylenol PRN for fever    -We will obtain CT abdomen to evaluate for intra-abdominal source     JOSE, improving   Creatinine of 1.49, BUN of 33.4. Patient does have elevated pro-BNP of 21,000, but this appears to be at baseline. Appears dry to euvolemic at baseline. Pulmonary edema improved on imaging. S/p 500cc bolus.   - BMP in the am       HFpEF (EF 55-60%)   Patient with history of HFpEF with most recent ECHO on 12/15/22 showing EF 55-60%, severe RV, LA, RA dilation, moderate-severe tricuspid regurgitation, mild-mod mitral regurg, non-collapsing IVC. Patient follows with cardiology  and is compliant with her medications at her group home. Appropriately diuresed on last admission and discharged with the following regimen: Bumex 1 mg daily. Appears the patient may not have received increased dose as intended following discharge, however she appears euvolemic to dry on physical exam with stable BUN level.    - Continue PTA 1mg bumex daily  - Hold metoprolol for now         Cognitive decline  Likely dementia  Patient with increased behaviors, forgetfulness, confusion over the past year. At last admission, it was communicated to son Shiv that this is likely dementia/ Recommended to son that they start talking as a family about goals of care moving forward given Chris doesn't like being hospitalized.       Chronic hyponatremia, improving  Sodium of 129 on admission, down from 132 5 days prior on discharge. Asymptomatic. Work up on prior admissions negative for adrenal insufficiency. Nephrology previously consulted and recommended salt tabs, but unclear if patient was taking appropriately.  Now improving.   - Resume salt tabs NaCl 1mg BID   -BMP in a.m.  -Encourage oral intake                   Chronic Macrocytic Anemia - Stable  History of Upper GI Bleed   Hemoglobin 8.0 on admission, appears improved since last admission. Per discussion with patient no history to support recurrent GI bleed. Most recent EGD in October showed a duodenal nonbleeding ulcer, biopsy consistent with edema and acute duodenitis, negative for dysplasia and malignancy. We will continue to monitor hemoglobin.  Patient has an upcoming GI appointment in 01/24/23 for EGD.   -CBC in a.m.     Hypothyroidism   Noted in chart. PTA medications include levothyroxine.   - Continue PTA levothyroxine     Hypertension  Blood pressure well controlled.  We will hold amiloride for now.         Diet: Combination Diet Regular Diet    DVT Prophylaxis: SCD  Javed Catheter: Not present  Fluids: PO  Central Lines: None  Cardiac Monitoring:  None  Code Status: No CPR- Do NOT Intubate      Disposition Plan      Expected Discharge Date: 12/26/2022                The patient's care was discussed with Dr Paolo Lay MD  Hospitalist Service  [facility]  Securely message with the Vocera Web Console (learn more here)  Text page via [employer] Paging/Directory         Clinically Significant Risk Factors Present on Admission         # Hyponatremia: Lowest Na = 129 mmol/L in last 2 days, will monitor as appropriate                       ______________________________________________________________________    Interval History     No acute events overnight.  Continues to endorse pain all over her body.  Requesting a medication to make her feel better.  Upon further discussion, this is likely Ativan she takes at home.    Denies any abdominal pain.  Does feel as if this distended a bit.  She would like to eat some food to see if he feels better, has not eaten much since yesterday.    Denies any fever or chills.  No chest pain or shortness of breath.  No dysuria or hematuria.    Data reviewed today: I reviewed all medications, new labs and imaging results over the last 24 hours.    Physical Exam   Vital Signs: Temp: 98  F (36.7  C) Temp src: Oral BP: 136/60 Pulse: 97   Resp: 20 SpO2: 95 % O2 Device: None (Room air)    Weight: 94 lbs 8 oz  General Appearance: Chronically ill.  No acute distress.  Respiratory: Comfortable respiratory effort   Cardiovascular: Regular rate rhythm.  No murmurs appreciated.  GI: Nondistended.  Soft.  Nontender.  Skin: Warm and well perfused.  Neuro: Confused.  Moves all 4 extremities.  No focal deficits    Data   Recent Labs   Lab 12/24/22  0725 12/23/22  2303 12/23/22  1308 12/19/22  0620   WBC 28.7* 36.7* 43.9* 7.3   HGB 7.2* 7.2* 8.0* 7.3*   * 113* 112* 114*    195 220 198   *  --  129* 132*   POTASSIUM 3.4  --  3.9 4.0   CHLORIDE 99  --  95* 95*   CO2 21*  --  23 31*   BUN  25.7*  --  33.4* 10.2   CR 1.03*  --  1.49* 0.80   ANIONGAP 15  --  11 6*   MANE 7.1*  --  7.4* 7.2*   GLC 95  --  99 85

## 2022-12-24 NOTE — PLAN OF CARE
Problem: Infection  Goal: Absence of Infection Signs and Symptoms  Outcome: Not Progressing  Intervention: Prevent or Manage Infection  Recent Flowsheet Documentation  Taken 12/24/2022 0400 by Mariajose Chirinos RN  Isolation Precautions: enteric precautions maintained  Taken 12/24/2022 0000 by Mariajose Chirinos RN  Isolation Precautions: enteric precautions maintained     Problem: Plan of Care - These are the overarching goals to be used throughout the patient stay.    Goal: Optimal Comfort and Wellbeing  Outcome: Progressing     Problem: Risk for Delirium  Goal: Improved Sleep  Outcome: Progressing   Goal Outcome Evaluation:      Sepsis protocol fired at 0700, Lactic acid to be drawn STAT, VS done at 0708. Pt has slept most of the night, PureWick in place. IV antibiotics infusing.

## 2022-12-24 NOTE — ED NOTES
[facility] ED Handoff Report    ED Chief Complaint:   ED Triage Notes Addendum ED Triage Notes Addendum JK            Pt coming in via [facility] with c/o sob.  EMS reports pt pt has hx of chf and anxiety.  VS wnl and 12 lead NSR per EMS.  Pt placed on 2L/nc for comfort.  Pt reports she is here lower extremity pain that started a week ago but yesterday got worse and unable to walk.  Pt describes pain as a tingling nerve pain that starts at her thighs and goes down her legs.  Pt states here because of all the pain .  When asked if she was having sob pt replied yes via .  Pt denies any fever /chills. States depressed secondary to pain .          ED Diagnosis:  (M79.604,  M79.605) Bilateral leg pain  Comment:   Plan:     (R06.02) Shortness of breath  Comment:   Plan:     (D72.829) Leukocytosis, unspecified type  Comment:   Plan:        PMH:    Past Medical History:   Diagnosis Date    Anemia     Depression     Disease of thyroid gland     HTN (hypertension)     Infection due to 2019 novel coronavirus 7/28/2022    Osteoporosis     PUD (peptic ulcer disease)         Code Status:  No CPR- Do NOT Intubate     Falls Risk: Yes Band: Applied    Current Living Situation/Residence: lives in a group home     Elimination Status: Continent: No     Activity Level: Total assist/lift    Patients Preferred Language:  Other: [employer]     Needed: Yes    Vital Signs:  /58   Pulse 96   Temp (!) 100.7  F (38.2  C) (Oral)   Resp 30   SpO2 96%      Cardiac Rhythm: ST    Pain Score: Patient is unable to quantify.    Is the Patient Confused:  Yes    Last Food or Drink: 12/23/22 at 2100    Focused Assessment:  Pt is alert, but difficult to communicate with even when [employer]  is used, wbc ct elevated, pt refused blood cxs, md aware, iv abx started as ordered, #20g piv to right arm, purewick in place.    Tests Performed: Done: Labs and Imaging    Treatments Provided:      Family Dynamics/Concerns:  No    Family Updated On Visitor Policy: Yes    Plan of Care Communicated to Family: Yes    Who Was Updated about Plan of Care: son and pt    Belongings Checklist Done and Signed by Patient: Yes    Medications sent with patient:     Covid: asymptomatic , negative    Additional Information:     RN: Kim Mesa RN 12/23/2022 9:05 PM

## 2022-12-24 NOTE — PHARMACY-VANCOMYCIN DOSING SERVICE
Pharmacy Vancomycin Initial Note  Date of Service 2022  Patient's  1935  87 year old, female    Indication: Sepsis    Current estimated CrCl = Estimated Creatinine Clearance: 17.9 mL/min (A) (based on SCr of 1.49 mg/dL (H)).    Creatinine for last 3 days  2022:  1:08 PM Creatinine 1.49 mg/dL    Recent Vancomycin Level(s) for last 3 days  No results found for requested labs within last 72 hours.      Vancomycin IV Administrations (past 72 hours)      No vancomycin orders with administrations in past 72 hours.                Nephrotoxins and other renal medications (From now, onward)    Start     Dose/Rate Route Frequency Ordered Stop    22 2300  vancomycin (VANCOCIN) 500 mg in sodium chloride 0.9 % 100 mL intermittent infusion         500 mg  over 1 Hours Intravenous EVERY 24 HOURS 22 0500  piperacillin-tazobactam (ZOSYN) 3.375 g vial to attach to  mL bag        Note to Pharmacy: Extended infusion dosing to start 6 hours after initial infusion.   See Hyperspace for full Linked Orders Report.    3.375 g  over 240 Minutes Intravenous EVERY 12 HOURS 22 2200  vancomycin (VANCOCIN) 1000 mg in dextrose 5% 200 mL PREMIX         1,000 mg  200 mL/hr over 1 Hours Intravenous ONCE 22 2100  piperacillin-tazobactam (ZOSYN) 3.375 g vial to attach to  mL bag        See Hyperspace for full Linked Orders Report.    3.375 g  over 30 Minutes Intravenous ONCE 22            Contrast Orders - past 72 hours (72h ago, onward)    None          [employer] Prediction of Planned Initial Vancomycin Regimen  Loading dose: 1000 mg at 22:00 2022.  Regimen: 500 mg IV every 24 hours.  Start time: 21:40 on 2022  Exposure target: AUC24 (range)400-600 mg/L.hr   AUC24,ss: 475 mg/L.hr  Probability of AUC24 > 400: 70 %  Ctrough,ss: 16.3 mg/L  Probability of Ctrough,ss > 20: 28 %  Probability of nephrotoxicity (Lodise  HIPOLITO 2009): 12 %        Plan:  1. Start vancomycin 1000 mg IV x 1, followed by 500 mg IV q24h.   2. Vancomycin monitoring method: AUC  3. Vancomycin therapeutic monitoring goal: 400-600 mg*h/L  4. Pharmacy will check vancomycin levels as appropriate in 1-3 Days.    5. Serum creatinine levels will be ordered every 48 hours.      Emily Lopes, [facility]

## 2022-12-25 NOTE — PLAN OF CARE
"Goal Outcome Evaluation:       Close to the start of night shift, pt c/o chest pain and conversing nonstop with the interpreterand staff, upon checking VS , HR was between 120 to 128, 02 sat was 82-84% on r/a and was placed on oxymask at 6L. RR was called and pt was was placed on Bipap with several labs  ordered. Pt later requesting the Bipap taken off, stated will like to eat and drink; at this time,Dr Edwards has ordered Bipap to be taken off ad pt is on r/a at this time, sating at 92-94% on r/a. Pt is currently started on scheduled Lasix         Problem: Plan of Care - These are the overarching goals to be used throughout the patient stay.    Goal: Plan of Care Review  Description: The Plan of Care Review/Shift note should be completed every shift.  The Outcome Evaluation is a brief statement about your assessment that the patient is improving, declining, or no change.  This information will be displayed automatically on your shift note.  Outcome: Progressing     Problem: Plan of Care - These are the overarching goals to be used throughout the patient stay.    Goal: Patient-Specific Goal (Individualized)  Description: You can add care plan individualizations to a care plan. Examples of Individualization might be:  \"Parent requests to be called daily at 9am for status\", \"I have a hard time hearing out of my right ear\", or \"Do not touch me to wake me up as it startles me\".  Outcome: Progressing     Problem: Plan of Care - These are the overarching goals to be used throughout the patient stay.    Goal: Absence of Hospital-Acquired Illness or Injury  Intervention: Identify and Manage Fall Risk  Recent Flowsheet Documentation  Taken 12/25/2022 0315 by Fatou Islas RN  Safety Promotion/Fall Prevention:   activity supervised   bed alarm on  Taken 12/24/2022 2358 by Fatou Islas RN  Safety Promotion/Fall Prevention:   activity supervised   bed alarm on  Taken 12/24/2022 2016 by Fatou Islas RN  Safety " Promotion/Fall Prevention:   activity supervised   bed alarm on     Problem: Plan of Care - These are the overarching goals to be used throughout the patient stay.    Goal: Absence of Hospital-Acquired Illness or Injury  Intervention: Prevent Skin Injury  Recent Flowsheet Documentation  Taken 12/25/2022 0315 by Fatou Islas RN  Body Position: position changed independently  Taken 12/24/2022 0358 by Fatou Islas RN  Body Position: position changed independently  Taken 12/24/2022 2016 by Fatou Islas RN  Body Position: position changed independently

## 2022-12-25 NOTE — PROGRESS NOTES
[facility]    Progress Note - Hospitalist Service       Date of Admission:  12/23/2022    Assessment & Plan   Chris Bell is a 87 year old female admitted on 12/23/2022. Previously admitted to our service from 12/14/22-12/19/22 for HFpEF exacerbation and community acquired pneumonia. She presented today with vague symptoms (ongoing bilateral leg pain, ongoing shortness of breath) but ultimately admitted for leukocytosis with WBC of 42.1.          Leukocytosis, improving   Fever   Infectious vs Hematologic Etiology vs Leukemoid Reaction   Leukocytosis noted on admission with WBC level of 43.9. Neutrophil count of 42.1. Procal 5.92. Lactic acid non-elevated. Patient febrile up to 100.7 degrees. Infectious work up has included a urinalysis which was negative for evidence of UTI, viral panel negative for influenza, covid, or RSV. Chest XR interestingly shows improving pneumonia and pulmonary edema when compared to imaging on last admission.  CT abdomen without evidence of a source.  C. difficile negative.  Again unclear etiology, leukocytosis improving.  We will continue with broad-spectrum antibiotics for now.  - Blood culture pending -no growth to date   -CBC in the a.m.  -Continue vancomycin Zosyn for now   - Tylenol PRN for fever     Tachypnea  Increased work of breathing  Rapid called on 12/24/2022 increased work of breathing.  Placed on BiPAP for a brief period.  X-ray showing pulmonary congestion consistent with CHF.  Blood gas was unremarkable.  Also with tachycardia, could consider  pulmonary embolism with elevated D-dimer.  Lower concern for pneumonia, but presented with elevated white count.  Anxiety could also be contributing.  -CT PE run  -Diuresis as below  -Continue antibiotics        HFpEF (EF 55-60%)   Patient with history of HFpEF with most recent ECHO on 12/15/22 showing EF 55-60%, severe RV, LA, RA dilation, moderate-severe tricuspid regurgitation, mild-mod mitral regurg,  non-collapsing IVC. Patient follows with cardiology and is compliant with her medications at her group home. Appropriately diuresed on last admission and discharged with the following regimen: Bumex 1 mg daily. Appears the patient may not have received increased dose as intended following discharge.  Pulmonary congestion noted on 12/24/2022, diuresed well with 40 mg IV Lasix.  We will continue with couple IV doses of Lasix, plan to transition to PTA Bumex.  -40 mg IV Lasix 2 times daily x3 doses   - Continue PTA 1mg bumex started 12/26/2022  - Hold metoprolol for now  -Strict I's and O's      Hypokalemia  Potassium down to 2.9, likely in setting of diuresis.    -Replacement protocol    JOSE, improving   Creatinine of 1.49, BUN of 33.4..  Improving.  Continue to monitor closely with diuresis as above.  - BMP in the am      Cognitive decline  Likely dementia  Patient with increased behaviors, forgetfulness, confusion over the past year. At last admission, it was communicated to son Shiv that this is likely dementia/ Recommended to son that they start talking as a family about goals of care moving forward given Chris doesn't like being hospitalized.        Chronic hyponatremia, improving  Sodium of 129 on admission, down from 132 5 days prior on discharge. Asymptomatic. Work up on prior admissions negative for adrenal insufficiency. Nephrology previously consulted and recommended salt tabs, but unclear if patient was taking appropriately.  Now improving.   - Resume salt tabs NaCl 1mg BID   -BMP in a.m.  -Encourage oral intake                   Chronic Macrocytic Anemia - Stable  History of Upper GI Bleed   Hemoglobin 8.0 on admission, appears improved since last admission. Per discussion with patient no history to support recurrent GI bleed. Most recent EGD in October showed a duodenal nonbleeding ulcer, biopsy consistent with edema and acute duodenitis, negative for dysplasia and malignancy. We will continue to  monitor hemoglobin.  Patient has an upcoming GI appointment in 01/24/23 for EGD.   -CBC in a.m.     Hypothyroidism   Noted in chart. PTA medications include levothyroxine.   - Continue PTA levothyroxine      Hypertension  Blood pressure well controlled.  We will hold amiloride for now.  Also holding metoprolol.    Left Inguinal hernia   Noted on CT, now contains unobstructed short loop of small bowel.        Diet: Combination Diet Regular Diet; Liquidized/Moderately Thick (level 3) (with medications)    DVT Prophylaxis: Enoxaparin (Lovenox) SQ  Javed Catheter: Not present  Fluids: None  Central Lines: None  Cardiac Monitoring: None  Code Status: No CPR- Do NOT Intubate      Disposition Plan     Expected Discharge Date: 12/26/2022                The patient's care was discussed with Dr. Paolo Lay MD  Hospitalist Service  [facility]  Securely message with the Vocera Web Console (learn more here)  Text page via [employer] Paging/Directory         Clinically Significant Risk Factors        # Hypokalemia: Lowest K = 2.9 mmol/L in last 2 days, will replace as needed  # Hyponatremia: Lowest Na = 129 mmol/L in last 2 days, will monitor as appropriate      # Hypoalbuminemia: Lowest albumin = 2.2 g/dL at 12/24/2022  7:25 AM, will monitor as appropriate                   ______________________________________________________________________    Interval History   Rep response was called overnight due to increased work of breathing.  Placed on BiPAP with improvement of symptoms.     Currently feeling okay.  Please concern today is her food not being soft enough to eat.  Requesting very hot water to place in her food to soften it up.  Also requesting hot water to be provided when taking medications.    Continues to have intermittent chest pressure.  This is associated with taking her medications.    She denies any fever or chills.  No difficulty breathing.    Data reviewed today: I  reviewed all medications, new labs and imaging results over the last 24 hours.    Physical Exam   Vital Signs: Temp: 98.5  F (36.9  C) Temp src: Axillary BP: 105/56 Pulse: 96   Resp: 20 SpO2: 93 % O2 Device: None (Room air)    Weight: 94 lbs 8 oz  General Appearance: Chronically ill.  No acute distress.  Respiratory: Comfortable respiratory effort.  Able to speak in very long sentences without dyspnea.  Bibasilar coarse breath sounds.  No wheezing.  Cardiovascular: Regular rate and rhythm.  Soft systolic murmur  GI: Nondistended.  Bowel sounds present.  Soft and nontender.  Skin: Warm and well perfused  Extremities: Warm and well perfused.  Mild edema in her lower extremities bilaterally    Data   Recent Labs   Lab 12/25/22  0712 12/24/22  2313 12/24/22  0725   WBC 20.8* 19.8* 28.7*   HGB 7.1* 7.4* 7.2*   * 111* 113*    202 204   * 134* 135*   POTASSIUM 2.9*  2.9* 3.2* 3.4   CHLORIDE 96* 98 99   CO2 23 22 21*   BUN 18.6 21.6 25.7*   CR 1.00* 0.99* 1.03*   ANIONGAP 14 14 15   MANE 7.3* 7.5* 7.1*   GLC 99 103* 95   ALBUMIN  --   --  2.2*   PROTTOTAL  --   --  7.6   BILITOTAL  --   --  0.3   ALKPHOS  --   --  91   ALT  --   --  6*   AST  --   --  16

## 2022-12-25 NOTE — SIGNIFICANT EVENT
Significant Event Note    Time of event: 11:48 PM December 24, 2022    Description of event:  Rapid response called on patient in setting of new onset chest pain and shortness of breath.  Briefly patient is admitted for new onset leukocytosis of unknown origin--C. difficile negative, on vancomycin and Zosyn.  On arrival to room patient was anxious appearing and having increased work of breathing.  She was also tachycardic.  With an  patient continuously kept saying she is having chest pain in the middle of her chest and trouble breathing.  However she then later said her pain is all over including her legs, which is not new for her.  Patient has a history of frequent hospitalizations for heart failure exacerbations.  On chart review it appears patient's PTA Bumex was not started on admission.  Significant crackles in bases.  Bedside chest x-ray confirms suspicion for pulmonary congestion.  Her BNP was actually improved from admission, at 14,000 which is down from 21,000.  Patient was placed on BiPAP given increased work of breathing and pulmonary congestion.  He tolerated placement well.  When I went to check on her 30 minutes later she was resting comfortably with mask in place.  Given new findings of pulmonary congestion we will give her a one-time dose of 40 mg of Lasix.  Troponin down from admission and EKG unremarkable making ACS unlikely.  Given what appears to be acute onset of chest pain and tachycardia will get D-dimer for further assessment of possible PE.    Patient also noted on the last admission to have some swallowing difficulties.  I adjusted diet to thickened liquids with medications.  Appears solids were fine.  Chest x-ray not overly concerning for aspiration pneumonia and patient is already receiving vancomycin and Zosyn.    Plan:  -D-dimer pending  -Strict I's and O's  -Thickened liquids  -Start prophylaxis Lovenox -hemoglobin stable    Discussed with: bedside nurse and Dr. Covarrubias who  responded to the rapid and updated attending Dr. Boone via text    Wilma Edwards MD      2:39 AM  Patient's D-dimer returned elevated to 4.18.  In setting of tachycardia, chest pain and increased respirations we will get a CT PE.  However I did go look at patient and she was much more comfortable now.  We will take her off the BiPAP mask as her gases looked fine and she was satting well.  Question some underlying anxiety in addition to her pulmonary congestion.  Patient had roughly 1000 mL out with a one-time dose of 40 mg of Lasix.  Blood pressure tolerated well.  We will order another 40 mg Lasix for the morning.  Potassium low at 3.2 we will add replacement protocol given ongoing diuresis.  Patient's home diuretic is 1 mg of Bumex (just discharged on this dose) however it appears she was maybe not getting this increased dose at her group home.  According to pharmacy med rec.  -CT PE  -start with 40 mg Lasix daily  -Day team to determine further diuresis  -RN potassium replacement

## 2022-12-25 NOTE — PROGRESS NOTES
Patient placed on BIPAP due to increase work of breathing on bellow settings:     12/24/22 2347   CPAP/BiPAP/Settings   IPAP/EPAP (cmH2O) 10/5   Rate (breaths/min) 14   Oxygen (%) 30

## 2022-12-25 NOTE — PLAN OF CARE
Goal Outcome Evaluation:         Writer placed a call to Shiv Her, pt's son for update, awaiting call back. AM RN was informed and will follow up.

## 2022-12-26 NOTE — PROGRESS NOTES
Care Management Follow Up    Length of Stay (days): 3    Expected Discharge Date: 12/27/2022     Concerns to be Addressed:     Medical progresion  Patient plan of care discussed at interdisciplinary rounds: Yes    Anticipated Discharge Disposition:  Back to group home     Anticipated Discharge Services:    Anticipated Discharge DME:      Patient/family educated on Medicare website which has current facility and service quality ratings:    Education Provided on the Discharge Plan:    Patient/Family in Agreement with the Plan:      Referrals Placed by CM/SW:    Private pay costs discussed: Not applicable    Additional Information:  Chart review:  Pt lives at a customized living home. Adult foster care home. Contact at the foster care is [employer] [facility] [phone]. She is total care at baseline. Foster Care staff are her PCAs. Uses walker and WC for mobility. Pt would like to return to foster home. Pt will need  Health transport at discharge.     BC pending      Juanis Nazario RN

## 2022-12-26 NOTE — PHARMACY-VANCOMYCIN DOSING SERVICE
Pharmacy Vancomycin Note  Date of Service 2022  Patient's  1935   87 year old, female    Indication: Sepsis  Day of Therapy: 3   Current vancomycin regimen:  750 mg IV q24h  Current vancomycin monitoring method: AUC  Current vancomycin therapeutic monitoring goal: 400-600 mg*h/L    Insight[employer] Prediction of Current Vancomycin Regimen    Regimen: 750 mg IV every 24 hours.  Start time: 10:27 on 2022  Exposure target: AUC24 (range)400-600 mg/L.hr   AUC24,ss: 538 mg/L.hr  Probability of AUC24 > 400: 82 %  Ctrough,ss: 17.3 mg/L  Probability of Ctrough,ss > 20: 35 %  Probability of nephrotoxicity (Lodise HIPOLITO ): 13 %    Current estimated CrCl = Estimated Creatinine Clearance: 18.5 mL/min (A) (based on SCr of 1.45 mg/dL (H)).    Creatinine for last 3 days  2022:  7:25 AM Creatinine 1.03 mg/dL; 11:13 PM Creatinine 0.99 mg/dL  2022:  7:12 AM Creatinine 1.00 mg/dL  2022: 11:13 AM Creatinine 1.45 mg/dL    Recent Vancomycin Levels (past 3 days)  2022: 11:13 AM Vancomycin 21.2 ug/mL    Vancomycin IV Administrations (past 72 hours)                   vancomycin (VANCOCIN) 750 mg in 0.9% NaCl 250 mL intermittent infusion (mg) 750 mg Given 22 0111     750 mg Given 22 2254    vancomycin (VANCOCIN) 1000 mg in dextrose 5% 200 mL PREMIX (mg) 1,000 mg New Bag 22 2312                Nephrotoxins and other renal medications (From now, onward)    Start     Dose/Rate Route Frequency Ordered Stop    22 2300  vancomycin (VANCOCIN) 750 mg in 0.9% NaCl 250 mL intermittent infusion         750 mg  over  Minutes Intravenous EVERY 24 HOURS 22 0352      22 0900  bumetanide (BUMEX) tablet 1 mg        Note to Pharmacy: PTA Sig:Take 1 tablet (1 mg) by mouth daily  Patient taking differently: Take 0.5 mg by mouth daily      1 mg Oral DAILY 22 0852      22 1236  piperacillin-tazobactam (ZOSYN) 3.375 g vial to attach to  mL bag        Note to  Pharmacy: Extended infusion dosing to start 6 hours after initial infusion.   [facility] for [facility] Report.    3.375 g  over 240 Minutes Intravenous EVERY 8 HOURS 12/24/22 0926               Contrast Orders - past 72 hours (72h ago, onward)    Start     Dose/Rate Route Frequency Stop    12/24/22 1430  iopamidol (ISOVUE-370) solution 75 mL         75 mL Intravenous ONCE 12/24/22 1420          Interpretation of levels and current regimen:  Vancomycin level is reflective of AUC greater than 600    Has serum creatinine changed greater than 50% in last 72 hours: No    Urine output:  good urine output    Renal Function: Worsening    InsightRX Prediction of Planned New Vancomycin Regimen  Loading dose: N/A  Regimen: 500 mg IV every 24 hours.  Start time: 16:33 on 12/26/2022  Exposure target: AUC24 (range)400-600 mg/L.hr   AUC24,ss: 541 mg/L.hr  Probability of AUC24 > 400: 94 %  Ctrough,ss: 18.6 mg/L  Probability of Ctrough,ss > 20: 38 %  Probability of nephrotoxicity (Lodise HIPOLITO 2009): 15 %    Plan:  1. Decrease Dose to 500 mg IV q 24 hours  2. Vancomycin monitoring method: AUC  3. Vancomycin therapeutic monitoring goal: 400-600 mg*h/L  4. Pharmacy will check vancomycin levels as appropriate in 1-3 Days.  5. Serum creatinine levels will be ordered daily for the first week of therapy and at least twice weekly for subsequent weeks.    SUSAN MCKEON, RPH

## 2022-12-26 NOTE — PLAN OF CARE
Patient is alert and oriented to self and family. Lungs are clear. Patient is on room air-tolerating well. Patient is afebrile. Reports that pain on feet and hands is improving.  Fluids monitor and electrolyte replacement is ongoing.  Monitor.   Problem: Infection  Goal: Absence of Infection Signs and Symptoms  Outcome: Progressing

## 2022-12-26 NOTE — PLAN OF CARE
Problem: Risk for Delirium  Goal: Improved Attention and Thought Clarity  Outcome: Progressing  -Pt A & O X4        Problem: Infection  Goal: Absence of Infection Signs and Symptoms  Outcome: Progressing  -Temp 96.6 axillary this am and 98.3 axillary this afternoon              -Pt c/o pain in bilateral legs, states it has improved since yesterday, declines medications.   -Denies SOB, states cough has improved since yesterday  -Lungs clear

## 2022-12-26 NOTE — PROGRESS NOTES
[facility]    Progress Note - Hospitalist Service       Date of Admission:  12/23/2022    Assessment & Plan   Chris Bell is a 87 year old female admitted on 12/23/2022. Previously admitted to our service from 12/14/22-12/19/22 for HFpEF exacerbation and community acquired pneumonia. She presented today with vague symptoms (ongoing bilateral leg pain, ongoing shortness of breath) but ultimately admitted for leukocytosis with WBC of 42.1, unknown source.      Leukocytosis, improving   Fever   Infectious vs Hematologic Etiology vs Leukemoid Reaction   Leukocytosis noted on admission with WBC level of 43.9. Neutrophil count of 42.1. Procal 5.92. Lactic acid non-elevated. Patient febrile up to 100.7 degrees. Infectious work up has included a urinalysis which was negative for evidence of UTI, viral panel negative for influenza, covid, or RSV.  No obvious evidence of pneumonia on chest x-ray.  CT abdomen without evidence of a source.  C. difficile negative.  Again unclear etiology, leukocytosis continues to improve.   Did have one fever overnight.  Appears well clinically.  Will continue with vancomycin and Zosyn  - Blood culture pending -no growth to date   -CBC in the a.m.  -Continue vancomycin Zosyn for 5 days and transition to oral   - Tylenol PRN for fever          HFpEF (EF 55-60%)   Patient with history of HFpEF with most recent ECHO on 12/15/22 showing EF 55-60%, severe RV, LA, RA dilation, moderate-severe tricuspid regurgitation, mild-mod mitral regurg, non-collapsing IVC.  Recent hospitalization, discharged with Bumex 1 mg daily. Appears the patient may not have received increased dose as intended following discharge.  Pulmonary congestion noted on 12/24/2022, diuresed well with 40 mg IV Lasix x3 .  Restart home Bumex.    - Continue PTA 1mg bumex   - Hold metoprolol for now  -Strict I's and O's        Tachypnea, resolved  Increased work of breathing, resolved  Rapid called on  12/24/2022 increased work of breathing.  Placed on BiPAP for a brief period.  X-ray showing pulmonary congestion consistent with CHF.  Blood gas was unremarkable.  Also with tachycardia, could consider  pulmonary embolism with elevated D-dimer however is remained stable, lower suspicion. Lower concern for pneumonia, but did  present with significant leukocytosis.  Anxiety could also be contributing.  -Diuresis as below  -Continue antibiotics    Hypokalemia  Potassium down to 2.9, likely in setting of diuresis.    -Replacement protocol     JOSE, improving   Creatinine of 1.49, BUN of 33.4..    Initially improving, now again trending back up.  Likely in setting of diuresis.  We will recheck in the morning.  -BMP in a.m.     Cognitive decline  Likely dementia  Patient with increased behaviors, forgetfulness, confusion over the past year. At last admission, it was communicated to son Shiv that this is likely dementia/ Recommended to son that they start talking as a family about goals of care moving forward given Jonesia doesn't like being hospitalized.        Chronic hyponatremia, stable  Sodium of 129 on admission, down from 132 5 days prior on discharge. Asymptomatic. Work up on prior admissions negative for adrenal insufficiency. Nephrology previously consulted and recommended salt tabs, but unclear if patient was taking appropriately.  Now improving.   - Resume salt tabs NaCl 1mg BID   -BMP in a.m.  -Encourage oral intake                   Chronic Macrocytic Anemia - Stable  History of Upper GI Bleed   Hemoglobin 8.0 on admission, appears improved since last admission.  No recent bleed.  Most recent EGD in October showed a duodenal nonbleeding ulcer, biopsy consistent with edema and acute duodenitis, negative for dysplasia and malignancy. We will continue to monitor hemoglobin, slowly downtrending.  No source of bleed.  Patient has an upcoming GI appointment in 01/24/23 for EGD.   -CBC in a.m.     Hypothyroidism    Noted in chart. PTA medications include levothyroxine.   - Continue PTA levothyroxine      Hypertension  Blood pressure well controlled.  We will hold amiloride for now.  Also holding metoprolol.     Left Inguinal hernia   Noted on CT, now contains unobstructed short loop of small bowel.      Diet: Combination Diet Regular Diet; Liquidized/Moderately Thick (level 3) (with medications)  Room Service    DVT Prophylaxis: Heparin subcu  Javed Catheter: Not present  Fluids: Oral  Central Lines: None  Cardiac Monitoring: None  Code Status: No CPR- Do NOT Intubate      Disposition Plan     Expected Discharge Date: 12/26/2022                The patient's care was discussed with Dr Hugo Lay MD  Hospitalist Service  [facility]  Securely message with the Vocera Web Console (learn more here)  Text page via [employer] Paging/Directory         Clinically Significant Risk Factors        # Hypokalemia: Lowest K = 2.9 mmol/L in last 2 days, will replace as needed  # Hyponatremia: Lowest Na = 133 mmol/L in last 2 days, will monitor as appropriate      # Hypoalbuminemia: Lowest albumin = 2.2 g/dL at 12/24/2022  7:25 AM, will monitor as appropriate                   ______________________________________________________________________    Interval History   Patient sleeping but arousable.  Limited HPI.  Was complaining of pain in her lower extremities this morning.  Per nurse, was able to eat breakfast.    Did have a fever overnight, improved.  Temperature little bit low this morning, was an axillary temperature.    Data reviewed today: I reviewed all medications, new labs and imaging results over the last 24 hours.     Physical Exam   Vital Signs: Temp: (!) 96.6  F (35.9  C) (nurse aware) Temp src: Axillary BP: 101/65 Pulse: 106   Resp: 20 SpO2: 95 % O2 Device: None (Room air) Oxygen Delivery: 1 LPM  Weight: 94 lbs 8 oz  General Appearance: Chronically ill.  Resting comfortably in  bed.  Respiratory: Comfortable respiratory effort.  Good air movement bilaterally.  No appreciable wheezing or crackles.  Cardiovascular: Regular rate and rhythm.    GI: Nondistended.  Soft.  Nontender.  Skin: Warm and well perfused.  Minimal lower extremity edema.      Data   Recent Labs   Lab 12/26/22  1113 12/25/22  2316 12/25/22  1443 12/25/22  0712 12/24/22  2313 12/24/22  0725   WBC 11.9*  --   --  20.8* 19.8* 28.7*   HGB 7.0*  --   --  7.1* 7.4* 7.2*   *  --   --  113* 111* 113*     --   --  208 202 204     --   --  133* 134* 135*   POTASSIUM 3.2* 3.5 3.3* 2.9*  2.9* 3.2* 3.4   CHLORIDE 98  --   --  96* 98 99   CO2 26  --   --  23 22 21*   BUN 21.0  --   --  18.6 21.6 25.7*   CR 1.45*  --   --  1.00* 0.99* 1.03*   ANIONGAP 12  --   --  14 14 15   MANE 7.1*  --   --  7.3* 7.5* 7.1*   GLC 97  --   --  99 103* 95   ALBUMIN  --   --   --   --   --  2.2*   PROTTOTAL  --   --   --   --   --  7.6   BILITOTAL  --   --   --   --   --  0.3   ALKPHOS  --   --   --   --   --  91   ALT  --   --   --   --   --  6*   AST  --   --   --   --   --  16

## 2022-12-26 NOTE — PROGRESS NOTES
KEY called IR regarding CT chest P E run and was told that they are busy and will call for patient when ready.

## 2022-12-26 NOTE — PLAN OF CARE
Problem: Infection  Goal: Absence of Infection Signs and Symptoms  Outcome: Progressing   Goal Outcome Evaluation:      Plan of Care Reviewed With: patient, family    Patient oriented to self. Tachycardic. Intermittent fever. Lost 4 IV lines due to veins blowing, infiltration or occlusion. Triggered sepsis. Lactic 1.3.    IV in right hand is patent. Vanco infused. Needs a 16 g. IV access for CT w. Contrast but SWOT unable to place even with ultrasound. Can we consider getting a PICC line?    Patient is complaining of pain in legs. No PRN on file currently.

## 2022-12-27 NOTE — PROVIDER NOTIFICATION
Notified House officer that pt is having tachycardia 120-130's, abdominal pain and SOB with increased FIO2 needs on 2 liter with simple face mask to keep sats greater than 90%. EKG ordered. Sepsis BPA fired, lactic 1.8

## 2022-12-27 NOTE — SIGNIFICANT EVENT
Significant Event Note    Time of event:   10:02 PM    S: House staff was called by the patient's nurse due to tachycardia and shortness of breath. Briefly, the patient was admitted for leukocytosis of unknown source (initially WBC 44, now improving) on vanc/zosyn.     I went to evaluate the patient. She reports increased shortness of breath for about the past hour. Reports substernal chest pain that has been ongoing for the past few days and may or may not be worse now than previously. She currently reports no light-headedness/dizziness, abdominal pain, blood in stool.    Exam: /75 (BP Location: Left arm, Patient Position: Semi-Mccullough's, Cuff Size: Adult Regular)   Pulse (!) 130   Temp 98.1  F (36.7  C)   Resp (!) 32   Ht 1.524 m (5')   Wt 42.9 kg (94 lb 8 oz)   SpO2 100%   BMI 18.46 kg/m    GEN: in no acute distress, mildly short of breath.  HEENT: Normocephalic, atraumatic. Extraoccular eye movements intact. Anicteric sclera. Moist mucous membranes.   PULM: Mild shortness of breath. Speaking comfortably. On 2L oxymask. No use of accessory muscles. Fine bibasilar crackles. Lungs otherwise CTAB.   CV: Regular rate and rhythm. Normal S1, S2. No rubs, murmurs, or gallops. Elevated JVD.    ABDOMEN: Normoactive bowel sounds. Non-tender to palpation. Non-distended.    EXTREMITES: Mild pitting edema in ankles bilaterally. No clubbing, cyanosis.  NEURO:  Awake. Oriented to person. Moving all extremities.    PSYCH: Mildly agitated. Appropriate affect, insight, judgment.     A/P:   With new tachycardia and shortness of breath, concern for PE; will hold off on CT PE for now pending further work-up given JOSE. Anemia also possible etiology of tachycardia and shortness of breath; hemoglobin was 7.0 this AM; will recheck CBC now. Given history of HFpEF (s/p IV diuresis a few days ago) as well as bibasilar crackles and elevated JVD on exam, will treat with 1 dose of IV lasix and assess for clinical improvement. Will  also assess for ACS given CP with EKG and troponin.   - EKG with sinus tachycardia (vent rate 129)  - CXR  - CBC  - Troponin  - Lasix 40 mg IV once     Sue Lainez MD, PGY-1  [facility]   Pager #: [phone]    10:51 PM  Appears tachycardia has improved, still slightly tachypneic.  CXR back-- suspicion for edema vs. infiltrate with new pleural fluid collection. She is on vanc/zosyn. Will continue with diureses under the presumption for heart failure exacerbation.  CBC- Hgb stable, denies bleeding sxs  BMP- stable, no new findings-- worsening kidney function  Trop- stable from two days ago, will obtain repeat in two hours    Dr. Ring

## 2022-12-27 NOTE — PLAN OF CARE
Problem: Plan of Care - These are the overarching goals to be used throughout the patient stay.    Goal: Optimal Comfort and Wellbeing  Intervention: Monitor Pain and Promote Comfort  Recent Flowsheet Documentation  Taken 12/27/2022 0029 by Zamzam De Luna RN  Pain Management Interventions:   declines   emotional support   Goal Outcome Evaluation:       Patient requested and received prn Tylenol 650 mg around midnight. Staff assisted with turning and reposition. Reported moderate pain with movement per . Patient continues on IV Abx. For leukocytosis of unknown resources. No adverse reaction noted. Afebrile. Patient's Troponin high sensitivity 36 and 34. House officer informed via vocera and a waiting for new order. Patient slept well with occasional a wake for meds. Denies SOB. Continues on Oxygen via mask. SPO2 remains above 94%.

## 2022-12-27 NOTE — PROGRESS NOTES
[facility]    Progress Note - Hospitalist Service       Date of Admission:  12/23/2022    Assessment & Plan   Chris Bell is a 87 year old female admitted on 12/23/2022. Previously admitted to our service from 12/14/22-12/19/22 for HFpEF exacerbation and community acquired pneumonia. She presented today with vague symptoms (ongoing bilateral leg pain, ongoing shortness of breath) but ultimately admitted for leukocytosis with WBC of 42.1, unknown source.      Leukocytosis, improving   Infectious vs Leukemoid Reaction   Leukocytosis noted on admission with WBC level of 43.9. Neutrophil count of 42.1. Procal 5.92. Lactic acid non-elevated. Patient febrile up to 100.7 degrees. Infectious work up has included a urinalysis which was negative for evidence of UTI, viral panel negative for influenza, covid, or RSV.  No obvious evidence of pneumonia on chest x-ray.  CT abdomen without evidence of a source.  C. difficile negative.  Again unclear etiology, leukocytosis continues to improve.   - Blood culture pending -no growth to date  -Continue Zosyn   -Vancomycin discontinued   -CBC in the a.m.  - Tylenol PRN for fever        HFpEF (EF 55-60%)   Acute heart failure exacerbation  Patient with history of HFpEF with most recent ECHO on 12/15/22 showing EF 55-60%, severe RV, LA, RA dilation, moderate-severe tricuspid regurgitation, mild-mod mitral regurg, non-collapsing IVC.  Recent hospitalization, discharged with Bumex 1 mg daily. Appears the patient may not have received increased dose as intended following discharge.  Pulmonary congestion noted on 12/24/2022, diuresed well with 40 mg IV Lasix x3 .  Second episode of increased work of breathing on 12/26.  Chest x-ray showing worsening pulmonary edema with new pleural effusion.  Will be little more aggressive with diuresis today.  -Lasix 40 mg IV x2  -Bumex 1 mg daily  - Hold metoprolol for now  -Strict I's and O's  - Fluid restriction        Acute on  chronic macrocytic anemia  History of Upper GI Bleed   Hemoglobin 8.0 on admission, appears improved since last admission.   Slowly downtrending, most recently 6.7 this morning.  No evidence of bleeding source.  Could consider upper GI bleed with most recent EGD in October showed a duodenal nonbleeding ulcer, biopsy consistent with edema and acute duodenitis, negative for dysplasia and malignancy.  No melena/bloody stool since admission.  We will transfuse 1 unit PRBCs and recheck.  Patient and family agreeable with this plan.  Consider GI consult if hemoglobin does not stabilize.  -1 unit PRBCs   -Repeat hemoglobin 1 hour after transfusion  -Hold heparin subq for VT prophylaxis  -Transfuse for hemoglobin less than 7  -Continue pantoprazole  -CBC in the a.m.     Acute hypoxic respiratory failure  Likely in setting of acute heart failure exacerbation as above.  Also considered pneumonia vs PE, but lower suspicion.  Possibly component of symptomatic anemia.  Cardiac work-up including troponin and EKG again negative.  Remains hemodynamically stable.  -Diuresis as above  -Continue Zosyn  -Supplemental oxygen as needed  -Transfuse RBCs as above       Hypokalemia  Likely in setting of diuresis.  We will continue to monitor closely as we continue to diurese.  -Replacement protocol     Acute kidney injury  Creatinine of 1.49, BUN of 33.4 on admission.    Initially improving, now again trending back up.  Possibly cardiorenal vs diuretic induced.  Will monitor as we continue to diurese.  -BMP in a.m.       Cognitive decline  Likely dementia  Patient with increased behaviors, forgetfulness, confusion over the past year. At last admission, it was communicated to tyrel Chaney that this is likely dementia/cognitive decline.  Recommended to son that they start talking as a family about goals of care moving forward given Chris doesn't like being hospitalized.  Son currently wishing to allow Chris to make her own medical decisions as  she appears to have capacity to do so.  We will continue to discuss goals of care.        Chronic hyponatremia, stable  Sodium of 129 on admission, down from 132 5 days prior on discharge. Asymptomatic. Work up on prior admissions negative for adrenal insufficiency. Nephrology previously consulted and recommended salt tabs.  Salt tabs could be contributing to heart failure exacerbation.   -Resume salt tabs NaCl 1mg BID   -BMP in a.m.  -Encourage oral intake                   Hypothyroidism   Noted in chart. PTA medications include levothyroxine.   - Continue PTA levothyroxine      Hypertension  Blood pressure well controlled.  We will hold amiloride for now.  Also holding metoprolol.     Left Inguinal hernia   Noted on CT, now contains unobstructed short loop of small bowel.         Diet: Room Service  Combination Diet Regular Diet; Thin Liquids (level 0) (with medications)  Fluid restriction 1200 ML FLUID    DVT Prophylaxis: SCD  Javed Catheter: Not present  Fluids: Oral   Central Lines: None  Cardiac Monitoring: None  Code Status: No CPR- Do NOT Intubate      Disposition Plan      Expected Discharge Date: 12/28/2022        Discharge Comments: BC pending  12/27: HGB 6.7 ; K 3.0 getting k bumps x 4        The patient's care was discussed with Dr Hugo Lay MD  Hospitalist Service  [facility]  Securely message with the [employer] Web Console (learn more here)  Text page via [employer] Paging/Directory         Clinically Significant Risk Factors        # Hypokalemia: Lowest K = 3 mmol/L in last 2 days, will replace as needed       # Hypoalbuminemia: Lowest albumin = 2.2 g/dL at 12/24/2022  7:25 AM, will monitor as appropriate                   ______________________________________________________________________    Interval History   Another episode of increased work of breathing overnight.  Also some tachycardia.  Chest x-ray did show worsening pulmonary edema.  Received dose of  IV Lasix.  Placed on supplemental oxygen    Currently feels okay.  Denies any difficulty breathing shortness of breath.  No chest pain.  Does have some pain in her low back that is chronic.  Denies any fever or chills.  No bloody stool.      Data reviewed today: I reviewed all medications, new labs and imaging results over the last 24 hours.      Physical Exam   Vital Signs: Temp: 97.5  F (36.4  C) Temp src: Axillary BP: 102/61 Pulse: 113   Resp: 18 SpO2: 94 % O2 Device: Nasal cannula Oxygen Delivery: 2 LPM  Weight: 94 lbs 8 oz  General Appearance: Chronically ill.  No acute distress.  Respiratory: Comfortable respiratory effort.  Normal respiratory rate.  Able to speak in full sentences.  Bibasilar crackles.  No wheezing.  Cardiovascular: Regular rate and rhythm.  Soft systolic murmur  GI: Pulses present.  Soft.  Nondistended.  Skin: Warm and well perfused.  Extremities -minimal lower extremity edema.    Data   Recent Labs   Lab 12/27/22  0702 12/26/22  2218 12/26/22  1817 12/26/22  1113 12/24/22  2313 12/24/22  0725   WBC 13.2* 13.4*  --  11.9*   < > 28.7*   HGB 6.7* 7.3*  --  7.0*   < > 7.2*   * 113*  --  113*   < > 113*    215  --  203   < > 204    136  --  136   < > 135*   POTASSIUM 3.0* 3.6 3.8 3.2*   < > 3.4   CHLORIDE 97* 98  --  98   < > 99   CO2 26 25  --  26   < > 21*   BUN 20.6 21.5  --  21.0   < > 25.7*   CR 1.50* 1.51*  --  1.45*   < > 1.03*   ANIONGAP 14 13  --  12   < > 15   MANE 7.1* 7.2*  --  7.1*   < > 7.1*   * 117*  --  97   < > 95   ALBUMIN  --   --   --   --   --  2.2*   PROTTOTAL  --   --   --   --   --  7.6   BILITOTAL  --   --   --   --   --  0.3   ALKPHOS  --   --   --   --   --  91   ALT  --   --   --   --   --  6*   AST  --   --   --   --   --  16    < > = values in this interval not displayed.

## 2022-12-27 NOTE — PLAN OF CARE
Goal Outcome Evaluation:  Pt c/o discomfort to  when turning pt to side and encouraging repositioning. Pt keep turning to back. Pt is very anxious about her care and being here. Pt became tachycardic around 1930 when sepsis BPA fired. Lactic was 1.8. Pts HR was found to be 120-130's and pt SOB. MD was notified. EKD done, as well as chest xray and labs drawn. Cont to monitor and assist. Encourage relaxation with  assist.        Problem: Plan of Care - These are the overarching goals to be used throughout the patient stay.    Goal: Optimal Comfort and Wellbeing  Intervention: Monitor Pain and Promote Comfort  Recent Flowsheet Documentation  Taken 12/26/2022 2101 by Zelda Mayo, RN  Pain Management Interventions:   declines   emotional support  Taken 12/26/2022 1652 by Zelda Mayo, RN  Pain Management Interventions:   distraction   emotional support   declines              Problem: Plan of Care - These are the overarching goals to be used throughout the patient stay.    Goal: Absence of Hospital-Acquired Illness or Injury  Intervention: Prevent Skin Injury  Recent Flowsheet Documentation  Taken 12/26/2022 2220 by Zelda Mayo, RN  Body Position:   right   turned   legs elevated   heels elevated  Taken 12/26/2022 1652 by Zelda Mayo, RN  Body Position:   left   turned   position changed independently

## 2022-12-27 NOTE — PLAN OF CARE
"Goal Outcome Evaluation:                        Problem: Gas Exchange Impaired  Goal: Optimal Gas Exchange  Outcome: Progressing   Patient with coarse lung sounds and sob this morning.  O2 at 2L oxymask with sats in low to mid 90\"s.  Diuretics given per MD order.        Problem: Anemia  Goal: Anemia Symptom Improvement  Outcome: Progressing  Intervention: Monitor and Manage Anemia  Recent Flowsheet Documentation  Taken 12/27/2022 0900 by Janet Valdez, RN  Safety Promotion/Fall Prevention: bed alarm on   Blood transfusion started after PICC line placed.  Will continue to monitor.    "

## 2022-12-27 NOTE — PROCEDURES
"PICC Line Insertion Procedure Note  Pt. Name: Chris Bell  MRN:        [id number]    Procedure: Insertion of a  dual Lumen  5 fr  Bard SOLO (valved) Power PICC, Lot number [id number]    Indications: Vascular Access    Contraindications : none    Procedure Details     Patient identified with 2 identifiers and \"Time Out\" conducted.  .     Central line insertion bundle followed: hand hygiene performed prior to procedure, site cleansed with cholraprep, hat, mask, sterile gloves, sterile gown worn, patient draped with maximum barrier head to toe drape, sterile field maintained.    The vein was assessed and found to be compressible and of adequate size.     Lidocaine 1% 2 ml administered sq to the insertion site. A 5 Fr PICC was inserted into the basilic vein of the right arm with ultrasound guidance. 1 attempt(s) required to access vein.   Catheter threaded without difficulty. Good blood return noted.    Modified Seldinger Technique used for insertion.    The 8 sharps that are included in the PICC insertion kit were accounted for and disposed of in the sharps container prior to breakdown of the sterile field.    Catheter secured with Statlock, biopatch and Tegaderm dressing applied.    Findings:    Total catheter length  37 cm, with 0 cm exposed. Mid upper arm circumference is 23 cm. Catheter was flushed with 30 cc NS. Patient  tolerated procedure well.    Tip placement verified by 3CG technology . Tip placement in the SVC/RA junction.    CLABSI prevention brochure left at bedside.    Patient's primary RN notified PICC is ready for use.      Comments:          [facility] [place]        "

## 2022-12-27 NOTE — PROGRESS NOTES
Care Management Follow Up    Length of Stay (days): 4    Expected Discharge Date: 12/28/2022     Concerns to be Addressed:     IV Lasix  Patient plan of care discussed at interdisciplinary rounds: Yes    Anticipated Discharge Disposition:       Anticipated Discharge Services:    Anticipated Discharge DME:      Patient/family educated on Medicare website which has current facility and service quality ratings:    Education Provided on the Discharge Plan:    Patient/Family in Agreement with the Plan:      Referrals Placed by CM/SW:    Private pay costs discussed: Not applicable    Additional Information:  Chart review:  Pt lives at a customized living home. Adult foster care home. Contact at the foster care is [employer] [phone]. She is total care at baseline. Foster Care staff are her PCAs. Uses walker and WC for mobility. Pt would like to return to foster home. Pt will need  Health transport at discharge.     Getting IV Lasix      Juanis Nazario RN

## 2022-12-28 NOTE — PLAN OF CARE
Goal Outcome Evaluation: Pt is a/o x 4 with intermittent confusion. Pt is reposition from bed to room chair. C/o feeling SOB. 02 was at 96-99 on 2 L. Encourage taken deep breath. Seems to be better at this time, will continued to monitor.           Problem: Plan of Care - These are the overarching goals to be used throughout the patient stay.    Goal: Absence of Hospital-Acquired Illness or Injury  Outcome: Progressing  Intervention: Identify and Manage Fall Risk  Recent Flowsheet Documentation  Taken 12/28/2022 1550 by Fatou Islas RN  Safety Promotion/Fall Prevention: bed alarm on  Intervention: Prevent Skin Injury  Recent Flowsheet Documentation  Taken 12/28/2022 1550 by Fatou Islas RN  Body Position:    supine, legs elevated    supine, head elevated     Problem: Plan of Care - These are the overarching goals to be used throughout the patient stay.    Goal: Absence of Hospital-Acquired Illness or Injury  Intervention: Identify and Manage Fall Risk  Recent Flowsheet Documentation  Taken 12/28/2022 1550 by Fatou Islas RN  Safety Promotion/Fall Prevention: bed alarm on     Problem: Plan of Care - These are the overarching goals to be used throughout the patient stay.    Goal: Absence of Hospital-Acquired Illness or Injury  Intervention: Prevent Skin Injury  Recent Flowsheet Documentation  Taken 12/28/2022 1550 by Fatou Islas RN  Body Position:    supine, legs elevated    supine, head elevated     Problem: Plan of Care - These are the overarching goals to be used throughout the patient stay.    Goal: Readiness for Transition of Care  Outcome: Progressing     Problem: Plan of Care - These are the overarching goals to be used throughout the patient stay.    Goal: Plan of Care Review  Description: The Plan of Care Review/Shift note should be completed every shift.  The Outcome Evaluation is a brief statement about your assessment that the patient is improving, declining, or no change.  This  information will be displayed automatically on your shift note.  Outcome: Progressing

## 2022-12-28 NOTE — PLAN OF CARE
Problem: Plan of Care - These are the overarching goals to be used throughout the patient stay.    Goal: Absence of Hospital-Acquired Illness or Injury  Intervention: Identify and Manage Fall Risk  Recent Flowsheet Documentation  Taken 12/28/2022 0859 by Jayne Coker RN  Safety Promotion/Fall Prevention: bed alarm on     Problem: Plan of Care - These are the overarching goals to be used throughout the patient stay.    Goal: Readiness for Transition of Care  Outcome: Progressing     Problem: Risk for Delirium  Goal: Optimal Coping  Outcome: Progressing     Problem: Heart Failure Comorbidity  Goal: Maintenance of Heart Failure Symptom Control  Outcome: Progressing  Intervention: Maintain Heart Failure Management  Recent Flowsheet Documentation  Taken 12/28/2022 0859 by Jayne Coker RN  Medication Review/Management: medications reviewed     Problem: Gas Exchange Impaired  Goal: Optimal Gas Exchange  Outcome: Progressing     Goal Outcome Evaluation: patient alert and confused, reported back pain, repositioning and tylenol used to pain management, pt on 1 litre 02, had a bowel movement during the shift.Skin impairment on Coccyx , mepilex applied.

## 2022-12-28 NOTE — PROGRESS NOTES
[facility]    Progress Note - Hospitalist Service       Date of Admission:  12/23/2022    Assessment & Plan      Chris Bell is a 87 year old female admitted on 12/23/2022. Previously admitted to our service from 12/14/22-12/19/22 for HFpEF exacerbation and community acquired pneumonia. She presented today with vague symptoms (ongoing bilateral leg pain, ongoing shortness of breath) but ultimately admitted for leukocytosis with WBC of 42.1, unknown source.      Leukocytosis, improving   Infectious vs Leukemoid Reaction   Leukocytosis noted on admission with WBC level of 43.9. Neutrophil count of 42.1. Procal 5.92. Lactic acid non-elevated. Patient febrile up to 100.7 degrees. Infectious work up has included a urinalysis which was negative for evidence of UTI, viral panel negative for influenza, covid, or RSV.  No obvious evidence of pneumonia on chest x-ray.  CT abdomen without evidence of a source.  C. difficile negative.  Again unclear etiology, leukocytosis continues to improve.  Remains afebrile.  - Blood culture pending -no growth to date  -Continue Zosyn              -Vancomycin discontinued   -CBC in the a.m.  - Tylenol PRN for fever       HFpEF (EF 55-60%)   Acute heart failure exacerbation  Patient with history of HFpEF with most recent ECHO on 12/15/22 showing EF 55-60%, severe RV, LA, RA dilation, moderate-severe tricuspid regurgitation, mild-mod mitral regurg, non-collapsing IVC.  Recent hospitalization, discharged with Bumex 1 mg daily. Appears the patient may not have received increased dose as intended following discharge.  Pulmonary congestion noted on 12/24/2022, diuresed well with 40 mg IV Lasix x3 .  Second episode of increased work of breathing on 12/26.  Chest x-ray showing worsening pulmonary edema with new pleural effusion, responded well to IV Lasix.  Stable overnight, no new oxygen requirements.  Continue with PTA dose of diuretics.  Recommend additional dose of IV  Lasix if work of breathing worsens.  -Bumex 1 mg daily  - Hold metoprolol for now  -Strict I's and O's  - Fluid restriction         Acute on chronic macrocytic anemia, improving  History of Upper GI Bleed   Hemoglobin 8.0 on admission, appears improved since last admission.   Slowly downtrending, most recently 6.7 this morning.  No evidence of bleeding source.  Could consider upper GI bleed with most recent EGD in October showed a duodenal nonbleeding ulcer, biopsy consistent with edema and acute duodenitis, negative for dysplasia and malignancy.  No melena/bloody stool since admission.  Status post 1 unit PRBCs on 12/27/2022, improved unclear reason for slightly increasing MCV, relatively normal B12 and folate levels recently.   -Hold heparin subq for VT prophylaxis  -Transfuse for hemoglobin less than 7  -Continue pantoprazole  -CBC in the a.m.  -Blood morphology review pending    -Consider bone biopsy, however may not align with goals of care    History of positive hepatitis C antibody  Found on 6/30/2022.  PCR RNA was negative at that time.  Possible that ongoing viral hepatitis may be contributing to anemia as above.  Recent hepatic panel with low albumin, otherwise normal.  -Hepatitis C RNA    Acute hypoxic respiratory failure, improving  Likely in setting of acute heart failure exacerbation as above.  Also considered pneumonia vs PE, but lower suspicion.  Possibly component of symptomatic anemia.  Cardiac work-up including troponin and EKG again negative.    No new oxygen needs overnight.  -Diuresis as above  -Continue Zosyn  -Supplemental oxygen as needed  -Management of anemia as above      Cognitive decline  Goals of care  Patient with increased behaviors, forgetfulness, confusion over the past year. At last admission, it was communicated to son Shiv that this is likely dementia/cognitive decline.  Recommended to son that they start talking as a family about goals of care moving forward given Chris  doesn't like being hospitalized.  Son currently wishing to allow Chris to make her own medical decisions as she appears to have capacity to do so.  We will continue to discuss goals of care.  -Plan for family conference for continued goals of care discussion    Coccyx pain  Erythematous region of the coccyx noted.  Concern for pressure ulcer with limited mobility.  We will have wound care assess.  Continue to reposition.        Hypokalemia  Likely in setting of diuresis.  We will continue to monitor closely as we continue to diurese.  -Replacement protocol     Acute kidney injury  Creatinine of 1.49, BUN of 33.4 on admission.    Initially improving, now again trending back up.  Possibly cardiorenal vs diuretic induced.  Will monitor as we continue to diurese.  -BMP in a.m.          Chronic hyponatremia, stable  Sodium of 129 on admission, down from 132 5 days prior on discharge. Asymptomatic. Work up on prior admissions negative for adrenal insufficiency. Nephrology previously consulted and recommended salt tabs.  Salt tabs could be contributing to heart failure exacerbation.   -Resume salt tabs NaCl 1mg BID   -BMP in a.m.  -Encourage oral intake                   Hypothyroidism   Noted in chart. PTA medications include levothyroxine.   - Continue PTA levothyroxine      Hypertension  Blood pressure well controlled.  We will hold amiloride for now.  Also holding metoprolol.     Left Inguinal hernia   Noted on CT, now contains unobstructed short loop of small bowel.         Diet: Room Service  Combination Diet Regular Diet; Thin Liquids (level 0) (with medications)  Fluid restriction 1200 ML FLUID    DVT Prophylaxis: Pneumatic Compression Devices  Javed Catheter: Not present  Fluids: Oral  Central Lines: PRESENT  PICC 12/27/22 Double Lumen Right Basilic Vascular access-Site Assessment: WDL  Cardiac Monitoring: None  Code Status: No CPR- Do NOT Intubate      Disposition Plan     Expected Discharge Date: 12/28/2022     Discharge Delays: Lab Result Pending (enter specific test & time in comments)    Discharge Comments: BC pending  12/27: HGB 6.7 ; K 3.0 getting k bumps x 4        The patient's care was discussed with the Dr Hugo Lay MD  Hospitalist Service  [facility]  Securely message with the [employer] Web Console (learn more here)  Text page via [employer] Paging/Directory         Clinically Significant Risk Factors        # Hypokalemia: Lowest K = 3 mmol/L in last 2 days, will replace as needed       # Hypoalbuminemia: Lowest albumin = 2.2 g/dL at 12/24/2022  7:25 AM, will monitor as appropriate                   ______________________________________________________________________    Interval History   History is limited due to cognitive decline.  Does mention some pain in her coccyx    No acute events overnight.  Did not require any increasing oxygen.  Remained hemodynamically stable.  Tolerated blood transfusion    Data reviewed today: I reviewed all medications, new labs and imaging results over the last 24 hours.     Physical Exam   Vital Signs: Temp: 98.2  F (36.8  C) Temp src: Oral BP: 96/63 Pulse: 115   Resp: 18 SpO2: 97 % O2 Device: None (Room air) Oxygen Delivery: 2 LPM  Weight: 94 lbs 8 oz  General Appearance: Chronically ill.  No acute distress.  Respiratory: Breathing comfortably.  Nasal cannula in place.  Continues to have crackles bilaterally.  Moving air well.  Cardiovascular: Regular rate and rhythm.  GI: Nondistended.  Bowel sounds present.  Soft nontender.  Skin: Warm and well perfused.  Extremities no lower extremity edema    Data   Recent Labs   Lab 12/28/22  0703 12/27/22  1810 12/27/22  0702 12/26/22  2218 12/24/22  2313 12/24/22  0725   WBC 12.1* 13.8* 13.2* 13.4*   < > 28.7*   HGB 9.5* 8.8* 6.7* 7.3*   < > 7.2*   * 107* 112* 113*   < > 113*    235 230 215   < > 204   *  --  137 136   < > 135*   POTASSIUM 3.0* 3.7 3.0* 3.6   < > 3.4    CHLORIDE 92*  --  97* 98   < > 99   CO2 27  --  26 25   < > 21*   BUN 21.5  --  20.6 21.5   < > 25.7*   CR 1.46*  --  1.50* 1.51*   < > 1.03*   ANIONGAP 13  --  14 13   < > 15   MANE 7.2*  --  7.1* 7.2*   < > 7.1*   GLC 92  --  117* 117*   < > 95   ALBUMIN  --   --   --   --   --  2.2*   PROTTOTAL  --   --   --   --   --  7.6   BILITOTAL  --   --   --   --   --  0.3   ALKPHOS  --   --   --   --   --  91   ALT  --   --   --   --   --  6*   AST  --   --   --   --   --  16    < > = values in this interval not displayed.

## 2022-12-28 NOTE — PROGRESS NOTES
Care Management Follow Up    Length of Stay (days): 5    Expected Discharge Date: 12/28/2022     Concerns to be Addressed:   Family care conference    Patient plan of care discussed at interdisciplinary rounds: Yes    Anticipated Discharge Disposition: TBD      Anticipated Discharge Services:    Anticipated Discharge DME:      Patient/family educated on Medicare website which has current facility and service quality ratings:    Education Provided on the Discharge Plan:    Patient/Family in Agreement with the Plan:      Referrals Placed by CM/SW:    Private pay costs discussed: Not applicable    Additional Information:  Chart review:  Pt lives at a customized living home. Adult foster care home. Contact at the foster care is [employer] [phone]. She is total care at baseline. Foster Care staff are her PCAs. Uses walker and WC for mobility. Pt would like to return to foster home. Pt will need  Health transport at discharge.      Pending family care conference      Juanis Nazario RN

## 2022-12-28 NOTE — CONSULTS
[facility]  WOC Nurse Inpatient Assessment     Consulted for: coccyx    Patient History (according to provider note(s):      Per H+P:  87 year old female admitted on 12/23/2022. Previously admitted to our service from 12/14/22-12/19/22 for HFpEF exacerbation and community acquired pneumonia. She presented today with vague symptoms (ongoing bilateral leg pain, ongoing shortness of breath) but ultimately admitted for leukocytosis with WBC of 42.1.      Blia has notable cognitive decline and discussion is limited even with [facility] . Family is not present at time of admission and not reachable by phone. Per chart review, it appears goals of care discussions were initiated during last admission.     Areas Assessed:      Areas visualized during today's visit: Perineal area and Sacrum/coccyx    Skin Injury Location: Coccyx    Last photo: 12/28  Skin injury due to: Blanchable erythema due to prominent coccyx  Skin history and plan of care:   Unknown  Affected area:      Skin assessment: Erythema     Measurements (length x width x depth, in cm)2 x 3cm     Color: pink     Temperature  normal   Pain: moderate, aching  Pain interventions prior to dressing change: repositioning  Treatment goal: Protection  STATUS: initial assessment  Supplies ordered: supplies stored on unit         Treatment Plan:     Ordered PEÑA mattress and Mepilex for prevention    Orders: Written    RECOMMEND PRIMARY TEAM ORDER: None, at this time  Education provided: plan of care  Discussed plan of care with: Patient, Nurse and   WOC nurse follow-up plan: weekly  Notify WOC if wound(s) deteriorate.  Nursing to notify the Provider(s) and re-consult the WOC Nurse if new skin concern.    DATA:     Current support surface: Standard  Low air loss (PEÑA pump, Isolibrium, Pulsate, skin guard, etc) ordered  Containment of urine/stool: Purewick external catheter   BMI: Body mass index is 18.46 kg/m .   Active diet order:  Orders Placed This Encounter      Combination Diet Regular Diet; Thin Liquids (level 0) (with medications)     Output: I/O last 3 completed shifts:  In: 1140 [P.O.:540]  Out: 2400 [Urine:2400]     Labs: Recent Labs   Lab 12/28/22  0703 12/24/22  2313 12/24/22  0725   ALBUMIN  --   --  2.2*   HGB 9.5*   < > 7.2*   WBC 12.1*   < > 28.7*    < > = values in this interval not displayed.     Pressure injury risk assessment:   Sensory Perception: 4-->no impairment  Moisture: 3-->occasionally moist  Activity: 1-->bedfast  Mobility: 3-->slightly limited  Nutrition: 2-->probably inadequate  Friction and Shear: 2-->potential problem  Saleem Score: 15    Mari Henry, KATERINEN, RN, PHN, HNB-BC, CWOCN

## 2022-12-28 NOTE — PLAN OF CARE
"Goal Outcome Evaluation: Pt is a/o x 4. Back and neck pain managed with PRN Tylenol and warm pack with relief. Pt refusing to be turn side to side , stating back positioning is the only position that feels better,  use to educate pt on the important of turning/repositioning . After multiple approached, pt allowed to be turn off the back for few hr. Coccyx is reddened , mepilex drs applied. Will continued to encourage pt turning side to side and stay off the back. Order will be request for woc consult.          Problem: Plan of Care - These are the overarching goals to be used throughout the patient stay.    Goal: Absence of Hospital-Acquired Illness or Injury  Intervention: Prevent Skin Injury  Recent Flowsheet Documentation  Taken 12/27/2022 2877 by Fatou Islas RN  Body Position:   supine, legs elevated   supine, head elevated  Taken 12/27/2022 1553 by Fatou Islas RN  Body Position:   supine, legs elevated   supine, head elevated       Problem: Plan of Care - These are the overarching goals to be used throughout the patient stay.    Goal: Patient-Specific Goal (Individualized)  Description: You can add care plan individualizations to a care plan. Examples of Individualization might be:  \"Parent requests to be called daily at 9am for status\", \"I have a hard time hearing out of my right ear\", or \"Do not touch me to wake me up as it startles me\".  12/28/2022 0250 by Fatou Islas RN  Outcome: Progressing  12/27/2022 1805 by Fatou Islas RN  Outcome: Progressing                  "

## 2022-12-28 NOTE — PLAN OF CARE
"Goal Outcome Evaluation:Pt is alert, stated feeling better, however, still c/o SOB . 02 sat between 98-99% on 2L via oxymask.Septic continuously triggering for for the low HGB; pt finished the one unit of blood and HGB will be recheck now with the LA. Called to get an ok order to draw from the PICC. Lab drawn pt blood for Hgb and LA, and L.A was 1.0. Will continued to monitor and address any neccessary issue(s).           Problem: Plan of Care - These are the overarching goals to be used throughout the patient stay.    Goal: Patient-Specific Goal (Individualized)  Description: You can add care plan individualizations to a care plan. Examples of Individualization might be:  \"Parent requests to be called daily at 9am for status\", \"I have a hard time hearing out of my right ear\", or \"Do not touch me to wake me up as it startles me\".  Outcome: Progressing     Problem: Plan of Care - These are the overarching goals to be used throughout the patient stay.    Goal: Plan of Care Review  Description: The Plan of Care Review/Shift note should be completed every shift.  The Outcome Evaluation is a brief statement about your assessment that the patient is improving, declining, or no change.  This information will be displayed automatically on your shift note.  Outcome: Progressing     Problem: Plan of Care - These are the overarching goals to be used throughout the patient stay.    Goal: Readiness for Transition of Care  Outcome: Progressing     Problem: Plan of Care - These are the overarching goals to be used throughout the patient stay.    Goal: Optimal Comfort and Wellbeing  Outcome: Progressing                  "

## 2022-12-29 NOTE — PLAN OF CARE
Goal Outcome Evaluation:           Overall Patient Progress: no changeOverall Patient Progress: no change    Outcome Evaluation: unable to visit with pt this AM- pt sleeping soundly. underwt BMI and poor PO intake this admit. ordered Ensure to add additional energy/protein intake    Huyen Del Toro RD, LD

## 2022-12-29 NOTE — PROGRESS NOTES
[facility]    Progress Note - Hospitalist Service       Date of Admission:  12/23/2022    Assessment & Plan   Chris Bell is a 87 year old female admitted on 12/23/2022. Previously admitted to our service from 12/14/22-12/19/22 for HFpEF exacerbation and community acquired pneumonia. She presented today with vague symptoms (ongoing bilateral leg pain, ongoing shortness of breath) but ultimately admitted for leukocytosis with WBC of 42.1, unknown source.  Planning to transition to hospice cares.  Consult in place.        Cognitive decline  Goals of care  Patient with increased behaviors, forgetfulness, confusion over the past year. At last admission, it was communicated to tyrel Chaney that this is likely dementia/cognitive decline.  Recommended to son that they start talking as a family about goals of care moving forward given Chris doesn't like being hospitalized.  Care conference with son Shiv, he would like her to remain comfortable.  He is requesting hospice at this time due to worsening cognitive decline/health status.  Confirmed family does not want any further life-prolonging measures, with goals to keep her comfortable.  Discussed that she may pass away with worsening anemia secondary to GI bleed as below.  They understand and are in agreement that they do not want further work-up or intervention  -Outpatient hospice consult  -Tyrel Chaney is a point of contact for any changes in clinical status.  -Oxycodone for pain    Leukocytosis, resolved  Infectious vs Leukemoid Reaction   Leukocytosis noted on admission with WBC level of 43.9. Neutrophil count of 42.1. Procal 5.92. Lactic acid non-elevated. Patient febrile up to 100.7 degrees. Infectious work up has included a urinalysis which was negative for evidence of UTI, viral panel negative for influenza, covid, or RSV.  No obvious evidence of pneumonia on chest x-ray.  CT abdomen without evidence of a source.  C. difficile negative.   Again unclear etiology, leukocytosis has resolved. Remains afebrile.  With plans to transition to hospice, will discontinue Zosyn.  - Tylenol PRN for fever       Hematochezia  Acute on chronic macrocytic anemia  Hemoglobin slowly down trended since admission status post 1 unit PRBCs on 12/27/2022.  First episode of bright red bloody stool 12/28/2022. Recent EGD in October showed a duodenal nonbleeding ulcer, biopsy consistent with edema and acute duodenitis, negative for dysplasia and malignancy.  Hemoglobin relatively stable at this point. With plans to transition to hospice, will not work-up or intervene. Continue to provide comfort centered cares.      HFpEF (EF 55-60%)   Acute heart failure exacerbation  Patient with history of HFpEF with most recent ECHO on 12/15/22 showing EF 55-60%, severe RV, LA, RA dilation, moderate-severe tricuspid regurgitation, mild-mod mitral regurg, non-collapsing IVC.  Recent hospitalization, discharged with Bumex 1 mg daily. Appears the patient may not have received increased dose as intended following discharge.  Pulmonary congestion noted on 12/24/2022, diuresed well with 40 mg IV Lasix x3 .  Second episode of increased work of breathing on 12/26.  Chest x-ray showing worsening pulmonary edema with new pleural effusion, responded well to IV Lasix.  Stable overnight, no new oxygen requirements.  Continue with PTA dose of diuretics.  Recommend additional dose of IV Lasix if work of breathing worsens.  -Bumex 1 mg daily  - Hold metoprolol   -Provide IV Lasix for comfort if worsening respiratory status       Acute hypoxic respiratory failure, improving  Likely in setting of acute heart failure exacerbation as above.  Also considered pneumonia vs PE, but lower suspicion.  Possibly component of symptomatic anemia.  Cardiac work-up including troponin and EKG again negative. Continues to require 1 to 2 L nasal cannula.  -Diuresis as above  -Continue Zosyn  -Supplemental oxygen as needed  for comfort       Coccyx pain  Erythematous region of the coccyx noted.  Concern for pressure ulcer with limited mobility.  We will have wound care assess.  Continue to reposition.        Hypokalemia  Likely in setting of diuresis.       Acute kidney injury, improving  Creatinine of 1.49, BUN of 33.4 on admission.    Initially improving, now again trending back up.  Possibly cardiorenal vs diuretic induced.        Chronic hyponatremia, stable  Sodium of 129 on admission, down from 132 5 days prior on discharge. Asymptomatic. Work up on prior admissions negative for adrenal insufficiency. Nephrology previously consulted and recommended salt tabs.            Hypothyroidism   Noted in chart. PTA medications include levothyroxine.   - Continue PTA levothyroxine      Hypertension  Blood pressure well controlled.  We will hold amiloride for now.  Also holding metoprolol.     Left Inguinal hernia   Noted on CT, now contains unobstructed short loop of small bowel       Diet: Room Service  Combination Diet Regular Diet; Thin Liquids (level 0) (with medications)  Fluid restriction 1200 ML FLUID    DVT Prophylaxis: Pneumatic Compression Devices  Javed Catheter: Not present  Fluids: Oral  Central Lines: PRESENT  PICC 12/27/22 Double Lumen Right Basilic Vascular access-Site Assessment: WDL  Cardiac Monitoring: None  Code Status: No CPR- Do NOT Intubate      Disposition Plan     Expected Discharge Date: 12/29/2022    Discharge Delays: Lab Result Pending (enter specific test & time in comments)    Discharge Comments: BC pending  12/27: HGB 6.7 ; K 3.0 getting k bumps x 4  12/28care conference, possible hospice involvement        The patient's care was discussed with the Dr Hugo Lay MD  Hospitalist Service  [facility]  Securely message with the Vocera Web Console (learn more here)  Text page via [employer] Paging/Directory         Clinically Significant Risk Factors        # Hypokalemia:  Lowest K = 3 mmol/L in last 2 days, will replace as needed  # Hyponatremia: Lowest Na = 132 mmol/L in last 2 days, will monitor as appropriate      # Hypoalbuminemia: Lowest albumin = 2.2 g/dL at 12/24/2022  7:25 AM, will monitor as appropriate                   ______________________________________________________________________    Interval History   Episode of bloody stool overnight with clots.  Remained hemodynamically stable, slightly tachycardic.  Hemoglobin 8.1 this morning.  Continues to have loose stools.  Mild abdominal pain as well.  Tolerating breakfast this morning.  No difficulties with breathing.  Feeling okay otherwise.    Data reviewed today: I reviewed all medications, new labs and imaging results over the last 24 hours.    Physical Exam   Vital Signs: Temp: 97.5  F (36.4  C) Temp src: Axillary BP: 133/83 Pulse: 112   Resp: 20 SpO2: 98 % O2 Device: Oxymask Oxygen Delivery: 2 LPM  Weight: 94 lbs 8 oz  General Appearance: Chronically ill.  No acute distress.  Respiratory: Comfortable respiratory effort.  No conversational dyspnea scattered crackles on exam.  Cardiovascular: Regular rate and rhythm.   GI: Nondistended.  Bowel sounds present.  Soft.  Neuro: Neutral affect.  Fluent speech.    Extremities -minimal lower extremity edema.    Data   Recent Labs   Lab 12/29/22  0525 12/29/22  0244 12/28/22  2045 12/28/22  0703 12/27/22  1810 12/27/22  0702 12/24/22  2313 12/24/22  0725   WBC 9.7  --  11.4* 12.1*   < > 13.2*   < > 28.7*   HGB 8.1* 7.9* 8.8* 9.5*   < > 6.7*   < > 7.2*   *  --  104* 106*   < > 112*   < > 113*     --  253 242   < > 230   < > 204   *  --   --  132*  --  137   < > 135*   POTASSIUM 3.4  --  3.7 3.0*   < > 3.0*   < > 3.4   CHLORIDE 96*  --   --  92*  --  97*   < > 99   CO2 26  --   --  27  --  26   < > 21*   BUN 21.7  --   --  21.5  --  20.6   < > 25.7*   CR 1.33*  --   --  1.46*  --  1.50*   < > 1.03*   ANIONGAP 10  --   --  13  --  14   < > 15   MANE 6.7*  --    --  7.2*  --  7.1*   < > 7.1*   GLC 91  --   --  92  --  117*   < > 95   ALBUMIN  --   --   --   --   --   --   --  2.2*   PROTTOTAL  --   --   --   --   --   --   --  7.6   BILITOTAL  --   --   --   --   --   --   --  0.3   ALKPHOS  --   --   --   --   --   --   --  91   ALT  --   --   --   --   --   --   --  6*   AST  --   --   --   --   --   --   --  16    < > = values in this interval not displayed.

## 2022-12-29 NOTE — PLAN OF CARE
"Goal Outcome Evaluation:             Pt was having several moroon color loose stools, c/o abdominal pain, SOB with HR up to 121, house officer was paged and came to assess pt, Last BM was noted with blood clots, House resident is aware and did ordered Hgb lab check. Pt's son's Shiv was aware as pt already informed son and writer also get to talked to son. Awaiting Lab result at this time. So far a total of five large loose stools since 3 pm with the last one notable of blood clots. Will continue to monitor     At 2153 pm, Hgb lab result came back 8.8. Pt is resting at this time, will monitor.    Problem: Plan of Care - These are the overarching goals to be used throughout the patient stay.    Goal: Patient-Specific Goal (Individualized)  Description: You can add care plan individualizations to a care plan. Examples of Individualization might be:  \"Parent requests to be called daily at 9am for status\", \"I have a hard time hearing out of my right ear\", or \"Do not touch me to wake me up as it startles me\".  Outcome: Progressing     Problem: Plan of Care - These are the overarching goals to be used throughout the patient stay.    Goal: Optimal Comfort and Wellbeing  Outcome: Progressing       "

## 2022-12-29 NOTE — SIGNIFICANT EVENT
Significant Event Note    Time of event: 8:31 PM December 28, 2022    Description of event:  Paged by nurse,  bloody stool, bright red with clots about 400cc. No prior melena/bloody stool since admission prior to this evening. Will continue to monitor and check hgb level. Pt is s/p 1 pRBC 12/27.     Plan:  Cbc stat  Transfuse if Hgb less than 7  If she continues to have bloody stool and becomes vitally unstable will transfuse get tagged RBC scan. Otherwise can have GI consult and further workup of GI bleed in the AM.   - will repeat Hgb q4h and monitor closely.         Discussed with: bedside nurse      CARY Staton  [facility] PGY1

## 2022-12-29 NOTE — PROGRESS NOTES
Care Management Follow Up    Length of Stay (days): 6    Expected Discharge Date: 12/29/2022     Concerns to be Addressed:  Outpatient hospice consult, BC pending, IV ABX     Patient plan of care discussed at interdisciplinary rounds: Yes    Anticipated Discharge Disposition:       Anticipated Discharge Services:    Anticipated Discharge DME:      Patient/family educated on Medicare website which has current facility and service quality ratings:    Education Provided on the Discharge Plan:    Patient/Family in Agreement with the Plan:      Referrals Placed by CM/SW:    Private pay costs discussed: Not applicable    Additional Information:  Chart review:  Pt lives at a customized living home. Adult foster care home. Contact at the [facility] is [employer] [phone]. She is total care at baseline. Foster Care staff are her PCAs. Uses walker and WC for mobility. Pt would like to return to foster home. Pt will need  Health transport at discharge.     Pending outpatient hospice consult      Juanis Nazario RN

## 2022-12-29 NOTE — PLAN OF CARE
Goal Outcome Evaluation:      Plan of Care Reviewed With: patient    Overall Patient Progress: no changeOverall Patient Progress: no change    Outcome Evaluation: Pt was not feeling well this morning, but she improved throughout the day.  Pt has several small, maroon, congealed bowel movements. Pt had abdominal pain, but Tylenol seemed to help.  Pt ate well today and slept between cares.

## 2022-12-29 NOTE — PROGRESS NOTES
CLINICAL NUTRITION SERVICES  -  ASSESSMENT NOTE      Recommendations Ordered by Registered Dietitian (RD):   ordered chocolate Ensure Enlive @ 10 am    Malnutrition:   % Weight Loss:  > 10% in 6 months (severe malnutrition) - 13% loss  % Intake:  </= 50% for >/= 5 days (severe malnutrition)  Subcutaneous Fat Loss:  Orbital region mild depletion  Muscle Loss:  Temporal region mild depletion and Clavicle bone region mild depletion - suspect some is age-related  Fluid Retention:  Mild - bilateral hand edema     Malnutrition Diagnosis: Severe malnutrition in the context of -  Acute illness or injury  Chronic illness or disease       REASON FOR ASSESSMENT  Chris Bell is a 87 year old female seen by Registered Dietitian for Admission Nutrition Risk Screen for positive (unsure wt loss, yes decreased appetite)      NUTRITION HISTORY  - Information obtained from chart review  - Unable to obtain nutrition history from pt d/t pt sleeping during visit attempt this AM  - PMH of CHF (EF 55-60%), HTN, PUD, SIADH, hx of cancer of soft palate  - presented to ED with bilateral leg pain  - recently admitted to [facility] from 12/14/22 to 12/19/22 (5 days). The patient was admitted for CHF exacerbation and pneumonia. Patient started on IV Bumex (1 mg), transitioned to home oral regimen (0.5 mg) on 12/17. Echo on 12/15 showing EF 55-60%  - per ED note, pt reported feeling depressed 2/2 pain  - per chart review, pt is known to this RD service. Last seen March, April (received 2g Na diet education), and October 2022    CURRENT NUTRITION ORDERS  Diet Order: 1200 mL Fluid Restriction and Regular  - RSWA    Current Intake/Tolerance:  - per nursing flow sheet, 25-50% intakes documented, mostly 50% recently   - per health touch, pt receiving TID meals. Typically receives Hmong post-partum meal, broths, vegetables or rice. Likes hot chocolate- will send chocolate Ensure      NUTRITION FOCUSED PHYSICAL ASSESSMENT FOR DIAGNOSING  "MALNUTRITION)  Yes         Observed:    Muscle wasting (refer to documentation in Malnutrition section) and Subcutaneous fat loss (refer to documentation in Malnutrition section)    Obtained from Chart/Interdisciplinary Team:  Pressure Injury see below    ANTHROPOMETRICS  Height: 5' 0\"  Weight: 94 lbs 8 oz  Body mass index is 18.46 kg/m .  Weight Status:  Underweight BMI <18.5  IBW: 45.5 kg  % IBW: 94%  Weight History: variable wts suspected d/t fluid status. Wt loss of 6.4 kg (13%) in 6 months  12/23/22 : 42.9 kg (94 lb 8 oz)  12/19/22 : 42.6 kg (94 lb)  10/12/22 : 43.1 kg (95 lb)  08/17/22 : 43.1 kg (95 lb)  08/02/22 : 43.2 kg (95 lb 3.2 oz)  06/30/22 : 50.8 kg (112 lb)  06/22/22 : 49.3 kg (108 lb 9.6 oz)  06/04/22 : 49.9 kg (110 lb)  05/25/22 : 50.5 kg (111 lb 5.3 oz)  05/23/22 : 52.2 kg (115 lb)  04/16/22 : 53.3 kg (117 lb 8 oz)  04/06/22 : 54.4 kg (120 lb)  03/20/22 : 54.4 kg (120 lb)  02/15/22 : 52 kg (114 lb 10.9 oz)  08/18/21 : 53.1 kg (117 lb)    LABS  Labs reviewed: Na 132 (L), creatinine 1.33 (H)    MEDICATIONS  Medications reviewed: bumex, levothyroxine, protonix, sodium chloride tablet    PER CHART REVIEW:  General:  - Hmong speaking. Per H&P, Chris has notable cognitive decline and discussion is limited even with Hmong   - pt remains confused and is very anxious  - family care conference TBD for GOC discussion. Potential discharge with hospice   - 12/26: significant event = tachycardia and shortness of breath  - 12/24: RRT called for new onset chest pain, SOB  - per MD note, At last admission, it was communicated to son Shiv that this is likely dementia/cognitive decline.    GI/Nutrition:  - 12/28: significant event = bloody stool, bright red with clots about 400cc  - 12/24: C.diff negative    Skin: WOC following, 12/28  Skin Injury Location: Coccyx  Skin injury due to: Blanchable erythema due to prominent coccyx  Skin history and plan of care:   Unknown  STATUS: initial " assessment    Resp.: 2L oxymask      ASSESSED NUTRITION NEEDS PER APPROVED PRACTICE GUIDELINES:  Dosing Weight: 42.9 kg (actual, admit wt)  Estimated Energy Needs: 7735-1429+ kcals (35-40+ Kcal/Kg)  Justification: underweight, repletion  Estimated Protein Needs: 52-77 grams protein (1.2-1.8 g pro/Kg)  Justification: Repletion, preservation of lean body mass and increased needs r/t age  Estimated Fluid Needs: per provider pending fluid status    MALNUTRITION:  % Weight Loss:  > 10% in 6 months (severe malnutrition) - 13% loss  % Intake:  </= 50% for >/= 5 days (severe malnutrition)  Subcutaneous Fat Loss:  Orbital region mild depletion  Muscle Loss:  Temporal region mild depletion and Clavicle bone region mild depletion - suspect some is age-related  Fluid Retention:  Mild - bilateral hand edema     Malnutrition Diagnosis: Severe malnutrition in the context of -  Acute illness or injury  Chronic illness or disease    NUTRITION DIAGNOSIS:  Malnutrition related to suspected poor appetite 2/2 cognitive decline, language barrier, and chronic illnesses as evidenced by 13% wt loss over past 6 months, BMI of 18.46, <50% intakes since admit x6 days      NUTRITION INTERVENTIONS  Recommendations / Nutrition Prescription  - ordered chocolate Ensure Enlive @ 10 am   - Nutrition education: Not appropriate at this time due to patient condition      Implementation  Medical Food Supplement     Nutrition Goals  Patient to consume >50% of nutritionally adequate meals TID with supplement over the next 5-7 days.  Maintain wt >42 kg over the next 5-7 days.      MONITORING AND EVALUATION:  Progress towards goals will be monitored and evaluated per protocol and Practice Guidelines      Huyen Del Toro RD, LD

## 2022-12-29 NOTE — PLAN OF CARE
"Goal Outcome Evaluation: Slept better this shift. Hgb q 4 hr with lab results of 8.8 and 7.9 respectively. I smear stool this shift, will continued to monitor.           Problem: Plan of Care - These are the overarching goals to be used throughout the patient stay.    Goal: Patient-Specific Goal (Individualized)  Description: You can add care plan individualizations to a care plan. Examples of Individualization might be:  \"Parent requests to be called daily at 9am for status\", \"I have a hard time hearing out of my right ear\", or \"Do not touch me to wake me up as it startles me\".  12/29/2022 0440 by Fatou Islas RN  Outcome: Progressing  12/28/2022 1653 by Fatou Islas RN  Outcome: Progressing     Problem: Plan of Care - These are the overarching goals to be used throughout the patient stay.    Goal: Plan of Care Review  Description: The Plan of Care Review/Shift note should be completed every shift.  The Outcome Evaluation is a brief statement about your assessment that the patient is improving, declining, or no change.  This information will be displayed automatically on your shift note.  12/29/2022 0440 by Fatou Islas RN  Outcome: Progressing  12/28/2022 1653 by Fatou Islas RN  Outcome: Progressing     Problem: Plan of Care - These are the overarching goals to be used throughout the patient stay.    Goal: Absence of Hospital-Acquired Illness or Injury  Intervention: Identify and Manage Fall Risk  Recent Flowsheet Documentation  Taken 12/28/2022 2324 by Fatou Islas RN  Safety Promotion/Fall Prevention: bed alarm on  Taken 12/28/2022 1550 by Fatou Islas RN  Safety Promotion/Fall Prevention: bed alarm on     Problem: Plan of Care - These are the overarching goals to be used throughout the patient stay.    Goal: Absence of Hospital-Acquired Illness or Injury  Intervention: Prevent Skin Injury  Recent Flowsheet Documentation  Taken 12/28/2022 2324 by Fatou Isals RN  Body " Position:   supine, legs elevated   supine, head elevated  Taken 12/28/2022 1550 by Fatou Islas, RN  Body Position:   supine, legs elevated   supine, head elevated

## 2022-12-30 NOTE — PROGRESS NOTES
[facility]    Progress Note - Hospitalist Service       Date of Admission:  12/23/2022    Assessment & Plan   Chris Bell is a 87 year old female admitted on 12/23/2022. Previously admitted to our service from 12/14/22-12/19/22 for HFpEF exacerbation and community acquired pneumonia. She presented today with vague symptoms (ongoing bilateral leg pain, ongoing shortness of breath) but ultimately admitted for leukocytosis with WBC of 42.1, unknown source.  Planning to transition to hospice cares, referral out with [facility] hospice.  They are reviewing    Medically stable to discharge with hospice.  Referral in place, currently being evaluated.  Plan to discharge this afternoon if supplemental oxygen no longer needed.         Cognitive decline  Goals of care  Patient with increased behaviors, forgetfulness, confusion over the past year. At last admission, it was communicated to tyrel Chaney that this is likely dementia/cognitive decline.  Recommended to son that they start talking as a family about goals of care moving forward given Chris doesn't like being hospitalized.  Care conference with tyrel Chaney, he would like her to remain comfortable.  He is requesting hospice at this time due to worsening cognitive decline/health status.  Confirmed family does not want any further life-prolonging measures, with goals to keep her comfortable.  They understand and are in agreement that they do not want further work-up or intervention.   -Outpatient hospice consult -family requesting [facility] hospice.  Care management will assist with this.  -Tyrel Chaney is a point of contact for any changes in clinical status.  -Oxycodone for pain  -PTA lorazepam    Abdominal pain   Was having abdominal pain with loose stools.  Improved this morning.  No recurrence of bloody stool.  Abdominal exam reassuring this morning.  Continue with pain medications as above.  We will hold off on further work-up with hospice  cares pending.      Extremity numbness/weakness  Hypomagnesemia  Began experiencing paresthesias in her upper and lower extremities bilaterally.  On exam, sensation does appear intact.  Magnesium found to be low, could be etiology of weakness/paresthesias.  Will replace.  - Mg replacement protocol       Leukocytosis, resolved  Infectious vs Leukemoid Reaction   Leukocytosis noted on admission with WBC level of 43.9. Neutrophil count of 42.1. Procal 5.92. Lactic acid non-elevated. Patient febrile up to 100.7 degrees. Infectious work up has included a urinalysis which was negative for evidence of UTI, viral panel negative for influenza, covid, or RSV.  No obvious evidence of pneumonia on chest x-ray.  CT abdomen without evidence of a source.  C. difficile negative.  Again unclear etiology, leukocytosis has resolved. Remains afebrile.  With plans to transition to hospice, will discontinue Zosyn.  - Tylenol PRN for fever         Hematochezia  Acute on chronic macrocytic anemia  Hemoglobin slowly down trended since admission status post 1 unit PRBCs on 12/27/2022.  First episode of bright red bloody stool 12/28/2022. Recent EGD in October showed a duodenal nonbleeding ulcer, biopsy consistent with edema and acute duodenitis, negative for dysplasia and malignancy.  Hemoglobin relatively stable at this point. With plans to transition to hospice, will not work-up or intervene.   -Consider transfusion for symptomatic anemia       HFpEF (EF 55-60%)   Acute heart failure exacerbation  Patient with history of HFpEF with most recent ECHO on 12/15/22 showing EF 55-60%, severe RV, LA, RA dilation, moderate-severe tricuspid regurgitation, mild-mod mitral regurg, non-collapsing IVC.  Recent hospitalization, discharged with Bumex 1 mg daily. Appears the patient may not have received increased dose as intended following discharge.  Pulmonary congestion noted on 12/24/2022, diuresed well with 40 mg IV Lasix x3 .  Second episode of  increased work of breathing on 12/26.  Chest x-ray showing worsening pulmonary edema with new pleural effusion, responded well to IV Lasix.  Stable overnight, no new oxygen requirements.  Continue with PTA dose of diuretics.  Recommend additional dose of IV Lasix if work of breathing worsens.  -Bumex 1 mg daily  -Restart metoprolol  -Provide IV Lasix for comfort if worsening respiratory status        Acute hypoxic respiratory failure, improving  Likely in setting of acute heart failure exacerbation as above.  Also considered pneumonia vs PE, but lower suspicion.  Possibly component of symptomatic anemia.  Cardiac work-up including troponin and EKG again negative. Continues to require 1 to 2 L nasal cannula..  We will shut this off and see how she does.  Likely able to discharge to home with maintaining O2 saturations above 90% if not, will plan for home O2 evaluation at discharge 12/31/2022  -PTA diuretics  -Supplemental oxygen as needed for comfort        Coccyx pain  Erythematous region of the coccyx noted.  Concern for pressure ulcer with limited mobility.  Wound care following.  Continue to reposition.        Hypokalemia  Likely in setting of diuresis.         Acute kidney injury, resolved  Creatinine of 1.49, BUN of 33.4 on admission.  Now normalized        Chronic hyponatremia, stable  Sodium of 129 on admission, down from 132 5 days prior on discharge. Asymptomatic. Work up on prior admissions negative for adrenal insufficiency. Nephrology previously consulted and recommended salt tabs.            Hypothyroidism   Noted in chart. PTA medications include levothyroxine.   - Continue PTA levothyroxine      Hypertension  Blood pressure well controlled.  We will hold amiloride for now.     Left Inguinal hernia   Noted on CT, now contains unobstructed short loop of small bowel     Diet: Room Service  Combination Diet Regular Diet; Thin Liquids (level 0) (with medications)  Fluid restriction 1200 ML  FLUID  Snacks/Supplements Adult: Ensure Enlive; Between Meals    DVT Prophylaxis: None, hospice pending  Javed Catheter: Not present  Fluids: Oral  Central Lines: PRESENT  PICC 12/27/22 Double Lumen Right Basilic Vascular access-Site Assessment: WDL  Cardiac Monitoring: None  Code Status: No CPR- Do NOT Intubate      Disposition Plan     Expected Discharge Date: 12/30/2022    Discharge Delays: Lab Result Pending (enter specific test & time in comments)    Discharge Comments: BC pending  12/29 Hgb 8.1  care conference needed  plan to return to foster home with hospice        The patient's care was discussed with Dr. Cricket Lay MD  Hospitalist Service  [facility]  Securely message with the Vocera Web Console (learn more here)  Text page via [employer] Paging/Directory         Clinically Significant Risk Factors         # Hyponatremia: Lowest Na = 132 mmol/L in last 2 days, will monitor as appropriate      # Hypoalbuminemia: Lowest albumin = 2.2 g/dL at 12/24/2022  7:25 AM, will monitor as appropriate            # Severe Malnutrition: based on nutrition assessment        ______________________________________________________________________    Interval History   Patient some abdominal pain overnight.  Also some numbness and tingling in her upper extremities in a stocking glove distribution.  Basic lab work was done including CBC BMP UA.  Lab work overall looked improved from priors.    Feels well today, still some numbness in her upper and lower extremities bilaterally.  Abdominal pain has improved.  No diarrhea.  No fever or chills.  No shortness of breath or chest pain.        Data reviewed today: I reviewed all medications, new labs and imaging results over the last 24 hours.     Physical Exam   Vital Signs: Temp: 97.7  F (36.5  C) Temp src: Oral BP: (!) 141/83 Pulse: 94   Resp: 18 SpO2: 99 % O2 Device: Nasal cannula Oxygen Delivery: 2 LPM  Weight: 94 lbs 8 oz  General  Appearance: Chronically ill.  Comfortable.  No acute distress.  Respiratory: Comfortable respiratory effort.  Bibasilar crackles present.  Moving air well bilaterally.  Cardiovascular: Regular rate and rhythm.    GI: Nondistended.  Bowel sounds present.  Soft nontender  Neuro: Neutral affect.  Fluent speech.  Sensation to light and sharp intact bilaterally in the upper and lower extremities.  Moving all 4 extremities.      Data   Recent Labs   Lab 12/30/22  0148 12/29/22  0525 12/29/22  0244 12/28/22  2045 12/28/22  0703 12/24/22  2313 12/24/22  0725   WBC 7.0 9.7  --  11.4* 12.1*   < > 28.7*   HGB 8.2* 8.1* 7.9* 8.8* 9.5*   < > 7.2*   * 109*  --  104* 106*   < > 113*    251  --  253 242   < > 204   * 132*  --   --  132*   < > 135*   POTASSIUM 3.7 3.4  --  3.7 3.0*   < > 3.4   CHLORIDE 97* 96*  --   --  92*   < > 99   CO2 28 26  --   --  27   < > 21*   BUN 17.2 21.7  --   --  21.5   < > 25.7*   CR 0.94 1.33*  --   --  1.46*   < > 1.03*   ANIONGAP 8 10  --   --  13   < > 15   MANE 7.0* 6.7*  --   --  7.2*   < > 7.1*   GLC 86 91  --   --  92   < > 95   ALBUMIN  --   --   --   --   --   --  2.2*   PROTTOTAL  --   --   --   --   --   --  7.6   BILITOTAL  --   --   --   --   --   --  0.3   ALKPHOS  --   --   --   --   --   --  91   ALT  --   --   --   --   --   --  6*   AST  --   --   --   --   --   --  16    < > = values in this interval not displayed.

## 2022-12-30 NOTE — SIGNIFICANT EVENT
Significant Event Note    Time of event: 1:13 AM December 30, 2022    Description of event:  House officer notified of patient experiencing tingling in arms and legs, as well as abdominal pain that radiates to vaginal area and rectum. Pt is an 86yo female w/hx of cognitive decline, HFpEF, admitted with leukocytosis and dyspnea. Pt presently in process of transitioning to hospice.     Tingling in arms and legs started at about 1-2PM today. Described as stocking/glove distribution.     Abdominal pain started up this evening. Pain starts in lower abdomen and radiates to vagina and rectum. Pt describes persistent diarrhea. She says that she experiences burning with urination as well as bowel movmements    BP (!) 141/83 (BP Location: Left arm)   Pulse 94   Temp 97.7  F (36.5  C) (Oral)   Resp 18   Ht 1.524 m (5')   Wt 42.9 kg (94 lb 8 oz)   SpO2 99%   BMI 18.46 kg/m      GENERAL: resting in bed, alert and no distress  RESP: No increased work of breathing, NC in place, coarse lung sounds in lower fields bilaterally  CV: No cyanosis, RRR, no murmur  ABD: NABS, mildly tender to deep palpation, no masses palpated.  MS: no gross musculoskeletal defects noted, no edema  SKIN: no suspicious lesions or rashes  NEURO: strength and sensation appropriate in UE and LE bilaterally.      Plan:  Discussed care with daughter who is present. Pt in the process of trasnitioning to hospice, however would like tests to be done to see if there is anything we can do to improve comfort.  -Check CBC, tingling may be related to anemia, would transfuse if symptomatic anemia.  -Check BMP given neuropathic symptoms  -check UA to evaluate dysuria.  -Oxycodone for pain    Discussed with: patient's family/emergency contact and bedside nurse    Addendum:  Labs reassuring, will continue to monitor for changes in symptoms. Pt is vitally stable. Continue pain management as necessary.    Emil Menchaca, DO

## 2022-12-30 NOTE — PROGRESS NOTES
Care Management Follow Up    Length of Stay (days): 7    Expected Discharge Date: 12/30/2022     Concerns to be Addressed:       Patient plan of care discussed at interdisciplinary rounds: Yes    Anticipated Discharge Disposition:       Anticipated Discharge Services:    Anticipated Discharge DME:      Patient/family educated on Medicare website which has current facility and service quality ratings:    Education Provided on the Discharge Plan:    Patient/Family in Agreement with the Plan:      Referrals Placed by CM/SW:    Private pay costs discussed: transportation costs    Additional Information:  SW met with patient and son in room to discuss hospice options. Son would like patient to discharge with [facility] hospice. SW sent referral. SW spoke to patient's son about private pay costs for transport and he is agreeable.    SW called [facility] contact [facility] ([phone]) to inform her of discharge plans.    12:48 PM  SW set up w/c for patient at 5:45. [facility] hospice plans to meet with patient and family at [facility].     Molly Song

## 2022-12-30 NOTE — PLAN OF CARE
Goal Outcome Evaluation:    Assist of 2 per pt request but pt could be assist of one.  Sat in chair from 11-1.  Pt tolerated her meals today. Purewick in place.  Did receive mag iv per the protocol for mag 1.0. family visisting today.  To transfer today via wheelchiar to her foster group home.

## 2022-12-30 NOTE — DISCHARGE SUMMARY
[facility]  Discharge Summary - Medicine & Pediatrics       Date of Admission:  12/23/2022  Date of Discharge:  12/30/2022  Discharging Provider: Juan Lay MD  Discharge Service: Hospitalist Service    Discharge Diagnoses   HFpEF  Cognitive decline  History of duodenal ulcer with recurrent GI bleed  Hypothyroidism  Chronic hyponatremia  Acute on chronic macrocytic anemia    Follow-ups Needed After Discharge     Unresulted Labs Ordered in the Past 30 Days of this Admission     Date and Time Order Name Status Description    12/27/2022  6:06 PM Bld morphology pathology review In process       Patient transitioning to hospice, no need for lab follow-up.    Discharge Disposition   Discharged to home, referral for hospice ([place])  Condition at discharge: Stable    Hospital Course   Chris Bell is a 87 year old female admitted on 12/23/2022. Previously admitted to our service from 12/14/22-12/19/22 for HFpEF exacerbation and community acquired pneumonia. She presented today with vague symptoms (ongoing bilateral leg pain, ongoing shortness of breath) but ultimately admitted for leukocytosis with WBC of 42.1, unknown source.  Planning to transition to hospice cares.  Consult in place.         Cognitive decline  Goals of care  Patient with increased behaviors, forgetfulness, confusion over the past year. At last admission, it was communicated to son Shiv that this is likely dementia/cognitive decline.  Recommended to son that they start talking as a family about goals of care moving forward given Chris doesn't like being hospitalized.  Underwent care conference with son, opted for hospice cares.  Confirmed they would no longer want any life prolonging measures, would be okay with treatments for comfort.  Family requested [facility].  Referral in place.     Leukocytosis, resolved  Infectious vs Leukemoid Reaction   Leukocytosis noted on admission with WBC level of 43.9. Neutrophil  count of 42.1. Procal 5.92. Lactic acid non-elevated. Patient febrile up to 100.7 degrees. Infectious work up was unremarkable for source.  Leukocytosis resolved while on Zosyn, which was discontinued after decision to obtain hospice referral.        Hematochezia  Acute on chronic macrocytic anemia  Hemoglobin had slowly down trended since admission status post 1 unit PRBCs on 12/27/2022.  First episode of bright red bloody stool 12/28/2022. Recent EGD in October showed a duodenal nonbleeding ulcer, biopsy consistent with edema and acute duodenitis, negative for dysplasia and malignancy.  Hemoglobin remained relatively stable.  No recurrence of melena/hematochezia.  With plans for hospice, no further work-up done.        HFpEF (EF 55-60%)   Acute heart failure exacerbation  Patient with history of HFpEF with most recent ECHO on 12/15/22 showing EF 55-60%, severe RV, LA, RA dilation, moderate-severe tricuspid regurgitation, mild-mod mitral regurg, non-collapsing IVC.  Patient a couple episodes of increased work of breathing/hypoxia while hospitalized.  Work-up suggested acute heart failure exacerbation.  Responded well to IV diuretics.  Will discharge to home on her home regimen of Bumex.      Coccyx pain  Erythematous region of the coccyx noted.  Evaluated by wound care.            Consultations This Hospital Stay   CARE MANAGEMENT / SOCIAL WORK IP CONSULT  PHARMACY TO DOSE VANCO  VASCULAR ACCESS ADULT IP CONSULT  VASCULAR ACCESS ADULT IP CONSULT  WOUND OSTOMY CONTINENCE NURSE  IP CONSULT  INPATIENT HOSPICE ADULT CONSULT    Code Status   No CPR- Do NOT Intubate       The patient was discussed with Dr. Cricket Lay MD  [place] [facility] [place] [address]  Phone: [phone]  Fax: [phone]  ______________________________________________________________________    Physical Exam   Vital Signs: Temp: 98.1  F (36.7  C) Temp src: Oral BP:  (!) 143/82 Pulse: 110   Resp: 18 SpO2: 94 % O2 Device: Nasal cannula Oxygen Delivery: 1 LPM  Weight: 94 lbs 8 oz  General Appearance:  Chronically ill.  Comfortable.  No acute distress.  Respiratory: Comfortable respiratory effort.  Bibasilar crackles present.  Moving air well bilaterally.  Cardiovascular: Regular rate and rhythm.    GI: Nondistended.  Bowel sounds present.  Soft nontender  Neuro: Neutral affect.  Fluent speech.  Sensation to light and sharp intact bilaterally in the upper and lower extremities.  Moving all 4 extremities      Primary Care Physician   INOCENCIA PRINGLE    Discharge Orders      Reason for your hospital stay    Your admitted to the hospital due to weakness and shortness of breath.  You are found to have elevated white blood cell count suggesting infection.  You are given antibiotics with improvement.  You are also found to have a likely heart failure exacerbation.  You were given medications to decrease the water in your lungs, your breathing improved significantly.    The decision was made to move towards hospice cares.  We put in a hospice referral while you are in the hospital.  We will help you get this arranged as an outpatient.     Follow-up and recommended labs and tests     Please follow-up with the hospice care team within the next week.  You can also follow-up with your primary care provider within the next 7 days to ensure hospice has been arranged.     Activity    Your activity upon discharge: activity as tolerated     Diet    Follow this diet upon discharge: Orders Placed This Encounter      Snacks/Supplements Adult: Ensure Enlive; Between Meals      Combination Diet Regular Diet; Thin Liquids (level 0) (with medications)       Significant Results and Procedures   Most Recent 3 CBC's:  Recent Labs   Lab Test 12/30/22  0148 12/29/22  0525 12/29/22  0244 12/28/22  2045   WBC 7.0 9.7  --  11.4*   HGB 8.2* 8.1* 7.9* 8.8*   * 109*  --  104*    251  --  253      Most Recent 3 BMP's:  Recent Labs   Lab Test 12/30/22  0148 12/29/22  0525 12/28/22  2045 12/28/22  0703   * 132*  --  132*   POTASSIUM 3.7 3.4 3.7 3.0*   CHLORIDE 97* 96*  --  92*   CO2 28 26  --  27   BUN 17.2 21.7  --  21.5   CR 0.94 1.33*  --  1.46*   ANIONGAP 8 10  --  13   MANE 7.0* 6.7*  --  7.2*   GLC 86 91  --  92   ,   Results for orders placed or performed during the hospital encounter of 12/23/22   XR Chest 2 Views    Narrative    EXAM: XR CHEST 2 VIEWS  LOCATION: [facility]  DATE/TIME: 12/23/2022 3:30 PM    INDICATION: fatigue  COMPARISON: 12/14/2022.      Impression    IMPRESSION: There has been partial clearing of the bilateral airspace opacities. There is persistent mild cardiac enlargement and mild pulmonary vascular congestion. No pneumothorax or definite pleural effusion. Findings are consistent with improving   pneumonia or improving asymmetric pulmonary edema.   CT Abdomen Pelvis w Contrast    Narrative    EXAM: CT ABDOMEN PELVIS W CONTRAST  LOCATION: [facility]  DATE/TIME: 12/24/2022 2:18 PM    INDICATION: Significant leukocytosis. Distended abdomen. Evaluating for abdominal source of infection.  COMPARISON: 11/12/2022  TECHNIQUE: CT scan of the abdomen and pelvis was performed following injection of IV contrast. Multiplanar reformats were obtained. Dose reduction techniques were used.  CONTRAST: 75ml isovue 370    FINDINGS:   LOWER CHEST: Coarse interstitial prominence at both lung bases similar to previous exam. Trace volume left pleural effusion stable. Cardiomegaly.    HEPATOBILIARY: Normal.    PANCREAS: Normal.    SPLEEN: Normal.    ADRENAL GLANDS: Normal.    KIDNEYS/BLADDER: Small left hilar calcification appears to be vascular and is stable. No urinary tract stone or hydronephrosis. Bladder unremarkable.    BOWEL: No obstruction or inflammatory change. Appendix normal.    LYMPH NODES: Normal.    VASCULATURE:  Unremarkable.    PELVIC ORGANS: Hysterectomy. No pelvic mass. No ascites.    MUSCULOSKELETAL: Modest left inguinal hernia now contains an unobstructed short loop of small bowel. Osteopenia with compression fractures of T12, L2 and L4 unchanged.      Impression    IMPRESSION:   1.  No etiology for patient's symptoms evident. Nothing obstructive or inflammatory involving bowel.  2.  Modest left inguinal hernia.   XR Chest Port 1 View    Narrative    EXAM: XR CHEST PORT 1 VIEW  LOCATION: [facility]  DATE/TIME: 12/24/2022 11:28 PM    INDICATION: Chest pain  COMPARISON: 12/23/2022      Impression    IMPRESSION: Cardiac enlargement. Pulmonary vascular congestion. Findings compatible with CHF/volume overload. Patchy areas of alveolar infiltrate in the perihilar regions and lower lungs have increased since prior which can be seen with pulmonary edema   or pneumonia. Minimal left basilar pleural fluid. Aortic calcification. Degenerative changes in the spine and shoulders.   XR Chest Port 1 View    Narrative    EXAM: XR CHEST PORT 1 VIEW  LOCATION: [facility]  DATE/TIME: 12/26/2022 10:37 PM    INDICATION: Hypoxemia  COMPARISON: 12/24/2022      Impression    IMPRESSION: Interval development of increased alveolar opacity in both mid to lower lungs suspicious for infiltrate possibly related to pneumonia or edema. Cardiac enlargement. Pulmonary vascular congestion. Bilateral pleural fluid collections, new on   the right. Degenerative changes in the spine.       Discharge Medications   Current Discharge Medication List      START taking these medications    Details   oxyCODONE (ROXICODONE) 5 MG tablet Take 1 tablet (5 mg) by mouth every 4 hours as needed for severe pain (7-10)  Qty: 10 tablet, Refills: 0    Associated Diagnoses: Other chronic pain         CONTINUE these medications which have NOT CHANGED    Details   acetaminophen (TYLENOL) 500 MG tablet Take 500 mg by mouth 2  times daily      alum & mag hydroxide-simethicone (MAALOX) 200-200-20 MG/5ML SUSP suspension Take 15 mLs by mouth every 4 hours as needed for indigestion or heartburn      aMILoride (MIDAMOR) 5 MG tablet Take 5 mg by mouth daily      bumetanide (BUMEX) 1 MG tablet Take 1 tablet (1 mg) by mouth daily  Qty: 90 tablet, Refills: 1    Associated Diagnoses: Acute on chronic heart failure with preserved ejection fraction (H)      calcium carbonate (TUMS) 500 MG chewable tablet Take 1 chew tab by mouth every 4 hours as needed for heartburn      calcium carbonate-vitamin D (OSCAL W/D) 500-200 MG-UNIT tablet Take 1 tablet by mouth 2 times daily      cetirizine (ZYRTEC) 5 MG tablet Take 5 mg by mouth every evening      chlorhexidine (PERIDEX) 0.12 % solution Swish and spit 15 mLs in mouth 2 times daily Twice a day after brushing teeth      guaiFENesin (ROBITUSSIN) 100 MG/5ML SYRP Take 5-10 mLs by mouth every 6 hours as needed for cough      levothyroxine (SYNTHROID/LEVOTHROID) 50 MCG tablet Take 50 mcg by mouth every morning      loperamide (IMODIUM) 2 MG capsule Take 1 capsule (2 mg) by mouth 4 times daily as needed for diarrhea  Qty: 60 capsule, Refills: 0    Associated Diagnoses: Diarrhea, unspecified type      LORazepam (ATIVAN) 0.5 MG tablet Take 0.5-1 mg by mouth 3 times daily 2 tablets qam and 1 tablet qafternoon and 2 tablets qpm      melatonin 5 MG tablet Take 5 mg by mouth At Bedtime      metoprolol succinate ER (TOPROL-XL) 50 MG 24 hr tablet Take 100 mg by mouth daily      nitroGLYcerin (NITROSTAT) 0.4 MG sublingual tablet Place 0.4 mg under the tongue every 5 minutes as needed for chest pain For chest pain place 1 tablet under the tongue every 5 minutes for 3 doses. If symptoms persist 5 minutes after 1st dose call 911.      nystatin (MYCOSTATIN) 975836 UNIT/ML suspension Swish and swallow 5 mLs (500,000 Units) in mouth 4 times daily    Associated Diagnoses: Oral ulcer      oxybutynin ER (DITROPAN XL) 5 MG 24 hr  tablet Take 5 mg by mouth daily      pantoprazole (PROTONIX) 40 MG EC tablet Take 1 tablet (40 mg) by mouth daily    Associated Diagnoses: PUD (peptic ulcer disease)      potassium chloride ER (K-TAB/KLOR-CON) 10 MEQ CR tablet Take 20 mEq by mouth daily      sodium chloride 1 GM tablet Take 1 tablet (1 g) by mouth 2 times daily (with meals)  Qty: 60 tablet, Refills: 3    Associated Diagnoses: SIADH (syndrome of inappropriate ADH production) (H)      sucralfate (CARAFATE) 1 GM tablet Take 1 tablet (1 g) by mouth 4 times daily  Qty: 30 tablet, Refills: 0    Associated Diagnoses: PUD (peptic ulcer disease)      vitamin B-12 (CYANOCOBALAMIN) 250 MCG tablet Take 250 mcg by mouth daily         STOP taking these medications       loperamide (IMODIUM A-D) 2 MG tablet Comments:   Reason for Stopping:             Allergies   Allergies   Allergen Reactions     Unknown [No Clinical Screening - See Comments]      Pt states she has a medication allergy but does not know what it is

## 2022-12-30 NOTE — PLAN OF CARE
"  Problem: Plan of Care - These are the overarching goals to be used throughout the patient stay.    Goal: Plan of Care Review  Description: The Plan of Care Review/Shift note should be completed every shift.  The Outcome Evaluation is a brief statement about your assessment that the patient is improving, declining, or no change.  This information will be displayed automatically on your shift note.  Outcome: Progressing  Goal: Patient-Specific Goal (Individualized)  Description: You can add care plan individualizations to a care plan. Examples of Individualization might be:  \"Parent requests to be called daily at 9am for status\", \"I have a hard time hearing out of my right ear\", or \"Do not touch me to wake me up as it startles me\".  Outcome: Progressing  Goal: Absence of Hospital-Acquired Illness or Injury  Outcome: Progressing  Intervention: Identify and Manage Fall Risk  Recent Flowsheet Documentation  Taken 12/30/2022 0000 by Lashawn Caldera RN  Safety Promotion/Fall Prevention: bed alarm on  Intervention: Prevent Skin Injury  Recent Flowsheet Documentation  Taken 12/30/2022 0000 by Lashawn Caldera RN  Body Position: position changed independently  Intervention: Prevent and Manage VTE (Venous Thromboembolism) Risk  Recent Flowsheet Documentation  Taken 12/30/2022 0000 by Lashawn Caldera RN  VTE Prevention/Management: SCDs (sequential compression devices) on  Goal: Optimal Comfort and Wellbeing  Outcome: Progressing  Intervention: Monitor Pain and Promote Comfort  Recent Flowsheet Documentation  Taken 12/30/2022 0420 by Lashawn Caldera RN  Pain Management Interventions: medication (see MAR)  Taken 12/30/2022 0026 by Lashawn Caldera RN  Pain Management Interventions:   medication (see MAR)   emotional support  Goal: Readiness for Transition of Care  Outcome: Progressing   Goal Outcome Evaluation:                        "

## 2022-12-30 NOTE — PLAN OF CARE
Goal Outcome Evaluation:         Pt is AXO. PT c/o pain  via  and was given prn tylenol. Pt was educated on her discharge and Pt seemed not aware, Pt wanted to stay one more night. Pt said she wanted staff to put her on NC but Pt was sating 97% on RA.Pt was told that she does not need Oxygen , Pt was told her O2 reading. PT's son was called and he was aware and Pt's PICC line was removed.

## 2022-12-30 NOTE — PLAN OF CARE
Problem: Plan of Care - These are the overarching goals to be used throughout the patient stay.    Goal: Optimal Comfort and Wellbeing  Outcome: Progressing     Problem: Anemia  Goal: Anemia Symptom Improvement  Outcome: Progressing  Intervention: Monitor and Manage Anemia  Recent Flowsheet Documentation  Taken 12/29/2022 2300 by PRADEEP DUBOSE  Safety Promotion/Fall Prevention: bed alarm on  Taken 12/29/2022 1700 by PRADEEP DUBOSE  Safety Promotion/Fall Prevention: bed alarm on   Goal Outcome Evaluation:    Pt continues to have cramping and maroon colored runny stools; pt reports 5/10 pain in abdomen; pts daughter is sleeping at bedside with approval from ; daughter brought food from home; pt ate well; purewick in place

## 2023-03-19 NOTE — PLAN OF CARE
Problem: Plan of Care - These are the overarching goals to be used throughout the patient stay.    Goal: Optimal Comfort and Wellbeing  Outcome: Progressing   Goal Outcome Evaluation:         Pt. Pleasant and cooperative, hmong  utilized for conversation, pt. Up to bathroom with assist of 1 and walker, uses purewick in bed, pt. Denies pain, pt. Down for thyroid US this evening, regular diet and remains on 1000cc fluid restriction, will cont to monitor.                   No

## 2024-07-03 NOTE — PROGRESS NOTES
Care Management Follow Up    Length of Stay (days): 7    Expected Discharge Date: 08/05/2022        Concerns to be Addressed:  monitoring hgb        Patient plan of care discussed at interdisciplinary rounds: Yes     Anticipated Discharge Disposition: Home Care, Group Home     Anticipated Discharge Services: PCA  Anticipated Discharge DME: Walker      Education Provided on the Discharge Plan:  Per care team  Patient/Family in Agreement with the Plan: yes     Referrals Placed by CM/SW: home care PT/OT        Additional Information:  Patient covid recovered. Admitted for anemia, hypoxia, hyponatremia. On room air, 1:1 for safety.     Social history per initial CM consult:  Pt from home with family , daughter is PCA. Per son , plan is to admit to [facility] which is a private adult foster care home. Russ is main contact at the home [phone]. Russ reports pt must be covid recovered to admit.(ER notes indicate pt tested positive for COVID on 7/19/2022 ). May need to verify with infection control. Care management will follow and keep family and Xong updated.     Spoke with son Shiv who confirms plan is discharge to Foster home. He states patient is aware of this plan and requests [facility] to transport.  He will meet patient at the group home.  Spoke with Russ who confirms patient can admit to the foster home today or tomorrow.     1:05 PM Accepted for home care PT/OT by [facility] [phone].    3:29 PM [facility] ride-stretcher scheduled for tomorrow at 1430. Updated Shiv and Russ. Any new prescriptions will need to be filled here and sent with patient.           Leona Coon RN         Plan of care , labs , orders and progress notes reviewed.

## (undated) DEVICE — SOL WATER IRRIG 1000ML BOTTLE 2F7114

## (undated) DEVICE — TUBING SUCTION MEDI-VAC 1/4"X20' N620A - HE

## (undated) DEVICE — SUCTION MANIFOLD NEPTUNE 2 SYS 1 PORT 702-025-000

## (undated) DEVICE — FORCEP BIOPSY 2.3MM DISP COATED 000388

## (undated) RX ORDER — PROPOFOL 10 MG/ML
INJECTION, EMULSION INTRAVENOUS
Status: DISPENSED
Start: 2022-01-01

## (undated) RX ORDER — ONDANSETRON 2 MG/ML
INJECTION INTRAMUSCULAR; INTRAVENOUS
Status: DISPENSED
Start: 2022-01-01

## (undated) RX ORDER — HEPARIN SODIUM (PORCINE) LOCK FLUSH IV SOLN 100 UNIT/ML 100 UNIT/ML
SOLUTION INTRAVENOUS
Status: DISPENSED
Start: 2022-01-01